# Patient Record
Sex: FEMALE | Race: WHITE | Employment: OTHER | URBAN - METROPOLITAN AREA
[De-identification: names, ages, dates, MRNs, and addresses within clinical notes are randomized per-mention and may not be internally consistent; named-entity substitution may affect disease eponyms.]

---

## 2017-11-13 PROBLEM — S80.01XA CONTUSION OF RIGHT KNEE: Status: ACTIVE | Noted: 2017-11-13

## 2017-11-13 PROBLEM — S13.9XXA NECK SPRAIN: Status: ACTIVE | Noted: 2017-11-13

## 2017-11-13 PROBLEM — T14.8XXA ABRASION: Status: ACTIVE | Noted: 2017-11-13

## 2018-04-08 ENCOUNTER — APPOINTMENT (OUTPATIENT)
Dept: GENERAL RADIOLOGY | Age: 83
End: 2018-04-08
Payer: MEDICARE

## 2018-04-08 ENCOUNTER — HOSPITAL ENCOUNTER (EMERGENCY)
Age: 83
Discharge: HOME | End: 2018-04-08
Attending: EMERGENCY MEDICINE
Payer: MEDICARE

## 2018-04-08 VITALS
HEART RATE: 80 BPM | OXYGEN SATURATION: 96 % | DIASTOLIC BLOOD PRESSURE: 70 MMHG | WEIGHT: 160 LBS | RESPIRATION RATE: 18 BRPM | TEMPERATURE: 98 F | SYSTOLIC BLOOD PRESSURE: 144 MMHG | HEIGHT: 63 IN | BODY MASS INDEX: 28.35 KG/M2

## 2018-04-08 DIAGNOSIS — J06.9 UPPER RESPIRATORY TRACT INFECTION, UNSPECIFIED TYPE: Primary | ICD-10-CM

## 2018-04-08 LAB
ALBUMIN SERPL-MCNC: 3.8 G/DL (ref 3.5–5.2)
ALP BLD-CCNC: 81 U/L (ref 35–104)
ALT SERPL-CCNC: 8 U/L (ref 0–32)
ANION GAP SERPL CALCULATED.3IONS-SCNC: 9 MMOL/L (ref 7–16)
AST SERPL-CCNC: 17 U/L (ref 0–31)
BASOPHILS ABSOLUTE: 0.02 E9/L (ref 0–0.2)
BASOPHILS RELATIVE PERCENT: 0.3 % (ref 0–2)
BILIRUB SERPL-MCNC: 0.2 MG/DL (ref 0–1.2)
BUN BLDV-MCNC: 12 MG/DL (ref 8–23)
CALCIUM SERPL-MCNC: 8.8 MG/DL (ref 8.6–10.2)
CHLORIDE BLD-SCNC: 102 MMOL/L (ref 98–107)
CO2: 28 MMOL/L (ref 22–29)
CREAT SERPL-MCNC: 0.8 MG/DL (ref 0.5–1)
EKG ATRIAL RATE: 69 BPM
EKG P AXIS: 77 DEGREES
EKG P-R INTERVAL: 194 MS
EKG Q-T INTERVAL: 434 MS
EKG QRS DURATION: 80 MS
EKG QTC CALCULATION (BAZETT): 465 MS
EKG R AXIS: -3 DEGREES
EKG T AXIS: -16 DEGREES
EKG VENTRICULAR RATE: 69 BPM
EOSINOPHILS ABSOLUTE: 0.12 E9/L (ref 0.05–0.5)
EOSINOPHILS RELATIVE PERCENT: 1.9 % (ref 0–6)
GFR AFRICAN AMERICAN: >60
GFR NON-AFRICAN AMERICAN: >60 ML/MIN/1.73
GLUCOSE BLD-MCNC: 100 MG/DL (ref 74–109)
HCT VFR BLD CALC: 39.2 % (ref 34–48)
HEMOGLOBIN: 12.9 G/DL (ref 11.5–15.5)
IMMATURE GRANULOCYTES #: 0.02 E9/L
IMMATURE GRANULOCYTES %: 0.3 % (ref 0–5)
INFLUENZA A BY PCR: NOT DETECTED
INFLUENZA B BY PCR: NOT DETECTED
LACTIC ACID: 0.8 MMOL/L (ref 0.5–2.2)
LYMPHOCYTES ABSOLUTE: 1.23 E9/L (ref 1.5–4)
LYMPHOCYTES RELATIVE PERCENT: 19.4 % (ref 20–42)
MCH RBC QN AUTO: 30.4 PG (ref 26–35)
MCHC RBC AUTO-ENTMCNC: 32.9 % (ref 32–34.5)
MCV RBC AUTO: 92.5 FL (ref 80–99.9)
MONOCYTES ABSOLUTE: 0.72 E9/L (ref 0.1–0.95)
MONOCYTES RELATIVE PERCENT: 11.4 % (ref 2–12)
NEUTROPHILS ABSOLUTE: 4.22 E9/L (ref 1.8–7.3)
NEUTROPHILS RELATIVE PERCENT: 66.7 % (ref 43–80)
PDW BLD-RTO: 13.2 FL (ref 11.5–15)
PLATELET # BLD: 280 E9/L (ref 130–450)
PMV BLD AUTO: 9.6 FL (ref 7–12)
POTASSIUM SERPL-SCNC: 3.9 MMOL/L (ref 3.5–5)
RBC # BLD: 4.24 E12/L (ref 3.5–5.5)
SODIUM BLD-SCNC: 139 MMOL/L (ref 132–146)
TOTAL PROTEIN: 7.1 G/DL (ref 6.4–8.3)
TROPONIN: <0.01 NG/ML (ref 0–0.03)
WBC # BLD: 6.3 E9/L (ref 4.5–11.5)

## 2018-04-08 PROCEDURE — 85025 COMPLETE CBC W/AUTO DIFF WBC: CPT

## 2018-04-08 PROCEDURE — 36415 COLL VENOUS BLD VENIPUNCTURE: CPT

## 2018-04-08 PROCEDURE — 84484 ASSAY OF TROPONIN QUANT: CPT

## 2018-04-08 PROCEDURE — 87502 INFLUENZA DNA AMP PROBE: CPT

## 2018-04-08 PROCEDURE — 2580000003 HC RX 258: Performed by: EMERGENCY MEDICINE

## 2018-04-08 PROCEDURE — 99284 EMERGENCY DEPT VISIT MOD MDM: CPT

## 2018-04-08 PROCEDURE — 96374 THER/PROPH/DIAG INJ IV PUSH: CPT

## 2018-04-08 PROCEDURE — 6360000002 HC RX W HCPCS: Performed by: EMERGENCY MEDICINE

## 2018-04-08 PROCEDURE — 80053 COMPREHEN METABOLIC PANEL: CPT

## 2018-04-08 PROCEDURE — 83605 ASSAY OF LACTIC ACID: CPT

## 2018-04-08 PROCEDURE — 71045 X-RAY EXAM CHEST 1 VIEW: CPT

## 2018-04-08 RX ORDER — PREDNISONE 10 MG/1
TABLET ORAL
Qty: 20 TABLET | Refills: 0 | Status: SHIPPED | OUTPATIENT
Start: 2018-04-08 | End: 2018-04-18

## 2018-04-08 RX ORDER — 0.9 % SODIUM CHLORIDE 0.9 %
1000 INTRAVENOUS SOLUTION INTRAVENOUS ONCE
Status: COMPLETED | OUTPATIENT
Start: 2018-04-08 | End: 2018-04-08

## 2018-04-08 RX ORDER — METHYLPREDNISOLONE SODIUM SUCCINATE 125 MG/2ML
125 INJECTION, POWDER, LYOPHILIZED, FOR SOLUTION INTRAMUSCULAR; INTRAVENOUS ONCE
Status: COMPLETED | OUTPATIENT
Start: 2018-04-08 | End: 2018-04-08

## 2018-04-08 RX ORDER — CODEINE PHOSPHATE AND GUAIFENESIN 10; 100 MG/5ML; MG/5ML
5 SOLUTION ORAL 3 TIMES DAILY PRN
Qty: 150 ML | Refills: 0 | Status: SHIPPED | OUTPATIENT
Start: 2018-04-08 | End: 2018-04-13

## 2018-04-08 RX ADMIN — SODIUM CHLORIDE 1000 ML: 9 INJECTION, SOLUTION INTRAVENOUS at 17:53

## 2018-04-08 RX ADMIN — METHYLPREDNISOLONE SODIUM SUCCINATE 125 MG: 125 INJECTION, POWDER, FOR SOLUTION INTRAMUSCULAR; INTRAVENOUS at 17:42

## 2018-04-08 ASSESSMENT — ENCOUNTER SYMPTOMS
DIARRHEA: 0
VOMITING: 0
NAUSEA: 0
EYE DISCHARGE: 0
EYE PAIN: 0
ABDOMINAL DISTENTION: 0
WHEEZING: 0
COUGH: 1
SORE THROAT: 0
SHORTNESS OF BREATH: 1
SINUS PRESSURE: 0
EYE REDNESS: 0
BACK PAIN: 0

## 2018-04-08 ASSESSMENT — PAIN DESCRIPTION - PAIN TYPE: TYPE: ACUTE PAIN

## 2018-04-08 ASSESSMENT — PAIN DESCRIPTION - ORIENTATION: ORIENTATION: RIGHT;LEFT

## 2018-04-08 ASSESSMENT — PAIN SCALES - GENERAL: PAINLEVEL_OUTOF10: 10

## 2018-04-08 ASSESSMENT — PAIN DESCRIPTION - DESCRIPTORS: DESCRIPTORS: ACHING;SORE

## 2018-04-08 ASSESSMENT — PAIN DESCRIPTION - LOCATION: LOCATION: ABDOMEN;HEAD;BACK

## 2018-04-14 ENCOUNTER — APPOINTMENT (OUTPATIENT)
Dept: GENERAL RADIOLOGY | Age: 83
End: 2018-04-14
Payer: MEDICARE

## 2018-04-14 ENCOUNTER — HOSPITAL ENCOUNTER (EMERGENCY)
Age: 83
Discharge: HOME | End: 2018-04-14
Payer: MEDICARE

## 2018-04-14 VITALS
RESPIRATION RATE: 18 BRPM | TEMPERATURE: 97.9 F | HEART RATE: 80 BPM | SYSTOLIC BLOOD PRESSURE: 148 MMHG | BODY MASS INDEX: 27.31 KG/M2 | DIASTOLIC BLOOD PRESSURE: 70 MMHG | HEIGHT: 64 IN | OXYGEN SATURATION: 96 % | WEIGHT: 160 LBS

## 2018-04-14 DIAGNOSIS — J40 BRONCHITIS: Primary | ICD-10-CM

## 2018-04-14 LAB
ANION GAP SERPL CALCULATED.3IONS-SCNC: 11 MMOL/L (ref 7–16)
BUN BLDV-MCNC: 19 MG/DL (ref 8–23)
CALCIUM SERPL-MCNC: 8.5 MG/DL (ref 8.6–10.2)
CHLORIDE BLD-SCNC: 102 MMOL/L (ref 98–107)
CO2: 26 MMOL/L (ref 22–29)
CREAT SERPL-MCNC: 0.9 MG/DL (ref 0.5–1)
EKG ATRIAL RATE: 82 BPM
EKG P AXIS: 70 DEGREES
EKG P-R INTERVAL: 182 MS
EKG Q-T INTERVAL: 404 MS
EKG QRS DURATION: 82 MS
EKG QTC CALCULATION (BAZETT): 472 MS
EKG R AXIS: -6 DEGREES
EKG T AXIS: 46 DEGREES
EKG VENTRICULAR RATE: 82 BPM
GFR AFRICAN AMERICAN: >60
GFR NON-AFRICAN AMERICAN: >60 ML/MIN/1.73
GLUCOSE BLD-MCNC: 162 MG/DL (ref 74–109)
HCT VFR BLD CALC: 37.6 % (ref 34–48)
HEMOGLOBIN: 12.7 G/DL (ref 11.5–15.5)
MCH RBC QN AUTO: 30.9 PG (ref 26–35)
MCHC RBC AUTO-ENTMCNC: 33.8 % (ref 32–34.5)
MCV RBC AUTO: 91.5 FL (ref 80–99.9)
PDW BLD-RTO: 13.1 FL (ref 11.5–15)
PLATELET # BLD: 370 E9/L (ref 130–450)
PMV BLD AUTO: 9.4 FL (ref 7–12)
POTASSIUM SERPL-SCNC: 4.4 MMOL/L (ref 3.5–5)
PRO-BNP: 396 PG/ML (ref 0–450)
RBC # BLD: 4.11 E12/L (ref 3.5–5.5)
SODIUM BLD-SCNC: 139 MMOL/L (ref 132–146)
TROPONIN: <0.01 NG/ML (ref 0–0.03)
WBC # BLD: 8 E9/L (ref 4.5–11.5)

## 2018-04-14 PROCEDURE — 85027 COMPLETE CBC AUTOMATED: CPT

## 2018-04-14 PROCEDURE — 83880 ASSAY OF NATRIURETIC PEPTIDE: CPT

## 2018-04-14 PROCEDURE — 80048 BASIC METABOLIC PNL TOTAL CA: CPT

## 2018-04-14 PROCEDURE — 94664 DEMO&/EVAL PT USE INHALER: CPT

## 2018-04-14 PROCEDURE — 6370000000 HC RX 637 (ALT 250 FOR IP): Performed by: PHYSICIAN ASSISTANT

## 2018-04-14 PROCEDURE — 84484 ASSAY OF TROPONIN QUANT: CPT

## 2018-04-14 PROCEDURE — 71046 X-RAY EXAM CHEST 2 VIEWS: CPT

## 2018-04-14 PROCEDURE — 99284 EMERGENCY DEPT VISIT MOD MDM: CPT

## 2018-04-14 RX ORDER — DOXYCYCLINE HYCLATE 100 MG
100 TABLET ORAL 2 TIMES DAILY
Qty: 20 TABLET | Refills: 0 | Status: SHIPPED | OUTPATIENT
Start: 2018-04-14 | End: 2018-04-24

## 2018-04-14 RX ORDER — ALBUTEROL SULFATE 90 UG/1
2 AEROSOL, METERED RESPIRATORY (INHALATION) EVERY 4 HOURS PRN
Qty: 1 INHALER | Refills: 1 | Status: SHIPPED | OUTPATIENT
Start: 2018-04-14 | End: 2018-06-04 | Stop reason: ALTCHOICE

## 2018-04-14 RX ORDER — IPRATROPIUM BROMIDE AND ALBUTEROL SULFATE 2.5; .5 MG/3ML; MG/3ML
1 SOLUTION RESPIRATORY (INHALATION) ONCE
Status: COMPLETED | OUTPATIENT
Start: 2018-04-14 | End: 2018-04-14

## 2018-04-14 RX ADMIN — IPRATROPIUM BROMIDE AND ALBUTEROL SULFATE 1 AMPULE: .5; 3 SOLUTION RESPIRATORY (INHALATION) at 00:57

## 2018-04-14 ASSESSMENT — PAIN DESCRIPTION - LOCATION: LOCATION: CHEST

## 2018-04-14 ASSESSMENT — PAIN SCALES - GENERAL: PAINLEVEL_OUTOF10: 8

## 2018-04-14 ASSESSMENT — PAIN DESCRIPTION - PAIN TYPE: TYPE: ACUTE PAIN

## 2018-04-14 ASSESSMENT — PAIN DESCRIPTION - FREQUENCY: FREQUENCY: INTERMITTENT

## 2018-04-14 ASSESSMENT — PAIN DESCRIPTION - DESCRIPTORS: DESCRIPTORS: ACHING

## 2018-06-04 ENCOUNTER — APPOINTMENT (OUTPATIENT)
Dept: GENERAL RADIOLOGY | Age: 83
DRG: 305 | End: 2018-06-04
Payer: MEDICARE

## 2018-06-04 ENCOUNTER — HOSPITAL ENCOUNTER (INPATIENT)
Age: 83
LOS: 1 days | Discharge: HOME | DRG: 305 | End: 2018-06-05
Attending: INTERNAL MEDICINE | Admitting: INTERNAL MEDICINE
Payer: MEDICARE

## 2018-06-04 DIAGNOSIS — I10 ESSENTIAL HYPERTENSION: ICD-10-CM

## 2018-06-04 DIAGNOSIS — R55 NEAR SYNCOPE: Primary | ICD-10-CM

## 2018-06-04 LAB
ANION GAP SERPL CALCULATED.3IONS-SCNC: 8 MMOL/L (ref 7–16)
BASOPHILS ABSOLUTE: 0.02 E9/L (ref 0–0.2)
BASOPHILS RELATIVE PERCENT: 0.5 % (ref 0–2)
BUN BLDV-MCNC: 13 MG/DL (ref 8–23)
CALCIUM SERPL-MCNC: 8.6 MG/DL (ref 8.6–10.2)
CHLORIDE BLD-SCNC: 106 MMOL/L (ref 98–107)
CO2: 28 MMOL/L (ref 22–29)
CREAT SERPL-MCNC: 0.6 MG/DL (ref 0.5–1)
EOSINOPHILS ABSOLUTE: 0.1 E9/L (ref 0.05–0.5)
EOSINOPHILS RELATIVE PERCENT: 2.3 % (ref 0–6)
GFR AFRICAN AMERICAN: >60
GFR NON-AFRICAN AMERICAN: >60 ML/MIN/1.73
GLUCOSE BLD-MCNC: 98 MG/DL (ref 74–109)
HCT VFR BLD CALC: 36.5 % (ref 34–48)
HEMOGLOBIN: 11.9 G/DL (ref 11.5–15.5)
IMMATURE GRANULOCYTES #: 0.01 E9/L
IMMATURE GRANULOCYTES %: 0.2 % (ref 0–5)
LYMPHOCYTES ABSOLUTE: 1.2 E9/L (ref 1.5–4)
LYMPHOCYTES RELATIVE PERCENT: 27.8 % (ref 20–42)
MCH RBC QN AUTO: 30.7 PG (ref 26–35)
MCHC RBC AUTO-ENTMCNC: 32.6 % (ref 32–34.5)
MCV RBC AUTO: 94.1 FL (ref 80–99.9)
MONOCYTES ABSOLUTE: 0.46 E9/L (ref 0.1–0.95)
MONOCYTES RELATIVE PERCENT: 10.6 % (ref 2–12)
NEUTROPHILS ABSOLUTE: 2.53 E9/L (ref 1.8–7.3)
NEUTROPHILS RELATIVE PERCENT: 58.6 % (ref 43–80)
PDW BLD-RTO: 13.4 FL (ref 11.5–15)
PLATELET # BLD: 239 E9/L (ref 130–450)
PMV BLD AUTO: 9.8 FL (ref 7–12)
POTASSIUM SERPL-SCNC: 3.8 MMOL/L (ref 3.5–5)
RBC # BLD: 3.88 E12/L (ref 3.5–5.5)
SODIUM BLD-SCNC: 142 MMOL/L (ref 132–146)
TROPONIN: <0.01 NG/ML (ref 0–0.03)
WBC # BLD: 4.3 E9/L (ref 4.5–11.5)

## 2018-06-04 PROCEDURE — 71046 X-RAY EXAM CHEST 2 VIEWS: CPT

## 2018-06-04 PROCEDURE — 6370000000 HC RX 637 (ALT 250 FOR IP): Performed by: INTERNAL MEDICINE

## 2018-06-04 PROCEDURE — 2580000003 HC RX 258: Performed by: INTERNAL MEDICINE

## 2018-06-04 PROCEDURE — 85025 COMPLETE CBC W/AUTO DIFF WBC: CPT

## 2018-06-04 PROCEDURE — 99285 EMERGENCY DEPT VISIT HI MDM: CPT

## 2018-06-04 PROCEDURE — 80048 BASIC METABOLIC PNL TOTAL CA: CPT

## 2018-06-04 PROCEDURE — 2060000000 HC ICU INTERMEDIATE R&B

## 2018-06-04 PROCEDURE — 84484 ASSAY OF TROPONIN QUANT: CPT

## 2018-06-04 RX ORDER — DIAZEPAM 2 MG/1
2 TABLET ORAL NIGHTLY
Status: DISCONTINUED | OUTPATIENT
Start: 2018-06-04 | End: 2018-06-05 | Stop reason: HOSPADM

## 2018-06-04 RX ORDER — SODIUM CHLORIDE 0.9 % (FLUSH) 0.9 %
10 SYRINGE (ML) INJECTION PRN
Status: DISCONTINUED | OUTPATIENT
Start: 2018-06-04 | End: 2018-06-05 | Stop reason: HOSPADM

## 2018-06-04 RX ORDER — ACETAMINOPHEN 325 MG/1
650 TABLET ORAL EVERY 4 HOURS PRN
Status: DISCONTINUED | OUTPATIENT
Start: 2018-06-04 | End: 2018-06-05 | Stop reason: HOSPADM

## 2018-06-04 RX ORDER — SODIUM CHLORIDE 0.9 % (FLUSH) 0.9 %
10 SYRINGE (ML) INJECTION EVERY 12 HOURS SCHEDULED
Status: DISCONTINUED | OUTPATIENT
Start: 2018-06-04 | End: 2018-06-05 | Stop reason: HOSPADM

## 2018-06-04 RX ORDER — EZETIMIBE 10 MG/1
10 TABLET ORAL DAILY
Status: DISCONTINUED | OUTPATIENT
Start: 2018-06-05 | End: 2018-06-05 | Stop reason: HOSPADM

## 2018-06-04 RX ORDER — ASPIRIN 81 MG/1
81 TABLET, CHEWABLE ORAL DAILY
Status: DISCONTINUED | OUTPATIENT
Start: 2018-06-05 | End: 2018-06-05 | Stop reason: HOSPADM

## 2018-06-04 RX ORDER — CLONIDINE HYDROCHLORIDE 0.1 MG/1
0.1 TABLET ORAL EVERY 12 HOURS
Status: DISCONTINUED | OUTPATIENT
Start: 2018-06-04 | End: 2018-06-05

## 2018-06-04 RX ADMIN — Medication 10 ML: at 23:44

## 2018-06-04 RX ADMIN — DIAZEPAM 2 MG: 2 TABLET ORAL at 23:44

## 2018-06-04 ASSESSMENT — PAIN SCALES - GENERAL
PAINLEVEL_OUTOF10: 0
PAINLEVEL_OUTOF10: 0

## 2018-06-05 VITALS
WEIGHT: 169 LBS | RESPIRATION RATE: 18 BRPM | OXYGEN SATURATION: 97 % | SYSTOLIC BLOOD PRESSURE: 142 MMHG | DIASTOLIC BLOOD PRESSURE: 67 MMHG | HEART RATE: 75 BPM | BODY MASS INDEX: 29.95 KG/M2 | TEMPERATURE: 97.6 F | HEIGHT: 63 IN

## 2018-06-05 LAB
ANION GAP SERPL CALCULATED.3IONS-SCNC: 10 MMOL/L (ref 7–16)
BUN BLDV-MCNC: 14 MG/DL (ref 8–23)
CALCIUM SERPL-MCNC: 8.9 MG/DL (ref 8.6–10.2)
CHLORIDE BLD-SCNC: 105 MMOL/L (ref 98–107)
CO2: 26 MMOL/L (ref 22–29)
CREAT SERPL-MCNC: 0.6 MG/DL (ref 0.5–1)
GFR AFRICAN AMERICAN: >60
GFR NON-AFRICAN AMERICAN: >60 ML/MIN/1.73
GLUCOSE BLD-MCNC: 97 MG/DL (ref 74–109)
HCT VFR BLD CALC: 34.5 % (ref 34–48)
HEMOGLOBIN: 11.4 G/DL (ref 11.5–15.5)
LV EF: 65 %
LVEF MODALITY: NORMAL
MCH RBC QN AUTO: 31.1 PG (ref 26–35)
MCHC RBC AUTO-ENTMCNC: 33 % (ref 32–34.5)
MCV RBC AUTO: 94 FL (ref 80–99.9)
PDW BLD-RTO: 13.4 FL (ref 11.5–15)
PLATELET # BLD: 226 E9/L (ref 130–450)
PMV BLD AUTO: 9.9 FL (ref 7–12)
POTASSIUM SERPL-SCNC: 4 MMOL/L (ref 3.5–5)
PRO-BNP: 223 PG/ML (ref 0–450)
RBC # BLD: 3.67 E12/L (ref 3.5–5.5)
SODIUM BLD-SCNC: 141 MMOL/L (ref 132–146)
T3 FREE: 2.6 PG/ML (ref 2–4.4)
T4 FREE: 1.04 NG/DL (ref 0.93–1.7)
TROPONIN: <0.01 NG/ML (ref 0–0.03)
TROPONIN: <0.01 NG/ML (ref 0–0.03)
TSH SERPL DL<=0.05 MIU/L-ACNC: 2.56 UIU/ML (ref 0.27–4.2)
WBC # BLD: 3.8 E9/L (ref 4.5–11.5)

## 2018-06-05 PROCEDURE — 2580000003 HC RX 258: Performed by: INTERNAL MEDICINE

## 2018-06-05 PROCEDURE — 85027 COMPLETE CBC AUTOMATED: CPT

## 2018-06-05 PROCEDURE — 99232 SBSQ HOSP IP/OBS MODERATE 35: CPT | Performed by: INTERNAL MEDICINE

## 2018-06-05 PROCEDURE — 84481 FREE ASSAY (FT-3): CPT

## 2018-06-05 PROCEDURE — 6370000000 HC RX 637 (ALT 250 FOR IP): Performed by: INTERNAL MEDICINE

## 2018-06-05 PROCEDURE — APPSS45 APP SPLIT SHARED TIME 31-45 MINUTES: Performed by: NURSE PRACTITIONER

## 2018-06-05 PROCEDURE — 36415 COLL VENOUS BLD VENIPUNCTURE: CPT

## 2018-06-05 PROCEDURE — 84484 ASSAY OF TROPONIN QUANT: CPT

## 2018-06-05 PROCEDURE — 6370000000 HC RX 637 (ALT 250 FOR IP): Performed by: NURSE PRACTITIONER

## 2018-06-05 PROCEDURE — 84439 ASSAY OF FREE THYROXINE: CPT

## 2018-06-05 PROCEDURE — 80048 BASIC METABOLIC PNL TOTAL CA: CPT

## 2018-06-05 PROCEDURE — 93306 TTE W/DOPPLER COMPLETE: CPT

## 2018-06-05 PROCEDURE — 83880 ASSAY OF NATRIURETIC PEPTIDE: CPT

## 2018-06-05 PROCEDURE — 84443 ASSAY THYROID STIM HORMONE: CPT

## 2018-06-05 RX ORDER — AMLODIPINE BESYLATE 5 MG/1
5 TABLET ORAL DAILY
Qty: 30 TABLET | Refills: 3 | Status: ON HOLD | OUTPATIENT
Start: 2018-06-06 | End: 2019-10-29 | Stop reason: HOSPADM

## 2018-06-05 RX ORDER — AMLODIPINE BESYLATE 5 MG/1
5 TABLET ORAL DAILY
Status: DISCONTINUED | OUTPATIENT
Start: 2018-06-05 | End: 2018-06-05 | Stop reason: HOSPADM

## 2018-06-05 RX ADMIN — ASPIRIN 81 MG 81 MG: 81 TABLET ORAL at 09:37

## 2018-06-05 RX ADMIN — EZETIMIBE 10 MG: 10 TABLET ORAL at 09:37

## 2018-06-05 RX ADMIN — Medication 10 ML: at 09:38

## 2018-06-05 RX ADMIN — AMLODIPINE BESYLATE 5 MG: 5 TABLET ORAL at 09:37

## 2018-06-05 ASSESSMENT — PAIN SCALES - GENERAL: PAINLEVEL_OUTOF10: 0

## 2018-06-15 LAB
EKG ATRIAL RATE: 59 BPM
EKG P AXIS: 56 DEGREES
EKG P-R INTERVAL: 216 MS
EKG Q-T INTERVAL: 468 MS
EKG QRS DURATION: 84 MS
EKG QTC CALCULATION (BAZETT): 463 MS
EKG R AXIS: 10 DEGREES
EKG T AXIS: 35 DEGREES
EKG VENTRICULAR RATE: 59 BPM

## 2018-08-30 ENCOUNTER — APPOINTMENT (OUTPATIENT)
Dept: CT IMAGING | Age: 83
End: 2018-08-30
Payer: MEDICARE

## 2018-08-30 ENCOUNTER — HOSPITAL ENCOUNTER (OUTPATIENT)
Age: 83
Setting detail: OBSERVATION
Discharge: HOME | End: 2018-08-31
Attending: EMERGENCY MEDICINE | Admitting: INTERNAL MEDICINE
Payer: MEDICARE

## 2018-08-30 DIAGNOSIS — K57.92 ACUTE DIVERTICULITIS: Primary | ICD-10-CM

## 2018-08-30 PROBLEM — K57.33 DIVERTICULITIS LARGE INTESTINE W/O PERFORATION OR ABSCESS W/BLEEDING: Status: ACTIVE | Noted: 2018-08-30

## 2018-08-30 LAB
ALBUMIN SERPL-MCNC: 3.9 G/DL (ref 3.5–5.2)
ALP BLD-CCNC: 93 U/L (ref 35–104)
ALT SERPL-CCNC: 9 U/L (ref 0–32)
ANION GAP SERPL CALCULATED.3IONS-SCNC: 9 MMOL/L (ref 7–16)
AST SERPL-CCNC: 13 U/L (ref 0–31)
BACTERIA: ABNORMAL /HPF
BASOPHILS ABSOLUTE: 0.02 E9/L (ref 0–0.2)
BASOPHILS RELATIVE PERCENT: 0.3 % (ref 0–2)
BILIRUB SERPL-MCNC: 0.5 MG/DL (ref 0–1.2)
BILIRUBIN URINE: NEGATIVE
BLOOD, URINE: NEGATIVE
BUN BLDV-MCNC: 15 MG/DL (ref 8–23)
CALCIUM SERPL-MCNC: 9.4 MG/DL (ref 8.6–10.2)
CHLORIDE BLD-SCNC: 105 MMOL/L (ref 98–107)
CHP ED QC CHECK: YES
CLARITY: CLEAR
CO2: 27 MMOL/L (ref 22–29)
COLOR: YELLOW
CREAT SERPL-MCNC: 0.8 MG/DL (ref 0.5–1)
CRYSTALS, UA: ABNORMAL
EOSINOPHILS ABSOLUTE: 0.08 E9/L (ref 0.05–0.5)
EOSINOPHILS RELATIVE PERCENT: 1.4 % (ref 0–6)
EPITHELIAL CELLS, UA: ABNORMAL /HPF
GFR AFRICAN AMERICAN: >60
GFR NON-AFRICAN AMERICAN: >60 ML/MIN/1.73
GLUCOSE BLD-MCNC: 100 MG/DL (ref 74–109)
GLUCOSE URINE: NEGATIVE MG/DL
HCT VFR BLD CALC: 38.6 % (ref 34–48)
HEMOCCULT STL QL: NEGATIVE
HEMOGLOBIN: 12.8 G/DL (ref 11.5–15.5)
IMMATURE GRANULOCYTES #: 0.02 E9/L
IMMATURE GRANULOCYTES %: 0.3 % (ref 0–5)
KETONES, URINE: NEGATIVE MG/DL
LACTIC ACID: 1.1 MMOL/L (ref 0.5–2.2)
LEUKOCYTE ESTERASE, URINE: ABNORMAL
LIPASE: 15 U/L (ref 13–60)
LYMPHOCYTES ABSOLUTE: 1.02 E9/L (ref 1.5–4)
LYMPHOCYTES RELATIVE PERCENT: 17.7 % (ref 20–42)
MCH RBC QN AUTO: 30.8 PG (ref 26–35)
MCHC RBC AUTO-ENTMCNC: 33.2 % (ref 32–34.5)
MCV RBC AUTO: 93 FL (ref 80–99.9)
MONOCYTES ABSOLUTE: 0.62 E9/L (ref 0.1–0.95)
MONOCYTES RELATIVE PERCENT: 10.8 % (ref 2–12)
NEUTROPHILS ABSOLUTE: 4 E9/L (ref 1.8–7.3)
NEUTROPHILS RELATIVE PERCENT: 69.5 % (ref 43–80)
NITRITE, URINE: NEGATIVE
PDW BLD-RTO: 13.5 FL (ref 11.5–15)
PH UA: 5.5 (ref 5–9)
PLATELET # BLD: 289 E9/L (ref 130–450)
PMV BLD AUTO: 9.7 FL (ref 7–12)
POTASSIUM SERPL-SCNC: 3.6 MMOL/L (ref 3.5–5)
PROTEIN UA: NEGATIVE MG/DL
RBC # BLD: 4.15 E12/L (ref 3.5–5.5)
RBC UA: ABNORMAL /HPF (ref 0–2)
SODIUM BLD-SCNC: 141 MMOL/L (ref 132–146)
SPECIFIC GRAVITY UA: >=1.03 (ref 1–1.03)
TOTAL PROTEIN: 6.9 G/DL (ref 6.4–8.3)
UROBILINOGEN, URINE: 0.2 E.U./DL
WBC # BLD: 5.8 E9/L (ref 4.5–11.5)
WBC UA: ABNORMAL /HPF (ref 0–5)

## 2018-08-30 PROCEDURE — G0378 HOSPITAL OBSERVATION PER HR: HCPCS

## 2018-08-30 PROCEDURE — 85025 COMPLETE CBC W/AUTO DIFF WBC: CPT

## 2018-08-30 PROCEDURE — 2500000003 HC RX 250 WO HCPCS: Performed by: EMERGENCY MEDICINE

## 2018-08-30 PROCEDURE — 74176 CT ABD & PELVIS W/O CONTRAST: CPT

## 2018-08-30 PROCEDURE — 99285 EMERGENCY DEPT VISIT HI MDM: CPT

## 2018-08-30 PROCEDURE — 80053 COMPREHEN METABOLIC PANEL: CPT

## 2018-08-30 PROCEDURE — 6360000002 HC RX W HCPCS: Performed by: EMERGENCY MEDICINE

## 2018-08-30 PROCEDURE — 83690 ASSAY OF LIPASE: CPT

## 2018-08-30 PROCEDURE — 83605 ASSAY OF LACTIC ACID: CPT

## 2018-08-30 PROCEDURE — 2580000003 HC RX 258: Performed by: NURSE PRACTITIONER

## 2018-08-30 PROCEDURE — 81001 URINALYSIS AUTO W/SCOPE: CPT

## 2018-08-30 PROCEDURE — 96365 THER/PROPH/DIAG IV INF INIT: CPT

## 2018-08-30 RX ORDER — SODIUM CHLORIDE 0.9 % (FLUSH) 0.9 %
10 SYRINGE (ML) INJECTION PRN
Status: DISCONTINUED | OUTPATIENT
Start: 2018-08-30 | End: 2018-08-31 | Stop reason: HOSPADM

## 2018-08-30 RX ORDER — SODIUM CHLORIDE 0.9 % (FLUSH) 0.9 %
10 SYRINGE (ML) INJECTION EVERY 12 HOURS SCHEDULED
Status: DISCONTINUED | OUTPATIENT
Start: 2018-08-30 | End: 2018-08-31 | Stop reason: HOSPADM

## 2018-08-30 RX ORDER — 0.9 % SODIUM CHLORIDE 0.9 %
1000 INTRAVENOUS SOLUTION INTRAVENOUS ONCE
Status: COMPLETED | OUTPATIENT
Start: 2018-08-30 | End: 2018-08-30

## 2018-08-30 RX ORDER — CIPROFLOXACIN 2 MG/ML
400 INJECTION, SOLUTION INTRAVENOUS ONCE
Status: COMPLETED | OUTPATIENT
Start: 2018-08-30 | End: 2018-08-30

## 2018-08-30 RX ORDER — ACETAMINOPHEN 325 MG/1
650 TABLET ORAL EVERY 4 HOURS PRN
Status: DISCONTINUED | OUTPATIENT
Start: 2018-08-30 | End: 2018-08-31 | Stop reason: HOSPADM

## 2018-08-30 RX ORDER — DIAZEPAM 2 MG/1
1 TABLET ORAL NIGHTLY
Status: ON HOLD | COMMUNITY
Start: 2018-08-11 | End: 2019-10-29 | Stop reason: SDUPTHER

## 2018-08-30 RX ADMIN — SODIUM CHLORIDE 1000 ML: 9 INJECTION, SOLUTION INTRAVENOUS at 14:03

## 2018-08-30 RX ADMIN — CIPROFLOXACIN 400 MG: 2 INJECTION, SOLUTION INTRAVENOUS at 16:51

## 2018-08-30 RX ADMIN — METRONIDAZOLE 500 MG: 500 INJECTION, SOLUTION INTRAVENOUS at 18:04

## 2018-08-30 ASSESSMENT — PAIN DESCRIPTION - FREQUENCY: FREQUENCY: CONTINUOUS

## 2018-08-30 ASSESSMENT — PAIN DESCRIPTION - LOCATION: LOCATION: ABDOMEN

## 2018-08-30 ASSESSMENT — PAIN DESCRIPTION - ONSET: ONSET: ON-GOING

## 2018-08-30 ASSESSMENT — PAIN DESCRIPTION - PROGRESSION: CLINICAL_PROGRESSION: NOT CHANGED

## 2018-08-30 ASSESSMENT — PAIN SCALES - GENERAL
PAINLEVEL_OUTOF10: 5
PAINLEVEL_OUTOF10: 5

## 2018-08-30 ASSESSMENT — PAIN DESCRIPTION - DESCRIPTORS: DESCRIPTORS: ACHING;CONSTANT;DISCOMFORT

## 2018-08-30 ASSESSMENT — PAIN DESCRIPTION - PAIN TYPE: TYPE: ACUTE PAIN

## 2018-08-30 ASSESSMENT — PAIN DESCRIPTION - ORIENTATION: ORIENTATION: RIGHT;LEFT;MID

## 2018-08-30 NOTE — ED PROVIDER NOTES
Saturation Interpretation: Normal.    · General Appearance/Constitutional:  Alert, development consistent with age. NAD  · HEENT:  NC/NT. PERRLA. Airway patent. · Neck:  Supple. No lymphadenopathy. No meningeal signs. · Respiratory:  No retractions. Lungs Clear to auscultation and breath sounds equal.  · CV:  Regular rate and rhythm. · GI:  General Appearance: normal.         Bowel sounds: normal bowel sounds. Distension:  None. Tenderness: mild tenderness is present in the lower abdomen, no rebound tenderness, no guarding. Nonsurgical abdomen. Liver: non-tender. Spleen:  non-tender. Pulsatile Mass: absent. Rectum: Female chaperone at bedside. No thrombosed or bleeding hemorrhoids noted. No pain with palpation of rectum and walls. No gross blood noted on gloved finger. Hemoccult negative. Back: CVA Tenderness: No.  · Integument:  Normal turgor. Warm, dry, without visible rash, unless noted elsewhere. · Lymphatics: No edema, cap.refill <3sec. · Neurological:  Orientation age-appropriate. Motor functions intact.     Lab / Imaging Results   (All laboratory and radiology results have been personally reviewed by myself)  Labs:  Results for orders placed or performed during the hospital encounter of 08/30/18   CBC auto differential   Result Value Ref Range    WBC 5.8 4.5 - 11.5 E9/L    RBC 4.15 3.50 - 5.50 E12/L    Hemoglobin 12.8 11.5 - 15.5 g/dL    Hematocrit 38.6 34.0 - 48.0 %    MCV 93.0 80.0 - 99.9 fL    MCH 30.8 26.0 - 35.0 pg    MCHC 33.2 32.0 - 34.5 %    RDW 13.5 11.5 - 15.0 fL    Platelets 411 303 - 472 E9/L    MPV 9.7 7.0 - 12.0 fL    Neutrophils % 69.5 43.0 - 80.0 %    Immature Granulocytes % 0.3 0.0 - 5.0 %    Lymphocytes % 17.7 (L) 20.0 - 42.0 %    Monocytes % 10.8 2.0 - 12.0 %    Eosinophils % 1.4 0.0 - 6.0 %    Basophils % 0.3 0.0 - 2.0 %    Neutrophils # 4.00 1.80 - 7.30 E9/L    Immature Granulocytes # 0.02 E9/L    Lymphocytes # Medical Decision Making     Medications   ciprofloxacin (CIPRO) IVPB 400 mg (400 mg Intravenous New Bag 8/30/18 1651)   metronidazole (FLAGYL) 500 mg in NaCl 100 mL IVPB premix (not administered)   0.9 % sodium chloride bolus (0 mLs Intravenous Stopped 8/30/18 1609)        Re-Evaluations:  8/30/18      Patient was informed of diagnostic test results that are back at this time. Awaiting CT results. Patient in no acute distress, nontoxic in appearance. Patient has a nonsurgical abdomen on reevaluation. Patient denies needs currently. 1655    Patient was informed that she will be admitted to the hospital for diverticulitis. Patient is comfortable and agreeable to this plan. Patient in no acute distress, nontoxic in appearance. Patient appropriate for transfer to the floor for further evaluation at this time. Consultations:             IP CONSULT TO PRIMARY CARE PROVIDER  1650: I spoke with Dr. Sky Membreno about the patient's presentation, diagnostic test results and current status. He states that he eugenie admit the patient and will place orders for her. Procedures:   none    MDM:  Patient presents to the emergency department for lower abdominal pain that began roughly 3 days ago. Patient reported blood on her toilet paper this morning while wiping. Multiple diagnostic tests were obtained. These lab tests were reassuring. However her CAT scan did show acute diverticulitis of the sigmoid colon. Because of this the patient was given IV antibiotics. She will be admitted to Dr. Sky Membreno on a medical surgical floor. Patient is comfortable with this plan. On reassessment prior to disposition patient has a nonsurgical abdomen. Patient's vital signs are stable. She is appropriate for transfer to the floor for further evaluation and care.     Counseling:   I have spoken with the patient and discussed todays results, in addition to providing specific details for the plan of care and counseling regarding the diagnosis and

## 2018-08-30 NOTE — H&P
History & Physical        Reason for admission:    History Obtained From:  patient    HISTORY OF PRESENT ILLNESS:    The patient is a [de-identified] y.o. female who presents to the hospital with abdominal pain and loose bowel movements evaluation ER with CAT scan indicated acute diverticulitis patient started on IV antibiotics IV fluids and assigned observation status. Past Medical History:        Diagnosis Date    Anxiety     Gastric reflux     Hypertension        Past Surgical History:        Procedure Laterality Date    COLONOSCOPY      HYSTERECTOMY         Medications Prior to Admission:    Not in a hospital admission. Allergies:  Lorazepam and Penicillins    Social History:   TOBACCO:   reports that she has never smoked. She has never used smokeless tobacco.  ETOH:   reports that she does not drink alcohol. Family History:   Family History   Problem Relation Age of Onset    High Blood Pressure Mother     Other Father         Emphysema    Cancer Brother         Leukemia    Cancer Brother        REVIEW OF SYSTEMS:  CONSTITUTIONAL:  Neg   Recent weight changes,fatigue,fever,chills or night sweats  EYES:  Neg  blurriness,tearing,itching or acute change in vision  NOSE:  Neg  rhinorrhea,sneezing,itching,allergy or epistaxis  MOUTH/THROAT:  Neg  bleeding gums,hoarseness or sore throat. RESPIRATORY:   Neg SOB,wheeze,cough,sputum,hemoptysis or bronochitis  CARDIOVASCULAR   Neg : chest pain,palpitations,dyspnea on exertion,orthopnea,paroxysmal nocturnal dyspnea or edema  GASTROINTESTINAL:  As per present illness. NEUROLOGICAL:  Neg  Loss of Consciousness,memeory loss,forgetfulness,periods of confusion,decline in intellect,nervousness,insomina,aphasia or dysarthria. SKIN :  Neg  skin or hair changes,and has no itching,rashes,sores.     PHYSICAL EXAM:  BP (!) 140/70   Pulse 82   Temp 98.3 °F (36.8 °C) (Oral)   Resp 18   Ht 5' 3\" (1.6 m)   Wt 170 lb (77.1 kg)   LMP  (LMP Unknown)   SpO2 95%   BMI 30.11

## 2018-08-30 NOTE — ED NOTES
SBAR faxed to [phone]. Called to confirm fax.         Chemo Robles, CARRI  08/30/18 8138 [facility] Candelario Aiken, CARRI  08/30/18 3420

## 2018-08-31 VITALS
BODY MASS INDEX: 30.12 KG/M2 | WEIGHT: 170 LBS | SYSTOLIC BLOOD PRESSURE: 127 MMHG | OXYGEN SATURATION: 95 % | RESPIRATION RATE: 18 BRPM | DIASTOLIC BLOOD PRESSURE: 58 MMHG | HEIGHT: 63 IN | HEART RATE: 92 BPM | TEMPERATURE: 97.8 F

## 2018-08-31 PROBLEM — E87.6 HYPOKALEMIA: Status: ACTIVE | Noted: 2018-08-31

## 2018-08-31 LAB
ANION GAP SERPL CALCULATED.3IONS-SCNC: 11 MMOL/L (ref 7–16)
BASOPHILS ABSOLUTE: 0.03 E9/L (ref 0–0.2)
BASOPHILS RELATIVE PERCENT: 0.7 % (ref 0–2)
BUN BLDV-MCNC: 10 MG/DL (ref 8–23)
CALCIUM SERPL-MCNC: 8.8 MG/DL (ref 8.6–10.2)
CHLORIDE BLD-SCNC: 108 MMOL/L (ref 98–107)
CO2: 25 MMOL/L (ref 22–29)
CREAT SERPL-MCNC: 0.7 MG/DL (ref 0.5–1)
EOSINOPHILS ABSOLUTE: 0.12 E9/L (ref 0.05–0.5)
EOSINOPHILS RELATIVE PERCENT: 2.9 % (ref 0–6)
GFR AFRICAN AMERICAN: >60
GFR NON-AFRICAN AMERICAN: >60 ML/MIN/1.73
GLUCOSE BLD-MCNC: 98 MG/DL (ref 74–109)
HCT VFR BLD CALC: 35.6 % (ref 34–48)
HEMOGLOBIN: 11.6 G/DL (ref 11.5–15.5)
IMMATURE GRANULOCYTES #: 0.02 E9/L
IMMATURE GRANULOCYTES %: 0.5 % (ref 0–5)
LYMPHOCYTES ABSOLUTE: 1.08 E9/L (ref 1.5–4)
LYMPHOCYTES RELATIVE PERCENT: 26.1 % (ref 20–42)
MCH RBC QN AUTO: 30.4 PG (ref 26–35)
MCHC RBC AUTO-ENTMCNC: 32.6 % (ref 32–34.5)
MCV RBC AUTO: 93.4 FL (ref 80–99.9)
MONOCYTES ABSOLUTE: 0.55 E9/L (ref 0.1–0.95)
MONOCYTES RELATIVE PERCENT: 13.3 % (ref 2–12)
NEUTROPHILS ABSOLUTE: 2.34 E9/L (ref 1.8–7.3)
NEUTROPHILS RELATIVE PERCENT: 56.5 % (ref 43–80)
PDW BLD-RTO: 13.5 FL (ref 11.5–15)
PLATELET # BLD: 273 E9/L (ref 130–450)
PMV BLD AUTO: 9.8 FL (ref 7–12)
POTASSIUM SERPL-SCNC: 3.4 MMOL/L (ref 3.5–5)
RBC # BLD: 3.81 E12/L (ref 3.5–5.5)
SODIUM BLD-SCNC: 144 MMOL/L (ref 132–146)
WBC # BLD: 4.1 E9/L (ref 4.5–11.5)

## 2018-08-31 PROCEDURE — 96367 TX/PROPH/DG ADDL SEQ IV INF: CPT

## 2018-08-31 PROCEDURE — G0378 HOSPITAL OBSERVATION PER HR: HCPCS

## 2018-08-31 PROCEDURE — 2580000003 HC RX 258: Performed by: INTERNAL MEDICINE

## 2018-08-31 PROCEDURE — 80048 BASIC METABOLIC PNL TOTAL CA: CPT

## 2018-08-31 PROCEDURE — 6370000000 HC RX 637 (ALT 250 FOR IP): Performed by: INTERNAL MEDICINE

## 2018-08-31 PROCEDURE — 96366 THER/PROPH/DIAG IV INF ADDON: CPT

## 2018-08-31 PROCEDURE — 6360000002 HC RX W HCPCS: Performed by: INTERNAL MEDICINE

## 2018-08-31 PROCEDURE — 2500000003 HC RX 250 WO HCPCS: Performed by: INTERNAL MEDICINE

## 2018-08-31 PROCEDURE — 85025 COMPLETE CBC W/AUTO DIFF WBC: CPT

## 2018-08-31 PROCEDURE — 36415 COLL VENOUS BLD VENIPUNCTURE: CPT

## 2018-08-31 RX ORDER — AMMONIUM LACTATE 12 G/100G
LOTION TOPICAL 2 TIMES DAILY
Status: DISCONTINUED | OUTPATIENT
Start: 2018-08-31 | End: 2018-08-31 | Stop reason: HOSPADM

## 2018-08-31 RX ORDER — POTASSIUM CHLORIDE 20 MEQ/1
40 TABLET, EXTENDED RELEASE ORAL ONCE
Status: COMPLETED | OUTPATIENT
Start: 2018-08-31 | End: 2018-08-31

## 2018-08-31 RX ORDER — EZETIMIBE 10 MG/1
10 TABLET ORAL DAILY
Status: DISCONTINUED | OUTPATIENT
Start: 2018-08-31 | End: 2018-08-31 | Stop reason: CLARIF

## 2018-08-31 RX ORDER — AMMONIUM LACTATE 12 G/100G
LOTION TOPICAL
Qty: 45 G | Refills: 5 | Status: ON HOLD | OUTPATIENT
Start: 2018-08-31 | End: 2020-02-04

## 2018-08-31 RX ORDER — ASPIRIN 81 MG/1
81 TABLET, CHEWABLE ORAL DAILY
Status: DISCONTINUED | OUTPATIENT
Start: 2018-08-31 | End: 2018-08-31 | Stop reason: HOSPADM

## 2018-08-31 RX ORDER — SODIUM CHLORIDE 9 MG/ML
INJECTION, SOLUTION INTRAVENOUS CONTINUOUS
Status: DISCONTINUED | OUTPATIENT
Start: 2018-08-31 | End: 2018-08-31 | Stop reason: HOSPADM

## 2018-08-31 RX ORDER — CIPROFLOXACIN 500 MG/1
500 TABLET, FILM COATED ORAL EVERY 12 HOURS SCHEDULED
Qty: 20 TABLET | Refills: 0 | Status: SHIPPED | OUTPATIENT
Start: 2018-08-31 | End: 2018-09-10

## 2018-08-31 RX ORDER — AMLODIPINE BESYLATE 2.5 MG/1
2.5 TABLET ORAL DAILY
Status: DISCONTINUED | OUTPATIENT
Start: 2018-08-31 | End: 2018-08-31 | Stop reason: HOSPADM

## 2018-08-31 RX ORDER — DIAZEPAM 2 MG/1
2 TABLET ORAL NIGHTLY
Status: DISCONTINUED | OUTPATIENT
Start: 2018-08-31 | End: 2018-08-31 | Stop reason: HOSPADM

## 2018-08-31 RX ORDER — CIPROFLOXACIN 2 MG/ML
400 INJECTION, SOLUTION INTRAVENOUS EVERY 12 HOURS
Status: DISCONTINUED | OUTPATIENT
Start: 2018-08-31 | End: 2018-08-31

## 2018-08-31 RX ORDER — AMMONIUM LACTATE 12 G/100G
LOTION TOPICAL PRN
Status: DISCONTINUED | OUTPATIENT
Start: 2018-08-31 | End: 2018-08-31 | Stop reason: SDUPTHER

## 2018-08-31 RX ORDER — METRONIDAZOLE 500 MG/1
500 TABLET ORAL EVERY 8 HOURS SCHEDULED
Qty: 30 TABLET | Refills: 0 | Status: SHIPPED | OUTPATIENT
Start: 2018-08-31 | End: 2018-09-10

## 2018-08-31 RX ORDER — CIPROFLOXACIN 500 MG/1
500 TABLET, FILM COATED ORAL EVERY 12 HOURS SCHEDULED
Status: DISCONTINUED | OUTPATIENT
Start: 2018-08-31 | End: 2018-08-31 | Stop reason: HOSPADM

## 2018-08-31 RX ORDER — METRONIDAZOLE 500 MG/1
500 TABLET ORAL EVERY 8 HOURS SCHEDULED
Status: DISCONTINUED | OUTPATIENT
Start: 2018-08-31 | End: 2018-08-31 | Stop reason: HOSPADM

## 2018-08-31 RX ADMIN — Medication 10 ML: at 10:54

## 2018-08-31 RX ADMIN — AMLODIPINE BESYLATE 2.5 MG: 2.5 TABLET ORAL at 10:54

## 2018-08-31 RX ADMIN — Medication 10 ML: at 01:04

## 2018-08-31 RX ADMIN — SODIUM CHLORIDE: 9 INJECTION, SOLUTION INTRAVENOUS at 10:54

## 2018-08-31 RX ADMIN — POTASSIUM CHLORIDE 40 MEQ: 20 TABLET, EXTENDED RELEASE ORAL at 18:12

## 2018-08-31 RX ADMIN — Medication: at 13:19

## 2018-08-31 RX ADMIN — METRONIDAZOLE 500 MG: 500 INJECTION, SOLUTION INTRAVENOUS at 13:19

## 2018-08-31 RX ADMIN — CIPROFLOXACIN 400 MG: 2 INJECTION, SOLUTION INTRAVENOUS at 10:54

## 2018-08-31 RX ADMIN — DIAZEPAM 2 MG: 2 TABLET ORAL at 01:03

## 2018-08-31 RX ADMIN — ASPIRIN 81 MG 81 MG: 81 TABLET ORAL at 10:54

## 2018-08-31 ASSESSMENT — PAIN SCALES - GENERAL
PAINLEVEL_OUTOF10: 0

## 2018-08-31 NOTE — DISCHARGE SUMMARY
Patient ID: Fredis Meeks      Patient's PCP: Aleksandr Jordan MD    Admit Date: 8/30/2018   Discharge Date: 8/31/2018  Admitting Physician: Aleksandr Jordan MD  Discharge Physician: Aleksandr Jordan   Discharge Diagnoses:   Patient Active Problem List   Diagnosis Code    Anxiety F41.9    Chest pain R07.9    Diabetes mellitus ([facility] [address] E11.9    Essential hypertension I10    Gastroesophageal reflux disease without esophagitis K21.9    Contusion of right knee S80. 01XA    Abrasion T14. 8XXA    Neck sprain S13. 9XXA    Near syncope R55    Mixed hyperlipidemia T74.2    Diastolic dysfunction I10.2    Dizziness R42    Diverticulitis K57.92    Diverticulitis large intestine w/o perforation or abscess w/bleeding K57.33    Hypokalemia E87.6       Hospital Course: The patient is admitted with abdominal pain found on CT scan to have acute diverticulitis, patient was started on IV Flagyl and IV Cipro as well as IV fluids, patient improved and had a formed bowel movement today and was able to tolerate diet. Patient is being discharged on oral antibiotics to follow in the office in one week. Physical Exam:  BP (!) 127/58   Pulse 92   Temp 97.8 °F (36.6 °C) (Oral)   Resp 18   Ht 5' 3\" (1.6 m)   Wt 170 lb (77.1 kg)   LMP  (LMP Unknown)   SpO2 95%   BMI 30.11 kg/m²   General appearance: alert, appears stated age and cooperative  Head: Normocephalic, without obvious abnormality, atraumatic  Eyes: conjunctivae/corneas clear. PERRL, EOM's intact. Ears: External ears -normal  Nose: No drainage or sinus tenderness. Throat: lips, mucosa, and tongue normal; teeth and gums normal  Neck: no adenopathy, no carotid bruit, no JVD, supple, symmetrical, trachea midline and thyroid not enlarged, symmetric, no tenderness/mass/nodules  Lungs: clear to auscultation bilaterally  Heart: regular rate and rhythm, S1, S2 normal, no murmur,   Abdomen: soft, mild discomfort with palpation in the right lower quadrant with no palpable masses.

## 2018-08-31 NOTE — PROGRESS NOTES
Pharmacy Note    Sydnee Hoang was ordered ezetimibe (Rosy Anna). Per the [address], this medication is non-formulary and not stocked by pharmacy for the reason indicated below. The medication can be reordered at discharge.      Medications in which risks outweigh benefits during hospitalization:         -  oral bisphosphonates         -  raloxifene (Evista)    Medications that lack necessity during an acute hospital stay:      -  ezetimibe (Zetia)         -  nasal antihistamines        -  nasal miacalcin        -  acyclovir topical cream/ointment orders for herpes labialis (cold sores)    Simon Harding RPh  8/31/2018  1:33 AM

## 2019-04-02 ENCOUNTER — HOSPITAL ENCOUNTER (EMERGENCY)
Age: 84
Discharge: HOME | End: 2019-04-02
Attending: EMERGENCY MEDICINE
Payer: MEDICARE

## 2019-04-02 ENCOUNTER — APPOINTMENT (OUTPATIENT)
Dept: GENERAL RADIOLOGY | Age: 84
End: 2019-04-02
Payer: MEDICARE

## 2019-04-02 VITALS
TEMPERATURE: 98.1 F | HEART RATE: 88 BPM | OXYGEN SATURATION: 96 % | RESPIRATION RATE: 16 BRPM | DIASTOLIC BLOOD PRESSURE: 68 MMHG | SYSTOLIC BLOOD PRESSURE: 138 MMHG

## 2019-04-02 DIAGNOSIS — M25.512 LEFT SHOULDER PAIN, UNSPECIFIED CHRONICITY: ICD-10-CM

## 2019-04-02 DIAGNOSIS — R07.89 CHEST WALL PAIN: Primary | ICD-10-CM

## 2019-04-02 LAB
ALBUMIN SERPL-MCNC: 3.9 G/DL (ref 3.5–5.2)
ALP BLD-CCNC: 101 U/L (ref 35–104)
ALT SERPL-CCNC: 12 U/L (ref 0–32)
ANION GAP SERPL CALCULATED.3IONS-SCNC: 7 MMOL/L (ref 7–16)
AST SERPL-CCNC: 14 U/L (ref 0–31)
BASOPHILS ABSOLUTE: 0.02 E9/L (ref 0–0.2)
BASOPHILS RELATIVE PERCENT: 0.4 % (ref 0–2)
BILIRUB SERPL-MCNC: 0.3 MG/DL (ref 0–1.2)
BILIRUBIN DIRECT: <0.2 MG/DL (ref 0–0.3)
BILIRUBIN, INDIRECT: NORMAL MG/DL (ref 0–1)
BUN BLDV-MCNC: 16 MG/DL (ref 8–23)
CALCIUM SERPL-MCNC: 8.9 MG/DL (ref 8.6–10.2)
CHLORIDE BLD-SCNC: 108 MMOL/L (ref 98–107)
CO2: 27 MMOL/L (ref 22–29)
CREAT SERPL-MCNC: 0.8 MG/DL (ref 0.5–1)
EOSINOPHILS ABSOLUTE: 0.12 E9/L (ref 0.05–0.5)
EOSINOPHILS RELATIVE PERCENT: 2.6 % (ref 0–6)
GFR AFRICAN AMERICAN: >60
GFR NON-AFRICAN AMERICAN: >60 ML/MIN/1.73
GLUCOSE BLD-MCNC: 200 MG/DL (ref 74–99)
HCT VFR BLD CALC: 38.3 % (ref 34–48)
HEMOGLOBIN: 12.4 G/DL (ref 11.5–15.5)
IMMATURE GRANULOCYTES #: 0.01 E9/L
IMMATURE GRANULOCYTES %: 0.2 % (ref 0–5)
INFLUENZA A BY PCR: NOT DETECTED
INFLUENZA B BY PCR: NOT DETECTED
LIPASE: 23 U/L (ref 13–60)
LYMPHOCYTES ABSOLUTE: 1.27 E9/L (ref 1.5–4)
LYMPHOCYTES RELATIVE PERCENT: 27.9 % (ref 20–42)
MCH RBC QN AUTO: 31.3 PG (ref 26–35)
MCHC RBC AUTO-ENTMCNC: 32.4 % (ref 32–34.5)
MCV RBC AUTO: 96.7 FL (ref 80–99.9)
MONOCYTES ABSOLUTE: 0.42 E9/L (ref 0.1–0.95)
MONOCYTES RELATIVE PERCENT: 9.2 % (ref 2–12)
NEUTROPHILS ABSOLUTE: 2.72 E9/L (ref 1.8–7.3)
NEUTROPHILS RELATIVE PERCENT: 59.7 % (ref 43–80)
PDW BLD-RTO: 13.3 FL (ref 11.5–15)
PLATELET # BLD: 254 E9/L (ref 130–450)
PMV BLD AUTO: 9.8 FL (ref 7–12)
POTASSIUM SERPL-SCNC: 3.4 MMOL/L (ref 3.5–5)
RBC # BLD: 3.96 E12/L (ref 3.5–5.5)
SODIUM BLD-SCNC: 142 MMOL/L (ref 132–146)
TOTAL PROTEIN: 6.5 G/DL (ref 6.4–8.3)
TROPONIN: <0.01 NG/ML (ref 0–0.03)
WBC # BLD: 4.6 E9/L (ref 4.5–11.5)

## 2019-04-02 PROCEDURE — 84484 ASSAY OF TROPONIN QUANT: CPT

## 2019-04-02 PROCEDURE — 83690 ASSAY OF LIPASE: CPT

## 2019-04-02 PROCEDURE — 85025 COMPLETE CBC W/AUTO DIFF WBC: CPT

## 2019-04-02 PROCEDURE — 80048 BASIC METABOLIC PNL TOTAL CA: CPT

## 2019-04-02 PROCEDURE — 87502 INFLUENZA DNA AMP PROBE: CPT

## 2019-04-02 PROCEDURE — 71045 X-RAY EXAM CHEST 1 VIEW: CPT

## 2019-04-02 PROCEDURE — 80076 HEPATIC FUNCTION PANEL: CPT

## 2019-04-02 PROCEDURE — 99284 EMERGENCY DEPT VISIT MOD MDM: CPT

## 2019-04-02 ASSESSMENT — PAIN DESCRIPTION - PAIN TYPE: TYPE: ACUTE PAIN

## 2019-04-02 ASSESSMENT — PAIN SCALES - GENERAL: PAINLEVEL_OUTOF10: 8

## 2019-04-02 NOTE — ED PROVIDER NOTES
HPI:  4/2/19,   Time: 7:37 PM         Heber Cox is a 82 y.o. female presenting to the ED for chest pain and shortness of breath, beginning with the chest pain for 2 weeks worse last night and the shortness of breath over last several days ago. The complaint has been persistent, mild in severity, and worsened by nothing. Patient's an 51-year-old female presents to the emergency department complaining of shortness of breath most likely upper respiratory infection per her along with voice hoarseness, however she did have some substernal chest discomfort last night, patient is a poor historian unable to really give me an accurate times of start in resolution, patient states that the chest discomfort started going up into her left shoulder down in her left arm and her left arm is tender when she palpates around her biceps as well as she palpates around her clavicle. ROS:   Pertinent positives and negatives are stated within HPI, all other systems reviewed and are negative.  --------------------------------------------- PAST HISTORY ---------------------------------------------  Past Medical History:  has a past medical history of Anxiety, Gastric reflux, and Hypertension. Past Surgical History:  has a past surgical history that includes Colonoscopy and Hysterectomy. Social History:  reports that she has never smoked. She has never used smokeless tobacco. She reports that she does not drink alcohol or use drugs. Family History: family history includes Cancer in her brother and brother; High Blood Pressure in her mother; Other in her father. The patients home medications have been reviewed.     Allergies: Lorazepam and Penicillins    -------------------------------------------------- RESULTS -------------------------------------------------  All laboratory and radiology results have been personally reviewed by myself   LABS:  Results for orders placed or performed during the hospital encounter of 04/02/19   Rapid influenza A/B antigens   Result Value Ref Range    Influenza A by PCR Not Detected Not Detected    Influenza B by PCR Not Detected Not Detected   CBC Auto Differential   Result Value Ref Range    WBC 4.6 4.5 - 11.5 E9/L    RBC 3.96 3.50 - 5.50 E12/L    Hemoglobin 12.4 11.5 - 15.5 g/dL    Hematocrit 38.3 34.0 - 48.0 %    MCV 96.7 80.0 - 99.9 fL    MCH 31.3 26.0 - 35.0 pg    MCHC 32.4 32.0 - 34.5 %    RDW 13.3 11.5 - 15.0 fL    Platelets 734 645 - 343 E9/L    MPV 9.8 7.0 - 12.0 fL    Neutrophils % 59.7 43.0 - 80.0 %    Immature Granulocytes % 0.2 0.0 - 5.0 %    Lymphocytes % 27.9 20.0 - 42.0 %    Monocytes % 9.2 2.0 - 12.0 %    Eosinophils % 2.6 0.0 - 6.0 %    Basophils % 0.4 0.0 - 2.0 %    Neutrophils # 2.72 1.80 - 7.30 E9/L    Immature Granulocytes # 0.01 E9/L    Lymphocytes # 1.27 (L) 1.50 - 4.00 E9/L    Monocytes # 0.42 0.10 - 0.95 E9/L    Eosinophils # 0.12 0.05 - 0.50 E9/L    Basophils # 0.02 0.00 - 0.20 J6/E   Basic Metabolic Panel   Result Value Ref Range    Sodium 142 132 - 146 mmol/L    Potassium 3.4 (L) 3.5 - 5.0 mmol/L    Chloride 108 (H) 98 - 107 mmol/L    CO2 27 22 - 29 mmol/L    Anion Gap 7 7 - 16 mmol/L    Glucose 200 (H) 74 - 99 mg/dL    BUN 16 8 - 23 mg/dL    CREATININE 0.8 0.5 - 1.0 mg/dL    GFR Non-African American >60 >=60 mL/min/1.73    GFR African American >60     Calcium 8.9 8.6 - 10.2 mg/dL   Hepatic Function Panel   Result Value Ref Range    Total Protein 6.5 6.4 - 8.3 g/dL    Alb 3.9 3.5 - 5.2 g/dL    Alkaline Phosphatase 101 35 - 104 U/L    ALT 12 0 - 32 U/L    AST 14 0 - 31 U/L    Total Bilirubin 0.3 0.0 - 1.2 mg/dL    Bilirubin, Direct <0.2 0.0 - 0.3 mg/dL    Bilirubin, Indirect see below 0.0 - 1.0 mg/dL   Lipase   Result Value Ref Range    Lipase 23 13 - 60 U/L   Troponin   Result Value Ref Range    Troponin <0.01 0.00 - 0.03 ng/mL   EKG 12 Lead   Result Value Ref Range    Ventricular Rate 79 BPM    Atrial Rate 79 BPM    P-R Interval 194 ms    QRS Duration 82 ms    Q-T Interval 400 ms    QTc Calculation (Bazett) 458 ms    P Axis 58 degrees    R Axis 14 degrees    T Axis 81 degrees       RADIOLOGY:  Interpreted by Radiologist.  XR CHEST 1 VW   Final Result   No radiographic evidence of acute cardiopulmonary disease. Vascular calcifications thoracic aorta.          ------------------------- NURSING NOTES AND VITALS REVIEWED ---------------------------   The nursing notes within the ED encounter and vital signs as below have been reviewed. /68   Pulse 88   Temp 98.1 °F (36.7 °C) (Oral)   Resp 16   LMP  (LMP Unknown)   SpO2 96%   Oxygen Saturation Interpretation: Normal      ---------------------------------------------------PHYSICAL EXAM--------------------------------------      Constitutional/General: Alert and oriented x3, well appearing, non toxic in NAD  Head: NC/AT  Eyes: PERRL, EOMI  Mouth: Oropharynx clear, handling secretions, no trismus  Neck: Supple, full ROM, no meningeal signs  Pulmonary: Lungs clear to auscultation bilaterally, no wheezes, rales, or rhonchi. Not in respiratory distress  Cardiovascular:  Regular rate and rhythm, no murmurs, gallops, or rubs. 2+ distal pulses, left lateral chest wall and left upper extremity tenderness noted without rash or ecchymosis  Abdomen: Soft, non tender, non distended,   Extremities: Moves all extremities x 4. Warm and well perfused  Skin: warm and dry without rash  Neurologic: GCS 15,  Psych: Normal Affect      ------------------------------ ED COURSE/MEDICAL DECISION MAKING----------------------  Medications - No data to display      Medical Decision Making:    HEART SCORE:    History: +0 for low suspicion  EKG: +0 for normal EKG   Age: +4 for age >65 years  Risk factors (includes HLD, HTN, DM, tobacco use, obesity, and +FHx): +1 for 1-2 risk factors  Initial troponin: +0 for negative troponin    Heart score: 3.   This falls under the following category: Score of 0-3, which indicates a very low risk for major adverse cardiac event and supports early discharge      Counseling: The emergency provider has spoken with the patient and family member daughter and discussed todays results, in addition to providing specific details for the plan of care and counseling regarding the diagnosis and prognosis. Questions are answered at this time and they are agreeable with the plan.      --------------------------------- IMPRESSION AND DISPOSITION ---------------------------------    IMPRESSION  1. Chest wall pain    2.  Left shoulder pain, unspecified chronicity        DISPOSITION  Disposition: Discharge to home  Patient condition is good                  Shaun Rowe MD  04/14/19 9185

## 2019-04-07 LAB
EKG ATRIAL RATE: 79 BPM
EKG P AXIS: 58 DEGREES
EKG P-R INTERVAL: 194 MS
EKG Q-T INTERVAL: 400 MS
EKG QRS DURATION: 82 MS
EKG QTC CALCULATION (BAZETT): 458 MS
EKG R AXIS: 14 DEGREES
EKG T AXIS: 81 DEGREES
EKG VENTRICULAR RATE: 79 BPM

## 2019-06-17 ENCOUNTER — HOSPITAL ENCOUNTER (EMERGENCY)
Age: 84
Discharge: HOME | End: 2019-06-18
Attending: EMERGENCY MEDICINE
Payer: MEDICARE

## 2019-06-17 ENCOUNTER — APPOINTMENT (OUTPATIENT)
Dept: GENERAL RADIOLOGY | Age: 84
End: 2019-06-17
Payer: MEDICARE

## 2019-06-17 DIAGNOSIS — R10.9 ABDOMINAL PAIN, UNSPECIFIED ABDOMINAL LOCATION: Primary | ICD-10-CM

## 2019-06-17 LAB
ALBUMIN SERPL-MCNC: 4.1 G/DL (ref 3.5–5.2)
ALP BLD-CCNC: 97 U/L (ref 35–104)
ALT SERPL-CCNC: 9 U/L (ref 0–32)
ANION GAP SERPL CALCULATED.3IONS-SCNC: 9 MMOL/L (ref 7–16)
AST SERPL-CCNC: 14 U/L (ref 0–31)
BACTERIA: ABNORMAL /HPF
BASOPHILS ABSOLUTE: 0.03 E9/L (ref 0–0.2)
BASOPHILS RELATIVE PERCENT: 0.4 % (ref 0–2)
BILIRUB SERPL-MCNC: 0.2 MG/DL (ref 0–1.2)
BILIRUBIN URINE: NEGATIVE
BLOOD, URINE: NEGATIVE
BUN BLDV-MCNC: 17 MG/DL (ref 8–23)
CALCIUM SERPL-MCNC: 8.8 MG/DL (ref 8.6–10.2)
CASTS: ABNORMAL /LPF
CHLORIDE BLD-SCNC: 106 MMOL/L (ref 98–107)
CLARITY: CLEAR
CO2: 27 MMOL/L (ref 22–29)
COLOR: YELLOW
CREAT SERPL-MCNC: 0.8 MG/DL (ref 0.5–1)
CRYSTALS, UA: ABNORMAL
EOSINOPHILS ABSOLUTE: 0.08 E9/L (ref 0.05–0.5)
EOSINOPHILS RELATIVE PERCENT: 1.2 % (ref 0–6)
GFR AFRICAN AMERICAN: >60
GFR NON-AFRICAN AMERICAN: >60 ML/MIN/1.73
GLUCOSE BLD-MCNC: 91 MG/DL (ref 74–99)
GLUCOSE URINE: NEGATIVE MG/DL
HCT VFR BLD CALC: 40.1 % (ref 34–48)
HEMOGLOBIN: 12.9 G/DL (ref 11.5–15.5)
IMMATURE GRANULOCYTES #: 0.01 E9/L
IMMATURE GRANULOCYTES %: 0.1 % (ref 0–5)
KETONES, URINE: NEGATIVE MG/DL
LACTIC ACID: 0.8 MMOL/L (ref 0.5–2.2)
LEUKOCYTE ESTERASE, URINE: ABNORMAL
LIPASE: 21 U/L (ref 13–60)
LYMPHOCYTES ABSOLUTE: 1.69 E9/L (ref 1.5–4)
LYMPHOCYTES RELATIVE PERCENT: 24.9 % (ref 20–42)
MCH RBC QN AUTO: 30.6 PG (ref 26–35)
MCHC RBC AUTO-ENTMCNC: 32.2 % (ref 32–34.5)
MCV RBC AUTO: 95.2 FL (ref 80–99.9)
MONOCYTES ABSOLUTE: 0.69 E9/L (ref 0.1–0.95)
MONOCYTES RELATIVE PERCENT: 10.2 % (ref 2–12)
MUCUS: PRESENT
NEUTROPHILS ABSOLUTE: 4.29 E9/L (ref 1.8–7.3)
NEUTROPHILS RELATIVE PERCENT: 63.2 % (ref 43–80)
NITRITE, URINE: NEGATIVE
PDW BLD-RTO: 13.2 FL (ref 11.5–15)
PH UA: 5.5 (ref 5–9)
PLATELET # BLD: 286 E9/L (ref 130–450)
PMV BLD AUTO: 9.8 FL (ref 7–12)
POTASSIUM REFLEX MAGNESIUM: 3.9 MMOL/L (ref 3.5–5)
PROTEIN UA: NEGATIVE MG/DL
RBC # BLD: 4.21 E12/L (ref 3.5–5.5)
RBC UA: ABNORMAL /HPF (ref 0–2)
SODIUM BLD-SCNC: 142 MMOL/L (ref 132–146)
SPECIFIC GRAVITY UA: >=1.03 (ref 1–1.03)
TOTAL PROTEIN: 6.7 G/DL (ref 6.4–8.3)
UROBILINOGEN, URINE: 0.2 E.U./DL
WBC # BLD: 6.8 E9/L (ref 4.5–11.5)
WBC UA: ABNORMAL /HPF (ref 0–5)

## 2019-06-17 PROCEDURE — 83690 ASSAY OF LIPASE: CPT

## 2019-06-17 PROCEDURE — 99284 EMERGENCY DEPT VISIT MOD MDM: CPT

## 2019-06-17 PROCEDURE — 96372 THER/PROPH/DIAG INJ SC/IM: CPT

## 2019-06-17 PROCEDURE — 83605 ASSAY OF LACTIC ACID: CPT

## 2019-06-17 PROCEDURE — 80053 COMPREHEN METABOLIC PANEL: CPT

## 2019-06-17 PROCEDURE — 81001 URINALYSIS AUTO W/SCOPE: CPT

## 2019-06-17 PROCEDURE — 96374 THER/PROPH/DIAG INJ IV PUSH: CPT

## 2019-06-17 PROCEDURE — 85025 COMPLETE CBC W/AUTO DIFF WBC: CPT

## 2019-06-17 PROCEDURE — 6360000002 HC RX W HCPCS: Performed by: EMERGENCY MEDICINE

## 2019-06-17 PROCEDURE — 74022 RADEX COMPL AQT ABD SERIES: CPT

## 2019-06-17 RX ORDER — DICYCLOMINE HYDROCHLORIDE 10 MG/ML
20 INJECTION INTRAMUSCULAR ONCE
Status: COMPLETED | OUTPATIENT
Start: 2019-06-17 | End: 2019-06-17

## 2019-06-17 RX ORDER — DICYCLOMINE HYDROCHLORIDE 10 MG/1
20 CAPSULE ORAL
Qty: 20 CAPSULE | Refills: 0 | Status: ON HOLD | OUTPATIENT
Start: 2019-06-17 | End: 2019-10-28

## 2019-06-17 RX ORDER — KETOROLAC TROMETHAMINE 30 MG/ML
15 INJECTION, SOLUTION INTRAMUSCULAR; INTRAVENOUS ONCE
Status: COMPLETED | OUTPATIENT
Start: 2019-06-17 | End: 2019-06-17

## 2019-06-17 RX ADMIN — KETOROLAC TROMETHAMINE 15 MG: 30 INJECTION, SOLUTION INTRAMUSCULAR at 21:46

## 2019-06-17 RX ADMIN — DICYCLOMINE HYDROCHLORIDE 20 MG: 20 INJECTION, SOLUTION INTRAMUSCULAR at 21:48

## 2019-06-17 ASSESSMENT — ENCOUNTER SYMPTOMS
SHORTNESS OF BREATH: 0
DIARRHEA: 0
BLOOD IN STOOL: 0
COUGH: 0
COLOR CHANGE: 0
VOMITING: 0
NAUSEA: 0
SORE THROAT: 0
ABDOMINAL PAIN: 1
CONSTIPATION: 0

## 2019-06-17 ASSESSMENT — PAIN DESCRIPTION - PAIN TYPE: TYPE: ACUTE PAIN

## 2019-06-17 ASSESSMENT — PAIN - FUNCTIONAL ASSESSMENT: PAIN_FUNCTIONAL_ASSESSMENT: PREVENTS OR INTERFERES SOME ACTIVE ACTIVITIES AND ADLS

## 2019-06-17 ASSESSMENT — PAIN SCALES - GENERAL
PAINLEVEL_OUTOF10: 9
PAINLEVEL_OUTOF10: 9

## 2019-06-17 ASSESSMENT — PAIN DESCRIPTION - DESCRIPTORS: DESCRIPTORS: PATIENT UNABLE TO DESCRIBE

## 2019-06-17 ASSESSMENT — PAIN DESCRIPTION - LOCATION: LOCATION: ABDOMEN

## 2019-06-17 ASSESSMENT — PAIN DESCRIPTION - FREQUENCY: FREQUENCY: CONTINUOUS

## 2019-06-17 ASSESSMENT — PAIN DESCRIPTION - ONSET: ONSET: SUDDEN

## 2019-06-18 VITALS
DIASTOLIC BLOOD PRESSURE: 63 MMHG | OXYGEN SATURATION: 95 % | RESPIRATION RATE: 14 BRPM | TEMPERATURE: 98.2 F | BODY MASS INDEX: 28.35 KG/M2 | HEIGHT: 63 IN | HEART RATE: 87 BPM | SYSTOLIC BLOOD PRESSURE: 128 MMHG | WEIGHT: 160 LBS

## 2019-06-18 NOTE — ED NOTES
Patient given something to drink and told to drink little sips slowly.       Rufina Somers RN  06/17/19 1239

## 2019-06-18 NOTE — ED PROVIDER NOTES
Vincenzo Urban is a 82 y.o. female with PMH significant for anxiety, GERD and hypertension presenting to the emergency department for Abdominal Pain. Patient reports that she has had intermittent pain for 1 week. After she noticed she ate donuts that were on recall. Patient reports generalized abdominal pain. Patient's biggest complaint today is that after she eats something she has a bowel movement. Patient denies any fever, chills, nausea, vomiting constipation, diarrhea or bloody stools. Patient is concerned about the donut recall and that she continues to have bowel movements. She has seen Dr. Eric Miller for separate complaint of back pain she is prescribed lidocaine patches, however patient reports she has not used them. The history is provided by the patient. Abdominal Pain   Pain location:  Generalized  Timing:  Intermittent  Chronicity:  New  Relieved by:  Nothing  Worsened by:  Nothing  Associated symptoms: no chest pain, no chills, no constipation, no cough, no diarrhea, no dysuria, no fever, no nausea, no shortness of breath, no sore throat and no vomiting        Review of Systems   Constitutional: Negative for chills and fever. HENT: Negative for congestion and sore throat. Respiratory: Negative for cough and shortness of breath. Cardiovascular: Negative for chest pain. Gastrointestinal: Positive for abdominal pain. Negative for blood in stool, constipation, diarrhea, nausea and vomiting. Genitourinary: Negative for dysuria. Musculoskeletal: Negative for gait problem and neck pain. Skin: Negative for color change. Neurological: Negative for headaches. Psychiatric/Behavioral: Negative for confusion. Physical Exam   Constitutional: She is oriented to person, place, and time. She appears well-developed. No distress. HENT:   Head: Normocephalic and atraumatic.    Nose: Nose normal.   Mouth/Throat: Oropharynx is clear and moist.   Eyes: Conjunctivae are normal.   Neck: Normal range of motion. Neck supple. Cardiovascular: Normal rate, regular rhythm, intact distal pulses and normal pulses. Pulmonary/Chest: Effort normal. She has no wheezes. She has no rhonchi. She has no rales. Abdominal: Soft. Bowel sounds are normal. She exhibits no pulsatile midline mass. There is no tenderness. There is no rigidity, no rebound and no guarding. Musculoskeletal: She exhibits no edema. Neurological: She is alert and oriented to person, place, and time. No sensory deficit. She exhibits normal muscle tone. Skin: Skin is warm and dry. Capillary refill takes less than 2 seconds. Psychiatric: She has a normal mood and affect. Her speech is normal.   Nursing note and vitals reviewed. Procedures    MDM  Number of Diagnoses or Management Options  Abdominal pain, unspecified abdominal location:   Diagnosis management comments: Patient presents to the ED for abdominal pain. We initially obtained blood work and urine sample. Patient was given Bentyl and Toradol for their symptoms with moderate improvement. Workup in the ED revealed unspecified abdominal pain. Results of blood work and ua unremarkable. Patient did not have any abdominal pain on exam as abdominal series was obtained that did not show any obstructive pattern. Patient did not have rebound, guarding or rigidity present on exam as patient does not show signs of acute surgical abdomen during this encounter. Patient continues to be non-toxic on re-evaluation. She is able to tolerate p.o. intake. Patient is hemodynamically stable. Findings were discussed with the patient and reasons to immediately return to the ED were articulated to them. Patient wants work-up for E. coli after eating donuts hour recall, as we advised patient follow-up with their PMD. Patient agrees with the plan and all questions were answered.              --------------------------------------------- PAST HISTORY ---------------------------------------------  Past Medical History:  has a past medical history of Anxiety, Gastric reflux, and Hypertension. Past Surgical History:  has a past surgical history that includes Colonoscopy and Hysterectomy. Social History:  reports that she has never smoked. She has never used smokeless tobacco. She reports that she does not drink alcohol or use drugs. Family History: family history includes Cancer in her brother and brother; High Blood Pressure in her mother; Other in her father. The patients home medications have been reviewed.     Allergies: Lorazepam and Penicillins    -------------------------------------------------- RESULTS -------------------------------------------------  Labs:  Results for orders placed or performed during the hospital encounter of 06/17/19   Comprehensive Metabolic Panel w/ Reflex to MG   Result Value Ref Range    Sodium 142 132 - 146 mmol/L    Potassium reflex Magnesium 3.9 3.5 - 5.0 mmol/L    Chloride 106 98 - 107 mmol/L    CO2 27 22 - 29 mmol/L    Anion Gap 9 7 - 16 mmol/L    Glucose 91 74 - 99 mg/dL    BUN 17 8 - 23 mg/dL    CREATININE 0.8 0.5 - 1.0 mg/dL    GFR Non-African American >60 >=60 mL/min/1.73    GFR African American >60     Calcium 8.8 8.6 - 10.2 mg/dL    Total Protein 6.7 6.4 - 8.3 g/dL    Alb 4.1 3.5 - 5.2 g/dL    Total Bilirubin 0.2 0.0 - 1.2 mg/dL    Alkaline Phosphatase 97 35 - 104 U/L    ALT 9 0 - 32 U/L    AST 14 0 - 31 U/L   Lactic acid, plasma   Result Value Ref Range    Lactic Acid 0.8 0.5 - 2.2 mmol/L   CBC auto differential   Result Value Ref Range    WBC 6.8 4.5 - 11.5 E9/L    RBC 4.21 3.50 - 5.50 E12/L    Hemoglobin 12.9 11.5 - 15.5 g/dL    Hematocrit 40.1 34.0 - 48.0 %    MCV 95.2 80.0 - 99.9 fL    MCH 30.6 26.0 - 35.0 pg    MCHC 32.2 32.0 - 34.5 %    RDW 13.2 11.5 - 15.0 fL    Platelets 929 061 - 391 E9/L    MPV 9.8 7.0 - 12.0 fL    Neutrophils % 63.2 43.0 - 80.0 %    Immature Granulocytes % 0.1 0.0 - 5.0 % 6/17/19 2148)   ketorolac (TORADOL) injection 15 mg (15 mg Intravenous Given 6/17/19 2146)       I have spoken with the patient and discussed todays results, in addition to providing specific details for the plan of care and counseling regarding the diagnosis and prognosis. Their questions are answered at this time and they are agreeable with the plan. I discussed at length with them reasons for immediate return here for re evaluation. They will followup with primary care by calling their office tomorrow. --------------------------------- ADDITIONAL PROVIDER NOTES ---------------------------------  At this time the patient is without objective evidence of an acute process requiring hospitalization or inpatient management. They have remained hemodynamically stable throughout their entire ED visit and are stable for discharge with outpatient follow-up. The plan has been discussed in detail and they are aware of the specific conditions for emergent return, as well as the importance of follow-up. Discharge Medication List as of 6/17/2019 11:56 PM      START taking these medications    Details   dicyclomine (BENTYL) 10 MG capsule Take 2 capsules by mouth 4 times daily (before meals and nightly), Disp-20 capsule, R-nonePrint             Diagnosis:  1. Abdominal pain, unspecified abdominal location        Disposition:  Patient's disposition: Discharge to home  Patient's condition is stable.             Lala Mandel DO  Resident  06/18/19 4778

## 2019-10-28 ENCOUNTER — HOSPITAL ENCOUNTER (INPATIENT)
Age: 84
LOS: 1 days | Discharge: HOME | DRG: 313 | End: 2019-10-29
Attending: INTERNAL MEDICINE | Admitting: INTERNAL MEDICINE
Payer: MEDICARE

## 2019-10-28 DIAGNOSIS — F41.9 ANXIETY: Primary | Chronic | ICD-10-CM

## 2019-10-28 PROBLEM — R07.9 CHEST PAIN: Status: ACTIVE | Noted: 2019-10-28

## 2019-10-28 LAB
ALBUMIN SERPL-MCNC: 4.1 G/DL (ref 3.5–5.2)
ALP BLD-CCNC: 88 U/L (ref 35–104)
ALT SERPL-CCNC: 9 U/L (ref 0–32)
ANION GAP SERPL CALCULATED.3IONS-SCNC: 10 MMOL/L (ref 7–16)
AST SERPL-CCNC: 13 U/L (ref 0–31)
BILIRUB SERPL-MCNC: 0.2 MG/DL (ref 0–1.2)
BUN BLDV-MCNC: 12 MG/DL (ref 8–23)
CALCIUM SERPL-MCNC: 8.9 MG/DL (ref 8.6–10.2)
CHLORIDE BLD-SCNC: 103 MMOL/L (ref 98–107)
CHOLESTEROL, TOTAL: 160 MG/DL (ref 0–199)
CO2: 28 MMOL/L (ref 22–29)
CREAT SERPL-MCNC: 0.7 MG/DL (ref 0.5–1)
GFR AFRICAN AMERICAN: >60
GFR NON-AFRICAN AMERICAN: >60 ML/MIN/1.73
GLUCOSE BLD-MCNC: 100 MG/DL (ref 74–99)
HCT VFR BLD CALC: 39 % (ref 34–48)
HDLC SERPL-MCNC: 33 MG/DL
HEMOGLOBIN: 12.7 G/DL (ref 11.5–15.5)
LDL CHOLESTEROL CALCULATED: 98 MG/DL (ref 0–99)
MCH RBC QN AUTO: 30.8 PG (ref 26–35)
MCHC RBC AUTO-ENTMCNC: 32.6 % (ref 32–34.5)
MCV RBC AUTO: 94.4 FL (ref 80–99.9)
PDW BLD-RTO: 13.6 FL (ref 11.5–15)
PLATELET # BLD: 279 E9/L (ref 130–450)
PMV BLD AUTO: 9.9 FL (ref 7–12)
POTASSIUM SERPL-SCNC: 3.9 MMOL/L (ref 3.5–5)
RBC # BLD: 4.13 E12/L (ref 3.5–5.5)
SODIUM BLD-SCNC: 141 MMOL/L (ref 132–146)
TOTAL PROTEIN: 6.8 G/DL (ref 6.4–8.3)
TRIGL SERPL-MCNC: 147 MG/DL (ref 0–149)
TROPONIN: <0.01 NG/ML (ref 0–0.03)
TSH SERPL DL<=0.05 MIU/L-ACNC: 1.57 UIU/ML (ref 0.27–4.2)
VLDLC SERPL CALC-MCNC: 29 MG/DL
WBC # BLD: 4.7 E9/L (ref 4.5–11.5)

## 2019-10-28 PROCEDURE — 36415 COLL VENOUS BLD VENIPUNCTURE: CPT

## 2019-10-28 PROCEDURE — 1200000000 HC SEMI PRIVATE

## 2019-10-28 PROCEDURE — 6370000000 HC RX 637 (ALT 250 FOR IP): Performed by: INTERNAL MEDICINE

## 2019-10-28 PROCEDURE — 80061 LIPID PANEL: CPT

## 2019-10-28 PROCEDURE — 84484 ASSAY OF TROPONIN QUANT: CPT

## 2019-10-28 PROCEDURE — 86160 COMPLEMENT ANTIGEN: CPT

## 2019-10-28 PROCEDURE — 84443 ASSAY THYROID STIM HORMONE: CPT

## 2019-10-28 PROCEDURE — 85027 COMPLETE CBC AUTOMATED: CPT

## 2019-10-28 PROCEDURE — 80053 COMPREHEN METABOLIC PANEL: CPT

## 2019-10-28 RX ORDER — NITROGLYCERIN 0.4 MG/1
0.4 TABLET SUBLINGUAL EVERY 5 MIN PRN
Status: DISCONTINUED | OUTPATIENT
Start: 2019-10-28 | End: 2019-10-29 | Stop reason: HOSPADM

## 2019-10-28 RX ORDER — AMMONIUM LACTATE 12 G/100G
LOTION TOPICAL PRN
Status: DISCONTINUED | OUTPATIENT
Start: 2019-10-28 | End: 2019-10-29 | Stop reason: HOSPADM

## 2019-10-28 RX ORDER — DIAZEPAM 2 MG/1
2 TABLET ORAL NIGHTLY
Status: DISCONTINUED | OUTPATIENT
Start: 2019-10-28 | End: 2019-10-29 | Stop reason: HOSPADM

## 2019-10-28 RX ORDER — EZETIMIBE 10 MG/1
10 TABLET ORAL DAILY
Status: DISCONTINUED | OUTPATIENT
Start: 2019-10-29 | End: 2019-10-28 | Stop reason: DRUGHIGH

## 2019-10-28 RX ORDER — ACETAMINOPHEN 325 MG/1
650 TABLET ORAL EVERY 6 HOURS PRN
Status: DISCONTINUED | OUTPATIENT
Start: 2019-10-28 | End: 2019-10-29 | Stop reason: HOSPADM

## 2019-10-28 RX ORDER — AMLODIPINE BESYLATE 5 MG/1
5 TABLET ORAL DAILY
Status: DISCONTINUED | OUTPATIENT
Start: 2019-10-29 | End: 2019-10-29 | Stop reason: HOSPADM

## 2019-10-28 RX ADMIN — DIAZEPAM 2 MG: 2 TABLET ORAL at 23:34

## 2019-10-28 ASSESSMENT — PAIN - FUNCTIONAL ASSESSMENT: PAIN_FUNCTIONAL_ASSESSMENT: ACTIVITIES ARE NOT PREVENTED

## 2019-10-28 ASSESSMENT — PAIN DESCRIPTION - DESCRIPTORS: DESCRIPTORS: PATIENT UNABLE TO DESCRIBE

## 2019-10-28 ASSESSMENT — PAIN DESCRIPTION - ORIENTATION: ORIENTATION: LEFT

## 2019-10-28 ASSESSMENT — PAIN DESCRIPTION - FREQUENCY: FREQUENCY: INTERMITTENT

## 2019-10-28 ASSESSMENT — PAIN DESCRIPTION - PROGRESSION: CLINICAL_PROGRESSION: NOT CHANGED

## 2019-10-28 ASSESSMENT — PAIN SCALES - GENERAL
PAINLEVEL_OUTOF10: 0
PAINLEVEL_OUTOF10: 5
PAINLEVEL_OUTOF10: 5

## 2019-10-28 ASSESSMENT — PAIN DESCRIPTION - DIRECTION: RADIATING_TOWARDS: L ARM

## 2019-10-28 ASSESSMENT — PAIN DESCRIPTION - LOCATION: LOCATION: CHEST

## 2019-10-28 ASSESSMENT — PAIN DESCRIPTION - ONSET: ONSET: GRADUAL

## 2019-10-28 ASSESSMENT — PAIN DESCRIPTION - PAIN TYPE: TYPE: ACUTE PAIN

## 2019-10-29 ENCOUNTER — APPOINTMENT (OUTPATIENT)
Dept: NUCLEAR MEDICINE | Age: 84
DRG: 313 | End: 2019-10-29
Attending: INTERNAL MEDICINE
Payer: MEDICARE

## 2019-10-29 VITALS
DIASTOLIC BLOOD PRESSURE: 65 MMHG | OXYGEN SATURATION: 98 % | RESPIRATION RATE: 16 BRPM | SYSTOLIC BLOOD PRESSURE: 131 MMHG | HEIGHT: 62 IN | HEART RATE: 68 BPM | TEMPERATURE: 98.3 F | BODY MASS INDEX: 30.18 KG/M2 | WEIGHT: 164 LBS

## 2019-10-29 PROBLEM — R07.89 ATYPICAL CHEST PAIN: Status: ACTIVE | Noted: 2019-10-28

## 2019-10-29 LAB
C4 COMPLEMENT: 19 MG/DL (ref 10–40)
EKG ATRIAL RATE: 80 BPM
EKG P AXIS: 72 DEGREES
EKG P-R INTERVAL: 204 MS
EKG Q-T INTERVAL: 390 MS
EKG QRS DURATION: 80 MS
EKG QTC CALCULATION (BAZETT): 449 MS
EKG R AXIS: 13 DEGREES
EKG T AXIS: 81 DEGREES
EKG VENTRICULAR RATE: 80 BPM
LV EF: 60 %
LV EF: 83 %
LVEF MODALITY: NORMAL
LVEF MODALITY: NORMAL
TROPONIN: <0.01 NG/ML (ref 0–0.03)

## 2019-10-29 PROCEDURE — 93010 ELECTROCARDIOGRAM REPORT: CPT | Performed by: INTERNAL MEDICINE

## 2019-10-29 PROCEDURE — 93306 TTE W/DOPPLER COMPLETE: CPT

## 2019-10-29 PROCEDURE — 93018 CV STRESS TEST I&R ONLY: CPT | Performed by: INTERNAL MEDICINE

## 2019-10-29 PROCEDURE — 84484 ASSAY OF TROPONIN QUANT: CPT

## 2019-10-29 PROCEDURE — 93017 CV STRESS TEST TRACING ONLY: CPT

## 2019-10-29 PROCEDURE — 6370000000 HC RX 637 (ALT 250 FOR IP): Performed by: INTERNAL MEDICINE

## 2019-10-29 PROCEDURE — 3430000000 HC RX DIAGNOSTIC RADIOPHARMACEUTICAL: Performed by: RADIOLOGY

## 2019-10-29 PROCEDURE — 99223 1ST HOSP IP/OBS HIGH 75: CPT | Performed by: INTERNAL MEDICINE

## 2019-10-29 PROCEDURE — 36415 COLL VENOUS BLD VENIPUNCTURE: CPT

## 2019-10-29 PROCEDURE — 6360000002 HC RX W HCPCS: Performed by: INTERNAL MEDICINE

## 2019-10-29 PROCEDURE — A9500 TC99M SESTAMIBI: HCPCS | Performed by: RADIOLOGY

## 2019-10-29 PROCEDURE — 93005 ELECTROCARDIOGRAM TRACING: CPT | Performed by: INTERNAL MEDICINE

## 2019-10-29 PROCEDURE — 78452 HT MUSCLE IMAGE SPECT MULT: CPT

## 2019-10-29 PROCEDURE — 93016 CV STRESS TEST SUPVJ ONLY: CPT | Performed by: INTERNAL MEDICINE

## 2019-10-29 RX ORDER — AMLODIPINE BESYLATE 5 MG/1
5 TABLET ORAL DAILY
Qty: 30 TABLET | Refills: 3 | Status: ON HOLD | OUTPATIENT
Start: 2019-10-30 | End: 2020-02-13 | Stop reason: HOSPADM

## 2019-10-29 RX ORDER — DIAZEPAM 2 MG/1
2 TABLET ORAL EVERY 12 HOURS PRN
Qty: 60 TABLET | Refills: 0 | Status: SHIPPED | OUTPATIENT
Start: 2019-10-29 | End: 2019-12-28

## 2019-10-29 RX ADMIN — DICLOFENAC 2 G: 10 GEL TOPICAL at 16:56

## 2019-10-29 RX ADMIN — REGADENOSON 0.4 MG: 0.08 INJECTION, SOLUTION INTRAVENOUS at 09:18

## 2019-10-29 RX ADMIN — Medication 30 MILLICURIE: at 08:28

## 2019-10-29 RX ADMIN — Medication 10 MILLICURIE: at 08:28

## 2019-10-29 RX ADMIN — DICLOFENAC 2 G: 10 GEL TOPICAL at 13:00

## 2019-10-29 ASSESSMENT — PAIN SCALES - GENERAL
PAINLEVEL_OUTOF10: 0
PAINLEVEL_OUTOF10: 0

## 2020-02-04 ENCOUNTER — APPOINTMENT (OUTPATIENT)
Dept: CT IMAGING | Age: 85
DRG: 659 | End: 2020-02-04
Payer: MEDICARE

## 2020-02-04 ENCOUNTER — HOSPITAL ENCOUNTER (INPATIENT)
Age: 85
LOS: 8 days | Discharge: HOME | DRG: 659 | End: 2020-02-13
Attending: EMERGENCY MEDICINE | Admitting: FAMILY MEDICINE
Payer: MEDICARE

## 2020-02-04 PROBLEM — K52.9 COLITIS: Status: ACTIVE | Noted: 2020-02-04

## 2020-02-04 LAB
ALBUMIN SERPL-MCNC: 4.2 G/DL (ref 3.5–5.2)
ALP BLD-CCNC: 93 U/L (ref 35–104)
ALT SERPL-CCNC: 8 U/L (ref 0–32)
ANION GAP SERPL CALCULATED.3IONS-SCNC: 8 MMOL/L (ref 7–16)
AST SERPL-CCNC: 14 U/L (ref 0–31)
BACTERIA: ABNORMAL /HPF
BILIRUB SERPL-MCNC: 0.3 MG/DL (ref 0–1.2)
BILIRUBIN URINE: NEGATIVE
BLOOD, URINE: ABNORMAL
BUN BLDV-MCNC: 16 MG/DL (ref 8–23)
CALCIUM SERPL-MCNC: 9.3 MG/DL (ref 8.6–10.2)
CHLORIDE BLD-SCNC: 105 MMOL/L (ref 98–107)
CLARITY: CLEAR
CO2: 27 MMOL/L (ref 22–29)
COLOR: ABNORMAL
CREAT SERPL-MCNC: 0.9 MG/DL (ref 0.5–1)
EPITHELIAL CELLS, UA: ABNORMAL /HPF
GFR AFRICAN AMERICAN: >60
GFR NON-AFRICAN AMERICAN: 60 ML/MIN/1.73
GLUCOSE BLD-MCNC: 166 MG/DL (ref 74–99)
GLUCOSE URINE: NEGATIVE MG/DL
HCT VFR BLD CALC: 40.8 % (ref 34–48)
HEMOGLOBIN: 13.3 G/DL (ref 11.5–15.5)
KETONES, URINE: ABNORMAL MG/DL
LACTIC ACID, SEPSIS: 1.4 MMOL/L (ref 0.5–1.9)
LEUKOCYTE ESTERASE, URINE: ABNORMAL
LIPASE: 19 U/L (ref 13–60)
MCH RBC QN AUTO: 31.1 PG (ref 26–35)
MCHC RBC AUTO-ENTMCNC: 32.6 % (ref 32–34.5)
MCV RBC AUTO: 95.6 FL (ref 80–99.9)
NITRITE, URINE: NEGATIVE
PDW BLD-RTO: 13.3 FL (ref 11.5–15)
PH UA: 7 (ref 5–9)
PLATELET # BLD: 294 E9/L (ref 130–450)
PMV BLD AUTO: 10.2 FL (ref 7–12)
POTASSIUM SERPL-SCNC: 3.6 MMOL/L (ref 3.5–5)
PROTEIN UA: NEGATIVE MG/DL
RBC # BLD: 4.27 E12/L (ref 3.5–5.5)
RBC UA: ABNORMAL /HPF (ref 0–2)
RENAL EPITHELIAL, UA: ABNORMAL /HPF
SODIUM BLD-SCNC: 140 MMOL/L (ref 132–146)
SPECIFIC GRAVITY UA: 1.01 (ref 1–1.03)
TOTAL PROTEIN: 7.2 G/DL (ref 6.4–8.3)
UROBILINOGEN, URINE: 0.2 E.U./DL
WBC # BLD: 10.8 E9/L (ref 4.5–11.5)
WBC UA: >20 /HPF (ref 0–5)

## 2020-02-04 PROCEDURE — G0378 HOSPITAL OBSERVATION PER HR: HCPCS

## 2020-02-04 PROCEDURE — 6360000004 HC RX CONTRAST MEDICATION: Performed by: RADIOLOGY

## 2020-02-04 PROCEDURE — 96374 THER/PROPH/DIAG INJ IV PUSH: CPT

## 2020-02-04 PROCEDURE — 96365 THER/PROPH/DIAG IV INF INIT: CPT

## 2020-02-04 PROCEDURE — 96375 TX/PRO/DX INJ NEW DRUG ADDON: CPT

## 2020-02-04 PROCEDURE — 74177 CT ABD & PELVIS W/CONTRAST: CPT

## 2020-02-04 PROCEDURE — 2580000003 HC RX 258: Performed by: FAMILY MEDICINE

## 2020-02-04 PROCEDURE — 87040 BLOOD CULTURE FOR BACTERIA: CPT

## 2020-02-04 PROCEDURE — 85027 COMPLETE CBC AUTOMATED: CPT

## 2020-02-04 PROCEDURE — 96361 HYDRATE IV INFUSION ADD-ON: CPT

## 2020-02-04 PROCEDURE — 83605 ASSAY OF LACTIC ACID: CPT

## 2020-02-04 PROCEDURE — 80053 COMPREHEN METABOLIC PANEL: CPT

## 2020-02-04 PROCEDURE — 36415 COLL VENOUS BLD VENIPUNCTURE: CPT

## 2020-02-04 PROCEDURE — 99285 EMERGENCY DEPT VISIT HI MDM: CPT

## 2020-02-04 PROCEDURE — 6360000002 HC RX W HCPCS: Performed by: FAMILY MEDICINE

## 2020-02-04 PROCEDURE — 81001 URINALYSIS AUTO W/SCOPE: CPT

## 2020-02-04 PROCEDURE — 2580000003 HC RX 258

## 2020-02-04 PROCEDURE — 83690 ASSAY OF LIPASE: CPT

## 2020-02-04 PROCEDURE — 87088 URINE BACTERIA CULTURE: CPT

## 2020-02-04 PROCEDURE — 6360000002 HC RX W HCPCS: Performed by: STUDENT IN AN ORGANIZED HEALTH CARE EDUCATION/TRAINING PROGRAM

## 2020-02-04 RX ORDER — SODIUM CHLORIDE 0.9 % (FLUSH) 0.9 %
10 SYRINGE (ML) INJECTION EVERY 12 HOURS SCHEDULED
Status: DISCONTINUED | OUTPATIENT
Start: 2020-02-04 | End: 2020-02-13 | Stop reason: HOSPADM

## 2020-02-04 RX ORDER — MORPHINE SULFATE 4 MG/ML
4 INJECTION, SOLUTION INTRAMUSCULAR; INTRAVENOUS ONCE
Status: COMPLETED | OUTPATIENT
Start: 2020-02-04 | End: 2020-02-04

## 2020-02-04 RX ORDER — CIPROFLOXACIN 2 MG/ML
400 INJECTION, SOLUTION INTRAVENOUS EVERY 12 HOURS
Status: DISCONTINUED | OUTPATIENT
Start: 2020-02-05 | End: 2020-02-08

## 2020-02-04 RX ORDER — CIPROFLOXACIN 2 MG/ML
400 INJECTION, SOLUTION INTRAVENOUS ONCE
Status: COMPLETED | OUTPATIENT
Start: 2020-02-04 | End: 2020-02-04

## 2020-02-04 RX ORDER — SODIUM CHLORIDE 9 MG/ML
INJECTION, SOLUTION INTRAVENOUS CONTINUOUS
Status: DISCONTINUED | OUTPATIENT
Start: 2020-02-04 | End: 2020-02-08

## 2020-02-04 RX ORDER — KETOROLAC TROMETHAMINE 30 MG/ML
15 INJECTION, SOLUTION INTRAMUSCULAR; INTRAVENOUS ONCE
Status: COMPLETED | OUTPATIENT
Start: 2020-02-04 | End: 2020-02-04

## 2020-02-04 RX ORDER — ONDANSETRON 2 MG/ML
4 INJECTION INTRAMUSCULAR; INTRAVENOUS ONCE
Status: COMPLETED | OUTPATIENT
Start: 2020-02-04 | End: 2020-02-04

## 2020-02-04 RX ORDER — 0.9 % SODIUM CHLORIDE 0.9 %
500 INTRAVENOUS SOLUTION INTRAVENOUS ONCE
Status: COMPLETED | OUTPATIENT
Start: 2020-02-04 | End: 2020-02-04

## 2020-02-04 RX ORDER — SODIUM CHLORIDE 0.9 % (FLUSH) 0.9 %
10 SYRINGE (ML) INJECTION PRN
Status: DISCONTINUED | OUTPATIENT
Start: 2020-02-04 | End: 2020-02-13 | Stop reason: HOSPADM

## 2020-02-04 RX ORDER — SODIUM CHLORIDE 0.9 % (FLUSH) 0.9 %
SYRINGE (ML) INJECTION
Status: COMPLETED
Start: 2020-02-04 | End: 2020-02-04

## 2020-02-04 RX ORDER — ACETAMINOPHEN 325 MG/1
650 TABLET ORAL EVERY 4 HOURS PRN
Status: DISCONTINUED | OUTPATIENT
Start: 2020-02-04 | End: 2020-02-13 | Stop reason: HOSPADM

## 2020-02-04 RX ORDER — MORPHINE SULFATE 2 MG/ML
2 INJECTION, SOLUTION INTRAMUSCULAR; INTRAVENOUS EVERY 4 HOURS PRN
Status: DISCONTINUED | OUTPATIENT
Start: 2020-02-04 | End: 2020-02-13 | Stop reason: HOSPADM

## 2020-02-04 RX ORDER — HEPARIN SODIUM 10000 [USP'U]/ML
5000 INJECTION, SOLUTION INTRAVENOUS; SUBCUTANEOUS EVERY 8 HOURS SCHEDULED
Status: DISCONTINUED | OUTPATIENT
Start: 2020-02-04 | End: 2020-02-13 | Stop reason: HOSPADM

## 2020-02-04 RX ORDER — ONDANSETRON 2 MG/ML
4 INJECTION INTRAMUSCULAR; INTRAVENOUS EVERY 6 HOURS PRN
Status: DISCONTINUED | OUTPATIENT
Start: 2020-02-04 | End: 2020-02-13 | Stop reason: HOSPADM

## 2020-02-04 RX ADMIN — SODIUM CHLORIDE, PRESERVATIVE FREE 10 ML: 5 INJECTION INTRAVENOUS at 19:58

## 2020-02-04 RX ADMIN — CIPROFLOXACIN 400 MG: 2 INJECTION, SOLUTION INTRAVENOUS at 21:48

## 2020-02-04 RX ADMIN — ONDANSETRON 4 MG: 2 INJECTION INTRAMUSCULAR; INTRAVENOUS at 19:21

## 2020-02-04 RX ADMIN — MORPHINE SULFATE 4 MG: 4 INJECTION, SOLUTION INTRAMUSCULAR; INTRAVENOUS at 19:21

## 2020-02-04 RX ADMIN — KETOROLAC TROMETHAMINE 15 MG: 30 INJECTION, SOLUTION INTRAMUSCULAR; INTRAVENOUS at 19:55

## 2020-02-04 RX ADMIN — IOPAMIDOL 110 ML: 755 INJECTION, SOLUTION INTRAVENOUS at 20:05

## 2020-02-04 RX ADMIN — SODIUM CHLORIDE 500 ML: 9 INJECTION, SOLUTION INTRAVENOUS at 19:28

## 2020-02-04 ASSESSMENT — PAIN DESCRIPTION - PAIN TYPE
TYPE: ACUTE PAIN
TYPE: ACUTE PAIN

## 2020-02-04 ASSESSMENT — ENCOUNTER SYMPTOMS
VOMITING: 1
COUGH: 0
CONSTIPATION: 0
RHINORRHEA: 0
DIARRHEA: 1
ABDOMINAL PAIN: 1
NAUSEA: 1
WHEEZING: 0
SORE THROAT: 0
BACK PAIN: 0
SHORTNESS OF BREATH: 0

## 2020-02-04 ASSESSMENT — PAIN SCALES - GENERAL
PAINLEVEL_OUTOF10: 10
PAINLEVEL_OUTOF10: 9
PAINLEVEL_OUTOF10: 7
PAINLEVEL_OUTOF10: 3

## 2020-02-04 ASSESSMENT — PAIN DESCRIPTION - LOCATION
LOCATION: ABDOMEN
LOCATION: ABDOMEN

## 2020-02-04 ASSESSMENT — PAIN DESCRIPTION - DESCRIPTORS: DESCRIPTORS: ACHING;CRAMPING;DISCOMFORT

## 2020-02-04 ASSESSMENT — PAIN - FUNCTIONAL ASSESSMENT: PAIN_FUNCTIONAL_ASSESSMENT: ACTIVITIES ARE NOT PREVENTED

## 2020-02-04 ASSESSMENT — PAIN DESCRIPTION - ORIENTATION: ORIENTATION: LEFT

## 2020-02-04 NOTE — ED PROVIDER NOTES
symmetrical with midline trachea; no cervical or supraclavicular lymphadenopathy noted; thyroid not palpated, but no tenderness or masses noted in the region; no JVD, no bruits; normal ROM with no meningeal signs  Lungs: LCTAB with no wheezes, rhonchi or rales noted; no respiratory distress, breathing unlabored   CV: RRR, no murmurs, gallops or rubs noted on auscultation, normal S1/S2; UE and LE distal pulses intact b/l +2/4 with no cyanosis noted; no LE edema noted b/l; capillary refill < 2 seconds  ABD: Soft, minimal tenderness to LUQ and LLQ to palpation, non-distended, BS x4; no organomegaly, hernias or masses noted  /Rectal: no suprapubic tenderness; remainder of exam deferred  MSK: UEs and LEs without notable trauma, ROM restriction or tenderness to palpation b/l; normal strength throughout  Skin: Skin warm and dry without notable rash, erythema, bruising or lesion; normal turgor  Neuro: A&O x3; CN II-XII appear grossly intact; no focal deficits appreciated; pt thoughtful in discussion and able to answer all questions without issue     Dre Wilkerson presents to the ED for evaluation of diarrhea and abdominal pain. Differential diagnoses included but not limited to colitis, gastritis, UTI, pyelonephritis, nephrolithiasis. Workup in the ED revealed labs within normal limits, patient tachycardic and appears dehydrated on physical exam. CT abdomen/pelvis demonstrating hydronephrosis and hydroureter of the left kidney due to 2-3mm calculus that is on the verge of passing through the bladder wall. There were also findings concerning for colitis. Patient was started on flagyl and cipro. Patient was given first round of antibiotics and pain medication for their symptoms with mild improvement.  Patient requires continued workup and management of their symptoms and will be admitted to the hospital for further evaluation and treatment.      --------------------------------------------- PAST HISTORY ---------------------------------------------  Past Medical History:  has a past medical history of Anxiety, Gastric reflux, and Hypertension. Past Surgical History:  has a past surgical history that includes Colonoscopy and Hysterectomy. Social History:  reports that she has never smoked. She has never used smokeless tobacco. She reports that she does not drink alcohol or use drugs. Family History: family history includes Cancer in her brother and brother; High Blood Pressure in her mother; Other in her father. The patients home medications have been reviewed.     Allergies: Lorazepam and Penicillins    -------------------------------------------------- RESULTS -------------------------------------------------    LABS:  Results for orders placed or performed during the hospital encounter of 02/04/20   CBC   Result Value Ref Range    WBC 10.8 4.5 - 11.5 E9/L    RBC 4.27 3.50 - 5.50 E12/L    Hemoglobin 13.3 11.5 - 15.5 g/dL    Hematocrit 40.8 34.0 - 48.0 %    MCV 95.6 80.0 - 99.9 fL    MCH 31.1 26.0 - 35.0 pg    MCHC 32.6 32.0 - 34.5 %    RDW 13.3 11.5 - 15.0 fL    Platelets 235 721 - 011 E9/L    MPV 10.2 7.0 - 12.0 fL   Comprehensive metabolic panel   Result Value Ref Range    Sodium 140 132 - 146 mmol/L    Potassium 3.6 3.5 - 5.0 mmol/L    Chloride 105 98 - 107 mmol/L    CO2 27 22 - 29 mmol/L    Anion Gap 8 7 - 16 mmol/L    Glucose 166 (H) 74 - 99 mg/dL    BUN 16 8 - 23 mg/dL    CREATININE 0.9 0.5 - 1.0 mg/dL    GFR Non-African American 60 >=60 mL/min/1.73    GFR African American >60     Calcium 9.3 8.6 - 10.2 mg/dL    Total Protein 7.2 6.4 - 8.3 g/dL    Alb 4.2 3.5 - 5.2 g/dL    Total Bilirubin 0.3 0.0 - 1.2 mg/dL    Alkaline Phosphatase 93 35 - 104 U/L    ALT 8 0 - 32 U/L    AST 14 0 - 31 U/L   Lactate, Sepsis   Result Value Ref Range    Lactic Acid, Sepsis 1.4 0.5 - 1.9 mmol/L   Lipase   Result Value Ref Range    Lipase 19 13 - 60 U/L   Urinalysis, reflex to microscopic   Result Value Ref Range (74.4 kg)       Oxygen Saturation Interpretation: Normal    ------------------------------------------ PROGRESS NOTES ------------------------------------------  Counseling:  I have spoken with the patient and discussed todays results, in addition to providing specific details for the plan of care and counseling regarding the diagnosis and prognosis. Their questions are answered at this time and they are agreeable with the plan of admission.    --------------------------------- ADDITIONAL PROVIDER NOTES ---------------------------------  Consultations:  Time: 2144. Spoke with Dr. Katherine Cedeno. Discussed case. They will admit the patient. This patient's ED course included: a personal history and physicial examination, re-evaluation prior to disposition, multiple bedside re-evaluations and complex medical decision making and emergency management    This patient has remained hemodynamically stable during their ED course. Diagnosis:  1. Colitis    2. Nephrolithiasis        Disposition:  Patient's disposition: Admit to telemetry  Patient's condition is stable.              Travis Gaviria DO  Resident  02/04/20 0268

## 2020-02-05 LAB
ALBUMIN SERPL-MCNC: 3.5 G/DL (ref 3.5–5.2)
ANION GAP SERPL CALCULATED.3IONS-SCNC: 9 MMOL/L (ref 7–16)
BUN BLDV-MCNC: 13 MG/DL (ref 8–23)
CALCIUM SERPL-MCNC: 8.3 MG/DL (ref 8.6–10.2)
CHLORIDE BLD-SCNC: 105 MMOL/L (ref 98–107)
CO2: 24 MMOL/L (ref 22–29)
CREAT SERPL-MCNC: 1.2 MG/DL (ref 0.5–1)
CREATININE URINE: 32 MG/DL (ref 29–226)
GFR AFRICAN AMERICAN: 52
GFR NON-AFRICAN AMERICAN: 43 ML/MIN/1.73
GLUCOSE BLD-MCNC: 102 MG/DL (ref 74–99)
HCT VFR BLD CALC: 35.4 % (ref 34–48)
HEMOGLOBIN: 11.6 G/DL (ref 11.5–15.5)
MCH RBC QN AUTO: 31.3 PG (ref 26–35)
MCHC RBC AUTO-ENTMCNC: 32.8 % (ref 32–34.5)
MCV RBC AUTO: 95.4 FL (ref 80–99.9)
PDW BLD-RTO: 13.3 FL (ref 11.5–15)
PHOSPHORUS: 4 MG/DL (ref 2.5–4.5)
PLATELET # BLD: 261 E9/L (ref 130–450)
PMV BLD AUTO: 9.8 FL (ref 7–12)
POTASSIUM SERPL-SCNC: 4 MMOL/L (ref 3.5–5)
RBC # BLD: 3.71 E12/L (ref 3.5–5.5)
SODIUM BLD-SCNC: 138 MMOL/L (ref 132–146)
SODIUM URINE: 129 MMOL/L
WBC # BLD: 7 E9/L (ref 4.5–11.5)

## 2020-02-05 PROCEDURE — 96366 THER/PROPH/DIAG IV INF ADDON: CPT

## 2020-02-05 PROCEDURE — 87088 URINE BACTERIA CULTURE: CPT

## 2020-02-05 PROCEDURE — 2500000003 HC RX 250 WO HCPCS: Performed by: STUDENT IN AN ORGANIZED HEALTH CARE EDUCATION/TRAINING PROGRAM

## 2020-02-05 PROCEDURE — 36415 COLL VENOUS BLD VENIPUNCTURE: CPT

## 2020-02-05 PROCEDURE — 84300 ASSAY OF URINE SODIUM: CPT

## 2020-02-05 PROCEDURE — 2060000000 HC ICU INTERMEDIATE R&B

## 2020-02-05 PROCEDURE — 96376 TX/PRO/DX INJ SAME DRUG ADON: CPT

## 2020-02-05 PROCEDURE — 2580000003 HC RX 258: Performed by: FAMILY MEDICINE

## 2020-02-05 PROCEDURE — 85027 COMPLETE CBC AUTOMATED: CPT

## 2020-02-05 PROCEDURE — 82570 ASSAY OF URINE CREATININE: CPT

## 2020-02-05 PROCEDURE — 6360000002 HC RX W HCPCS: Performed by: FAMILY MEDICINE

## 2020-02-05 PROCEDURE — 87040 BLOOD CULTURE FOR BACTERIA: CPT

## 2020-02-05 PROCEDURE — G0378 HOSPITAL OBSERVATION PER HR: HCPCS

## 2020-02-05 PROCEDURE — 96372 THER/PROPH/DIAG INJ SC/IM: CPT

## 2020-02-05 PROCEDURE — 99222 1ST HOSP IP/OBS MODERATE 55: CPT | Performed by: SURGERY

## 2020-02-05 PROCEDURE — 96367 TX/PROPH/DG ADDL SEQ IV INF: CPT

## 2020-02-05 PROCEDURE — 80069 RENAL FUNCTION PANEL: CPT

## 2020-02-05 RX ORDER — SODIUM CHLORIDE 0.9 % (FLUSH) 0.9 %
10 SYRINGE (ML) INJECTION EVERY 12 HOURS SCHEDULED
Status: DISCONTINUED | OUTPATIENT
Start: 2020-02-05 | End: 2020-02-07

## 2020-02-05 RX ORDER — CIPROFLOXACIN 2 MG/ML
400 INJECTION, SOLUTION INTRAVENOUS
Status: ACTIVE | OUTPATIENT
Start: 2020-02-05 | End: 2020-02-05

## 2020-02-05 RX ORDER — SODIUM CHLORIDE 0.9 % (FLUSH) 0.9 %
10 SYRINGE (ML) INJECTION PRN
Status: DISCONTINUED | OUTPATIENT
Start: 2020-02-05 | End: 2020-02-07

## 2020-02-05 RX ADMIN — MORPHINE SULFATE 2 MG: 2 INJECTION, SOLUTION INTRAMUSCULAR; INTRAVENOUS at 20:38

## 2020-02-05 RX ADMIN — SODIUM CHLORIDE: 9 INJECTION, SOLUTION INTRAVENOUS at 00:35

## 2020-02-05 RX ADMIN — MORPHINE SULFATE 2 MG: 2 INJECTION, SOLUTION INTRAMUSCULAR; INTRAVENOUS at 15:41

## 2020-02-05 RX ADMIN — SODIUM CHLORIDE: 9 INJECTION, SOLUTION INTRAVENOUS at 10:27

## 2020-02-05 RX ADMIN — MORPHINE SULFATE 2 MG: 2 INJECTION, SOLUTION INTRAMUSCULAR; INTRAVENOUS at 01:43

## 2020-02-05 RX ADMIN — HEPARIN SODIUM 5000 UNITS: 10000 INJECTION INTRAVENOUS; SUBCUTANEOUS at 00:40

## 2020-02-05 RX ADMIN — Medication 10 ML: at 00:40

## 2020-02-05 RX ADMIN — HEPARIN SODIUM 5000 UNITS: 10000 INJECTION INTRAVENOUS; SUBCUTANEOUS at 15:41

## 2020-02-05 RX ADMIN — CIPROFLOXACIN 400 MG: 2 INJECTION, SOLUTION INTRAVENOUS at 21:04

## 2020-02-05 RX ADMIN — METRONIDAZOLE 500 MG: 500 INJECTION, SOLUTION INTRAVENOUS at 00:35

## 2020-02-05 RX ADMIN — CIPROFLOXACIN 400 MG: 2 INJECTION, SOLUTION INTRAVENOUS at 09:16

## 2020-02-05 RX ADMIN — SODIUM CHLORIDE: 9 INJECTION, SOLUTION INTRAVENOUS at 20:40

## 2020-02-05 ASSESSMENT — PAIN DESCRIPTION - PAIN TYPE
TYPE: ACUTE PAIN

## 2020-02-05 ASSESSMENT — PAIN SCALES - GENERAL
PAINLEVEL_OUTOF10: 0
PAINLEVEL_OUTOF10: 5
PAINLEVEL_OUTOF10: 10
PAINLEVEL_OUTOF10: 8
PAINLEVEL_OUTOF10: 0
PAINLEVEL_OUTOF10: 0
PAINLEVEL_OUTOF10: 6
PAINLEVEL_OUTOF10: 3

## 2020-02-05 ASSESSMENT — PAIN DESCRIPTION - DESCRIPTORS
DESCRIPTORS: CRAMPING;DISCOMFORT
DESCRIPTORS: ACHING;CRAMPING;DISCOMFORT
DESCRIPTORS: CRAMPING

## 2020-02-05 ASSESSMENT — PAIN DESCRIPTION - LOCATION
LOCATION: ABDOMEN

## 2020-02-05 ASSESSMENT — PAIN - FUNCTIONAL ASSESSMENT
PAIN_FUNCTIONAL_ASSESSMENT: ACTIVITIES ARE NOT PREVENTED
PAIN_FUNCTIONAL_ASSESSMENT: ACTIVITIES ARE NOT PREVENTED
PAIN_FUNCTIONAL_ASSESSMENT: PREVENTS OR INTERFERES SOME ACTIVE ACTIVITIES AND ADLS

## 2020-02-05 ASSESSMENT — PAIN DESCRIPTION - ONSET
ONSET: ON-GOING
ONSET: ON-GOING

## 2020-02-05 ASSESSMENT — PAIN DESCRIPTION - FREQUENCY
FREQUENCY: CONTINUOUS
FREQUENCY: CONTINUOUS

## 2020-02-05 ASSESSMENT — PAIN DESCRIPTION - PROGRESSION
CLINICAL_PROGRESSION: NOT CHANGED
CLINICAL_PROGRESSION: NOT CHANGED

## 2020-02-05 ASSESSMENT — PAIN DESCRIPTION - ORIENTATION
ORIENTATION: LOWER;MID
ORIENTATION: LOWER
ORIENTATION: LEFT

## 2020-02-05 NOTE — CONSULTS
status:       Spouse name: Not on file    Number of children: 11    Years of education: 15    Highest education level: Not on file   Occupational History    Occupation: retired -[employer]   Social Needs    Financial resource strain: Not on file    Food insecurity:     Worry: Not on file     Inability: Not on file   [employer] needs:     Medical: Not on file     Non-medical: Not on file   Tobacco Use    Smoking status: Never Smoker    Smokeless tobacco: Never Used   Substance and Sexual Activity    Alcohol use: No    Drug use: No    Sexual activity: Never   Lifestyle    Physical activity:     Days per week: Not on file     Minutes per session: Not on file    Stress: Not on file   Relationships    Social connections:     Talks on phone: Not on file     Gets together: Not on file     Attends Sabianism service: Not on file     Active member of club or organization: Not on file     Attends meetings of clubs or organizations: Not on file     Relationship status: Not on file    Intimate partner violence:     Fear of current or ex partner: Not on file     Emotionally abused: Not on file     Physically abused: Not on file     Forced sexual activity: Not on file   Other Topics Concern    Not on file   Social History Narrative    Not on file       Family History   Problem Relation Age of Onset    High Blood Pressure Mother     Other Father         Emphysema    Cancer Brother         Leukemia    Cancer Brother        Review Of Systems:   CONSTITUTIONAL:  positive for  chills, malaise, anorexia and weight loss  EYES:  negative  HEENT:  negative  RESPIRATORY:  negative  CARDIOVASCULAR:  negative  GASTROINTESTINAL:  positive for diarrhea and abdominal pain  GENITOURINARY:  negative  INTEGUMENT/BREAST:  negative  HEMATOLOGIC/LYMPHATIC:  negative  ALLERGIC/IMMUNOLOGIC:  negative  ENDOCRINE:  negative  MUSCULOSKELETAL:  negative  NEUROLOGICAL:  negative  BEHAVIOR/PSYCH:  negative    Physical

## 2020-02-05 NOTE — CARE COORDINATION
Met with patient at bedside, introduced myself and explained my role as RN case manager. Pt stated she came to the hospital due to abdominal pain. Discussed plan of care (urology consult, gen surgery consult, labs/testing, medications, isolation, diet, discharge planning, etc.). Pt verbalized understanding. Pt denies any needs at present time. Pt plans to transition back home upon discharge.      Informed patient that case management and social work will continue to follow to assist with the transition of care

## 2020-02-05 NOTE — PLAN OF CARE
Problem: Falls - Risk of:  Goal: Will remain free from falls  Description  Will remain free from falls  Outcome: Met This Shift  Goal: Absence of physical injury  Description  Absence of physical injury  Outcome: Met This Shift     Problem: Pain:  Description  Pain management should include both nonpharmacologic and pharmacologic interventions.   Goal: Pain level will decrease  Description  Pain level will decrease  Outcome: Met This Shift  Goal: Control of acute pain  Description  Control of acute pain  Outcome: Met This Shift     Problem: Urinary Elimination:  Goal: Will remain free from infection  Description  Will remain free from infection  Outcome: Ongoing

## 2020-02-05 NOTE — H&P
This is a 83 yo F with a PMHx significant for HTN, GERD, Anxiety who presents with abdominal pain and nausea. The patient states that her pain begun around 4:30 PM today after eating at [employer]. Patient states the pain is 1000/10, located everywhere, and sharp in nature. Per son, patient had dry heaves on the drive to the hospital.Patient had one episode diarrhea; patient states that it was copious. When asked if blood was present in the diarrhea, patient stated [employer] has time for that\". Patient had a recent colonoscopy; unsure what the results were (nothing in her chart). Patient is very agitated and was very hard to get a clear story from. The patient has tried nothing for this condition without getting meaningful relief of symptoms. The patient denies recent trauma, fever, chills, fatigue, HA, dizziness, vision changes, chest pain, palpitations, hx of MI, SOB, cough, wheezing, D/C, hematochezia, melena, dysuria, hematuria, generalized weakness and paresthesias. Pt states she chronically has loose bms after eating but yesterday driving home from the store, sudden left lower abd pain, nausea, had diarrhea  Ct showed colitis and left lower stone with hydronephrosis,admitted iv fluids, iv ab  Past Medical History:   Diagnosis Date    Anxiety     Gastric reflux     Hypertension        Past Surgical History:   Procedure Laterality Date    COLONOSCOPY      HYSTERECTOMY         Allergies   Allergen Reactions    Lorazepam Photosensitivity     Unable to remember reaction state's it's too long ago    Penicillins Other (See Comments)     States it was paranoia. No current facility-administered medications on file prior to encounter.       Current Outpatient Medications on File Prior to Encounter   Medication Sig Dispense Refill    amLODIPine (NORVASC) 5 MG tablet Take 1 tablet by mouth daily 30 tablet 3    diclofenac sodium 1 % GEL Apply 2 g topically 4 times daily 2 Tube 1    ezetimibe (ZETIA) 10 MG tablet

## 2020-02-05 NOTE — CONSULTS
Vitals: /63   Pulse 98   Temp 98.1 °F (36.7 °C) (Oral)   Resp 18   Ht 5' 3\" (1.6 m)   Wt 163 lb 9.6 oz (74.2 kg)   LMP  (LMP Unknown)   SpO2 92%   BMI 28.98 kg/m²   General:  Awake, alert, oriented X 3. Well developed, well nourished, well groomed. No apparent distress. HEENT:  Normocephalic, atraumatic. Pupils equal, round. No scleral icterus. No conjunctival injection. Normal lips, teeth, and gums. No nasal discharge. Neck:  Supple, no masses. Heart:  RRR  Lungs:  No audible wheezing. Respirations symmetric and non-labored. Abdomen:  soft, nontender, no masses, no organomegaly, no peritoneal signs  Extremities:  No clubbing, cyanosis, or edema  Skin:  Warm and dry, no open lesions or rashes  Neuro:  Cranial nerves 2-12 intact, no focal deficits  Rectal: deferred  Genitalia:  Cano no    Labs:   Recent Labs     02/04/20  1904 02/05/20  0412   WBC 10.8 7.0   RBC 4.27 3.71   HGB 13.3 11.6   HCT 40.8 35.4   MCV 95.6 95.4   MCH 31.1 31.3   MCHC 32.6 32.8   RDW 13.3 13.3    261   MPV 10.2 9.8       Recent Labs     02/04/20  1904 02/05/20  0412   CREATININE 0.9 1.2*     1. The colon appears narrowed with mural thickening, which is   suspicious for colitis. It extends to the pelvic colon level, and   could represent ulcerative colitis. 2. There is hydronephrosis and hydroureter of the left kidney to the   bladder base, where a 2 to 3 mm diameter calculus is identified. Is on   the verge of passing through the bladder wall.    3. A moderate size retrocardiac hiatus hernia, numerous bilateral   renal cortical cysts, degenerative spinal findings are noted.           ALERT:  THIS IS AN ABNORMAL REPORT-possible colitis and definite left   hydroureteronephrosis with a calculus at the UVJ on the left measuring   2 or 3 mm maximum diameter.             Images:                  Assessment: Danna Dixon 82 y.o. female     Distal left ureteral stone  Left hydronephrosis  Left flank pain    Plan:    CT was reviewed  All options were discussed  Her pain is resolved  Hold on surgery  She can be DC'ed from  standpoint  This was a small stone  She may have passed this stone    DO LULA Bynum  Urology

## 2020-02-05 NOTE — CARE COORDINATION
2/5/2020  Social Work Discharge Planning: This worker met with Pt to discuss  role and transition of care/discharge planning. Pt resides alone, is independent and drives. Pt is currently on IV ATB. If Pt needs HHC she stated no preference after given choices. SW will continue to follow. Electronically signed by MELISSA Tolentino on 2/5/2020 at 10:17 AM

## 2020-02-06 PROBLEM — T14.8XXA ABRASION: Status: RESOLVED | Noted: 2017-11-13 | Resolved: 2020-02-06

## 2020-02-06 PROBLEM — R07.89 ATYPICAL CHEST PAIN: Status: RESOLVED | Noted: 2019-10-28 | Resolved: 2020-02-06

## 2020-02-06 PROBLEM — K44.9 HIATAL HERNIA WITH GERD: Status: ACTIVE | Noted: 2020-02-06

## 2020-02-06 PROBLEM — N13.2 HYDRONEPHROSIS WITH URINARY OBSTRUCTION DUE TO RENAL CALCULUS: Status: ACTIVE | Noted: 2020-02-06

## 2020-02-06 PROBLEM — S80.01XA CONTUSION OF RIGHT KNEE: Status: RESOLVED | Noted: 2017-11-13 | Resolved: 2020-02-06

## 2020-02-06 PROBLEM — K21.9 HIATAL HERNIA WITH GERD: Status: ACTIVE | Noted: 2020-02-06

## 2020-02-06 LAB
ALBUMIN SERPL-MCNC: 3.3 G/DL (ref 3.5–5.2)
ALP BLD-CCNC: 72 U/L (ref 35–104)
ALT SERPL-CCNC: 7 U/L (ref 0–32)
ANION GAP SERPL CALCULATED.3IONS-SCNC: 5 MMOL/L (ref 7–16)
AST SERPL-CCNC: 12 U/L (ref 0–31)
BASOPHILS ABSOLUTE: 0.01 E9/L (ref 0–0.2)
BASOPHILS RELATIVE PERCENT: 0.2 % (ref 0–2)
BILIRUB SERPL-MCNC: 0.2 MG/DL (ref 0–1.2)
BUN BLDV-MCNC: 13 MG/DL (ref 8–23)
CALCIUM SERPL-MCNC: 8.2 MG/DL (ref 8.6–10.2)
CHLORIDE BLD-SCNC: 111 MMOL/L (ref 98–107)
CO2: 24 MMOL/L (ref 22–29)
CREAT SERPL-MCNC: 1.4 MG/DL (ref 0.5–1)
EOSINOPHILS ABSOLUTE: 0.06 E9/L (ref 0.05–0.5)
EOSINOPHILS RELATIVE PERCENT: 1.1 % (ref 0–6)
GFR AFRICAN AMERICAN: 44
GFR NON-AFRICAN AMERICAN: 36 ML/MIN/1.73
GLUCOSE BLD-MCNC: 123 MG/DL (ref 74–99)
HCT VFR BLD CALC: 34.2 % (ref 34–48)
HEMOGLOBIN: 10.8 G/DL (ref 11.5–15.5)
IMMATURE GRANULOCYTES #: 0.01 E9/L
IMMATURE GRANULOCYTES %: 0.2 % (ref 0–5)
LYMPHOCYTES ABSOLUTE: 1 E9/L (ref 1.5–4)
LYMPHOCYTES RELATIVE PERCENT: 18.2 % (ref 20–42)
MCH RBC QN AUTO: 31.2 PG (ref 26–35)
MCHC RBC AUTO-ENTMCNC: 31.6 % (ref 32–34.5)
MCV RBC AUTO: 98.8 FL (ref 80–99.9)
MONOCYTES ABSOLUTE: 0.66 E9/L (ref 0.1–0.95)
MONOCYTES RELATIVE PERCENT: 12 % (ref 2–12)
NEUTROPHILS ABSOLUTE: 3.74 E9/L (ref 1.8–7.3)
NEUTROPHILS RELATIVE PERCENT: 68.3 % (ref 43–80)
PDW BLD-RTO: 13.7 FL (ref 11.5–15)
PLATELET # BLD: 220 E9/L (ref 130–450)
PMV BLD AUTO: 9.6 FL (ref 7–12)
POTASSIUM SERPL-SCNC: 3.7 MMOL/L (ref 3.5–5)
RBC # BLD: 3.46 E12/L (ref 3.5–5.5)
SODIUM BLD-SCNC: 140 MMOL/L (ref 132–146)
TOTAL PROTEIN: 5.8 G/DL (ref 6.4–8.3)
URINE CULTURE, ROUTINE: NORMAL
WBC # BLD: 5.5 E9/L (ref 4.5–11.5)

## 2020-02-06 PROCEDURE — 36415 COLL VENOUS BLD VENIPUNCTURE: CPT

## 2020-02-06 PROCEDURE — 2060000000 HC ICU INTERMEDIATE R&B

## 2020-02-06 PROCEDURE — 6360000002 HC RX W HCPCS: Performed by: FAMILY MEDICINE

## 2020-02-06 PROCEDURE — 85025 COMPLETE CBC W/AUTO DIFF WBC: CPT

## 2020-02-06 PROCEDURE — 99232 SBSQ HOSP IP/OBS MODERATE 35: CPT | Performed by: SURGERY

## 2020-02-06 PROCEDURE — 2580000003 HC RX 258: Performed by: UROLOGY

## 2020-02-06 PROCEDURE — 2580000003 HC RX 258: Performed by: FAMILY MEDICINE

## 2020-02-06 PROCEDURE — 80053 COMPREHEN METABOLIC PANEL: CPT

## 2020-02-06 RX ADMIN — MORPHINE SULFATE 2 MG: 2 INJECTION, SOLUTION INTRAMUSCULAR; INTRAVENOUS at 14:41

## 2020-02-06 RX ADMIN — MORPHINE SULFATE 2 MG: 2 INJECTION, SOLUTION INTRAMUSCULAR; INTRAVENOUS at 23:40

## 2020-02-06 RX ADMIN — CIPROFLOXACIN 400 MG: 2 INJECTION, SOLUTION INTRAVENOUS at 10:03

## 2020-02-06 RX ADMIN — Medication 10 ML: at 20:59

## 2020-02-06 RX ADMIN — SODIUM CHLORIDE: 9 INJECTION, SOLUTION INTRAVENOUS at 20:58

## 2020-02-06 RX ADMIN — HEPARIN SODIUM 5000 UNITS: 10000 INJECTION INTRAVENOUS; SUBCUTANEOUS at 16:53

## 2020-02-06 RX ADMIN — HEPARIN SODIUM 5000 UNITS: 10000 INJECTION INTRAVENOUS; SUBCUTANEOUS at 00:05

## 2020-02-06 RX ADMIN — CIPROFLOXACIN 400 MG: 2 INJECTION, SOLUTION INTRAVENOUS at 20:58

## 2020-02-06 RX ADMIN — HEPARIN SODIUM 5000 UNITS: 10000 INJECTION INTRAVENOUS; SUBCUTANEOUS at 08:19

## 2020-02-06 RX ADMIN — MORPHINE SULFATE 2 MG: 2 INJECTION, SOLUTION INTRAMUSCULAR; INTRAVENOUS at 08:32

## 2020-02-06 RX ADMIN — Medication 10 ML: at 23:41

## 2020-02-06 RX ADMIN — SODIUM CHLORIDE: 9 INJECTION, SOLUTION INTRAVENOUS at 08:18

## 2020-02-06 ASSESSMENT — PAIN DESCRIPTION - PROGRESSION
CLINICAL_PROGRESSION: NOT CHANGED
CLINICAL_PROGRESSION: GRADUALLY WORSENING
CLINICAL_PROGRESSION: NOT CHANGED

## 2020-02-06 ASSESSMENT — PAIN DESCRIPTION - ONSET
ONSET: ON-GOING

## 2020-02-06 ASSESSMENT — PAIN SCALES - GENERAL
PAINLEVEL_OUTOF10: 8
PAINLEVEL_OUTOF10: 3
PAINLEVEL_OUTOF10: 4
PAINLEVEL_OUTOF10: 9
PAINLEVEL_OUTOF10: 5
PAINLEVEL_OUTOF10: 7
PAINLEVEL_OUTOF10: 7

## 2020-02-06 ASSESSMENT — PAIN DESCRIPTION - ORIENTATION
ORIENTATION: LEFT;LOWER
ORIENTATION: LOWER;LEFT
ORIENTATION: LEFT;LOWER
ORIENTATION: LEFT
ORIENTATION: LOWER

## 2020-02-06 ASSESSMENT — PAIN - FUNCTIONAL ASSESSMENT
PAIN_FUNCTIONAL_ASSESSMENT: ACTIVITIES ARE NOT PREVENTED
PAIN_FUNCTIONAL_ASSESSMENT: ACTIVITIES ARE NOT PREVENTED

## 2020-02-06 ASSESSMENT — PAIN DESCRIPTION - LOCATION
LOCATION: ABDOMEN

## 2020-02-06 ASSESSMENT — PAIN DESCRIPTION - PAIN TYPE
TYPE: ACUTE PAIN

## 2020-02-06 ASSESSMENT — PAIN DESCRIPTION - FREQUENCY
FREQUENCY: CONTINUOUS

## 2020-02-06 ASSESSMENT — PAIN DESCRIPTION - DESCRIPTORS
DESCRIPTORS: CRAMPING;DISCOMFORT
DESCRIPTORS: DISCOMFORT;SHOOTING
DESCRIPTORS: CRAMPING;DISCOMFORT
DESCRIPTORS: DISCOMFORT;ACHING;SORE

## 2020-02-06 NOTE — PROGRESS NOTES
Lifestyle    Physical activity:     Days per week: None     Minutes per session: None    Stress: None   Relationships    Social connections:     Talks on phone: None     Gets together: None     Attends Jainism service: None     Active member of club or organization: None     Attends meetings of clubs or organizations: None     Relationship status: None    Intimate partner violence:     Fear of current or ex partner: None     Emotionally abused: None     Physically abused: None     Forced sexual activity: None   Other Topics Concern    None   Social History Narrative    None       Scheduled Meds:   sodium chloride flush  10 mL Intravenous 2 times per day    sodium chloride flush  10 mL Intravenous 2 times per day    heparin (porcine)  5,000 Units Subcutaneous 3 times per day    ciprofloxacin  400 mg Intravenous Q12H     Continuous Infusions:   sodium chloride 100 mL/hr at 02/06/20 0818     PRN Meds:.sodium chloride flush, sodium chloride flush, ondansetron, acetaminophen, morphine    ASSESSMENT:    Patient Active Problem List   Diagnosis    Anxiety    Diabetes mellitus ([facility] [address]    Essential hypertension    Gastroesophageal reflux disease without esophagitis    Neck sprain    Near syncope    Mixed hyperlipidemia    Diastolic dysfunction    Diverticulitis    Diverticulitis large intestine w/o perforation or abscess w/bleeding    Hypokalemia    Left shoulder pain    Pure hypercholesterolemia    Moderate obesity    Colitis    Hydronephrosis with urinary obstruction due to renal calculus    Hiatal hernia with GERD       PLAN:  Will schedule for cysto and stone removal for tomorrow  Rick Jean M.D.  2/6/2020  10:21 AM

## 2020-02-06 NOTE — PROGRESS NOTES
[facility] Quality Flow/Interdisciplinary Rounds Progress Note        Quality Flow Rounds held on February 6, 2020    Disciplines Attending:  Bedside Nurse, ,  and Nursing Unit Leadership    Ashanti Nelson was admitted on 2/4/2020  6:12 PM    Anticipated Discharge Date:  Expected Discharge Date: 02/06/20    Disposition:    Charanjit Score:  Charanjit Scale Score: 20    Readmission Risk              Risk of Unplanned Readmission:        14           Discussed patient goal for the day, patient clinical progression, and barriers to discharge.   The following Goal(s) of the Day/Commitment(s) have been identified:  Other  R/O c-diff      Yan Salinas  February 6, 2020

## 2020-02-06 NOTE — PROGRESS NOTES
Daily Progress Note  Marsha Landis MD      Subjective:   Patient has abdominal pain,she is refusing to eat hospital food. .     Past Medical History:   Diagnosis Date    Anxiety     Gastric reflux     Hypertension        Past Surgical History:   Procedure Laterality Date    COLONOSCOPY      HYSTERECTOMY         Scheduled Meds:   sodium chloride flush  10 mL Intravenous 2 times per day    sodium chloride flush  10 mL Intravenous 2 times per day    heparin (porcine)  5,000 Units Subcutaneous 3 times per day    ciprofloxacin  400 mg Intravenous Q12H     Continuous Infusions:   sodium chloride 100 mL/hr at 02/06/20 0818     PRN Meds:sodium chloride flush, sodium chloride flush, ondansetron, acetaminophen, morphine  DIET GENERAL;    Objective:     I/O last 3 completed shifts: In: 2200 [P.O.:960; I.V.:1140; IV Piggyback:100]  Out: 250 [Urine:250]  No intake/output data recorded.   Stool Occurrence: 0  Vitals:    02/05/20 0915 02/05/20 1616 02/05/20 2315 02/06/20 0415   BP: (!) 114/54 (!) 123/58 (!) 121/58    Pulse: 72 75 73    Resp: 16 16 16    Temp: 97.5 °F (36.4 °C) 98.2 °F (36.8 °C) 98.2 °F (36.8 °C)    TempSrc: Oral Oral Oral    SpO2: 99%  94%    Weight:    169 lb 1.6 oz (76.7 kg)   Height:           General appearance: alert, appears stated age and cooperative  Neck: no adenopathy, no carotid bruit, no JVD, supple, symmetrical, trachea midline and thyroid not enlarged, symmetric, no tenderness/mass/nodules  Lungs: chest clear, no wheezing, rales, normal symmetric air entry  Chest wall: no tenderness  Heart: regular rate and rhythm, S1, S2 normal, no murmur, click, rub or gallop  Abdomen: soft,diffuse discomfort ; bowel sounds normal; no masses,  no organomegaly  Extremities: extremities normal, atraumatic, no cyanosis or edema  Pulses: 2+ and symmetric  Neurologic: Grossly normal      Data Review:    Recent Labs     02/04/20  1904 02/05/20  0412   WBC 10.8 7.0   HGB 13.3 11.6   HCT 40.8 35.4   MCV 95.6 95.4  261     Recent Labs     02/04/20  1904 02/05/20  0412    138   K 3.6 4.0    105   CO2 27 24   BUN 16 13   CREATININE 0.9 1.2*   GLUCOSE 166* 102*     Recent Labs     02/04/20  1904   AST 14   ALT 8   BILITOT 0.3   ALKPHOS 93       U/A:    Lab Results   Component Value Date    NITRU Negative 02/04/2020    COLORU Straw 02/04/2020    PHUR 7.0 02/04/2020    LABCAST RARE 06/17/2019    WBCUA >20 02/04/2020    WBCUA 2-5 07/16/2011    RBCUA 10-20 02/04/2020    RBCUA 2-5 08/02/2013    MUCUS Present 06/17/2019    BACTERIA MODERATE 02/04/2020    CLARITYU Clear 02/04/2020    SPECGRAV 1.015 02/04/2020    LEUKOCYTESUR LARGE 02/04/2020    UROBILINOGEN 0.2 02/04/2020    BILIRUBINUR Negative 02/04/2020    BILIRUBINUR small 11/19/2018    BILIRUBINUR NEGATIVE 07/16/2011    BLOODU LARGE 02/04/2020    GLUCOSEU Negative 02/04/2020    GLUCOSEU NEGATIVE 07/16/2011    KETUA TRACE 02/04/2020     HgBA1c:    Lab Results   Component Value Date    LABA1C 6.1 10/13/2015     TSH:    Lab Results   Component Value Date    TSH 1.570 10/28/2019        Radiology:   CT ABDOMEN PELVIS W IV CONTRAST Additional Contrast? None   Final Result   1. The colon appears narrowed with mural thickening, which is   suspicious for colitis. It extends to the pelvic colon level, and   could represent ulcerative colitis. 2. There is hydronephrosis and hydroureter of the left kidney to the   bladder base, where a 2 to 3 mm diameter calculus is identified. Is on   the verge of passing through the bladder wall. 3. A moderate size retrocardiac hiatus hernia, numerous bilateral   renal cortical cysts, degenerative spinal findings are noted. ALERT:  THIS IS AN ABNORMAL REPORT-possible colitis and definite left   hydroureteronephrosis with a calculus at the UVJ on the left measuring   2 or 3 mm maximum diameter.              Assessment:     Active Problems:    Essential hypertension    Gastroesophageal reflux disease without esophagitis Anxiety    Mixed hyperlipidemia    Moderate obesity    Colitis    Hydronephrosis with urinary obstruction due to renal calculus    Hiatal hernia with GERD  Resolved Problems:    * No resolved hospital problems.  *      Plan:   Advance diet  Ambulate  CDiff pending  See orders      Electronically signed by Jose Stover MD on 2/6/2020 at 9:30 AM

## 2020-02-07 ENCOUNTER — ANESTHESIA EVENT (OUTPATIENT)
Dept: OPERATING ROOM | Age: 85
DRG: 659 | End: 2020-02-07
Payer: MEDICARE

## 2020-02-07 ENCOUNTER — APPOINTMENT (OUTPATIENT)
Dept: GENERAL RADIOLOGY | Age: 85
DRG: 659 | End: 2020-02-07
Payer: MEDICARE

## 2020-02-07 ENCOUNTER — ANESTHESIA (OUTPATIENT)
Dept: OPERATING ROOM | Age: 85
DRG: 659 | End: 2020-02-07
Payer: MEDICARE

## 2020-02-07 VITALS — OXYGEN SATURATION: 100 % | SYSTOLIC BLOOD PRESSURE: 128 MMHG | DIASTOLIC BLOOD PRESSURE: 61 MMHG

## 2020-02-07 LAB — URINE CULTURE, ROUTINE: NORMAL

## 2020-02-07 PROCEDURE — 3600000003 HC SURGERY LEVEL 3 BASE: Performed by: UROLOGY

## 2020-02-07 PROCEDURE — 2580000003 HC RX 258: Performed by: UROLOGY

## 2020-02-07 PROCEDURE — 3600000013 HC SURGERY LEVEL 3 ADDTL 15MIN: Performed by: UROLOGY

## 2020-02-07 PROCEDURE — 6370000000 HC RX 637 (ALT 250 FOR IP): Performed by: INTERNAL MEDICINE

## 2020-02-07 PROCEDURE — 7100000011 HC PHASE II RECOVERY - ADDTL 15 MIN: Performed by: UROLOGY

## 2020-02-07 PROCEDURE — 82365 CALCULUS SPECTROSCOPY: CPT

## 2020-02-07 PROCEDURE — 6360000002 HC RX W HCPCS: Performed by: NURSE ANESTHETIST, CERTIFIED REGISTERED

## 2020-02-07 PROCEDURE — 0T778DZ DILATION OF LEFT URETER WITH INTRALUMINAL DEVICE, VIA NATURAL OR ARTIFICIAL OPENING ENDOSCOPIC: ICD-10-PCS | Performed by: UROLOGY

## 2020-02-07 PROCEDURE — 2720000010 HC SURG SUPPLY STERILE: Performed by: UROLOGY

## 2020-02-07 PROCEDURE — 6370000000 HC RX 637 (ALT 250 FOR IP): Performed by: UROLOGY

## 2020-02-07 PROCEDURE — 3700000001 HC ADD 15 MINUTES (ANESTHESIA): Performed by: UROLOGY

## 2020-02-07 PROCEDURE — 0TC78ZZ EXTIRPATION OF MATTER FROM LEFT URETER, VIA NATURAL OR ARTIFICIAL OPENING ENDOSCOPIC: ICD-10-PCS | Performed by: UROLOGY

## 2020-02-07 PROCEDURE — 88300 SURGICAL PATH GROSS: CPT

## 2020-02-07 PROCEDURE — 3700000000 HC ANESTHESIA ATTENDED CARE: Performed by: UROLOGY

## 2020-02-07 PROCEDURE — 2500000003 HC RX 250 WO HCPCS: Performed by: NURSE ANESTHETIST, CERTIFIED REGISTERED

## 2020-02-07 PROCEDURE — 6360000002 HC RX W HCPCS: Performed by: UROLOGY

## 2020-02-07 PROCEDURE — C1769 GUIDE WIRE: HCPCS | Performed by: UROLOGY

## 2020-02-07 PROCEDURE — 6360000004 HC RX CONTRAST MEDICATION: Performed by: UROLOGY

## 2020-02-07 PROCEDURE — 74420 UROGRAPHY RTRGR +-KUB: CPT

## 2020-02-07 PROCEDURE — 2060000000 HC ICU INTERMEDIATE R&B

## 2020-02-07 PROCEDURE — 2709999900 HC NON-CHARGEABLE SUPPLY: Performed by: UROLOGY

## 2020-02-07 PROCEDURE — BT1F1ZZ FLUOROSCOPY OF LEFT KIDNEY, URETER AND BLADDER USING LOW OSMOLAR CONTRAST: ICD-10-PCS | Performed by: UROLOGY

## 2020-02-07 PROCEDURE — C2617 STENT, NON-COR, TEM W/O DEL: HCPCS | Performed by: UROLOGY

## 2020-02-07 PROCEDURE — 7100000010 HC PHASE II RECOVERY - FIRST 15 MIN: Performed by: UROLOGY

## 2020-02-07 PROCEDURE — 6360000002 HC RX W HCPCS: Performed by: FAMILY MEDICINE

## 2020-02-07 PROCEDURE — 2580000003 HC RX 258: Performed by: FAMILY MEDICINE

## 2020-02-07 PROCEDURE — C1758 CATHETER, URETERAL: HCPCS | Performed by: UROLOGY

## 2020-02-07 DEVICE — URETERAL STENT
Type: IMPLANTABLE DEVICE | Site: URETER | Status: FUNCTIONAL
Brand: PERCUFLEX™

## 2020-02-07 RX ORDER — LIDOCAINE HYDROCHLORIDE 20 MG/ML
INJECTION, SOLUTION EPIDURAL; INFILTRATION; INTRACAUDAL; PERINEURAL PRN
Status: DISCONTINUED | OUTPATIENT
Start: 2020-02-07 | End: 2020-02-07 | Stop reason: SDUPTHER

## 2020-02-07 RX ORDER — PROPOFOL 10 MG/ML
INJECTION, EMULSION INTRAVENOUS CONTINUOUS PRN
Status: DISCONTINUED | OUTPATIENT
Start: 2020-02-07 | End: 2020-02-07 | Stop reason: SDUPTHER

## 2020-02-07 RX ORDER — FENTANYL CITRATE 50 UG/ML
25 INJECTION, SOLUTION INTRAMUSCULAR; INTRAVENOUS EVERY 5 MIN PRN
Status: DISCONTINUED | OUTPATIENT
Start: 2020-02-07 | End: 2020-02-07 | Stop reason: HOSPADM

## 2020-02-07 RX ORDER — PROPOFOL 10 MG/ML
INJECTION, EMULSION INTRAVENOUS PRN
Status: DISCONTINUED | OUTPATIENT
Start: 2020-02-07 | End: 2020-02-07 | Stop reason: SDUPTHER

## 2020-02-07 RX ORDER — AMLODIPINE BESYLATE 2.5 MG/1
2.5 TABLET ORAL DAILY
Status: DISCONTINUED | OUTPATIENT
Start: 2020-02-07 | End: 2020-02-13 | Stop reason: HOSPADM

## 2020-02-07 RX ORDER — EZETIMIBE 10 MG/1
10 TABLET ORAL DAILY
Status: DISCONTINUED | OUTPATIENT
Start: 2020-02-07 | End: 2020-02-13 | Stop reason: HOSPADM

## 2020-02-07 RX ADMIN — LIDOCAINE HYDROCHLORIDE 40 MG: 20 INJECTION, SOLUTION EPIDURAL; INFILTRATION; INTRACAUDAL; PERINEURAL at 13:40

## 2020-02-07 RX ADMIN — CIPROFLOXACIN 400 MG: 2 INJECTION, SOLUTION INTRAVENOUS at 20:45

## 2020-02-07 RX ADMIN — HEPARIN SODIUM 5000 UNITS: 10000 INJECTION INTRAVENOUS; SUBCUTANEOUS at 23:24

## 2020-02-07 RX ADMIN — DICLOFENAC 2 G: 10 GEL TOPICAL at 20:45

## 2020-02-07 RX ADMIN — PROPOFOL 100 MCG/KG/MIN: 10 INJECTION, EMULSION INTRAVENOUS at 13:40

## 2020-02-07 RX ADMIN — AMLODIPINE BESYLATE 2.5 MG: 2.5 TABLET ORAL at 15:25

## 2020-02-07 RX ADMIN — SODIUM CHLORIDE: 9 INJECTION, SOLUTION INTRAVENOUS at 10:01

## 2020-02-07 RX ADMIN — MORPHINE SULFATE 2 MG: 2 INJECTION, SOLUTION INTRAMUSCULAR; INTRAVENOUS at 10:07

## 2020-02-07 RX ADMIN — HEPARIN SODIUM 5000 UNITS: 10000 INJECTION INTRAVENOUS; SUBCUTANEOUS at 15:25

## 2020-02-07 RX ADMIN — PROPOFOL 50 MG: 10 INJECTION, EMULSION INTRAVENOUS at 13:40

## 2020-02-07 RX ADMIN — SODIUM CHLORIDE: 9 INJECTION, SOLUTION INTRAVENOUS at 20:51

## 2020-02-07 RX ADMIN — HEPARIN SODIUM 5000 UNITS: 10000 INJECTION INTRAVENOUS; SUBCUTANEOUS at 00:59

## 2020-02-07 RX ADMIN — CIPROFLOXACIN 400 MG: 2 INJECTION, SOLUTION INTRAVENOUS at 10:01

## 2020-02-07 ASSESSMENT — PAIN DESCRIPTION - FREQUENCY
FREQUENCY: CONTINUOUS
FREQUENCY: INTERMITTENT

## 2020-02-07 ASSESSMENT — PULMONARY FUNCTION TESTS
PIF_VALUE: 1
PIF_VALUE: 0
PIF_VALUE: 0
PIF_VALUE: 1
PIF_VALUE: 0
PIF_VALUE: 1
PIF_VALUE: 0

## 2020-02-07 ASSESSMENT — PAIN DESCRIPTION - ORIENTATION
ORIENTATION: LEFT;LOWER
ORIENTATION: MID

## 2020-02-07 ASSESSMENT — PAIN - FUNCTIONAL ASSESSMENT: PAIN_FUNCTIONAL_ASSESSMENT: ACTIVITIES ARE NOT PREVENTED

## 2020-02-07 ASSESSMENT — PAIN DESCRIPTION - PROGRESSION
CLINICAL_PROGRESSION: NOT CHANGED
CLINICAL_PROGRESSION: NOT CHANGED

## 2020-02-07 ASSESSMENT — PAIN DESCRIPTION - PAIN TYPE
TYPE: ACUTE PAIN
TYPE: ACUTE PAIN
TYPE: SURGICAL PAIN
TYPE: SURGICAL PAIN

## 2020-02-07 ASSESSMENT — PAIN SCALES - GENERAL
PAINLEVEL_OUTOF10: 0
PAINLEVEL_OUTOF10: 6
PAINLEVEL_OUTOF10: 8
PAINLEVEL_OUTOF10: 0
PAINLEVEL_OUTOF10: 2

## 2020-02-07 ASSESSMENT — PAIN DESCRIPTION - DESCRIPTORS
DESCRIPTORS: ACHING;DISCOMFORT
DESCRIPTORS: PRESSURE;SORE

## 2020-02-07 ASSESSMENT — PAIN DESCRIPTION - ONSET
ONSET: ON-GOING
ONSET: ON-GOING

## 2020-02-07 ASSESSMENT — PAIN DESCRIPTION - LOCATION
LOCATION: PELVIS;VAGINA
LOCATION: ABDOMEN

## 2020-02-07 NOTE — ANESTHESIA POSTPROCEDURE EVALUATION
Department of Anesthesiology  Postprocedure Note    Patient: Remington Connelly  MRN: [id number]  YOB: 1935  Date of evaluation: 2/7/2020  Time:  3:29 PM     Procedure Summary     Date:  02/07/20 Room / Location:  [address]    Anesthesia Start:  1335 Anesthesia Stop:  0445    Procedure:  CYSTOSCOPY RETROGRADE PYELOGRAM LEFT URETEROSCOPY LEFT J STENT LASER LITHOTRIPSY (Left ) Diagnosis:  (RENAL CALC)    Surgeon:  Bridgette Mendez DO Responsible Provider:  Alba Platt DO    Anesthesia Type:  MAC ASA Status:  3          Anesthesia Type: MAC    Shivani Phase I: Shivani Score: 10    Shivani Phase II: Shivani Score: 10    Last vitals: Reviewed and per EMR flowsheets.        Anesthesia Post Evaluation    Patient location during evaluation: PACU  Patient participation: complete - patient participated  Level of consciousness: awake and alert  Airway patency: patent  Nausea & Vomiting: no nausea and no vomiting  Complications: no  Cardiovascular status: hemodynamically stable  Respiratory status: acceptable  Hydration status: euvolemic

## 2020-02-07 NOTE — PROGRESS NOTES
Daily Progress Note  Roshni Singh MD      Subjective:   Patient has dry cough  She required morphine last night due to pain  . Past Medical History:   Diagnosis Date    Anxiety     Gastric reflux     Hypertension        Past Surgical History:   Procedure Laterality Date    COLONOSCOPY      HYSTERECTOMY         Scheduled Meds:   amLODIPine  2.5 mg Oral Daily    diclofenac sodium  2 g Topical 4x Daily    ezetimibe  10 mg Oral Daily    sodium chloride flush  10 mL Intravenous 2 times per day    heparin (porcine)  5,000 Units Subcutaneous 3 times per day    ciprofloxacin  400 mg Intravenous Q12H     Continuous Infusions:   sodium chloride 100 mL/hr at 02/07/20 1001     PRN Meds:sodium chloride flush, ondansetron, acetaminophen, morphine  Diet NPO, After Midnight    Objective:     I/O last 3 completed shifts: In: 1143 [P.O.:660; I.V.:586]  Out: 550 [Urine:550]  No intake/output data recorded.   Stool Occurrence: 0  Vitals:    02/06/20 1950 02/06/20 2317 02/07/20 0322 02/07/20 0730   BP: (!) 142/62 133/63  (!) 140/80   Pulse: 76 86  88   Resp: 18 18  18   Temp: 99.3 °F (37.4 °C) 98.9 °F (37.2 °C)  98 °F (36.7 °C)   TempSrc: Oral Oral  Oral   SpO2:  94%  98%   Weight:   170 lb (77.1 kg)    Height:           General appearance: alert, appears stated age and cooperative  Neck: no adenopathy, no carotid bruit, no JVD, supple, symmetrical, trachea midline and thyroid not enlarged, symmetric, no tenderness/mass/nodules  Lungs: chest clear, no wheezing, rales, normal symmetric air entry  Chest wall: no tenderness  Heart: regular rate and rhythm, S1, S2 normal, no murmur, click, rub or gallop  Abdomen: soft,diffuse discomfort ; bowel sounds normal; no masses,  no organomegaly  Extremities: extremities normal, atraumatic, no cyanosis or edema  Pulses: 2+ and symmetric  Neurologic: Grossly normal      Data Review:    Recent Labs     02/04/20  1904 02/05/20  0412 02/06/20  1015   WBC 10.8 7.0 5.5   HGB 13.3 11.6 10.8*   HCT 40.8 35.4 34.2   MCV 95.6 95.4 98.8    261 220     Recent Labs     02/04/20  1904 02/05/20  0412 02/06/20  1015    138 140   K 3.6 4.0 3.7    105 111*   CO2 27 24 24   BUN 16 13 13   CREATININE 0.9 1.2* 1.4*   GLUCOSE 166* 102* 123*     Recent Labs     02/04/20  1904 02/06/20  1015   AST 14 12   ALT 8 7   BILITOT 0.3 0.2   ALKPHOS 93 72       U/A:    Lab Results   Component Value Date    NITRU Negative 02/04/2020    COLORU Straw 02/04/2020    PHUR 7.0 02/04/2020    LABCAST RARE 06/17/2019    WBCUA >20 02/04/2020    WBCUA 2-5 07/16/2011    RBCUA 10-20 02/04/2020    RBCUA 2-5 08/02/2013    MUCUS Present 06/17/2019    BACTERIA MODERATE 02/04/2020    CLARITYU Clear 02/04/2020    SPECGRAV 1.015 02/04/2020    LEUKOCYTESUR LARGE 02/04/2020    UROBILINOGEN 0.2 02/04/2020    BILIRUBINUR Negative 02/04/2020    BILIRUBINUR small 11/19/2018    BILIRUBINUR NEGATIVE 07/16/2011    BLOODU LARGE 02/04/2020    GLUCOSEU Negative 02/04/2020    GLUCOSEU NEGATIVE 07/16/2011    KETUA TRACE 02/04/2020     HgBA1c:    Lab Results   Component Value Date    LABA1C 6.1 10/13/2015     TSH:    Lab Results   Component Value Date    TSH 1.570 10/28/2019        Radiology:   CT ABDOMEN PELVIS W IV CONTRAST Additional Contrast? None   Final Result   1. The colon appears narrowed with mural thickening, which is   suspicious for colitis. It extends to the pelvic colon level, and   could represent ulcerative colitis. 2. There is hydronephrosis and hydroureter of the left kidney to the   bladder base, where a 2 to 3 mm diameter calculus is identified. Is on   the verge of passing through the bladder wall. 3. A moderate size retrocardiac hiatus hernia, numerous bilateral   renal cortical cysts, degenerative spinal findings are noted. ALERT:  THIS IS AN ABNORMAL REPORT-possible colitis and definite left   hydroureteronephrosis with a calculus at the UVJ on the left measuring   2 or 3 mm maximum diameter.          [place]

## 2020-02-07 NOTE — BRIEF OP NOTE
Brief Postoperative Note  ______________________________________________________________    Patient: Amanda Corey  YOB: 1935  MRN: [id number]  Date of Procedure: 2/7/2020    Pre-Op Diagnosis: RENAL CALC    Post-Op Diagnosis: Same       Procedure(s):  CYSTOSCOPY RETROGRADE PYELOGRAM LEFT URETEROSCOPY LEFT J STENT LASER LITHOTRIPSY    Anesthesia: Monitor Anesthesia Care    Surgeon(s):  Fernando Mendez DO    Assistant: none    Estimated Blood Loss (mL): less than 50     Complications: None    Specimens:   * No specimens in log *    Implants:  Implant Name Type Inv.  Item Serial No.  Lot No. LRB No. Used   Kim[employer] [facility] SFT [id number] Stent:[facility] SFT [id number]  [employer]: INTERVENTIONAL CARDIO  Left 1         Drains: * No LDAs found *    Findings: see operative report    Alma Mendez DO  Date: 2/7/2020  Time: 2:07 PM

## 2020-02-07 NOTE — ANESTHESIA PRE PROCEDURE
Department of Anesthesiology  Preprocedure Note       Name:  Anselmo Zaragoza   Age:  82 y.o.  :  1935                                          MRN:  [id number]         Date:  2020      Surgeon: Matthew Reed):  Martha Mendez DO    Procedure: CYSTOSCOPY RETROGRADE PYELOGRAM URETEROSCOPY J STENT LASER LITHOTRIPSY LEFT (Left )    Medications prior to admission:   Prior to Admission medications    Medication Sig Start Date End Date Taking?  Authorizing Provider   amLODIPine (NORVASC) 5 MG tablet Take 1 tablet by mouth daily 10/30/19  Yes Bryson Jenkins MD   diclofenac sodium 1 % GEL Apply 2 g topically 4 times daily 19  Yes Louann Hill MD   ezetimibe (ZETIA) 10 MG tablet Take 10 mg by mouth daily   Yes Historical Provider, MD       Current medications:    Current Facility-Administered Medications   Medication Dose Route Frequency Provider Last Rate Last Dose    amLODIPine (NORVASC) tablet 2.5 mg  2.5 mg Oral Daily Bryson Jenkins MD        diclofenac sodium 1 % gel 2 g  2 g Topical 4x Daily Bryson Jenkins MD        ezetimibe (ZETIA) tablet 10 mg  10 mg Oral Daily Bryson Jenkins MD        sodium chloride flush 0.9 % injection 10 mL  10 mL Intravenous 2 times per day [facility], DO   10 mL at 20 2341    sodium chloride flush 0.9 % injection 10 mL  10 mL Intravenous PRN [facility], DO        [facility]ron [facility]) injection 4 mg  4 mg Intravenous Q6H PRN [facility], DO        heparin (porcine) injection 5,000 Units  5,000 Units Subcutaneous 3 times per day [facility], DO   5,000 Units at 20 0059    acetaminophen (TYLENOL) tablet 650 mg  650 mg Oral Q4H PRN [facility], DO        morphine (PF) injection 2 mg  2 mg Intravenous Q4H PRN [place] [facility], DO   2 mg at 20 1007    0.9 % sodium chloride infusion   Intravenous Continuous [facility],  mL/hr at 20 1001      ciprofloxacin (CIPRO) IVPB 400 mg  400 mg Intravenous Q12H [facility], DO liquid consumption: 02/07/20                        Date of last solid food consumption: 02/06/20    EKG 10/30/2019     Component Value Ref Range & Units Status Collected Lab   Ventricular Rate 80  BPM Final 10/29/2019 12:00 AM [facility]   Atrial Rate 80  BPM Final 10/29/2019 12:00 AM [facility]   P-R Interval 204  ms Final 10/29/2019 12:00 AM [facility]   QRS Duration 80  ms Final 10/29/2019 12:00 AM [facility]   Q-T Interval 390  ms Final 10/29/2019 12:00 AM [facility]   QTc Calculation (Bazett) 449  ms Final 10/29/2019 12:00 AM [facility]   P [place] 72  degrees Final 10/29/2019 12:00 AM [facility]   R [place] 13  degrees Final 10/29/2019 12:00 AM [facility]   T [place] 81  degrees Final 10/29/2019 12:00 AM [facility]   Testing Performed By     Lab - Abbreviation Name Director Address Valid Date Range   360-[facility] MUSE Unknown Unknown 04/18/16 0721-Present   Narrative & Impression     Normal sinus rhythm  Normal ECG     ECHO 10/29/2019      Findings      Left Ventricle   Left ventricle is normal in size . No regional wall motion abnormalities seen. Normal left ventricular ejection fraction. Ejection fraction is visually estimated at 60%. There is doppler evidence of stage I diastolic dysfunction. Right Ventricle   Normal right ventricular size and function. Left Atrium   Normal sized left atrium. Interatrial septum appears intact. Right Atrium   Normal right atrium size. Mitral Valve   Focal calcification mitral valve leaflets. Physiologic and/or trace mitral regurgitation is present. Tricuspid Valve   The tricuspid valve appears structurally normal.   Physiologic and/or trace tricuspid regurgitation. Aortic Valve   The aortic valve leaflets were not well visualized. Pulmonic Valve   The pulmonic valve was not well visualized. Pericardial Effusion   No evidence of pericardial effusion. Aorta   Aortic root dimension within normal limits.       Conclusions      Summary   Left ventricle is normal in size . No regional wall motion abnormalities seen. Normal left ventricular ejection fraction. There is doppler evidence of stage I diastolic dysfunction. Physiologic and/or trace mitral regurgitation is present. Physiologic and/or trace tricuspid regurgitation. Cardiac Stress Test 10/29/2019  The   patient experienced no anginal symptoms and remained   hemodynamically stable during administration and no   electrocardiographic changes were noted. CT Abdomen Pelvis 2/4/2020  1. The colon appears narrowed with mural thickening, which is   suspicious for colitis. It extends to the pelvic colon level, and   could represent ulcerative colitis. 2. There is hydronephrosis and hydroureter of the left kidney to the   bladder base, where a 2 to 3 mm diameter calculus is identified. Is on   the verge of passing through the bladder wall. 3. A moderate size retrocardiac hiatus hernia, numerous bilateral   renal cortical cysts, degenerative spinal findings are noted. BMI:   Wt Readings from Last 3 Encounters:   02/07/20 170 lb (77.1 kg)   10/28/19 164 lb (74.4 kg)   06/17/19 160 lb (72.6 kg)     Body mass index is 30.11 kg/m².     CBC:   Lab Results   Component Value Date    WBC 5.5 02/06/2020    RBC 3.46 02/06/2020    HGB 10.8 02/06/2020    HCT 34.2 02/06/2020    MCV 98.8 02/06/2020    RDW 13.7 02/06/2020     02/06/2020       CMP:   Lab Results   Component Value Date     02/06/2020    K 3.7 02/06/2020    K 3.9 06/17/2019     02/06/2020    CO2 24 02/06/2020    BUN 13 02/06/2020    CREATININE 1.4 02/06/2020    GFRAA 44 02/06/2020    LABGLOM 36 02/06/2020    GLUCOSE 123 02/06/2020    GLUCOSE 97 01/30/2012    PROT 5.8 02/06/2020    CALCIUM 8.2 02/06/2020    BILITOT 0.2 02/06/2020    ALKPHOS 72 02/06/2020    AST 12 02/06/2020    ALT 7 02/06/2020       POC Tests: No results for input(s): POCGLU, POCNA, POCK, POCCL, POCBUN, POCHEMO, POCHCT in the last 72

## 2020-02-08 ENCOUNTER — APPOINTMENT (OUTPATIENT)
Dept: GENERAL RADIOLOGY | Age: 85
DRG: 659 | End: 2020-02-08
Payer: MEDICARE

## 2020-02-08 LAB
ANION GAP SERPL CALCULATED.3IONS-SCNC: 8 MMOL/L (ref 7–16)
BASOPHILS ABSOLUTE: 0.01 E9/L (ref 0–0.2)
BASOPHILS RELATIVE PERCENT: 0.2 % (ref 0–2)
BUN BLDV-MCNC: 6 MG/DL (ref 8–23)
CALCIUM SERPL-MCNC: 8 MG/DL (ref 8.6–10.2)
CHLORIDE BLD-SCNC: 108 MMOL/L (ref 98–107)
CO2: 24 MMOL/L (ref 22–29)
CREAT SERPL-MCNC: 0.7 MG/DL (ref 0.5–1)
EOSINOPHILS ABSOLUTE: 0.04 E9/L (ref 0.05–0.5)
EOSINOPHILS RELATIVE PERCENT: 0.6 % (ref 0–6)
GFR AFRICAN AMERICAN: >60
GFR NON-AFRICAN AMERICAN: >60 ML/MIN/1.73
GLUCOSE BLD-MCNC: 136 MG/DL (ref 74–99)
HCT VFR BLD CALC: 35.4 % (ref 34–48)
HEMOGLOBIN: 11.6 G/DL (ref 11.5–15.5)
IMMATURE GRANULOCYTES #: 0.01 E9/L
IMMATURE GRANULOCYTES %: 0.2 % (ref 0–5)
LYMPHOCYTES ABSOLUTE: 0.61 E9/L (ref 1.5–4)
LYMPHOCYTES RELATIVE PERCENT: 9.7 % (ref 20–42)
MCH RBC QN AUTO: 31.4 PG (ref 26–35)
MCHC RBC AUTO-ENTMCNC: 32.8 % (ref 32–34.5)
MCV RBC AUTO: 95.7 FL (ref 80–99.9)
MONOCYTES ABSOLUTE: 0.82 E9/L (ref 0.1–0.95)
MONOCYTES RELATIVE PERCENT: 13 % (ref 2–12)
NEUTROPHILS ABSOLUTE: 4.81 E9/L (ref 1.8–7.3)
NEUTROPHILS RELATIVE PERCENT: 76.3 % (ref 43–80)
PDW BLD-RTO: 13.2 FL (ref 11.5–15)
PLATELET # BLD: 222 E9/L (ref 130–450)
PMV BLD AUTO: 10.5 FL (ref 7–12)
POTASSIUM SERPL-SCNC: 3.3 MMOL/L (ref 3.5–5)
RBC # BLD: 3.7 E12/L (ref 3.5–5.5)
REASON FOR REJECTION: NORMAL
REJECTED TEST: NORMAL
SODIUM BLD-SCNC: 140 MMOL/L (ref 132–146)
WBC # BLD: 6.3 E9/L (ref 4.5–11.5)

## 2020-02-08 PROCEDURE — 2060000000 HC ICU INTERMEDIATE R&B

## 2020-02-08 PROCEDURE — 6360000002 HC RX W HCPCS: Performed by: UROLOGY

## 2020-02-08 PROCEDURE — 6370000000 HC RX 637 (ALT 250 FOR IP): Performed by: INTERNAL MEDICINE

## 2020-02-08 PROCEDURE — 85025 COMPLETE CBC W/AUTO DIFF WBC: CPT

## 2020-02-08 PROCEDURE — 80048 BASIC METABOLIC PNL TOTAL CA: CPT

## 2020-02-08 PROCEDURE — 71046 X-RAY EXAM CHEST 2 VIEWS: CPT

## 2020-02-08 PROCEDURE — 36415 COLL VENOUS BLD VENIPUNCTURE: CPT

## 2020-02-08 PROCEDURE — 6360000002 HC RX W HCPCS: Performed by: INTERNAL MEDICINE

## 2020-02-08 PROCEDURE — 2580000003 HC RX 258: Performed by: UROLOGY

## 2020-02-08 PROCEDURE — 94664 DEMO&/EVAL PT USE INHALER: CPT

## 2020-02-08 PROCEDURE — 6370000000 HC RX 637 (ALT 250 FOR IP): Performed by: UROLOGY

## 2020-02-08 RX ORDER — BENZONATATE 100 MG/1
200 CAPSULE ORAL 3 TIMES DAILY PRN
Status: DISCONTINUED | OUTPATIENT
Start: 2020-02-08 | End: 2020-02-13 | Stop reason: HOSPADM

## 2020-02-08 RX ORDER — PREDNISONE 1 MG/1
10 TABLET ORAL DAILY
Status: DISCONTINUED | OUTPATIENT
Start: 2020-02-08 | End: 2020-02-10

## 2020-02-08 RX ORDER — POTASSIUM CHLORIDE 20 MEQ/1
40 TABLET, EXTENDED RELEASE ORAL ONCE
Status: COMPLETED | OUTPATIENT
Start: 2020-02-08 | End: 2020-02-08

## 2020-02-08 RX ORDER — GUAIFENESIN 400 MG/1
600 TABLET ORAL 3 TIMES DAILY
Status: DISCONTINUED | OUTPATIENT
Start: 2020-02-08 | End: 2020-02-13 | Stop reason: HOSPADM

## 2020-02-08 RX ORDER — IPRATROPIUM BROMIDE AND ALBUTEROL SULFATE 2.5; .5 MG/3ML; MG/3ML
1 SOLUTION RESPIRATORY (INHALATION)
Status: DISCONTINUED | OUTPATIENT
Start: 2020-02-08 | End: 2020-02-13 | Stop reason: HOSPADM

## 2020-02-08 RX ORDER — CIPROFLOXACIN 500 MG/1
500 TABLET, FILM COATED ORAL EVERY 12 HOURS SCHEDULED
Status: DISCONTINUED | OUTPATIENT
Start: 2020-02-08 | End: 2020-02-09

## 2020-02-08 RX ORDER — METHYLPREDNISOLONE SODIUM SUCCINATE 125 MG/2ML
60 INJECTION, POWDER, LYOPHILIZED, FOR SOLUTION INTRAMUSCULAR; INTRAVENOUS ONCE
Status: COMPLETED | OUTPATIENT
Start: 2020-02-08 | End: 2020-02-08

## 2020-02-08 RX ORDER — BENZONATATE 100 MG/1
100 CAPSULE ORAL 3 TIMES DAILY PRN
Status: DISCONTINUED | OUTPATIENT
Start: 2020-02-08 | End: 2020-02-08

## 2020-02-08 RX ADMIN — EZETIMIBE 10 MG: 10 TABLET ORAL at 09:18

## 2020-02-08 RX ADMIN — IPRATROPIUM BROMIDE AND ALBUTEROL SULFATE 1 AMPULE: .5; 3 SOLUTION RESPIRATORY (INHALATION) at 15:59

## 2020-02-08 RX ADMIN — HEPARIN SODIUM 5000 UNITS: 10000 INJECTION INTRAVENOUS; SUBCUTANEOUS at 09:18

## 2020-02-08 RX ADMIN — CIPROFLOXACIN 400 MG: 2 INJECTION, SOLUTION INTRAVENOUS at 09:26

## 2020-02-08 RX ADMIN — METHYLPREDNISOLONE SODIUM SUCCINATE 60 MG: 125 INJECTION, POWDER, LYOPHILIZED, FOR SOLUTION INTRAMUSCULAR; INTRAVENOUS at 13:55

## 2020-02-08 RX ADMIN — Medication 10 ML: at 19:51

## 2020-02-08 RX ADMIN — POTASSIUM CHLORIDE 40 MEQ: 20 TABLET, EXTENDED RELEASE ORAL at 13:51

## 2020-02-08 RX ADMIN — AMLODIPINE BESYLATE 2.5 MG: 2.5 TABLET ORAL at 09:18

## 2020-02-08 RX ADMIN — PREDNISONE 10 MG: 5 TABLET ORAL at 13:54

## 2020-02-08 RX ADMIN — DICLOFENAC 2 G: 10 GEL TOPICAL at 16:40

## 2020-02-08 RX ADMIN — GUAIFENESIN 600 MG: 400 TABLET, FILM COATED ORAL at 19:50

## 2020-02-08 RX ADMIN — CIPROFLOXACIN HYDROCHLORIDE 500 MG: 500 TABLET, FILM COATED ORAL at 19:51

## 2020-02-08 RX ADMIN — SODIUM CHLORIDE: 9 INJECTION, SOLUTION INTRAVENOUS at 09:18

## 2020-02-08 RX ADMIN — BENZONATATE 200 MG: 100 CAPSULE ORAL at 13:54

## 2020-02-08 RX ADMIN — DICLOFENAC 2 G: 10 GEL TOPICAL at 19:51

## 2020-02-08 RX ADMIN — DICLOFENAC 2 G: 10 GEL TOPICAL at 09:18

## 2020-02-08 RX ADMIN — DICLOFENAC 2 G: 10 GEL TOPICAL at 12:47

## 2020-02-08 RX ADMIN — GUAIFENESIN 600 MG: 400 TABLET, FILM COATED ORAL at 13:54

## 2020-02-08 RX ADMIN — HEPARIN SODIUM 5000 UNITS: 10000 INJECTION INTRAVENOUS; SUBCUTANEOUS at 23:57

## 2020-02-08 RX ADMIN — HEPARIN SODIUM 5000 UNITS: 10000 INJECTION INTRAVENOUS; SUBCUTANEOUS at 16:40

## 2020-02-08 ASSESSMENT — PAIN DESCRIPTION - DESCRIPTORS: DESCRIPTORS: DISCOMFORT;PRESSURE

## 2020-02-08 ASSESSMENT — PAIN SCALES - GENERAL
PAINLEVEL_OUTOF10: 0
PAINLEVEL_OUTOF10: 8
PAINLEVEL_OUTOF10: 0

## 2020-02-08 ASSESSMENT — PAIN DESCRIPTION - PAIN TYPE: TYPE: ACUTE PAIN

## 2020-02-08 ASSESSMENT — PAIN DESCRIPTION - ONSET: ONSET: ON-GOING

## 2020-02-08 ASSESSMENT — PAIN DESCRIPTION - FREQUENCY: FREQUENCY: INTERMITTENT

## 2020-02-08 ASSESSMENT — PAIN - FUNCTIONAL ASSESSMENT: PAIN_FUNCTIONAL_ASSESSMENT: PREVENTS OR INTERFERES SOME ACTIVE ACTIVITIES AND ADLS

## 2020-02-08 ASSESSMENT — PAIN DESCRIPTION - ORIENTATION: ORIENTATION: RIGHT;LOWER

## 2020-02-08 ASSESSMENT — PAIN DESCRIPTION - LOCATION: LOCATION: ABDOMEN

## 2020-02-08 NOTE — OP NOTE
[id number] [address]                                OPERATIVE REPORT    PATIENT NAME: Maren Javed                     :        1935  MED REC NO:   [id number]                            ROOM:       4094  ACCOUNT NO:   [de-identified]                           ADMIT DATE: 2020  PROVIDER:     Marah Mendez DO    DATE OF PROCEDURE:  2020    PREOPERATIVE DIAGNOSES:  1. A 4 mm distal left ureteral stent. 2.  Left hydronephrosis. 3.  Left flank pain. POSTOPERATIVE DIAGNOSES:  1. A 4 mm distal left ureteral stent. 2.  Left hydronephrosis. 3.  Left flank pain. PROCEDURES PERFORMED:  The patient had:  1. Cystoscopy. 2.  Retrograde pyelograms done under fluoroscopy. 3.  Distal ureteroscopy. 4.  Stone basket extraction. 5.  Stent insertion. 6.  Stent was left on a string. ANESTHESIA:  Monitored anesthesia. SURGEON:  Fernando Mendez DO.    ESTIMATED BLOOD LOSS:  None. CONDITION:  To PACU, stable. ANTIBIOTICS:  Preoperative antibiotics were administered. STORY ON THIS PATIENT:  An 27-year-old  female who was admitted  to [facility] earlier this week with abdominal  pain. CAT scan did delineate a 4 mm distal stone. The patient actually  the next day stated that the pain had gotten better and so we had opted  not to do anything. When she was reevaluated, pain persisted, and we  decided to progress to the operative setting for the above proposed  surgical procedure. The risks, the benefits, and the alternatives of  the proposed surgical procedure were extensively explained to the  patient. She understood the risks, the benefits, and the alternatives  and elected to proceed.     DESCRIPTION OF PROCEDURE:  This pleasant 27-year-old  female  was brought to the operating room #6 at [facility], placed in the supine position and induced with  monitored anesthesia. Anesthesia monitored the head and neck area, IV  access, and vital signs throughout the course of the case. Status post  induction, the patient was placed in the dorsal lithotomy position. All  underlying points were pressure padded. SCDs were applied. Prepped and  draped in a normal sterile fashion. I proceeded by taking a #21-Turkish  Olympus scope with a 30-degree lens and inserted through the meatus in  an atraumatic fashion. Panendoscopic visualization of the bladder was  devoid of any significant masses, tumors, lesions, or defects. The  right and left orifices had normal position. I did shoot a right  retrograde pyelogram, which was normal.  The distal, mid, and proximal  portions of the right ureter were devoid of any significant masses,  tumors, lesions, or defects. There was appropriate re-cupping of the  renal calyces. There was no hydronephrosis. In the distal left ureter,  you could see a stone. I placed a 0.035 Glidewire past the stone. I  then took a semi-rigid ureteroscope with normal saline under pressure,  coursed it easily into the distal ureter. I was able to see the stone,  place a Sacred stone basket on the stone, extract the stone, and sent it  off to Pathology. Over the 0.035 Glidewire, I did place a 6 x 26 JJ  stent in a Seldinger method with the string left on. The patient's  bladder was drained. She awoke from anesthesia without complication and  brought to PACU in a stable condition. PLAN:  1. She can be discharged home potentially today. 2.  Pain medications and antibiotics will be need in the future. 3.  Stent can be removed next week. 4.  Findings were conveyed to her family. 5.  She will need outpatient followup.         Caitlin Patel DO    D: 02/07/2020 14:06:53       T: 02/08/2020 0:39:18     [id number]  Job#: [id number]     Doc#: [id number]    CC:    Pricilla Cook MD

## 2020-02-08 NOTE — PLAN OF CARE
Problem: Falls - Risk of:  Goal: Will remain free from falls  Description  Will remain free from falls  Outcome: Met This Shift  Goal: Absence of physical injury  Description  Absence of physical injury  Outcome: Met This Shift     Problem: Pain:  Goal: Pain level will decrease  Description  Pain level will decrease  Outcome: Met This Shift  Goal: Control of acute pain  Description  Control of acute pain  Outcome: Met This Shift     Problem: Urinary Elimination:  Goal: Will remain free from infection  Description  Will remain free from infection  Outcome: Ongoing

## 2020-02-08 NOTE — PLAN OF CARE
Problem: Falls - Risk of:  Goal: Will remain free from falls  Description  Will remain free from falls  2/8/2020 1655 by Farrah Curiel RN  Outcome: Met This Shift  2/8/2020 0355 by Ashish Ramirez RN  Outcome: Met This Shift  Goal: Absence of physical injury  Description  Absence of physical injury  2/8/2020 1655 by Farrah Curiel RN  Outcome: Met This Shift  2/8/2020 0355 by Ashish Ramirez RN  Outcome: Met This Shift     Problem: Pain:  Goal: Pain level will decrease  Description  Pain level will decrease  2/8/2020 1655 by Farrah Curiel RN  Outcome: Met This Shift  2/8/2020 0355 by Ashish Ramirez RN  Outcome: Met This Shift  Goal: Control of acute pain  Description  Control of acute pain  2/8/2020 1655 by Farrah Curiel RN  Outcome: Met This Shift  2/8/2020 0355 by Ashish Ramirez RN  Outcome: Met This Shift

## 2020-02-08 NOTE — PROGRESS NOTES
Per request, Dr. Luz Szymanski notified of cxr results and patient states cough not any better.    Amanda Ly

## 2020-02-08 NOTE — PROGRESS NOTES
[facility]  PROGRESS NOTE    Chief Complaint:  Left ureteral stone/Gross hematuria/Left hydronephrosis    HPI: She is feeling much better. She is voiding comfortably. Her hematuria is clearing. She is not passing clots. Her pain is much improved. She hopes to go home tomorrow    Vitals:    02/08/20 1559   BP:    Pulse:    Resp: 16   Temp:    SpO2: 90%       Allergies: Lorazepam and Penicillins    PAST MEDICAL HISTORY:   Past Medical History:   Diagnosis Date    Anxiety     Gastric reflux     Hypertension        PAST SURGICAL HISTORY:   Past Surgical History:   Procedure Laterality Date    COLONOSCOPY      HYSTERECTOMY      LITHOTRIPSY Left 2/7/2020    CYSTOSCOPY RETROGRADE PYELOGRAM LEFT URETEROSCOPY LEFT J STENT LASER LITHOTRIPSY performed by Mj Mendez DO at [facility] OR        PAST FAMILY HISTORY:    Family History   Problem Relation Age of Onset    High Blood Pressure Mother     Other Father         Emphysema    Cancer Brother         Leukemia    Cancer Brother        PAST SOCIAL HISTORY:    Social History     Socioeconomic History    Marital status:       Spouse name: None    Number of children: 11    Years of education: 15    Highest education level: None   Occupational History    Occupation: retired -[employer] de[employer]   Social Needs    Financial resource strain: None    Food insecurity:     Worry: None     Inability: None    Transportation needs:     Medical: None     Non-medical: None   Tobacco Use    Smoking status: Never Smoker    Smokeless tobacco: Never Used   Substance and Sexual Activity    Alcohol use: No    Drug use: No    Sexual activity: Never   Lifestyle    Physical activity:     Days per week: None     Minutes per session: None    Stress: None   Relationships    Social connections:     Talks on phone: None     Gets together: None     Attends Hoahaoism service: None     Active member of club or organization: None     Attends meetings of clubs or organizations: None MD Kimberly  2/8/2020  6:01 PM

## 2020-02-09 ENCOUNTER — APPOINTMENT (OUTPATIENT)
Dept: ULTRASOUND IMAGING | Age: 85
DRG: 659 | End: 2020-02-09
Payer: MEDICARE

## 2020-02-09 LAB
ANION GAP SERPL CALCULATED.3IONS-SCNC: 13 MMOL/L (ref 7–16)
BASOPHILS ABSOLUTE: 0 E9/L (ref 0–0.2)
BASOPHILS RELATIVE PERCENT: 0 % (ref 0–2)
BUN BLDV-MCNC: 15 MG/DL (ref 8–23)
BURR CELLS: ABNORMAL
CALCIUM SERPL-MCNC: 9.2 MG/DL (ref 8.6–10.2)
CHLORIDE BLD-SCNC: 104 MMOL/L (ref 98–107)
CO2: 23 MMOL/L (ref 22–29)
CREAT SERPL-MCNC: 0.8 MG/DL (ref 0.5–1)
EOSINOPHILS ABSOLUTE: 0 E9/L (ref 0.05–0.5)
EOSINOPHILS RELATIVE PERCENT: 0 % (ref 0–6)
GFR AFRICAN AMERICAN: >60
GFR NON-AFRICAN AMERICAN: >60 ML/MIN/1.73
GLUCOSE BLD-MCNC: 186 MG/DL (ref 74–99)
HCT VFR BLD CALC: 39.2 % (ref 34–48)
HEMOGLOBIN: 12.8 G/DL (ref 11.5–15.5)
LYMPHOCYTES ABSOLUTE: 0.1 E9/L (ref 1.5–4)
LYMPHOCYTES RELATIVE PERCENT: 0.9 % (ref 20–42)
MCH RBC QN AUTO: 31.1 PG (ref 26–35)
MCHC RBC AUTO-ENTMCNC: 32.7 % (ref 32–34.5)
MCV RBC AUTO: 95.4 FL (ref 80–99.9)
MONOCYTES ABSOLUTE: 0.69 E9/L (ref 0.1–0.95)
MONOCYTES RELATIVE PERCENT: 6.9 % (ref 2–12)
NEUTROPHILS ABSOLUTE: 9.02 E9/L (ref 1.8–7.3)
NEUTROPHILS RELATIVE PERCENT: 92.2 % (ref 43–80)
NUCLEATED RED BLOOD CELLS: 0 /100 WBC
OVALOCYTES: ABNORMAL
PDW BLD-RTO: 13.5 FL (ref 11.5–15)
PLATELET # BLD: 284 E9/L (ref 130–450)
PMV BLD AUTO: 10.1 FL (ref 7–12)
POIKILOCYTES: ABNORMAL
POTASSIUM SERPL-SCNC: 3.3 MMOL/L (ref 3.5–5)
RBC # BLD: 4.11 E12/L (ref 3.5–5.5)
SODIUM BLD-SCNC: 140 MMOL/L (ref 132–146)
WBC # BLD: 9.8 E9/L (ref 4.5–11.5)

## 2020-02-09 PROCEDURE — 6370000000 HC RX 637 (ALT 250 FOR IP): Performed by: FAMILY MEDICINE

## 2020-02-09 PROCEDURE — 6370000000 HC RX 637 (ALT 250 FOR IP): Performed by: UROLOGY

## 2020-02-09 PROCEDURE — 6360000002 HC RX W HCPCS: Performed by: UROLOGY

## 2020-02-09 PROCEDURE — 2060000000 HC ICU INTERMEDIATE R&B

## 2020-02-09 PROCEDURE — 94640 AIRWAY INHALATION TREATMENT: CPT

## 2020-02-09 PROCEDURE — 85025 COMPLETE CBC W/AUTO DIFF WBC: CPT

## 2020-02-09 PROCEDURE — 93970 EXTREMITY STUDY: CPT

## 2020-02-09 PROCEDURE — 80048 BASIC METABOLIC PNL TOTAL CA: CPT

## 2020-02-09 PROCEDURE — 36415 COLL VENOUS BLD VENIPUNCTURE: CPT

## 2020-02-09 PROCEDURE — 6370000000 HC RX 637 (ALT 250 FOR IP): Performed by: INTERNAL MEDICINE

## 2020-02-09 PROCEDURE — 2580000003 HC RX 258: Performed by: UROLOGY

## 2020-02-09 RX ORDER — POTASSIUM CHLORIDE 20 MEQ/1
40 TABLET, EXTENDED RELEASE ORAL ONCE
Status: COMPLETED | OUTPATIENT
Start: 2020-02-09 | End: 2020-02-09

## 2020-02-09 RX ADMIN — IPRATROPIUM BROMIDE AND ALBUTEROL SULFATE 1 AMPULE: .5; 3 SOLUTION RESPIRATORY (INHALATION) at 12:56

## 2020-02-09 RX ADMIN — GUAIFENESIN 600 MG: 400 TABLET, FILM COATED ORAL at 13:50

## 2020-02-09 RX ADMIN — DICLOFENAC 2 G: 10 GEL TOPICAL at 22:00

## 2020-02-09 RX ADMIN — GUAIFENESIN 600 MG: 400 TABLET, FILM COATED ORAL at 21:54

## 2020-02-09 RX ADMIN — Medication 10 ML: at 08:58

## 2020-02-09 RX ADMIN — CIPROFLOXACIN HYDROCHLORIDE 500 MG: 500 TABLET, FILM COATED ORAL at 08:54

## 2020-02-09 RX ADMIN — DICLOFENAC 2 G: 10 GEL TOPICAL at 16:28

## 2020-02-09 RX ADMIN — BENZONATATE 200 MG: 100 CAPSULE ORAL at 08:54

## 2020-02-09 RX ADMIN — IPRATROPIUM BROMIDE AND ALBUTEROL SULFATE 1 AMPULE: .5; 3 SOLUTION RESPIRATORY (INHALATION) at 20:53

## 2020-02-09 RX ADMIN — Medication 10 ML: at 22:09

## 2020-02-09 RX ADMIN — HEPARIN SODIUM 5000 UNITS: 10000 INJECTION INTRAVENOUS; SUBCUTANEOUS at 08:54

## 2020-02-09 RX ADMIN — EZETIMIBE 10 MG: 10 TABLET ORAL at 08:54

## 2020-02-09 RX ADMIN — IPRATROPIUM BROMIDE AND ALBUTEROL SULFATE 1 AMPULE: .5; 3 SOLUTION RESPIRATORY (INHALATION) at 07:56

## 2020-02-09 RX ADMIN — BENZONATATE 200 MG: 100 CAPSULE ORAL at 21:54

## 2020-02-09 RX ADMIN — HEPARIN SODIUM 5000 UNITS: 10000 INJECTION INTRAVENOUS; SUBCUTANEOUS at 16:28

## 2020-02-09 RX ADMIN — DICLOFENAC 2 G: 10 GEL TOPICAL at 08:58

## 2020-02-09 RX ADMIN — POTASSIUM CHLORIDE 40 MEQ: 20 TABLET, EXTENDED RELEASE ORAL at 13:50

## 2020-02-09 RX ADMIN — DICLOFENAC 2 G: 10 GEL TOPICAL at 13:50

## 2020-02-09 RX ADMIN — MORPHINE SULFATE 2 MG: 2 INJECTION, SOLUTION INTRAMUSCULAR; INTRAVENOUS at 21:56

## 2020-02-09 RX ADMIN — PREDNISONE 10 MG: 5 TABLET ORAL at 08:54

## 2020-02-09 RX ADMIN — GUAIFENESIN 600 MG: 400 TABLET, FILM COATED ORAL at 08:54

## 2020-02-09 ASSESSMENT — PAIN DESCRIPTION - DESCRIPTORS: DESCRIPTORS: ACHING;DISCOMFORT

## 2020-02-09 ASSESSMENT — PAIN - FUNCTIONAL ASSESSMENT: PAIN_FUNCTIONAL_ASSESSMENT: ACTIVITIES ARE NOT PREVENTED

## 2020-02-09 ASSESSMENT — PAIN SCALES - GENERAL
PAINLEVEL_OUTOF10: 2
PAINLEVEL_OUTOF10: 0
PAINLEVEL_OUTOF10: 9

## 2020-02-09 ASSESSMENT — PAIN DESCRIPTION - LOCATION: LOCATION: BACK

## 2020-02-09 ASSESSMENT — PAIN DESCRIPTION - ONSET: ONSET: GRADUAL

## 2020-02-09 ASSESSMENT — PAIN DESCRIPTION - FREQUENCY: FREQUENCY: INTERMITTENT

## 2020-02-09 ASSESSMENT — PAIN DESCRIPTION - PROGRESSION: CLINICAL_PROGRESSION: NOT CHANGED

## 2020-02-09 ASSESSMENT — PAIN DESCRIPTION - PAIN TYPE: TYPE: CHRONIC PAIN

## 2020-02-09 ASSESSMENT — PAIN DESCRIPTION - ORIENTATION: ORIENTATION: LOWER

## 2020-02-09 NOTE — PLAN OF CARE
Problem: Falls - Risk of:  Goal: Will remain free from falls  Description  Will remain free from falls  2/9/2020 0207 by Danica Roth RN  Outcome: Met This Shift  2/8/2020 1655 by Javier Mejias RN  Outcome: Met This Shift  Goal: Absence of physical injury  Description  Absence of physical injury  2/9/2020 0207 by Danica Roth RN  Outcome: Met This Shift  2/8/2020 1655 by Javier Mejias RN  Outcome: Met This Shift     Problem: Pain:  Description  Pain management should include both nonpharmacologic and pharmacologic interventions.   Goal: Pain level will decrease  Description  Pain level will decrease  2/9/2020 0207 by Danica Roth RN  Outcome: Met This Shift  2/8/2020 1655 by Javier Mejias RN  Outcome: Met This Shift  Goal: Control of acute pain  Description  Control of acute pain  2/9/2020 0207 by Danica Roth RN  Outcome: Met This Shift  2/8/2020 1655 by Javier Mejias RN  Outcome: Met This Shift     Problem: Urinary Elimination:  Goal: Will remain free from infection  Description  Will remain free from infection  Outcome: Met This Shift

## 2020-02-09 NOTE — PROGRESS NOTES
Lab Results   Component Value Date    CKMB <1.0 11/13/2017     Lab Results   Component Value Date    LABALBU 3.3 (L) 02/06/2020     Lab Results   Component Value Date    ALT 7 02/06/2020    AST 12 02/06/2020    ALKPHOS 72 02/06/2020    BILITOT 0.2 02/06/2020         Assessment:     POD #2 s/p cysto with left ureteroscopy and stone removal and stent insertion  Right popliteal pain: probable Baker's cyst, rule out DVT  Cough, tachycardia, and anxiety: remain alert to possibility of pulmonary embolus if these symptoms persist.  However, the most likely explanation of cough is a viral URI. Hypokalemia yesterday. Plan:     Check ultrasound of Lower extremities. Follow clinical progress. Will not discharge home today due to above concerns. Recheck K level today.

## 2020-02-10 ENCOUNTER — APPOINTMENT (OUTPATIENT)
Dept: GENERAL RADIOLOGY | Age: 85
DRG: 659 | End: 2020-02-10
Payer: MEDICARE

## 2020-02-10 LAB
ANION GAP SERPL CALCULATED.3IONS-SCNC: 9 MMOL/L (ref 7–16)
BLOOD CULTURE, ROUTINE: NORMAL
BUN BLDV-MCNC: 18 MG/DL (ref 8–23)
CALCIUM SERPL-MCNC: 8.7 MG/DL (ref 8.6–10.2)
CHLORIDE BLD-SCNC: 106 MMOL/L (ref 98–107)
CO2: 25 MMOL/L (ref 22–29)
CREAT SERPL-MCNC: 0.8 MG/DL (ref 0.5–1)
CULTURE, BLOOD 2: NORMAL
D DIMER: 268 NG/ML DDU
GFR AFRICAN AMERICAN: >60
GFR NON-AFRICAN AMERICAN: >60 ML/MIN/1.73
GLUCOSE BLD-MCNC: 121 MG/DL (ref 74–99)
POTASSIUM SERPL-SCNC: 4 MMOL/L (ref 3.5–5)
PRO-BNP: 1017 PG/ML (ref 0–450)
SODIUM BLD-SCNC: 140 MMOL/L (ref 132–146)

## 2020-02-10 PROCEDURE — 85378 FIBRIN DEGRADE SEMIQUANT: CPT

## 2020-02-10 PROCEDURE — 71046 X-RAY EXAM CHEST 2 VIEWS: CPT

## 2020-02-10 PROCEDURE — 36415 COLL VENOUS BLD VENIPUNCTURE: CPT

## 2020-02-10 PROCEDURE — 6360000002 HC RX W HCPCS: Performed by: INTERNAL MEDICINE

## 2020-02-10 PROCEDURE — 2580000003 HC RX 258: Performed by: UROLOGY

## 2020-02-10 PROCEDURE — 74018 RADEX ABDOMEN 1 VIEW: CPT

## 2020-02-10 PROCEDURE — 6360000002 HC RX W HCPCS: Performed by: UROLOGY

## 2020-02-10 PROCEDURE — 6370000000 HC RX 637 (ALT 250 FOR IP): Performed by: INTERNAL MEDICINE

## 2020-02-10 PROCEDURE — 6370000000 HC RX 637 (ALT 250 FOR IP): Performed by: UROLOGY

## 2020-02-10 PROCEDURE — 6370000000 HC RX 637 (ALT 250 FOR IP): Performed by: HOSPITALIST

## 2020-02-10 PROCEDURE — 2580000003 HC RX 258: Performed by: INTERNAL MEDICINE

## 2020-02-10 PROCEDURE — 6360000002 HC RX W HCPCS: Performed by: HOSPITALIST

## 2020-02-10 PROCEDURE — 80048 BASIC METABOLIC PNL TOTAL CA: CPT

## 2020-02-10 PROCEDURE — 6360000002 HC RX W HCPCS

## 2020-02-10 PROCEDURE — 94640 AIRWAY INHALATION TREATMENT: CPT

## 2020-02-10 PROCEDURE — 83880 ASSAY OF NATRIURETIC PEPTIDE: CPT

## 2020-02-10 PROCEDURE — 2060000000 HC ICU INTERMEDIATE R&B

## 2020-02-10 RX ORDER — METHYLPREDNISOLONE SODIUM SUCCINATE 125 MG/2ML
80 INJECTION, POWDER, LYOPHILIZED, FOR SOLUTION INTRAMUSCULAR; INTRAVENOUS EVERY 8 HOURS
Status: DISCONTINUED | OUTPATIENT
Start: 2020-02-10 | End: 2020-02-11

## 2020-02-10 RX ORDER — PANTOPRAZOLE SODIUM 40 MG/1
40 TABLET, DELAYED RELEASE ORAL
Status: DISCONTINUED | OUTPATIENT
Start: 2020-02-10 | End: 2020-02-13 | Stop reason: HOSPADM

## 2020-02-10 RX ORDER — SODIUM CHLORIDE 9 MG/ML
INJECTION, SOLUTION INTRAVENOUS CONTINUOUS
Status: DISCONTINUED | OUTPATIENT
Start: 2020-02-10 | End: 2020-02-11

## 2020-02-10 RX ORDER — METHYLPREDNISOLONE SODIUM SUCCINATE 40 MG/ML
INJECTION, POWDER, LYOPHILIZED, FOR SOLUTION INTRAMUSCULAR; INTRAVENOUS
Status: COMPLETED
Start: 2020-02-10 | End: 2020-02-10

## 2020-02-10 RX ORDER — DOCUSATE SODIUM 100 MG/1
100 CAPSULE, LIQUID FILLED ORAL 2 TIMES DAILY
Status: DISCONTINUED | OUTPATIENT
Start: 2020-02-10 | End: 2020-02-13 | Stop reason: HOSPADM

## 2020-02-10 RX ORDER — POLYETHYLENE GLYCOL 3350 17 G/17G
17 POWDER, FOR SOLUTION ORAL 2 TIMES DAILY
Status: DISCONTINUED | OUTPATIENT
Start: 2020-02-10 | End: 2020-02-13

## 2020-02-10 RX ORDER — PREDNISONE 20 MG/1
20 TABLET ORAL 2 TIMES DAILY
Status: DISCONTINUED | OUTPATIENT
Start: 2020-02-10 | End: 2020-02-10

## 2020-02-10 RX ORDER — BUDESONIDE 0.25 MG/2ML
250 INHALANT ORAL 2 TIMES DAILY
Status: DISCONTINUED | OUTPATIENT
Start: 2020-02-10 | End: 2020-02-13 | Stop reason: HOSPADM

## 2020-02-10 RX ORDER — METHYLPREDNISOLONE SODIUM SUCCINATE 40 MG/ML
40 INJECTION, POWDER, LYOPHILIZED, FOR SOLUTION INTRAMUSCULAR; INTRAVENOUS ONCE
Status: COMPLETED | OUTPATIENT
Start: 2020-02-10 | End: 2020-02-10

## 2020-02-10 RX ORDER — SENNA PLUS 8.6 MG/1
1 TABLET ORAL NIGHTLY
Status: DISCONTINUED | OUTPATIENT
Start: 2020-02-10 | End: 2020-02-13

## 2020-02-10 RX ADMIN — GUAIFENESIN 600 MG: 400 TABLET, FILM COATED ORAL at 08:29

## 2020-02-10 RX ADMIN — IPRATROPIUM BROMIDE AND ALBUTEROL SULFATE 1 AMPULE: .5; 3 SOLUTION RESPIRATORY (INHALATION) at 20:10

## 2020-02-10 RX ADMIN — METHYLNALTREXONE BROMIDE 12 MG: 12 INJECTION, SOLUTION SUBCUTANEOUS at 17:59

## 2020-02-10 RX ADMIN — HEPARIN SODIUM 5000 UNITS: 10000 INJECTION INTRAVENOUS; SUBCUTANEOUS at 08:30

## 2020-02-10 RX ADMIN — SODIUM CHLORIDE: 9 INJECTION, SOLUTION INTRAVENOUS at 14:10

## 2020-02-10 RX ADMIN — MORPHINE SULFATE 2 MG: 2 INJECTION, SOLUTION INTRAMUSCULAR; INTRAVENOUS at 08:37

## 2020-02-10 RX ADMIN — DICLOFENAC 2 G: 10 GEL TOPICAL at 21:05

## 2020-02-10 RX ADMIN — HEPARIN SODIUM 5000 UNITS: 10000 INJECTION INTRAVENOUS; SUBCUTANEOUS at 00:12

## 2020-02-10 RX ADMIN — Medication 10 ML: at 08:33

## 2020-02-10 RX ADMIN — MORPHINE SULFATE 2 MG: 2 INJECTION, SOLUTION INTRAMUSCULAR; INTRAVENOUS at 03:25

## 2020-02-10 RX ADMIN — IPRATROPIUM BROMIDE AND ALBUTEROL SULFATE 1 AMPULE: .5; 3 SOLUTION RESPIRATORY (INHALATION) at 12:33

## 2020-02-10 RX ADMIN — HEPARIN SODIUM 5000 UNITS: 10000 INJECTION INTRAVENOUS; SUBCUTANEOUS at 17:59

## 2020-02-10 RX ADMIN — GUAIFENESIN 600 MG: 400 TABLET, FILM COATED ORAL at 13:10

## 2020-02-10 RX ADMIN — Medication 10 ML: at 21:19

## 2020-02-10 RX ADMIN — DOCUSATE SODIUM 100 MG: 100 CAPSULE, LIQUID FILLED ORAL at 21:06

## 2020-02-10 RX ADMIN — POLYETHYLENE GLYCOL 3350 17 G: 17 POWDER, FOR SOLUTION ORAL at 21:06

## 2020-02-10 RX ADMIN — IPRATROPIUM BROMIDE AND ALBUTEROL SULFATE 1 AMPULE: .5; 3 SOLUTION RESPIRATORY (INHALATION) at 09:04

## 2020-02-10 RX ADMIN — PANTOPRAZOLE SODIUM 40 MG: 40 TABLET, DELAYED RELEASE ORAL at 17:58

## 2020-02-10 RX ADMIN — DICLOFENAC 2 G: 10 GEL TOPICAL at 08:28

## 2020-02-10 RX ADMIN — GUAIFENESIN 600 MG: 400 TABLET, FILM COATED ORAL at 21:06

## 2020-02-10 RX ADMIN — IPRATROPIUM BROMIDE AND ALBUTEROL SULFATE 1 AMPULE: .5; 3 SOLUTION RESPIRATORY (INHALATION) at 03:28

## 2020-02-10 RX ADMIN — METHYLPREDNISOLONE SODIUM SUCCINATE 40 MG: 40 INJECTION, POWDER, LYOPHILIZED, FOR SOLUTION INTRAMUSCULAR; INTRAVENOUS at 04:56

## 2020-02-10 RX ADMIN — MAGNESIUM HYDROXIDE 30 ML: 400 SUSPENSION ORAL at 21:06

## 2020-02-10 RX ADMIN — SENNOSIDES 8.6 MG: 8.6 TABLET, FILM COATED ORAL at 21:06

## 2020-02-10 RX ADMIN — DICLOFENAC 2 G: 10 GEL TOPICAL at 13:10

## 2020-02-10 RX ADMIN — EZETIMIBE 10 MG: 10 TABLET ORAL at 08:28

## 2020-02-10 RX ADMIN — BUDESONIDE 250 MCG: 0.25 SUSPENSION RESPIRATORY (INHALATION) at 20:12

## 2020-02-10 ASSESSMENT — PAIN DESCRIPTION - PAIN TYPE
TYPE: ACUTE PAIN
TYPE: ACUTE PAIN

## 2020-02-10 ASSESSMENT — PAIN DESCRIPTION - LOCATION
LOCATION: ABDOMEN
LOCATION: GENERALIZED

## 2020-02-10 ASSESSMENT — PAIN SCALES - GENERAL
PAINLEVEL_OUTOF10: 6
PAINLEVEL_OUTOF10: 9
PAINLEVEL_OUTOF10: 8
PAINLEVEL_OUTOF10: 8
PAINLEVEL_OUTOF10: 0

## 2020-02-10 ASSESSMENT — PAIN DESCRIPTION - DESCRIPTORS
DESCRIPTORS: ACHING;DISCOMFORT
DESCRIPTORS: ACHING

## 2020-02-10 ASSESSMENT — PAIN - FUNCTIONAL ASSESSMENT: PAIN_FUNCTIONAL_ASSESSMENT: PREVENTS OR INTERFERES SOME ACTIVE ACTIVITIES AND ADLS

## 2020-02-10 NOTE — PROGRESS NOTES
Notified Dr. Joseph Ruiz of consult.  [facility]

## 2020-02-10 NOTE — PROGRESS NOTES
Pt with multiple complaints about care and things she thinks she needs ordered. Still asking for cough medicine with codeine, Xray, meds to help her have a bowel movement. Pt is passing gas when assisted to [address] Pt very upset about order for steroid this morning, states she is deathly afraid of steroids making her fat. Explained that she has been taking prednisone the last 2 days and not much risk for permanent weight gain with limited use, as well the benefit of decreasing inflammation and helping her breathe better. Pt now states that her breathing is not her problem and she needs to have a BM and get an Xray because her LLQ hurts. Given prune juice to assist with BM. Pt now asking \"where did my wheeze go\"? Complaining that her lips swelled up, throat is dry. Lips do not appear swollen--she c/o of this happening in the ER upon admission as well. Suggested the pt attempt to drink more fluids since she is also c/o not being able to eat.

## 2020-02-10 NOTE — PLAN OF CARE
Problem: Falls - Risk of:  Goal: Will remain free from falls  Description  Will remain free from falls  Outcome: Met This Shift  Goal: Absence of physical injury  Description  Absence of physical injury  Outcome: Met This Shift     Problem: Pain:  Description  Pain management should include both nonpharmacologic and pharmacologic interventions.   Goal: Pain level will decrease  Description  Pain level will decrease  Outcome: Met This Shift  Goal: Control of acute pain  Description  Control of acute pain  Outcome: Met This Shift     Problem: Urinary Elimination:  Goal: Will remain free from infection  Description  Will remain free from infection  Outcome: Met This Shift

## 2020-02-10 NOTE — PROGRESS NOTES
Dr. Zohaib Barillas updated patient requiring 3 liters nasal cannula when ambulating and abdominal pain. Orders given.

## 2020-02-10 NOTE — PROGRESS NOTES
Dr. Ava Bernal Notified of consult via perfect serve connected to answering service  Katelin Anne [facility]/US2/MT 2/10/2020 2:07 PM     Dr. Lenora Galvez Notified of consult via perfect serve connected to office  Torie Arias /  TIMMY Martin [facility]/US2/MT 2/10/2020 2:13 PM

## 2020-02-10 NOTE — CONSULTS
Celeste Ta M.D. [facility]  Dr. Krishan Telles M.D.,  Dr. Faisal Holguin M.D.,  Dr. Bibiana Bowers D.O.,  Dr Analy Norris D.O. ,  Dr Rolfe Boas, M.D. Susy Lopez  82 y.o.  female      Re: Abdominal pain  Requesting physician: Dr. Milla Llanos  Date:2:53 PM 2/10/2020          HPI: Mrs. Donnell Atkins is a 82-year-old female patient seen in the hospital for above-described issue of abdominal pain. Patient initially presented to the hospital on 4 February with chief complaint of abdominal pain with diarrhea. She was admitted at that time because of nausea, vomiting, diarrhea and abdominal pain and diagnosed with colitis and nephrolithiasis. At that time patient had CT scan with IV contrast revealing ascending and transverse colonic segments with narrowing and mild mural thickening as well as some diverticular disease in the proximal sigmoid colon. At that time patient was noted to have white blood cell count of 10.8 which has decreased to 6.3 on 8 February. Patient also was consulted with nephrology because of nephrolithiasis -she underwent cystoscopy with retrograde pyelogram and stone extraction with stent insertion. Patient was consulted also with general surgery for above described nausea/vomiting/diarrhea. At this time does not appear that stool studies were obtained/reported. Patient also is complaining of currently consulted with pulmonology for shortness of breath. Patient herself currently denies any further nausea or vomiting. She complains of abdominal pain which is localized mostly in the left lower quadrant with radiation across the abdomen. Patient reports poor oral intake but denies dysphagia odynophagia. She denies hematemesis or emesis of coffee-ground material.  Patient reports that she has not had bowel movement for 7 days. Patient denies noticing any blood in the stool or melena prior to this.   Patient reports that she had colonoscopy in the past 2 to 3 months in [place]        ondansetron (ZOFRAN) injection 4 mg  4 mg Intravenous Q6H PRN Fernando A Andrea, DO        heparin (porcine) injection 5,000 Units  5,000 Units Subcutaneous 3 times per day Bridgette Shaffer Andrea, DO   5,000 Units at 02/10/20 0830    acetaminophen (TYLENOL) tablet 650 mg  650 mg Oral Q4H PRN Fernando A Andrea, DO        morphine (PF) injection 2 mg  2 mg Intravenous Q4H PRN Fernando A Andrea, DO   2 mg at 02/10/20 8923       SocHx:  Social History     Socioeconomic History    Marital status:       Spouse name: Not on file    Number of children: 11    Years of education: 15    Highest education level: Not on file   Occupational History    Occupation: retired -[employer]   Social Needs    Financial resource strain: Not on file    Food insecurity:     Worry: Not on file     Inability: Not on file   Make My plate needs:     Medical: Not on file     Non-medical: Not on file   Tobacco Use    Smoking status: Never Smoker    Smokeless tobacco: Never Used   Substance and Sexual Activity    Alcohol use: No    Drug use: No    Sexual activity: Never   Lifestyle    Physical activity:     Days per week: Not on file     Minutes per session: Not on file    Stress: Not on file   Relationships    Social connections:     Talks on phone: Not on file     Gets together: Not on file     Attends Rastafarian service: Not on file     Active member of club or organization: Not on file     Attends meetings of clubs or organizations: Not on file     Relationship status: Not on file    Intimate partner violence:     Fear of current or ex partner: Not on file     Emotionally abused: Not on file     Physically abused: Not on file     Forced sexual activity: Not on file   Other Topics Concern    Not on file   Social History Narrative    Not on file       FamHx:  Family History   Problem Relation Age of Onset    High Blood Pressure Mother     Other Father         Emphysema    Cancer Brother         Leukemia    Cancer Brother 2/4  -Differential diagnosis will include infectious versus ischemic and less likely inflammatory  -obtain stool studies in case of recurrent diarrhea  -Review office records regarding recent colonoscopy  -Patient symptoms likely at least partially related to constipation  -Constipation possibly related to narcotic use  -Attempt Relistor/stool softeners/laxatives -reassess abdominal pain after bowel movement  -Patient reports decreased appetite but otherwise able to tolerate general diet  -Start PPI especially given steroid treatment  -Await KUB as ordered earlier    2. Nephrolithiasis  - S/P cystoscopy/retrograde pyelogram with stent placement and stone removal  -Per neurology/admitting    Above has been discussed with the patient all questions answered to her satisfaction. She is agreeable with the plan as delineated. Thank you for the opportunity to see this patient in consultation    NOTE:  This report was transcribed using voice recognition software. Every effort was made to ensure accuracy; however, inadvertent computerized transcription errors may be present.     Benjamín Benito MD  2/10/2020  2:53 PM

## 2020-02-10 NOTE — PROGRESS NOTES
Second call placed to answering service for Dr Lan Freire regarding pt condition. New order for 1x solumedrol 40mg IV. Defer to Dr Rhona Flynn this morning regarding further care.

## 2020-02-10 NOTE — PROGRESS NOTES
level, and   could represent ulcerative colitis. 2. There is hydronephrosis and hydroureter of the left kidney to the   bladder base, where a 2 to 3 mm diameter calculus is identified. Is on   the verge of passing through the bladder wall. 3. A moderate size retrocardiac hiatus hernia, numerous bilateral   renal cortical cysts, degenerative spinal findings are noted. ALERT:  THIS IS AN ABNORMAL REPORT-possible colitis and definite left   hydroureteronephrosis with a calculus at the UVJ on the left measuring   2 or 3 mm maximum diameter. Assessment:     Active Problems:    Essential hypertension    Gastroesophageal reflux disease without esophagitis    Anxiety    Mixed hyperlipidemia    Moderate obesity    Colitis    Hydronephrosis with urinary obstruction due to renal calculus    Hiatal hernia with GERD  Resolved Problems:    * No resolved hospital problems. *  Bronchospasm and bronchitis versus atelectasis  Hypoxic resp. failure  Plan:   DuoNeb and increase steroids  Check US lower ext.   Repeat CXR  Zithromax  Address constipation      Electronically signed by Ryland Mcgill MD on 2/10/2020 at 11:05 AM

## 2020-02-10 NOTE — PROGRESS NOTES
Patient refusing dose of solumedrol. States that she will get fat from medication and will blame the doctor. Educated patient on importance of medication compliance and that solumedrol can help to improve her breathing.  Patient states she needs time to think about taking medication but does not want at this time

## 2020-02-11 ENCOUNTER — APPOINTMENT (OUTPATIENT)
Dept: CT IMAGING | Age: 85
DRG: 659 | End: 2020-02-11
Payer: MEDICARE

## 2020-02-11 PROCEDURE — 2700000000 HC OXYGEN THERAPY PER DAY

## 2020-02-11 PROCEDURE — 74177 CT ABD & PELVIS W/CONTRAST: CPT

## 2020-02-11 PROCEDURE — 6360000002 HC RX W HCPCS: Performed by: UROLOGY

## 2020-02-11 PROCEDURE — 6370000000 HC RX 637 (ALT 250 FOR IP): Performed by: UROLOGY

## 2020-02-11 PROCEDURE — 6360000004 HC RX CONTRAST MEDICATION: Performed by: RADIOLOGY

## 2020-02-11 PROCEDURE — 2580000003 HC RX 258: Performed by: UROLOGY

## 2020-02-11 PROCEDURE — 94640 AIRWAY INHALATION TREATMENT: CPT

## 2020-02-11 PROCEDURE — 6360000002 HC RX W HCPCS: Performed by: INTERNAL MEDICINE

## 2020-02-11 PROCEDURE — 6370000000 HC RX 637 (ALT 250 FOR IP): Performed by: HOSPITALIST

## 2020-02-11 PROCEDURE — 6370000000 HC RX 637 (ALT 250 FOR IP): Performed by: INTERNAL MEDICINE

## 2020-02-11 PROCEDURE — 1200000000 HC SEMI PRIVATE

## 2020-02-11 PROCEDURE — 6360000002 HC RX W HCPCS: Performed by: CLINICAL NURSE SPECIALIST

## 2020-02-11 RX ORDER — METHYLPREDNISOLONE SODIUM SUCCINATE 40 MG/ML
40 INJECTION, POWDER, LYOPHILIZED, FOR SOLUTION INTRAMUSCULAR; INTRAVENOUS EVERY 8 HOURS
Status: DISCONTINUED | OUTPATIENT
Start: 2020-02-12 | End: 2020-02-12

## 2020-02-11 RX ORDER — DOCUSATE SODIUM 100 MG/1
100 CAPSULE, LIQUID FILLED ORAL DAILY
Status: DISCONTINUED | OUTPATIENT
Start: 2020-02-11 | End: 2020-02-11

## 2020-02-11 RX ORDER — ARFORMOTEROL TARTRATE 15 UG/2ML
15 SOLUTION RESPIRATORY (INHALATION) 2 TIMES DAILY
Status: DISCONTINUED | OUTPATIENT
Start: 2020-02-11 | End: 2020-02-13 | Stop reason: HOSPADM

## 2020-02-11 RX ADMIN — GUAIFENESIN 600 MG: 400 TABLET, FILM COATED ORAL at 13:43

## 2020-02-11 RX ADMIN — HEPARIN SODIUM 5000 UNITS: 10000 INJECTION INTRAVENOUS; SUBCUTANEOUS at 00:33

## 2020-02-11 RX ADMIN — BUDESONIDE 250 MCG: 0.25 SUSPENSION RESPIRATORY (INHALATION) at 20:12

## 2020-02-11 RX ADMIN — DICLOFENAC 2 G: 10 GEL TOPICAL at 15:02

## 2020-02-11 RX ADMIN — GUAIFENESIN 600 MG: 400 TABLET, FILM COATED ORAL at 20:27

## 2020-02-11 RX ADMIN — DICLOFENAC 2 G: 10 GEL TOPICAL at 10:23

## 2020-02-11 RX ADMIN — IOPAMIDOL 110 ML: 755 INJECTION, SOLUTION INTRAVENOUS at 17:36

## 2020-02-11 RX ADMIN — DOCUSATE SODIUM 100 MG: 100 CAPSULE, LIQUID FILLED ORAL at 20:27

## 2020-02-11 RX ADMIN — POLYETHYLENE GLYCOL 3350 17 G: 17 POWDER, FOR SOLUTION ORAL at 10:21

## 2020-02-11 RX ADMIN — EZETIMIBE 10 MG: 10 TABLET ORAL at 10:22

## 2020-02-11 RX ADMIN — SENNOSIDES 8.6 MG: 8.6 TABLET, FILM COATED ORAL at 20:27

## 2020-02-11 RX ADMIN — HEPARIN SODIUM 5000 UNITS: 10000 INJECTION INTRAVENOUS; SUBCUTANEOUS at 15:01

## 2020-02-11 RX ADMIN — IPRATROPIUM BROMIDE AND ALBUTEROL SULFATE 1 AMPULE: .5; 3 SOLUTION RESPIRATORY (INHALATION) at 08:37

## 2020-02-11 RX ADMIN — MAGNESIUM HYDROXIDE 30 ML: 400 SUSPENSION ORAL at 10:21

## 2020-02-11 RX ADMIN — AMLODIPINE BESYLATE 2.5 MG: 2.5 TABLET ORAL at 10:28

## 2020-02-11 RX ADMIN — MAGNESIUM HYDROXIDE 30 ML: 400 SUSPENSION ORAL at 20:27

## 2020-02-11 RX ADMIN — Medication 10 ML: at 10:22

## 2020-02-11 RX ADMIN — BUDESONIDE 250 MCG: 0.25 SUSPENSION RESPIRATORY (INHALATION) at 08:38

## 2020-02-11 RX ADMIN — Medication 10 ML: at 20:28

## 2020-02-11 RX ADMIN — GUAIFENESIN 600 MG: 400 TABLET, FILM COATED ORAL at 10:22

## 2020-02-11 RX ADMIN — POLYETHYLENE GLYCOL 3350 17 G: 17 POWDER, FOR SOLUTION ORAL at 20:27

## 2020-02-11 RX ADMIN — MORPHINE SULFATE 2 MG: 2 INJECTION, SOLUTION INTRAMUSCULAR; INTRAVENOUS at 16:08

## 2020-02-11 RX ADMIN — IPRATROPIUM BROMIDE AND ALBUTEROL SULFATE 1 AMPULE: .5; 3 SOLUTION RESPIRATORY (INHALATION) at 12:13

## 2020-02-11 RX ADMIN — DOCUSATE SODIUM 100 MG: 100 CAPSULE, LIQUID FILLED ORAL at 10:22

## 2020-02-11 RX ADMIN — METHYLPREDNISOLONE SODIUM SUCCINATE 80 MG: 125 INJECTION, POWDER, LYOPHILIZED, FOR SOLUTION INTRAMUSCULAR; INTRAVENOUS at 15:01

## 2020-02-11 RX ADMIN — DICLOFENAC 2 G: 10 GEL TOPICAL at 20:28

## 2020-02-11 RX ADMIN — METHYLPREDNISOLONE SODIUM SUCCINATE 80 MG: 125 INJECTION, POWDER, LYOPHILIZED, FOR SOLUTION INTRAMUSCULAR; INTRAVENOUS at 06:01

## 2020-02-11 RX ADMIN — ONDANSETRON 4 MG: 2 INJECTION INTRAMUSCULAR; INTRAVENOUS at 16:07

## 2020-02-11 RX ADMIN — ARFORMOTEROL TARTRATE 15 MCG: 15 SOLUTION RESPIRATORY (INHALATION) at 20:12

## 2020-02-11 RX ADMIN — BENZONATATE 200 MG: 100 CAPSULE ORAL at 14:04

## 2020-02-11 RX ADMIN — HEPARIN SODIUM 5000 UNITS: 10000 INJECTION INTRAVENOUS; SUBCUTANEOUS at 10:22

## 2020-02-11 RX ADMIN — IPRATROPIUM BROMIDE AND ALBUTEROL SULFATE 1 AMPULE: .5; 3 SOLUTION RESPIRATORY (INHALATION) at 17:09

## 2020-02-11 RX ADMIN — DICLOFENAC 2 G: 10 GEL TOPICAL at 13:43

## 2020-02-11 RX ADMIN — PANTOPRAZOLE SODIUM 40 MG: 40 TABLET, DELAYED RELEASE ORAL at 06:01

## 2020-02-11 RX ADMIN — IPRATROPIUM BROMIDE AND ALBUTEROL SULFATE 1 AMPULE: .5; 3 SOLUTION RESPIRATORY (INHALATION) at 20:12

## 2020-02-11 RX ADMIN — IOHEXOL 50 ML: 240 INJECTION, SOLUTION INTRATHECAL; INTRAVASCULAR; INTRAVENOUS; ORAL at 17:36

## 2020-02-11 ASSESSMENT — PAIN SCALES - GENERAL
PAINLEVEL_OUTOF10: 3
PAINLEVEL_OUTOF10: 7
PAINLEVEL_OUTOF10: 8
PAINLEVEL_OUTOF10: 0
PAINLEVEL_OUTOF10: 8
PAINLEVEL_OUTOF10: 0
PAINLEVEL_OUTOF10: 8

## 2020-02-11 NOTE — PLAN OF CARE
Problem: Falls - Risk of:  Goal: Will remain free from falls  Description  Will remain free from falls  Outcome: Met This Shift  Goal: Absence of physical injury  Description  Absence of physical injury  Outcome: Met This Shift     Problem: Pain:  Goal: Pain level will decrease  Description  Pain level will decrease  Outcome: Met This Shift  Goal: Control of acute pain  Description  Control of acute pain  Outcome: Met This Shift     Problem: Urinary Elimination:  Goal: Will remain free from infection  Description  Will remain free from infection  Outcome: Met This Shift

## 2020-02-11 NOTE — PROGRESS NOTES
[facility] Quality Flow/Interdisciplinary Rounds Progress Note        Quality Flow Rounds held on February 11, 2020    Disciplines Attending:  Bedside Nurse, ,  and Nursing Unit Leadership    Abiodun De was admitted on 2/4/2020  6:12 PM    Anticipated Discharge Date:  Expected Discharge Date: 02/06/20    Disposition:    Charanjit Score:  Charanjit Scale Score: 20    Readmission Risk              Risk of Unplanned Readmission:        13           Discussed patient goal for the day, patient clinical progression, and barriers to discharge.   The following Goal(s) of the Day/Commitment(s) have been identified:  Wean steroids        Deandre Villanueva  February 11, 2020

## 2020-02-11 NOTE — CARE COORDINATION
Met with patient at bedside, introduced myself and explained my role as RN case manager. Discussed plan of care AT LENGTH (new pulm consult, GI and urology consults,  labs/testing, medications, oxygen therapy, discharge planning, etc.). Pt verbalize understanding. Pt noted that she is frustrated, stating that she came to the hospital for abdominal pain and she is still experiencing abdominal pain. Pt upset that she is being ordered pills when she doesn't take pills at home. She is also concerned about her wheezing. Attempted to educated patient on reasons for her current treatment plan/regimen. Pt also refusing nose swab for respiratory molecular panel, educated pt on rationale and reasoning for testing, however pt still declining. Pt notes that she resides alone on the [facility] [address] [facility] [place] [facility] and plans to transition back home independently at discharge. She denies any needs at present time.      Informed patient that case management and social work will continue to follow to assist with the transition of care

## 2020-02-11 NOTE — CONSULTS
Power Montes M.D.,[facility]  Lisa Bender D.O., F.A.C.O.I., Iwona Jurado M.D. Piper Ruth M.D., Nahum Ballesteros M.D. Patient:  Lieutenant Lombardi 82 y.o. female MRN: [id number]     Date of Service: 2/11/2020      PULMONARY CONSULTATION    Reason for Consultation: post operative hypoxia with wheezing   Referring Physician: Dr. Basil Chauhan with the referring physician will be sent via the electronic medical record. Chief Complaint:wheezing     CODE STATUS:prior     SUBJECTIVE:  HPI:  Lieutenant Lombardi is a 82 y.o. resented to the ER 6 days ago with abdominal pain, diarrhea. Work-up in ED showed colitis and nephrolithiasis. General surgery, urology consulted patient subsequently went for on 2/7/2020 cystoscopy retrograde pyelogram and left stent with lithotripsy. We have been consulted for status post procedure hypoxia and wheezing. Dr. Rukhsana Henderson did read the PFT done on 3/31/2017 patient showed no obstruction did have mild restriction and moderate reduction in diffusion capacity with partial correction for alveolar lung volume. Distally patient does not look like she need required oxygen day and a half ago which she required 2 L for low saturation she is now currently on 3 L with 93% saturation. Sound of bilateral lower extremities did not show a DVT. Chest x-ray done today shows no acute process. She is currently on Pulmicort, duo nebs every 4 hours while awake. Has received several doses of Solu-Medrol over the last day and a half currently she is on Solu-Medrol 80 mg every 8 hours. Patient was seen in her room she is laying in bed. She reports to me she does not understand why she is on oxygen she feels like she does not need it. She reports that she had wheezing overnight and this morning that was as she reports severe. During my examination and auscultation of the patient she does not have any wheezing.   When I did discuss with her steroids she reports she does not want steroids as she feels it will make her\"fat\", complained at length purpose of steroids and short-term side effects. She reports she was never smoker. Reports she has never seen a pulmonologist, and she has never been diagnosed with any lung disease or disorder. 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 Did order incentive spirometry and will have it at bedside patient given instructions for use. Ports that she has intermittent cough none today, but coughed extremely hard yesterday and she felt she dislodged a mucous plug she had a large white semisolid plug that she coughed up. Denies shortness of breath, she does complain of continued abdominal pain and states\"Iwant someone to figure that our\". Planes of constipation. KUB done today with nonspecific bowel gas pattern. Past Medical History:   Diagnosis Date    Anxiety     Gastric reflux     Hypertension        Past Surgical History:   Procedure Laterality Date    COLONOSCOPY      HYSTERECTOMY      LITHOTRIPSY Left 2/7/2020    CYSTOSCOPY RETROGRADE PYELOGRAM LEFT URETEROSCOPY LEFT J STENT LASER LITHOTRIPSY performed by Wilber Mendez DO at [facility] OR       Family History   Problem Relation Age of Onset    High Blood Pressure Mother     Other Father         Emphysema    Cancer Brother         Leukemia    Cancer Brother        Social History:   Social History     Socioeconomic History    Marital status:       Spouse name: Not on file    Number of children: 11    Years of education: 15    Highest education level: Not on file   Occupational History    Occupation: retired -[employer]   Social Needs    Financial resource strain: Not on file    Food insecurity:     Worry: Not on file     Inability: Not on file   [employer] needs:     Medical: Not on file     Non-medical: Not on file   Tobacco Use    Smoking status: Never Smoker    Smokeless tobacco: Never Used   Substance and Sexual Activity    Alcohol use: No    input(s): LP1UAG in the last 72 hours. Assessment:  1. Respiratory failure with hypoxia   2. S/p cysto with stent 2/8 for ureteral stone   3. Hx of mild restrictive ans moderately reduced diffusion capacity on PFT done 3/31/2017   4. Chronic diarrhea   5. US of legs negative for DVT      Plan:  1. mucinex, duoneb Q 4 WA , pulmicort, will add brovan  2. Did not have IS will add this   3. Solumedrol 80 every 8 hours IV , taper with improvement   4. General surgery following for abd pian/ likely ischemic colitis /constiaption   5. Follow cxr -today no acute process         Thank you for allowing me to participate in the care of Richa Treviño. Please feel free to call with questions. This plan of care was reviewed in collaboration with Dr. Ashley Christopher     Electronically signed by CELE Alfonso on 2/11/2020 at 1:39 PM        I personally saw, examined, and cared for the patient. Labs, medications, radiographs reviewed. I agree with history exam and plans detailed in NP note. Acute hypoxic respiratory failure    Wheezing after cystoscopy. Minimal wheezing now. Primarily upper airway. Possible VCD. Taper steroids.     Electronically signed by Jasen Mohan DO on 2/11/2020 at 10:06 PM

## 2020-02-12 LAB
ALBUMIN SERPL-MCNC: 3.2 G/DL (ref 3.5–5.2)
ALP BLD-CCNC: 66 U/L (ref 35–104)
ALT SERPL-CCNC: 19 U/L (ref 0–32)
ANION GAP SERPL CALCULATED.3IONS-SCNC: 10 MMOL/L (ref 7–16)
AST SERPL-CCNC: 14 U/L (ref 0–31)
BASOPHILS ABSOLUTE: 0 E9/L (ref 0–0.2)
BASOPHILS RELATIVE PERCENT: 0 % (ref 0–2)
BILIRUB SERPL-MCNC: 0.2 MG/DL (ref 0–1.2)
BUN BLDV-MCNC: 19 MG/DL (ref 8–23)
CALCIUM SERPL-MCNC: 8.3 MG/DL (ref 8.6–10.2)
CALCULI COMPOSITION: NORMAL
CHLORIDE BLD-SCNC: 102 MMOL/L (ref 98–107)
CO2: 28 MMOL/L (ref 22–29)
CREAT SERPL-MCNC: 0.7 MG/DL (ref 0.5–1)
EOSINOPHILS ABSOLUTE: 0 E9/L (ref 0.05–0.5)
EOSINOPHILS RELATIVE PERCENT: 0 % (ref 0–6)
GFR AFRICAN AMERICAN: >60
GFR NON-AFRICAN AMERICAN: >60 ML/MIN/1.73
GLUCOSE BLD-MCNC: 158 MG/DL (ref 74–99)
HCT VFR BLD CALC: 34.5 % (ref 34–48)
HEMOGLOBIN: 11 G/DL (ref 11.5–15.5)
LACTIC ACID: 1.4 MMOL/L (ref 0.5–2.2)
LYMPHOCYTES ABSOLUTE: 0.53 E9/L (ref 1.5–4)
LYMPHOCYTES RELATIVE PERCENT: 8 % (ref 20–42)
MASS: 7 MG
MCH RBC QN AUTO: 31.2 PG (ref 26–35)
MCHC RBC AUTO-ENTMCNC: 31.9 % (ref 32–34.5)
MCV RBC AUTO: 97.7 FL (ref 80–99.9)
MONOCYTES ABSOLUTE: 0.2 E9/L (ref 0.1–0.95)
MONOCYTES RELATIVE PERCENT: 3 % (ref 2–12)
NEUTROPHILS ABSOLUTE: 5.87 E9/L (ref 1.8–7.3)
NEUTROPHILS RELATIVE PERCENT: 89 % (ref 43–80)
PDW BLD-RTO: 14.1 FL (ref 11.5–15)
PLATELET # BLD: 267 E9/L (ref 130–450)
PMV BLD AUTO: 10.2 FL (ref 7–12)
POTASSIUM SERPL-SCNC: 4.8 MMOL/L (ref 3.5–5)
RBC # BLD: 3.53 E12/L (ref 3.5–5.5)
RBC # BLD: NORMAL 10*6/UL
SODIUM BLD-SCNC: 140 MMOL/L (ref 132–146)
STONE DESCRIPTION: NORMAL
STONE NUMBER: 1
STONE SIZE: NORMAL MM
TOTAL PROTEIN: 6 G/DL (ref 6.4–8.3)
WBC # BLD: 6.6 E9/L (ref 4.5–11.5)

## 2020-02-12 PROCEDURE — 6370000000 HC RX 637 (ALT 250 FOR IP): Performed by: UROLOGY

## 2020-02-12 PROCEDURE — 6360000002 HC RX W HCPCS: Performed by: CLINICAL NURSE SPECIALIST

## 2020-02-12 PROCEDURE — 6370000000 HC RX 637 (ALT 250 FOR IP): Performed by: INTERNAL MEDICINE

## 2020-02-12 PROCEDURE — 6360000002 HC RX W HCPCS: Performed by: INTERNAL MEDICINE

## 2020-02-12 PROCEDURE — 85025 COMPLETE CBC W/AUTO DIFF WBC: CPT

## 2020-02-12 PROCEDURE — 36415 COLL VENOUS BLD VENIPUNCTURE: CPT

## 2020-02-12 PROCEDURE — 6370000000 HC RX 637 (ALT 250 FOR IP): Performed by: HOSPITALIST

## 2020-02-12 PROCEDURE — 6360000002 HC RX W HCPCS: Performed by: UROLOGY

## 2020-02-12 PROCEDURE — 2580000003 HC RX 258: Performed by: UROLOGY

## 2020-02-12 PROCEDURE — 94640 AIRWAY INHALATION TREATMENT: CPT

## 2020-02-12 PROCEDURE — 80053 COMPREHEN METABOLIC PANEL: CPT

## 2020-02-12 PROCEDURE — 83605 ASSAY OF LACTIC ACID: CPT

## 2020-02-12 PROCEDURE — 1200000000 HC SEMI PRIVATE

## 2020-02-12 PROCEDURE — 2700000000 HC OXYGEN THERAPY PER DAY

## 2020-02-12 RX ORDER — PREDNISONE 20 MG/1
40 TABLET ORAL DAILY
Status: DISCONTINUED | OUTPATIENT
Start: 2020-02-13 | End: 2020-02-13 | Stop reason: HOSPADM

## 2020-02-12 RX ADMIN — BUDESONIDE 250 MCG: 0.25 SUSPENSION RESPIRATORY (INHALATION) at 21:37

## 2020-02-12 RX ADMIN — HEPARIN SODIUM 5000 UNITS: 10000 INJECTION INTRAVENOUS; SUBCUTANEOUS at 00:09

## 2020-02-12 RX ADMIN — IPRATROPIUM BROMIDE AND ALBUTEROL SULFATE 1 AMPULE: .5; 3 SOLUTION RESPIRATORY (INHALATION) at 08:09

## 2020-02-12 RX ADMIN — GUAIFENESIN 600 MG: 400 TABLET, FILM COATED ORAL at 21:31

## 2020-02-12 RX ADMIN — GUAIFENESIN 600 MG: 400 TABLET, FILM COATED ORAL at 15:15

## 2020-02-12 RX ADMIN — POLYETHYLENE GLYCOL 3350 17 G: 17 POWDER, FOR SOLUTION ORAL at 21:33

## 2020-02-12 RX ADMIN — ARFORMOTEROL TARTRATE 15 MCG: 15 SOLUTION RESPIRATORY (INHALATION) at 08:10

## 2020-02-12 RX ADMIN — Medication 10 ML: at 21:33

## 2020-02-12 RX ADMIN — Medication 10 ML: at 09:48

## 2020-02-12 RX ADMIN — BENZONATATE 200 MG: 100 CAPSULE ORAL at 10:25

## 2020-02-12 RX ADMIN — PANTOPRAZOLE SODIUM 40 MG: 40 TABLET, DELAYED RELEASE ORAL at 06:24

## 2020-02-12 RX ADMIN — IPRATROPIUM BROMIDE AND ALBUTEROL SULFATE 1 AMPULE: .5; 3 SOLUTION RESPIRATORY (INHALATION) at 21:36

## 2020-02-12 RX ADMIN — HEPARIN SODIUM 5000 UNITS: 10000 INJECTION INTRAVENOUS; SUBCUTANEOUS at 09:51

## 2020-02-12 RX ADMIN — METHYLPREDNISOLONE SODIUM SUCCINATE 40 MG: 40 INJECTION, POWDER, LYOPHILIZED, FOR SOLUTION INTRAMUSCULAR; INTRAVENOUS at 09:49

## 2020-02-12 RX ADMIN — DOCUSATE SODIUM 100 MG: 100 CAPSULE, LIQUID FILLED ORAL at 21:31

## 2020-02-12 RX ADMIN — METHYLPREDNISOLONE SODIUM SUCCINATE 40 MG: 40 INJECTION, POWDER, LYOPHILIZED, FOR SOLUTION INTRAMUSCULAR; INTRAVENOUS at 00:10

## 2020-02-12 RX ADMIN — SENNOSIDES 8.6 MG: 8.6 TABLET, FILM COATED ORAL at 21:32

## 2020-02-12 RX ADMIN — DICLOFENAC 2 G: 10 GEL TOPICAL at 09:48

## 2020-02-12 RX ADMIN — POLYETHYLENE GLYCOL 3350 17 G: 17 POWDER, FOR SOLUTION ORAL at 09:48

## 2020-02-12 RX ADMIN — DICLOFENAC 2 G: 10 GEL TOPICAL at 21:33

## 2020-02-12 RX ADMIN — DICLOFENAC 2 G: 10 GEL TOPICAL at 15:16

## 2020-02-12 RX ADMIN — MAGNESIUM HYDROXIDE 30 ML: 400 SUSPENSION ORAL at 21:31

## 2020-02-12 RX ADMIN — IPRATROPIUM BROMIDE AND ALBUTEROL SULFATE 1 AMPULE: .5; 3 SOLUTION RESPIRATORY (INHALATION) at 16:13

## 2020-02-12 RX ADMIN — DICLOFENAC 2 G: 10 GEL TOPICAL at 19:02

## 2020-02-12 RX ADMIN — DOCUSATE SODIUM 100 MG: 100 CAPSULE, LIQUID FILLED ORAL at 09:47

## 2020-02-12 RX ADMIN — BUDESONIDE 250 MCG: 0.25 SUSPENSION RESPIRATORY (INHALATION) at 08:10

## 2020-02-12 RX ADMIN — GUAIFENESIN 600 MG: 400 TABLET, FILM COATED ORAL at 09:48

## 2020-02-12 RX ADMIN — MAGNESIUM HYDROXIDE 30 ML: 400 SUSPENSION ORAL at 09:48

## 2020-02-12 RX ADMIN — EZETIMIBE 10 MG: 10 TABLET ORAL at 09:48

## 2020-02-12 RX ADMIN — HEPARIN SODIUM 5000 UNITS: 10000 INJECTION INTRAVENOUS; SUBCUTANEOUS at 17:03

## 2020-02-12 RX ADMIN — AMLODIPINE BESYLATE 2.5 MG: 2.5 TABLET ORAL at 09:48

## 2020-02-12 RX ADMIN — ARFORMOTEROL TARTRATE 15 MCG: 15 SOLUTION RESPIRATORY (INHALATION) at 21:36

## 2020-02-12 ASSESSMENT — PAIN SCALES - GENERAL
PAINLEVEL_OUTOF10: 9
PAINLEVEL_OUTOF10: 7

## 2020-02-12 NOTE — PROGRESS NOTES
(74.4 kg)(Per EMR 10/28/19)  · % Weight Change:  ,  No significant wt change since admit (+1% likely fluid related d/t +I/Os). · Ideal Body Wt: 115 lb (52.2 kg), % Ideal Body 143%(admit wt)  · BMI Classification: BMI 25.0 - 29.9 Overweight    Nutrition Interventions:   Continue current diet, Start ONS  Continued Inpatient Monitoring, Education Initiated    Nutrition Evaluation:   · Evaluation: Goals set   · Goals: Intake >50% of meals/ONS.      · Monitoring: Meal Intake, Supplement Intake, Diet Tolerance, Skin Integrity, I&O, Weight, Pertinent Labs, Diarrhea, Constipation, Monitor Bowel Function      Electronically signed by Nathan Seip, RD, LD on 2/12/20 at 2:34 PM    Contact Number:

## 2020-02-12 NOTE — PROGRESS NOTES
Lillian Whatley M.D.,[facility]  Kae Mc D.O., F.A.C.O.I., Nirav Harrington M.D. Scott Brown M.D., Anthon Pallas ,M.D. Jose Gaytan D.O. Daily Pulmonary Progress Note    Patient:  Sonny Alfonso 82 y.o. female MRN: [id number]     Date of Service: 2/12/2020      Synopsis     We are following patient for wheezing     \"CC abdominal pain    Code status:prior       Subjective      Patient was seen and examined. Patient is sitting up in chair she appears in no acute distress she is just returned from a walk she is not out of breath she has no labored breathing and she does not report any wheezing today. Report continued abdominal pain but she reports that it is slightly better after several bowel movements. I did upon auscultation do not hear any wheezing throughout her lung fields nor did I hear any stridor. Review of Systems:  Constitutional: Denies fever, weight loss, night sweats, and fatigue  Skin: Denies pigmentation, dark lesions, and rashes   HEENT: Denies hearing loss, tinnitus, ear drainage, epistaxis, sore throat, and hoarseness. Cardiovascular: Denies palpitations, chest pain, and chest pressure. Respiratory: Denies cough, dyspnea at rest, hemoptysis, apnea, and choking.   Gastrointestinal: Denies nausea, vomiting, poor appetite, diarrhea, heartburn or reflux+ abdominal pain  Genitourinary: Denies dysuria, frequency, urgency or hematuria  Musculoskeletal: Denies myalgias, muscle weakness, and bone pain  24-hour events:  none    Objective   Vitals: /64   Pulse 72   Temp 97.6 °F (36.4 °C) (Oral)   Resp 18   Ht 5' 3\" (1.6 m)   Wt 167 lb (75.8 kg)   LMP  (LMP Unknown)   SpO2 92%   BMI 29.58 kg/m²     I/O:    Intake/Output Summary (Last 24 hours) at 2/12/2020 1423  Last data filed at 2/12/2020 0830  Gross per 24 hour   Intake 180 ml   Output --   Net 180 ml                        CURRENT MEDS :  Scheduled Meds:   Arformoterol Tartrate  15 mcg Nebulization BID    methylPREDNISolone  40 mg Intravenous Q8H    budesonide  250 mcg Nebulization BID    pantoprazole  40 mg Oral QAM AC    polyethylene glycol  17 g Oral BID    docusate sodium  100 mg Oral BID    magnesium hydroxide  30 mL Oral BID    senna  1 tablet Oral Nightly    ipratropium-albuterol  1 ampule Inhalation Q4H WA    guaiFENesin  600 mg Oral TID    amLODIPine  2.5 mg Oral Daily    diclofenac sodium  2 g Topical 4x Daily    ezetimibe  10 mg Oral Daily    sodium chloride flush  10 mL Intravenous 2 times per day    heparin (porcine)  5,000 Units Subcutaneous 3 times per day       Physical Exam:  General Appearance: appears comfortable in no acute distress. HEENT: Normocephalic atraumatic without obvious abnormality   Neck: Lips, mucosa, and tongue normal.  Supple, symmetrical, trachea midline, no adenopathy;thyroid:  no enlargement/tenderness/nodules or JVD. No stridor  Lung: Breath sounds CTA. Respirations   unlabored. Symmetrical expansion. Heart: RRR, normal S1, S2. No MRG  Abdomen: Soft, NT, ND. BS present x 4 quadrants. No bruit or organomegaly. Extremities: Pedal pulses 2+ symmetric b/l. Extremities normal, no cyanosis, clubbing, or edema. Musculokeletal: No joint swelling, no muscle tenderness. ROM normal in all joints of extremities. Neurologic: Mental status: Alert and Oriented X3 .     Pertinent/ New Labs and Imaging Studies   Labs:  Lab Results   Component Value Date    WBC 6.6 02/12/2020    HGB 11.0 02/12/2020    HCT 34.5 02/12/2020    MCV 97.7 02/12/2020    MCH 31.2 02/12/2020    MCHC 31.9 02/12/2020    RDW 14.1 02/12/2020     02/12/2020    MPV 10.2 02/12/2020     Lab Results   Component Value Date     02/12/2020    K 4.8 02/12/2020    K 3.9 06/17/2019     02/12/2020    CO2 28 02/12/2020    BUN 19 02/12/2020    CREATININE 0.7 02/12/2020    LABALBU 3.2 02/12/2020    LABALBU 4.1 01/30/2012    CALCIUM 8.3 02/12/2020    GFRAA >60 02/12/2020

## 2020-02-13 VITALS
RESPIRATION RATE: 18 BRPM | HEART RATE: 78 BPM | BODY MASS INDEX: 29.59 KG/M2 | HEIGHT: 63 IN | TEMPERATURE: 97.8 F | WEIGHT: 167 LBS | OXYGEN SATURATION: 92 % | SYSTOLIC BLOOD PRESSURE: 128 MMHG | DIASTOLIC BLOOD PRESSURE: 66 MMHG

## 2020-02-13 PROCEDURE — 6370000000 HC RX 637 (ALT 250 FOR IP): Performed by: HOSPITALIST

## 2020-02-13 PROCEDURE — 6370000000 HC RX 637 (ALT 250 FOR IP): Performed by: UROLOGY

## 2020-02-13 PROCEDURE — 6360000002 HC RX W HCPCS: Performed by: UROLOGY

## 2020-02-13 PROCEDURE — 6370000000 HC RX 637 (ALT 250 FOR IP): Performed by: INTERNAL MEDICINE

## 2020-02-13 PROCEDURE — 6360000002 HC RX W HCPCS: Performed by: INTERNAL MEDICINE

## 2020-02-13 PROCEDURE — 94640 AIRWAY INHALATION TREATMENT: CPT

## 2020-02-13 PROCEDURE — 6360000002 HC RX W HCPCS: Performed by: CLINICAL NURSE SPECIALIST

## 2020-02-13 PROCEDURE — 2700000000 HC OXYGEN THERAPY PER DAY

## 2020-02-13 PROCEDURE — 2580000003 HC RX 258: Performed by: UROLOGY

## 2020-02-13 RX ORDER — BENZONATATE 200 MG/1
200 CAPSULE ORAL 3 TIMES DAILY PRN
Qty: 90 CAPSULE | Refills: 0 | Status: SHIPPED | OUTPATIENT
Start: 2020-02-13 | End: 2020-02-20

## 2020-02-13 RX ORDER — PSEUDOEPHEDRINE HCL 30 MG
100 TABLET ORAL 2 TIMES DAILY PRN
Status: ON HOLD | COMMUNITY
Start: 2020-02-13 | End: 2020-11-23

## 2020-02-13 RX ORDER — BUDESONIDE 0.25 MG/2ML
250 INHALANT ORAL 2 TIMES DAILY
Qty: 60 AMPULE | Refills: 3 | Status: SHIPPED | OUTPATIENT
Start: 2020-02-13 | End: 2020-02-13 | Stop reason: HOSPADM

## 2020-02-13 RX ORDER — PANTOPRAZOLE SODIUM 40 MG/1
40 TABLET, DELAYED RELEASE ORAL
Qty: 30 TABLET | Refills: 3 | Status: ON HOLD | OUTPATIENT
Start: 2020-02-14 | End: 2020-11-23

## 2020-02-13 RX ORDER — AMLODIPINE BESYLATE 2.5 MG/1
2.5 TABLET ORAL DAILY
Qty: 30 TABLET | Refills: 3 | Status: ON HOLD | OUTPATIENT
Start: 2020-02-14 | End: 2020-12-09 | Stop reason: HOSPADM

## 2020-02-13 RX ORDER — POLYETHYLENE GLYCOL 3350 17 G/17G
17 POWDER, FOR SOLUTION ORAL DAILY PRN
Status: DISCONTINUED | OUTPATIENT
Start: 2020-02-13 | End: 2020-02-13 | Stop reason: HOSPADM

## 2020-02-13 RX ORDER — SENNA PLUS 8.6 MG/1
1 TABLET ORAL NIGHTLY PRN
Status: DISCONTINUED | OUTPATIENT
Start: 2020-02-13 | End: 2020-02-13 | Stop reason: HOSPADM

## 2020-02-13 RX ORDER — IPRATROPIUM BROMIDE AND ALBUTEROL SULFATE 2.5; .5 MG/3ML; MG/3ML
3 SOLUTION RESPIRATORY (INHALATION)
Qty: 360 ML | Refills: 1 | Status: SHIPPED | OUTPATIENT
Start: 2020-02-13 | End: 2020-02-13 | Stop reason: HOSPADM

## 2020-02-13 RX ORDER — ALBUTEROL SULFATE 90 UG/1
2 AEROSOL, METERED RESPIRATORY (INHALATION) EVERY 6 HOURS PRN
Qty: 1 INHALER | Refills: 3 | Status: ON HOLD | OUTPATIENT
Start: 2020-02-13 | End: 2020-11-23

## 2020-02-13 RX ORDER — ARFORMOTEROL TARTRATE 15 UG/2ML
15 SOLUTION RESPIRATORY (INHALATION) 2 TIMES DAILY
Qty: 120 ML | Refills: 3 | Status: SHIPPED | OUTPATIENT
Start: 2020-02-13 | End: 2020-02-13 | Stop reason: HOSPADM

## 2020-02-13 RX ORDER — PREDNISONE 20 MG/1
40 TABLET ORAL DAILY
Qty: 20 TABLET | Refills: 0 | Status: SHIPPED | OUTPATIENT
Start: 2020-02-14 | End: 2020-02-24

## 2020-02-13 RX ORDER — SENNA PLUS 8.6 MG/1
1 TABLET ORAL NIGHTLY PRN
COMMUNITY
Start: 2020-02-13 | End: 2020-03-14

## 2020-02-13 RX ORDER — GUAIFENESIN 200 MG/1
600 TABLET ORAL 3 TIMES DAILY
Qty: 56 TABLET | Refills: 0 | Status: ON HOLD | OUTPATIENT
Start: 2020-02-13 | End: 2020-11-23

## 2020-02-13 RX ADMIN — DOCUSATE SODIUM 100 MG: 100 CAPSULE, LIQUID FILLED ORAL at 08:50

## 2020-02-13 RX ADMIN — HEPARIN SODIUM 5000 UNITS: 10000 INJECTION INTRAVENOUS; SUBCUTANEOUS at 00:24

## 2020-02-13 RX ADMIN — PANTOPRAZOLE SODIUM 40 MG: 40 TABLET, DELAYED RELEASE ORAL at 06:20

## 2020-02-13 RX ADMIN — GUAIFENESIN 600 MG: 400 TABLET, FILM COATED ORAL at 14:34

## 2020-02-13 RX ADMIN — DICLOFENAC 2 G: 10 GEL TOPICAL at 14:35

## 2020-02-13 RX ADMIN — EZETIMIBE 10 MG: 10 TABLET ORAL at 08:53

## 2020-02-13 RX ADMIN — MAGNESIUM HYDROXIDE 30 ML: 400 SUSPENSION ORAL at 08:50

## 2020-02-13 RX ADMIN — Medication 10 ML: at 08:51

## 2020-02-13 RX ADMIN — IPRATROPIUM BROMIDE AND ALBUTEROL SULFATE 1 AMPULE: .5; 3 SOLUTION RESPIRATORY (INHALATION) at 16:23

## 2020-02-13 RX ADMIN — BENZONATATE 200 MG: 100 CAPSULE ORAL at 08:50

## 2020-02-13 RX ADMIN — DICLOFENAC 2 G: 10 GEL TOPICAL at 08:51

## 2020-02-13 RX ADMIN — GUAIFENESIN 600 MG: 400 TABLET, FILM COATED ORAL at 08:50

## 2020-02-13 RX ADMIN — POLYETHYLENE GLYCOL 3350 17 G: 17 POWDER, FOR SOLUTION ORAL at 08:50

## 2020-02-13 RX ADMIN — HEPARIN SODIUM 5000 UNITS: 10000 INJECTION INTRAVENOUS; SUBCUTANEOUS at 08:53

## 2020-02-13 RX ADMIN — AMLODIPINE BESYLATE 2.5 MG: 2.5 TABLET ORAL at 08:50

## 2020-02-13 RX ADMIN — IPRATROPIUM BROMIDE AND ALBUTEROL SULFATE 1 AMPULE: .5; 3 SOLUTION RESPIRATORY (INHALATION) at 12:48

## 2020-02-13 RX ADMIN — BUDESONIDE 250 MCG: 0.25 SUSPENSION RESPIRATORY (INHALATION) at 12:49

## 2020-02-13 RX ADMIN — PREDNISONE 40 MG: 20 TABLET ORAL at 08:50

## 2020-02-13 RX ADMIN — ARFORMOTEROL TARTRATE 15 MCG: 15 SOLUTION RESPIRATORY (INHALATION) at 12:50

## 2020-02-13 ASSESSMENT — PAIN SCALES - GENERAL: PAINLEVEL_OUTOF10: 0

## 2020-02-13 NOTE — PROGRESS NOTES
Occupational Therapy  Date:2/13/2020  Patient Name: Kevyn Castillo  Room: 6137/7296-G     Occupational Therapy (OT) order received and OT evaluation attempted this afternoon. Patient declined to participate in OT evaluation at this time noting her need to attend to [facility]". OT evaluation to be re-attempted at later date, as able/appropriate. Krysta Pacheco, OTR/L  License Number: [id number]

## 2020-02-13 NOTE — PROGRESS NOTES
BID    docusate sodium  100 mg Oral BID    magnesium hydroxide  30 mL Oral BID    senna  1 tablet Oral Nightly    ipratropium-albuterol  1 ampule Inhalation Q4H WA    guaiFENesin  600 mg Oral TID    amLODIPine  2.5 mg Oral Daily    diclofenac sodium  2 g Topical 4x Daily    ezetimibe  10 mg Oral Daily    sodium chloride flush  10 mL Intravenous 2 times per day    heparin (porcine)  5,000 Units Subcutaneous 3 times per day       Physical Exam:  General Appearance: appears comfortable in no acute distress. HEENT: Normocephalic atraumatic without obvious abnormality   Neck: Lips, mucosa, and tongue normal.  Supple, symmetrical, trachea midline, no adenopathy;thyroid:  no enlargement/tenderness/nodules or JVD. No stridor  Lung: Breath sounds CTA. Respirations   unlabored. Symmetrical expansion. Heart: RRR, normal S1, S2. No MRG  Abdomen: Soft, NT, ND. BS present x 4 quadrants. No bruit or organomegaly. Extremities: Pedal pulses 2+ symmetric b/l. Extremities normal, no cyanosis, clubbing, or edema. Musculokeletal: No joint swelling, no muscle tenderness. ROM normal in all joints of extremities. Neurologic: Mental status: Alert and Oriented X3 .     Pertinent/ New Labs and Imaging Studies   Labs:  Lab Results   Component Value Date    WBC 6.6 02/12/2020    HGB 11.0 02/12/2020    HCT 34.5 02/12/2020    MCV 97.7 02/12/2020    MCH 31.2 02/12/2020    MCHC 31.9 02/12/2020    RDW 14.1 02/12/2020     02/12/2020    MPV 10.2 02/12/2020     Lab Results   Component Value Date     02/12/2020    K 4.8 02/12/2020    K 3.9 06/17/2019     02/12/2020    CO2 28 02/12/2020    BUN 19 02/12/2020    CREATININE 0.7 02/12/2020    LABALBU 3.2 02/12/2020    LABALBU 4.1 01/30/2012    CALCIUM 8.3 02/12/2020    GFRAA >60 02/12/2020    LABGLOM >60 02/12/2020     Lab Results   Component Value Date    PROTIME 11.5 11/13/2017    PROTIME 11.5 09/05/2011    INR 1.0 11/13/2017     Recent Labs     02/10/20  1400

## 2020-02-13 NOTE — PROGRESS NOTES
Discharge instructions reviewed with patient and son Naila Sorto. Oxygen delivered and instructions and set up given to son . All questions answered.

## 2020-02-13 NOTE — PROGRESS NOTES
Daily Progress Note  Kailey Kumari MD      Subjective:   Patient continues to complain of abdominal pain. .     Past Medical History:   Diagnosis Date    Anxiety     Gastric reflux     Hypertension        Past Surgical History:   Procedure Laterality Date    COLONOSCOPY      HYSTERECTOMY      LITHOTRIPSY Left 2/7/2020    CYSTOSCOPY RETROGRADE PYELOGRAM LEFT URETEROSCOPY LEFT J STENT LASER LITHOTRIPSY performed by [facility] A Andrea, DO at [facility] OR       Scheduled Meds:   predniSONE  40 mg Oral Daily    Arformoterol Tartrate  15 mcg Nebulization BID    budesonide  250 mcg Nebulization BID    pantoprazole  40 mg Oral QAM AC    docusate sodium  100 mg Oral BID    ipratropium-albuterol  1 ampule Inhalation Q4H WA    guaiFENesin  600 mg Oral TID    amLODIPine  2.5 mg Oral Daily    diclofenac sodium  2 g Topical 4x Daily    ezetimibe  10 mg Oral Daily    sodium chloride flush  10 mL Intravenous 2 times per day    heparin (porcine)  5,000 Units Subcutaneous 3 times per day     Continuous Infusions:    PRN Meds:magnesium hydroxide, polyethylene glycol, senna, benzonatate, sodium chloride flush, ondansetron, acetaminophen, morphine  DIET GENERAL;  Dietary Nutrition Supplements: Diabetic Oral Supplement    Objective:     I/O last 3 completed shifts: In: 180 [P.O.:180]  Out: -   No intake/output data recorded. Stool Occurrence: 1  Vitals:    02/12/20 2100 02/13/20 0830 02/13/20 0835 02/13/20 1252   BP: 119/64 128/66     Pulse: 65 78     Resp: 18 18     Temp: 98 °F (36.7 °C) 97.8 °F (36.6 °C)     TempSrc: Oral Oral     SpO2: 93% (!) 89% 92% 92%   Weight:       Height:           General appearance: alert, appears stated age and cooperative.,anxious. Neck: no adenopathy, no carotid bruit, supple, symmetrical, trachea midline and thyroid not enlarged, symmetric, no tenderness/mass/nodules  Lungs: insp. Rales anteriorly & expiratory rhonchi posteriorly bilaterally worse in the right lung and prolonged expirations.

## 2020-02-13 NOTE — PROGRESS NOTES
recurrent diarrhea  - Colonoscopy 8-27-19 in the office sigmoid with diverticulosis, random biopsies negative for any pathology   -Patient symptoms likely at least partially related to constipation  -Improved constipation/tolerated general diet without nausea or vomiting  - Repeat Ct abdomen 2/11shows large stool burden no signs of diverticulitis   -Continue with current bowel regimen with MiraLAX 17 g twice daily and Colace.     2.  Nephrolithiasis  - S/P cystoscopy/retrograde pyelogram with stent placement and stone removal  -Repeat CT abdomen shows possible stenosis of the left ureter  -Per urology/admitting     3. Cough/wheezing  -Consider Mucomyst versus chest PT  -Per admitting/pulmonology    OK to be d/c from GI POV. Follow in office in 2-3 weeks or as needed - pt to call for appointment - [phone]. Pt was seen, d/w, and examined with  Dr. James Barney DO   GI Fellow   2/13/2020  9:52 AM    Pt seen and independently examined. Patient complains of pain which is migratory around her abdomen and appears to be present with coughing and sneezing. Most recently complaining of pain in the right upper quadrant. Benign abdominal exam without reproducible abdominal pain as described by the patient. Pertinent notes and lab work reviewed. D/w Dr. Priscilla Sheets. Agree with physical exam and A&P. Discussed with patient - all questions answered - agreeable with the plan as delineated.       Jessica Mcclure MD  2/13/2020  3:05 PM

## 2020-02-13 NOTE — CARE COORDINATION
Social Work / Discharge Planning :SW ordered Oxygen as well as nebulizer through [facility] [employer]. Charge RN updated. Gloria Schneider aware discharge is today. SW to follow.  Electronically signed by MELISSA Dumont on 2/13/20 at 4:30 PM

## 2020-03-25 NOTE — DISCHARGE SUMMARY
Patient ID: Farhana Teresai      Patient's PCP: Pricilla Cook MD    Admit Date: 2/4/2020   Discharge Date: 3/24/2020  Admitting Physician: Wendy Spencer DO  Discharge Physician: Pricilla Cook   Discharge Diagnoses:   Patient Active Problem List   Diagnosis Code    Anxiety F41.9    Diabetes mellitus ([facility] [address] E11.9    Essential hypertension I10    Gastroesophageal reflux disease without esophagitis K21.9    Neck sprain S13. 9XXA    Near syncope R51    Mixed hyperlipidemia Q52.2    Diastolic dysfunction P09.66    Diverticulitis K57.92    Diverticulitis large intestine w/o perforation or abscess w/bleeding K57.33    Hypokalemia E87.6    Left shoulder pain M25.512    Pure hypercholesterolemia E78.00    Moderate obesity E66.8    Colitis K52.9    Hydronephrosis with urinary obstruction due to renal calculus N13.2    Hiatal hernia with GERD K21.9, K44.9       Hospital Course: The patient was admitted with nausea vomiting abdominal pain was found to have ureteral stone, had cystoscopy and stent placement, this was followed by cough and hypoxia and respiratory failure patient required oxygen and bronchodilators, pulmonary was consulted was also given Solu-Medrol 80 mg every 8 hours. Patient was seen also by surgery due to persistent pain in the left lower quadrant which was attributed to constipation and possible nonspecific colitis    Physical Exam:  /66   Pulse 78   Temp 97.8 °F (36.6 °C) (Oral)   Resp 18   Ht 5' 3\" (1.6 m)   Wt 167 lb (75.8 kg)   LMP  (LMP Unknown)   SpO2 92%   BMI 29.58 kg/m²   General appearance: alert, appears stated age and cooperative  Head: Normocephalic, without obvious abnormality, atraumatic  Eyes: conjunctivae/corneas clear. PERRL, EOM's intact. Ears: External ears -normal  Nose: No drainage or sinus tenderness.   Throat: lips, mucosa, and tongue normal; teeth and gums normal  Neck: no adenopathy, no carotid bruit, no JVD, supple, symmetrical, trachea midline and thyroid not enlarged, symmetric, no tenderness/mass/nodules  Lungs: clear to auscultation bilaterally  Heart: regular rate and rhythm, S1, S2 normal, no murmur,   Abdomen: soft, non-tender; bowel sounds normal; no masses,  no organomegaly  Extremities: extremities normal, atraumatic, no cyanosis or edema  Neurologic: Grossly normal    CT ABDOMEN PELVIS W IV CONTRAST Additional Contrast? Oral   Final Result   Persistent hydronephrosis, edema and hydroureter on the left kidney   without visualization of the previously noted stone. A small area of   narrowing is identified in the distal left ureter at the UVJ which   could represent edema and stricture, either benign or malignant,   causing obstruction. Consider cystoscopic assessment. Constipation. Mild CHF. XR ABDOMEN (KUB) (SINGLE AP VIEW)   Final Result   Nonspecific bowel gas pattern. XR CHEST STANDARD (2 VW)   Final Result      No airspace opacities or pleural effusion. US DUP LOWER EXTREMITIES BILATERAL VENOUS   Final Result   PATENT DEEP VENOUS SYSTEM OF THE BILATERAL LOWER   EXTREMITY. NO EVIDENCE FOR DVT. XR CHEST STANDARD (2 VW)   Final Result   Unchanged from prior study with no evidence of airspace consolidation   or pulmonary venous congestion. FL RETROGRADE PYELOGRAM W WO KUB   Final Result      Retrograde pyelograms with ureteral stent placement. CT ABDOMEN PELVIS W IV CONTRAST Additional Contrast? None   Final Result   1. The colon appears narrowed with mural thickening, which is   suspicious for colitis. It extends to the pelvic colon level, and   could represent ulcerative colitis. 2. There is hydronephrosis and hydroureter of the left kidney to the   bladder base, where a 2 to 3 mm diameter calculus is identified. Is on   the verge of passing through the bladder wall.    3. A moderate size retrocardiac hiatus hernia, numerous bilateral   renal cortical cysts, degenerative spinal findings are noted. ALERT:  THIS IS AN ABNORMAL REPORT-possible colitis and definite left   hydroureteronephrosis with a calculus at the UVJ on the left measuring   2 or 3 mm maximum diameter. Disposition: Home  Discharged Condition: Stable  Follow Up: Primary Care Physician in 1 week  Discharge Medications:   Ender Gaines Medication Instructions PUW:[id number]    Printed on:03/24/20 2210   Medication Information                      albuterol sulfate HFA (PROVENTIL HFA) 108 (90 Base) MCG/ACT inhaler  Inhale 2 puffs into the lungs every 6 hours as needed for Wheezing             amLODIPine (NORVASC) 2.5 MG tablet  Take 1 tablet by mouth daily             diclofenac sodium 1 % GEL  Apply 2 g topically 4 times daily             docusate sodium (COLACE, DULCOLAX) 100 MG CAPS  Take 100 mg by mouth 2 times daily as needed for Constipation             ezetimibe (ZETIA) 10 MG tablet  Take 10 mg by mouth daily             guaiFENesin 200 MG tablet  Take 3 tablets by mouth 3 times daily             pantoprazole (PROTONIX) 40 MG tablet  Take 1 tablet by mouth every morning (before breakfast)             Respiratory Therapy Supplies (FULL KIT NEBULIZER SET) MISC  Use as directed with nebulized medication. Activity:As tolerated  Diet: As tolerated    Time Spent on discharge is more than 30 minutes discussing plan of care and discharge medications. Active Problems:    Essential hypertension    Gastroesophageal reflux disease without esophagitis    Anxiety    Mixed hyperlipidemia    Moderate obesity    Colitis    Hydronephrosis with urinary obstruction due to renal calculus    Hiatal hernia with GERD  Resolved Problems:    * No resolved hospital problems.  *      Signed:  Electronically signed by Pauline Gillespie MD on 3/24/2020 at 10:10 PM

## 2020-10-17 ENCOUNTER — APPOINTMENT (OUTPATIENT)
Dept: GENERAL RADIOLOGY | Age: 85
End: 2020-10-17
Payer: MEDICARE

## 2020-10-17 ENCOUNTER — HOSPITAL ENCOUNTER (EMERGENCY)
Age: 85
Discharge: HOME | End: 2020-10-17
Attending: EMERGENCY MEDICINE
Payer: MEDICARE

## 2020-10-17 VITALS
HEIGHT: 62 IN | HEART RATE: 75 BPM | OXYGEN SATURATION: 94 % | DIASTOLIC BLOOD PRESSURE: 53 MMHG | BODY MASS INDEX: 30.73 KG/M2 | WEIGHT: 167 LBS | SYSTOLIC BLOOD PRESSURE: 149 MMHG | RESPIRATION RATE: 14 BRPM | TEMPERATURE: 97.9 F

## 2020-10-17 LAB
ALBUMIN SERPL-MCNC: 4 G/DL (ref 3.5–5.2)
ALP BLD-CCNC: 105 U/L (ref 35–104)
ALT SERPL-CCNC: 7 U/L (ref 0–32)
ANION GAP SERPL CALCULATED.3IONS-SCNC: 6 MMOL/L (ref 7–16)
AST SERPL-CCNC: 13 U/L (ref 0–31)
BASOPHILS ABSOLUTE: 0.01 E9/L (ref 0–0.2)
BASOPHILS RELATIVE PERCENT: 0.2 % (ref 0–2)
BILIRUB SERPL-MCNC: 0.2 MG/DL (ref 0–1.2)
BUN BLDV-MCNC: 14 MG/DL (ref 8–23)
CALCIUM SERPL-MCNC: 9.3 MG/DL (ref 8.6–10.2)
CHLORIDE BLD-SCNC: 109 MMOL/L (ref 98–107)
CO2: 28 MMOL/L (ref 22–29)
CREAT SERPL-MCNC: 0.8 MG/DL (ref 0.5–1)
D DIMER: <200 NG/ML DDU
EOSINOPHILS ABSOLUTE: 0.08 E9/L (ref 0.05–0.5)
EOSINOPHILS RELATIVE PERCENT: 1.6 % (ref 0–6)
GFR AFRICAN AMERICAN: >60
GFR NON-AFRICAN AMERICAN: >60 ML/MIN/1.73
GLUCOSE BLD-MCNC: 107 MG/DL (ref 74–99)
HCT VFR BLD CALC: 39.1 % (ref 34–48)
HEMOGLOBIN: 12.9 G/DL (ref 11.5–15.5)
IMMATURE GRANULOCYTES #: 0.01 E9/L
IMMATURE GRANULOCYTES %: 0.2 % (ref 0–5)
LYMPHOCYTES ABSOLUTE: 1.2 E9/L (ref 1.5–4)
LYMPHOCYTES RELATIVE PERCENT: 23.4 % (ref 20–42)
MCH RBC QN AUTO: 31.5 PG (ref 26–35)
MCHC RBC AUTO-ENTMCNC: 33 % (ref 32–34.5)
MCV RBC AUTO: 95.6 FL (ref 80–99.9)
MONOCYTES ABSOLUTE: 0.46 E9/L (ref 0.1–0.95)
MONOCYTES RELATIVE PERCENT: 9 % (ref 2–12)
NEUTROPHILS ABSOLUTE: 3.36 E9/L (ref 1.8–7.3)
NEUTROPHILS RELATIVE PERCENT: 65.6 % (ref 43–80)
PDW BLD-RTO: 13.4 FL (ref 11.5–15)
PLATELET # BLD: 277 E9/L (ref 130–450)
PMV BLD AUTO: 9.5 FL (ref 7–12)
POTASSIUM SERPL-SCNC: 4.2 MMOL/L (ref 3.5–5)
PRO-BNP: 96 PG/ML (ref 0–450)
RBC # BLD: 4.09 E12/L (ref 3.5–5.5)
SODIUM BLD-SCNC: 143 MMOL/L (ref 132–146)
TOTAL PROTEIN: 6.9 G/DL (ref 6.4–8.3)
TROPONIN: <0.01 NG/ML (ref 0–0.03)
WBC # BLD: 5.1 E9/L (ref 4.5–11.5)

## 2020-10-17 PROCEDURE — 80053 COMPREHEN METABOLIC PANEL: CPT

## 2020-10-17 PROCEDURE — 83880 ASSAY OF NATRIURETIC PEPTIDE: CPT

## 2020-10-17 PROCEDURE — 93005 ELECTROCARDIOGRAM TRACING: CPT | Performed by: EMERGENCY MEDICINE

## 2020-10-17 PROCEDURE — 6370000000 HC RX 637 (ALT 250 FOR IP): Performed by: EMERGENCY MEDICINE

## 2020-10-17 PROCEDURE — 99284 EMERGENCY DEPT VISIT MOD MDM: CPT

## 2020-10-17 PROCEDURE — 71045 X-RAY EXAM CHEST 1 VIEW: CPT

## 2020-10-17 PROCEDURE — 85378 FIBRIN DEGRADE SEMIQUANT: CPT

## 2020-10-17 PROCEDURE — 85025 COMPLETE CBC W/AUTO DIFF WBC: CPT

## 2020-10-17 PROCEDURE — 84484 ASSAY OF TROPONIN QUANT: CPT

## 2020-10-17 PROCEDURE — 96374 THER/PROPH/DIAG INJ IV PUSH: CPT

## 2020-10-17 PROCEDURE — 6360000002 HC RX W HCPCS: Performed by: EMERGENCY MEDICINE

## 2020-10-17 PROCEDURE — 36415 COLL VENOUS BLD VENIPUNCTURE: CPT

## 2020-10-17 RX ORDER — DOXYCYCLINE HYCLATE 100 MG/1
100 CAPSULE ORAL ONCE
Status: COMPLETED | OUTPATIENT
Start: 2020-10-17 | End: 2020-10-17

## 2020-10-17 RX ORDER — DOXYCYCLINE HYCLATE 100 MG
100 TABLET ORAL 2 TIMES DAILY
Qty: 20 TABLET | Refills: 0 | Status: SHIPPED | OUTPATIENT
Start: 2020-10-17 | End: 2020-10-27

## 2020-10-17 RX ORDER — KETOROLAC TROMETHAMINE 30 MG/ML
15 INJECTION, SOLUTION INTRAMUSCULAR; INTRAVENOUS ONCE
Status: COMPLETED | OUTPATIENT
Start: 2020-10-17 | End: 2020-10-17

## 2020-10-17 RX ADMIN — KETOROLAC TROMETHAMINE 15 MG: 30 INJECTION, SOLUTION INTRAMUSCULAR; INTRAVENOUS at 20:50

## 2020-10-17 RX ADMIN — DOXYCYCLINE HYCLATE 100 MG: 100 CAPSULE ORAL at 21:37

## 2020-10-17 ASSESSMENT — PAIN SCALES - GENERAL
PAINLEVEL_OUTOF10: 3
PAINLEVEL_OUTOF10: 9
PAINLEVEL_OUTOF10: 10

## 2020-10-17 ASSESSMENT — PAIN DESCRIPTION - PAIN TYPE: TYPE: ACUTE PAIN

## 2020-10-18 LAB
EKG ATRIAL RATE: 76 BPM
EKG P AXIS: 69 DEGREES
EKG P-R INTERVAL: 200 MS
EKG Q-T INTERVAL: 404 MS
EKG QRS DURATION: 82 MS
EKG QTC CALCULATION (BAZETT): 454 MS
EKG R AXIS: -6 DEGREES
EKG T AXIS: 55 DEGREES
EKG VENTRICULAR RATE: 76 BPM

## 2020-10-18 NOTE — ED PROVIDER NOTES
Department of Emergency Medicine   ED  Provider Note  Admit Date/RoomTime: 10/17/2020  8:05 PM  ED Room: 22/22          History of Present Illness:  10/17/20, Time: 9:24 PM EDT  Chief Complaint   Patient presents with    Rib Pain     L sided starting about an 1 hour ago    Shortness of Breath                Sahra Hanley is a 82 y.o. female presenting to the ED for rib pain. Patient developed a sudden onset of left-sided rib pain about an hour ago. Nothing makes it better, it is worse with breathing and twisting. This also worse with touch. She also feels short of breath as it is painful to take a deep breath. She has never had this before. She was at rest when it began. She denies any fever, chills, cough, sputum, change in bowel bladder, neck pain or stiffness, back pain, lethargy, or any other symptoms or complaints. Review of Systems:   Pertinent positives and negatives are stated within HPI, all other systems reviewed and are negative.        --------------------------------------------- PAST HISTORY ---------------------------------------------  Past Medical History:  has a past medical history of Anxiety, Gastric reflux, and Hypertension. Past Surgical History:  has a past surgical history that includes Colonoscopy; Hysterectomy; and Lithotripsy (Left, 2/7/2020). Social History:  reports that she has never smoked. She has never used smokeless tobacco. She reports that she does not drink alcohol or use drugs. Family History: family history includes Cancer in her brother and brother; High Blood Pressure in her mother; Other in her father. . Unless otherwise noted, family history is non contributory    The patients home medications have been reviewed.     Allergies: Lorazepam and Penicillins        ---------------------------------------------------PHYSICAL EXAM--------------------------------------    Constitutional/General: Alert and oriented x3  Head: Normocephalic and atraumatic  Eyes: PERRL, EOMI, sclera non icteric  Mouth: Oropharynx clear, handling secretions, no trismus, no asymmetry of the posterior oropharynx or uvular edema  Neck: Supple, full ROM, no stridor, no meningeal signs  Respiratory: Lungs clear to auscultation bilaterally, no wheezes, rales, or rhonchi. Not in respiratory distress  Cardiovascular:  Regular rate. Regular rhythm. 2+ distal pulses. Equal extremity pulses. Chest: Point tenderness to lateral left chest, no crepitus  GI:  Abdomen Soft, Non tender, Non distended. No rebound, guarding, or rigidity. No pulsatile masses. Musculoskeletal: Moves all extremities x 4. Warm and well perfused, no clubbing, cyanosis, or edema. Capillary refill <3 seconds  Integument: skin warm and dry. No rashes. Neurologic: GCS 15, no focal deficits, symmetric strength 5/5 in the upper and lower extremities bilaterally  Psychiatric: Normal Affect          -------------------------------------------------- RESULTS -------------------------------------------------  I have personally reviewed all laboratory and imaging results for this patient. Results are listed below.      LABS: (Lab results interpreted by me)  Results for orders placed or performed during the hospital encounter of 10/17/20   CBC Auto Differential   Result Value Ref Range    WBC 5.1 4.5 - 11.5 E9/L    RBC 4.09 3.50 - 5.50 E12/L    Hemoglobin 12.9 11.5 - 15.5 g/dL    Hematocrit 39.1 34.0 - 48.0 %    MCV 95.6 80.0 - 99.9 fL    MCH 31.5 26.0 - 35.0 pg    MCHC 33.0 32.0 - 34.5 %    RDW 13.4 11.5 - 15.0 fL    Platelets 214 103 - 027 E9/L    MPV 9.5 7.0 - 12.0 fL    Neutrophils % 65.6 43.0 - 80.0 %    Immature Granulocytes % 0.2 0.0 - 5.0 %    Lymphocytes % 23.4 20.0 - 42.0 %    Monocytes % 9.0 2.0 - 12.0 %    Eosinophils % 1.6 0.0 - 6.0 %    Basophils % 0.2 0.0 - 2.0 %    Neutrophils Absolute 3.36 1.80 - 7.30 E9/L    Immature Granulocytes # 0.01 E9/L    Lymphocytes Absolute 1.20 (L) 1.50 - 4.00 E9/L    Monocytes Absolute 0.46 0.10

## 2020-10-22 LAB
ALBUMIN SERPL-MCNC: NORMAL G/DL
ALP BLD-CCNC: NORMAL U/L
ALT SERPL-CCNC: NORMAL U/L
ANION GAP SERPL CALCULATED.3IONS-SCNC: NORMAL MMOL/L
AST SERPL-CCNC: NORMAL U/L
AVERAGE GLUCOSE: 120
BASOPHILS ABSOLUTE: NORMAL
BASOPHILS RELATIVE PERCENT: NORMAL
BILIRUB SERPL-MCNC: NORMAL MG/DL
BUN BLDV-MCNC: NORMAL MG/DL
CALCIUM SERPL-MCNC: NORMAL MG/DL
CHLORIDE BLD-SCNC: NORMAL MMOL/L
CHOLESTEROL, TOTAL: 188 MG/DL
CHOLESTEROL/HDL RATIO: ABNORMAL
CO2: NORMAL
CREAT SERPL-MCNC: NORMAL MG/DL
EOSINOPHILS ABSOLUTE: NORMAL
EOSINOPHILS RELATIVE PERCENT: NORMAL
GFR CALCULATED: NORMAL
GLUCOSE BLD-MCNC: NORMAL MG/DL
HBA1C MFR BLD: 5.8 %
HCT VFR BLD CALC: NORMAL %
HDLC SERPL-MCNC: 33 MG/DL (ref 35–70)
HEMOGLOBIN: NORMAL
LDL CHOLESTEROL CALCULATED: 127 MG/DL (ref 0–160)
LYMPHOCYTES ABSOLUTE: NORMAL
LYMPHOCYTES RELATIVE PERCENT: NORMAL
MCH RBC QN AUTO: NORMAL PG
MCHC RBC AUTO-ENTMCNC: NORMAL G/DL
MCV RBC AUTO: NORMAL FL
MONOCYTES ABSOLUTE: NORMAL
MONOCYTES RELATIVE PERCENT: NORMAL
NEUTROPHILS ABSOLUTE: NORMAL
NEUTROPHILS RELATIVE PERCENT: NORMAL
NONHDLC SERPL-MCNC: ABNORMAL MG/DL
PLATELET # BLD: NORMAL 10*3/UL
PMV BLD AUTO: NORMAL FL
POTASSIUM SERPL-SCNC: NORMAL MMOL/L
RBC # BLD: NORMAL 10*6/UL
SODIUM BLD-SCNC: NORMAL MMOL/L
TOTAL PROTEIN: NORMAL
TRIGL SERPL-MCNC: 164 MG/DL
VLDLC SERPL CALC-MCNC: 33 MG/DL
WBC # BLD: NORMAL 10*3/UL

## 2020-11-23 ENCOUNTER — APPOINTMENT (OUTPATIENT)
Dept: CT IMAGING | Age: 85
End: 2020-11-23
Payer: MEDICARE

## 2020-11-23 ENCOUNTER — HOSPITAL ENCOUNTER (INPATIENT)
Age: 85
LOS: 7 days | Discharge: NURSING, REHAB OR LONG-TERM CARE | DRG: 038 | End: 2020-11-30
Attending: FAMILY MEDICINE | Admitting: FAMILY MEDICINE
Payer: MEDICARE

## 2020-11-23 ENCOUNTER — HOSPITAL ENCOUNTER (EMERGENCY)
Age: 85
Discharge: ANOTHER HOSPITAL | End: 2020-11-23
Attending: EMERGENCY MEDICINE | Admitting: FAMILY MEDICINE
Payer: MEDICARE

## 2020-11-23 ENCOUNTER — APPOINTMENT (OUTPATIENT)
Dept: GENERAL RADIOLOGY | Age: 85
End: 2020-11-23
Payer: MEDICARE

## 2020-11-23 VITALS
HEIGHT: 62 IN | OXYGEN SATURATION: 99 % | HEART RATE: 76 BPM | BODY MASS INDEX: 30.73 KG/M2 | WEIGHT: 167 LBS | DIASTOLIC BLOOD PRESSURE: 77 MMHG | RESPIRATION RATE: 18 BRPM | TEMPERATURE: 97.7 F | SYSTOLIC BLOOD PRESSURE: 164 MMHG

## 2020-11-23 PROBLEM — Z87.448 PERSONAL HISTORY OF HYDRONEPHROSIS: Chronic | Status: ACTIVE | Noted: 2020-02-06

## 2020-11-23 PROBLEM — I65.21 STENOSIS OF RIGHT CAROTID ARTERY: Status: ACTIVE | Noted: 2020-11-23

## 2020-11-23 PROBLEM — I16.1 HYPERTENSIVE EMERGENCY: Status: ACTIVE | Noted: 2020-11-23

## 2020-11-23 PROBLEM — I63.9 ACUTE CVA (CEREBROVASCULAR ACCIDENT) (HCC): Status: ACTIVE | Noted: 2020-11-23

## 2020-11-23 PROBLEM — S13.9XXA NECK SPRAIN: Status: RESOLVED | Noted: 2017-11-13 | Resolved: 2020-11-23

## 2020-11-23 PROBLEM — K52.9 COLITIS: Status: RESOLVED | Noted: 2020-02-04 | Resolved: 2020-11-23

## 2020-11-23 PROBLEM — K57.92 DIVERTICULITIS: Status: RESOLVED | Noted: 2018-08-30 | Resolved: 2020-11-23

## 2020-11-23 PROBLEM — E87.6 HYPOKALEMIA: Status: RESOLVED | Noted: 2018-08-31 | Resolved: 2020-11-23

## 2020-11-23 PROBLEM — R55 NEAR SYNCOPE: Status: RESOLVED | Noted: 2018-06-04 | Resolved: 2020-11-23

## 2020-11-23 PROBLEM — K57.33 DIVERTICULITIS LARGE INTESTINE W/O PERFORATION OR ABSCESS W/BLEEDING: Status: RESOLVED | Noted: 2018-08-30 | Resolved: 2020-11-23

## 2020-11-23 PROBLEM — M25.512 LEFT SHOULDER PAIN: Status: RESOLVED | Noted: 2019-04-02 | Resolved: 2020-11-23

## 2020-11-23 PROBLEM — K21.9 HIATAL HERNIA WITH GERD: Chronic | Status: ACTIVE | Noted: 2020-02-06

## 2020-11-23 PROBLEM — K44.9 HIATAL HERNIA WITH GERD: Chronic | Status: ACTIVE | Noted: 2020-02-06

## 2020-11-23 LAB
ALBUMIN SERPL-MCNC: 4 G/DL (ref 3.5–5.2)
ALP BLD-CCNC: 99 U/L (ref 35–104)
ALT SERPL-CCNC: 7 U/L (ref 0–32)
ANION GAP SERPL CALCULATED.3IONS-SCNC: 11 MMOL/L (ref 7–16)
APTT: 33.4 SEC (ref 24.5–35.1)
AST SERPL-CCNC: 11 U/L (ref 0–31)
BASOPHILS ABSOLUTE: 0.01 E9/L (ref 0–0.2)
BASOPHILS RELATIVE PERCENT: 0.2 % (ref 0–2)
BILIRUB SERPL-MCNC: 0.4 MG/DL (ref 0–1.2)
BUN BLDV-MCNC: 16 MG/DL (ref 8–23)
CALCIUM SERPL-MCNC: 9 MG/DL (ref 8.6–10.2)
CHLORIDE BLD-SCNC: 106 MMOL/L (ref 98–107)
CHP ED QC CHECK: NORMAL
CO2: 24 MMOL/L (ref 22–29)
CREAT SERPL-MCNC: 0.7 MG/DL (ref 0.5–1)
EKG ATRIAL RATE: 91 BPM
EKG P AXIS: 82 DEGREES
EKG P-R INTERVAL: 190 MS
EKG Q-T INTERVAL: 386 MS
EKG QRS DURATION: 80 MS
EKG QTC CALCULATION (BAZETT): 474 MS
EKG R AXIS: 19 DEGREES
EKG T AXIS: 76 DEGREES
EKG VENTRICULAR RATE: 91 BPM
EOSINOPHILS ABSOLUTE: 0.02 E9/L (ref 0.05–0.5)
EOSINOPHILS RELATIVE PERCENT: 0.4 % (ref 0–6)
GFR AFRICAN AMERICAN: >60
GFR NON-AFRICAN AMERICAN: >60 ML/MIN/1.73
GLUCOSE BLD-MCNC: 104 MG/DL
GLUCOSE BLD-MCNC: 104 MG/DL (ref 74–99)
HCT VFR BLD CALC: 43.7 % (ref 34–48)
HEMOGLOBIN: 13.9 G/DL (ref 11.5–15.5)
IMMATURE GRANULOCYTES #: 0.01 E9/L
IMMATURE GRANULOCYTES %: 0.2 % (ref 0–5)
INR BLD: 1
LYMPHOCYTES ABSOLUTE: 0.97 E9/L (ref 1.5–4)
LYMPHOCYTES RELATIVE PERCENT: 20.8 % (ref 20–42)
MCH RBC QN AUTO: 31.1 PG (ref 26–35)
MCHC RBC AUTO-ENTMCNC: 31.8 % (ref 32–34.5)
MCV RBC AUTO: 97.8 FL (ref 80–99.9)
METER GLUCOSE: 104 MG/DL (ref 74–99)
MONOCYTES ABSOLUTE: 0.58 E9/L (ref 0.1–0.95)
MONOCYTES RELATIVE PERCENT: 12.4 % (ref 2–12)
NEUTROPHILS ABSOLUTE: 3.07 E9/L (ref 1.8–7.3)
NEUTROPHILS RELATIVE PERCENT: 66 % (ref 43–80)
PDW BLD-RTO: 13.4 FL (ref 11.5–15)
PLATELET # BLD: 274 E9/L (ref 130–450)
PMV BLD AUTO: 9.4 FL (ref 7–12)
POTASSIUM SERPL-SCNC: 3.9 MMOL/L (ref 3.5–5)
PROTHROMBIN TIME: 11.4 SEC (ref 9.3–12.4)
RBC # BLD: 4.47 E12/L (ref 3.5–5.5)
SODIUM BLD-SCNC: 141 MMOL/L (ref 132–146)
TOTAL PROTEIN: 6.9 G/DL (ref 6.4–8.3)
TROPONIN: <0.01 NG/ML (ref 0–0.03)
WBC # BLD: 4.7 E9/L (ref 4.5–11.5)

## 2020-11-23 PROCEDURE — 99223 1ST HOSP IP/OBS HIGH 75: CPT | Performed by: PSYCHIATRY & NEUROLOGY

## 2020-11-23 PROCEDURE — 6360000004 HC RX CONTRAST MEDICATION: Performed by: RADIOLOGY

## 2020-11-23 PROCEDURE — 93005 ELECTROCARDIOGRAM TRACING: CPT | Performed by: PHYSICIAN ASSISTANT

## 2020-11-23 PROCEDURE — 99443 PR PHYS/QHP TELEPHONE EVALUATION 21-30 MIN: CPT | Performed by: PSYCHIATRY & NEUROLOGY

## 2020-11-23 PROCEDURE — 70496 CT ANGIOGRAPHY HEAD: CPT

## 2020-11-23 PROCEDURE — 2060000000 HC ICU INTERMEDIATE R&B

## 2020-11-23 PROCEDURE — 85730 THROMBOPLASTIN TIME PARTIAL: CPT

## 2020-11-23 PROCEDURE — 2580000003 HC RX 258: Performed by: FAMILY MEDICINE

## 2020-11-23 PROCEDURE — 2580000003 HC RX 258: Performed by: RADIOLOGY

## 2020-11-23 PROCEDURE — 0042T CT BRAIN PERFUSION: CPT

## 2020-11-23 PROCEDURE — 84484 ASSAY OF TROPONIN QUANT: CPT

## 2020-11-23 PROCEDURE — 85610 PROTHROMBIN TIME: CPT

## 2020-11-23 PROCEDURE — 80053 COMPREHEN METABOLIC PANEL: CPT

## 2020-11-23 PROCEDURE — 70498 CT ANGIOGRAPHY NECK: CPT

## 2020-11-23 PROCEDURE — 82962 GLUCOSE BLOOD TEST: CPT

## 2020-11-23 PROCEDURE — 70450 CT HEAD/BRAIN W/O DYE: CPT

## 2020-11-23 PROCEDURE — 6370000000 HC RX 637 (ALT 250 FOR IP): Performed by: EMERGENCY MEDICINE

## 2020-11-23 PROCEDURE — 99285 EMERGENCY DEPT VISIT HI MDM: CPT

## 2020-11-23 PROCEDURE — 85025 COMPLETE CBC W/AUTO DIFF WBC: CPT

## 2020-11-23 PROCEDURE — 2580000003 HC RX 258: Performed by: PHYSICIAN ASSISTANT

## 2020-11-23 PROCEDURE — 71045 X-RAY EXAM CHEST 1 VIEW: CPT

## 2020-11-23 RX ORDER — HYDRALAZINE HYDROCHLORIDE 20 MG/ML
5 INJECTION INTRAMUSCULAR; INTRAVENOUS EVERY 4 HOURS PRN
Status: DISCONTINUED | OUTPATIENT
Start: 2020-11-23 | End: 2020-11-30

## 2020-11-23 RX ORDER — ASPIRIN 325 MG
325 TABLET ORAL ONCE
Status: COMPLETED | OUTPATIENT
Start: 2020-11-23 | End: 2020-11-23

## 2020-11-23 RX ORDER — SODIUM CHLORIDE 0.9 % (FLUSH) 0.9 %
10 SYRINGE (ML) INJECTION PRN
Status: DISCONTINUED | OUTPATIENT
Start: 2020-11-23 | End: 2020-11-30 | Stop reason: HOSPADM

## 2020-11-23 RX ORDER — PROMETHAZINE HYDROCHLORIDE 25 MG/1
12.5 TABLET ORAL EVERY 6 HOURS PRN
Status: DISCONTINUED | OUTPATIENT
Start: 2020-11-23 | End: 2020-11-30 | Stop reason: HOSPADM

## 2020-11-23 RX ORDER — ACETAMINOPHEN 325 MG/1
650 TABLET ORAL EVERY 6 HOURS PRN
Status: DISCONTINUED | OUTPATIENT
Start: 2020-11-23 | End: 2020-11-30 | Stop reason: HOSPADM

## 2020-11-23 RX ORDER — CLOPIDOGREL BISULFATE 75 MG/1
300 TABLET ORAL ONCE
Status: COMPLETED | OUTPATIENT
Start: 2020-11-23 | End: 2020-11-23

## 2020-11-23 RX ORDER — 0.9 % SODIUM CHLORIDE 0.9 %
500 INTRAVENOUS SOLUTION INTRAVENOUS ONCE
Status: COMPLETED | OUTPATIENT
Start: 2020-11-23 | End: 2020-11-23

## 2020-11-23 RX ORDER — ACETAMINOPHEN 650 MG/1
650 SUPPOSITORY RECTAL EVERY 6 HOURS PRN
Status: DISCONTINUED | OUTPATIENT
Start: 2020-11-23 | End: 2020-11-30 | Stop reason: HOSPADM

## 2020-11-23 RX ORDER — ONDANSETRON 2 MG/ML
4 INJECTION INTRAMUSCULAR; INTRAVENOUS EVERY 6 HOURS PRN
Status: DISCONTINUED | OUTPATIENT
Start: 2020-11-23 | End: 2020-11-30 | Stop reason: HOSPADM

## 2020-11-23 RX ORDER — EZETIMIBE 10 MG/1
10 TABLET ORAL DAILY
Status: DISCONTINUED | OUTPATIENT
Start: 2020-11-24 | End: 2020-11-30 | Stop reason: HOSPADM

## 2020-11-23 RX ORDER — POLYETHYLENE GLYCOL 3350 17 G/17G
17 POWDER, FOR SOLUTION ORAL DAILY PRN
Status: DISCONTINUED | OUTPATIENT
Start: 2020-11-23 | End: 2020-11-30 | Stop reason: HOSPADM

## 2020-11-23 RX ORDER — AMLODIPINE BESYLATE 5 MG/1
5 TABLET ORAL DAILY
Status: DISCONTINUED | OUTPATIENT
Start: 2020-11-24 | End: 2020-11-30 | Stop reason: HOSPADM

## 2020-11-23 RX ORDER — SODIUM CHLORIDE 0.9 % (FLUSH) 0.9 %
10 SYRINGE (ML) INJECTION EVERY 12 HOURS SCHEDULED
Status: DISCONTINUED | OUTPATIENT
Start: 2020-11-23 | End: 2020-11-30 | Stop reason: HOSPADM

## 2020-11-23 RX ORDER — SODIUM CHLORIDE 0.9 % (FLUSH) 0.9 %
10 SYRINGE (ML) INJECTION PRN
Status: COMPLETED | OUTPATIENT
Start: 2020-11-23 | End: 2020-11-23

## 2020-11-23 RX ADMIN — CLOPIDOGREL BISULFATE 300 MG: 75 TABLET ORAL at 11:49

## 2020-11-23 RX ADMIN — IOPAMIDOL 100 ML: 755 INJECTION, SOLUTION INTRAVENOUS at 10:43

## 2020-11-23 RX ADMIN — Medication 10 ML: at 10:41

## 2020-11-23 RX ADMIN — SODIUM CHLORIDE 500 ML: 9 INJECTION, SOLUTION INTRAVENOUS at 11:28

## 2020-11-23 RX ADMIN — ASPIRIN 325 MG: 325 TABLET, FILM COATED ORAL at 11:49

## 2020-11-23 RX ADMIN — Medication 10 ML: at 22:00

## 2020-11-23 NOTE — ED NOTES
Radiology Procedure Waiver   Name: Ayaan Pemberton  : 1935  MRN: [id number]    Date:  20    Time: 10:33 AM EST    Benefits of immediately proceeding with Radiology exam(s) without pre-testing outweigh the risks or are not indicated as specified below and therefore the following is/are being waived:    [] Pregnancy test   [] Patients LMP on-time and regular.   [] Patient had Tubal Ligation or has other Contraception Device. [] Patient  is Menopausal or Premenarcheal.    [] Patient had Full or Partial Hysterectomy. [] Protocol for Iodine allergy    [] MRI Questionnaire     [x] BUN/Creatinine   [] Patient age w/no hx of renal dysfunction. [] Patient on Dialysis. [x] Recent Normal Labs.   Electronically signed by Stephanie Gutierrez PA-C on 20 at 10:33 AM EST               Stephanie Gutierrez PA-C  [phone]

## 2020-11-23 NOTE — PROGRESS NOTES
Called the [address] @ [phone] for update on bed assignment. Spoke with EMILEE NINO [facility], still waiting on a bed. Informed Dr. Dav Zaman and nurse. Called the [address] @ [phone] for update on bed assignment. Spoke with Jenna Rose, still waiting on a bed, checking the bed board-no bed. Informed Dr. Dav Zaman and nurse.

## 2020-11-23 NOTE — ED PROVIDER NOTES
ED Attending  CC: Yes     Department of Emergency Medicine   ED  Provider Note  Admit Date/RoomTime: 11/23/2020 10:27 AM  ED Room: 23/23  Chief Complaint:      Numbness (left hand numbness, hx of neck problems, had recent mri at [facility])    History of Present Illness   Source of history provided by:  patient. History/Exam Limitations: none. LAST KNOWN WELL TIME & DATE: 0830  This am    Liudmila Adams is a 82 y.o. old female who has a past medical history of:   Past Medical History:   Diagnosis Date    Anxiety     Gastric reflux     Hypertension     presents to the emergency department by private vehicle, with sudden onset of weakness of left arm(s) or left hand(s), which began 2 hour(s) prior to arrival. The episode occurred at home. Since recognized the symptoms have been persistent. She has no neurologic history. She has stroke risk factors of: diabetes mellitus, hyperlipidemia and hypertension. Patient states 2 weeks ago her family doctor discontinued her blood pressure medication due to it being too low and the patient felt like it was becoming now \"too high therefore doubled her medication the last day. \"  There have been associated symptoms of nothing else. There has been NO history of abdominal pain, cramping, vomiting, diarrhea, fever, chills, sweats, headache, arthralgias, myalgias, irritability, URI symptoms or cough. There has been no history of recent trauma none. Code Status on file: Prior. NIH Stroke Scale/Score at time of initial evaluation:  1A: Level of Consciousness 0 - alert; keenly responsive   1B: Ask Month and Age 0 - answers both questions correctly   1C:  Tell Patient To Open and Close Eyes, then Hand  Squeeze 0 - performs both tasks correctly   2: Test Horizontal Extraocular Movements 0 - normal   3: Test Visual Fields 0 - no visual loss   4: Test Facial Palsy 0 - normal symmetric movement   5A: Test Left Arm Motor Drift 1 - drift, limb holds 90 (or 45) degrees but drifts down before full 10 seconds: does not hit bed   5B: Test Right Arm Motor Drift 0 - no drift, limb holds 90 (or 45) degrees for full 10 seconds   6A: Test Left Leg Motor Drift 0 - no drift; leg holds 30 degree position for full 5 seconds   6B: Test Right Leg Motor Drift 0 - no drift; leg holds 30 degree position for full 5 seconds   7: Test Limb Ataxia   (FNF/Heel-Shin) 0 - absent   8: Test Sensation 0 - normal; no sensory loss   9: Test Language/Aphasia 0 - no aphasia, normal   10: Test Dysarthria 0 - normal   11: Test Extinction/Inattention 0 - no abnormality   Total Score: 1   11/23/20 at 11:19 PM EST. ROS    Pertinent positives and negatives are stated within HPI, all other systems reviewed and are negative. Past Surgical History:   Procedure Laterality Date    COLONOSCOPY      HYSTERECTOMY      LITHOTRIPSY Left 2/7/2020    CYSTOSCOPY RETROGRADE PYELOGRAM LEFT URETEROSCOPY LEFT J STENT LASER LITHOTRIPSY performed by Casandra Mendez DO at [facility] OR   Social History:  reports that she has never smoked. She has never used smokeless tobacco. She reports that she does not drink alcohol or use drugs. Family History: family history includes Cancer in her brother and brother; High Blood Pressure in her mother; Other in her father. Allergies: Lorazepam and Penicillins    Physical Exam           ED Triage Vitals   BP Temp Temp src Pulse Resp SpO2 Height Weight   -- -- -- -- -- -- -- --      Oxygen Saturation Interpretation: Normal.    Constitutional:   Level of Consciousness: Awake and alert. ETOH: No.         Distress: mild. Cooperativeness: cooperative. Eyes:  PERRL, EOMI, no discharge or conjunctival injection. Ears:  External ears without lesions. Throat:  Pharynx without injection, exudate, or tonsillar hypertrophy. Airway patient. Neck:  Normal ROM. Supple.   Respiratory:  Clear to auscultation and breath sounds equal.  CV:  Regular rate and rhythm, normal heart sounds, without pathological murmurs, ectopy, gallops, or rubs. GI: Abdomen Soft, nontender, good bowel sounds. No firm or pulsatile mass. Back:  No costovertebral tenderness. Integument:  Normal turgor. Warm, dry, without visible rash, unless noted elsewhere. Lymphatic: no lymphadenopathy noted  Neurological:       Orientation: person, place and time. Memory:             short term impairment: No.             Long term impairment: No.       CN II-XII: cranial nerves II-XII are grossly intact. Motor function:            Arm(s): Left weak.             Leg(s): Bilateral normal.       Cerebellar function:            Tremor: No.              Past-pointing: No.             Limb Ataxia: No.       Reflexes: Bilateral knee,ankle,biceps normal.       Sensory function:            Arm(s): Bilateral normal.            Leg(s): Bilateral normal.            Face: Bilateral normal.       Gait:  normal.    Lab / Imaging Results   (All laboratory and radiology results have been personally reviewed by myself)  Labs:  Results for orders placed or performed during the hospital encounter of 11/23/20   CBC Auto Differential   Result Value Ref Range    WBC 4.7 4.5 - 11.5 E9/L    RBC 4.47 3.50 - 5.50 E12/L    Hemoglobin 13.9 11.5 - 15.5 g/dL    Hematocrit 43.7 34.0 - 48.0 %    MCV 97.8 80.0 - 99.9 fL    MCH 31.1 26.0 - 35.0 pg    MCHC 31.8 (L) 32.0 - 34.5 %    RDW 13.4 11.5 - 15.0 fL    Platelets 687 958 - 280 E9/L    MPV 9.4 7.0 - 12.0 fL    Neutrophils % 66.0 43.0 - 80.0 %    Immature Granulocytes % 0.2 0.0 - 5.0 %    Lymphocytes % 20.8 20.0 - 42.0 %    Monocytes % 12.4 (H) 2.0 - 12.0 %    Eosinophils % 0.4 0.0 - 6.0 %    Basophils % 0.2 0.0 - 2.0 %    Neutrophils Absolute 3.07 1.80 - 7.30 E9/L    Immature Granulocytes # 0.01 E9/L    Lymphocytes Absolute 0.97 (L) 1.50 - 4.00 E9/L    Monocytes Absolute 0.58 0.10 - 0.95 E9/L    Eosinophils Absolute 0.02 (L) 0.05 - 0.50 E9/L    Basophils Absolute 0.01 0.00 - 0.20 E9/L   POCT Glucose with the patient and discussed todays results, in addition to providing specific details for the plan of care and counseling regarding the diagnosis and prognosis and are agreeable with the plan. Assessment      1. Cerebrovascular accident (CVA), unspecified mechanism ([address]    2. Left arm weakness    3. Bilateral carotid artery stenosis    4. Secondary hypertension      This patient's ED course included: a personal history and physicial examination, re-evaluation prior to disposition, cardiac monitoring, continuous pulse oximetry and complex medical decision making and emergency management  This patient has remained hemodynamically stable during their ED course. Plan   Transfer to [facility] stroke unit. Patient condition is stable. New Medications     New Prescriptions    No medications on file     Electronically signed by Briana Aviles PA-C   DD: 11/23/20  **This report was transcribed using voice recognition software. Every effort was made to ensure accuracy; however, inadvertent computerized transcription errors may be present.   END OF PROVIDER NOTE        Briana Aviles PA-C  11/23/20 [id number]

## 2020-11-23 NOTE — PROGRESS NOTES
The [address] called @ [phone], patient will be going to [address] [place] [address], nurse to nurse report [phone]. Transferred call to nurse for transport needs.

## 2020-11-23 NOTE — ED PROVIDER NOTES
Department of Emergency Medicine   ED  Provider Note  Admit Date/RoomTime: 11/23/2020 10:27 AM  ED Room: 23/23 11/23/20  11:20 AM EST    Stroke Alert called: on arrival    HISTORY OF PRESENT ILLNESS:  (Nurses Notes Reviewed)    Chief Complaint:   Numbness (left hand numbness, hx of neck problems, had recent mri at [facility])      Source of history provided by:  patient. History/Exam Limitations: none. Little Pate is a 82 y.o. old female presenting to the emergency department by private care, with gradual onset of left arm heavyness, which began 0800. Last known well time: 0800. The episode occurred at home. Since recognized the symptoms have been improving. She has no neurologic history. She has stroke risk factors of: hyperlipidemia and hypertension. There have been associated symptoms of left arm heaviness. There has been no history of recent trauma. Pt is 81 yo f who notes she woke up just before 0800 and shortly after noticed left arm heaviness and mild numbness. Pt notes sx are intermittent and are now improved. Pt denies any speech difficulties, facial dysemmetry, headache, chest pain, abdominal pain, ataxia, leg weakness or numbness, visual complaints. Falls or trauma. Pt denies anuy recent surgeries, POC glucose was 104    Code Status on file: Prior. NIH Stroke Scale at time of initial evaluation:   NIH/MNHISS Stroke Scale  Level of Consciousness (1a. ): Alert  LOC Questions (1b. ):  Answers both correctly  LOC Commands (1c. ): Performs both tasks correctly  Best Gaze (2. ): Normal  Visual (3. ): No visual loss  Facial Palsy (4. ): Normal symmetrical movement  Motor Arm, Left (5a. ): Drift, but does not hit bed  Motor Arm, Right (5b. ): No drift  Motor Leg, Left (6a. ): No drift  Motor Leg, Right (6b. ): No drift  Limb Ataxia (7. ): Absent  Sensory (8. ): Normal  Best Language (9. ): No aphasia  Dysarthria (10. ): Normal  Extinction and Inattention (11): No abnormality  Total: 1      Past Medical History:  has a past medical history of Anxiety, Gastric reflux, and Hypertension. Past Surgical History:  has a past surgical history that includes Colonoscopy; Hysterectomy; and Lithotripsy (Left, 2/7/2020). Social History:  reports that she has never smoked. She has never used smokeless tobacco. She reports that she does not drink alcohol or use drugs. Prior Functional Status(Modified Chrisman Scale):  1=No significant disability despite symptoms; able to carry out all usual duties and activities    Family History: family history includes Cancer in her brother and brother; High Blood Pressure in her mother; Other in her father. The patients home medications have been reviewed. Prior to Admission medications    Medication Sig Start Date End Date Taking? Authorizing Provider   amLODIPine (NORVASC) 2.5 MG tablet Take 1 tablet by mouth daily 2/14/20   Vicki Santos MD   docusate sodium (COLACE, DULCOLAX) 100 MG CAPS Take 100 mg by mouth 2 times daily as needed for Constipation 2/13/20   Vicki Santos MD   guaiFENesin 200 MG tablet Take 3 tablets by mouth 3 times daily 2/13/20   Vicki Santos MD   pantoprazole (PROTONIX) 40 MG tablet Take 1 tablet by mouth every morning (before breakfast) 2/14/20   Vicki Santos MD   Respiratory Therapy Supplies (FULL KIT NEBULIZER SET) MISC Use as directed with nebulized medication.  2/13/20   Vicki Santos MD   albuterol sulfate HFA (PROVENTIL HFA) 108 (90 Base) MCG/ACT inhaler Inhale 2 puffs into the lungs every 6 hours as needed for Wheezing 2/13/20   Nancy Barrera,    diclofenac sodium 1 % GEL Apply 2 g topically 4 times daily 4/2/19   Aubrey Cortes MD   ezetimibe (ZETIA) 10 MG tablet Take 10 mg by mouth daily    Historical Provider, MD       Allergies: Lorazepam and Penicillins       Review of Systems:   Pertinent positives and negatives are stated within HPI, all other systems reviewed and are negative.    ---------------------------------------------------PHYSICAL EXAM--------------------------------------    Constitutional/General: Alert and oriented x3, well appearing, non toxic in NAD  Head: Normocephalic and atraumatic  Eyes: PERRL, EOMI  Mouth: Oropharynx clear, handling secretions, no trismus. NO facial droop  Neck: Supple, full ROM, non tender to palpation in the midline, no stridor, no crepitus, no meningeal signs  Pulmonary: Lungs clear to auscultation bilaterally, no wheezes, rales, or rhonchi. Not in respiratory distress  Cardiovascular:  Regular rate. Regular rhythm. No murmurs, gallops, or rubs. 2+ distal pulses  Chest: no chest wall tenderness  Abdomen: Soft. Non tender. Non distended. +BS. No rebound, guarding, or rigidity. No pulsatile masses appreciated. Musculoskeletal: Moves all extremities x 4. Warm and well perfused, no clubbing, cyanosis, or edema. Capillary refill <3 seconds  Skin: warm and dry. No rashes. Neurologic: GCS 15, the CN 2-12 grossly intact, no focal deficits, symmetric strength 5/5 in the upper and lower extremities bilaterally. Speech normal. Normal finger to nose. Mild left pronator drift. Please also see NIH stroke scale. Psych: Normal Affect    NIH Stroke Scale/Score at time of initial evaluation:  1A: Level of Consciousness 0 - alert; keenly responsive   1B: Ask Month and Age 0 - answers both questions correctly   1C:  Tell Patient To Open and Close Eyes, then Hand  Squeeze 0 - performs both tasks correctly   2: Test Horizontal Extraocular Movements 0 - normal   3: Test Visual Fields 0 - no visual loss   4: Test Facial Palsy 0 - normal symmetric movement   5A: Test Left Arm Motor Drift 1 - drift, limb holds 90 (or 45) degrees but drifts down before full 10 seconds: does not hit bed   5B: Test Right Arm Motor Drift 0 - no drift, limb holds 90 (or 45) degrees for full 10 seconds   6A: Test Left Leg Motor Drift 0 - no drift; leg holds 30 degree position for full 5 seconds   6B: Test Right Leg Motor Drift 0 - no drift; leg holds 30 degree position for full 5 seconds   7: Test Limb Ataxia   (FNF/Heel-Shin) 0 - absent   8: Test Sensation 0 - normal; no sensory loss   9: Test Language/Aphasia 0 - no aphasia, normal   10: Test Dysarthria 0 - normal   11: Test Extinction/Inattention 0 - no abnormality   Total 1         -------------------------------------------------- RESULTS -------------------------------------------------  All laboratory and imaging studies have been reviewed by myself    LABS:  Results for orders placed or performed during the hospital encounter of 11/23/20   CBC Auto Differential   Result Value Ref Range    WBC 4.7 4.5 - 11.5 E9/L    RBC 4.47 3.50 - 5.50 E12/L    Hemoglobin 13.9 11.5 - 15.5 g/dL    Hematocrit 43.7 34.0 - 48.0 %    MCV 97.8 80.0 - 99.9 fL    MCH 31.1 26.0 - 35.0 pg    MCHC 31.8 (L) 32.0 - 34.5 %    RDW 13.4 11.5 - 15.0 fL    Platelets 251 678 - 445 E9/L    MPV 9.4 7.0 - 12.0 fL    Neutrophils % 66.0 43.0 - 80.0 %    Immature Granulocytes % 0.2 0.0 - 5.0 %    Lymphocytes % 20.8 20.0 - 42.0 %    Monocytes % 12.4 (H) 2.0 - 12.0 %    Eosinophils % 0.4 0.0 - 6.0 %    Basophils % 0.2 0.0 - 2.0 %    Neutrophils Absolute 3.07 1.80 - 7.30 E9/L    Immature Granulocytes # 0.01 E9/L    Lymphocytes Absolute 0.97 (L) 1.50 - 4.00 E9/L    Monocytes Absolute 0.58 0.10 - 0.95 E9/L    Eosinophils Absolute 0.02 (L) 0.05 - 0.50 E9/L    Basophils Absolute 0.01 0.00 - 0.20 E9/L   Comprehensive Metabolic Panel   Result Value Ref Range    Sodium 141 132 - 146 mmol/L    Potassium 3.9 3.5 - 5.0 mmol/L    Chloride 106 98 - 107 mmol/L    CO2 24 22 - 29 mmol/L    Anion Gap 11 7 - 16 mmol/L    Glucose 104 (H) 74 - 99 mg/dL    BUN 16 8 - 23 mg/dL    CREATININE 0.7 0.5 - 1.0 mg/dL    GFR Non-African American >60 >=60 mL/min/1.73    GFR African American >60     Calcium 9.0 8.6 - 10.2 mg/dL    Total Protein 6.9 6.4 - 8.3 g/dL    Alb 4.0 3.5 - 5.2 g/dL    Total Bilirubin 0.4 0.0 - 1.2 mg/dL    Alkaline Phosphatase 99 35 - 104 U/L    ALT 7 0 - 32 U/L    AST 11 0 - 31 U/L   Troponin   Result Value Ref Range    Troponin <0.01 0.00 - 0.03 ng/mL   Protime-INR   Result Value Ref Range    Protime 11.4 9.3 - 12.4 sec    INR 1.0    APTT   Result Value Ref Range    aPTT 33.4 24.5 - 35.1 sec   POCT Glucose   Result Value Ref Range    Glucose 104 mg/dL    QC OK? ok    POCT Glucose   Result Value Ref Range    Meter Glucose 104 (H) 74 - 99 mg/dL   EKG 12 Lead   Result Value Ref Range    Ventricular Rate 91 BPM    Atrial Rate 91 BPM    P-R Interval 190 ms    QRS Duration 80 ms    Q-T Interval 386 ms    QTc Calculation (Bazett) 474 ms    P Axis 82 degrees    R Axis 19 degrees    T Axis 76 degrees       RADIOLOGY:  Interpreted by Radiologist.  XR CHEST PORTABLE   Final Result   1. No acute cardiopulmonary process is identified. 2.  The lungs appear mildly hyperinflated, suggestive of possible underlying   obstructive lung disease (such as asthma or COPD). CTA HEAD W CONTRAST   Final Result   1. No perfusion mismatch. 2. 80% stenosis in the proximal right internal carotid artery, with focal   soft plaque projecting into the lumen of the vessel, at risk for distal   embolism. Further evaluation with MRI brain is recommended to exclude small   embolic infarctions. 3. 50% stenosis in the proximal left internal carotid artery with associated   2.4 mm atherosclerotic penetrating ulcer at the posterior aspect. 4. 40% focal stenosis at the origin of the right vertebral artery. 5. 40% stenosis in the M1 segment of the left MCA. 6. Fetal origins of the bilateral PCAs with small caliber of the basilar   artery, normal variants. 30% stenosis along the basilar artery. 7. Asymmetric narrowing of the right posterior nasopharynx at the site of   fossa of Rosenmuller, and asymmetric prominence at the right lingular tonsil,   nonspecific.   Recommend correlation with direct visualization to exclude   underlying mass. Results were reported to Dr. Alaina Malhotra at 11:21 a.m. on November 23, 2020. CTA NECK W CONTRAST   Final Result   1. No perfusion mismatch. 2. 80% stenosis in the proximal right internal carotid artery, with focal   soft plaque projecting into the lumen of the vessel, at risk for distal   embolism. Further evaluation with MRI brain is recommended to exclude small   embolic infarctions. 3. 50% stenosis in the proximal left internal carotid artery with associated   2.4 mm atherosclerotic penetrating ulcer at the posterior aspect. 4. 40% focal stenosis at the origin of the right vertebral artery. 5. 40% stenosis in the M1 segment of the left MCA. 6. Fetal origins of the bilateral PCAs with small caliber of the basilar   artery, normal variants. 30% stenosis along the basilar artery. 7. Asymmetric narrowing of the right posterior nasopharynx at the site of   fossa of Rosenmuller, and asymmetric prominence at the right lingular tonsil,   nonspecific. Recommend correlation with direct visualization to exclude   underlying mass. Results were reported to Dr. Alaina Malhotra at 11:21 a.m. on November 23, 2020. CT BRAIN PERFUSION   Final Result   1. No perfusion mismatch. 2. 80% stenosis in the proximal right internal carotid artery, with focal   soft plaque projecting into the lumen of the vessel, at risk for distal   embolism. Further evaluation with MRI brain is recommended to exclude small   embolic infarctions. 3. 50% stenosis in the proximal left internal carotid artery with associated   2.4 mm atherosclerotic penetrating ulcer at the posterior aspect. 4. 40% focal stenosis at the origin of the right vertebral artery. 5. 40% stenosis in the M1 segment of the left MCA. 6. Fetal origins of the bilateral PCAs with small caliber of the basilar   artery, normal variants. 30% stenosis along the basilar artery.    7. Asymmetric narrowing of the right posterior nasopharynx at the site of   fossa of Rosenmuller, and asymmetric prominence at the right lingular tonsil,   nonspecific. Recommend correlation with direct visualization to exclude   underlying mass. Results were reported to Dr. Jakub Ovalle at 11:21 a.m. on November 23, 2020. CT HEAD WO CONTRAST   Final Result   No acute intracranial abnormality. Findings likely reflecting chronic small vessel ischemic change          EKG Interpretation  Interpreted by emergency department physician. Rhythm: normal sinus   Rate: normal  Axis: normal  Conduction: normal  ST Segments: no acute change  T Waves: no acute change    Clinical Impression: no acute changes  Comparison to Old EKG  10/2020          ------------------------- NURSING NOTES AND VITALS REVIEWED ---------------------------   The nursing notes within the ED encounter and vital signs as below have been reviewed. BP (!) 169/91   Pulse 95   Temp 97.7 °F (36.5 °C) (Oral)   Resp 18   Ht 5' 2\" (1.575 m)   Wt 167 lb (75.8 kg)   LMP  (LMP Unknown)   SpO2 97%   BMI 30.54 kg/m²   Oxygen Saturation Interpretation: Normal    The patients available past medical records and past encounters were reviewed. ------------------------------ ED COURSE/MEDICAL DECISION MAKING----------------------  Medications   iopamidol (ISOVUE-370) 76 % injection 100 mL (100 mLs Intravenous Given 11/23/20 1043)   sodium chloride flush 0.9 % injection 10 mL (10 mLs Intravenous Given 11/23/20 1041)   0.9 % sodium chloride bolus (0 mLs Intravenous Stopped 11/23/20 1217)   aspirin tablet 325 mg (325 mg Oral Given 11/23/20 1149)   clopidogrel (PLAVIX) tablet 300 mg (300 mg Oral Given 11/23/20 1149)       Based upon patient's stroke like symptoms a stroke neurology consult is indicated. Consult to Neurology completed.  [facility]) Neurology via Telepresence      Acute CVA Core Measures:   Last Known to be Well (Stroke Diagnosis)  Date Last Known Well: 11/23/20  Time Last Known Well: 0800  NIH Stroke Scale Total: Total: 1  t-PA Eligibility: was recommeded by [facility] Neurologist and under the order of the ED physician  IV t-PA was considered and not given due to violations in inclusion criteria including pt refused TPA  <3 hsrs indication for TPA. Medical Decision Making:    Pt noted left arm \"heaviness\" that started at 0800, it was difficult to understand if she woke up with it or slightly after. If we got w 0800 as LKW she could possibly be a tpa candidate hence neurology was consulted. Pt did have pronator drift and NIHSS was 1, her motor strength was symmetric bilaterally in upper and lower extremities, there was no speech or facial involvement,. Pt appearewd to have adequate sensation. We did have a discussion about TPA and it was offered and risks vs benefits were discussed which included could prevent worsening stroke or stroke like symptoms, and risks being risk of Severe major bleeding at 6-7% which includes intracranial life threatning bleeding and angioedema. Pt refused TPA she did verbalized understanding of risks vs benefeits. D/w dr Virginia Szymanski who noted asa plavix if pt doesn't want tpa, pt was agreeable to that. Pt will be admitted     Re-Evaluations:             Re-evaluation. Patients symptoms are improving    This patient's ED course included: a personal history and physicial examination, re-evaluation prior to disposition, multiple bedside re-evaluations, cardiac monitoring, continuous pulse oximetry and a personal history and physicial eaxmination    This patient has remained hemodynamically stable and improved during their ED course. Consultations:              1110am  The case has been discussed with Dr. Yusuf Meyers, they noted low NIHSS likely TPA could be no benefit and to give patient the option however if not recommend asa and plavix. 18  Dr. Antonio Comment maty they will admit the patient                Counseling:    The emergency provider has spoken with the patient and discussed todays presentation, in addition to providing specific details for the plan of care and counseling regarding the diagnosis and prognosis. Questions are answered at this time and they are not agreeable with the plan. Was IV tPa Delay/Not Given: No  IV tPA therapy given but delayed(>60 minutes from ED arrival to IV tPA administration time) due to:  initial refusal of IV tPA therapy by patient/family    IV tPA not given because:  patient/family refused IV tPA following discussion of benefits/risk ASA/ plavix [id number] given     --------------------------------- IMPRESSION AND DISPOSITION ---------------------------------    IMPRESSION  1. Cerebrovascular accident (CVA), unspecified mechanism ([address]    2. Left arm weakness    3. Bilateral carotid artery stenosis    4.  Secondary hypertension        DISPOSITION  Disposition: Transfer to [facility] to [facility]  Patient condition is fair             Megan Arora DO  11/23/20 4922

## 2020-11-23 NOTE — PROGRESS NOTES
Called the [address] @ [phone] for update on bed assignment. Spoke with EMILEE NINO [facility], still waiting on a bed. Informed charge nurse.

## 2020-11-23 NOTE — VIRTUAL HEALTH
[address] [facility] Stroke and Vascular Neurology Consult for  [facility] Stroke Alert through [address] @ 1107am 11/23/2020 11/23/2020 11:15 AM  Pt Name: Liudmila Adams  MRN: [id number]  YOB: 1935  Date of evaluation: 11/23/2020  Primary Care Physician: Melina Wei MD  Reason for Evaluation: Stroke evaluation with Phone Consult, Discussion and Review of imaging     83yo female with pmh of htn, dmii, hld, gerd. Pt presents with acute onset LUE weakness. Pt at baseline has no focal deficits. She awoke normal this am, lkn was 8am 11/23/2020. At that time pt developed LUE weakness and presented to the ed. sbp was 213, glu 108. Allergies  is allergic to lorazepam and penicillins. Medications  Prior to Admission medications    Medication Sig Start Date End Date Taking? Authorizing Provider   amLODIPine (NORVASC) 2.5 MG tablet Take 1 tablet by mouth daily 2/14/20   Melina Wei MD   docusate sodium (COLACE, DULCOLAX) 100 MG CAPS Take 100 mg by mouth 2 times daily as needed for Constipation 2/13/20   Melina Wei MD   guaiFENesin 200 MG tablet Take 3 tablets by mouth 3 times daily 2/13/20   Melina Wei MD   pantoprazole (PROTONIX) 40 MG tablet Take 1 tablet by mouth every morning (before breakfast) 2/14/20   Melina Wei MD   Respiratory Therapy Supplies (FULL KIT NEBULIZER SET) MISC Use as directed with nebulized medication.  2/13/20   Melina Wei MD   albuterol sulfate HFA (PROVENTIL HFA) 108 (90 Base) MCG/ACT inhaler Inhale 2 puffs into the lungs every 6 hours as needed for Wheezing 2/13/20   Kali Barrera, DO   diclofenac sodium 1 % GEL Apply 2 g topically 4 times daily 4/2/19   Good Keenan MD   ezetimibe (ZETIA) 10 MG tablet Take 10 mg by mouth daily    Historical Provider, MD    Scheduled Meds:   sodium chloride  500 mL Intravenous Once     Continuous Infusions:  PRN Meds:.  Past Medical History   has a past medical history of Anxiety, Gastric reflux, and both tasks correctly   2. Best Gaze: 0 - normal   3. Visual: 0 - no visual loss   4. Facial Palsy: 0 - normal symmetric movement   5a. Motor left arm: 0 - no drift, limb holds 90 (or 45) degrees for full 10 seconds   5b. Motor right arm: 0 - no drift, limb holds 90 (or 45) degrees for full 10 seconds   6a. Motor left le - no drift; leg holds 30 degree position for full 5 seconds   6b  Motor right le - no drift; leg holds 30 degree position for full 5 seconds   7. Limb Ataxia: 0 - absent   8. Sensory: 0 - normal; no sensory loss   9. Best Language:  0 - no aphasia, normal   10. Dysarthria: 0 - normal   11. Extinction and Inattention: 0 - no abnormality         Total:   0     Premorbid MRS: 0      Imaging:  Images were personally reviewed including:  CT brain without contrast: no large territory hypodensity or bleed  CTA imaging: no LVO  CTP no mismatch    Assessment   81yo female with pmh of htn, dmii, hld, gerd. Pt presents with acute onset LUE weakness. CTH, CTA, CTP no acute changes. Give risk factor stroke is possible, symptoms are mild. Plan to discuss risk and benefit of tpa. ddx includes hypertensive emergency    Recommendations:  --if pt thinks the deficit is severe, and pt consents, give tpa, pt is within window. --no mt, no lvo  --txf to [facility] regardless of tpa  --if tpa is given, admit to nsicu  --if no tpa, load asa 325 and plavix 300 once, continue asa 81 and plavix 75 daily. Admit to floor  --stroke care and workup as per neuro  --sbp < 220 x24hr, then sbp <140 target. Discussed with ED Physician    At least 30 min of Telemedicine and time in conversation directly with ED staff and physician for the patient who is in imminent and life threatening deterioration without further treatment and evaluation. This Virtual Visit was conducted with patient's (and/or legal guardian's) consent, to provide telestroke consultation and necessary medical care.   Time spent examining patient, reviewing the images personally, reviewing the chart, perform high complexity decision making and speaking with the nursing staff regarding recommendations    This is a Phone Consult, I have not seen the patient face to face, the telemedicine device was not utilized.     Lillian Washington MD PhD  Stroke, Neurocritical Care And/or [address] [place] [address] Stroke [facility]  Electronically signed 11/23/2020 at 11:15 AM

## 2020-11-24 ENCOUNTER — APPOINTMENT (OUTPATIENT)
Dept: GENERAL RADIOLOGY | Age: 85
DRG: 038 | End: 2020-11-24
Attending: FAMILY MEDICINE
Payer: MEDICARE

## 2020-11-24 ENCOUNTER — ANESTHESIA EVENT (OUTPATIENT)
Dept: OPERATING ROOM | Age: 85
DRG: 038 | End: 2020-11-24
Payer: MEDICARE

## 2020-11-24 ENCOUNTER — APPOINTMENT (OUTPATIENT)
Dept: MRI IMAGING | Age: 85
DRG: 038 | End: 2020-11-24
Attending: FAMILY MEDICINE
Payer: MEDICARE

## 2020-11-24 LAB
ALBUMIN SERPL-MCNC: 3.5 G/DL (ref 3.5–5.2)
ALP BLD-CCNC: 81 U/L (ref 35–104)
ALT SERPL-CCNC: 6 U/L (ref 0–32)
ANION GAP SERPL CALCULATED.3IONS-SCNC: 11 MMOL/L (ref 7–16)
AST SERPL-CCNC: 10 U/L (ref 0–31)
BILIRUB SERPL-MCNC: 0.4 MG/DL (ref 0–1.2)
BUN BLDV-MCNC: 18 MG/DL (ref 8–23)
CALCIUM SERPL-MCNC: 8.6 MG/DL (ref 8.6–10.2)
CHLORIDE BLD-SCNC: 108 MMOL/L (ref 98–107)
CHOLESTEROL, FASTING: 158 MG/DL (ref 0–199)
CO2: 25 MMOL/L (ref 22–29)
CREAT SERPL-MCNC: 0.8 MG/DL (ref 0.5–1)
GFR AFRICAN AMERICAN: >60
GFR NON-AFRICAN AMERICAN: >60 ML/MIN/1.73
GLUCOSE BLD-MCNC: 90 MG/DL (ref 74–99)
HCT VFR BLD CALC: 38.5 % (ref 34–48)
HDLC SERPL-MCNC: 34 MG/DL
HEMOGLOBIN: 12.5 G/DL (ref 11.5–15.5)
LDL CHOLESTEROL CALCULATED: 97 MG/DL (ref 0–99)
MCH RBC QN AUTO: 31 PG (ref 26–35)
MCHC RBC AUTO-ENTMCNC: 32.5 % (ref 32–34.5)
MCV RBC AUTO: 95.5 FL (ref 80–99.9)
PDW BLD-RTO: 13.7 FL (ref 11.5–15)
PLATELET # BLD: 265 E9/L (ref 130–450)
PMV BLD AUTO: 9.8 FL (ref 7–12)
POTASSIUM REFLEX MAGNESIUM: 3.7 MMOL/L (ref 3.5–5)
RBC # BLD: 4.03 E12/L (ref 3.5–5.5)
SODIUM BLD-SCNC: 144 MMOL/L (ref 132–146)
TOTAL PROTEIN: 5.9 G/DL (ref 6.4–8.3)
TRIGLYCERIDE, FASTING: 136 MG/DL (ref 0–149)
VLDLC SERPL CALC-MCNC: 27 MG/DL
WBC # BLD: 5.5 E9/L (ref 4.5–11.5)

## 2020-11-24 PROCEDURE — 72050 X-RAY EXAM NECK SPINE 4/5VWS: CPT

## 2020-11-24 PROCEDURE — 97530 THERAPEUTIC ACTIVITIES: CPT

## 2020-11-24 PROCEDURE — 6360000002 HC RX W HCPCS: Performed by: FAMILY MEDICINE

## 2020-11-24 PROCEDURE — 2060000000 HC ICU INTERMEDIATE R&B

## 2020-11-24 PROCEDURE — 97161 PT EVAL LOW COMPLEX 20 MIN: CPT

## 2020-11-24 PROCEDURE — 99223 1ST HOSP IP/OBS HIGH 75: CPT | Performed by: SURGERY

## 2020-11-24 PROCEDURE — 70551 MRI BRAIN STEM W/O DYE: CPT

## 2020-11-24 PROCEDURE — 80053 COMPREHEN METABOLIC PANEL: CPT

## 2020-11-24 PROCEDURE — 6370000000 HC RX 637 (ALT 250 FOR IP): Performed by: FAMILY MEDICINE

## 2020-11-24 PROCEDURE — 80061 LIPID PANEL: CPT

## 2020-11-24 PROCEDURE — 2580000003 HC RX 258: Performed by: FAMILY MEDICINE

## 2020-11-24 PROCEDURE — 85027 COMPLETE CBC AUTOMATED: CPT

## 2020-11-24 PROCEDURE — 97166 OT EVAL MOD COMPLEX 45 MIN: CPT

## 2020-11-24 PROCEDURE — APPSS60 APP SPLIT SHARED TIME 46-60 MINUTES: Performed by: PHYSICIAN ASSISTANT

## 2020-11-24 PROCEDURE — 36415 COLL VENOUS BLD VENIPUNCTURE: CPT

## 2020-11-24 PROCEDURE — 99223 1ST HOSP IP/OBS HIGH 75: CPT | Performed by: INTERNAL MEDICINE

## 2020-11-24 RX ORDER — ASPIRIN 81 MG/1
81 TABLET, CHEWABLE ORAL DAILY
Status: DISCONTINUED | OUTPATIENT
Start: 2020-11-25 | End: 2020-11-30 | Stop reason: HOSPADM

## 2020-11-24 RX ORDER — ROSUVASTATIN CALCIUM 5 MG/1
5 TABLET, COATED ORAL NIGHTLY
Status: DISCONTINUED | OUTPATIENT
Start: 2020-11-24 | End: 2020-11-30 | Stop reason: HOSPADM

## 2020-11-24 RX ADMIN — EZETIMIBE 10 MG: 10 TABLET ORAL at 09:36

## 2020-11-24 RX ADMIN — ONDANSETRON 4 MG: 2 INJECTION INTRAMUSCULAR; INTRAVENOUS at 18:29

## 2020-11-24 RX ADMIN — ASPIRIN 325 MG: 325 TABLET, COATED ORAL at 09:36

## 2020-11-24 RX ADMIN — AMLODIPINE BESYLATE 5 MG: 5 TABLET ORAL at 09:36

## 2020-11-24 RX ADMIN — SODIUM CHLORIDE, PRESERVATIVE FREE 10 ML: 5 INJECTION INTRAVENOUS at 18:29

## 2020-11-24 RX ADMIN — Medication 10 ML: at 09:37

## 2020-11-24 RX ADMIN — ENOXAPARIN SODIUM 40 MG: 40 INJECTION SUBCUTANEOUS at 09:36

## 2020-11-24 RX ADMIN — Medication 10 ML: at 20:21

## 2020-11-24 NOTE — PROGRESS NOTES
Physical Therapy    Facility/Department: [address] NEURO IMCU    NAME: Na Kaur  : 1935  MRN: [id number]    Date of Service: 2020  Pt may benefit from acute rehab at discharge, pending progress, and is in need of a PM&R consult.     Marly Rojo, PT, DPT  [id number]

## 2020-11-24 NOTE — CONSULTS
Inpatient Cardiology Consultation      Reason for Consult: Cardiac risk stratification prior to right CEA    Consulting Physician: Dr. Malena Abernathy    Requesting Physician: Dr. Pankaj Ramey    Date of Consultation: 11/24/2020    HISTORY OF PRESENT ILLNESS:   Patient is a 82year-old white female who used to follow with Dr. Kael Thao per prior [facility] Cardiology documentation. She has not had outpatient follow-up with Dr. Kael Thao within the past five years. She was last seen by Dr. Sherita Webb inpatient 10/2019 for chest discomfort. She underwent pharmacologic stress testing and echocardiogram, with no significant abnormalities as noted below. Patient is being seen this hospital admission by Dr. Malena Abernathy for cardiac risk stratification prior to right CEA. She has a known past medical history of obesity, hypertension, hyperlipidemia, gastroesophageal reflux disease and history of medical non-compliance. She denies any personal history of coronary artery disease, myocardial infarction, heart failure, valvular heart disease or cardiac arrhythmia. She states she has had echocardiograms and stress testing in the past, but denies any prior left heart catheterization. Patient originally presented to [facility] on November 23, 2020 with complaints of left arm heaviness beginning around 8AM on 11/23. She describes the feeling as her left upper extremity \"went dead\" and she had difficulty moving it. She denies to me any numbness or tingling of the left upper extremity. She states she became concerned, and decided to call her family who brought her to the ED at [facility] for further evaluation. Per [facility] ED note, it was difficult to tell if patient's symptoms begin after waking up in the AM or began possibly overnight. They also noted pronator drift, but patient had symmetric motor strength with no speech or facial abnormalities.   She was offered treatment with TPA after consultation with neurology service, however patient refused. She was agreeable to ASA and Plavix therapy, and was transferred to [facility] for further evaluation by neurology service. Vascular surgery has evaluated the patient, and plan is for right CEA due to right ICA 80% stenosis on head/neck CTA this admission. Head CT has not revealed any findings of acute CVA, but brain MRI is pending today. She states that her symptoms resolved once she was in the ED at [facility], which was around 9 AM.  She states the left upper extremity symptoms lasted approximately one hour in duration. She notes overall of \"just feeling funny\". She admits to having a similar episode approximately three to four weeks ago -- this episode same as described above, lasting 45 minutes to one hour in duration before complete resolution. She also admits to a similar episode in her left lower extremity approximately six weeks ago, same feeling and duration of time as her upper extremity symptoms. She did not seek medical evaluation for these first two episodes. She denies ever having vision changes, headache, dizziness, near-syncope or syncope. She denies any gait difficulty or other complaints of weakness/inability to move her right upper or lower extremity. She denies any difficulty with speech or facial droop. She denies any prior history of CVA or TIA. She denies complaints of chest discomfort, nausea, vomiting, palpitations diaphoresis, subjective fever, chills, cough or any recent sick contacts. She denies paroxysmal nocturnal dyspnea, orthopnea, peripheral edema. She does admit to dyspnea on exertion, particularly with climbing her stairs. She states she has had this for \"months\". The dyspnea resolves with rest.  She never has dyspnea at rest.  She denies any pertinent cardiac and medical history at this time. She denies any former or current tobacco, alcohol or illicit drug use. Labs and diagnostic testing as noted below.       Please note: past medical records were reviewed per electronic medical record (EMR) - see detailed reports under Past Medical/ Surgical History. PAST MEDICAL HISTORY:    1. Obesity. 2. Hypertension. 3. Hyperlipidemia, on Zetia. 4. Adverse reaction to Lipitor, but upon questioning cannot elaborate. 5. Gastroesophageal reflux disease. 6. History of medical non-compliance. PRIOR CARDIAC TESTING    Lexiscan stress test 10/2019  No reversible perfusion defect. No significant wall motion abnormality. LVEF 83%. Echocardiogram (Dr. Timothy Delatorre) 10/2019   Summary:   Left ventricle is normal in size. No regional wall motion abnormalities seen. Normal left ventricular ejection fraction. There is doppler evidence of stage I diastolic dysfunction. Physiologic and/or trace mitral regurgitation is present. Physiologic and/or trace tricuspid regurgitation. Focal calcifications of the mitral valve leaflets. Aortic valve, pulmonic valve leaflets were not well visualized. PAST SURGICAL HISTORY:    Past Surgical History:   Procedure Laterality Date    COLONOSCOPY      HYSTERECTOMY      LITHOTRIPSY Left 2/7/2020    CYSTOSCOPY RETROGRADE PYELOGRAM LEFT URETEROSCOPY LEFT J STENT LASER LITHOTRIPSY performed by Joan Veliz Memo,  at [address]:  Prior to Admission medications    Medication Sig Start Date End Date Taking?  Authorizing Provider   diclofenac sodium (VOLTAREN) 1 % GEL Apply topically 4 times daily as needed for Pain   Yes Historical Provider, MD   amLODIPine (NORVASC) 2.5 MG tablet Take 1 tablet by mouth daily 2/14/20   Brayan Lora MD   ezetimibe (ZETIA) 10 MG tablet Take 10 mg by mouth daily    Historical Provider, MD       CURRENT MEDICATIONS:      Current Facility-Administered Medications:     amLODIPine (NORVASC) tablet 5 mg, 5 mg, Oral, Daily, Ayleen Melgoza MD, 5 mg at 11/24/20 0936    ezetimibe (ZETIA) tablet 10 mg, 10 mg, Oral, Daily, Ayleen Melgoza MD, 10 mg at 11/24/20 0936    sodium chloride flush 0.9 % injection 10 mL, 10 mL, Intravenous, 2 times per day, Jose Douglas MD, 10 mL at 11/24/20 4887    sodium chloride flush 0.9 % injection 10 mL, 10 mL, Intravenous, PRN, Jose Douglas MD    acetaminophen (TYLENOL) tablet 650 mg, 650 mg, Oral, Q6H PRN **OR** acetaminophen (TYLENOL) suppository 650 mg, 650 mg, Rectal, Q6H PRN, Jose Douglas MD    polyethylene glycol [facility]) packet 17 g, 17 g, Oral, Daily PRN, Jose Douglas MD    promethazine (PHENERGAN) tablet 12.5 mg, 12.5 mg, Oral, Q6H PRN **OR** ondansetron (ZOFRAN) injection 4 mg, 4 mg, Intravenous, Q6H PRN, Jose Douglas MD    enoxaparin (LOVENOX) injection 40 mg, 40 mg, Subcutaneous, Daily, Jose Douglsa MD, 40 mg at 11/24/20 0449    aspirin EC tablet 325 mg, 325 mg, Oral, Daily, Jose Douglas MD, 325 mg at 11/24/20 7392    hydrALAZINE (APRESOLINE) injection 5 mg, 5 mg, Intravenous, Q4H PRN, Jose Douglas MD      ALLERGIES:  Lorazepam and Penicillins    SOCIAL HISTORY:    She denies any former or current tobacco, alcohol or illicit drug use. FAMILY HISTORY:   She denies any pertinent cardiac and medical history at this time. REVIEW OF SYSTEMS:     · Constitutional: Denies fevers, chills, night sweats, and generalized fatigue. Denies significant weight loss or weight gain. · HEENT: Denies headaches, nose bleeds, rhinorrhea, sore throat. Denies blurred vision. Denies dysphagia, odynophagia. · Musculoskeletal: +LUE weakness with history of LLE weakness. Denies falls, pain to BLE with ambulation. · Neurological: Denies dizziness and lightheadedness, numbness and tingling. Denies other focal neurological deficits. · Cardiovascular: Denies chest pain, palpitations, diaphoresis. Denies syncope. Denies PND, orthopnea, peripheral edema. · Respiratory: +chronic dyspnea on exertion. Denies cough, hemoptysis. · Gastrointestinal: Denies abdominal pain, nausea/vomiting, diarrhea and constipation, black/bloody, and tarry stools. · Genitourinary: Denies dysuria and hematuria. · Hematologic: Denies excessive bruising or bleeding. · Endocrine: Denies excessive thirst. Denies intolerance to hot and cold. PHYSICAL EXAM:   BP (!) 144/76   Pulse 77   Temp 97.2 °F (36.2 °C) (Temporal)   Resp 18   Ht 5' 2\" (1.575 m)   Wt 167 lb (75.8 kg)   LMP  (LMP Unknown)   SpO2 96%   BMI 30.54 kg/m²   CONST:  Well developed, obese WF who appears stated age. Awake, alert, cooperative, no apparent distress. HEENT:   Head- Normocephalic, atraumatic. Eyes- Conjunctivae pink, anicteric. Neck-  No stridor, trachea midline, no apparent jugular venous distention. CHEST: Chest symmetrical and non-tender to palpation. No accessory muscle use or intercostal retractions. RESPIRATORY: Lung sounds - clear throughout fields. No wheezing, rales or rhonchi. CARDIOVASCULAR:     Heart Inspection- shows no noted pulsations. Heart Palpation- no heaves or thrills. Heart Ausculation- Regular rate and rhythm, no apparent murmur. PV: No lower extremity edema. Pedal pulses palpable, no clubbing or cyanosis. ABDOMEN: Soft, non-tender to light palpation. Bowel sounds present. MS: Good muscle strength and tone. No atrophy or abnormal movements. SKIN: Warm and dry. NEURO / PSYCH: Oriented to person, place and time. Speech clear and appropriate. Follows all commands. Pleasant affect. No apparent focal neurological deficits on exam.      DATA:    Telemetry: NSR with HR in the 80s    Diagnostic:  All diagnostic testing and lab work thus far this admission reviewed in detail. Head CT 11/23/2020: Impression:  No acute intracranial abnormality. Findings likely reflecting chronic small vessel ischemic change     CTA Head/Neck 11/23/2020 and CT Brain Perfusion:  Impression:  1. No perfusion mismatch.    2. 80% stenosis in the proximal right internal carotid artery, with focal   soft plaque projecting into the lumen of the vessel, at risk for distal Malena Abernathy. [facility], [address] [place] [address], Margarito Alvarez  Cardiology    Electronically signed by Dong Hollins PA-C on 11/24/2020 at 10:05 AM      I have personally seen and evaluated the patient. I personally obtained the history and performed the physical exam.  I personally reviewed all of the above labs, history, review of systems, and data. All of the assessments and recommendations are from me. All of the above cardiac medical decisions are from me. Please see my additional contributions to the history, physical exam, assessment, and recommendations below. History of chief complaint:  She is a very vague and difficult historian. She denies any chest discomfort. However, she states that she does have dyspnea with going up stairs and with carrying groceries into the house. Is impossible for me to tell if this is dyspnea or normal windedness with higher levels of activities. He was admitted this hospitalization with a CVA and found to have a significant right internal carotid artery stenosis. Cardiology is consulted for preop cardiac risk stratification. Review of systems:     Heart: as above   Lungs: as above   Eyes: denies changes in vision or discharge. Ears: denies changes in hearing or pain. Nose: denies epistaxis or masses   Throat: denies sore throat or trouble swallowing. Neuro: denies numbness, tingling, tremors. Skin: denies rashes or itching. : denies hematuria, dysuria   GI: denies vomiting, diarrhea   Psych: denies mood changed, anxiety, depression. Physical exam:  BP (!) 144/76   Pulse 77   Temp 97.2 °F (36.2 °C) (Temporal)   Resp 18   Ht 5' 2\" (1.575 m)   Wt 167 lb (75.8 kg)   LMP  (LMP Unknown)   SpO2 96%   BMI 30.54 kg/m²   Constitutional: A&O x3, communicates well, no acute distress. Eyes: extraocular muscles intact, PERRL. Normal lids & conjunctiva. No icterus. ENT: clear, no bleeding. No external masses. Lips normal formation.   Neck: supple, full ROM, no JVD, no bruits, no lymphadenopathy. No masses. trachea midline. Heart: regular rate & rhythm, normal S1 & S2, no abnormal murmur, S4 gallop. No heave. Lungs: CTA. No accessory muscles. Abd: soft, non-tender. Normal bowel sounds. Neuro: Full ROM X 4, EOMI, no tremors. EXT: No bilateral lower extremity edema  Skin: warm, dry, intact. Good turgor. Psych: A&O x 3, normal behavior, not anxious. Patient seen and examined. Chart, labs & data reviewed. A:  1. Very difficult historian. Dyspnea with exertion versus normal physiologic windedness with activities. 2. CVA and right internal carotid artery stenosis. 3. Obesity  4. Hypertension  5. Hypercholesterolemia  6. GERD  7. Noncompliance      Rec:  1. Lexiscan Cardiolite stress test.  2. Preop cardiac risk stratification based on the stress results. Electronically signed by Alana Mast DO on 11/24/2020 at 1:46 PM    Note: This report was completed using computerized voice recognition software. Every effort has been made to ensure accuracy, however; and invert and computerized transcription errors may be present.

## 2020-11-24 NOTE — CONSULTS
Saleem Simental is a 82 y.o. right handed female    Neurology was consulted for CVA    History of Present Illness: The patient is a 82year old female with a past medical history of hypertension, hyperlipidemia presenting with the complaints of sudden onset of left arm weakness, tingling, numbness. She stated that she developed symptoms at around 8 am on 11/23/2020 shortly after waking up from sleep. In the ED, CT head revealing chronic microvascular changes without any acute abnormalities. She denies any speech difficulty, nausea, vomiting, loss of consciousness, fecal/urinary incontinence. She had three similar episodes of transient left UE and left LE weakness that resolved in the last 3 months. CTA neck revealing bilateral internal carotid artery stenosis, 80% on the right and 50% on the left. TPA was recommended by telestroke but patient denied as her symptoms already improved. She stated that she used to take aspirin but it has been discontinued by her PCP about 8 months ago. Vascular surgery was consulted and recommended carotid endarterectomy given the recurrence of symptoms. At baseline, she is independent, still drives, and is able to take care of herself. Neurology was consulted for evaluation of stroke like symptoms/TIA. This morning, she stated that she feels nauseaous however her symptoms much improved and no longer has tingling, numbness or weakness. Past Medical History:     Past Medical History:   Diagnosis Date    Anxiety     Gastric reflux     Hypertension        Past Surgical History:     Past Surgical History:   Procedure Laterality Date    COLONOSCOPY      HYSTERECTOMY      LITHOTRIPSY Left 2/7/2020    CYSTOSCOPY RETROGRADE PYELOGRAM LEFT URETEROSCOPY LEFT J STENT LASER LITHOTRIPSY performed by Mayank Mendez, DO at [facility] OR       Allergies:     Lorazepam and Penicillins    Medications:     Prior to Admission medications    Medication Sig Start Date End Date Taking?  Authorizing Provider   diclofenac sodium (VOLTAREN) 1 % GEL Apply topically 4 times daily as needed for Pain   Yes Historical Provider, MD   amLODIPine (NORVASC) 2.5 MG tablet Take 1 tablet by mouth daily 2/14/20   Markus Gary MD   ezetimibe (ZETIA) 10 MG tablet Take 10 mg by mouth daily    Historical Provider, MD       Social History:     Denies ETOH, tobacco, or illicit drugs    Review of Systems:     No chest pain or palpitations  No SOB  No vertigo, lightheadedness or loss of consciousness  No falls, tripping or stumbling  No incontinence of bowels or bladder  No itching or bruising appreciated  No numbness, tingling or focal arm/leg weakness    ROS is otherwise negative     Family History:     Family History   Problem Relation Age of Onset    High Blood Pressure Mother     Other Father         Emphysema    Cancer Brother         Leukemia    Cancer Brother         Objective:     BP (!) 144/76   Pulse 77   Temp 97.2 °F (36.2 °C) (Temporal)   Resp 18   Ht 5' 2\" (1.575 m)   Wt 167 lb (75.8 kg)   LMP  (LMP Unknown)   SpO2 96%   BMI 30.54 kg/m²     General appearance: alert, appears stated age, cooperative and no distress  Head: normocephalic, without obvious abnormality, atraumatic  Eyes: conjunctivae/corneas clear.  .  Neck: no adenopathy, no carotid bruit, supple, symmetrical, trachea midline and thyroid not enlarged, symmetric, no tenderness/mass/nodules  Lungs: clear to auscultation bilaterally  Heart: regular rate and rhythm, S1, S2 normal, no murmur, click, rub or gallop  Extremities: normal, atraumatic, no cyanosis or edema  Pulses: 2+ and symmetric  Skin: color, texture, turgor normal---no rashes or lesions    Mental Status: alert, oriented, thought content appropriate    Appropriate attention/concentration  Intact fundus of knowledge  Repetition intact  Memories intact    Speech: no dysarthria  Language: no aphasias    Cranial Nerves:  I: smell    II: visual acuity     II: visual fields Full    II: pupils NORMA   III,VII: ptosis None   III,IV,VI: extraocular muscles  EOMI without nystagmus    V: mastication Normal   V: facial light touch sensation  Normal   V,VII: corneal reflex  Present   VII: facial muscle function - upper  Normal   VII: facial muscle function - lower Normal   VIII: hearing Normal   IX: soft palate elevation  Normal   IX,X: gag reflex Present   XI: trapezius strength  5/5   XI: sternocleidomastoid strength 5/5   XI: neck extension strength  5/5   XII: tongue strength  Normal     Motor:  Right   5/5              Left   5/5               Right Bicep  5/5           Left Bicep  5/5              Right Triceps   5/5       Left Triceps  5/5          Right Deltoid  5/5     Left Deltoid  5/5         Right IPS  5/5            Left IPS  5/5               Right Quadriceps  5/5          Left Quadriceps    5/5           Right Gastrocnemius    5/5    Left Gastrocnemius   5/5  Right Ant Tibialis   5/5  Left Ant Tibialis   5/5   5/5 throughout  Normal bulk and tone   No drift  No abnormal movements    Sensory:  LT and PP normal  Vibration normal     Coordination:   FN, FFM and MADELYN normal  HS normal    Gait:  normal    DTR:   Right Brachioradialis reflex 2+  Left Brachioradialis reflex 2+  Right Biceps reflex 2+  Left Biceps reflex 2+  Right Triceps reflex 2+  Left Triceps reflex 2+  Right Quadriceps reflex 2+  Left Quadriceps reflex 2+  Right Achilles reflex 2+  Left Achilles reflex 2+    No Babinskis  No Ortiz's     No other pathological reflexes    Laboratory/Radiology:     CBC:   Lab Results   Component Value Date    WBC 5.5 11/24/2020    RBC 4.03 11/24/2020    HGB 12.5 11/24/2020    HCT 38.5 11/24/2020    MCV 95.5 11/24/2020    MCH 31.0 11/24/2020    MCHC 32.5 11/24/2020    RDW 13.7 11/24/2020     11/24/2020    MPV 9.8 11/24/2020     CMP:    Lab Results   Component Value Date     11/24/2020    K 3.7 11/24/2020     11/24/2020    CO2 25 11/24/2020    BUN 18 11/24/2020    CREATININE 0.8 the M1 segment of the left MCA. 6. Fetal origins of the bilateral PCAs with small caliber of the basilar    artery, normal variants.  30% stenosis along the basilar artery. 7. Asymmetric narrowing of the right posterior nasopharynx at the site of    fossa of Rosenmuller, and asymmetric prominence at the right lingular tonsil,    nonspecific.  Recommend correlation with direct visualization to exclude    underlying mass. I independently reviewed the labs and imaging studies today    Assessment:     1. Acute left upper extremity weakness likely 2/2 TIA vs myelopathy  2. Bilateral carotid artery stenosis; 80% on the right and 50% on the left internal carotid artery;  Vascular surgery onboard for carotid endarterectomy    Plan:     · Will follow up MRI brain  · Continue Aspirin 81 mg daily for stroke prophylaxis  · Atorvastatin 40 mg daily  · Will check MRI cervical spine and Xray cervical region   · PT/OT    Aisha Kanner, MD  10:09 AM  11/24/2020

## 2020-11-24 NOTE — CONSULTS
I have seen and examined the patient with the Resident Physician on 11/23/2020 and the clinical findings and decision making for this patient were discussed in detail. I have reviewed the documentation included and I agree with plan of care including the workup, evaluation, management, and diagnosis as detailed in the Consultation note. Patient is an 72-year-old female with history of HTN, HLD, spinal arthropathy, and GERD who was admitted with cute onset left arm weakness. Patient indicated about a month ago she had an episode of left leg numbness and weakness that resolved after a few minutes. She indicated that morning of admission she awoke right neck pain with onset soon after of left arm heaviness. Unlikely for cyst symptoms seem to wax and wane but did not resolve as was the case with her left leg. Find any associated symptoms. She does have a history of arthropathy of the neck and has been followed in pain clinic with recent cervical injections. She denies any falls or recent trauma. No recent illness reported. She was seen in the [facility] ED with no acute abnormalities found on CT of the head. CT perfusion did not show evidence of ischemia. CTA was notable for 80% stenosis proximal right ICA with 50% stenosis proximal left ICA and associated atherosclerotic plaque. Her examination was remarkable for normal strength in bilateral upper extremities and left lower extremities. Sensation was preserved throughout with no sensory extinction. Cranial nerve exam was normal.  Gait was normal with a slowed movement secondary to pain. No significant abnormalities in reflexes. No significant abnormalities in her labs. She has been ambulating independently in the room with follow precautions. Clinical findings indicate acute onset left-sided weakness in the arm. There is some neck pain associated with suspicion of possible radicular pain and weakness. Patient with no prior history of a stroke.

## 2020-11-24 NOTE — CONSULTS
Vascular Surgery Consultation Note    Reason for Consult:  80% R ICA stenosis, possible CVA    HPI :    This is a 82 y.o. female who is admitted to the hospital for treatment of left hand weakness, possible CVA vs TIA. Patient states her left arm \"went dead\" day before yesterday. She states it happened all of a sudden and it had never happened before. Patient went to the ED for evaluation. There was concern for stroke CT head, CTA head, CTA neck showed 80% R ICA stenosis with soft plaque. 50% stenosis L ICA. She was offered tPA but declined  Vascular surgery is consulted for evaluation and treatment of bilateral carotid artery stenosis. She denies history of HLD, CAD, smoking. She states her symptoms have since resolved.        ROS : Negative if blank [], Positive if [x]  General Vascular   [] Fevers [] Claudication (Blocks)   [] Chills [] Rest Pain   [] Weight Loss [] Tissue Loss   [] Chest Pain [] Clotting Disorder    [] SOB at rest [] Leg Swelling   [] SOB with exertion [] DVT/PE      [x] Nausea    [] Vomitting [x] Stroke/TIA   [] Abdominal Pain [x] Focal weakness   [] Melena [] Slurred Speech   [] Hematochezia [] Vision Changes   [] Hematuria    [] Dysuria [] Hx of Central Catheters   [] Wears Glasses/Contacts  [] Dialysis and If so date initiated   [] Blindness     []  Hand Dominant   [] Difficulty swallowing        Past Medical History:   Diagnosis Date    Anxiety     Gastric reflux     Hypertension         Past Surgical History:   Procedure Laterality Date    COLONOSCOPY      HYSTERECTOMY      LITHOTRIPSY Left 2/7/2020    CYSTOSCOPY RETROGRADE PYELOGRAM LEFT URETEROSCOPY LEFT J STENT LASER LITHOTRIPSY performed by Oren Mendez,  at [facility] OR     Current Medications:      sodium chloride flush, acetaminophen **OR** acetaminophen, polyethylene glycol, promethazine **OR** ondansetron, hydrALAZINE    amLODIPine  5 mg Oral Daily    ezetimibe  10 mg Oral Daily    sodium chloride flush  10 mL Intravenous 2 age  Normal  EYES:  lids and lashes normal, sclera clear and conjunctiva normal  ENT:  normocepalic, without obvious abnormality, external ears without lesions  NECK:  supple, symmetrical, trachea midline,no jugular venous distension, no carotid bruits  HEMATOLOGIC/LYMPHATICS:  no cervical lymphadenopathy  LUNGS:  no increased work of breathing, good air exchange  CARDIOVASCULAR:  regular rate and rhythm no murmur noted  ABDOMEN:  soft, non-distended, non-tender, Aorta is not palpable  SKIN:  no bruising or bleeding  EXTREMITIES:   R UE Swelling absent Incisions absent       5/5 Strength  L UE Swelling absent Incisions absent       4/5 Strength  R LE Edema absent  Incisions absent    Varicose veins absent    Wounds absent, normalcaprefill   5/5 Strength, neuropathy is absent  L LE Edema absent  Incisions absent    Varicose veins absent    Wounds absent, normalcaprefill   5/5 Strength, neuropathy is absent  R brachial 2 L brachial 2   R radial 2 L radial 2   R femoral 2 L femoral 2                       LABS:    Lab Results   Component Value Date    WBC 5.5 11/24/2020    HGB 12.5 11/24/2020    HCT 38.5 11/24/2020     11/24/2020    PROTIME 11.4 11/23/2020    INR 1.0 11/23/2020    K 3.9 11/23/2020    BUN 16 11/23/2020    CREATININE 0.7 11/23/2020       RADIOLOGY:  CTA neck 11/23  Narrative    EXAMINATION:    CTA OF THE HEAD WITH CONTRAST WITH PERFUSION; CTA OF THE NECK; CTA OF THE    HEAD WITH CONTRAST 11/23/2020 10:43 am; 11/23/2020 10:41 am:    TECHNIQUE:    CTA of the head/brain was performed with the administration of intravenous    contrast. Multiplanar reformatted images are provided for review.  MIP images    are provided for review.  Dose modulation, iterative reconstruction, and/or    weight based adjustment of the mA/kV was utilized to reduce the radiation    dose to as low as reasonably achievable.; CTA of the neck was performed with    the administration of intravenous contrast. Multiplanar reformatted images    are provided for review.  MIP images are provided for review. Stenosis of the    internal carotid arteries measured using NASCET criteria. Dose modulation,    iterative reconstruction, and/or weight based adjustment of the mA/kV was    utilized to reduce the radiation dose to as low as reasonably achievable. Noncontrast CT of the head with reconstructed 2-D images are also provided    for review. COMPARISON:    None. HISTORY:    ORDERING SYSTEM PROVIDED HISTORY: left arm weakness    TECHNOLOGIST PROVIDED HISTORY:    Reason for exam:->left arm weakness; ORDERING SYSTEM PROVIDED HISTORY: left    arm weakness    TECHNOLOGIST PROVIDED HISTORY:    Reason for exam:->left arm weakness    Has a \"code stroke\" or \"stroke alert\" been called? ->Yes; ORDERING SYSTEM    PROVIDED HISTORY: left arm weaknesss    TECHNOLOGIST PROVIDED HISTORY:    Reason for exam:->left arm weaknesss    Has a \"code stroke\" or \"stroke alert\" been called? ->Yes    FINDINGS:    CTA NECK:    AORTIC ARCH/ARCH VESSELS: No dissection or arterial injury.  No significant    stenosis of the brachiocephalic or subclavian arteries. CAROTID ARTERIES: There is 80% stenosis in the proximal right internal    carotid artery, with focal soft plaque projecting into the lumen of the    vessel (series 3, image 102), at risk for distal embolism.  There is up to    50% stenosis in the proximal left internal carotid artery with associated 2.4    mm atherosclerotic penetrating ulcer at the posterior aspect (series 3, image    111).  No evidence of acute abnormality or flow-limiting stenosis in the    remainder of the internal or common carotid arteries by NASCET criteria. VERTEBRAL ARTERIES: There is left dominance of the vertebral arteries.  There    is 40% focal stenosis at the origin of the right vertebral artery.  No    dissection, arterial injury, or significant stenosis in the remainder of the    bilateral vertebral arteries.     SOFT TISSUES: The lung apices are clear.  No cervical or superior mediastinal    lymphadenopathy. Radha Paco is asymmetric narrowing of the right posterior    nasopharynx at the site of fossa of Rosenmuller, and asymmetric prominence at    the right lingular tonsil, nonspecific.  No acute abnormality of the salivary    and thyroid glands. BONES: No acute osseous abnormality. CTA HEAD:    ANTERIOR CIRCULATION: There is up to 40% stenosis in the M1 segment of the    left MCA.  No significant stenosis of the intracranial internal carotid,    anterior cerebral, or right middle cerebral arteries. No aneurysm. POSTERIOR CIRCULATION: There are fetal origins of the bilateral PCAs with    small caliber of the basilar artery, normal variants.  There is left    dominance of the vertebral arteries with hypoplastic intracranial right    vertebral artery.  There is up to 30% stenosis along the basilar artery.  No    significant stenosis of the left vertebral, or posterior cerebral arteries. No aneurysm. OTHER: No dural venous sinus thrombosis on this non-dedicated study. Brain: There is mild parenchymal volume loss.  There is periventricular white    matter low attenuation, likely related to mild chronic microvascular disease. There is no mass effect or midline shift. Radha Paco is no abnormal extra-axial    collection. Radha Paco is no evidence of acute territorial infarction. CT PERFUSION:    There is symmetric perfusion to the brain without a perfusion mismatch.         Impression    1. No perfusion mismatch. 2. 80% stenosis in the proximal right internal carotid artery, with focal    soft plaque projecting into the lumen of the vessel, at risk for distal    embolism.  Further evaluation with MRI brain is recommended to exclude small    embolic infarctions. 3. 50% stenosis in the proximal left internal carotid artery with associated    2.4 mm atherosclerotic penetrating ulcer at the posterior aspect.     4. 40% focal stenosis at the origin of the right vertebral artery. 5. 40% stenosis in the M1 segment of the left MCA. 6. Fetal origins of the bilateral PCAs with small caliber of the basilar    artery, normal variants.  30% stenosis along the basilar artery. 7. Asymmetric narrowing of the right posterior nasopharynx at the site of    fossa of Rosenmuller, and asymmetric prominence at the right lingular tonsil,    nonspecific.  Recommend correlation with direct visualization to exclude    underlying mass. Assesment/Plan  Patient is an 83 yo F who presents secondary to TIA, LUE weakness, found to have 80% R ICA stenosis. - CTA reviewed does have narrowing of R ICA. Symptomatic with TIA  - Patient would likely benefit from R carotid endarterectomy  - Start ASA, high dose statin  - Cardiology consult for clearance  - Patient seen and examined with Dr. Dayron Montemayor    Electronically signed by Dae Herrera MD on 11/24/2020 at 9:21 AM      Patient was seen and examined. Agree with above. General: She is awake she is alert she is answering questions appropriately currently she is in no acute distress  Skin: Is warm and dry no change in turgor no jaundice no scleral icterus. HEENT head is normocephalic atraumatic trach is midline no jugular venous distention. She has some limited range of motion she has some cervical disc issues  Cardiac: Is currently regular rate and rhythm I do not appreciate a murmur rub or gallop  Pulmonary: Clear to auscultation bilaterally no wheezes rales or rhonchi  Abdomen: Soft nontender no rebound or guarding. No pulsatile masses  Extremities: Bilateral palpable brachial and radial pulses. Bilateral palpable DP and PT pulses. Motor and sensation are intact. There is no vascular deficit. Neuro: Is grossly intact bilateral any gross cranial nerve deficit. It is very difficult to tell she has any left-sided weakness at all.   She is pretty symmetrical on the physical examination and she believes that she is back to normal.  Musculoskeletal: Motor and sensation are intact. See above. No gross joint deformities. No joint tenderness. Normal musculature development  Psych: Affect: Judgment: Mentation appear to be appropriate. I have personally reviewed the laboratories. I personally reviewed the CT scan findings. She has a high-grade right carotid artery stenosis. With a soft plaque that appears to be at the level of the internal carotid vessel. Assessment and plan. #1 symptomatic right carotid artery stenosis. At this point she has had no new symptoms. She is scheduled for an MRI. She was not taking antiplatelet therapy up until this admission. This is been started. I also recommend a statin. Cardiology is also been consulted for risk assessment and stratification.     Marisel Lomax MD

## 2020-11-24 NOTE — H&P
Lm Richardson History and Physical      CHIEF COMPLAINT:  Left arm heaviness    History Obtained From:  Patient    HISTORY OF PRESENT ILLNESS:    The patient is a 82 y.o. female with significant past medical history of HTN and hyperlipidemia who presents with gradual onset of left arm heavyness, which began at 0800 yesterday morning shortly after awakening. The episode occurred at home. Since she recognized the symptoms have been improving. She has no neurologic history. There has been no history of recent trauma.     She noted mild numbness. Pt notes sx are intermittent and improved in the ED in [place] [address] yesterday and she decided her symptoms were not severe enough to warrant TPA. Pt denies any speech difficulties, facial dysemmetry, headache, chest pain, abdominal pain, ataxia, leg weakness or numbness, visual complaints. No falls or trauma. Pt denies any recent surgeries. She states that she had a spell of a heavy feeling in her right lower extremity lasting for a few minutes about 6 weeks ago. She states she has felt nauseated this morning but she feels that the movement and sensation in her left arm is normal today. She reports she had an adverse reaction to Lipitor in the past and would not take LIpitor again. Past Medical History:     has a past medical history of Anxiety, Gastric reflux, and Hypertension. Past Surgical History:     has a past surgical history that includes Colonoscopy; Hysterectomy; and Lithotripsy (Left, 2/7/2020).     Medications Prior to Admission:    Medications Prior to Admission: diclofenac sodium (VOLTAREN) 1 % GEL, Apply topically 4 times daily as needed for Pain  amLODIPine (NORVASC) 2.5 MG tablet, Take 1 tablet by mouth daily  [DISCONTINUED] docusate sodium (COLACE, DULCOLAX) 100 MG CAPS, Take 100 mg by mouth 2 times daily as needed for Constipation  [DISCONTINUED] guaiFENesin 200 MG tablet, Take 3 tablets by mouth 3 times daily  [DISCONTINUED] pantoprazole (PROTONIX) 40 MG tablet, Take 1 tablet by mouth every morning (before breakfast)  [DISCONTINUED] Respiratory Therapy Supplies (FULL KIT NEBULIZER SET) MISC, Use as directed with nebulized medication. [DISCONTINUED] albuterol sulfate HFA (PROVENTIL HFA) 108 (90 Base) MCG/ACT inhaler, Inhale 2 puffs into the lungs every 6 hours as needed for Wheezing  [DISCONTINUED] diclofenac sodium 1 % GEL, Apply 2 g topically 4 times daily  ezetimibe (ZETIA) 10 MG tablet, Take 10 mg by mouth daily    Allergies:  Lorazepam and Penicillins    Social History:    reports that she has never smoked. She has never used smokeless tobacco. She reports that she does not drink alcohol or use drugs.     Family History:       Problem Relation Age of Onset    High Blood Pressure Mother     Other Father         Emphysema    Cancer Brother         Leukemia    Cancer Brother        REVIEW OF SYSTEMS:  CONSTITUTIONAL:  negative for  fevers, chills, anorexia and weight loss  RESPIRATORY:  negative for  dry cough, dyspnea, wheezing and chest pain  CARDIOVASCULAR:  negative for  chest pain, dyspnea, palpitations, edema, syncope  GASTROINTESTINAL:  positive for nausea  GENITOURINARY:  negative for frequency, dysuria and hematuria  MUSCULOSKELETAL:  negative for  myalgias and arthralgias  NEUROLOGICAL:  negative for headaches, dizziness, memory problems, speech problems, dysphagia, numbness and syncope    PHYSICAL EXAM:  Vitals:  BP (!) 155/62   Pulse 85   Temp 98.2 °F (36.8 °C) (Temporal)   Resp 20   Ht 5' 2\" (1.575 m)   Wt 167 lb (75.8 kg)   LMP  (LMP Unknown)   SpO2 96%   BMI 30.54 kg/m²   CONSTITUTIONAL:  awake, alert, cooperative, no apparent distress, and appears stated age  EYES:  Lids and lashes normal, pupils equal, round and reactive to light, extra ocular muscles intact, sclera clear, conjunctiva normal  ENT:  Normocephalic, without obvious abnormality, atraumatic, sinuses nontender on palpation, external ears without lesions, oral pharynx with moist mucus membranes, tonsils without erythema or exudates, gums normal and good dentition. NECK:  Supple, symmetrical, trachea midline, no adenopathy, thyroid symmetric, not enlarged and no tenderness, skin normal  HEMATOLOGIC/LYMPHATICS:  no cervical lymphadenopathy  BACK:  Symmetric, no curvature, spinous processes are non-tender on palpation, paraspinous muscles are non-tender on palpation, no costal vertebral tenderness  LUNGS:  No increased work of breathing, good air exchange, clear to auscultation bilaterally, no crackles or wheezing  CARDIOVASCULAR:  Normal apical impulse, regular rate and rhythm, normal S1 and S2, no S3 or S4, and no murmur noted  ABDOMEN:  Flat, soft, non-tender, no HSM, no masses  CHEST/BREASTS:  No chest tenderness  GENITAL/URINARY:  deferred  MUSCULOSKELETAL:  There is no redness, warmth, or swelling of the joints. Full range of motion noted. Motor strength is 5 out of 5 all extremities bilaterally. Tone is normal.  NEUROLOGIC:  Awake, alert, oriented to name, place and time. Cranial nerves II-XII are grossly intact. Motor is 5 out of 5 bilaterally. Cerebellar finger to nose, heel to shin intact. Sensory is intact.   Babinski down going, Romberg negative, and gait is normal.  SKIN:  no bruising or bleeding and no rashes    DATA:  EKG:  NSR  CBC with Differential:    Lab Results   Component Value Date    WBC 5.5 11/24/2020    RBC 4.03 11/24/2020    HGB 12.5 11/24/2020    HCT 38.5 11/24/2020     11/24/2020    MCV 95.5 11/24/2020    MCH 31.0 11/24/2020    MCHC 32.5 11/24/2020    RDW 13.7 11/24/2020    NRBC 0.0 02/09/2020    SEGSPCT 51 12/24/2013    LYMPHOPCT 20.8 11/23/2020    MONOPCT 12.4 11/23/2020    BASOPCT 0.2 11/23/2020    MONOSABS 0.58 11/23/2020    LYMPHSABS 0.97 11/23/2020    EOSABS 0.02 11/23/2020    BASOSABS 0.01 11/23/2020     CMP:    Lab Results   Component Value Date     11/23/2020    K 3.9 11/23/2020    K 3.9 06/17/2019     11/23/2020    CO2 24 11/23/2020    BUN 16 11/23/2020    CREATININE 0.7 11/23/2020    GFRAA >60 11/23/2020    LABGLOM >60 11/23/2020    GLUCOSE 104 11/23/2020    GLUCOSE 97 01/30/2012    PROT 6.9 11/23/2020    LABALBU 4.0 11/23/2020    LABALBU 4.1 01/30/2012    CALCIUM 9.0 11/23/2020    BILITOT 0.4 11/23/2020    ALKPHOS 99 11/23/2020    AST 11 11/23/2020    ALT 7 11/23/2020     PT/INR:    Lab Results   Component Value Date    PROTIME 11.4 11/23/2020    PROTIME 11.5 09/05/2011    INR 1.0 11/23/2020     Last 3 Troponin:    Lab Results   Component Value Date    TROPONINI <0.01 11/23/2020    TROPONINI <0.01 10/17/2020    TROPONINI <0.01 10/29/2019    CKTOTAL 32 11/13/2017    CKTOTAL 29 11/05/2016    CKTOTAL 30 11/05/2016    CKMB <1.0 11/13/2017    CKMB <1.0 11/05/2016    CKMB 1.0 11/05/2016     TSH:    Lab Results   Component Value Date    TSH 1.570 10/28/2019     Radiology Review:  CTA shows [de-identified] percent stenosis of right carotid artery. ASSESSMENT AND PLAN:    Patient Active Problem List    Diagnosis Date Noted    Acute CVA (cerebrovascular accident) ([email or web address] 11/23/2020     Priority: High    Stenosis of right carotid artery 11/23/2020     Priority: High    Hypertensive emergency 11/23/2020     Priority: High    Diastolic dysfunction      Priority: Medium    Diabetes mellitus ([email or web address] 06/11/2012     Priority: Medium    Personal history of hydronephrosis 02/06/2020     Priority: Low    Hiatal hernia with GERD 02/06/2020     Priority: Low    Pure hypercholesterolemia      Priority: Low    Moderate obesity      Priority: Low    Mixed hyperlipidemia      Priority: Low    Essential hypertension 11/04/2016     Priority: Low    Gastroesophageal reflux disease without esophagitis 11/04/2016     Priority: Low    Anxiety      Priority: Low        As symptoms have improved, it appears this may have been TIA and not completed CVA.        Also, patient states she has never had diabetes even though the diagnosis  had been recorded in her medical record. Admit the patient. Patient is hesitant about taking Rosuvastatin but seems to agree to trying to take it. Consult neurologist to advise regarding ongoing evaluation and treatment. Do MRI of brain. Consult vascular surgeon to advise if intervention on right carotid artery is indicated.

## 2020-11-24 NOTE — PROGRESS NOTES
Treatment Status  Date: Short Term Goals=LTG  Treatment frequency: PRN 2-4 x/week   Feeding Set-up/Stand by Assist   Completed while seated edge of bed  Modified [place]    Grooming Minimal Assist   Modified [place]     UB Dressing Minimal Assist   Modified [place]    LB Dressing Maximal Assist  Patient refused to participate in LB dressing   Stand by Assist    Bathing Moderate Assist   Stand by Assist    Toileting Moderate Assist   Stand by Assist    Bed Mobility  Supine to sit: SBA   Sit to supine: NT  Supine to sit: Mod I   Sit to supine: Mod I   Functional Transfers Sit to stand: Min A  Stand to sit: Min A  Toilet Transfer: Min A  Verbal cues for hand placement  and   Mod I using LRD   Functional Mobility Min A  Bed<>bathroom without device  Mod I using LRD  Functional household distance   Balance Sitting:     Static:SBA    Dynamic:NT  Standing: Min A     Activity Tolerance Fair  Good   Visual/  Perceptual Glasses:no  Patient able to accurately read clock on wall          Safety      Poor                  Fair+     Upper Extremities:    Treatment Status  Date: Short Term Goals=LTG   B UE UE ROM:   RUE:  WFL   L shoulder active ROM: ~0-90 degrees  B elbow-hand active ROM: WFL      Strength: RUE:  4-/5 LUE:  *3+/5     Strength: B:4-/5                     Coordination Fine Motor Coordination:  WFL  Opposition:WFL  Finger<>nose: WFL    Gross Motor Coordination:  WFL                      Hand dominance: R handed    Hearing: slight [place]  Sensation:  No c/o numbness or tingling  Tone:  WFL  Edema: none observed B UE                            Comments: Nursing approved OT session. Upon arrival, patient semi-supine in bed and agreeable to session with encouragement. OT evaluation performed with education provided regarding the purpose and benefits of OT session, along with mobility and I/ADL completion.  Patient emotionally labile throughout session reporting it was the recent anniversary of son's [x] Energy Conservation Techniques/Strategies      [] Neuromuscular Re-Education     [x] Functional Transfer Training to maximize safety utilizing LRD          [x] Functional Mobility Training to maximize safety utilizing LRD       [x] Cognitive Re-Training to maximize safetu        [] Splinting/Positioning Needs           [x] Therapeutic Activity to improve activity tolerance for functional activities and ADLs    [x]Therapeutic Exercise to improve UE strength for functional transfers and ADLs   [] Visual/Perceptual   [x] Delirium Prevention/Treatment to maximize functional outcomes  [x] Positioning to Improve Functional Becker, Safety, and Skin Integrity   [x] Patient and/or Family Education to Increase Safety and Functional Becker   [] Other:     Rehab Potential: Good for established goals    Patient / Family Goal: \"To be my own boss. \"     Evaluation time includes thorough review of current medical information, gathering information on past medical & social history & PLOF, completion of standardized testing, informal observation of tasks, consultation with other medical professions/disciplines, assessment of data & development of POC/goals. Patient and/or family were instructed diagnosis, prognosis/goals and plan of care. yes Demonstrated poor understanding. Time In: 09:32  Time Out: 09:58  Total Treatment Time: 26 minutes   Min Units   OT Eval Low 40275     OT Eval Medium 97166 X 1   OT Eval High 37100     OT Re-Eval [id number]     Therapeutic Ex 40114     Therapeutic Activities 73516 10 1   ADL/Self Care 65741     Orthotic Management 87393     Neuro Re-Ed 63990     Non-Billable Time     TOTAL TIMED TREATMENT 10      [] Malnutrition indicators have been identified and nursing has been notified to ensure a dietitian consult is ordered.       Mod OT Evaluation + 10  treatment minutes    Allison Day OTR/L #[id number]

## 2020-11-24 NOTE — CARE COORDINATION
SW spoke with patient in room. She states she lives at home alone in a 2 story home. She states she stays on the first floor mainly but does go up and down stairs for different things. She states prior to admission, she was independent and uses no DME at home. Her PCP is Dr Yamel Saeed. She states no history of HHC or ABHI. We discussed transition of care and likely need for rehab at discharge and also explained she may be appropriate for acute rehab. She is in agreement. She states [facility] like [facility], if they don't have a bed, she isn't sure she would agree to [facility]. She wants to think about it. Requested PM and R consult. Have called and talked to daughter, Wilfredo Rea regarding above and she is in agreement as well.

## 2020-11-25 ENCOUNTER — APPOINTMENT (OUTPATIENT)
Dept: NUCLEAR MEDICINE | Age: 85
DRG: 038 | End: 2020-11-25
Attending: FAMILY MEDICINE
Payer: MEDICARE

## 2020-11-25 ENCOUNTER — APPOINTMENT (OUTPATIENT)
Dept: NON INVASIVE DIAGNOSTICS | Age: 85
DRG: 038 | End: 2020-11-25
Attending: FAMILY MEDICINE
Payer: MEDICARE

## 2020-11-25 ENCOUNTER — APPOINTMENT (OUTPATIENT)
Dept: MRI IMAGING | Age: 85
DRG: 038 | End: 2020-11-25
Attending: FAMILY MEDICINE
Payer: MEDICARE

## 2020-11-25 LAB
LV EF: 75 %
LVEF MODALITY: NORMAL
METER GLUCOSE: 121 MG/DL (ref 74–99)

## 2020-11-25 PROCEDURE — 93018 CV STRESS TEST I&R ONLY: CPT | Performed by: INTERNAL MEDICINE

## 2020-11-25 PROCEDURE — 99232 SBSQ HOSP IP/OBS MODERATE 35: CPT | Performed by: NURSE PRACTITIONER

## 2020-11-25 PROCEDURE — 78452 HT MUSCLE IMAGE SPECT MULT: CPT

## 2020-11-25 PROCEDURE — 6360000002 HC RX W HCPCS: Performed by: INTERNAL MEDICINE

## 2020-11-25 PROCEDURE — 2580000003 HC RX 258: Performed by: FAMILY MEDICINE

## 2020-11-25 PROCEDURE — 3430000000 HC RX DIAGNOSTIC RADIOPHARMACEUTICAL: Performed by: RADIOLOGY

## 2020-11-25 PROCEDURE — 6370000000 HC RX 637 (ALT 250 FOR IP): Performed by: INTERNAL MEDICINE

## 2020-11-25 PROCEDURE — 72141 MRI NECK SPINE W/O DYE: CPT

## 2020-11-25 PROCEDURE — 6370000000 HC RX 637 (ALT 250 FOR IP): Performed by: FAMILY MEDICINE

## 2020-11-25 PROCEDURE — 93017 CV STRESS TEST TRACING ONLY: CPT

## 2020-11-25 PROCEDURE — 6360000002 HC RX W HCPCS: Performed by: FAMILY MEDICINE

## 2020-11-25 PROCEDURE — 82962 GLUCOSE BLOOD TEST: CPT

## 2020-11-25 PROCEDURE — 78452 HT MUSCLE IMAGE SPECT MULT: CPT | Performed by: INTERNAL MEDICINE

## 2020-11-25 PROCEDURE — 2060000000 HC ICU INTERMEDIATE R&B

## 2020-11-25 PROCEDURE — A9500 TC99M SESTAMIBI: HCPCS | Performed by: RADIOLOGY

## 2020-11-25 PROCEDURE — 93016 CV STRESS TEST SUPVJ ONLY: CPT | Performed by: INTERNAL MEDICINE

## 2020-11-25 RX ADMIN — ACETAMINOPHEN 650 MG: 325 TABLET ORAL at 16:10

## 2020-11-25 RX ADMIN — ROSUVASTATIN CALCIUM 5 MG: 5 TABLET, FILM COATED ORAL at 01:04

## 2020-11-25 RX ADMIN — Medication 12 MILLICURIE: at 07:47

## 2020-11-25 RX ADMIN — ROSUVASTATIN CALCIUM 5 MG: 5 TABLET, FILM COATED ORAL at 21:01

## 2020-11-25 RX ADMIN — ONDANSETRON 4 MG: 2 INJECTION INTRAMUSCULAR; INTRAVENOUS at 21:02

## 2020-11-25 RX ADMIN — AMLODIPINE BESYLATE 5 MG: 5 TABLET ORAL at 12:37

## 2020-11-25 RX ADMIN — ASPIRIN 81 MG CHEWABLE TABLET 81 MG: 81 TABLET CHEWABLE at 12:37

## 2020-11-25 RX ADMIN — REGADENOSON 0.4 MG: 0.08 INJECTION, SOLUTION INTRAVENOUS at 09:48

## 2020-11-25 RX ADMIN — Medication 10 ML: at 21:02

## 2020-11-25 RX ADMIN — ONDANSETRON 4 MG: 2 INJECTION INTRAMUSCULAR; INTRAVENOUS at 09:01

## 2020-11-25 RX ADMIN — EZETIMIBE 10 MG: 10 TABLET ORAL at 12:38

## 2020-11-25 RX ADMIN — Medication 35 MILLICURIE: at 11:32

## 2020-11-25 RX ADMIN — ENOXAPARIN SODIUM 40 MG: 40 INJECTION SUBCUTANEOUS at 12:38

## 2020-11-25 ASSESSMENT — PAIN SCALES - GENERAL
PAINLEVEL_OUTOF10: 6
PAINLEVEL_OUTOF10: 7

## 2020-11-25 NOTE — ANESTHESIA PRE PROCEDURE
Department of Anesthesiology  Preprocedure Note       Name:  Brien Morel   Age:  82 y.o.  :  1935                                          MRN:  [id number]         Date:  2020      Surgeon: Sonia Abernathy):  Jean-Pierre Harris MD    Procedure: Procedure(s):  RIGHT CAROTID ENDARTERECTOMY    Medications prior to admission:   Prior to Admission medications    Medication Sig Start Date End Date Taking?  Authorizing Provider   diclofenac sodium (VOLTAREN) 1 % GEL Apply topically 4 times daily as needed for Pain   Yes Historical Provider, MD   amLODIPine (NORVASC) 2.5 MG tablet Take 1 tablet by mouth daily 20   Reji Mitchell MD   ezetimibe (ZETIA) 10 MG tablet Take 10 mg by mouth daily    Historical Provider, MD       Current medications:    Current Facility-Administered Medications   Medication Dose Route Frequency Provider Last Rate Last Dose    aspirin chewable tablet 81 mg  81 mg Oral Daily Jermain Marvin MD   81 mg at 20 1237    rosuvastatin (CRESTOR) tablet 5 mg  5 mg Oral Nightly Arnoldo Rodriguez MD   5 mg at 20 0104    amLODIPine (NORVASC) tablet 5 mg  5 mg Oral Daily Aronldo Rodriguez MD   5 mg at 20 1237    ezetimibe (ZETIA) tablet 10 mg  10 mg Oral Daily Arnoldo Rodriguez MD   10 mg at 20 1238    sodium chloride flush 0.9 % injection 10 mL  10 mL Intravenous 2 times per day Arnoldo Rodriguez MD   10 mL at 20    sodium chloride flush 0.9 % injection 10 mL  10 mL Intravenous PRN Arnoldo Rodriguez MD   10 mL at 20 1829    acetaminophen (TYLENOL) tablet 650 mg  650 mg Oral Q6H PRN Arnoldo Rodriguez MD        Or   [facility] acetaminophen (TYLENOL) suppository 650 mg  650 mg Rectal Q6H PRN Arnoldo Rodriguez MD        polyethylene glycol [facility]) packet 17 g  17 g Oral Daily PRN Arnoldo Rodriguez MD        promethazine (PHENERGAN) tablet 12.5 mg  12.5 mg Oral Q6H PRN Arnoldo Rodriguez MD        Or    ondansetron [facility]) injection 4 mg  4 mg Intravenous Q6H PRN Arnoldo Rodriguez MD   4 mg at 11/25/20 0901    enoxaparin (LOVENOX) injection 40 mg  40 mg Subcutaneous Daily Court Sandhu MD   40 mg at 11/25/20 1238    hydrALAZINE (APRESOLINE) injection 5 mg  5 mg Intravenous Q4H PRN Court Sandhu MD           Allergies: Allergies   Allergen Reactions    Lorazepam Photosensitivity     Unable to remember reaction state's it's too long ago    Penicillins Other (See Comments)     States it was paranoia. Problem List:    Patient Active Problem List   Diagnosis Code    Anxiety F41.9    Essential hypertension I10    Gastroesophageal reflux disease without esophagitis K21.9    Mixed hyperlipidemia Q66.7    Diastolic dysfunction Y06.96    Pure hypercholesterolemia E78.00    Moderate obesity E66.8    Personal history of hydronephrosis Z87.448    Hiatal hernia with GERD K21.9, K44.9    Acute CVA (cerebrovascular accident) ([facility] [address] I63.9    Stenosis of right carotid artery I65.21    Hypertensive emergency I16.1       Past Medical History:        Diagnosis Date    Anxiety     Gastric reflux     Hypertension        Past Surgical History:        Procedure Laterality Date    COLONOSCOPY      HYSTERECTOMY      LITHOTRIPSY Left 2/7/2020    CYSTOSCOPY RETROGRADE PYELOGRAM LEFT URETEROSCOPY LEFT J STENT LASER LITHOTRIPSY performed by Bayron Mendez DO at [facility] OR       Social History:    Social History     Tobacco Use    Smoking status: Never Smoker    Smokeless tobacco: Never Used   Substance Use Topics    Alcohol use:  No                                Counseling given: Not Answered      Vital Signs (Current):   Vitals:    11/24/20 0730 11/24/20 2150 11/25/20 0623 11/25/20 1245   BP: (!) 144/76 138/68  (!) 186/77   Pulse: 77 68  73   Resp: 18 18  16   Temp: 36.2 °C (97.2 °F) 36.7 °C (98 °F)  35.8 °C (96.5 °F)   TempSrc: Temporal Temporal     SpO2: 96% 97%  96%   Weight:   167 lb 1.7 oz (75.8 kg)    Height:                                                  BP Readings from Last 3 Encounters: 11/25/20 (!) 186/77   11/23/20 (!) 164/77   10/17/20 (!) 149/53       NPO Status:                                                                                 BMI:   Wt Readings from Last 3 Encounters:   11/25/20 167 lb 1.7 oz (75.8 kg)   11/23/20 167 lb (75.8 kg)   10/17/20 167 lb (75.8 kg)     Body mass index is 30.56 kg/m². CBC:   Lab Results   Component Value Date    WBC 5.5 11/24/2020    RBC 4.03 11/24/2020    HGB 12.5 11/24/2020    HCT 38.5 11/24/2020    MCV 95.5 11/24/2020    RDW 13.7 11/24/2020     11/24/2020       CMP:   Lab Results   Component Value Date     11/24/2020    K 3.7 11/24/2020     11/24/2020    CO2 25 11/24/2020    BUN 18 11/24/2020    CREATININE 0.8 11/24/2020    GFRAA >60 11/24/2020    LABGLOM >60 11/24/2020    GLUCOSE 90 11/24/2020    GLUCOSE 97 01/30/2012    PROT 5.9 11/24/2020    CALCIUM 8.6 11/24/2020    BILITOT 0.4 11/24/2020    ALKPHOS 81 11/24/2020    AST 10 11/24/2020    ALT 6 11/24/2020       POC Tests: No results for input(s): POCGLU, POCNA, POCK, POCCL, POCBUN, POCHEMO, POCHCT in the last 72 hours. Coags:   Lab Results   Component Value Date    PROTIME 11.4 11/23/2020    PROTIME 11.5 09/05/2011    INR 1.0 11/23/2020    APTT 33.4 11/23/2020       HCG (If Applicable): No results found for: PREGTESTUR, PREGSERUM, HCG, HCGQUANT     ABGs: No results found for: PHART, PO2ART, IFE6TSD, RFO8AVO, BEART, F7BWZUVD     Type & Screen (If Applicable):  No results found for: LABABO, LABRH    Drug/Infectious Status (If Applicable):  No results found for: HIV, HEPCAB    COVID-19 Screening (If Applicable): No results found for: COVID19    ECHO 10/29/19   Summary   Left ventricle is normal in size . No regional wall motion abnormalities seen. Normal left ventricular ejection fraction. There is doppler evidence of stage I diastolic dysfunction. Physiologic and/or trace mitral regurgitation is present. Physiologic and/or trace tricuspid regurgitation.     EKG 11/23/20  Narrative & Impression     Normal sinus rhythm  Normal ECG  When compared with ECG of 17-OCT-2020 21:24,  No significant change was found  Confirmed by Gonzalo Hernadez ([id number]) on 11/23/2020 5:28:52 PM     MRI 11/24/20  Impression    There is a punctate 3 mm area of restricted diffusion in the high right    parietal lobe consistent with an acute area of infarct in the right middle    cerebral artery territory.  No other areas of restricted diffusion are    identified. Cerebral atrophy.  Chronic small vessel ischemic changes.  Remote lacunar    infarct in the right cerebellum. CTA 11/23/20  Impression    1. No perfusion mismatch. 2. 80% stenosis in the proximal right internal carotid artery, with focal    soft plaque projecting into the lumen of the vessel, at risk for distal    embolism.  Further evaluation with MRI brain is recommended to exclude small    embolic infarctions. 3. 50% stenosis in the proximal left internal carotid artery with associated    2.4 mm atherosclerotic penetrating ulcer at the posterior aspect. 4. 40% focal stenosis at the origin of the right vertebral artery. 5. 40% stenosis in the M1 segment of the left MCA. 6. Fetal origins of the bilateral PCAs with small caliber of the basilar    artery, normal variants.  30% stenosis along the basilar artery. 7. Asymmetric narrowing of the right posterior nasopharynx at the site of    fossa of Rosenmuller, and asymmetric prominence at the right lingular tonsil,    nonspecific.  Recommend correlation with direct visualization to exclude    underlying mass. Results were reported to Dr. Demi Cavazos at 11:21 a.m. on November 23, 2020. Nuclear Stress Test 11/25/20                 [facility]:     Cardiologist: Dr. Ericka Meckel     Baseline EKG: NSR, normal EKG     Indications for study: Chest pain     1. No chest pain  2. No new arrhythmias  3.  No EKG changes suggestive of stress induced ischemia  4. Nuclear images pending     Wild Mehta MD., [facility] [place]. [facility] ([facility]) Cardiology                 Anesthesia Evaluation  Patient summary reviewed and Nursing notes reviewed no history of anesthetic complications:   Airway: Mallampati: III  TM distance: >3 FB   Neck ROM: full  Mouth opening: > = 3 FB Dental: normal exam         Pulmonary:Negative Pulmonary ROS breath sounds clear to auscultation                            ROS comment: COVID Neg 11/27/2020   Cardiovascular:    (+) hypertension:, hyperlipidemia      ECG reviewed  Rhythm: regular  Rate: normal  Echocardiogram reviewed  Stress test reviewed  Cleared by cardiology           ROS comment: Stenosis of right carotid artery     Neuro/Psych:   (+) CVA (pt denies):, depression/anxiety             GI/Hepatic/Renal:   (+) hiatal hernia, GERD:,          ROS comment: Personal history of hydronephrosis. Endo/Other:                     Abdominal:   (+) obese,         Vascular:           ROS comment: Stenosis of right carotid artery. Anesthesia Plan      general     ASA 4       Induction: intravenous. arterial line, central line and BIS  MIPS: Postoperative opioids intended, Prophylactic antiemetics administered and Postoperative trial extubation. Anesthetic plan and risks discussed with patient. Use of blood products discussed with patient whom consented to blood products. Plan discussed with CRNA and attending. Tania Lawson RN   11/25/2020    Pt seen, examined, chart reviewed, plan discussed.   [facility] [place]  11/27/2020  6:46 AM

## 2020-11-25 NOTE — CARE COORDINATION
Spoke with Edy at acute rehab. If they are able to accept they will not have a bed until potentially Friday 11/27/20. Patient to have Stress test today. Await results and further plan. Dry Covid test given to Charge RN.

## 2020-11-25 NOTE — CARE COORDINATION
Notified by Nursing that patient will be having an endarectomy Friday. Notified acute, they will continue to follow for now. Will need to check beds and therapy levels closer to discharge.

## 2020-11-25 NOTE — PROGRESS NOTES
Gisell Huddleston is a 82 y.o. right handed female     Neurology following for stroke    PMH of HTN, HLD, GERD, chronic cervical pain    Presented to ED with acute onset of left arm weakness. Patient notes an episode of left leg numbness/weakness about a month ago. On the morning of admission she had awoken with right neck pain and soon after left arm heaviness. Patient has a history of receiving cervical injections with pain clinic. CT of head was negative. CTA head/neck showed R ICA 30% stenosed and LICA 36% stenosis. MRI brain shows small stroke right parietal area. Patient had a stress test done this morning. And is also being followed by vascular, to have CEA done this Friday. Patient has complaints of neck pain and having an episode of lightheadedness with forehead diaphoresis. She has no other complaints and her left arm weakness is slowly subsiding. Neurology has ordered MRI C-spine to further evaluate for possible myelopathic process. No chest pain or palpitations  No coughing or wheezing    +  lightheadedness   No falls, tripping or stumbling  No numbness or tingling   + Left arm weakness    ROS otherwise negative      Objective:     /63   Pulse 82   Temp 98 °F (36.7 °C) (Temporal)   Resp 18   Ht 5' 2\" (1.575 m)   Wt 167 lb 1.7 oz (75.8 kg)   LMP  (LMP Unknown)   SpO2 96%   BMI 30.56 kg/m²     General appearance: alert, appears stated age, cooperative and no distress  Head: normocephalic, without obvious abnormality, atraumatic  Eyes: conjunctivae/corneas clear  Neck: nsymmetrical, trachea midline   Lungs: Respirations nonlabored  Heart: NSR monitor  Extremities:  normal, atraumatic, no cyanosis or edema  Pulses: 2+ and symmetric  Skin: color, texture, turgor normal---no rashes or lesions      Mental Status: Alert and oriented to self, time and place. Follows commands. She did have difficulty spelling the word \"world\" backwards.     Appropriate attention/concentration  Intact fundus of knowledge  Repetition intact  Intact memories      Speech: No dysarthria  Language: No aphasias    Cranial Nerves:  I: smell NA   II: visual acuity  NA   II: visual fields Full to confrontation   II: pupils NORMA   III,VII: ptosis None   III,IV,VI: extraocular muscles  Full ROM   V: mastication Normal   V: facial light touch sensation  Normal   V,VII: corneal reflex     VII: facial muscle function - upper  Normal   VII: facial muscle function - lower Normal   VIII: hearing Normal   IX: soft palate elevation  Normal   IX,X: gag reflex    XI: trapezius strength  5/5   XI: sternocleidomastoid strength 5/5   XI: neck extension strength  5/5   XII: tongue strength  Normal     Motor:  5/5 throughout  Normal bulk and tone  No drift   No abnormal movements    Sensory:  LT and PP normal    Coordination:   FN, FFM and MADELYN normal  HS normal    DTR:   2+ throughout    No Babinskis  No Ortiz's    No other pathological reflexes  Laboratory/Radiology:     CBC with Differential:    Lab Results   Component Value Date    WBC 5.5 11/24/2020    RBC 4.03 11/24/2020    HGB 12.5 11/24/2020    HCT 38.5 11/24/2020     11/24/2020    MCV 95.5 11/24/2020    MCH 31.0 11/24/2020    MCHC 32.5 11/24/2020    RDW 13.7 11/24/2020    NRBC 0.0 02/09/2020    SEGSPCT 51 12/24/2013    LYMPHOPCT 20.8 11/23/2020    MONOPCT 12.4 11/23/2020    BASOPCT 0.2 11/23/2020    MONOSABS 0.58 11/23/2020    LYMPHSABS 0.97 11/23/2020    EOSABS 0.02 11/23/2020    BASOSABS 0.01 11/23/2020     CMP:    Lab Results   Component Value Date     11/24/2020    K 3.7 11/24/2020     11/24/2020    CO2 25 11/24/2020    BUN 18 11/24/2020    CREATININE 0.8 11/24/2020    GFRAA >60 11/24/2020    LABGLOM >60 11/24/2020    GLUCOSE 90 11/24/2020    GLUCOSE 97 01/30/2012    PROT 5.9 11/24/2020    LABALBU 3.5 11/24/2020    LABALBU 4.1 01/30/2012    CALCIUM 8.6 11/24/2020    BILITOT 0.4 11/24/2020    ALKPHOS 81 11/24/2020    AST 10 11/24/2020    ALT 6 11/24/2020     Hepatic Function Panel:    Lab Results   Component Value Date    ALKPHOS 81 11/24/2020    ALT 6 11/24/2020    AST 10 11/24/2020    PROT 5.9 11/24/2020    BILITOT 0.4 11/24/2020    BILIDIR <0.2 04/02/2019    IBILI see below 04/02/2019    LABALBU 3.5 11/24/2020    LABALBU 4.1 01/30/2012     FLP:    Lab Results   Component Value Date    TRIG 147 10/28/2019    HDL 34 11/24/2020    LDLCALC 97 11/24/2020    LABVLDL 27 11/24/2020     CT of head: Negative for acute findings    CTA head/neck; CTP brain:1. No perfusion mismatch. 2. 80% stenosis in the proximal right internal carotid artery, with focal   soft plaque projecting into the lumen of the vessel, at risk for distal   embolism.  Further evaluation with MRI brain is recommended to exclude small   embolic infarctions. 3. 50% stenosis in the proximal left internal carotid artery with associated   2.4 mm atherosclerotic penetrating ulcer at the posterior aspect. 4. 40% focal stenosis at the origin of the right vertebral artery. 5. 40% stenosis in the M1 segment of the left MCA. 6. Fetal origins of the bilateral PCAs with small caliber of the basilar   artery, normal variants.  30% stenosis along the basilar artery. 7. Asymmetric narrowing of the right posterior nasopharynx at the site of   fossa of Rosenmuller, and asymmetric prominence at the right lingular tonsil,   nonspecific.      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

## 2020-11-25 NOTE — PROCEDURES
[facility] Nuclear Stress Test:    Cardiologist: Dr. Pretty Bravo EKG: Normal sinus rhythm, normal EKG    Indications for study: Chest pain    1. No chest pain  2. No new arrhythmias  3. No EKG changes suggestive of stress induced ischemia  4. Nuclear images pending    Dragan Cruz MD., [address] [facility]) Cardiology

## 2020-11-25 NOTE — PROGRESS NOTES
11/25 4pm- Sent for patient to come to MRI and patient did not want exam at this time- asked \"if she could come around 7pm\" This is the 2nd refusal of patient- will try later if schedule allows.

## 2020-11-25 NOTE — PROGRESS NOTES
Acute Rehab Pre-Admission Screen      Referral date: 11/24/20  Onset/Hospital Admit Date: 11/23/2020  9:51 PM    Current Location: [address] [place] [address]    Name: Armani Scales: 1935  Age: 82 y.o. Admitting Diagnosis: CVA   Address: [address] [place] [address]  Home Phone: [phone] (home)  [address]:     Sex: female  Race:   Marital Status:    Ethnic/Cultural/Islam Considerations: Spiritism    Advanced Directives: [x] Full Code  [] [facility] [] Medications only       [] Living Will  [] DPOA      []Organ donor      [] No mechanical breathing or ventilation     [] no tube feeding, nutrition or hydration      [x] Patient does not have advanced directives or living will         COVERAGE INFORMATION   Primary Insurer: medicare  Secondary Insurer: Tanner Benoitey  Medicare #: [id number]  Verified coverage: [] Patient  [] Family/caregiver    [x] financial department [] insurance carrier    COVID SCREEN DATE:  Result: (Negative)      MEDICAL UPDATE:  History of present admission: 82year old female admitted 11/23 with sudden onset left sided weakness. In the ED, CT head revealing chronic microvascular changes without any acute abnormalities. CTA neck revealing bilateral internal carotid artery stenosis, 80% on the right and 50% on the left. TPA was recommended by telestroke but patient denied as her symptoms already improved. 11/24- MRI showed infarct in high right parietal lobe and remote lacunar infarct in right cerebellum  11/25- stress test today  11/27- to OR for carotid endarterectomy with Dr. Rubi Mac. Monitor in ICU post op. Keep BP <140  11/28 - reported swallowing difficulty, speech eval rec dental soft  11/29 - BP dropped significantly with IV hydralazine requiring IVF bolus.  Had BM  11/30 - increased BUN/cr, started IVF    PHYSICIAN / REFERRAL INFORMATION  Referring Physician: Yesenia Geurrero MD  Attending Physician: El Solano MD  Primary Care Physician: Franky Wallace, MD  Consultants/Opinions (see full consult notes on chart): cardiology- [facility]  Neurology- [facility]  [place] [facility] for [facility]    SOCIAL INFORMATION  Primary  Contact: Jacquelyn Enriquez  Relationship: child  Primary Phone: [phone]  Secondary  Contact: Sanna Francisco  Relationship: child  Primary Phone: [phone]    Previous Community Services: none  Caregiver available: [] Yes [] No Hours per day available: tbd  Patient previously employed:  [] Yes [] Part Time [] Full Time [x] No [x] Retired  Occupation/Profession: retired  Prior living arrangements: [x] Home  [] Assisted living  [] SNF [] Other  Lived with:  [x] Alone  [] Spouse  [] Family  [] Other  Home:  multi story home  4 entry steps  Rails: 1  Steps:  flight to 2nd floor  Rails:  1   Bedroom: [x] 1st floor  [] 2nd floor    Bathroom:  [x] 1st floor  [] 2nd floor    Prior Functional Level: Independent for: all, drove  Dominant hand: [x] Right  [] Left    Previous Home Equipment:  [] Cane [] Grab bars [] Orthotic / prosthetic   [] Shower chair [] Tub bench  [] 3-in-1 Commode [] Long handle sponge   [] Oxygen [] Sock aide  [] Wheelchair  [] motorized wc/scooter  [] Wheelchair cushion   [] Crutches [] Long handle shoehorn  [] Reachers [] Toilet seat elevator [] Rollator  [] Walker(wheeled)   [] Walker(standard) [] Mechanical lift    [x] None of the above     Has patient had 2 or more falls in the past year or any fall with injury in the past year? [] yes   [x] no   []unknown    Has patient had major surgery during past 100 days prior to admission?    [x] yes   [] no Type/ Date: 11/27- carotid endarterectomy    Surgical History:  Past Surgical History:   Procedure Laterality Date    CAROTID ENDARTERECTOMY Right 11/27/2020    RIGHT CAROTID ENDARTERECTOMY performed by [facility]ee Gust[facility]son [facility] at [facility] COLONOSCOPY      HYSTERECTOMY      LITHOTRIPSY Left 2/7/2020    CYSTOSCOPY RETROGRADE PYELOGRAM LEFT URETEROSCOPY LEFT J STENT LASER LITHOTRIPSY performed by Fabrice Mccain changes     CLINICAL DATA:     Height : 5'2     Weight:  167 lbs   BMI: 30.71       Date: 11/27/20 Date: 11/30/20 Date:    temperature 96.9 98.5    pulse 69 94    respirations 16 16    Blood pressure 145/66 152/61    Pulse oximeter 97% room air 95% 2L nasal cannula       ALLERGIES: Lorazepam and Penicillins    DIET : DIET DENTAL SOFT;  Dietary Nutrition Supplements: Standard High Calorie Oral Supplement  Dietary Nutrition Supplements: Wound Healing Oral Supplement    Current Lab and Diagnostic Tests:   Recent Results (from the past 24 hour(s))   Basic metabolic panel    Collection Time: 11/30/20  4:58 AM   Result Value Ref Range    Sodium 144 132 - 146 mmol/L    Potassium 3.8 3.5 - 5.0 mmol/L    Chloride 106 98 - 107 mmol/L    CO2 26 22 - 29 mmol/L    Anion Gap 12 7 - 16 mmol/L    Glucose 105 (H) 74 - 99 mg/dL    BUN 17 8 - 23 mg/dL    CREATININE 1.3 (H) 0.5 - 1.0 mg/dL    GFR Non-African American 39 >=60 mL/min/1.73    GFR African American 47     Calcium 8.7 8.6 - 10.2 mg/dL   CBC Auto Differential    Collection Time: 11/30/20  7:09 AM   Result Value Ref Range    WBC 10.1 4.5 - 11.5 E9/L    RBC 4.03 3.50 - 5.50 E12/L    Hemoglobin 12.6 11.5 - 15.5 g/dL    Hematocrit 38.5 34.0 - 48.0 %    MCV 95.5 80.0 - 99.9 fL    MCH 31.3 26.0 - 35.0 pg    MCHC 32.7 32.0 - 34.5 %    RDW 13.6 11.5 - 15.0 fL    Platelets 827 969 - 589 E9/L    MPV 10.4 7.0 - 12.0 fL    Neutrophils % 79.0 43.0 - 80.0 %    Immature Granulocytes % 0.4 0.0 - 5.0 %    Lymphocytes % 7.0 (L) 20.0 - 42.0 %    Monocytes % 13.5 (H) 2.0 - 12.0 %    Eosinophils % 0.1 0.0 - 6.0 %    Basophils % 0.0 0.0 - 2.0 %    Neutrophils Absolute 8.00 (H) 1.80 - 7.30 E9/L    Immature Granulocytes # 0.04 E9/L    Lymphocytes Absolute 0.71 (L) 1.50 - 4.00 E9/L    Monocytes Absolute 1.37 (H) 0.10 - 0.95 E9/L    Eosinophils Absolute 0.01 (L) 0.05 - 0.50 E9/L    Basophils Absolute 0.00 0.00 - 0.20 Y2/G   Basic metabolic panel    Collection Time: 11/30/20 11:30 AM   Result Value Ref Range    Sodium 142 132 - 146 mmol/L    Potassium 3.8 3.5 - 5.0 mmol/L    Chloride 104 98 - 107 mmol/L    CO2 30 (H) 22 - 29 mmol/L    Anion Gap 8 7 - 16 mmol/L    Glucose 113 (H) 74 - 99 mg/dL    BUN 17 8 - 23 mg/dL    CREATININE 1.2 (H) 0.5 - 1.0 mg/dL    GFR Non-African American 43 >=60 mL/min/1.73    GFR African American 52     Calcium 8.6 8.6 - 10.2 mg/dL     Xr Cervical Spine (4-5 Views)  Result Date: 11/24/2020  Degenerative changes of the cervical spine with bilateral neural foraminal narrowing and multilevel narrowing of intervertebral disc spaces. Evaluation is limited based on the images obtained. Limited visualization of the upper cervical spine on the lateral radiographs obtained. Ct Head Wo Contrast  Result Date: 11/23/2020  No acute intracranial abnormality. Findings likely reflecting chronic small vessel ischemic change    Xr Chest Portable  Result Date: 11/23/2020  1. No acute cardiopulmonary process is identified. 2.  The lungs appear mildly hyperinflated, suggestive of possible underlying obstructive lung disease (such as asthma or COPD). Cta Neck W Contrast  Result Date: 11/23/2020  1. No perfusion mismatch. 2. 80% stenosis in the proximal right internal carotid artery, with focal soft plaque projecting into the lumen of the vessel, at risk for distal embolism. Further evaluation with MRI brain is recommended to exclude small embolic infarctions. 3. 50% stenosis in the proximal left internal carotid artery with associated 2.4 mm atherosclerotic penetrating ulcer at the posterior aspect. 4. 40% focal stenosis at the origin of the right vertebral artery. 5. 40% stenosis in the M1 segment of the left MCA. 6. Fetal origins of the bilateral PCAs with small caliber of the basilar artery, normal variants. 30% stenosis along the basilar artery.  7. Asymmetric narrowing of the right posterior nasopharynx at the site of fossa of Rosenmuller, and asymmetric prominence at the right lingular tonsil, nonspecific. Recommend correlation with direct visualization to exclude underlying mass. Results were reported to Dr. Harrison Ellis at 11:21 a.m. on November 23, 2020. Ct Brain Perfusion  Result Date: 11/23/2020  1. No perfusion mismatch. 2. 80% stenosis in the proximal right internal carotid artery, with focal soft plaque projecting into the lumen of the vessel, at risk for distal embolism. Further evaluation with MRI brain is recommended to exclude small embolic infarctions. 3. 50% stenosis in the proximal left internal carotid artery with associated 2.4 mm atherosclerotic penetrating ulcer at the posterior aspect. 4. 40% focal stenosis at the origin of the right vertebral artery. 5. 40% stenosis in the M1 segment of the left MCA. 6. Fetal origins of the bilateral PCAs with small caliber of the basilar artery, normal variants. 30% stenosis along the basilar artery. 7. Asymmetric narrowing of the right posterior nasopharynx at the site of fossa of Rosenmuller, and asymmetric prominence at the right lingular tonsil, nonspecific. Recommend correlation with direct visualization to exclude underlying mass. Results were reported to Dr. Harrison Ellis at 11:21 a.m. on November 23, 2020. Cta Head W Contrast  Result Date: 11/23/2020  1. No perfusion mismatch. 2. 80% stenosis in the proximal right internal carotid artery, with focal soft plaque projecting into the lumen of the vessel, at risk for distal embolism. Further evaluation with MRI brain is recommended to exclude small embolic infarctions. 3. 50% stenosis in the proximal left internal carotid artery with associated 2.4 mm atherosclerotic penetrating ulcer at the posterior aspect. 4. 40% focal stenosis at the origin of the right vertebral artery. 5. 40% stenosis in the M1 segment of the left MCA. 6. Fetal origins of the bilateral PCAs with small caliber of the basilar artery, normal variants. 30% stenosis along the basilar artery.  7. Asymmetric narrowing of the right posterior nasopharynx at the site of fossa of Rosenmuller, and asymmetric prominence at the right lingular tonsil, nonspecific. Recommend correlation with direct visualization to exclude underlying mass. Results were reported to Dr. Demi Cavazos at 11:21 a.m. on November 23, 2020. Mri Brain Wo Contrast  Result Date: 11/24/2020  There is a punctate 3 mm area of restricted diffusion in the high right parietal lobe consistent with an acute area of infarct in the right middle cerebral artery territory. No other areas of restricted diffusion are identified. Cerebral atrophy. Chronic small vessel ischemic changes. Remote lacunar infarct in the right cerebellum.       Additional labs or diagnostic studies needed before admission to rehabilitation unit:  None    Medications:   sennosides-docusate sodium  2 tablet Oral Daily    docusate sodium  100 mg Oral BID    bisacodyl  10 mg Rectal Daily    lactulose  20 g Oral TID    aspirin  81 mg Oral Daily    rosuvastatin  5 mg Oral Nightly    amLODIPine  5 mg Oral Daily    ezetimibe  10 mg Oral Daily    sodium chloride flush  10 mL Intravenous 2 times per day    enoxaparin  40 mg Subcutaneous Daily      lactated ringers 100 mL/hr at 11/30/20 1139     oxyCODONE **OR** oxyCODONE, sodium chloride flush, acetaminophen **OR** acetaminophen, polyethylene glycol, promethazine **OR** ondansetron, hydrALAZINE    SPECIAL PRECAUTIONS: [x] No current precautions  [] Cardiac  [] Renal [] Sternal [] Respiratory      [] Neurological           [] Hip  [] Spinal [] Seizure  [] Aspiration  [] Isolation precautions:    [] Contact   [] Respiratory   [] Protective     [] Droplet    [x] Weight Bearing precautions:         [] Non Weight Bearing :         [] Toe Touch Weight Bearing :        [] Partial Weight Bearing :         [x] Weight Bearing as Tolerated :         [x] Fall Risk:   [] Recent history of falls [x] Falls risk level (Oseguera Scale): high      [x] Bed Alarm    [] Do not leave alone in the bathroom    [x] Chair Alarm   [] Cognitive impairment      [] One to One supervision  [] Sitter / Tele sitter   [] Safety enclosure bed  [x] Decreased balance     SPECIAL REHABILITATION NEEDS:   [x] IV Therapy: [x] PRN Adapter  [] Midline  [] PICC      [] Central Line    [] TPN       [x] Oxygen: [] Trach [] Bi-PAP [] CPAP  [x] Nasal cannula  [x] Liters: 2     [x] Wound Care:   [] Pressure ulcers(stage and location) -   [] Wound vac   [x] Wound or incision care right neck surgical incision    [x] Pain Management (level of pain, meds): no  pain     [x] Incontinence Bladder (purwick) [] Cano  Insertion date:    []Hemodialysis and  Frequency:   [] Incontinence Bowel    [x] Last bowel movement : 11/29/20    Substance use history: [] Yes  [] No   [] Tobacco  [] Alcohol  [] Other     [] Ethnic  [] Cultural  [] Spiritual  [] Language [] Needs  [] Other than English  [] Hearing Impaired  [] Visually Impaired  [] Speaking Impaired  [] Blind  [] Special equipment:  [] Devices/Splints  [] Type   [] Brace   [] Type  [] Bariatric bed  [] Extra wide commode  [] Extra wide wheelchair [] Extra wide walker  [] Rolando walker  [] Rolando wheelchair  [] Transfer lift    [] Other equipment     FUNCTIONAL STATUS PT / [place] / Meena Rose:  Wolf Oakley / RADHA Discipline Initial: 11/24/20 Follow Up: 11/30/20 Current:    Eating OT Set up   Set up      Grooming OT Minimum assistance   Minimum assistance      Bathing OT Moderate Assist   Moderate Assist      Dressing Upper Extremity OT Minimum assistance   Moderate Assist      Dressing Lower Extremity OT Max Assist   Moderate Assist      Toileting OT Moderate Assist   Minimum assistance      Toilet Transfers OT Minimum assistance   Minimum assistance      Tub/Shower Transfers OT nt nt    Homemaking OT nt nt    Bed Mobility PT Minimum assistance   Moderate Assist      Bed/Wheelchair Transfers PT Minimum assistance   Minimum assistance      Locomotion Walk / Wheelchair  Device:  Distance: PT 15 feet x 2 reps with Vince no device 60 feet x 2 reps with Vince no device    Endurance PT      Expression SP      Social Interaction SP      Problem Solving SP      Memory SP      Comprehension SP      Swallowing SP  Mild oropharyngeal dysphagia     Bowel Management NSG      Bladder Management NSG        Comments on Functional Status:  Will benefit from 3 hours of acute rehab therapy daily to improve gait, transfers, IADLs, ADLs    [x] Able to participate a minimum of 3 hours per day of therapy intervention    Required treatments/services: [x] Rehabilitation nursing [] Dietitian / nurtition                 [x] Case management  [] Respiratory Therapy      [x] Social work   [] Other     Required Therapy:  Therapy Hours per Day Days per Week Therapeutic Interventions Required   [x] Physical Therapy 1 5-7 Gait, transfers, Safety, strength, education, endurance   [x] Occupational Therapy 1 5-7 ADLs, IADLs, Safety, strength, education, endurance   [x] Speech Pathology 1 5-7 Speech, cognition, safety, education   [] Prosthetics / Orthotics       []         Anticipated Discharge Plan:   Anticipated DME Needs:  [x] Home     [] Commode   [x] Alone    [] Wheelchair   [] Supervised    [] Felecia Poser   [] Assist    [] Oxygen        [] Hospital Bed  [] Assisted Living    [] Ramp        [x] To Be Determined    Anticipated Home Health Services:  Anticipated Outpatient Services:  [] PT       [] PT  [] OT      [] OT  [] Speech     [] Speech  [] Nursing     [] Dialysis  [] Aide      [x] To Be Determined  [x] To Be Determined    Anticipated support group:  [] Amputation  [] Multiple Sclerosis  [x] Stroke  [] Brain Injury  [] Spinal cord injury  [] Other     Barriers to discharge: lives alone    Discharge Support: [x] Patient lives alone and does not have a caregiver available     [] Patient has a caregiver available     [] Discharge plan has been verified with patient's caregiver      [] Caregiver is in Dr. Vitor Asencio [] Dr. Andrea Uriarte  [] Dr. Pruitt Headings (if not admitted within 48 hours of initial pre-screen)    Medical Update/Changes: Prescreen updated to reflect current data since initiated. Functional Update/Changes: Therapy notes updated on prescreen graph to reflect current status.      Reviewer Signature:_____________________________________    Date:  11/30/20 Time: 1400    PHYSICIAN ADMISSION DETERMINATION AND REVIEW UPDATE:     ____________________________________________________________________  ____________________________________________________________________  ____________________________________________________________________  ____________________________________________________________________  ____________________________________________________________________    Physician Signature:_____________________________________    Print Signature:_________________________________________    Date: 11/30/20    Time:  6907

## 2020-11-25 NOTE — PROGRESS NOTES
Per Dr Eber Mahajan, patient appropriate for ARU. Will accept pending medical stability, completed testing, negative COVID, discharge plan confirmation with children, and bed availability.

## 2020-11-26 PROCEDURE — 6370000000 HC RX 637 (ALT 250 FOR IP): Performed by: FAMILY MEDICINE

## 2020-11-26 PROCEDURE — 2580000003 HC RX 258: Performed by: FAMILY MEDICINE

## 2020-11-26 PROCEDURE — 6370000000 HC RX 637 (ALT 250 FOR IP): Performed by: INTERNAL MEDICINE

## 2020-11-26 PROCEDURE — 6360000002 HC RX W HCPCS: Performed by: FAMILY MEDICINE

## 2020-11-26 PROCEDURE — 2060000000 HC ICU INTERMEDIATE R&B

## 2020-11-26 PROCEDURE — 99232 SBSQ HOSP IP/OBS MODERATE 35: CPT | Performed by: NURSE PRACTITIONER

## 2020-11-26 RX ADMIN — Medication 10 ML: at 20:40

## 2020-11-26 RX ADMIN — ACETAMINOPHEN 650 MG: 325 TABLET ORAL at 14:05

## 2020-11-26 RX ADMIN — EZETIMIBE 10 MG: 10 TABLET ORAL at 09:06

## 2020-11-26 RX ADMIN — ENOXAPARIN SODIUM 40 MG: 40 INJECTION SUBCUTANEOUS at 09:06

## 2020-11-26 RX ADMIN — AMLODIPINE BESYLATE 5 MG: 5 TABLET ORAL at 09:06

## 2020-11-26 RX ADMIN — ASPIRIN 81 MG CHEWABLE TABLET 81 MG: 81 TABLET CHEWABLE at 09:06

## 2020-11-26 RX ADMIN — Medication 10 ML: at 09:07

## 2020-11-26 RX ADMIN — ROSUVASTATIN CALCIUM 5 MG: 5 TABLET, FILM COATED ORAL at 20:39

## 2020-11-26 ASSESSMENT — PAIN SCALES - GENERAL
PAINLEVEL_OUTOF10: 0
PAINLEVEL_OUTOF10: 7
PAINLEVEL_OUTOF10: 4

## 2020-11-26 NOTE — PROGRESS NOTES
Progress Note  Date:2020       Room:8517/8517-B  Patient Name:Flores Gaines     YOB: 1935     Age:85 y.o. Subjective    Subjective arm weakness resolved but still off balance with ambulating  Cardiac tests negative yesterday  Going for endarterectomy ashlee am, very nervous, anxious  Review of Systems  Objective         Vitals Last 24 Hours:  TEMPERATURE:  Temp  Av °F (36.1 °C)  Min: 96.5 °F (35.8 °C)  Max: 98 °F (36.7 °C)  RESPIRATIONS RANGE: Resp  Av.5  Min: 16  Max: 18  PULSE OXIMETRY RANGE: SpO2  Av.7 %  Min: 96 %  Max: 98 %  PULSE RANGE: Pulse  Av.5  Min: 69  Max: 82  BLOOD PRESSURE RANGE: Systolic (42YFY), LXO:027 , Min:139 , UOV:713   ; Diastolic (99BIL), MCN:60, Min:61, Max:77    I/O (24Hr):     Intake/Output Summary (Last 24 hours) at 2020 0739  Last data filed at 2020 0900  Gross per 24 hour   Intake 0 ml   Output --   Net 0 ml     Objective  Labs/Imaging/Diagnostics    Labs:  CBC:  Recent Labs     20  1110 20  0531   WBC 4.7 5.5   RBC 4.47 4.03   HGB 13.9 12.5   HCT 43.7 38.5   MCV 97.8 95.5   RDW 13.4 13.7    265     CHEMISTRIES:  Recent Labs     20  1056 20  1110 20  0531   NA  --  141 144   K  --  3.9 3.7   CL  --  106 108*   CO2  --  24 25   BUN  --  16 18   CREATININE  --  0.7 0.8   GLUCOSE 104 104* 90     PT/INR:  Recent Labs     20  111   PROTIME 11.4   INR 1.0     APTT:  Recent Labs     20  111   APTT 33.4     LIVER PROFILE:  Recent Labs     20  1110 20  0531   AST 11 10   ALT 7 6   BILITOT 0.4 0.4   ALKPHOS 99 81       Imaging Last 24 Hours:  Xr Cervical Spine (4-5 Views)    Result Date: 2020  EXAMINATION: XRAY VIEWS OF THE CERVICAL SPINE 2020 4:22 pm COMPARISON: 2005 HISTORY: ORDERING SYSTEM PROVIDED HISTORY: neck pain TECHNOLOGIST PROVIDED HISTORY: Reason for exam:->neck pain What reading provider will be dictating this exam?->CRC FINDINGS: Prominent degenerative changes of the cervical spine with multilevel narrowing of intervertebral disc spaces. Large anterior osteophytes at C5. Alignment is anatomic. Evaluation is limited based on the images obtained. Limited visualization of the upper cervical spine on the lateral radiograph obtained. No evidence of prevertebral soft tissue swelling. Neural foraminal narrowing at C5-6 bilaterally secondary to osteophytes at the uncovertebral joints. Degenerative changes of the cervical spine with bilateral neural foraminal narrowing and multilevel narrowing of intervertebral disc spaces. Evaluation is limited based on the images obtained. Limited visualization of the upper cervical spine on the lateral radiographs obtained. Mri Cervical Spine Wo Contrast    Result Date: 11/25/2020  EXAMINATION: MRI OF THE CERVICAL SPINE WITHOUT CONTRAST 11/25/2020 7:34 pm TECHNIQUE: Multiplanar multisequence MRI of the cervical spine was performed without the administration of intravenous contrast. COMPARISON: None. HISTORY: ORDERING SYSTEM PROVIDED HISTORY: neck pain TECHNOLOGIST PROVIDED HISTORY: Reason for exam:->neck pain What reading provider will be dictating this exam?->CRC FINDINGS: BONES/ALIGNMENT: There is normal alignment of the spine. The vertebral body heights are maintained. The bone marrow signal appears unremarkable. SPINAL CORD: No abnormal cord signal is seen. SOFT TISSUES: No paraspinal mass identified. C2-C3: There is no significant disc protrusion, spinal canal stenosis or neural foraminal narrowing. C3-C4: There is no significant disc protrusion, spinal canal stenosis or neural foraminal narrowing. C4-C5: There is no significant disc protrusion, spinal canal stenosis or neural foraminal narrowing. C5-C6: Significant intervertebral disc height loss with surrounding endplate irregularity and a mild to moderate disc osteophyte complex resulting in mild narrowing of the spinal canal at this level.  C6-C7: Intervertebral disc height loss with a mild broad-based disc osteophyte complex resulting in mild narrowing of the spinal canal at this left C7-T1: There is no significant disc protrusion, spinal canal stenosis or neural foraminal narrowing. Mild-to-moderate degenerative changes at the C5-C6 and C6-C7 levels without significant spinal canal nor neural foraminal stenosis. Mri Brain Wo Contrast    Result Date: 11/24/2020  EXAMINATION: MRI OF THE BRAIN WITHOUT CONTRAST,  11/24/2020 8:10 pm TECHNIQUE: Multiplanar multisequence MRI of the brain was performed without the administration of intravenous contrast. COMPARISON: None HISTORY: ORDERING SYSTEM PROVIDED HISTORY: Acute CVA with left upper extremity weakness. TECHNOLOGIST PROVIDED HISTORY: Reason for exam:   Acute CVA with left upper extremity weakness. What reading provider will be dictating this exam?   CRC Initial evaluation. FINDINGS: INTRACRANIAL STRUCTURES/VENTRICLES: There is a punctate area of restricted diffusion measuring 3 mm in the high right parietal lobe consistent with an acute area of infarct. No other areas of restricted diffusion are identified. The ventricles and cisternal spaces are prominent consistent with cerebral atrophy. There are several punctate areas of high-signal in the periventricular white matter and centrum semiovale that are likely related to chronic small vessel ischemic disease. There is no midline shift or mass effect. There is a remote lacunar infarct in the right cerebellum. ORBITS: The visualized portion of the orbits demonstrate no acute abnormality. SINUSES:  The visualized paranasal sinuses and mastoid air cells are clear. BONES/SOFT TISSUES: The bone marrow signal intensity appears normal. The soft tissues demonstrate no acute abnormality. There is a punctate 3 mm area of restricted diffusion in the high right parietal lobe consistent with an acute area of infarct in the right middle cerebral artery territory. No other areas of restricted diffusion are identified. Cerebral atrophy. Chronic small vessel ischemic changes. Remote lacunar infarct in the right cerebellum. Nm Cardiac Stress Test Nuclear Imaging    Result Date: 11/25/2020  Indication:  Preoperative cardiac evaluation Clinical History:   Patient has no known history of coronary artery disease. Procedure:  Pharmacologic stress testing was performed with Regadenoson 0.4 mg for 15 seconds. IMAGING: Myocardial perfusion imaging was performed at rest 30-35 minutes following the intravenous injection of 12 mCi of (Tc-Sestamibi) followed by 10 ml of Normal Saline. As per infusion protocol, the patient was injected intravenously with 33.6 mCi of (Tc-Sestamibi) followed by 10 ml of Normal Saline. Gated post-stress tomographic imaging was performed 20-25 minutes after stress. FINDINGS: The overall quality of the study was good. Left ventricular cavity size was noted to be normal. Rotational analog analysis demonstrated no patient motion, no extracardiac activity, no attenuation artifact. The gated SPECT stress imaging in the short, vertical long, and horizontal long axis demonstrated normal homogeneous tracer distribution throughout the myocardium. The resting images show no change. Gated SPECT left ventricular ejection fraction was calculated to be 75 % with normal wall motion and wall thickening. Transient ischemic dilatation 1.07. ECG during the infusion did not change. The myocardial perfusion imaging was normal without ischemia or infarct. Overall left ventricular systolic function was normal without regional wall motion abnormalities. Low risk pre-operative pharmacologic stress test. Thank you for sending your patient to this [employer]. Jennifer Rea MD, [address] [facility], [address] cardiology.      Awake, alert  Nervous, anxious, no tremors  hrrr  Lungs ctab  Moving extremities equally with equal strength  No facial assymetry  Assessment//Plan           Hospital Problems           Last Modified POA    Acute CVA (cerebrovascular accident) ([facility] [address] 11/23/2020 Yes        Assessment & Plan  Acute cva  Carotid stenosis  htn  Hl  Anxiety  Control bp, for surgery in am  Electronically signed by Shae Jennings DO on 11/26/20 at 7:39 AM EST

## 2020-11-26 NOTE — PROGRESS NOTES
The stress test was (-) for MI or ischemia. They are low risk for perioperative ischemic cardiac events.

## 2020-11-26 NOTE — PROGRESS NOTES
Little Pate is a 82 y.o. right handed female     Neurology following for stroke    PMH: HTN, HLD, GERD, chronic cervical pain    Patient presented to ED on 11/23 with acute onset of left arm weakness. Patient notes an episode of left leg numbness/weakness about a month ago. On the morning of admission she had awoken with right neck pain and soon after left arm heaviness. Patient has a history of receiving cervical injections with pain clinic. CT of head was negative. CTA head/neck showed R ICA 77% stenosed and LICA 68% stenosis. CT perfusion showed no perfusion mismatch   MRI brain shows small stroke right parietal area. MRI cervical spine showed mild to moderate degenerative changes at C5-C6 and C6-C7 levels   Stress test done Thursday appears normal    Patient has right CEA planned with vascular on Friday    Vital signs are stable at present time    Patient is sitting up in her bed eating lunch; no complaints offered except her food was cold    ROS is limited due to uncooperativeness    Objective:     /65   Pulse 72   Temp 97.2 °F (36.2 °C)   Resp 16   Ht 5' 2\" (1.575 m)   Wt 167 lb 8.8 oz (76 kg)   LMP  (LMP Unknown)   SpO2 96%   BMI 30.65 kg/m²     General appearance: alert, appears stated age, cooperative and no distress  Head: normocephalic, without obvious abnormality, atraumatic  Eyes: conjunctivae/corneas clear  Neck: nsymmetrical, trachea midline   Lungs: Respirations nonlabored  Heart: NSR monitor  Extremities:  normal, atraumatic, no cyanosis or edema  Pulses: 2+ and symmetric  Skin: color, texture, turgor normal---no rashes or lesions      Mental Status: Alert and oriented x4--- answers questions each time with questions then would answer appropriately. Resistant to follow commands.   Very uncooperative    Poor attention/concentration  Intact fundus of knowledge  Repetition intact  Intact memories      Speech: No dysarthria  Language: No aphasias    Cranial Nerves:  I: smell NA   II: visual acuity  NA   II: visual fields Full to confrontation   II: pupils PERRL   III,VII: ptosis None   III,IV,VI: extraocular muscles   no gaze preference   V: mastication Normal   V: facial light touch sensation  Normal   V,VII: corneal reflex     VII: facial muscle function - upper  Normal   VII: facial muscle function - lower Normal   VIII: hearing Normal   IX: soft palate elevation  Normal   IX,X: gag reflex    XI: trapezius strength  5/5   XI: sternocleidomastoid strength 5/5   XI: neck extension strength  5/5   XII: tongue strength  Normal     Motor:  5/5 throughout  Normal bulk and tone  No drift   No abnormal movements    Sensory:  LT and PP normal    Coordination:   FN, FFM and MADELYN normal  HS normal    DTR:   2+ throughout    No Babinskis  No Ortiz's    No other pathological reflexes    Laboratory/Radiology:     CBC with Differential:    Lab Results   Component Value Date    WBC 5.5 11/24/2020    RBC 4.03 11/24/2020    HGB 12.5 11/24/2020    HCT 38.5 11/24/2020     11/24/2020    MCV 95.5 11/24/2020    MCH 31.0 11/24/2020    MCHC 32.5 11/24/2020    RDW 13.7 11/24/2020    LYMPHOPCT 20.8 11/23/2020    MONOPCT 12.4 11/23/2020    BASOPCT 0.2 11/23/2020    MONOSABS 0.58 11/23/2020    LYMPHSABS 0.97 11/23/2020    EOSABS 0.02 11/23/2020    BASOSABS 0.01 11/23/2020     CMP:    Lab Results   Component Value Date     11/24/2020    K 3.7 11/24/2020     11/24/2020    CO2 25 11/24/2020    BUN 18 11/24/2020    CREATININE 0.8 11/24/2020    GFRAA >60 11/24/2020    LABGLOM >60 11/24/2020    GLUCOSE 90 11/24/2020    PROT 5.9 11/24/2020    LABALBU 3.5 11/24/2020    CALCIUM 8.6 11/24/2020    BILITOT 0.4 11/24/2020    ALKPHOS 81 11/24/2020    AST 10 11/24/2020    ALT 6 11/24/2020     FLP:    Lab Results   Component Value Date    TRIG 147 10/28/2019    HDL 34 11/24/2020    LDLCALC 97 11/24/2020    LABVLDL 27 11/24/2020     MRI CERVICAL SPINE WO CONTRAST   Final Result Mild-to-moderate degenerative changes at the C5-C6 and C6-C7 levels without   significant spinal canal nor neural foraminal stenosis. NM Cardiac Stress Test Nuclear Imaging   Final Result   ECG during the infusion did not change. The myocardial perfusion imaging was normal without ischemia or   infarct. Overall left ventricular systolic function was normal without regional   wall motion abnormalities. Low risk pre-operative pharmacologic stress test.   Thank you for sending your patient to this [employer]. Amada Bee MD, [facility] [place], [address] [facility] cardiology. MRI BRAIN WO CONTRAST   Final Result   There is a punctate 3 mm area of restricted diffusion in the high right   parietal lobe consistent with an acute area of infarct in the right middle   cerebral artery territory. No other areas of restricted diffusion are   identified. Cerebral atrophy. Chronic small vessel ischemic changes. Remote lacunar   infarct in the right cerebellum. XR CERVICAL SPINE (4-5 VIEWS)   Final Result   Degenerative changes of the cervical spine with bilateral neural foraminal   narrowing and multilevel narrowing of intervertebral disc spaces. Evaluation is limited based on the images obtained. Limited visualization of the upper cervical spine on the lateral radiographs   obtained. CT of head: Negative for acute findings    CTA head/neck; CTP brain:  1. No perfusion mismatch. 2. 80% stenosis in the proximal right internal carotid artery, with focal   soft plaque projecting into the lumen of the vessel, at risk for distal   embolism.  Further evaluation with MRI brain is recommended to exclude small   embolic infarctions. 3. 50% stenosis in the proximal left internal carotid artery with associated   2.4 mm atherosclerotic penetrating ulcer at the posterior aspect. 4. 40% focal stenosis at the origin of the right vertebral artery.    5. 40% stenosis in the M1 segment of the left MCA. 6. Fetal origins of the bilateral PCAs with small caliber of the basilar   artery, normal variants.  30% stenosis along the basilar artery. 7. Asymmetric narrowing of the right posterior nasopharynx at the site of   fossa of Rosenmuller, and asymmetric prominence at the right lingular tonsil,   nonspecific. Stress test Lexiscan 11/25:  1. No chest pain  2. No new arrhythmias  3. No EKG changes suggestive of stress induced ischemia  4. 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 R ICA atherosclerotic disease   R ICA 80% stenosis--- R CEA planned for tomorrow    Stress test unrevealing   Stroke risk factors include: HTN, HLD, carotid stenosis    Complaint of neck pain   MRI cervical spine showed mild to moderate degenerative changes   Patient can continue to follow with pain clinic outpt for cervical injections as needed    Plan:     Continue supportive care  Recommend CEA with vascular once cleared  Continue aspirin and statin  LDL 47, A1c 6.1  Stroke education  Continue PT/OT  Up with assistance/fall precautions  SCDs    Okay to discharge from neuro standpoint once medically cleared. Patient should follow-up with stroke clinic in 2 to 3 weeks. Neuro will sign off.       Jimmie 207, APRN NP-C  1:01 PM  11/26/2020

## 2020-11-27 ENCOUNTER — ANESTHESIA (OUTPATIENT)
Dept: OPERATING ROOM | Age: 85
DRG: 038 | End: 2020-11-27
Payer: MEDICARE

## 2020-11-27 VITALS — DIASTOLIC BLOOD PRESSURE: 93 MMHG | OXYGEN SATURATION: 100 % | TEMPERATURE: 96.1 F | SYSTOLIC BLOOD PRESSURE: 172 MMHG

## 2020-11-27 LAB
ABO/RH: NORMAL
ANTIBODY SCREEN: NORMAL
SARS-COV-2, NAAT: NOT DETECTED

## 2020-11-27 PROCEDURE — C1768 GRAFT, VASCULAR: HCPCS | Performed by: SURGERY

## 2020-11-27 PROCEDURE — 2500000003 HC RX 250 WO HCPCS

## 2020-11-27 PROCEDURE — 88304 TISSUE EXAM BY PATHOLOGIST: CPT

## 2020-11-27 PROCEDURE — 86900 BLOOD TYPING SEROLOGIC ABO: CPT

## 2020-11-27 PROCEDURE — 6370000000 HC RX 637 (ALT 250 FOR IP): Performed by: SURGERY

## 2020-11-27 PROCEDURE — 86850 RBC ANTIBODY SCREEN: CPT

## 2020-11-27 PROCEDURE — 2580000003 HC RX 258

## 2020-11-27 PROCEDURE — 88311 DECALCIFY TISSUE: CPT

## 2020-11-27 PROCEDURE — U0002 COVID-19 LAB TEST NON-CDC: HCPCS

## 2020-11-27 PROCEDURE — C9248 INJ, CLEVIDIPINE BUTYRATE: HCPCS | Performed by: NURSE PRACTITIONER

## 2020-11-27 PROCEDURE — 03CK0ZZ EXTIRPATION OF MATTER FROM RIGHT INTERNAL CAROTID ARTERY, OPEN APPROACH: ICD-10-PCS | Performed by: SURGERY

## 2020-11-27 PROCEDURE — 86901 BLOOD TYPING SEROLOGIC RH(D): CPT

## 2020-11-27 PROCEDURE — 6360000002 HC RX W HCPCS

## 2020-11-27 PROCEDURE — C9248 INJ, CLEVIDIPINE BUTYRATE: HCPCS

## 2020-11-27 PROCEDURE — 6360000002 HC RX W HCPCS: Performed by: STUDENT IN AN ORGANIZED HEALTH CARE EDUCATION/TRAINING PROGRAM

## 2020-11-27 PROCEDURE — 03UH0KZ SUPPLEMENT RIGHT COMMON CAROTID ARTERY WITH NONAUTOLOGOUS TISSUE SUBSTITUTE, OPEN APPROACH: ICD-10-PCS | Performed by: SURGERY

## 2020-11-27 PROCEDURE — 3600000005 HC SURGERY LEVEL 5 BASE: Performed by: SURGERY

## 2020-11-27 PROCEDURE — 03CH0ZZ EXTIRPATION OF MATTER FROM RIGHT COMMON CAROTID ARTERY, OPEN APPROACH: ICD-10-PCS | Performed by: SURGERY

## 2020-11-27 PROCEDURE — 6360000002 HC RX W HCPCS: Performed by: NURSE PRACTITIONER

## 2020-11-27 PROCEDURE — 03UK0KZ SUPPLEMENT RIGHT INTERNAL CAROTID ARTERY WITH NONAUTOLOGOUS TISSUE SUBSTITUTE, OPEN APPROACH: ICD-10-PCS | Performed by: SURGERY

## 2020-11-27 PROCEDURE — 35301 RECHANNELING OF ARTERY: CPT | Performed by: SURGERY

## 2020-11-27 PROCEDURE — 85347 COAGULATION TIME ACTIVATED: CPT

## 2020-11-27 PROCEDURE — 2709999900 HC NON-CHARGEABLE SUPPLY: Performed by: SURGERY

## 2020-11-27 PROCEDURE — 2500000003 HC RX 250 WO HCPCS: Performed by: SURGERY

## 2020-11-27 PROCEDURE — 3700000000 HC ANESTHESIA ATTENDED CARE: Performed by: SURGERY

## 2020-11-27 PROCEDURE — 2000000000 HC ICU R&B

## 2020-11-27 PROCEDURE — 2580000003 HC RX 258: Performed by: STUDENT IN AN ORGANIZED HEALTH CARE EDUCATION/TRAINING PROGRAM

## 2020-11-27 PROCEDURE — 2580000003 HC RX 258: Performed by: SURGERY

## 2020-11-27 PROCEDURE — 2580000003 HC RX 258: Performed by: NURSE PRACTITIONER

## 2020-11-27 PROCEDURE — 6360000002 HC RX W HCPCS: Performed by: SURGERY

## 2020-11-27 PROCEDURE — 2700000000 HC OXYGEN THERAPY PER DAY

## 2020-11-27 PROCEDURE — 3600000015 HC SURGERY LEVEL 5 ADDTL 15MIN: Performed by: SURGERY

## 2020-11-27 PROCEDURE — 3700000001 HC ADD 15 MINUTES (ANESTHESIA): Performed by: SURGERY

## 2020-11-27 DEVICE — XENOSURE BIOLOGIC PATCH, 0.8CM X 8CM, EIFU
Type: IMPLANTABLE DEVICE | Site: CAROTID | Status: FUNCTIONAL
Brand: XENOSURE BIOLOGIC PATCH

## 2020-11-27 RX ORDER — MEPERIDINE HYDROCHLORIDE 25 MG/ML
12.5 INJECTION INTRAMUSCULAR; INTRAVENOUS; SUBCUTANEOUS EVERY 5 MIN PRN
Status: DISCONTINUED | OUTPATIENT
Start: 2020-11-27 | End: 2020-11-27 | Stop reason: HOSPADM

## 2020-11-27 RX ORDER — ONDANSETRON 2 MG/ML
INJECTION INTRAMUSCULAR; INTRAVENOUS PRN
Status: DISCONTINUED | OUTPATIENT
Start: 2020-11-27 | End: 2020-11-27 | Stop reason: SDUPTHER

## 2020-11-27 RX ORDER — SODIUM CHLORIDE 9 MG/ML
INJECTION, SOLUTION INTRAVENOUS CONTINUOUS PRN
Status: DISCONTINUED | OUTPATIENT
Start: 2020-11-27 | End: 2020-11-27 | Stop reason: SDUPTHER

## 2020-11-27 RX ORDER — PROTAMINE SULFATE 10 MG/ML
INJECTION, SOLUTION INTRAVENOUS PRN
Status: DISCONTINUED | OUTPATIENT
Start: 2020-11-27 | End: 2020-11-27 | Stop reason: SDUPTHER

## 2020-11-27 RX ORDER — PHENYLEPHRINE HCL IN 0.9% NACL 1 MG/10 ML
SYRINGE (ML) INTRAVENOUS PRN
Status: DISCONTINUED | OUTPATIENT
Start: 2020-11-27 | End: 2020-11-27 | Stop reason: SDUPTHER

## 2020-11-27 RX ORDER — CEFAZOLIN SODIUM 1 G/3ML
INJECTION, POWDER, FOR SOLUTION INTRAMUSCULAR; INTRAVENOUS PRN
Status: DISCONTINUED | OUTPATIENT
Start: 2020-11-27 | End: 2020-11-27 | Stop reason: SDUPTHER

## 2020-11-27 RX ORDER — NEOSTIGMINE METHYLSULFATE 1 MG/ML
INJECTION, SOLUTION INTRAVENOUS PRN
Status: DISCONTINUED | OUTPATIENT
Start: 2020-11-27 | End: 2020-11-27 | Stop reason: SDUPTHER

## 2020-11-27 RX ORDER — FENTANYL CITRATE 50 UG/ML
INJECTION, SOLUTION INTRAMUSCULAR; INTRAVENOUS PRN
Status: DISCONTINUED | OUTPATIENT
Start: 2020-11-27 | End: 2020-11-27 | Stop reason: SDUPTHER

## 2020-11-27 RX ORDER — MIDAZOLAM HYDROCHLORIDE 1 MG/ML
INJECTION INTRAMUSCULAR; INTRAVENOUS PRN
Status: DISCONTINUED | OUTPATIENT
Start: 2020-11-27 | End: 2020-11-27 | Stop reason: SDUPTHER

## 2020-11-27 RX ORDER — OXYCODONE HYDROCHLORIDE 10 MG/1
10 TABLET ORAL EVERY 4 HOURS PRN
Status: DISCONTINUED | OUTPATIENT
Start: 2020-11-27 | End: 2020-11-30 | Stop reason: HOSPADM

## 2020-11-27 RX ORDER — HEPARIN SODIUM 1000 [USP'U]/ML
INJECTION, SOLUTION INTRAVENOUS; SUBCUTANEOUS PRN
Status: DISCONTINUED | OUTPATIENT
Start: 2020-11-27 | End: 2020-11-27 | Stop reason: SDUPTHER

## 2020-11-27 RX ORDER — LIDOCAINE HYDROCHLORIDE 20 MG/ML
INJECTION, SOLUTION INTRAVENOUS PRN
Status: DISCONTINUED | OUTPATIENT
Start: 2020-11-27 | End: 2020-11-27 | Stop reason: SDUPTHER

## 2020-11-27 RX ORDER — DEXAMETHASONE SODIUM PHOSPHATE 10 MG/ML
INJECTION, SOLUTION INTRAMUSCULAR; INTRAVENOUS PRN
Status: DISCONTINUED | OUTPATIENT
Start: 2020-11-27 | End: 2020-11-27 | Stop reason: SDUPTHER

## 2020-11-27 RX ORDER — OXYCODONE HYDROCHLORIDE 5 MG/1
5 TABLET ORAL EVERY 4 HOURS PRN
Status: DISCONTINUED | OUTPATIENT
Start: 2020-11-27 | End: 2020-11-30 | Stop reason: HOSPADM

## 2020-11-27 RX ORDER — PROPOFOL 10 MG/ML
INJECTION, EMULSION INTRAVENOUS PRN
Status: DISCONTINUED | OUTPATIENT
Start: 2020-11-27 | End: 2020-11-27 | Stop reason: SDUPTHER

## 2020-11-27 RX ORDER — GLYCOPYRROLATE 1 MG/5 ML
SYRINGE (ML) INTRAVENOUS PRN
Status: DISCONTINUED | OUTPATIENT
Start: 2020-11-27 | End: 2020-11-27 | Stop reason: SDUPTHER

## 2020-11-27 RX ORDER — ROCURONIUM BROMIDE 10 MG/ML
INJECTION, SOLUTION INTRAVENOUS PRN
Status: DISCONTINUED | OUTPATIENT
Start: 2020-11-27 | End: 2020-11-27 | Stop reason: SDUPTHER

## 2020-11-27 RX ORDER — LABETALOL HYDROCHLORIDE 5 MG/ML
5 INJECTION, SOLUTION INTRAVENOUS EVERY 10 MIN PRN
Status: DISCONTINUED | OUTPATIENT
Start: 2020-11-27 | End: 2020-11-27 | Stop reason: HOSPADM

## 2020-11-27 RX ORDER — NITROGLYCERIN 5 MG/ML
INJECTION, SOLUTION INTRAVENOUS PRN
Status: DISCONTINUED | OUTPATIENT
Start: 2020-11-27 | End: 2020-11-27 | Stop reason: SDUPTHER

## 2020-11-27 RX ORDER — SODIUM CHLORIDE 9 MG/ML
INJECTION, SOLUTION INTRAVENOUS CONTINUOUS
Status: DISCONTINUED | OUTPATIENT
Start: 2020-11-27 | End: 2020-11-28

## 2020-11-27 RX ORDER — PROMETHAZINE HYDROCHLORIDE 25 MG/ML
25 INJECTION, SOLUTION INTRAMUSCULAR; INTRAVENOUS PRN
Status: DISCONTINUED | OUTPATIENT
Start: 2020-11-27 | End: 2020-11-27 | Stop reason: HOSPADM

## 2020-11-27 RX ADMIN — FENTANYL CITRATE 50 MCG: 50 INJECTION, SOLUTION INTRAMUSCULAR; INTRAVENOUS at 08:36

## 2020-11-27 RX ADMIN — LIDOCAINE HYDROCHLORIDE 100 MG: 20 INJECTION, SOLUTION INTRAVENOUS at 07:39

## 2020-11-27 RX ADMIN — FENTANYL CITRATE 50 MCG: 50 INJECTION, SOLUTION INTRAMUSCULAR; INTRAVENOUS at 07:39

## 2020-11-27 RX ADMIN — FENTANYL CITRATE 50 MCG: 50 INJECTION, SOLUTION INTRAMUSCULAR; INTRAVENOUS at 07:59

## 2020-11-27 RX ADMIN — Medication 100 MCG: at 07:46

## 2020-11-27 RX ADMIN — CLEVIPIDINE 4 MG/HR: 0.5 EMULSION INTRAVENOUS at 23:16

## 2020-11-27 RX ADMIN — Medication 3 MG: at 09:44

## 2020-11-27 RX ADMIN — HYDROMORPHONE HYDROCHLORIDE 0.5 MG: 1 INJECTION, SOLUTION INTRAMUSCULAR; INTRAVENOUS; SUBCUTANEOUS at 23:53

## 2020-11-27 RX ADMIN — ONDANSETRON HYDROCHLORIDE 4 MG: 2 INJECTION, SOLUTION INTRAMUSCULAR; INTRAVENOUS at 09:29

## 2020-11-27 RX ADMIN — CLEVIPIDINE 6 MG/HR: 0.5 EMULSION INTRAVENOUS at 12:20

## 2020-11-27 RX ADMIN — CEFAZOLIN 2000 MG: 1 INJECTION, POWDER, FOR SOLUTION INTRAMUSCULAR; INTRAVENOUS at 07:54

## 2020-11-27 RX ADMIN — SODIUM CHLORIDE: 9 INJECTION, SOLUTION INTRAVENOUS at 22:28

## 2020-11-27 RX ADMIN — SODIUM CHLORIDE: 9 INJECTION, SOLUTION INTRAVENOUS at 07:42

## 2020-11-27 RX ADMIN — MIDAZOLAM 2 MG: 1 INJECTION INTRAMUSCULAR; INTRAVENOUS at 07:39

## 2020-11-27 RX ADMIN — SODIUM CHLORIDE: 9 INJECTION, SOLUTION INTRAVENOUS at 10:00

## 2020-11-27 RX ADMIN — ROCURONIUM BROMIDE 40 MG: 10 INJECTION, SOLUTION INTRAVENOUS at 07:36

## 2020-11-27 RX ADMIN — HYDROMORPHONE HYDROCHLORIDE 0.5 MG: 1 INJECTION, SOLUTION INTRAMUSCULAR; INTRAVENOUS; SUBCUTANEOUS at 10:38

## 2020-11-27 RX ADMIN — FENTANYL CITRATE 50 MCG: 50 INJECTION, SOLUTION INTRAMUSCULAR; INTRAVENOUS at 09:17

## 2020-11-27 RX ADMIN — DEXAMETHASONE SODIUM PHOSPHATE 10 MG: 10 INJECTION, SOLUTION INTRAMUSCULAR; INTRAVENOUS at 07:39

## 2020-11-27 RX ADMIN — HEPARIN SODIUM 8000 UNITS: 1000 INJECTION INTRAVENOUS; SUBCUTANEOUS at 08:14

## 2020-11-27 RX ADMIN — SODIUM CHLORIDE: 9 INJECTION, SOLUTION INTRAVENOUS at 07:36

## 2020-11-27 RX ADMIN — ROCURONIUM BROMIDE 10 MG: 10 INJECTION, SOLUTION INTRAVENOUS at 07:55

## 2020-11-27 RX ADMIN — Medication 0.6 MG: at 09:44

## 2020-11-27 RX ADMIN — CLEVIPIDINE 2 MG/HR: 0.5 EMULSION INTRAVENOUS at 09:36

## 2020-11-27 RX ADMIN — PROTAMINE SULFATE 20 MG: 10 INJECTION, SOLUTION INTRAVENOUS at 09:23

## 2020-11-27 RX ADMIN — NITROGLYCERIN 50 MCG: 5 INJECTION, SOLUTION INTRAVENOUS at 09:27

## 2020-11-27 RX ADMIN — PROPOFOL 100 MG: 10 INJECTION, EMULSION INTRAVENOUS at 07:39

## 2020-11-27 RX ADMIN — PROTAMINE SULFATE 30 MG: 10 INJECTION, SOLUTION INTRAVENOUS at 09:13

## 2020-11-27 RX ADMIN — ROCURONIUM BROMIDE 20 MG: 10 INJECTION, SOLUTION INTRAVENOUS at 08:32

## 2020-11-27 RX ADMIN — Medication 100 MCG: at 07:43

## 2020-11-27 RX ADMIN — Medication 100 MCG: at 07:49

## 2020-11-27 RX ADMIN — Medication 10 ML: at 10:00

## 2020-11-27 RX ADMIN — HYDROMORPHONE HYDROCHLORIDE 0.5 MG: 1 INJECTION, SOLUTION INTRAMUSCULAR; INTRAVENOUS; SUBCUTANEOUS at 16:11

## 2020-11-27 RX ADMIN — CLEVIPIDINE 4 MG/HR: 0.5 EMULSION INTRAVENOUS at 18:11

## 2020-11-27 ASSESSMENT — PAIN DESCRIPTION - ORIENTATION
ORIENTATION: POSTERIOR
ORIENTATION: POSTERIOR
ORIENTATION: RIGHT
ORIENTATION: POSTERIOR
ORIENTATION: POSTERIOR
ORIENTATION: RIGHT
ORIENTATION: POSTERIOR

## 2020-11-27 ASSESSMENT — PULMONARY FUNCTION TESTS
PIF_VALUE: 21
PIF_VALUE: 20
PIF_VALUE: 21
PIF_VALUE: 0
PIF_VALUE: 20
PIF_VALUE: 21
PIF_VALUE: 19
PIF_VALUE: 20
PIF_VALUE: 19
PIF_VALUE: 13
PIF_VALUE: 19
PIF_VALUE: 19
PIF_VALUE: 0
PIF_VALUE: 18
PIF_VALUE: 20
PIF_VALUE: 19
PIF_VALUE: 17
PIF_VALUE: 20
PIF_VALUE: 19
PIF_VALUE: 20
PIF_VALUE: 1
PIF_VALUE: 20
PIF_VALUE: 18
PIF_VALUE: 19
PIF_VALUE: 19
PIF_VALUE: 4
PIF_VALUE: 20
PIF_VALUE: 19
PIF_VALUE: 18
PIF_VALUE: 0
PIF_VALUE: 19
PIF_VALUE: 19
PIF_VALUE: 17
PIF_VALUE: 20
PIF_VALUE: 20
PIF_VALUE: 18
PIF_VALUE: 20
PIF_VALUE: 19
PIF_VALUE: 20
PIF_VALUE: 20
PIF_VALUE: 13
PIF_VALUE: 20
PIF_VALUE: 0
PIF_VALUE: 20
PIF_VALUE: 20
PIF_VALUE: 18
PIF_VALUE: 19
PIF_VALUE: 18
PIF_VALUE: 18
PIF_VALUE: 1
PIF_VALUE: 20
PIF_VALUE: 19
PIF_VALUE: 20
PIF_VALUE: 19
PIF_VALUE: 18
PIF_VALUE: 19
PIF_VALUE: 0
PIF_VALUE: 23
PIF_VALUE: 18
PIF_VALUE: 20
PIF_VALUE: 0
PIF_VALUE: 21
PIF_VALUE: 19
PIF_VALUE: 20
PIF_VALUE: 19
PIF_VALUE: 20
PIF_VALUE: 19
PIF_VALUE: 19
PIF_VALUE: 21
PIF_VALUE: 19
PIF_VALUE: 18
PIF_VALUE: 19
PIF_VALUE: 27
PIF_VALUE: 19
PIF_VALUE: 20
PIF_VALUE: 19
PIF_VALUE: 20
PIF_VALUE: 18
PIF_VALUE: 21
PIF_VALUE: 2
PIF_VALUE: 19
PIF_VALUE: 13
PIF_VALUE: 18
PIF_VALUE: 19
PIF_VALUE: 20
PIF_VALUE: 20
PIF_VALUE: 19
PIF_VALUE: 19
PIF_VALUE: 18
PIF_VALUE: 19
PIF_VALUE: 19
PIF_VALUE: 0
PIF_VALUE: 13
PIF_VALUE: 21
PIF_VALUE: 18
PIF_VALUE: 14
PIF_VALUE: 0
PIF_VALUE: 20
PIF_VALUE: 14
PIF_VALUE: 20
PIF_VALUE: 19
PIF_VALUE: 19
PIF_VALUE: 0
PIF_VALUE: 19
PIF_VALUE: 20
PIF_VALUE: 19
PIF_VALUE: 19
PIF_VALUE: 20
PIF_VALUE: 19
PIF_VALUE: 20
PIF_VALUE: 2
PIF_VALUE: 21
PIF_VALUE: 20
PIF_VALUE: 3
PIF_VALUE: 21
PIF_VALUE: 20
PIF_VALUE: 21
PIF_VALUE: 19
PIF_VALUE: 19

## 2020-11-27 ASSESSMENT — PAIN SCALES - GENERAL
PAINLEVEL_OUTOF10: 6
PAINLEVEL_OUTOF10: 6
PAINLEVEL_OUTOF10: 9
PAINLEVEL_OUTOF10: 6
PAINLEVEL_OUTOF10: 6
PAINLEVEL_OUTOF10: 0
PAINLEVEL_OUTOF10: 6
PAINLEVEL_OUTOF10: 6
PAINLEVEL_OUTOF10: 9

## 2020-11-27 ASSESSMENT — PAIN DESCRIPTION - DESCRIPTORS
DESCRIPTORS: CONSTANT;DISCOMFORT
DESCRIPTORS: ACHING;CONSTANT;DISCOMFORT
DESCRIPTORS: CONSTANT;DISCOMFORT
DESCRIPTORS: ACHING;CONSTANT;DISCOMFORT
DESCRIPTORS: ACHING;CONSTANT;DISCOMFORT

## 2020-11-27 ASSESSMENT — PAIN DESCRIPTION - LOCATION
LOCATION: NECK

## 2020-11-27 ASSESSMENT — PAIN DESCRIPTION - FREQUENCY
FREQUENCY: CONTINUOUS

## 2020-11-27 ASSESSMENT — PAIN DESCRIPTION - ONSET
ONSET: ON-GOING

## 2020-11-27 ASSESSMENT — PAIN DESCRIPTION - PAIN TYPE
TYPE: SURGICAL PAIN

## 2020-11-27 ASSESSMENT — PAIN DESCRIPTION - PROGRESSION
CLINICAL_PROGRESSION: NOT CHANGED

## 2020-11-27 NOTE — PROGRESS NOTES
sounds  Extremities: Bilateral palpable brachial and radial pulses. Motor and sensation are intact. She may have just a slight amount of weakness in the left upper extremity but very difficult to tell. They feel very symmetric. Neuro: Grossly intact bilateral any gross cranial nerve deficit. See above    LABS:    Lab Results   Component Value Date    WBC 5.5 11/24/2020    HGB 12.5 11/24/2020    HCT 38.5 11/24/2020     11/24/2020    PROTIME 11.4 11/23/2020    INR 1.0 11/23/2020    APTT 33.4 11/23/2020    K 3.7 11/24/2020    BUN 18 11/24/2020    CREATININE 0.8 11/24/2020       RADIOLOGY:  MRI CERVICAL SPINE WO CONTRAST   Final Result   Mild-to-moderate degenerative changes at the C5-C6 and C6-C7 levels without   significant spinal canal nor neural foraminal stenosis. NM Cardiac Stress Test Nuclear Imaging   Final Result   ECG during the infusion did not change. The myocardial perfusion imaging was normal without ischemia or   infarct. Overall left ventricular systolic function was normal without regional   wall motion abnormalities. Low risk pre-operative pharmacologic stress test.   Thank you for sending your patient to this [employer]. Thomas Mota MD, [facility] [place], [address] [facility] cardiology. MRI BRAIN WO CONTRAST   Final Result   There is a punctate 3 mm area of restricted diffusion in the high right   parietal lobe consistent with an acute area of infarct in the right middle   cerebral artery territory. No other areas of restricted diffusion are   identified. Cerebral atrophy. Chronic small vessel ischemic changes. Remote lacunar   infarct in the right cerebellum. XR CERVICAL SPINE (4-5 VIEWS)   Final Result   Degenerative changes of the cervical spine with bilateral neural foraminal   narrowing and multilevel narrowing of intervertebral disc spaces. Evaluation is limited based on the images obtained.    Limited visualization of the upper cervical spine on the lateral radiographs   obtained. ASSESSMENT/PLAN:   · #1 critical symptomatic right carotid artery stenosis. At this point I reviewed the CT scan, I have reviewed the MRI. I reviewed cardiology's notes and neurology's notes. I had a long discussion with the patient and the son Barbara Parent via phone yesterday. I have gone over in detail the procedure. I have also gone in detail about the risk benefits and alternatives. Currently she is on antiplatelet therapy and risk reduction therapy. She has a high-grade right carotid artery stenosis that was symptomatic. We have gone over the risk benefits and alternatives including but not limited to bleeding, infection, arteriovenous nerve injury, stroke, sensorimotor disturbance, recurrence, wound complications, and/or death. Both the patient and the son understand. He will also relay the information to his additional family members. All questions were answered according to both their satisfaction.         Electronically signed by Nely Cuevas MD on 11/27/2020 at 7:34 AM

## 2020-11-27 NOTE — OP NOTE
Operative Note      Patient: Janice Garland  YOB: 1935  MRN: [id number]    Janice Garland  1935      DATE OF PROCEDURE: 11/27/2020     SURGEON: AZALIA Monk     PREOPERATIVE DIAGNOSIS: Symptomatic 90% right internal carotid artery stenosis. POSTOPERATIVE DIAGNOSIS: Same    OPERATION: right carotid endarterectomy with bovine patch angioplasty. ANESTHESIA: General tracheal anesthesia    Findings: Exophytic plaque extending into the internal carotid vessel. There was a significant amount of posterior plaque that extended more cephalad than the CT scan demonstrated. ESTIMATED BLOOD LOSS: less than 272 ml     COMPLICATIONS: None     DESCRIPTION OF PROCEDURE: The patient was identified and the procedure was confirmed. The right neck was prepped and draped in the usual sterile fashion. A skin incision was made along the anterior border of the sternocleidomastoid muscle and carried down through the subcutaneous tissue. Dissection continued through the platysma and along the anterior border of the sternocleidomastoid muscle. The common facial vein was divided between silk ties. The common carotid artery was identified and dissected free from the surrounding tissues. It was surrounded proximally in the soft portion with an umbilical tape. The vagus nerve was deep to the artery and preserved. Dissection continued along the carotid artery and the external carotid artery and its superior thyroid branch were dissected free from the surrounding tissues and surrounded with vessel loops for control. The patient was then heparinized, maintaining an activated clotting time greater than 300 seconds. Dissection continued along the internal carotid artery, which was freed from the surrounding tissues and surrounded with a vessel loop for control. The vagus was able to be seen. The Barrington Linea was able to be seen. And the hypoglossal was identified.   We did end up having bleeding where the facial vein had been tied. This may have been a retraction injury. Therefore it was difficult to control in the hypoglossal was difficult to see in the remaining tissue. The bleeding was able to be controlled with pulling the tissue towards the carotid vessel and tied over her right angle clamp with a 2-0 silk tie. The vessel loops on the external carotid artery branches were controlled and the internal carotid artery was clamped. After control of the internal, external, and common. Back pressures were performed demonstrating a back pressure of 60 mmHg. The common carotid artery was clamped and then a longitudinal arteriotomy was made in the common carotid artery and extended through the bulb and onto the internal carotid artery. There was a 90 % stenosis in the origin of the internal carotid artery. There was a large exophytic piece of plaque that was friable extending into the internal carotid vessel. A standard endarterectomy was then performed of the obtaining smooth end points on the distal common and internal carotid arteries. Loose fibrinous debris was removed from the vessel bed, which was flushed with heparinized saline solution. 7.0 tacking sutures were placed at distal endpoint. A bovine patch was obtained and cut to the appropriate length and sewn to the artery using a running 6-0 Prolene suture. Both internal, external, and common carotid were flushed in the standard fashion. Flow was then reestablished in the external carotid followed by the internal carotid. .  The closure was completed and flow was restored initially through the external carotid artery followed by the internal carotid artery. Flow through the vessels was confirmed with the handheld Doppler probe. The patient was given protamine and hemostasis was obtained. The incision was irrigated with antibiotic saline solution.  The platysma was approximated with interrupted 3-0 Vicryl sutures and 0.25% Marcaine was injected into the subcutaneous tissue surrounding the incision. The skin was closed with a subcuticular Vicryl stitch and Dermabond was applied to the skin incision in the operating room. Needle, sponge, and instrument counts were reported as correct x2. The patient tolerated the procedure and was transferred to the Cardiovascular ICU in satisfactory condition. Ariadna Torres    CC : Annamaria Villalba MD    Specimens:   ID Type Source Tests Collected by Time Destination   A : RIGHT CAROTID PLAQUE Tissue Tissue SURGICAL PATHOLOGY Ariadna Torres MD 11/27/2020 2024        Implants:  Implant Name Type Inv.  Item Serial No.  Lot No. LRB No. Used Action   GRAFT VASC [id number] THK0.35-0.75MM CAR PERICARD PROC BOV  GRAFT VASC [id number] THK0.35-0.75MM CAR PERICARD PROC BOV  [facility] VASCULAR [facility]- [id number] Right 1 Implanted         Drains:   Urethral Catheter Non-latex 16 fr (Active)         Electronically signed by Ariadna Torres MD on 11/27/2020 at 10:16 AM

## 2020-11-27 NOTE — PROGRESS NOTES
CVICU Admission Note    Name: Adrianne Samano  MRN: [id number]    CC: Postoperative Critical Care Management     Indication for Surgery/Procedure: high grade right carotid artery stenosis     Important/Relevant PMH/PSH: HTN, HLD, GERD, Chronic cervical pain, recent stroke (presented to ED 11/23/20 with acute onset left arm weakness-CTA head/neck showed right ICA 80%, left ICA stenosis 50%)     Procedure/Surgeries: 11/27/2020 right carotid endarterectomy     Physical Exam:    BP (!) 167/85   Pulse 120   Temp 95.7 °F (35.4 °C) (Temporal)   Resp 18   Ht 5' 2\" (1.575 m)   Wt 167 lb 14.4 oz (76.2 kg)   LMP  (LMP Unknown)   SpO2 100%   BMI 30.71 kg/m²     No results for input(s): WBC, RBC, HGB, HCT, MCV, MCH, MCHC, RDW, PLT, MPV in the last 72 hours. No results for input(s): NA, K, CL, CO2, BUN, CREATININE, GLUCOSE, CALCIUM in the last 72 hours. General Appearance: Arrived to ICU in stable condition   Eyes: PERRL  Pulmonary: CTA bilaterally. No wheezes, no accessory muscle use noted on simple face mask   Cardiovascular: RRR, no heaves or thrills palpated   Telemetry: ST   Abdomen: Soft, nontender, nondisended  Extremities: Palpable pulses all extremities, no edema   Neurologic/Psych: drowsy but following commands, appears equal in strength bilaterally, tongue with slight deviation to the right  Skin: Warm and dry   Incision: right neck incision well approximated, minimal swelling, soft, no hematoma    Assessment/Plan: Day of Surgery     1.  Right ICA stenosis S/p right cea    - Frequent neurovascular checks, monitor incision for signs of swelling/hematoma   -NPO, IVF @75cc/hr  -Cano catheter to GD  -Bedrest  -Ancef  -HTN- cleviprex infusion, target -160  -Recent stroke--plan for f/u with stroke clinic in 2-3 weeks--continue ASA and statin    Electronically signed by CELE Olvera - CNP on 11/27/2020 at 10:05 AM

## 2020-11-27 NOTE — CARE COORDINATION
Spoke with Edy, liaison with ARU at [facility]. Patient was appropriate prior to surgery to transition to ARU for rehab when medically stable to discharge. Pending outcome of CEA and updated therapy notes post-op, plan will be to transition to ARU at [facility] on Monday when a bed will be available. CM/SW will continue to follow.      Leticia Alberts.  P:  [phone]

## 2020-11-27 NOTE — PROGRESS NOTES
Vascular Surgery Progress Note    Pt is being seen in f/u today regarding recent symptomatic right carotid endarterectomy    Subjective:  Keyur Barr is a 82 y.o. female is post right carotid endarterectomy currently now in the CVICU    Current Medications:    clevidipine        oxyCODONE **OR** oxyCODONE, HYDROmorphone, sodium chloride flush, acetaminophen **OR** acetaminophen, polyethylene glycol, promethazine **OR** ondansetron, hydrALAZINE    aspirin  81 mg Oral Daily    rosuvastatin  5 mg Oral Nightly    amLODIPine  5 mg Oral Daily    ezetimibe  10 mg Oral Daily    sodium chloride flush  10 mL Intravenous 2 times per day    enoxaparin  40 mg Subcutaneous Daily        PHYSICAL EXAM:    BP (!) 167/85   Pulse 120   Temp 95.7 °F (35.4 °C) (Temporal)   Resp 18   Ht 5' 2\" (1.575 m)   Wt 167 lb 14.4 oz (76.2 kg)   LMP  (LMP Unknown)   SpO2 100%   BMI 30.71 kg/m²     Intake/Output Summary (Last 24 hours) at 11/27/2020 1023  Last data filed at 11/27/2020 0941  Gross per 24 hour   Intake 1610 ml   Output 180 ml   Net 1430 ml          General: He is awake she is alert she will answer questions. She is a little bit hoarse. Skin: Skin incision is clean dry and intact. HEENT: Cephalic atraumatic trachea is midline. She has some mild right-sided tongue deviation. CVS: Currently regular rate and rhythm  Resp: There to auscultation bilaterally  Abd: Soft nontender no rebound or guarding  Extremities: Motor and sensation are intact in the upper and lower extremities. There is a slight bit of weakness on the left side this is similar to the preoperative examination. Essentially unchanged  Neuro: Distally intact bilaterally with mild left upper extremity weakness. This is very difficult to appreciate an otherwise is fairly symmetrical with the contralateral right side consistent with the preoperative examination.   She does have some tongue deviation     LABS:    Lab Results   Component Value Date    WBC 5.5 11/24/2020    HGB 12.5 11/24/2020    HCT 38.5 11/24/2020     11/24/2020    PROTIME 11.4 11/23/2020    INR 1.0 11/23/2020    APTT 33.4 11/23/2020    K 3.7 11/24/2020    BUN 18 11/24/2020    CREATININE 0.8 11/24/2020       RADIOLOGY:  MRI CERVICAL SPINE WO CONTRAST   Final Result   Mild-to-moderate degenerative changes at the C5-C6 and C6-C7 levels without   significant spinal canal nor neural foraminal stenosis. NM Cardiac Stress Test Nuclear Imaging   Final Result   ECG during the infusion did not change. The myocardial perfusion imaging was normal without ischemia or   infarct. Overall left ventricular systolic function was normal without regional   wall motion abnormalities. Low risk pre-operative pharmacologic stress test.   Thank you for sending your patient to this [facility]. Kavya Hanson MD, [facility] [place], [address] [place] [address] cardiology. MRI BRAIN WO CONTRAST   Final Result   There is a punctate 3 mm area of restricted diffusion in the high right   parietal lobe consistent with an acute area of infarct in the right middle   cerebral artery territory. No other areas of restricted diffusion are   identified. Cerebral atrophy. Chronic small vessel ischemic changes. Remote lacunar   infarct in the right cerebellum. XR CERVICAL SPINE (4-5 VIEWS)   Final Result   Degenerative changes of the cervical spine with bilateral neural foraminal   narrowing and multilevel narrowing of intervertebral disc spaces. Evaluation is limited based on the images obtained. Limited visualization of the upper cervical spine on the lateral radiographs   obtained. ASSESSMENT/PLAN:   · #1 symptomatic right carotid artery disease. She is status post carotid endarterectomy. Continue with current postop management. We will continue to evaluate. No plans for any additional intervention at this point. Blood pressure control as per the ICU staff.     I had a long discussion with Sagrario regarding the surgery as well as the postoperative plan. We will see how she progresses.     Electronically signed by Robert Bell MD on 11/27/2020 at 10:23 AM

## 2020-11-27 NOTE — PROGRESS NOTES
Patient for carotid endarterectomy today. Questions and concerns answered. The risks, benefits, alternatives, and potential complications of the procedure, including the risks of bleeding, infection, and injury to surrounding structures, were explained to the patient. All questions were answered. The patient understands and agrees to proceed with the procedure.      Electronically signed by Harshad Yusuf MD on 11/27/2020 at 6:13 AM

## 2020-11-27 NOTE — OP NOTE
Som Schultz  1935      DATE OF PROCEDURE: 11/27/2020     SURGEON: Lorna Welsh M.D.     ASSISTANT: Apollo Jett M.D. PREOPERATIVE DIAGNOSIS:  Symptomatic stenosis of the right internal carotid artery without infarction. POSTOPERATIVE DIAGNOSIS: Same    OPERATION: Right carotid endarterectomy with bovine pericardial patch angioplasty. ANESTHESIA: General     ESTIMATED BLOOD LOSS: less than 50 ml     COMPLICATIONS: None     DESCRIPTION OF PROCEDURE: The patient was identified and the procedure was confirmed. The right neck was prepped and draped in the usual sterile fashion. A skin incision was made along the anterior border of the sternocleidomastoid muscle and carried down through the subcutaneous tissue. Dissection continued through the platysma and along the anterior border of the sternocleidomastoid muscle. The common facial vein was divided between silk ties. The common carotid artery was identified and dissected free from the surrounding tissues. The vagus nerve was deep to the artery and preserved. Dissection continued along the carotid artery and the external carotid artery and its superior thyroid branch were dissected free from the surrounding tissues and surrounded with vessel loops for control. The patient was then heparinized, maintaining an activated clotting time greater than 300 seconds. Dissection continued along the internal carotid artery, which was freed from the surrounding tissues and surrounded with a vessel loop for control. The hypoglossal nerve was identified and preserved. The vessel loops on the external carotid artery branches were controlled and the internal carotid artery was clamped. The common carotid artery was clamped and then a longitudinal arteriotomy was made in the common carotid artery and extended through the bulb and onto the internal carotid artery. There was an 80% stenosis in the origin of the internal carotid artery.  A standard endarterectomy was then performed of the obtaining smooth end points on the distal common and internal carotid arteries. Loose fibrinous debris was removed from the vessel bed, which was flushed with heparinized saline solution. A 7-0 Prolene suture was used to tack the distal remaining plaque to the internal carotid artery  A bovine pericardial patch was obtained and cut to the appropriate length and sewn to the artery using a running 6-0 Prolene suture. The closure was completed and flow was restored initially through the external carotid artery followed by the internal carotid artery. Flow through the vessels was confirmed with the handheld Doppler probe. The patient was given protamine and hemostasis was obtained. The incision was irrigated with antibiotic saline solution. The platysma was approximated with running 3-0 Vicryl sutures. The skin was closed with a subcuticular Monocryl stitch and skin adhesive was applied to the skin incision in the operating room. Needle, sponge, and instrument counts were reported as correct x2. The patient tolerated the procedure and was transferred to the Cardiovascular ICU in satisfactory condition. Dr. Soraya Ferrara was present and scrubbed for the entire procedure.     Electronically signed by Kimberli Solis MD on 11/27/2020 at 10:08 AM

## 2020-11-28 ENCOUNTER — APPOINTMENT (OUTPATIENT)
Dept: GENERAL RADIOLOGY | Age: 85
DRG: 038 | End: 2020-11-28
Attending: FAMILY MEDICINE
Payer: MEDICARE

## 2020-11-28 LAB
ANION GAP SERPL CALCULATED.3IONS-SCNC: 6 MMOL/L (ref 7–16)
BUN BLDV-MCNC: 16 MG/DL (ref 8–23)
CALCIUM SERPL-MCNC: 8.3 MG/DL (ref 8.6–10.2)
CHLORIDE BLD-SCNC: 105 MMOL/L (ref 98–107)
CO2: 28 MMOL/L (ref 22–29)
CREAT SERPL-MCNC: 0.6 MG/DL (ref 0.5–1)
GFR AFRICAN AMERICAN: >60
GFR NON-AFRICAN AMERICAN: >60 ML/MIN/1.73
GLUCOSE BLD-MCNC: 130 MG/DL (ref 74–99)
HCT VFR BLD CALC: 33.9 % (ref 34–48)
HEMOGLOBIN: 11.4 G/DL (ref 11.5–15.5)
MCH RBC QN AUTO: 31.6 PG (ref 26–35)
MCHC RBC AUTO-ENTMCNC: 33.6 % (ref 32–34.5)
MCV RBC AUTO: 93.9 FL (ref 80–99.9)
PDW BLD-RTO: 13.1 FL (ref 11.5–15)
PLATELET # BLD: 226 E9/L (ref 130–450)
PMV BLD AUTO: 10.3 FL (ref 7–12)
POTASSIUM SERPL-SCNC: 4.3 MMOL/L (ref 3.5–5)
RBC # BLD: 3.61 E12/L (ref 3.5–5.5)
SODIUM BLD-SCNC: 139 MMOL/L (ref 132–146)
WBC # BLD: 7.7 E9/L (ref 4.5–11.5)

## 2020-11-28 PROCEDURE — 80048 BASIC METABOLIC PNL TOTAL CA: CPT

## 2020-11-28 PROCEDURE — 6370000000 HC RX 637 (ALT 250 FOR IP): Performed by: NURSE PRACTITIONER

## 2020-11-28 PROCEDURE — 85027 COMPLETE CBC AUTOMATED: CPT

## 2020-11-28 PROCEDURE — 6370000000 HC RX 637 (ALT 250 FOR IP): Performed by: STUDENT IN AN ORGANIZED HEALTH CARE EDUCATION/TRAINING PROGRAM

## 2020-11-28 PROCEDURE — 2500000003 HC RX 250 WO HCPCS: Performed by: STUDENT IN AN ORGANIZED HEALTH CARE EDUCATION/TRAINING PROGRAM

## 2020-11-28 PROCEDURE — 92526 ORAL FUNCTION THERAPY: CPT

## 2020-11-28 PROCEDURE — 92611 MOTION FLUOROSCOPY/SWALLOW: CPT

## 2020-11-28 PROCEDURE — 74230 X-RAY XM SWLNG FUNCJ C+: CPT

## 2020-11-28 PROCEDURE — 2580000003 HC RX 258: Performed by: STUDENT IN AN ORGANIZED HEALTH CARE EDUCATION/TRAINING PROGRAM

## 2020-11-28 PROCEDURE — 6360000002 HC RX W HCPCS: Performed by: STUDENT IN AN ORGANIZED HEALTH CARE EDUCATION/TRAINING PROGRAM

## 2020-11-28 PROCEDURE — 2000000000 HC ICU R&B

## 2020-11-28 RX ORDER — DEXTROSE, SODIUM CHLORIDE, AND POTASSIUM CHLORIDE 5; .9; .15 G/100ML; G/100ML; G/100ML
INJECTION INTRAVENOUS CONTINUOUS
Status: DISCONTINUED | OUTPATIENT
Start: 2020-11-28 | End: 2020-11-29

## 2020-11-28 RX ORDER — DEXTROSE, SODIUM CHLORIDE, AND POTASSIUM CHLORIDE 5; .45; .15 G/100ML; G/100ML; G/100ML
INJECTION INTRAVENOUS CONTINUOUS
Status: DISCONTINUED | OUTPATIENT
Start: 2020-11-28 | End: 2020-11-28

## 2020-11-28 RX ADMIN — BARIUM SULFATE 5 ML: 400 PASTE ORAL at 11:03

## 2020-11-28 RX ADMIN — OXYCODONE HYDROCHLORIDE 5 MG: 5 TABLET ORAL at 08:12

## 2020-11-28 RX ADMIN — EZETIMIBE 10 MG: 10 TABLET ORAL at 08:14

## 2020-11-28 RX ADMIN — BARIUM SULFATE 5 ML: 400 SUSPENSION ORAL at 11:03

## 2020-11-28 RX ADMIN — AMLODIPINE BESYLATE 5 MG: 5 TABLET ORAL at 08:12

## 2020-11-28 RX ADMIN — ENOXAPARIN SODIUM 40 MG: 40 INJECTION SUBCUTANEOUS at 08:12

## 2020-11-28 RX ADMIN — ROSUVASTATIN CALCIUM 5 MG: 5 TABLET, FILM COATED ORAL at 20:52

## 2020-11-28 RX ADMIN — BARIUM SULFATE 5 ML: 0.81 POWDER, FOR SUSPENSION ORAL at 11:03

## 2020-11-28 RX ADMIN — DEXTROSE MONOHYDRATE, SODIUM CHLORIDE, AND POTASSIUM CHLORIDE: 50; 9; 1.49 INJECTION, SOLUTION INTRAVENOUS at 13:20

## 2020-11-28 RX ADMIN — ASPIRIN 81 MG CHEWABLE TABLET 81 MG: 81 TABLET CHEWABLE at 08:11

## 2020-11-28 RX ADMIN — OXYCODONE HYDROCHLORIDE 10 MG: 10 TABLET ORAL at 20:56

## 2020-11-28 RX ADMIN — ACETAMINOPHEN 650 MG: 325 TABLET ORAL at 08:12

## 2020-11-28 RX ADMIN — Medication 10 ML: at 20:52

## 2020-11-28 ASSESSMENT — PAIN DESCRIPTION - ONSET
ONSET: ON-GOING

## 2020-11-28 ASSESSMENT — PAIN SCALES - GENERAL
PAINLEVEL_OUTOF10: 4
PAINLEVEL_OUTOF10: 0
PAINLEVEL_OUTOF10: 4
PAINLEVEL_OUTOF10: 0
PAINLEVEL_OUTOF10: 7
PAINLEVEL_OUTOF10: 0
PAINLEVEL_OUTOF10: 0
PAINLEVEL_OUTOF10: 4
PAINLEVEL_OUTOF10: 4
PAINLEVEL_OUTOF10: 7
PAINLEVEL_OUTOF10: 7
PAINLEVEL_OUTOF10: 5
PAINLEVEL_OUTOF10: 4

## 2020-11-28 ASSESSMENT — PAIN DESCRIPTION - PROGRESSION

## 2020-11-28 ASSESSMENT — PAIN DESCRIPTION - FREQUENCY
FREQUENCY: CONTINUOUS

## 2020-11-28 ASSESSMENT — PAIN DESCRIPTION - PAIN TYPE
TYPE: SURGICAL PAIN

## 2020-11-28 ASSESSMENT — PAIN DESCRIPTION - DESCRIPTORS
DESCRIPTORS: ACHING;CONSTANT;DISCOMFORT
DESCRIPTORS: ACHING;DISCOMFORT
DESCRIPTORS: ACHING;DISCOMFORT
DESCRIPTORS: ACHING;CONSTANT;DISCOMFORT

## 2020-11-28 ASSESSMENT — PAIN DESCRIPTION - ORIENTATION
ORIENTATION: POSTERIOR
ORIENTATION: RIGHT
ORIENTATION: POSTERIOR
ORIENTATION: RIGHT

## 2020-11-28 ASSESSMENT — PAIN DESCRIPTION - LOCATION
LOCATION: NECK

## 2020-11-28 NOTE — PROGRESS NOTES
SPEECH/LANGUAGE PATHOLOGY  VIDEOFLUOROSCOPIC STUDY OF SWALLOWING (MBS)      PATIENT NAME:  Lakesha Barger      :  1935          TODAY'S DATE:  2020 ROOM:  3816/3816-A      SUMMARY OF EVALUATION     DYSPHAGIA DIAGNOSIS:  Mild oropharyngeal dysphagia      DIET RECOMMENDATIONS:  Dental soft (Easy to Chew) solids with  thin liquids     FEEDING RECOMMENDATIONS:     Assistance level:  Set-up is required for all oral intake      Compensatory strategies recommended: Alternate solids and liquids    THERAPY RECOMMENDATIONS:      Dysphagia therapy is not recommended                  PROCEDURE     Consistencies Administered During the Evaluation   Liquids: thin liquid and nectar thick liquid   Solids:  pureed foods and solid foods      Method of Intake:   cup, straw, spoon  Self fed, Fed by clinician      Position:   Seated, upright, Lateral plane    Current Respiratory Status   room air                RESULTS     ORAL STAGE       Decreased mastication due to:  Disorganization due to tongue swelling       PHARYNGEAL STAGE           ONSET TIME     Onset time of the pharyngeal swallow was adequate       PHARYNGEAL RESIDUALS          Vallecula/Pharyngeal Wall           Reduced tongue base retraction resulting in minimal residuals in vallecula and/or posterior pharyngeal wall for pureed foods and solid foods which cleared with liquid chaser      Pyriform Sinuses      No significant residuals were noted in the pyriform sinuses    LARYNGEAL PENETRATION     Laryngeal penetration was not present during this evaluation                 ASPIRATION    Aspiration was not present during this evaluation         COMPENSATORY STRATEGIES       Compensatory strategies were not attempted      STRUCTURAL/FUNCTIONAL ANOMALIES       No structural/functional anomalies were noted      CERVICAL ESOPHAGEAL STAGE :        The cervical esophagus appeared adequate                              CPT code:  30697  dysphagia study      [x]The admitting diagnosis and active problem list, as listed below have been reviewed prior to initiation of this evaluation.      ADMITTING DIAGNOSIS: Acute CVA (cerebrovascular accident) ([facility] [address] [I63.9]     ACTIVE PROBLEM LIST:   Patient Active Problem List   Diagnosis    Anxiety    Essential hypertension    Gastroesophageal reflux disease without esophagitis    Mixed hyperlipidemia    Diastolic dysfunction    Pure hypercholesterolemia    Moderate obesity    Personal history of hydronephrosis    Hiatal hernia with GERD    Acute CVA (cerebrovascular accident) ([facility] [address]    Stenosis of right carotid artery    Hypertensive emergency

## 2020-11-28 NOTE — PROGRESS NOTES
Daily Progress Note  Jose Moore MD      Subjective:   Patient has garbled speech since her surgery yesterday, she denies any sore throat  . Past Medical History:   Diagnosis Date    Anxiety     Gastric reflux     Hypertension        Past Surgical History:   Procedure Laterality Date    CAROTID ENDARTERECTOMY Right 11/27/2020    RIGHT CAROTID ENDARTERECTOMY performed by Bernarda Flower MD at [facility]      LITHOTRIPSY Left 2/7/2020    CYSTOSCOPY RETROGRADE PYELOGRAM LEFT URETEROSCOPY LEFT J STENT LASER LITHOTRIPSY performed by Fouzia Mendez DO at [facility] OR       Scheduled Meds:   aspirin  81 mg Oral Daily    rosuvastatin  5 mg Oral Nightly    amLODIPine  5 mg Oral Daily    ezetimibe  10 mg Oral Daily    sodium chloride flush  10 mL Intravenous 2 times per day    enoxaparin  40 mg Subcutaneous Daily     Continuous Infusions:   dextrose 5% and 0.9% NaCl with KCl 20 mEq 75 mL/hr at 11/28/20 1320    clevidipine Stopped (11/28/20 0319)     PRN Meds:oxyCODONE **OR** oxyCODONE, HYDROmorphone, sodium chloride flush, acetaminophen **OR** acetaminophen, polyethylene glycol, promethazine **OR** ondansetron, hydrALAZINE  DIET DENTAL SOFT;    Objective:     I/O last 3 completed shifts: In: 1465 [I.V.:1653]  Out: 835 [Urine:835]  No intake/output data recorded.      Vitals:    11/28/20 0900 11/28/20 1000 11/28/20 1200 11/28/20 1300   BP:       Pulse: 80 62 64 56   Resp: 17 17 10 17   Temp:   98.1 °F (36.7 °C)    TempSrc:   Axillary    SpO2: 93% 98% 99% 99%   Weight:       Height:           General appearance: alert, appears stated age and cooperative  Neck: no adenopathy, no carotid bruit, no JVD, supple, symmetrical, trachea midline and thyroid not enlarged, symmetric, no tenderness/mass/nodules  Lungs: chest clear, no wheezing, rales, normal symmetric air entry  Chest wall: no tenderness  Heart: regular rate and rhythm, S1, S2 normal, no murmur, click, rub or gallop  Abdomen: evaluation  Speech to evaluate cognitive function and phonation  Swallow study reviewed patient can have thin liquids. See orders.       Electronically signed by Ron Fofana MD on 11/28/2020 at 3:06 PM

## 2020-11-28 NOTE — PROGRESS NOTES
VASCULAR SURGERY  DAILY PROGRESS NOTE  11/28/2020    Subjective:  Denies pain, denies weakness, wants food, denies nausea or vomiting, - flatus/BM    Objective:  BP (!) 167/85   Pulse 74   Temp 98.6 °F (37 °C) (Oral)   Resp 16   Ht 5' 2\" (1.575 m)   Wt 167 lb 14.4 oz (76.2 kg)   LMP  (LMP Unknown)   SpO2 99%   BMI 30.71 kg/m²     GENERAL:  Laying in bed, no apparent distress, neurologic exam normal-tongue midline, 5/5 strength in all extremities, smile and grimace equal bilaterally  LUNGS:  No increased work of breathing, no cyanosis  CARDIOVASCULAR:  Extremities warm and well perfused, hypertensive, off cleviprex  ABDOMEN:  Soft, no tenderness, non distended  SKIN: Warm and dry, incision c/d/i, some bruising    Assessment/Plan:  82 y.o. female symptomatic right carotid artery disease. She is status post carotid endarterectomy. Continue with current postop management   No plans for any additional intervention at this point   Blood pressure control as per the ICU staff, BP <140, home meds restarted  Passed swallow test this AM, but still having difficulties with swallowing. Will order   Swallow study  Cano out  Encourage Ambulation       Electronically signed by Johnathon Boxer, MD on 11/28/2020 at 8:12 AM     Agree. Doing well overall. Will see how her swallowing progresses. Continue ICU tonight.

## 2020-11-28 NOTE — FLOWSHEET NOTE
Spoke with Smitha Luis this morning and was instructed to perform a bedside swallow, and pending the passing of the swallow test could advance diet and order pt.  Breakfast.

## 2020-11-28 NOTE — ANESTHESIA POSTPROCEDURE EVALUATION
Department of Anesthesiology  Postprocedure Note    Patient: Asa Patten  MRN: [id number]  YOB: 1935  Date of evaluation: 11/28/2020  Time:  6:25 AM     Procedure Summary     Date:  11/27/20 Room / Location:  [facility] OR 04 / [facility]    Anesthesia Start:  0730 Anesthesia Stop:  1004    Procedure:  RIGHT CAROTID ENDARTERECTOMY (Right ) Diagnosis:  (.)    Surgeon:  Patel Najera MD Responsible Provider:  Tay Truong MD    Anesthesia Type:  general ASA Status:  4          Anesthesia Type: general    Shivani Phase I:      Shivani Phase II:      Last vitals: Reviewed and per EMR flowsheets.        Anesthesia Post Evaluation    Patient location during evaluation: ICU  Patient participation: complete - patient participated  Level of consciousness: awake  Airway patency: patent  Nausea & Vomiting: no nausea and no vomiting  Complications: no  Cardiovascular status: hemodynamically stable  Respiratory status: acceptable  Hydration status: stable

## 2020-11-29 LAB
ANION GAP SERPL CALCULATED.3IONS-SCNC: 10 MMOL/L (ref 7–16)
BUN BLDV-MCNC: 15 MG/DL (ref 8–23)
CALCIUM SERPL-MCNC: 8.2 MG/DL (ref 8.6–10.2)
CHLORIDE BLD-SCNC: 110 MMOL/L (ref 98–107)
CO2: 25 MMOL/L (ref 22–29)
CREAT SERPL-MCNC: 0.6 MG/DL (ref 0.5–1)
GFR AFRICAN AMERICAN: >60
GFR NON-AFRICAN AMERICAN: >60 ML/MIN/1.73
GLUCOSE BLD-MCNC: 120 MG/DL (ref 74–99)
METER GLUCOSE: 139 MG/DL (ref 74–99)
POTASSIUM SERPL-SCNC: 4.1 MMOL/L (ref 3.5–5)
SODIUM BLD-SCNC: 145 MMOL/L (ref 132–146)

## 2020-11-29 PROCEDURE — 6360000002 HC RX W HCPCS: Performed by: STUDENT IN AN ORGANIZED HEALTH CARE EDUCATION/TRAINING PROGRAM

## 2020-11-29 PROCEDURE — 2000000000 HC ICU R&B

## 2020-11-29 PROCEDURE — 6370000000 HC RX 637 (ALT 250 FOR IP): Performed by: STUDENT IN AN ORGANIZED HEALTH CARE EDUCATION/TRAINING PROGRAM

## 2020-11-29 PROCEDURE — 2500000003 HC RX 250 WO HCPCS: Performed by: STUDENT IN AN ORGANIZED HEALTH CARE EDUCATION/TRAINING PROGRAM

## 2020-11-29 PROCEDURE — 2580000003 HC RX 258: Performed by: STUDENT IN AN ORGANIZED HEALTH CARE EDUCATION/TRAINING PROGRAM

## 2020-11-29 PROCEDURE — 80048 BASIC METABOLIC PNL TOTAL CA: CPT

## 2020-11-29 PROCEDURE — 82962 GLUCOSE BLOOD TEST: CPT

## 2020-11-29 PROCEDURE — 6370000000 HC RX 637 (ALT 250 FOR IP): Performed by: NURSE PRACTITIONER

## 2020-11-29 PROCEDURE — 2580000003 HC RX 258: Performed by: SURGERY

## 2020-11-29 RX ORDER — 0.9 % SODIUM CHLORIDE 0.9 %
500 INTRAVENOUS SOLUTION INTRAVENOUS ONCE
Status: COMPLETED | OUTPATIENT
Start: 2020-11-29 | End: 2020-11-29

## 2020-11-29 RX ORDER — DOCUSATE SODIUM 100 MG/1
100 CAPSULE, LIQUID FILLED ORAL 2 TIMES DAILY
Status: DISCONTINUED | OUTPATIENT
Start: 2020-11-29 | End: 2020-11-30 | Stop reason: HOSPADM

## 2020-11-29 RX ORDER — LACTULOSE 10 G/15ML
20 SOLUTION ORAL 3 TIMES DAILY
Status: DISCONTINUED | OUTPATIENT
Start: 2020-11-29 | End: 2020-11-30 | Stop reason: HOSPADM

## 2020-11-29 RX ORDER — BISACODYL 10 MG
10 SUPPOSITORY, RECTAL RECTAL DAILY
Status: DISCONTINUED | OUTPATIENT
Start: 2020-11-29 | End: 2020-11-30 | Stop reason: HOSPADM

## 2020-11-29 RX ORDER — SENNA AND DOCUSATE SODIUM 50; 8.6 MG/1; MG/1
2 TABLET, FILM COATED ORAL DAILY
Status: DISCONTINUED | OUTPATIENT
Start: 2020-11-29 | End: 2020-11-30 | Stop reason: HOSPADM

## 2020-11-29 RX ADMIN — DOCUSATE SODIUM 100 MG: 100 CAPSULE, LIQUID FILLED ORAL at 20:12

## 2020-11-29 RX ADMIN — DEXTROSE MONOHYDRATE, SODIUM CHLORIDE, AND POTASSIUM CHLORIDE: 50; 9; 1.49 INJECTION, SOLUTION INTRAVENOUS at 00:05

## 2020-11-29 RX ADMIN — ENOXAPARIN SODIUM 40 MG: 40 INJECTION SUBCUTANEOUS at 09:17

## 2020-11-29 RX ADMIN — ACETAMINOPHEN 650 MG: 325 TABLET ORAL at 11:24

## 2020-11-29 RX ADMIN — LACTULOSE 20 G: 20 SOLUTION ORAL at 09:17

## 2020-11-29 RX ADMIN — OXYCODONE HYDROCHLORIDE 10 MG: 10 TABLET ORAL at 20:18

## 2020-11-29 RX ADMIN — BISACODYL 10 MG: 10 SUPPOSITORY RECTAL at 15:26

## 2020-11-29 RX ADMIN — ONDANSETRON 4 MG: 2 INJECTION INTRAMUSCULAR; INTRAVENOUS at 15:47

## 2020-11-29 RX ADMIN — SODIUM CHLORIDE 500 ML: 9 INJECTION, SOLUTION INTRAVENOUS at 12:45

## 2020-11-29 RX ADMIN — EZETIMIBE 10 MG: 10 TABLET ORAL at 10:06

## 2020-11-29 RX ADMIN — HYDRALAZINE HYDROCHLORIDE 5 MG: 20 INJECTION, SOLUTION INTRAMUSCULAR; INTRAVENOUS at 12:11

## 2020-11-29 RX ADMIN — Medication 10 ML: at 20:12

## 2020-11-29 RX ADMIN — ROSUVASTATIN CALCIUM 5 MG: 5 TABLET, FILM COATED ORAL at 20:12

## 2020-11-29 RX ADMIN — ASPIRIN 81 MG CHEWABLE TABLET 81 MG: 81 TABLET CHEWABLE at 09:17

## 2020-11-29 RX ADMIN — DOCUSATE SODIUM 50 MG AND SENNOSIDES 8.6 MG 2 TABLET: 8.6; 5 TABLET, FILM COATED ORAL at 09:17

## 2020-11-29 RX ADMIN — Medication 10 ML: at 09:19

## 2020-11-29 RX ADMIN — AMLODIPINE BESYLATE 5 MG: 5 TABLET ORAL at 09:17

## 2020-11-29 RX ADMIN — OXYCODONE HYDROCHLORIDE 5 MG: 5 TABLET ORAL at 16:08

## 2020-11-29 RX ADMIN — DOCUSATE SODIUM 100 MG: 100 CAPSULE, LIQUID FILLED ORAL at 09:17

## 2020-11-29 ASSESSMENT — PAIN SCALES - GENERAL
PAINLEVEL_OUTOF10: 3
PAINLEVEL_OUTOF10: 0
PAINLEVEL_OUTOF10: 10
PAINLEVEL_OUTOF10: 0
PAINLEVEL_OUTOF10: 0
PAINLEVEL_OUTOF10: 9
PAINLEVEL_OUTOF10: 0
PAINLEVEL_OUTOF10: 0
PAINLEVEL_OUTOF10: 9
PAINLEVEL_OUTOF10: 0

## 2020-11-29 ASSESSMENT — PAIN - FUNCTIONAL ASSESSMENT: PAIN_FUNCTIONAL_ASSESSMENT: ACTIVITIES ARE NOT PREVENTED

## 2020-11-29 ASSESSMENT — PAIN DESCRIPTION - ONSET: ONSET: ON-GOING

## 2020-11-29 ASSESSMENT — PAIN DESCRIPTION - PAIN TYPE: TYPE: SURGICAL PAIN

## 2020-11-29 ASSESSMENT — PAIN DESCRIPTION - PROGRESSION: CLINICAL_PROGRESSION: GRADUALLY WORSENING

## 2020-11-29 ASSESSMENT — PAIN DESCRIPTION - DESCRIPTORS: DESCRIPTORS: ACHING;DISCOMFORT

## 2020-11-29 ASSESSMENT — PAIN DESCRIPTION - LOCATION: LOCATION: NECK

## 2020-11-29 ASSESSMENT — PAIN DESCRIPTION - ORIENTATION: ORIENTATION: RIGHT

## 2020-11-29 ASSESSMENT — PAIN DESCRIPTION - FREQUENCY: FREQUENCY: CONTINUOUS

## 2020-11-29 NOTE — PROGRESS NOTES
VASCULAR SURGERY  DAILY PROGRESS NOTE  11/29/2020    Subjective:  Denies pain, denies weakness, concerned about swallowing food, denies nausea or vomiting, - flatus/BM    Objective:  BP (!) 167/85   Pulse 82   Temp 97.1 °F (36.2 °C) (Temporal)   Resp 22   Ht 5' 2\" (1.575 m)   Wt 172 lb 12.8 oz (78.4 kg)   LMP  (LMP Unknown)   SpO2 96%   BMI 31.61 kg/m²     GENERAL:  Laying in bed, no apparent distress, neurologic exam normal-tongue rightward deviation, returns to midline without prompting, 5/5 strength in all extremities  LUNGS:  No increased work of breathing, no cyanosis  CARDIOVASCULAR:  Extremities warm and well perfused, hypertensive, off cleviprex  ABDOMEN:  Soft, no tenderness, non distended  SKIN: Warm and dry, incision c/d/i, some bruising improved    Assessment/Plan:  82 y.o. female sp CEA 11/27    Blood pressure control as per the ICU staff, BP <140, home meds restarted  Passed swallow test yesterday, but still having difficulties with swallowing. Encourage Ambulation   Urinating appropriately, increase bowel regimen  Stopped IVF, encourage PO intake with supervision  Consider transfer out of ICU pending progress      Electronically signed by Jamey Ulrich MD on 11/29/2020 at 7:08 AM     Agree. Remove arterial line (not functioning). Fluids for mild hypotension. Continue ICU for close monitoring.

## 2020-11-29 NOTE — PROGRESS NOTES
Patient's BP low after receiving IV hydralazine for , Dr. Leidy Whiting present, patient symptomatic, feeling faint, IV bolus 500ml started.

## 2020-11-30 ENCOUNTER — APPOINTMENT (OUTPATIENT)
Dept: ULTRASOUND IMAGING | Age: 85
DRG: 038 | End: 2020-11-30
Attending: FAMILY MEDICINE
Payer: MEDICARE

## 2020-11-30 ENCOUNTER — HOSPITAL ENCOUNTER (INPATIENT)
Age: 85
LOS: 9 days | Discharge: HOME | DRG: 057 | End: 2020-12-09
Attending: PHYSICAL MEDICINE & REHABILITATION | Admitting: PHYSICAL MEDICINE & REHABILITATION
Payer: MEDICARE

## 2020-11-30 VITALS
HEIGHT: 62 IN | HEART RATE: 92 BPM | DIASTOLIC BLOOD PRESSURE: 74 MMHG | WEIGHT: 172.8 LBS | BODY MASS INDEX: 31.8 KG/M2 | RESPIRATION RATE: 20 BRPM | OXYGEN SATURATION: 93 % | TEMPERATURE: 98.4 F | SYSTOLIC BLOOD PRESSURE: 163 MMHG

## 2020-11-30 PROBLEM — I63.9 ISCHEMIC STROKE (HCC): Status: ACTIVE | Noted: 2020-11-30

## 2020-11-30 LAB
ANION GAP SERPL CALCULATED.3IONS-SCNC: 12 MMOL/L (ref 7–16)
ANION GAP SERPL CALCULATED.3IONS-SCNC: 8 MMOL/L (ref 7–16)
BASOPHILS ABSOLUTE: 0 E9/L (ref 0–0.2)
BASOPHILS RELATIVE PERCENT: 0 % (ref 0–2)
BUN BLDV-MCNC: 17 MG/DL (ref 8–23)
BUN BLDV-MCNC: 17 MG/DL (ref 8–23)
CALCIUM SERPL-MCNC: 8.6 MG/DL (ref 8.6–10.2)
CALCIUM SERPL-MCNC: 8.7 MG/DL (ref 8.6–10.2)
CHLORIDE BLD-SCNC: 104 MMOL/L (ref 98–107)
CHLORIDE BLD-SCNC: 106 MMOL/L (ref 98–107)
CO2: 26 MMOL/L (ref 22–29)
CO2: 30 MMOL/L (ref 22–29)
CREAT SERPL-MCNC: 1.2 MG/DL (ref 0.5–1)
CREAT SERPL-MCNC: 1.3 MG/DL (ref 0.5–1)
EOSINOPHILS ABSOLUTE: 0.01 E9/L (ref 0.05–0.5)
EOSINOPHILS RELATIVE PERCENT: 0.1 % (ref 0–6)
GFR AFRICAN AMERICAN: 47
GFR AFRICAN AMERICAN: 52
GFR NON-AFRICAN AMERICAN: 39 ML/MIN/1.73
GFR NON-AFRICAN AMERICAN: 43 ML/MIN/1.73
GLUCOSE BLD-MCNC: 105 MG/DL (ref 74–99)
GLUCOSE BLD-MCNC: 113 MG/DL (ref 74–99)
HCT VFR BLD CALC: 38.5 % (ref 34–48)
HEMOGLOBIN: 12.6 G/DL (ref 11.5–15.5)
IMMATURE GRANULOCYTES #: 0.04 E9/L
IMMATURE GRANULOCYTES %: 0.4 % (ref 0–5)
LYMPHOCYTES ABSOLUTE: 0.71 E9/L (ref 1.5–4)
LYMPHOCYTES RELATIVE PERCENT: 7 % (ref 20–42)
MCH RBC QN AUTO: 31.3 PG (ref 26–35)
MCHC RBC AUTO-ENTMCNC: 32.7 % (ref 32–34.5)
MCV RBC AUTO: 95.5 FL (ref 80–99.9)
MONOCYTES ABSOLUTE: 1.37 E9/L (ref 0.1–0.95)
MONOCYTES RELATIVE PERCENT: 13.5 % (ref 2–12)
NEUTROPHILS ABSOLUTE: 8 E9/L (ref 1.8–7.3)
NEUTROPHILS RELATIVE PERCENT: 79 % (ref 43–80)
PDW BLD-RTO: 13.6 FL (ref 11.5–15)
PLATELET # BLD: 235 E9/L (ref 130–450)
PMV BLD AUTO: 10.4 FL (ref 7–12)
POC ACT LR: 155 SECONDS
POC ACT LR: 162 SECONDS
POC ACT LR: 187 SECONDS
POC ACT LR: 320 SECONDS
POC ACT LR: 385 SECONDS
POTASSIUM SERPL-SCNC: 3.8 MMOL/L (ref 3.5–5)
POTASSIUM SERPL-SCNC: 3.8 MMOL/L (ref 3.5–5)
RBC # BLD: 4.03 E12/L (ref 3.5–5.5)
SODIUM BLD-SCNC: 142 MMOL/L (ref 132–146)
SODIUM BLD-SCNC: 144 MMOL/L (ref 132–146)
WBC # BLD: 10.1 E9/L (ref 4.5–11.5)

## 2020-11-30 PROCEDURE — 1280000000 HC REHAB R&B

## 2020-11-30 PROCEDURE — 2580000003 HC RX 258: Performed by: INTERNAL MEDICINE

## 2020-11-30 PROCEDURE — 97164 PT RE-EVAL EST PLAN CARE: CPT

## 2020-11-30 PROCEDURE — 6370000000 HC RX 637 (ALT 250 FOR IP): Performed by: NURSE PRACTITIONER

## 2020-11-30 PROCEDURE — 76770 US EXAM ABDO BACK WALL COMP: CPT

## 2020-11-30 PROCEDURE — 99024 POSTOP FOLLOW-UP VISIT: CPT | Performed by: SURGERY

## 2020-11-30 PROCEDURE — 97530 THERAPEUTIC ACTIVITIES: CPT

## 2020-11-30 PROCEDURE — 85025 COMPLETE CBC W/AUTO DIFF WBC: CPT

## 2020-11-30 PROCEDURE — 96125 COGNITIVE TEST BY HC PRO: CPT | Performed by: SPEECH-LANGUAGE PATHOLOGIST

## 2020-11-30 PROCEDURE — 6370000000 HC RX 637 (ALT 250 FOR IP): Performed by: STUDENT IN AN ORGANIZED HEALTH CARE EDUCATION/TRAINING PROGRAM

## 2020-11-30 PROCEDURE — 80048 BASIC METABOLIC PNL TOTAL CA: CPT

## 2020-11-30 PROCEDURE — 36415 COLL VENOUS BLD VENIPUNCTURE: CPT

## 2020-11-30 PROCEDURE — 6360000002 HC RX W HCPCS: Performed by: STUDENT IN AN ORGANIZED HEALTH CARE EDUCATION/TRAINING PROGRAM

## 2020-11-30 PROCEDURE — 97168 OT RE-EVAL EST PLAN CARE: CPT

## 2020-11-30 PROCEDURE — 97535 SELF CARE MNGMENT TRAINING: CPT

## 2020-11-30 PROCEDURE — 2580000003 HC RX 258: Performed by: STUDENT IN AN ORGANIZED HEALTH CARE EDUCATION/TRAINING PROGRAM

## 2020-11-30 RX ORDER — ACETAMINOPHEN 650 MG/1
650 SUPPOSITORY RECTAL EVERY 6 HOURS PRN
Status: DISCONTINUED | OUTPATIENT
Start: 2020-11-30 | End: 2020-11-30

## 2020-11-30 RX ORDER — LACTULOSE 10 G/15ML
20 SOLUTION ORAL 3 TIMES DAILY
Status: DISCONTINUED | OUTPATIENT
Start: 2020-12-01 | End: 2020-12-09

## 2020-11-30 RX ORDER — OXYCODONE HYDROCHLORIDE 10 MG/1
10 TABLET ORAL EVERY 4 HOURS PRN
Status: CANCELLED | OUTPATIENT
Start: 2020-11-30

## 2020-11-30 RX ORDER — POLYETHYLENE GLYCOL 3350 17 G/17G
17 POWDER, FOR SOLUTION ORAL DAILY PRN
Status: CANCELLED | OUTPATIENT
Start: 2020-11-30

## 2020-11-30 RX ORDER — OXYCODONE HYDROCHLORIDE 5 MG/1
5 TABLET ORAL EVERY 4 HOURS PRN
Status: DISCONTINUED | OUTPATIENT
Start: 2020-11-30 | End: 2020-12-09 | Stop reason: HOSPADM

## 2020-11-30 RX ORDER — EZETIMIBE 10 MG/1
10 TABLET ORAL DAILY
Status: CANCELLED | OUTPATIENT
Start: 2020-12-01

## 2020-11-30 RX ORDER — AMLODIPINE BESYLATE 5 MG/1
5 TABLET ORAL DAILY
Status: DISCONTINUED | OUTPATIENT
Start: 2020-12-01 | End: 2020-12-09 | Stop reason: HOSPADM

## 2020-11-30 RX ORDER — ACETAMINOPHEN 325 MG/1
650 TABLET ORAL EVERY 6 HOURS PRN
Status: DISCONTINUED | OUTPATIENT
Start: 2020-11-30 | End: 2020-12-09 | Stop reason: HOSPADM

## 2020-11-30 RX ORDER — PROMETHAZINE HYDROCHLORIDE 25 MG/1
12.5 TABLET ORAL EVERY 6 HOURS PRN
Status: DISCONTINUED | OUTPATIENT
Start: 2020-11-30 | End: 2020-12-09 | Stop reason: HOSPADM

## 2020-11-30 RX ORDER — PROMETHAZINE HYDROCHLORIDE 25 MG/1
12.5 TABLET ORAL EVERY 6 HOURS PRN
Status: CANCELLED | OUTPATIENT
Start: 2020-11-30

## 2020-11-30 RX ORDER — OXYCODONE HYDROCHLORIDE 5 MG/1
5 TABLET ORAL EVERY 4 HOURS PRN
Status: CANCELLED | OUTPATIENT
Start: 2020-11-30

## 2020-11-30 RX ORDER — SODIUM CHLORIDE 0.9 % (FLUSH) 0.9 %
10 SYRINGE (ML) INJECTION EVERY 12 HOURS SCHEDULED
Status: DISCONTINUED | OUTPATIENT
Start: 2020-12-01 | End: 2020-12-03

## 2020-11-30 RX ORDER — SENNA AND DOCUSATE SODIUM 50; 8.6 MG/1; MG/1
2 TABLET, FILM COATED ORAL DAILY
Status: DISCONTINUED | OUTPATIENT
Start: 2020-12-01 | End: 2020-12-09 | Stop reason: HOSPADM

## 2020-11-30 RX ORDER — LACTULOSE 10 G/15ML
20 SOLUTION ORAL 3 TIMES DAILY
Status: CANCELLED | OUTPATIENT
Start: 2020-11-30

## 2020-11-30 RX ORDER — OXYCODONE HYDROCHLORIDE 10 MG/1
10 TABLET ORAL EVERY 4 HOURS PRN
Status: DISCONTINUED | OUTPATIENT
Start: 2020-11-30 | End: 2020-12-02

## 2020-11-30 RX ORDER — ASPIRIN 81 MG/1
81 TABLET, CHEWABLE ORAL DAILY
Status: CANCELLED | OUTPATIENT
Start: 2020-12-01

## 2020-11-30 RX ORDER — ONDANSETRON 2 MG/ML
4 INJECTION INTRAMUSCULAR; INTRAVENOUS EVERY 6 HOURS PRN
Status: DISCONTINUED | OUTPATIENT
Start: 2020-11-30 | End: 2020-11-30

## 2020-11-30 RX ORDER — POLYETHYLENE GLYCOL 3350 17 G/17G
17 POWDER, FOR SOLUTION ORAL DAILY PRN
Status: DISCONTINUED | OUTPATIENT
Start: 2020-11-30 | End: 2020-12-09 | Stop reason: HOSPADM

## 2020-11-30 RX ORDER — SODIUM CHLORIDE 0.9 % (FLUSH) 0.9 %
10 SYRINGE (ML) INJECTION PRN
Status: CANCELLED | OUTPATIENT
Start: 2020-11-30

## 2020-11-30 RX ORDER — ACETAMINOPHEN 650 MG/1
650 SUPPOSITORY RECTAL EVERY 6 HOURS PRN
Status: CANCELLED | OUTPATIENT
Start: 2020-11-30

## 2020-11-30 RX ORDER — BISACODYL 10 MG
10 SUPPOSITORY, RECTAL RECTAL DAILY
Status: DISCONTINUED | OUTPATIENT
Start: 2020-12-01 | End: 2020-12-09

## 2020-11-30 RX ORDER — SODIUM CHLORIDE 0.9 % (FLUSH) 0.9 %
10 SYRINGE (ML) INJECTION PRN
Status: DISCONTINUED | OUTPATIENT
Start: 2020-11-30 | End: 2020-12-03

## 2020-11-30 RX ORDER — ONDANSETRON 2 MG/ML
4 INJECTION INTRAMUSCULAR; INTRAVENOUS EVERY 6 HOURS PRN
Status: CANCELLED | OUTPATIENT
Start: 2020-11-30

## 2020-11-30 RX ORDER — SODIUM CHLORIDE, SODIUM LACTATE, POTASSIUM CHLORIDE, CALCIUM CHLORIDE 600; 310; 30; 20 MG/100ML; MG/100ML; MG/100ML; MG/100ML
INJECTION, SOLUTION INTRAVENOUS CONTINUOUS
Status: CANCELLED | OUTPATIENT
Start: 2020-11-30

## 2020-11-30 RX ORDER — AMLODIPINE BESYLATE 5 MG/1
5 TABLET ORAL DAILY
Status: CANCELLED | OUTPATIENT
Start: 2020-12-01

## 2020-11-30 RX ORDER — DOCUSATE SODIUM 100 MG/1
100 CAPSULE, LIQUID FILLED ORAL 2 TIMES DAILY
Status: DISCONTINUED | OUTPATIENT
Start: 2020-12-01 | End: 2020-12-09

## 2020-11-30 RX ORDER — SODIUM CHLORIDE 0.9 % (FLUSH) 0.9 %
10 SYRINGE (ML) INJECTION EVERY 12 HOURS SCHEDULED
Status: CANCELLED | OUTPATIENT
Start: 2020-11-30

## 2020-11-30 RX ORDER — SODIUM CHLORIDE, SODIUM LACTATE, POTASSIUM CHLORIDE, CALCIUM CHLORIDE 600; 310; 30; 20 MG/100ML; MG/100ML; MG/100ML; MG/100ML
INJECTION, SOLUTION INTRAVENOUS CONTINUOUS
Status: DISCONTINUED | OUTPATIENT
Start: 2020-11-30 | End: 2020-11-30 | Stop reason: HOSPADM

## 2020-11-30 RX ORDER — ACETAMINOPHEN 325 MG/1
650 TABLET ORAL EVERY 6 HOURS PRN
Status: CANCELLED | OUTPATIENT
Start: 2020-11-30

## 2020-11-30 RX ORDER — ASPIRIN 81 MG/1
81 TABLET, CHEWABLE ORAL DAILY
Status: DISCONTINUED | OUTPATIENT
Start: 2020-12-01 | End: 2020-12-09 | Stop reason: HOSPADM

## 2020-11-30 RX ORDER — ROSUVASTATIN CALCIUM 5 MG/1
5 TABLET, COATED ORAL NIGHTLY
Status: CANCELLED | OUTPATIENT
Start: 2020-11-30

## 2020-11-30 RX ORDER — SENNA AND DOCUSATE SODIUM 50; 8.6 MG/1; MG/1
2 TABLET, FILM COATED ORAL DAILY
Status: CANCELLED | OUTPATIENT
Start: 2020-12-01

## 2020-11-30 RX ORDER — SODIUM CHLORIDE, SODIUM LACTATE, POTASSIUM CHLORIDE, CALCIUM CHLORIDE 600; 310; 30; 20 MG/100ML; MG/100ML; MG/100ML; MG/100ML
INJECTION, SOLUTION INTRAVENOUS CONTINUOUS
Status: DISCONTINUED | OUTPATIENT
Start: 2020-11-30 | End: 2020-12-01

## 2020-11-30 RX ORDER — ROSUVASTATIN CALCIUM 5 MG/1
5 TABLET, COATED ORAL NIGHTLY
Status: DISCONTINUED | OUTPATIENT
Start: 2020-12-01 | End: 2020-12-09 | Stop reason: HOSPADM

## 2020-11-30 RX ORDER — EZETIMIBE 10 MG/1
10 TABLET ORAL DAILY
Status: DISCONTINUED | OUTPATIENT
Start: 2020-12-01 | End: 2020-12-09 | Stop reason: HOSPADM

## 2020-11-30 RX ORDER — DOCUSATE SODIUM 100 MG/1
100 CAPSULE, LIQUID FILLED ORAL 2 TIMES DAILY
Status: CANCELLED | OUTPATIENT
Start: 2020-11-30

## 2020-11-30 RX ORDER — BISACODYL 10 MG
10 SUPPOSITORY, RECTAL RECTAL DAILY
Status: CANCELLED | OUTPATIENT
Start: 2020-12-01

## 2020-11-30 RX ADMIN — Medication 10 ML: at 08:04

## 2020-11-30 RX ADMIN — ONDANSETRON 4 MG: 2 INJECTION INTRAMUSCULAR; INTRAVENOUS at 11:39

## 2020-11-30 RX ADMIN — Medication 10 ML: at 19:45

## 2020-11-30 RX ADMIN — SODIUM CHLORIDE, POTASSIUM CHLORIDE, SODIUM LACTATE AND CALCIUM CHLORIDE: 600; 310; 30; 20 INJECTION, SOLUTION INTRAVENOUS at 23:29

## 2020-11-30 RX ADMIN — EZETIMIBE 10 MG: 10 TABLET ORAL at 08:04

## 2020-11-30 RX ADMIN — AMLODIPINE BESYLATE 5 MG: 5 TABLET ORAL at 08:04

## 2020-11-30 RX ADMIN — SODIUM CHLORIDE, POTASSIUM CHLORIDE, SODIUM LACTATE AND CALCIUM CHLORIDE: 600; 310; 30; 20 INJECTION, SOLUTION INTRAVENOUS at 11:39

## 2020-11-30 RX ADMIN — ENOXAPARIN SODIUM 40 MG: 40 INJECTION SUBCUTANEOUS at 08:04

## 2020-11-30 RX ADMIN — ROSUVASTATIN CALCIUM 5 MG: 5 TABLET, FILM COATED ORAL at 19:45

## 2020-11-30 RX ADMIN — ASPIRIN 81 MG CHEWABLE TABLET 81 MG: 81 TABLET CHEWABLE at 08:04

## 2020-11-30 ASSESSMENT — PAIN SCALES - GENERAL
PAINLEVEL_OUTOF10: 0

## 2020-11-30 NOTE — PLAN OF CARE
Problem: Inadequate oral food/beverage intake (NI-2.1)  Goal: Food and/or Nutrient Delivery  Description: Individualized approach for food/nutrient provision.   Outcome: Met This Shift
Problem: Skin Integrity:  Goal: Absence of new skin breakdown  Description: Absence of new skin breakdown  Outcome: Met This Shift  Note: Will encourage frequent repositioning, and assist pt in doing so at minimum of q2h. Will frequently assess pressure points and provide pillow support under knees and legs to elevate heels and place pillows under arms. Problem: Pain:  Goal: Control of acute pain  Description: Control of acute pain  Outcome: Met This Shift  Note: Will frequently assess pain level using 0-10 scale. Will explain medications available as well as nonpharmacological ways to manage postoperative pain, such as repositioning. Will administer pain medication as needed.
Problem: Skin Integrity:  Goal: Will show no infection signs and symptoms  Description: Will show no infection signs and symptoms  11/29/2020 1026 by Erum Yin RN  Outcome: Met This Shift  11/29/2020 0405 by Maite Lopez RN  Outcome: Met This Shift  11/28/2020 2122 by Maite Lopez RN  Outcome: Met This Shift  Goal: Absence of new skin breakdown  Description: Absence of new skin breakdown  11/29/2020 1026 by Erum Yin RN  Outcome: Met This Shift  11/29/2020 0405 by Maite Lopez RN  Outcome: Met This Shift  11/28/2020 2122 by Maite Lopez RN  Outcome: Met This Shift     Problem: Falls - Risk of:  Goal: Will remain free from falls  Description: Will remain free from falls  11/29/2020 1026 by Erum Yin RN  Outcome: Met This Shift  11/29/2020 0405 by Maite Lopez RN  Outcome: Met This Shift  11/28/2020 2122 by Maite Lopez RN  Outcome: Met This Shift  Goal: Absence of physical injury  Description: Absence of physical injury  11/29/2020 1026 by Erum Yin RN  Outcome: Met This Shift  11/29/2020 0405 by Maite Lopez RN  Outcome: Met This Shift  11/28/2020 2122 by Maite Lopez RN  Outcome: Met This Shift     Problem: Pain:  Goal: Pain level will decrease  Description: Pain level will decrease  11/29/2020 1026 by Erum Yin RN  Outcome: Met This Shift  11/29/2020 0405 by Maite Lopez RN  Outcome: Met This Shift  11/28/2020 2122 by Maite Lopez RN  Outcome: Met This Shift  Goal: Control of acute pain  Description: Control of acute pain  11/29/2020 0405 by Maite Lopez RN  Outcome: Met This Shift  11/28/2020 2122 by Maite Lopez RN  Outcome: Not Met This Shift  Goal: Control of chronic pain  Description: Control of chronic pain  11/29/2020 0405 by Maite Lopez RN  Outcome: Met This Shift  11/28/2020 2122 by Maite Lopez RN  Outcome: Met This Shift
Problem: Skin Integrity:  Goal: Will show no infection signs and symptoms  Description: Will show no infection signs and symptoms  11/29/2020 2034 by Yogi Renteria RN  Outcome: Met This Shift  11/29/2020 1026 by Yumiko Bowling RN  Outcome: Met This Shift  Goal: Absence of new skin breakdown  Description: Absence of new skin breakdown  11/29/2020 2034 by Yogi Renteria RN  Outcome: Met This Shift  11/29/2020 1026 by Yumiko Bowling RN  Outcome: Met This Shift     Problem: Falls - Risk of:  Goal: Will remain free from falls  Description: Will remain free from falls  11/29/2020 2034 by Yogi Renteria RN  Outcome: Met This Shift  11/29/2020 1026 by Yumiko Bowling RN  Outcome: Met This Shift  Goal: Absence of physical injury  Description: Absence of physical injury  11/29/2020 2034 by Yogi Renteria RN  Outcome: Met This Shift  11/29/2020 1026 by Yumiko Bowling RN  Outcome: Met This Shift     Problem: Neurological  Goal: Maximum potential motor/sensory/cognitive function  Outcome: Not Met This Shift     Problem: Pain:  Goal: Pain level will decrease  Description: Pain level will decrease  11/29/2020 2034 by Yogi Renteria RN  Outcome: Not Met This Shift  11/29/2020 1026 by Yumiko Bowling RN  Outcome: Met This Shift  Goal: Control of acute pain  Description: Control of acute pain  Outcome: Not Met This Shift  Goal: Control of chronic pain  Description: Control of chronic pain  Outcome: Not Met This Shift
Problem: Skin Integrity:  Goal: Will show no infection signs and symptoms  Description: Will show no infection signs and symptoms  11/30/2020 0438 by Juan Guerrero RN  Outcome: Met This Shift  11/29/2020 2034 by Juan Guerrero RN  Outcome: Met This Shift  Goal: Absence of new skin breakdown  Description: Absence of new skin breakdown  11/30/2020 0438 by Juan Guerrero RN  Outcome: Met This Shift  11/29/2020 2034 by Juan Guerrero RN  Outcome: Met This Shift     Problem: Falls - Risk of:  Goal: Will remain free from falls  Description: Will remain free from falls  11/30/2020 0438 by Juan Guerrero RN  Outcome: Met This Shift  11/29/2020 2034 by Juan Guerrero RN  Outcome: Met This Shift  Goal: Absence of physical injury  Description: Absence of physical injury  11/30/2020 0438 by Juan Guerrero RN  Outcome: Met This Shift  11/29/2020 2034 by Juan Guerrero RN  Outcome: Met This Shift     Problem: Neurological  Goal: Maximum potential motor/sensory/cognitive function  11/30/2020 0438 by Juan Guerrero RN  Outcome: Not Met This Shift  11/29/2020 2034 by Juan Guerrero RN  Outcome: Not Met This Shift     Problem: Pain:  Goal: Pain level will decrease  Description: Pain level will decrease  11/30/2020 0438 by Juan Guerrero RN  Outcome: Met This Shift  11/29/2020 2034 by Juan Guerrero RN  Outcome: Not Met This Shift  Goal: Control of acute pain  Description: Control of acute pain  11/30/2020 0438 by Juan Guerrero RN  Outcome: Met This Shift  11/29/2020 2034 by Juan Guerrero RN  Outcome: Not Met This Shift  Goal: Control of chronic pain  Description: Control of chronic pain  11/30/2020 0438 by Juan Guerrero RN  Outcome: Met This Shift  11/29/2020 2034 by Juan Guerrero RN  Outcome: Not Met This Shift
Problem: Skin Integrity:  Goal: Will show no infection signs and symptoms  Description: Will show no infection signs and symptoms  11/30/2020 0719 by Cristine Saucedo RN  Outcome: Met This Shift     Problem: Falls - Risk of:  Goal: Will remain free from falls  Description: Will remain free from falls  11/30/2020 0719 by Cristine Saucedo RN  Outcome: Met This Shift     Problem: Pain:  Goal: Pain level will decrease  Description: Pain level will decrease  11/30/2020 0719 by Cristine Saucedo RN  Outcome: Met This Shift     Problem: Pain:  Goal: Control of acute pain  Description: Control of acute pain  11/30/2020 0719 by Cristine Saucedo RN  Outcome: Met This Shift
Problem: Skin Integrity:  Goal: Will show no infection signs and symptoms  Description: Will show no infection signs and symptoms  Outcome: Met This Shift  Goal: Absence of new skin breakdown  Description: Absence of new skin breakdown  Outcome: Met This Shift     Problem: Falls - Risk of:  Goal: Will remain free from falls  Description: Will remain free from falls  Outcome: Met This Shift  Goal: Absence of physical injury  Description: Absence of physical injury  Outcome: Met This Shift     Problem: Neurological  Goal: Maximum potential motor/sensory/cognitive function  Outcome: Met This Shift
Problem: Skin Integrity:  Goal: Will show no infection signs and symptoms  Description: Will show no infection signs and symptoms  Outcome: Met This Shift  Goal: Absence of new skin breakdown  Description: Absence of new skin breakdown  Outcome: Met This Shift     Problem: Falls - Risk of:  Goal: Will remain free from falls  Description: Will remain free from falls  Outcome: Met This Shift  Goal: Absence of physical injury  Description: Absence of physical injury  Outcome: Met This Shift     Problem: Neurological  Goal: Maximum potential motor/sensory/cognitive function  Outcome: Met This Shift
98.5

## 2020-11-30 NOTE — PROGRESS NOTES
Daily Progress Note  Cyrus Lopez MD      Subjective:   Patient Speech is little better. she had an episode earlier today of severe dizziness blood pressure dropped significantly and patient was quite somnolent with that. She denies any chest pain or shortness of breath. .     She is complaining of constipation. Past Medical History:   Diagnosis Date    Anxiety     Gastric reflux     Hypertension        Past Surgical History:   Procedure Laterality Date    CAROTID ENDARTERECTOMY Right 11/27/2020    RIGHT [facility]      LITHOTRIPSY Left 2/7/2020    CYSTOSCOPY RETROGRADE PYELOGRAM LEFT URETEROSCOPY LEFT J STENT LASER LITHOTRIPSY performed by Merari Mendez,  at [facility] OR       Scheduled Meds:   sennosides-docusate sodium  2 tablet Oral Daily    docusate sodium  100 mg Oral BID    bisacodyl  10 mg Rectal Daily    lactulose  20 g Oral TID    aspirin  81 mg Oral Daily    rosuvastatin  5 mg Oral Nightly    amLODIPine  5 mg Oral Daily    ezetimibe  10 mg Oral Daily    sodium chloride flush  10 mL Intravenous 2 times per day    enoxaparin  40 mg Subcutaneous Daily     Continuous Infusions:    PRN Meds:oxyCODONE **OR** oxyCODONE, HYDROmorphone, sodium chloride flush, acetaminophen **OR** acetaminophen, polyethylene glycol, promethazine **OR** ondansetron, hydrALAZINE  DIET DENTAL SOFT;  Dietary Nutrition Supplements: Standard High Calorie Oral Supplement  Dietary Nutrition Supplements: Wound Healing Oral Supplement    Objective:     I/O last 3 completed shifts:   In: 620 [P.O.:120; IV Piggyback:500]  Out: 650 [Urine:650]  I/O this shift:  In: -   Out: 200 [Urine:200]  Stool Occurrence: 0  Vitals:    11/30/20 0807 11/30/20 0900 11/30/20 1000 11/30/20 1030   BP:  (!) 156/63 (!) 143/66    Pulse:  102 97    Resp:  25 15    Temp:       TempSrc:       SpO2: 95% 96% 97%    Weight:       Height:    5' 2\" (1.575 m)       General appearance: alert, appears stated age and cooperative, speech was significantly improved in the beginning of the visit but as the patient kept talking fast then she started to have garbled speech again, she stated that the speech issue is intermittent. Neck: no adenopathy, swelling over the right endarterectomy, trachea midline and thyroid not enlarged, symmetric. Lungs: chest clear, no increased work of breathing, no wheezing, rales, normal symmetric air entry  Chest wall: no tenderness  Heart: regular rate and rhythm, S1, S2 normal, no murmur, click, rub or gallop  Abdomen: soft, non-tender; bowel sounds normal; no masses,  no organomegaly  Extremities: extremities normal, atraumatic, no cyanosis or edema  Pulses: 2+ and symmetric  Neurologic: Good strength in upper and lower extremity bilaterally, speech is intermittently garbled. Patient alert oriented x3. Data Review:    Recent Labs     11/28/20  0435 11/30/20  0709   WBC 7.7 10.1   HGB 11.4* 12.6   HCT 33.9* 38.5   MCV 93.9 95.5    235     Recent Labs     11/28/20  0435 11/29/20  0448 11/30/20  0458    145 144   K 4.3 4.1 3.8    110* 106   CO2 28 25 26   BUN 16 15 17   CREATININE 0.6 0.6 1.3*   GLUCOSE 130* 120* 105*       HgBA1c:    Lab Results   Component Value Date    LABA1C 6.1 10/13/2015     Radiology:   Fluoroscopy modified barium swallow with video   Final Result   Swallowing mechanism grossly within normal limits without evidence of   aspiration. Please see separate speech pathology report for full discussion of findings   and recommendations. MRI CERVICAL SPINE WO CONTRAST   Final Result   Mild-to-moderate degenerative changes at the C5-C6 and C6-C7 levels without   significant spinal canal nor neural foraminal stenosis. NM Cardiac Stress Test Nuclear Imaging   Final Result   ECG during the infusion did not change. The myocardial perfusion imaging was normal without ischemia or   infarct.     Overall left ventricular systolic function was normal without regional   wall motion abnormalities. Low risk pre-operative pharmacologic stress test.   Thank you for sending your patient to this [facility] Cristina Garcia MD, [facility], [address] [place] [address] cardiology. MRI BRAIN WO CONTRAST   Final Result   There is a punctate 3 mm area of restricted diffusion in the high right   parietal lobe consistent with an acute area of infarct in the right middle   cerebral artery territory. No other areas of restricted diffusion are   identified. Cerebral atrophy. Chronic small vessel ischemic changes. Remote lacunar   infarct in the right cerebellum. XR CERVICAL SPINE (4-5 VIEWS)   Final Result   Degenerative changes of the cervical spine with bilateral neural foraminal   narrowing and multilevel narrowing of intervertebral disc spaces. Evaluation is limited based on the images obtained. Limited visualization of the upper cervical spine on the lateral radiographs   obtained. Assessment:     Principal Problem:    Acute CVA (cerebrovascular accident) [facility])  Active Problems:    Essential hypertension    Gastroesophageal reflux disease without esophagitis    Anxiety    Diastolic dysfunction    Moderate obesity    Hiatal hernia with GERD  Resolved Problems:    * No resolved hospital problems. *  Hypotension after administering 10 mg of hydralazine IV for a blood pressure between 778-359 systolic    Plan:   Discussed with the patient, she was under the impression that she  but with long discussion she now understands that she only had low blood pressure as a reaction to the medication. Medication adjusted and parameters for hydralazine addressed. Patient is a good candidate for acute rehab  Patient swallow evaluation noted  Discussed with ICU staff and constipation addressed  Advised the patient that tomorrow she needs to get up with physical therapy and starts moving.   She voices understanding. This is delayed entry for evaluation done November 29, 2020.   Electronically signed by Damien Perez MD on 11/30/2020 at 10:40 AM

## 2020-11-30 NOTE — PROGRESS NOTES
Discussed pt with Dr. Radha Bowling and he is OK to admit pt with IVF. Admit to ARU bed 5517A pending negative Covid test. Orders placed under misc nursing.

## 2020-11-30 NOTE — LETTER
PORTABLE PATIENT PROFILE  Saleem Simental  [id number]    MEDICAL DIAGNOSIS/CONDITION:   Patient Active Problem List   Diagnosis    Anxiety    Essential hypertension    Gastroesophageal reflux disease without esophagitis    Mixed hyperlipidemia    Diastolic dysfunction    Pure hypercholesterolemia    Moderate obesity    Personal history of hydronephrosis    Hiatal hernia with GERD    [facility] [facility]    Stenosis of right carotid artery    Hypertensive emergency    Ischemic stroke ([facility] [facility]       INSURANCE INFORMATION:  Payor: MEDICARE /  /  /     ADVANCED DIRECTIVES:   Advance Directives (For Healthcare)  Pre-existing DNR Comfort Care/DNR Arrest/DNI Order: No  Healthcare Directive: Yes, patient has an advance directive for healthcare treatment  Type of Healthcare Directive: Living will  Copy in Chart: No, copy requested from family  [unfilled]     EMERGENCY CONTACT:       RISK FACTORS:   Social History     Tobacco Use    Smoking status: Never Smoker    Smokeless tobacco: Never Used   Substance Use Topics    Alcohol use: No       ALLERGIES:  Allergies   Allergen Reactions    Lorazepam Photosensitivity     Unable to remember reaction state's it's too long ago    Penicillins Other (See Comments)     States it was paranoia. IMMUNIZATIONS:  Immunization History   Administered Date(s) Administered    Tdap (Boostrix, Adacel) 11/13/2017       SWALLOWING:   Difficulty Chewing or Swallowing Food: Yes (Comment)    VISION AND HEARING:   Sensory Problems  Visual impairment: None    PHYSICIANS INVOLVED WITH CARE:    Sally Mills MD  No ref.  provider found  MD Sally Joy MD

## 2020-11-30 NOTE — CONSULTS
(COLACE) capsule 100 mg, BID  bisacodyl (DULCOLAX) suppository 10 mg, Daily  lactulose (CHRONULAC) 10 GM/15ML solution 20 g, TID  oxyCODONE (ROXICODONE) immediate release tablet 5 mg, Q4H PRN    Or  oxyCODONE HCl (OXY-IR) immediate release tablet 10 mg, Q4H PRN  aspirin chewable tablet 81 mg, Daily  rosuvastatin (CRESTOR) tablet 5 mg, Nightly  amLODIPine (NORVASC) tablet 5 mg, Daily  ezetimibe (ZETIA) tablet 10 mg, Daily  sodium chloride flush 0.9 % injection 10 mL, 2 times per day  sodium chloride flush 0.9 % injection 10 mL, PRN  acetaminophen (TYLENOL) tablet 650 mg, Q6H PRN    Or  acetaminophen (TYLENOL) suppository 650 mg, Q6H PRN  polyethylene glycol (GLYCOLAX) packet 17 g, Daily PRN  promethazine (PHENERGAN) tablet 12.5 mg, Q6H PRN    Or  ondansetron (ZOFRAN) injection 4 mg, Q6H PRN  enoxaparin (LOVENOX) injection 40 mg, Daily        Review of Systems:       Physical exam:   Vital signs stable  Head and ENT normocephalic atraumatic supple neck no JVD, surgical site around the antrectomy was noted  Lungs clear to auscultation no rhonchi no crackles  Heart rate and rhythm no friction physical  Abdomen soft organomegaly no tenderness  Extremities no edema  Neurologic physiologic  Data:   Labs:  CBC with Differential:    Lab Results   Component Value Date    WBC 10.1 11/30/2020    RBC 4.03 11/30/2020    HGB 12.6 11/30/2020    HCT 38.5 11/30/2020     11/30/2020    MCV 95.5 11/30/2020    MCH 31.3 11/30/2020    MCHC 32.7 11/30/2020    RDW 13.6 11/30/2020    NRBC 0.0 02/09/2020    SEGSPCT 51 12/24/2013    LYMPHOPCT 7.0 11/30/2020    MONOPCT 13.5 11/30/2020    BASOPCT 0.0 11/30/2020    MONOSABS 1.37 11/30/2020    LYMPHSABS 0.71 11/30/2020    EOSABS 0.01 11/30/2020    BASOSABS 0.00 11/30/2020     CMP:    Lab Results   Component Value Date     11/30/2020    K 3.8 11/30/2020    K 3.7 11/24/2020     11/30/2020    CO2 30 11/30/2020    BUN 17 11/30/2020    CREATININE 1.2 11/30/2020    GFRAA 52 11/30/2020 LABGLOM 43 11/30/2020    GLUCOSE 113 11/30/2020    GLUCOSE 97 01/30/2012    PROT 5.9 11/24/2020    LABALBU 3.5 11/24/2020    LABALBU 4.1 01/30/2012    CALCIUM 8.6 11/30/2020    BILITOT 0.4 11/24/2020    ALKPHOS 81 11/24/2020    AST 10 11/24/2020    ALT 6 11/24/2020     Ionized Calcium:  No results found for: IONCA  Magnesium:    Lab Results   Component Value Date    MG 2.1 11/13/2017     Phosphorus:    Lab Results   Component Value Date    PHOS 4.0 02/05/2020     U/A:    Lab Results   Component Value Date    NITRITE neg 11/19/2018    COLORU Straw 02/04/2020    PHUR 7.0 02/04/2020    LABCAST RARE 06/17/2019    WBCUA >20 02/04/2020    WBCUA 2-5 07/16/2011    RBCUA 10-20 02/04/2020    RBCUA 2-5 08/02/2013    MUCUS Present 06/17/2019    BACTERIA MODERATE 02/04/2020    CLARITYU Clear 02/04/2020    SPECGRAV 1.015 02/04/2020    LEUKOCYTESUR LARGE 02/04/2020    UROBILINOGEN 0.2 02/04/2020    BILIRUBINUR Negative 02/04/2020    BILIRUBINUR small 11/19/2018    BILIRUBINUR NEGATIVE 07/16/2011    BLOODU LARGE 02/04/2020    GLUCOSEU Negative 02/04/2020    GLUCOSEU NEGATIVE 07/16/2011     Microalbumen/Creatinine ratio:  No components found for: RUCREAT  Iron Saturation:  No components found for: PERCENTFE  TIBC:  No results found for: TIBC  FERRITIN:  No results found for: FERRITIN     Imaging:  Fluoroscopy modified barium swallow with video   Final Result   Swallowing mechanism grossly within normal limits without evidence of   aspiration. Please see separate speech pathology report for full discussion of findings   and recommendations. MRI CERVICAL SPINE WO CONTRAST   Final Result   Mild-to-moderate degenerative changes at the C5-C6 and C6-C7 levels without   significant spinal canal nor neural foraminal stenosis. NM Cardiac Stress Test Nuclear Imaging   Final Result   ECG during the infusion did not change. The myocardial perfusion imaging was normal without ischemia or   infarct.     Overall left ventricular

## 2020-11-30 NOTE — PROGRESS NOTES
Speech Language Pathology  FORMAL COGNITIVE EVALUATION       [x] The admitting diagnosis and active problem list as listed below have been reviewed prior to the initiation of this evaluation. Acute CVA (cerebrovascular accident) [facility]) [I63.9]     Patient Active Problem List   Diagnosis    Anxiety    Essential hypertension    Gastroesophageal reflux disease without esophagitis    Mixed hyperlipidemia    Diastolic dysfunction    Pure hypercholesterolemia    Moderate obesity    Personal history of hydronephrosis    Hiatal hernia with GERD    Cerebrovascular accident (CVA) ([address]    Stenosis of right carotid artery    Hypertensive emergency         SPEECH PATHOLOGY DIAGNOSIS:     Mild-moderate cognitive deficit. Mild dysarthria and slight lateral lisp due to tongue swelling. Periods of dysphonia noted (glottal wagner)      THERAPY RECOMMENDATIONS:      [] Speech pathology intervention is not warranted at this time. [x] Speech pathology intervention is recommended to address deficits in the following cognitive areas:     [x] Immediate memory   [] Recent memory   [] Remote memory    [] Spatial orientation    [] Reality orientation    [] Recall of general information    [x] Reasoning     [x] Problem solving     [] Organization   [] Auditory processing and retention                  MOTOR SPEECH       Oral Peripheral Examination   Lingual weakness and swelling. Parameters of Speech Production  Respiration:  Adequate for speech production  Articulation:  Distortion due to tongue swelling  Resonance:  Within functional limits  Quality:   Within functional limits  Pitch: Within functional limits  Intensity: Within functional limits  Fluency:  Intact  Prosody Intact    Possible glottal wagner noted during conversation    Stimulus questions were obtained from the RIPA-2.   Severity ratings for each subtest  were determined as follows:    RAW SCORE SEVERITY   28-30 Within functional   limits   25-27 Mild

## 2020-11-30 NOTE — FLOWSHEET NOTE
Patient received pain medication for a pain of 9/10. Patient had an episode of hypotension following hydralazine administration at 1245 today. Will recheck blood pressure at 2100 after the pain medication has had some time to work.

## 2020-11-30 NOTE — DISCHARGE INSTR - COC
Continuity of Care Form    Patient Name: Lakesha Barger   :  1935  MRN:  [id number]    Admit date:  2020  Discharge date:  20    Code Status Order: Full Code   Advance Directives:   [facility] Documentation     Date/Time Healthcare Directive Type of Healthcare Directive Copy in [facility] [address] Agent's Name Healthcare Agent's Phone Number    20 [phone]  Yes, patient has an advance directive for healthcare treatment  Living will  --  --  --  --    208  Yes, patient has an advance directive for healthcare treatment  Living will  --  --  --  --          Admitting Physician:  Merary Ferrer MD  PCP: John Barragan MD    Discharging Nurse: [facility] Unit/Room#: 4814/7921-L  Discharging Unit Phone Number: [phone]    Emergency Contact:   Extended Emergency Contact Information  Primary Emergency Contact: Carly Mariano  Address: [address] [place] [address] Phone: [phone]  Mobile Phone: [phone]  Relation: Child  Secondary Emergency Contact: Andrew [address] Phone: [phone]  Relation: Child    Past Surgical History:  Past Surgical History:   Procedure Laterality Date    CAROTID ENDARTERECTOMY Right 2020    RIGHT CAROTID ENDARTERECTOMY performed by Jasmine Boas, MD at [address] [place] [address], [address] LITHOTRIPSY Left 2020    CYSTOSCOPY RETROGRADE PYELOGRAM LEFT URETEROSCOPY LEFT J STENT LASER LITHOTRIPSY performed by Brenda Mendez DO at [facility] OR       Immunization History:   Immunization History   Administered Date(s) Administered    Tdap (Boostrix, Adacel) 2017       Active Problems:  Patient Active Problem List   Diagnosis Code    Anxiety F41.9    Essential hypertension I10    Gastroesophageal reflux disease without esophagitis K21.9    Mixed hyperlipidemia E78.2    Diastolic dysfunction C99.75    Pure hypercholesterolemia E78.00    Moderate obesity E66.8    Personal history of hydronephrosis Z87.448    Hiatal hernia with GERD K21.9, K44.9    Cerebrovascular accident (CVA) ([facility] [address] I63.9    Stenosis of right carotid artery I65.21    Hypertensive emergency I16.1       Isolation/Infection:   Isolation          No Isolation        Patient Infection Status     Infection Onset Added Last Indicated Last Indicated By Review Planned Expiration Resolved Resolved By    COVID-19 Rule Out 11/30/20 11/30/20 11/30/20 COVID-19 (Ordered) 12/07/20 12/14/20      Resolved    COVID-19 Rule Out 11/25/20 11/27/20 11/27/20 COVID-19 (Ordered)   11/27/20 Rule-Out Test Resulted          Nurse Assessment:  Last Vital Signs: BP (!) 156/66   Pulse 97   Temp 98.1 °F (36.7 °C) (Temporal)   Resp 22   Ht 5' 2\" (1.575 m)   Wt 172 lb 12.8 oz (78.4 kg)   LMP  (LMP Unknown)   SpO2 96%   BMI 31.61 kg/m²     Last documented pain score (0-10 scale): Pain Level: 0  Last Weight:   Wt Readings from Last 1 Encounters:   11/29/20 172 lb 12.8 oz (78.4 kg)     Mental Status:  oriented, alert, coherent, logical, thought processes intact and able to concentrate and follow conversation    IV Access:  - Peripheral IV - site  R Antecubital, insertion date: t    Nursing Mobility/ADLs:  Walking   Assisted  Transfer  Assisted  Bathing  Assisted  Dressing  Assisted  Toileting  Assisted  Feeding  Independent  Med Admin  Assisted  Med Delivery   whole    Wound Care Documentation and Therapy:        Elimination:  Continence:   · Bowel: Yes  · Bladder: Yes  Urinary Catheter: None   Colostomy/Ileostomy/Ileal Conduit: No       Date of Last BM: 11/30/20      Intake/Output Summary (Last 24 hours) at 11/30/2020 1701  Last data filed at 11/30/2020 1500  Gross per 24 hour   Intake 535 ml   Output 1200 ml   Net -665 ml     I/O last 3 completed shifts: In: 535 [P.O.:300; I.V.:235]  Out: 1200 [Urine:1200]    Safety Concerns:      At Risk for Falls    Impairments/Disabilities:      None    Nutrition Therapy:  Current Nutrition Therapy:   - Oral Diet:  Dental Soft    Routes of Feeding: Oral  Liquids: Thin Liquids  Daily Fluid Restriction: no  Last Modified Barium Swallow with Video (Video Swallowing Test): 11/26/2020    Treatments at the Time of Hospital Discharge:   Respiratory Treatments: ***  Oxygen Therapy:  is on oxygen at 2 L/min per nasal cannula. Ventilator:    - No ventilator support    Rehab Therapies: Physical Therapy, Occupational Therapy and Speech/Language Therapy  Weight Bearing Status/Restrictions: No weight bearing restirctions  Other Medical Equipment (for information only, NOT a DME order):  {EQUIPMENT:283714197}  Other Treatments: ***    Patient's personal belongings (please select all that are sent with patient):  {[facility] DME Belongings:874805375} phone, phone , clothes, hospital distributed medications    RN SIGNATURE:  Electronically signed by Maricarmen Casas RN on 11/30/20 at 5:04 PM EST    CASE MANAGEMENT/SOCIAL WORK SECTION    Inpatient Status Date: ***    Readmission Risk Assessment Score:  Readmission Risk              Risk of Unplanned Readmission:        13           Discharging to Facility/ Agency   · Name:   · Address:  · Phone:  · Fax:    Dialysis Facility (if applicable)   · Name:  · Address:  · Dialysis Schedule:  · Phone:  · Fax:    / signature: {Esignature:380640329}    PHYSICIAN SECTION    Prognosis: {Prognosis:9860527736}    Condition at Discharge: [address] Patient Condition:429321841}    Rehab Potential (if transferring to Rehab): {Prognosis:7487319231}    Recommended Labs or Other Treatments After Discharge: ***    Physician Certification: I certify the above information and transfer of Sahra Hanley  is necessary for the continuing treatment of the diagnosis listed and that she requires {Admit to Appropriate Level of Care:11350} for {GREATER/LESS:661767863} 30 days.      Update Admission H&P: {CHP DME Changes in LBIOQ:406883835}    PHYSICIAN SIGNATURE:  {Esignature:472134277}

## 2020-11-30 NOTE — PROGRESS NOTES
Physical Therapy  Physical Therapy Re-Assessment     Name: Arleen Logan  : 1935  MRN: [id number]    Referring Provider:  Michelle Jaime MD    Date of Service: 2020    Evaluating PT:  Oral Mortensens, PT, DPT [id number]    Room #:  6396/1759-G  Diagnosis:  Acute CVA  Precautions: Falls, O2  Procedure/Surgery:   R CEA  PMHx/PSHx:  HTN  Equipment Needs:  TBD    SUBJECTIVE:    Pt lives alone in a 2 story home with 4 stairs to enter and 1 rail. Pt sleeps on 1st floor but frequently uses 2nd floor and basement - full flight of steps and 1 rail to each level. Pt ambulated with no device and was independent PTA. OBJECTIVE:   Re- Evaluation  Date: 20 Treatment Short Term/ Long Term   Goals   AM-PAC 6 Clicks      Was pt agreeable to Eval/treatment? Yes     Does pt have pain?  No c/o pain     Bed Mobility  Rolling: NT  Supine to sit: ModA with HOB elevated  Sit to supine: NT  Scooting: ModA  SBA   Transfers Sit to stand: Vince  Stand to sit: Vince  Stand pivot: Vince no device  SBA with AAD   Ambulation   60 feet x 2 reps with Vince no device  >150 feet with SBA with AAD   Stair negotiation: ascended and descended NT  >4 steps with 1 rail SBA   ROM BUE:  Defer to OT note  BLE:  WNL     Strength BUE:  Defer to OT note  BLE:  4/5  Increase by 1/3 MMT grade   Balance Sitting EOB:  Vince  Dynamic Standing:  Vince no device  Sitting EOB:  Independent  Dynamic Standing:  SBA with AAD     Pt is A & O x 4  CAM-ICU: NT  RASS: 0  Sensation:  WNL  Edema:  None    Vitals:  Heart Rate at rest 92 bpm Heart Rate post session 105 bpm   SpO2 at rest 96% SpO2 post session 95%   Blood Pressure at rest 156/63 mmHg Blood Pressure post session 120/55 mmHg     Functional Status Score-Intensive Care Unit (FSS-ICU)   Rolling -/7   Supine to sit transfer 2/7   Unsupported sitting  4/7   Sit to stand transfers 4/7   Ambulation 4/7   Total         Therapeutic Exercises:  NA    Patient education  Pt educated on safety    Patient response to education:   Pt verbalized understanding Pt demonstrated skill Pt requires further education in this area   x x x     ASSESSMENT:    Comments:  RN reported pt was stable for session. Pt was in bed upon arrival, agreeable to re-evaluation s/p R CEA with encouragement. Poor command following for bed mobility technique which resulted in increased assistance. Upon sitting EOB, pt requested to use bathroom. Mild incontinence noted while ambulating to toilet. Once finished, pt continued ambulation into hallway with decreased gait speed and mild unsteadiness. Pt occasionally reached for environment. Fatigue and dizziness limited activity. Pt was left in chair with all needs met and call light in reach. All lines remained intact. Treatment:  Patient practiced and was instructed in the following treatment:     Bed mobility training - pt given verbal and tactile cues to facilitate proper sequencing and safety during supine>sit as well as provided with physical assistance to complete task    Sitting EOB for >2 minutes for upright tolerance, postural awareness and BLE ROM   Transfer training - pt was given verbal and tactile cues to facilitate proper hand placement, technique and safety during sit to stand and stand to sit as well as provided with physical assistance to complete task.  Gait training- pt was given verbal and tactile cues to facilitate safety and balance during ambulation as well as provided with physical assistance to complete task. Pt's/ family goals   1. Return home    Patient and or family understand(s) diagnosis, prognosis, and plan of care.   yes    PLAN OF CARE:    Current Treatment Recommendations     [x] Strengthening     [] ROM   [x] Balance Training   [x] Endurance Training   [x] Transfer Training   [x] Gait Training   [x] Stair Training   [] Positioning   [x] Safety and Education Training   [x] Patient/Caregiver Education   [] HEP  [] Other Frequency of treatments: 2-5x/week x 1-2 weeks. Time in  0855  Time out  0925    Total Treatment Time  25 minutes     Evaluation Time includes thorough review of current medical information, gathering information on past medical history/social history and prior level of function, completion of standardized testing/informal observation of tasks, assessment of data and education on plan of care and goals.     CPT codes:  [] Low Complexity PT evaluation 30847  [] Moderate Complexity PT evaluation 19265  [] High Complexity PT evaluation 54818  [x] PT Re-evaluation 32180  [] Gait training 95592 - minutes  [] Manual therapy 84963 - minutes  [x] Therapeutic activities 30451 25 minutes  [] Therapeutic exercises 42216 - minutes  [] Neuromuscular reeducation 76330 - minutes     Denisa Bullock, PT, DPT  [id number]

## 2020-11-30 NOTE — PROGRESS NOTES
Comprehensive Nutrition Assessment    Type and Reason for Visit:  Reassess    Nutrition Recommendations/Plan: Continue current diet as ordered per SLP recs. Will add  ONS to promote PO intake & healing. Nutrition Assessment:  Pt admit w/ CVA now s/p CEA. Noted SLP reccomending Dental soft diet. Pt w/ noted anxiety about PO intake. WIll provide ONS to support intake. Malnutrition Assessment:  Malnutrition Status: At risk for malnutrition (Comment)    Context:  Acute Illness     Findings of the 6 clinical characteristics of malnutrition:  Energy Intake:  1 - 75% or less of estimated energy requirements for 7 or more days  Weight Loss:  No significant weight loss     Body Fat Loss:  Unable to assess     Muscle Mass Loss:  Unable to assess    Fluid Accumulation:  No significant fluid accumulation     Strength:  Not Performed    Estimated Daily Nutrient Needs:  Energy (kcal):  6015-9877; Weight Used for Energy Requirements:  Current     Protein (g):  1.5-1.8= 75-85; Weight Used for Protein Requirements:  Ideal        Fluid (ml/day):   ; Method Used for Fluid Requirements:         Nutrition Related Findings:  +i/os, soft abd, active bs, no edema noted, MBS w/ mild ororpharangeal dysphagia      Wounds:  Surgical Incision       Current Nutrition Therapies:    DIET DENTAL SOFT;    Anthropometric Measures:  · Height: 5' 2\" (157.5 cm)  · Current Body Weight: 167 lb (75.8 kg)(will use admit 11/23 d/t fluids w/ noted 11/29 172#)   · Usual Body Weight: 167 lb (75.8 kg)(2/2020 per EMR)     · Ideal Body Weight: 110 lbs; % Ideal Body Weight 151.8 %   · BMI: 30.5  · BMI Categories: Obese Class 1 (BMI 30.0-34. 9)       Nutrition Diagnosis:   · Inadequate oral intake related to swallowing difficulty as evidenced by swallow study results, intake 0-25%      Nutrition Interventions:   Food and/or Nutrient Delivery:  Continue Current Diet, Start Oral Nutrition Supplement  Nutrition Education/Counseling:  No recommendation at this time   Coordination of Nutrition Care:  Continue to monitor while inpatient    Goals:  >25% of meals/ONS       Nutrition Monitoring and Evaluation:   Food/Nutrient Intake Outcomes:  Food and Nutrient Intake, Supplement Intake  Physical Signs/Symptoms Outcomes:  Biochemical Data, Chewing or Swallowing, GI Status, Fluid Status or Edema, Hemodynamic Status, Nutrition Focused Physical Findings, Skin, Weight     Discharge Planning:     Too soon to determine     Electronically signed by Alda Vaz RD, LD on 11/30/20 at 10:33 AM EST    Contact: x [phone]

## 2020-11-30 NOTE — PROGRESS NOTES
CVICU Progress Note    Name: Grecia Britton  MRN: [id number]    CC: Postoperative Critical Care Management     Indication for Surgery/Procedure: high grade right carotid artery stenosis      Important/Relevant PMH/PSH: HTN, HLD, GERD, Chronic cervical pain, recent stroke (presented to ED 11/23/20 with acute onset left arm weakness-CTA head/neck showed right ICA 80%, left ICA stenosis 50%)      Procedure/Surgeries: 11/27/2020 right carotid endarterectomy       Intake/Output Summary (Last 24 hours) at 11/30/2020 0735  Last data filed at 11/30/2020 0600  Gross per 24 hour   Intake 620 ml   Output 650 ml   Net -30 ml       Recent Labs     11/28/20  0435   WBC 7.7   HGB 11.4*   HCT 33.9*         Recent Labs     11/28/20  0435 11/29/20  0448 11/30/20  0458    145 144   K 4.3 4.1 3.8    110* 106   CO2 28 25 26   BUN 16 15 17   CREATININE 0.6 0.6 1.3*   GLUCOSE 130* 120* 105*   CALCIUM 8.3* 8.2* 8.7         Physical Exam:    BP (!) 152/67   Pulse 84   Temp 97.8 °F (36.6 °C) (Oral)   Resp 26   Ht 5' 2\" (1.575 m)   Wt 172 lb 12.8 oz (78.4 kg)   LMP  (LMP Unknown)   SpO2 96%   BMI 31.61 kg/m²     General: Awake, alert. Laying in bed, no acute events overnight   Eyes: PERRL, anicteric   Pulmonary: CTA bilaterally. No wheezes, no accessory muscle use noted changed to room air   Cardiovascular:  RRR, no heaves or thrills on palpation  Tele: SR  Abdomen: Soft, nontender, + BS, +flatus, +BM last night    Extremities: Palpable pulses all extremities, no edema   Neurologic/Psych: A&Ox3, [place] to command, equal in strength bilaterally, remains with slight tongue deviation to the right   Skin: Warm and dry  Incisions: right neck incision well approximated, no swelling, minimal ecchymosis       Assessment/Plan: POD #3     1.  Right ICA stenosis S/p right cea    -Labs reviewed, no acute events overnight   -Bump in Scr--monitor--repeat later today 1400   -Passed video swallow--on dental soft diet, still anxious about swallowing--encourage oral intake   -HTN- on norvasc   -PT/OT   -Recent stroke--plan for f/u with stroke clinic in 2-3 weeks--continue ASA and statin  -transfer out of ICU to monitored floor         VTE Prophylaxis: Pharmacologic/Mechanical: Yes, SCDs/daily lovenox   Line infection prevention: Can CVC or arterial line be removed: N/A  Continued need for urinary catheter: No    Dispo: transfer out of ICU     Electronically signed by CELE Truong CNP on 11/30/2020 at 7:35 AM

## 2020-11-30 NOTE — PROGRESS NOTES
Vascular Surgery Progress Note    Pt is being seen in f/u today regarding recent symptomatic right carotid endarterectomy    Subjective  Pt s/e. She continues to be concerned about swallowing despite passing swallow evaluation and has been having poor PO intake. She is swallow water without issue. She complains of her speech being slurred. Hypotensive yesterday after being given hydralazine for . Given fluids after which pressures improved. She is passing gas and had a BM yesterday. Current Medications:      oxyCODONE **OR** oxyCODONE, HYDROmorphone, sodium chloride flush, acetaminophen **OR** acetaminophen, polyethylene glycol, promethazine **OR** ondansetron, hydrALAZINE    sennosides-docusate sodium  2 tablet Oral Daily    docusate sodium  100 mg Oral BID    bisacodyl  10 mg Rectal Daily    lactulose  20 g Oral TID    aspirin  81 mg Oral Daily    rosuvastatin  5 mg Oral Nightly    amLODIPine  5 mg Oral Daily    ezetimibe  10 mg Oral Daily    sodium chloride flush  10 mL Intravenous 2 times per day    enoxaparin  40 mg Subcutaneous Daily        PHYSICAL EXAM:    BP (!) 172/68   Pulse 92   Temp 98.6 °F (37 °C) (Oral)   Resp 15   Ht 5' 2\" (1.575 m)   Wt 172 lb 12.8 oz (78.4 kg)   LMP  (LMP Unknown)   SpO2 97%   BMI 31.61 kg/m²     Intake/Output Summary (Last 24 hours) at 11/30/2020 0082  Last data filed at 11/30/2020 0500  Gross per 24 hour   Intake 620 ml   Output 650 ml   Net -30 ml          Gen: awake, alert and oriented x3, no apparent distress. Neuro exam normal except for mild rightward tongue deviation that patient is able to correct. 5/5 strength bilateral UE, RLE 5/5, LLE 4/5 minimally weaker.    CVS: RRR  Resp: No increased work of breathing, 2 LNC  Abd: Soft, non-tender, non-distended  Skin: Right neck incision C/D/I      LABS:    Lab Results   Component Value Date    WBC 7.7 11/28/2020    HGB 11.4 (L) 11/28/2020    HCT 33.9 (L) 11/28/2020     11/28/2020    PROTIME 11.4

## 2020-11-30 NOTE — PROGRESS NOTES
Could not obtain daily weight. Bed on, but screen would not turn on. Bed is/has been plugged in. No bedside scale available.

## 2020-11-30 NOTE — DISCHARGE SUMMARY
Patient ID: Jyothi Ray      Patient's PCP: Bishop Samia MD    Admit Date: 11/23/2020   Discharge Date: 11/30/2020  Admitting Physician: Danis Short MD  Discharge Physician: Taisha Easton   Discharge Diagnoses:   Patient Active Problem List   Diagnosis Code    Anxiety F41.9    Essential hypertension I10    Gastroesophageal reflux disease without esophagitis K21.9    Mixed hyperlipidemia D68.5    Diastolic dysfunction T53.74    Pure hypercholesterolemia E78.00    Moderate obesity E66.8    Personal history of hydronephrosis Z87.448    Hiatal hernia with GERD K21.9, K44.9    Cerebrovascular accident (CVA) ([address] I63.9    Stenosis of right carotid artery I65.21    Hypertensive emergency I16.1       Lacey Gallego is a 82 y.o. female with severe cerebrovascular disease, patient was admitted to the acute hospital approximately a week ago with acute stroke and hypertensive emergency, evaluation showed severe narrowing of the carotids, patient required right carotid endarterectomy, postoperatively patient developed some slurred speech, she had also some episodes of confusion this has resolved, patient gait was unsteady, she was discharged to acute rehab  Physical Exam:  BP (!) 156/66   Pulse 97   Temp 98.1 °F (36.7 °C) (Temporal)   Resp 22   Ht 5' 2\" (1.575 m)   Wt 172 lb 12.8 oz (78.4 kg)   LMP  (LMP Unknown)   SpO2 96%   BMI 31.61 kg/m²    General appearance: alert, appears stated age, cooperative and no distress  Head: Normocephalic, without obvious abnormality, atraumatic  Eyes: conjunctivae/corneas clear. PERRL, EOM's intact. Ears: normal external ear canals both ears  Neck: no adenopathy, scar of right carotid endarterectomy noted with minimal swelling at the site, supple, symmetrical, trachea midline and thyroid not enlarged.   Lungs: clear to auscultation bilaterally, symmetrical expansion Heart: regular rate and rhythm, S1, S2 normal, no murmur, regular rate and rhythm ,no precordial heave  Abdomen:soft, non-tender; non-distended normal bowel sounds no masses, no organomegaly  Extremities:Pedal edema none  Homans sign is negative, no sign of DVT  Skin: Skin color, texture, turgor normal. No rashes or lesions  Neurologic:Mental status: Alert, oriented, x3 , displaying anxiety       Fluoroscopy modified barium swallow with video   Final Result   Swallowing mechanism grossly within normal limits without evidence of   aspiration. Please see separate speech pathology report for full discussion of findings   and recommendations. MRI CERVICAL SPINE WO CONTRAST   Final Result   Mild-to-moderate degenerative changes at the C5-C6 and C6-C7 levels without   significant spinal canal nor neural foraminal stenosis. NM Cardiac Stress Test Nuclear Imaging   Final Result   ECG during the infusion did not change. The myocardial perfusion imaging was normal without ischemia or   infarct. Overall left ventricular systolic function was normal without regional   wall motion abnormalities. Low risk pre-operative pharmacologic stress test.   Thank you for sending your patient to this [employer]. Kavya Hanson MD, [facility] [place], [address] [facility] cardiology. MRI BRAIN WO CONTRAST   Final Result   There is a punctate 3 mm area of restricted diffusion in the high right   parietal lobe consistent with an acute area of infarct in the right middle   cerebral artery territory. No other areas of restricted diffusion are   identified. Cerebral atrophy. Chronic small vessel ischemic changes. Remote lacunar   infarct in the right cerebellum. XR CERVICAL SPINE (4-5 VIEWS)   Final Result   Degenerative changes of the cervical spine with bilateral neural foraminal   narrowing and multilevel narrowing of intervertebral disc spaces.

## 2020-11-30 NOTE — LETTER
Date: 12/4/2020    Dear Jorge Lewis:    Recently you completed a stay in the Acute Rehab Unit at [facility]. We hope that you are continuing to make progress following your stroke. Now that you are back in your home, questions may arise and you may appreciate the opportunity to speak with one of our health professionals or other stroke survivors and their families. The [address] [facility] Stroke [facility] MEETINGS VIA ZOOM BEGIN AT 4PM AND TYPICALLY CONCLUDE BY 5:30PM.  The meeting is a good opportunity for stroke survivors and their families to mix and mingle with other stroke survivors and family members. January off  February 4  March 4  April 1  May 6  Marleni 3  July off  August 5   September 2  October 7  November 4   December 2    We would love to have you attend virtually thru Zoom until further notice. Please call me at [phone] OR email me: [email or web address] in order to confirm your attendance and for assistance setting up Zoom. Sincerely,    Priscilla Patricia MSN, APRN, FNP-C     [address]   [address]

## 2020-11-30 NOTE — PROGRESS NOTES
OCCUPATIONAL THERAPY RE-EVALUATION      Date:2020  Patient Name: Na Kaur  MRN: [id number]  : 1935  Room: [address] [place] [address]-A    Referring Provider: Pradip Kirkpatrick MD   Re-evaluation s/p surgery  Evaluating OT: Zoraida Moss OTR/L [id number]      Modified Amna Scale   Score     Description  0             No symptoms  1             No significant disability despite symptoms  2             Slight disability; able to look after own affairs  3             Moderate disability; able to ambulate without assist/ requires assist with ADLs  4             Moderate/Severe disability;requires assist to ambulate/assist with ADLs  5             Severe disability;bedridden/incontinent   6               Score:   4      AM-PAC Daily Activity Raw Score: 15/24    Recommended Adaptive Equipment: AD as indicated for balance and fall prevention    Reason for Admission/Diagnosis: Acute CVA (cerebrovascular accident)   Pertinent Medical History/Surgery: anxiety, HTN, gastric reflux     Surgery/Procedures:  right carotid endarterectomy with bovine patch angioplasty. Precautions:  Falls, Lime     Home Living: Pt lives alone in a multi-level home with 4 steps to enter with railing. Patient reports she sleeps on couch on the first floor. Patient has bathroom in basement and second floor with flight of stairs with railing. Bathroom setup: Second floor: tub/shower with grab bar   Equipment owned: none  Prior Level of Function:   I with ADLs ,  assist with IADLs. Patient completes cleaning, pays bills, and completes grocery shopping. Patient's family provide meals. Patient was using no device for functional mobility. Driving: yes                             Occupation: not stated  Leisure:not stated     Pain Level: pt c/o stomach pain  this session; does not rate. Cognition: A&O: 4/4    Follows 1-2 step commands with encouragement.    Memory: fair   Comprehension fair   Problem solving: fair-   Judgement/safety: fair-               Communication skills: WFL           Vision: WFL               Glasses:no                                                   Hearing: min [place]     RASS: 0  CAM-ICU: (NT) Delirium    UE Assessment:  Hand Dominance: Right [x]  Left []     ROM Strength   RUE  WFL 3+/5 proximal  4/5 distal   LUE WFL 3+/5 proximal  4/5 distal     Sensation: No c/o numbness or tingling in extremities   Tone: WNL   Edema: [facility]     Functional Assessment   Initial Eval Status  Date: 11/30 Treatment Status  Date: STG=LTG  5-14  days    Feeding S; set up                        Ailin  while seated up in chair to increase activity tolerance        Grooming Min A  After set up                        S   while standing sink level requiring no device for balance and demonstrating G tolerance      UB dressing/bathing Mod A                          S; set up       LB dressing/bathing Mod A  Seated on commode                        SBA  using AE as needed for safe reach/ energy conservation       Toileting Min A                        SBA     Bed Mobility  Supine to sit: Mod A for directional cues    Sit to supine: NT                        SBA  in prep of ADL tasks & transfers   Functional Transfers Sit to stand: Min A    Stand to sit: Min A    Commode: Min A                        SBA  sit<>stand/functional bathroom transfers using AD/DME as needed for balance and safety   Functional Mobility Min A no device                       SBA   functional/bathroom mobility using AD as needed & demonstrating G safety     Balance Sitting:     Static:  SBA    Dynamic:Min A  Standing: Min A   S dynamic sitting balance; SBA/S dynamic standing balance  during ADL tasks & transfers   Endurance/Activity Tolerance   F tolerance with moderate activity.    G   tolerance with moderate activity/self care routine   Visual/  Perceptual   WFL                       Vitals:   Heart Rate at rest 92 bpm Heart Rate post session 105 bpm movement/Attention                         [x]Delirium prevention/treatment    []Positioning to improve functional independence  []Other:       Treatment: OT intervention provided to achieve goals:   Bed mobility: Instruction on precautions prior to bed mobility to facilitate safe transfers and ADLS. Pt required mod cues for comprehension of technique. Balance retraining: Performed sitting balance ex's to improve righting reactions with postural changes during ADLS. Pt sat in supported chair to complete LB ADLs. ADL retraining: Instruction on adapted techniques (crossing LE's) to increase independence and safe reach during dressing/bathing activities. Pt demo fair follow through. Requires short breaks during tasks. Functional transfer training:  Instruction on postural cues, hand placement/sequencing to assist with balance and fall prevention during self care routine/bathroom transfers. Pt may benefit from use of AD for balance pending progress. Delirium Prevention: Environmental and sensory modifications assessed and implemented to decrease ICU acquired delirium and to improve overall orientation, mentation and pt interaction with family/staff. Line management and environmental modifications made prior to and end of session to ensure patient safety and to increase efficiency of session. Skilled monitoring of HR, O2 saturation, blood pressure and patient's response to activity performed throughout session. Comments: OK from RN to see patient. Upon arrival, patient supine in bed; required min encouragement to participate. Pt demo fair tolerance with fair understanding of education/techniques. At end of session, patient left seated in chair to increase endurance. Call light within reach, all lines and tubes intact. Pt instructed on use of call light for assistance and fall prevention.  .    Patient presents with decreased ROM/strength,activity tolerance, dynamic balance, functional mobility

## 2020-11-30 NOTE — PROGRESS NOTES
Spoke to Dr. Aroldo Aragon regarding need of discharge order to acute rehab, await orders to be placed.  Robel Younger

## 2020-11-30 NOTE — PROGRESS NOTES
Vascular Surgery Progress Note    Pt is being seen in f/u today regarding status post carotid endarterectomy. Overall she is doing well. She still has little bit of slurred speech she is eating well. And otherwise tell me subjectively she is feeling much better. Subjective:  Gisell Huddleston is a 82 y.o. female still has a little bit of slurred speech. But otherwise doing well. She is eating well. She is drinking well. She to me that her speech is improving daily. She has no other complaints. She is in good spirits and she is laughing and joking at the bedside. Current Medications:    lactated ringers 100 mL/hr at 11/30/20 1139      oxyCODONE **OR** oxyCODONE, HYDROmorphone, sodium chloride flush, acetaminophen **OR** acetaminophen, polyethylene glycol, promethazine **OR** ondansetron, hydrALAZINE    sennosides-docusate sodium  2 tablet Oral Daily    docusate sodium  100 mg Oral BID    bisacodyl  10 mg Rectal Daily    lactulose  20 g Oral TID    aspirin  81 mg Oral Daily    rosuvastatin  5 mg Oral Nightly    amLODIPine  5 mg Oral Daily    ezetimibe  10 mg Oral Daily    sodium chloride flush  10 mL Intravenous 2 times per day    enoxaparin  40 mg Subcutaneous Daily        PHYSICAL EXAM:    BP (!) 166/67   Pulse 83   Temp 98.7 °F (37.1 °C) (Temporal)   Resp 15   Ht 5' 2\" (1.575 m)   Wt 172 lb 12.8 oz (78.4 kg)   LMP  (LMP Unknown)   SpO2 97%   BMI 31.61 kg/m²     Intake/Output Summary (Last 24 hours) at 11/30/2020 1226  Last data filed at 11/30/2020 1200  Gross per 24 hour   Intake 740 ml   Output 800 ml   Net -60 ml          General: Alert and oriented answers questions appropriately no acute distress  Skin: Warm and dry no change in turgor no jaundice no scleral icterus. HEENT: Normocephalic atraumatic trach is midline no jugular venous distention. She has some mild right-sided tongue deviation which she actually normalizes.   CVS: Currently regular rate and rhythm no murmur rub or gallop  Resp: Clear to auscultation bilaterally no wheezes rales or rhonchi  Abd: Soft nontender no rebound or guarding. Positive bowel sounds  Extremities: Motor and sensation are intact. Bilateral palpable brachial radial pulses. DP PTs are palpable. Almost no noticeable left upper extremity weakness. Neuro: Grossly intact bilaterally. Speech is slightly slurred tongue is slightly deviated. See above. Otherwise she is doing well    LABS:    Lab Results   Component Value Date    WBC 10.1 11/30/2020    HGB 12.6 11/30/2020    HCT 38.5 11/30/2020     11/30/2020    PROTIME 11.4 11/23/2020    INR 1.0 11/23/2020    APTT 33.4 11/23/2020    K 3.8 11/30/2020    BUN 17 11/30/2020    CREATININE 1.2 (H) 11/30/2020       RADIOLOGY:  Fluoroscopy modified barium swallow with video   Final Result   Swallowing mechanism grossly within normal limits without evidence of   aspiration. Please see separate speech pathology report for full discussion of findings   and recommendations. MRI CERVICAL SPINE WO CONTRAST   Final Result   Mild-to-moderate degenerative changes at the C5-C6 and C6-C7 levels without   significant spinal canal nor neural foraminal stenosis. NM Cardiac Stress Test Nuclear Imaging   Final Result   ECG during the infusion did not change. The myocardial perfusion imaging was normal without ischemia or   infarct. Overall left ventricular systolic function was normal without regional   wall motion abnormalities. Low risk pre-operative pharmacologic stress test.   Thank you for sending your patient to this [facility]. Adenike Craven MD, [facility], [address] [place] [address] cardiology. MRI BRAIN WO CONTRAST   Final Result   There is a punctate 3 mm area of restricted diffusion in the high right   parietal lobe consistent with an acute area of infarct in the right middle   cerebral artery territory. No other areas of restricted diffusion are   identified. Cerebral atrophy. Chronic small vessel ischemic changes. Remote lacunar   infarct in the right cerebellum. XR CERVICAL SPINE (4-5 VIEWS)   Final Result   Degenerative changes of the cervical spine with bilateral neural foraminal   narrowing and multilevel narrowing of intervertebral disc spaces. Evaluation is limited based on the images obtained. Limited visualization of the upper cervical spine on the lateral radiographs   obtained. ASSESSMENT/PLAN:   · #1 status post right carotid endarterectomy. This was done secondary to symptomatic right carotid artery disease with a right sided stroke affecting her left upper extremity. Overall she is doing well. She has some mild neuropraxia she is passed her swallow study without any difficulty otherwise in good spirits and we will plan on transitioning her out of the unit and home shortly.         Electronically signed by Papa Adams MD on 11/30/2020 at 12:26 PM

## 2020-12-01 LAB
ALBUMIN SERPL-MCNC: 3.1 G/DL (ref 3.5–5.2)
ALP BLD-CCNC: 75 U/L (ref 35–104)
ALT SERPL-CCNC: 10 U/L (ref 0–32)
ANION GAP SERPL CALCULATED.3IONS-SCNC: 9 MMOL/L (ref 7–16)
AST SERPL-CCNC: 11 U/L (ref 0–31)
BASOPHILS ABSOLUTE: 0.01 E9/L (ref 0–0.2)
BASOPHILS RELATIVE PERCENT: 0.1 % (ref 0–2)
BILIRUB SERPL-MCNC: 0.5 MG/DL (ref 0–1.2)
BUN BLDV-MCNC: 16 MG/DL (ref 8–23)
CALCIUM SERPL-MCNC: 8.3 MG/DL (ref 8.6–10.2)
CHLORIDE BLD-SCNC: 105 MMOL/L (ref 98–107)
CO2: 29 MMOL/L (ref 22–29)
CREAT SERPL-MCNC: 1 MG/DL (ref 0.5–1)
EOSINOPHILS ABSOLUTE: 0.15 E9/L (ref 0.05–0.5)
EOSINOPHILS RELATIVE PERCENT: 1.8 % (ref 0–6)
GFR AFRICAN AMERICAN: >60
GFR NON-AFRICAN AMERICAN: 53 ML/MIN/1.73
GLUCOSE BLD-MCNC: 106 MG/DL (ref 74–99)
HCT VFR BLD CALC: 35.5 % (ref 34–48)
HEMOGLOBIN: 11.6 G/DL (ref 11.5–15.5)
IMMATURE GRANULOCYTES #: 0.03 E9/L
IMMATURE GRANULOCYTES %: 0.4 % (ref 0–5)
LYMPHOCYTES ABSOLUTE: 0.71 E9/L (ref 1.5–4)
LYMPHOCYTES RELATIVE PERCENT: 8.5 % (ref 20–42)
MAGNESIUM: 1.8 MG/DL (ref 1.6–2.6)
MCH RBC QN AUTO: 31 PG (ref 26–35)
MCHC RBC AUTO-ENTMCNC: 32.7 % (ref 32–34.5)
MCV RBC AUTO: 94.9 FL (ref 80–99.9)
MONOCYTES ABSOLUTE: 1.22 E9/L (ref 0.1–0.95)
MONOCYTES RELATIVE PERCENT: 14.6 % (ref 2–12)
NEUTROPHILS ABSOLUTE: 6.25 E9/L (ref 1.8–7.3)
NEUTROPHILS RELATIVE PERCENT: 74.6 % (ref 43–80)
PDW BLD-RTO: 13.2 FL (ref 11.5–15)
PLATELET # BLD: 219 E9/L (ref 130–450)
PMV BLD AUTO: 10.4 FL (ref 7–12)
POTASSIUM REFLEX MAGNESIUM: 3.5 MMOL/L (ref 3.5–5)
RBC # BLD: 3.74 E12/L (ref 3.5–5.5)
SODIUM BLD-SCNC: 143 MMOL/L (ref 132–146)
TOTAL PROTEIN: 5.4 G/DL (ref 6.4–8.3)
WBC # BLD: 8.4 E9/L (ref 4.5–11.5)

## 2020-12-01 PROCEDURE — 92610 EVALUATE SWALLOWING FUNCTION: CPT

## 2020-12-01 PROCEDURE — 92526 ORAL FUNCTION THERAPY: CPT

## 2020-12-01 PROCEDURE — 97535 SELF CARE MNGMENT TRAINING: CPT

## 2020-12-01 PROCEDURE — 6360000002 HC RX W HCPCS: Performed by: INTERNAL MEDICINE

## 2020-12-01 PROCEDURE — 80053 COMPREHEN METABOLIC PANEL: CPT

## 2020-12-01 PROCEDURE — 97530 THERAPEUTIC ACTIVITIES: CPT

## 2020-12-01 PROCEDURE — 97166 OT EVAL MOD COMPLEX 45 MIN: CPT

## 2020-12-01 PROCEDURE — 6370000000 HC RX 637 (ALT 250 FOR IP): Performed by: INTERNAL MEDICINE

## 2020-12-01 PROCEDURE — 1280000000 HC REHAB R&B

## 2020-12-01 PROCEDURE — 97161 PT EVAL LOW COMPLEX 20 MIN: CPT

## 2020-12-01 PROCEDURE — 2700000000 HC OXYGEN THERAPY PER DAY

## 2020-12-01 PROCEDURE — 85025 COMPLETE CBC W/AUTO DIFF WBC: CPT

## 2020-12-01 PROCEDURE — 83735 ASSAY OF MAGNESIUM: CPT

## 2020-12-01 PROCEDURE — 97110 THERAPEUTIC EXERCISES: CPT

## 2020-12-01 PROCEDURE — 36415 COLL VENOUS BLD VENIPUNCTURE: CPT

## 2020-12-01 PROCEDURE — 97112 NEUROMUSCULAR REEDUCATION: CPT

## 2020-12-01 PROCEDURE — 2580000003 HC RX 258: Performed by: INTERNAL MEDICINE

## 2020-12-01 RX ADMIN — ASPIRIN 81 MG CHEWABLE TABLET 81 MG: 81 TABLET CHEWABLE at 08:56

## 2020-12-01 RX ADMIN — SODIUM CHLORIDE, PRESERVATIVE FREE 10 ML: 5 INJECTION INTRAVENOUS at 08:57

## 2020-12-01 RX ADMIN — DOCUSATE SODIUM 50MG AND SENNOSIDES 8.6MG 2 TABLET: 8.6; 5 TABLET, FILM COATED ORAL at 08:56

## 2020-12-01 RX ADMIN — DOCUSATE SODIUM 100 MG: 100 CAPSULE, LIQUID FILLED ORAL at 20:57

## 2020-12-01 RX ADMIN — EZETIMIBE 10 MG: 10 TABLET ORAL at 08:57

## 2020-12-01 RX ADMIN — ROSUVASTATIN CALCIUM 5 MG: 5 TABLET, FILM COATED ORAL at 20:57

## 2020-12-01 RX ADMIN — LACTULOSE 20 G: 20 SOLUTION ORAL at 08:56

## 2020-12-01 RX ADMIN — AMLODIPINE BESYLATE 5 MG: 5 TABLET ORAL at 08:58

## 2020-12-01 RX ADMIN — BISACODYL 10 MG: 10 SUPPOSITORY RECTAL at 15:45

## 2020-12-01 RX ADMIN — LACTULOSE 20 G: 20 SOLUTION ORAL at 15:44

## 2020-12-01 RX ADMIN — DOCUSATE SODIUM 100 MG: 100 CAPSULE, LIQUID FILLED ORAL at 08:56

## 2020-12-01 RX ADMIN — ENOXAPARIN SODIUM 40 MG: 40 INJECTION SUBCUTANEOUS at 08:56

## 2020-12-01 ASSESSMENT — PAIN SCALES - GENERAL
PAINLEVEL_OUTOF10: 0
PAINLEVEL_OUTOF10: 0

## 2020-12-01 NOTE — PROGRESS NOTES
SPEECH/LANGUAGE PATHOLOGY  BEDSIDE SWALLOWING EVALUATION    PATIENT NAME:  Sonam Lal      :  1935          TODAY'S DATE:  2020 ROOM:  5502/5502-A      SUMMARY OF EVALUATION     DYSPHAGIA DIAGNOSIS: Moderate oral stage dysphagia due to pain/tongue swelling. DIET RECOMMENDATIONS:  Dysphagia 2,  Mechanical Soft (Minced & Moist) solids with thin liquids     FEEDING RECOMMENDATIONS:     Assistance level:  Supervision is needed during all oral intake and set-up is required      Compensatory strategies recommended: Check for oral pocketing given swelling/weakness in the right side of oral cavity, Small bites, Alternate solids and liquids. THERAPY RECOMMENDATIONS:      Speech Pathology intervention is recommended 3-6 times per week for LOS or when goals are met with emphasis on the following:     Pt will complete PO trials of upgraded diet textures with SLP only to determine the least restrictive PO diet to maintain adequate nutrition/hydration with no more than 1 overt s/s of pen/asp. Instruction regarding appropriate implementation of compensatory strategies to improve integrity of swallow function during PO intake        PROCEDURE     Consistencies Administered During the Evaluation   Liquids: Thin liquid   Solids:  Solid and pureed foods      Method of Intake:   Cup, spoon/fork  Self fed      Position:   Seated, upright                  RESULTS     Oral Stage:         Decreased mastication due to disorganized chewing pattern and pain/discomfort reported by pt. Pharyngeal Stage:      No signs of aspiration were noted during this evaluation; however, silent aspiration cannot be ruled out at bedside. If silent aspiration is suspected, a Videofluoroscopic Study of Swallowing (MBS) is recommended and requires a physician order. Prognosis for improvements is good  This plan will be re-evaluated and revised in 1 week  if warranted.    Patient stated goals: Agreed with above  Treatment goals discussed with Patient   The Patient understand(s) the diagnosis, prognosis and plan of care       CPT code:  56972  bedside swallow eval      [x]The admitting diagnosis and active problem list, as listed below have been reviewed prior to initiation of this evaluation.      ADMITTING DIAGNOSIS: Ischemic stroke ([facility] [address] [I63.9]     ACTIVE PROBLEM LIST:   Patient Active Problem List   Diagnosis    Anxiety    Essential hypertension    Gastroesophageal reflux disease without esophagitis    Mixed hyperlipidemia    Diastolic dysfunction    Pure hypercholesterolemia    Moderate obesity    Personal history of hydronephrosis    Hiatal hernia with GERD    Cerebrovascular accident (CVA) ([address]    Stenosis of right carotid artery    Hypertensive emergency    Ischemic stroke ([address]       Sukh Burleson, Graduate Clinician

## 2020-12-01 NOTE — PROGRESS NOTES
Occupational Therapy   Occupational Therapy Initial Assessment  Date: 2020   Patient Name: Kaz Woods  MRN: [id number]     : 1935  Room: 5502A    Referring Practitioner: Svetlana Rodriguez MD  Diagnosis: CVA- R carotid endarterectomy 2020; R parietal lobe & remote pacunar & R cerbellum  Additional Pertinent Hx: HTN  Restrictions: Fall Risk, bed & chair alarm & dental soft- alternate solids & liquids    Date of Service: 2020    Discharge Recommendations:  24 hour supervision or assist, Home with Home health OT, Home with nursing aide     Subjective   Chart Reviewed: Yes  Family / Caregiver Present: No  Subjective: Pt presents supine in bed & was agreeable to OT intervention  Pain Comments: Pt did not c/o pain during OT session    Social/Functional History  Lives With: Alone  Type of Home: House  Home Layout: Two level- basement flight of steps 1HR  Home Access: Stairs to enter with rails  Entrance Stairs - Number of Steps: 4 RAJEEV 1HR  Bathroom Shower/Tub: Tub/Shower unit  Bathroom Toilet: Standard  ADL Assistance: Independent  Homemaking Assistance: Independent  Homemaking Responsibilities: Yes  Meal Prep Responsibility: Primary  Laundry Responsibility: Primary  Cleaning Responsibility: Primary  Bill Paying/Finance Responsibility: Primary  Shopping Responsibility: Primary  Ambulation Assistance: Independent  Transfer Assistance: Independent  Active : Yes  Mode of Transportation: Car  IADL Comments: PLOF pt independent in all areas     Objective   Vision: Within Functional Limits  Hearing: Within functional limits      Orientation  Overall Orientation Status: Within Functional Limits     Observation/Palpation  Posture: Fair- Pt impulsive     Balance  Sitting Balance: Stand by assistance  Standing Balance: Minimal assistance(Pt demo decerased safety & insight & attempted to abandon rw while ambuilating to the toilet)  Functional Mobility  Functional - Mobility Device: Rolling Walker  Activity: awareness of need for assistance  Problem Solving: Assistance required to generate solutions;Assistance required to correct errors made  Insights: Decreased awareness of deficits  Initiation: Requires cues for some  Sequencing: Requires cues for some  Cognition Comment: Pt impulsive when pt realized she need to use the restroom. Pt attempted to abandon rw & ignored O2 tubing & IV line. Pt provided with Min A & mod-max vc's to ensure pt safety     Sensation  Overall Sensation Status: grossly intact to touch      BUE AROM: BUE 3/4 in all planes  Right & Left Hand AROM: B hand grasp & relelase WFL. Pt able to oppose her thumb to each digit with increased time & demonstration  LUE Strength  L Hand: 3-/5  LUE Strength Comment: 3+/5 strength in all planes  RUE Strength  R Hand: 4-/5  RUE Strength Comment: 4-/5 strength in all planes     Hand Dominance: Right    Left Hand Strength -    Handle Setting 2: 10# & norm for pt age & gender is 38#  Right Hand Strength -    Handle Setting 2: 21# & norm for pt age & gender is 43#    Fine Motor Skills  Left 9-Hole Peg Test: 23.6 seconds & norm for pt age & gender is 24.1 seconds  Right 9-Hole Peg Test: 21.7 seconds & norm for pt age & gender is 22.5 seconds      Plan   Times per week: OT twice a day for 10-14 days to address above problem areas  Times per day: Twice a day  Current Treatment Recommendations: Strengthening, Gait Training, Patient/Caregiver Education & Training, Home Management Training, Equipment Evaluation, Education, & procurement, Balance Training, Functional Mobility Training, Cognitive Reorientation, Endurance Training, Safety Education & Training, Self-Care / ADL, Cognitive/Perceptual Training    Assessment   Performance deficits / Impairments: Decreased functional mobility ; Decreased endurance;Decreased ADL status; Decreased balance;Decreased strength;Decreased vision/visual deficit; Decreased safe awareness;Decreased high-level IADLs;Decreased cognition  Prognosis: Good  Decision Making: Medium Complexity  OT Education: OT Role;Plan of Care;Precautions; ADL Adaptive Strategies;Transfer Training;Energy Conservation  Patient Education: Pt educated with regards to OT process. Pt participated in 1 Anthony Phillips goal planning. Pt pleasant & cooperative throughout the OT session. Pt education to vbe ongoing  REQUIRES OT FOLLOW UP: Yes  Activity Tolerance: Patient Tolerated treatment well  Safety Devices in place: Yes  Type of devices: Call light within reach; Bed alarm in place         Goals  Long term goals  Time Frame for Long term goals : 10-14 days to address above problem areas  Long term goal 1: Pt demo Mod I to eat all meals  Long term goal 2: Pt demo Mod I grooming seated  Long term goal 3: Pt demo SBA-CGA bathing @ shower level or sponge bathing @ sink level both seated & standing  Long term goal 4: Pt demo s/u UE & SBA-CGA to don depends & pants & Sup to don socks & shoes  Long term goal 5: Pt demo SBA commode trf wtih a rw to a standard commode  Long term goals 6: Pt demo SBA-CGA tub trf wwith appropraite DME to ensure pt safety  Long term goal 7: Pt demo Min A light homemaking activity & demo G- safety & insight with min vc's  Long term goal 8: Pt demo G- endurance for a 20 minute functional activity  Patient Goals   Patient goals : \"Lots of things- go to the grocery store, make coffee, eat a [place]. Khanh Rathke Khanh Rathke \"       Therapy Time   Individual Concurrent Group Co-treatment   Time In 550-OT eval  600-OT rx         Time Out 600-OT eval  700-OT rx         Minutes 70 Min OT total  10 Min OT eval  60 Min OT rx            Howard Hardy OTR/L [id number]

## 2020-12-01 NOTE — PROGRESS NOTES
OCCUPATIONAL THERAPY DAILY NOTE    Date:2020  Patient Name: Julieth Alatorre  MRN: [id number]  : 1935  Room: [address] [place] [address]   Referring Practitioner: Lei Pettit MD  Diagnosis: CVA- R carotid endarterectomy 2020; R parietal lobe & remote pacunar & R cerbellum  Additional Pertinent Hx: HTN  Restrictions: Fall Risk, bed & chair alarm & dental soft- alternate solids & liquids    Functional Assessment:   Date Status AE  Comments   Feeding 20 Supervision   Per eval   Grooming 20 Stand by Assist   Seated for hair combing and hand washing   Bathing 20 Minimal Assist   Per eval   UB Dressing 20 Stand by Assist   \" \"   LB Dressing 20 Minimal Assist   \" \"   Footwear 20 Minimal Assist   \" \"   Toileting 20 Minimal Assist   \" \"   Homemaking         Functional Transfers / Balance:   Date Status DME  Comments   Sit Balance 20 Stand by Assist      Stand Balance 20 Minimal Assist      [] Tub  [] Shower   Transfer       Commode   Transfer 20 Minimal Assist   While ambulating into bathroom in PM   Functional   Mobility 20 Minimal Assist  No AD To/from bathroom in PM    Other:         Functional Exercises / Activity:   Pt supine in bed upon arrival to  in PM. SUP for bed mobility to sit EOB and Min A for functional mobility into bathroom for toileting/grooming task. Engaged in therex using red milad, 3x15 reps (supination/pronation) to increase BUE strength for ease with functional transfers. SUP for sit-supine when back in . Sensory / Neuromuscular Re-Education:      Cognitive Skills:   Status Comments   Problem   Solving fair     Memory fair     Sequencing fair     Safety fair       Visual Perception:    Education:    [] Family teach completed on:    Pain Level: 0/10    Additional Notes:       Patient has made fair  progress during treatment sessions toward set goals.  Therapy emphasis to obtain goals:Strengthening, Gait Training, Patient/Caregiver Education & Training, Home Management Training, Equipment Evaluation, Education, & procurement, Balance Training, Functional Mobility Training, Cognitive Reorientation, Endurance Training, Safety Education & Training, Self-Care / ADL, Cognitive/Perceptual Training     [x] Continue with current OT Plan of care.   [] Prepare for Discharge     DISCHARGE RECOMMENDATIONS  Recommended DME:    Post Discharge Care:   []Home Independently  []Home with 24hr Care / Supervision []Home with Partial Supervision []Home with Home Health OT []Home with Out Pt OT []Other: ___   Comments:         Time in Time out Tx Time Breakdown  Variance:   First Session  eval  [] Individual Tx-   [] Concurrent Tx -  [] Co-Tx -   [] Group Tx -   [] Time Missed -     Second Session [id number] [] Individual Tx-   [x] Concurrent Tx -30  [] Co-Tx -   [] Group Tx -   [] Time Missed -     Third Session    [] Individual Tx-   [] Concurrent Tx -  [] Co-Tx -   [] Group Tx -   [] Time Missed -         Total Tx Time: Nyjuan pabloreyes [address] [place] [address], OTR/L [id number]

## 2020-12-01 NOTE — PROGRESS NOTES
Physical Therapy  Daily Treatment Note  Evaluating Therapist: Coby Kilgore DPT    NAME: Tonya Clancy  ROOM: 6635B  DIAGNOSIS: Ischemic stroke  PRECAUTIONS: Falls, alarm, O2   PROCEDURE(S): 11/27 R CEA  HPI: 82year old female admitted 11/23 with sudden onset left sided weakness. In the ED, CT head revealing chronic microvascular changes without any acute abnormalities. CTA neck revealing bilateral internal carotid artery stenosis, 80% on the right and 50% on the left. TPA was recommended by telestroke but patient denied as her symptoms already improved. MRI showed infarct in high right parietal lobe and remote lacunar infarct in right cerebellum. Social:  Pt lives with alone in a 2 story home with 4-5 steps and 2 rails to enter. 12 steps with 1 rail to upstairs and 10 steps with no rails to basement. Pt's bedroom is on the 2nd floor and walk in shower is in the basement. Pt sleeps on the couch on first floor. Pt may be staying with her son or daughter at discharge. Daughter's home is a ranch style home with 3 steps to enter and 2 rails. Son's house is a 2 story home with 3 steps to enter and no rails and pt's bed and bath would be on the 2nd floor with 12 steps and no rails. Prior to admission pt ambulated with no AD independently. Initial Evaluation  12/1/20      PM    Short Term Goals Long Term Goals    Was pt agreeable to Eval/treatment? yes Initial Evaluation Refused      Does pt have pain?  No c/o pain       Bed Mobility  Rolling: SBA  Supine to sit: SBA  Sit to supine: SBA  Scooting: SBA   Sup Independent   Transfers Sit to stand: SBA  Stand to sit: SBA  Stand pivot: SBA no AD   Sup Independent   Ambulation   75 feet with no AD with SBA   150 feet with no AD with Supervision 150 feet with no AD with Independent   Walking 10 feet on uneven surface 10 feet with no AD with SBA   10 feet with no AD with Sup 10 feet with no AD with Independent   Wheel Chair Mobility NT       Car Transfers SBA   Sup Independent    Stair negotiation: ascended and descended 4 steps with 1 rail with SBA   4 steps with 1 rail with Sup 12 steps with 1 rail with Mod I   Curb Step:   ascended and descended 7.5 inch step with no AD and CGA   7.5 inch step with no AD and Sup 7.5 inch step with no AD and Independent   Picking up object off the floor Picked up a cone with SBA   Will  an object with Sup Will  an object with Independent   BLE ROM WFL       BLE Strength 4/5       Balance  Sitting: Supervision  Standing: SBA no AD       Date Family Teach Completed TBA       Is additional Family Teaching Needed? Y or N TBA       Hindering Progress Balance, safety awareness       PT recommended ELOS 2 weeks       Team's Discharge Plan        Therapist at Team Meeting            Additional Comments: Pt refused her PM session stating that she was too tired and that she wanted to rest. Educated the pt on the benefits of PT and participating in the program however she continued to decline treatment. Will continue with current POC as established at the initial evaluation.     Padmini Domingo, DPT [id number]

## 2020-12-01 NOTE — PROGRESS NOTES
Physical Therapy  Initial Evaluation  Evaluating Therapist: Sourav Muro DPT    NAME: Clem Pool  ROOM: 6461X  DIAGNOSIS: Ischemic stroke  PRECAUTIONS: Falls, alarm, O2   PROCEDURE(S): 11/27 R CEA  HPI: 82year old female admitted 11/23 with sudden onset left sided weakness. In the ED, CT head revealing chronic microvascular changes without any acute abnormalities. CTA neck revealing bilateral internal carotid artery stenosis, 80% on the right and 50% on the left. TPA was recommended by telestroke but patient denied as her symptoms already improved. MRI showed infarct in high right parietal lobe and remote lacunar infarct in right cerebellum. Social:  Pt lives with alone in a 2 story home with 4-5 steps and 2 rails to enter. 12 steps with 1 rail to upstairs and 10 steps with no rails to basement. Pt's bedroom is on the 2nd floor and walk in shower is in the basement. Pt sleeps on the couch on first floor. Pt may be staying with her son or daughter at discharge. Daughter's home is a ranch style home with 3 steps to enter and 2 rails. Son's house is a 2 story home with 3 steps to enter and no rails and pt's bed and bath would be on the 2nd floor with 12 steps and no rails. Prior to admission pt ambulated with no AD independently. Initial Evaluation  12/1/20          Short Term Goals Long Term Goals    Was pt agreeable to Eval/treatment? yes Initial Evaluation Initial Evaluation     Does pt have pain?  No c/o pain       Bed Mobility  Rolling: SBA  Supine to sit: SBA  Sit to supine: SBA  Scooting: SBA   Sup Independent   Transfers Sit to stand: SBA  Stand to sit: SBA  Stand pivot: SBA no AD   Sup Independent   Ambulation   75 feet with no AD with SBA   150 feet with no AD with Supervision 150 feet with no AD with Independent   Walking 10 feet on uneven surface 10 feet with no AD with SBA   10 feet with no AD with Sup 10 feet with no AD with Independent   Wheel Chair Mobility NT       Car Transfers SBA   Sup Independent    Stair negotiation: ascended and descended 4 steps with 1 rail with SBA   4 steps with 1 rail with Sup 12 steps with 1 rail with Mod I   Curb Step:   ascended and descended 7.5 inch step with no AD and CGA   7.5 inch step with no AD and Sup 7.5 inch step with no AD and Independent   Picking up object off the floor Picked up a cone with SBA   Will  an object with Sup Will  an object with Independent   BLE ROM WFL       BLE Strength 4/5       Balance  Sitting: Supervision  Standing: SBA no AD       Date Family Teach Completed TBA       Is additional Family Teaching Needed? Y or N TBA       Hindering Progress Balance, safety awareness       PT recommended ELOS 2 weeks       Team's Discharge Plan        Therapist at Team Meeting          Pt is alert and Oriented x3  Sensation: Unremarkable  Endurance: Fair+  Skin was inspected: Unremarkable  Bed alarm: yes     ASSESSMENT  Pt displays functional ability as noted in the objective portion of this evaluation. Comments:  Pt was alert and oriented with PT however she does have significant deficits in safety awareness and lack of insight into her deficits. Pt was quick moving with all mobility despite being given cues to slow down to not only control her movements but to decrease the feeling of lightheadedness that she was reporting. Pt intermittently reported feeling lightheaded during the session and stated that it is because \"I'm not eating\". Pt was on 2L O2 during the session with no visible [facility] or labored breathing. Pt at times would reach for objects to grab onto while walking to increase her stability. Pt will benefit from intensive skilled PT services to improve her balance, strength, endurance, and safety with functional mobility. Goals set for independent as this was the pt's PLOF however if her safety awareness does not improve she made be downgraded to supervision long-term goals.     Patient education  Pt educated on role of PT, safety with functional mobility    Patient response to education:   Pt verbalized understanding Pt demonstrated skill Pt requires further education in this area   yes partially yes     Rehab potential is Good for reaching above PT goals. Pts/ family goals   1. To get better and be independent    Patient and or family understand(s) diagnosis, prognosis, and plan of care: yes    PLAN  PT care will be provided in accordance with the objectives noted above. Whenever appropriate, clear delegation orders will be provided for nursing staff. Exercises and functional mobility practice will be used as well as appropriate assistive devices or modalities to obtain goals. Patient and family education will also be administered as needed. Frequency of treatments will be 2x/day M-F and 1x/day Sat or Sun x  14 days.     Time in: 0830  Time out: [address] [facility] [id number]

## 2020-12-01 NOTE — PROGRESS NOTES
Perfect serve sent to [facility] covering Dr. Bong Estes for medical management while in acute rehab.

## 2020-12-01 NOTE — PROGRESS NOTES
Speech Language Pathology  ACUTE REHABILITATION--DAILY PROGRESS NOTE            FIMS    To be assessed    Attempted x2 for speech/language/cognitive evaluation. Pt refused x2 and said \"I just wanted to sleep\". Anticipating need for speech therapy given performance on formal cognitive evaluation (RIPA) on 11/30. Will reattempt evaluation tomorrow morning. SWALLOWING:      Diet:  . Recommend downgrade to Minced and moist consistency solids, continue thin liquids. See previous note     LANGUAGE:     To be assessed     COGNITION:       To be assessed however performance on RIPA on 11/30 indicated a mild-moderate cognitive deficit. Safety: To be assessed    SPEECH: To be assessed    SP recommended after discharge: To be assessed  Supervision recommended at discharge: To be assessed    Will continue SP intervention as per previously established POC.     Minute Tracking:    Individual therapy:     0 minutes  Concurrent therapy:    0 minutes  Group therapy:     0 minutes  Co-treatment therapy:    0 minutes    Total minutes for 12/1/2020: 0 minutes (15 min though this date from earlier session; see previous note)    Werner Diaz, Graduate Clinician

## 2020-12-01 NOTE — H&P
Post Admission Physician Evaluation      Patient Identification  Page Landry is a 82 y.o. female. :  1935  Admit Date:  2020  Attending Provider: Donaldo Britt MD                                  Primary Care Physician:  Jhonathan Cabral MD   Consultants:  Estela Lambert MD  Admitting Diagnosis: Ischemic stroke [facility]) [I63.9]    Indication for Rehabilitation Admission:  Stroke:  01.1  Left Body Involvement (Right Brain)    The main medical problem(s) being actively managed by the physicians and requiring 24 hour rehabilitation nursing care during this stay include close neuro follow up, dvt prophylaxis, skin care, management of multiple issues. This patient has rehabilitation potential for functional improvement. The domains of functional impairment present in this patient which will require an intensive and interdisciplinary rehabilitation environment include self care, mobility, motor dysfunction, safety, cognitive function and communication. History of present illness: The patient is a 82 y.o. female with significant past medical history of HTN and GERD who presented 20 with the gradual onset of (L) arm heaviness. She declined tPA as she felt the symptoms were not serious enough. She was admitted and found to have significant (R) carotid artery disease. She was evaluated by vascular surgery and cleared by cardiology. On 20 she underwent (R) carotid endarterectomy with bovine patch angioplasty by Dr. Bernarda Flower. She tolerated the procedure well SHe reports a change in her speech post operatively which has not returned to normal.   Denies difficulty chewing or swallowing. She now presents to the inpatient rehab unit to increase her level of function, independence, safety and mobility prior to returning home alone if possible. She is complaining of abdominal discomfort and constipation.   She is reluctant to take oral medication for constipation and is Environment / Accessibility: 2-story house, number of outside stairs: 2, number of inside stairs: 10 to basement with walk in shower, bedroom on 2nd floor, bathroom on 2nd floor with tub only and in basement with walk in shower floor, tub only, walk-in shower  Communication: follows at least 1-step commands and primary language: English    Review of Systems:Pertinent items are noted in HPI. The remainder of her comprehensive ROS is negative. Physical Exam:    Vitals reviewed. I/O last 3 completed shifts: In: 680 [P.O.:120; I.V.:560]  Out: -   No intake/output data recorded. Vitals:    12/01/20 0845   BP: (!) 148/74   Pulse: 90   Resp: 20   Temp: 98.2 °F (36.8 °C)         GENERAL ASSESSMENT: well developed and well nourished, anxious and laying on her (R) side in bed. No acute distress but complaining of abdominal pain and mild nausea  SKIN: S/p (R) carotid endarterectomy healing well and resolving ecchymosis  HEAD: normocephalic  EYES: normal eyes  EARS: normal  NOSE: normal external appearance and nares patent  MOUTH: normal mouth and throat  NECK: normal, supple full range of motion, no thyroid enlargement and no adenopathy or masses  CHEST: no rales, no rhonchi, no wheezes, respiratory effort normal with no retractions, decreased air exchange, no retractions  HEART: regular rate and rhythm, normal S1/S2, no murmurs  ABDOMEN: soft, non-distended, no masses, no hepatosplenomegaly  GENITALIA: not examined  SPINE: spine normal, symmetric  EXTREMITY: normal and symmetric movement, normal range of motion, no joint swelling, no calf tenderness or cords. No pain with ROM. Negative Homans' sign.         Detailed Neurological Exam:  Mental Status: Alert, oriented, thought content appropriate    Cranial Nerves:  I: smell NA   II: visual acuity  NA   II: visual fields Full to confrontation   II: pupils NORMA   III,VII: ptosis None   III,IV,VI: extraocular muscles  Full ROM   V: mastication Normal   V: facial light touch sensation  Normal   V,VII: corneal reflex  NA   VII: facial muscle function - upper  Normal   VII: facial muscle function - lower Normal   VIII: hearing Normal   IX: soft palate elevation  Normal   IX,X: gag reflex NA   XI: trapezius strength  5/5   XI: sternocleidomastoid strength 5/5   XI: neck flexion strength  5/5   XII: tongue strength  Normal           Motor:  Right   5/5              Left   5-/5               Right Bicep  5/5           Left Bicep  5-/5              Right Triceps   5-/5       Left Trceps  4/5          Right Deltoid  4/5     Left Deltoid  4/5         Right IPS  5-/5            Left IPS  4/5               Right Quadriceps  5/5          Left Quadriceps    5-/5           Right Gastrocnemius    2/5    Left Gastrocnemius   2/5  Right Ant Tibialis   5/5  Left Ant Tibialis   5/5        Sensory:  normal to light touch and no extinction to DSS.         Gait: not tested due to safety concerns      Coordination:   test for rapid alternating movements reduced on the (L)      DTR:   Right Brachioradialis reflex 1+  Left Brachioradialis reflex 1+  Right Biceps reflex 1+  Left Biceps reflex 1+  Right Triceps reflex 0  Left Triceps reflex 0  Right Quadriceps reflex 0  Left Quadriceps reflex 0  Right Achilles reflex 0  Left Achilles reflex 0      ASSESSMENT AND PLAN      Patient Active Problem List   Diagnosis    Anxiety    Essential hypertension    Gastroesophageal reflux disease without esophagitis    Mixed hyperlipidemia    Diastolic dysfunction    Pure hypercholesterolemia    Moderate obesity    Personal history of hydronephrosis    Hiatal hernia with GERD    [facility]A) [facility]    Stenosis of right carotid artery    Hypertensive emergency    Ischemic stroke ([facility]       1:  Primary indication for inpatient rehabilitation admission over other settings:  Stroke:  01.1  Left Body Involvement (Right Brain)  2:  Comorbidities:  Comorbid Conditions in addition to those listed above None  3. Estimated length of stay:  14 days  4. Anticipated disposition:  Home with family assist as needed. .  The potential to achieve that is very good. 5:  Precautions:  falls, behavior, infections, skin and aspirations      Patient Active Problem List   Diagnosis    Anxiety    Essential hypertension    Gastroesophageal reflux disease without esophagitis    Mixed hyperlipidemia    Diastolic dysfunction    Pure hypercholesterolemia    Moderate obesity    Personal history of hydronephrosis    Hiatal hernia with GERD    Cerebrovascular accident (CVA) ([address]    Stenosis of right carotid artery    Hypertensive emergency    Ischemic stroke [facility])         Admitted for comprehensive inpatient rehabilitation including PT, OT, RT, SLP, and supportive services to improve functional outcome of impaired mobility, ADL's, transfers, and self-care. Rehabilitation Potential: excellent      IOPOC:  I concur with the preadmission screen except as documented within this note. Care at a lower level would risk decline in function and increase risk of complications. Begin PT 90 minutes per day 5-7 days per week with focus on strengthening, balance, progressive ambulation, elevations and positioning; OT 90 minutes per day 5-7 days per week with focus on fine motor skills, ADLs, IADLs, energy conservation and equipment needs; rehab nursing 24/7 for skin care, bowel and bladder management, medication management, education, and carryover and  for discharge planning.       Current Functional Status:     Initial Evaluation  12/1/20          Short Term Goals Long Term Goals    Was pt agreeable to Eval/treatment? yes Initial Evaluation Initial Evaluation       Does pt have pain?  No c/o pain           Bed Mobility  Rolling: SBA  Supine to sit: SBA  Sit to supine: SBA  Scooting: SBA     Sup Independent   Transfers Sit to stand: SBA  Stand to sit: SBA  Stand pivot: SBA no AD     Sup Independent   Ambulation   75 feet with no AD with SBA     150 feet with no AD with Supervision 150 feet with no AD with Independent   Walking 10 feet on uneven surface 10 feet with no AD with SBA     10 feet with no AD with Sup 10 feet with no AD with Independent   Wheel Chair Mobility NT           Car Transfers SBA     Sup Independent    Stair negotiation: ascended and descended 4 steps with 1 rail with SBA     4 steps with 1 rail with Sup 12 steps with 1 rail with Mod I   Curb Step:   ascended and descended 7.5 inch step with no AD and CGA     7.5 inch step with no AD and Sup 7.5 inch step with no AD and Independent   Picking up object off the floor Picked up a cone with SBA     Will  an object with Sup Will  an object with Independent   BLE ROM WFL           BLE Strength 4/5           Balance  Sitting: Supervision  Standing: SBA no AD             SUMMARY OF EVALUATION                 DYSPHAGIA DIAGNOSIS: Moderate oral stage dysphagia due to pain/tongue swelling.                              DIET RECOMMENDATIONS:  Dysphagia 2,  Mechanical Soft (Minced & Moist) solids with thin liquids                 FEEDING RECOMMENDATIONS:                           Assistance level:  Supervision is needed during all oral intake and set-up is required       Balance  Sitting Balance: Stand by assistance  Standing Balance: Minimal assistance(Pt demo decerased safety & insight & attempted to abandon rw while ambuilating to the toilet)  Functional Mobility  Functional - Mobility Device: Rolling Walker  Activity: To/from bathroom  Assist Level: Minimal assistance  Functional Mobility Comments: Pt require mod vc's for safety & insight using rw as well attending to O2 tubing & IV line  Toilet Transfers  Toilet - Technique: Ambulating  Equipment Used: Standard toilet  Toilet Transfer: Minimal assistance  Toilet Transfers Comments: mod-max vc's for safety due to impulsivity & decrased insight into her deficits as well as IV pole & O2 tubing impacting pt as safety  Sensation  Overall Sensation Status: grossly intact to touch      Additional comments:I reviewed the patient's new clinical lab test results. Results for Kaylie Nurse (MRN [id number]) as of 12/1/2020 19:01   Ref.  Range 12/1/2020 04:50   Sodium Latest Ref Range: 132 - 146 mmol/L 143   Potassium Latest Ref Range: 3.5 - 5.0 mmol/L 3.5   Chloride Latest Ref Range: 98 - 107 mmol/L 105   CO2 Latest Ref Range: 22 - 29 mmol/L 29   BUN Latest Ref Range: 8 - 23 mg/dL 16   Creatinine Latest Ref Range: 0.5 - 1.0 mg/dL 1.0   Anion Gap Latest Ref Range: 7 - 16 mmol/L 9   GFR Non- Latest Ref Range: >=60 mL/min/1.73 53   GFR  Unknown >60   Magnesium Latest Ref Range: 1.6 - 2.6 mg/dL 1.8   Glucose Latest Ref Range: 74 - 99 mg/dL 106 (H)   Calcium Latest Ref Range: 8.6 - 10.2 mg/dL 8.3 (L)   Total Protein Latest Ref Range: 6.4 - 8.3 g/dL 5.4 (L)   Albumin Latest Ref Range: 3.5 - 5.2 g/dL 3.1 (L)   Alk Phos Latest Ref Range: 35 - 104 U/L 75   ALT Latest Ref Range: 0 - 32 U/L 10   AST Latest Ref Range: 0 - 31 U/L 11   Bilirubin Latest Ref Range: 0.0 - 1.2 mg/dL 0.5   WBC Latest Ref Range: 4.5 - 11.5 E9/L 8.4   RBC Latest Ref Range: 3.50 - 5.50 E12/L 3.74   Hemoglobin Quant Latest Ref Range: 11.5 - 15.5 g/dL 11.6   Hematocrit Latest Ref Range: 34.0 - 48.0 % 35.5   MCV Latest Ref Range: 80.0 - 99.9 fL 94.9   MCH Latest Ref Range: 26.0 - 35.0 pg 31.0   MCHC Latest Ref Range: 32.0 - 34.5 % 32.7   MPV Latest Ref Range: 7.0 - 12.0 fL 10.4   RDW Latest Ref Range: 11.5 - 15.0 fL 13.2   Platelet Count Latest Ref Range: 130 - 450 E9/L 219   Neutrophils % Latest Ref Range: 43.0 - 80.0 % 74.6   Immature Granulocytes % Latest Ref Range: 0.0 - 5.0 % 0.4   Lymphocyte % Latest Ref Range: 20.0 - 42.0 % 8.5 (L)   Monocytes % Latest Ref Range: 2.0 - 12.0 % 14.6 (H)   Eosinophils % Latest Ref Range: 0.0 - 6.0 % 1.8   Basophils % Latest Ref Range: 0.0 - 2.0 % 0.1   Neutrophils Absolute Latest Ref Range: 1.80 - 7.30 E9/L 6.25   Immature Granulocytes # Latest Units: E9/L 0.03   Lymphocytes Absolute Latest Ref Range: 1.50 - 4.00 E9/L 0.71 (L)   Monocytes Absolute Latest Ref Range: 0.10 - 0.95 E9/L 1.22 (H)   Eosinophils Absolute Latest Ref Range: 0.05 - 0.50 E9/L 0.15   Basophils Absolute Latest Ref Range: 0.00 - 0.20 E9/L 0.01       Continue to monitor renal function. Modified diet as per SLP for evidence of dysphagia on exam.  Explore ability to return home with assist initially. Labs reviewed. Continue secondary stroke prevention. Continue DVT prophylaxis.

## 2020-12-01 NOTE — PROGRESS NOTES
Patient left unit with transport. All meds and discharge papers sent with transport.  Patient holding bag including all belongings of phone, , clothes, etc.

## 2020-12-01 NOTE — CARE COORDINATION
Social Work Assessment Summary    PCP: Dr. Scott So    Patient Resides: alone    Home Architecture : pt lives in a 2 story home with 4-5 entry steps and 2 rails. There are 12 steps to the upstairs and 1 railing. There are 10 steps the the basement with no railings. There is a walk in bathroom in the basement. Another bathroom is on the 2nd floor it is a tub only. The pt's bedroom is on the 2nd floor but the pt sleeps on a couch on the 1st floor by choice. Will you return to your own home? Pt may stay with daughter Sy Pascual and grandson Karol Coyne, [address] [address]. The house is a ranch style home with 3 steps to enter and 2 rails. The bathroom is a tub/shower with curtain. Bed on 1st floor. Pt also may stay with her son Sammi Ford and his wife Bari Hopper as well as their son Lisa Savage. The home is a 2 story home with 3 entry steps and 0 rails. There are 12 steps to the 2nd floor. The pt's bed and bath would be on the 2nd floor. Kirk Recinos is a tub/shower combo with a shower curtain. Primary Caregiver: The pt has 4 living children and a son Demi Pacheco who passed away in 2016. Sy Pascual (14) is on SSD she is healthy and drives. Yisel Rodríguez (62) employed full time at [address] [employer] She is healthy and drives. Lesly May (61) is on SSD, Sammi Ford (47) works full time. Level of Function PTA : the pt was independent in all areas. Pt. Preferred to use the 3 in 1 commode on 1st floor so she did not have to go up and down the steps. Employment: the pt retired on disability from [employer] where she worked on the [employer]. Receives SSD yes Reason: eating disorder     DME Pta  : pt has a 3 in 1 commode.      Community Agency Involvement PTA : none    Do they have a medical profile: no    Family Teaching: tbs    Strength: family support    Preference: Per Farheen Bergeron \"voice/tongue and to get better\"      NAME RELATION HOME # WORK # CELL #   Jordy Morales Daughter  Son   [phone] [facility] Daughter    [phone]   Kentrell Hall   [phone]     Height: 5'2  Weight: 164 lb    Plan to update via conference call Kenna Monzon after team on Fridays at 15.     Junior Macedo  12/1/2020

## 2020-12-01 NOTE — CARE COORDINATION
Verified on 12/1/20 that patient has a non-qualifying BPCI-A DRG of [phone] for hospital encounter admission date 11/23/20 at [facility] (1-).

## 2020-12-01 NOTE — PROGRESS NOTES
Copy of disclosure statement and \"Privacy Act [address] Records\" given to patient. Patient also given a copy of Medicare Important message which was signed on admission.

## 2020-12-02 LAB
BACTERIA: ABNORMAL /HPF
BILIRUBIN URINE: NEGATIVE
BLOOD, URINE: NEGATIVE
CLARITY: ABNORMAL
COLOR: YELLOW
GLUCOSE URINE: NEGATIVE MG/DL
KETONES, URINE: ABNORMAL MG/DL
LEUKOCYTE ESTERASE, URINE: ABNORMAL
NITRITE, URINE: NEGATIVE
PH UA: 6.5 (ref 5–9)
PROTEIN UA: NEGATIVE MG/DL
RBC UA: ABNORMAL /HPF (ref 0–2)
SPECIFIC GRAVITY UA: 1.02 (ref 1–1.03)
UROBILINOGEN, URINE: 0.2 E.U./DL
WBC UA: ABNORMAL /HPF (ref 0–5)

## 2020-12-02 PROCEDURE — 6360000002 HC RX W HCPCS: Performed by: INTERNAL MEDICINE

## 2020-12-02 PROCEDURE — 81001 URINALYSIS AUTO W/SCOPE: CPT

## 2020-12-02 PROCEDURE — 97530 THERAPEUTIC ACTIVITIES: CPT

## 2020-12-02 PROCEDURE — 97535 SELF CARE MNGMENT TRAINING: CPT

## 2020-12-02 PROCEDURE — 2700000000 HC OXYGEN THERAPY PER DAY

## 2020-12-02 PROCEDURE — 97129 THER IVNTJ 1ST 15 MIN: CPT

## 2020-12-02 PROCEDURE — 92526 ORAL FUNCTION THERAPY: CPT

## 2020-12-02 PROCEDURE — 6370000000 HC RX 637 (ALT 250 FOR IP): Performed by: INTERNAL MEDICINE

## 2020-12-02 PROCEDURE — 87186 SC STD MICRODIL/AGAR DIL: CPT

## 2020-12-02 PROCEDURE — 87077 CULTURE AEROBIC IDENTIFY: CPT

## 2020-12-02 PROCEDURE — 92523 SPEECH SOUND LANG COMPREHEN: CPT

## 2020-12-02 PROCEDURE — 1280000000 HC REHAB R&B

## 2020-12-02 PROCEDURE — 87088 URINE BACTERIA CULTURE: CPT

## 2020-12-02 RX ORDER — OXYBUTYNIN CHLORIDE 5 MG/1
5 TABLET ORAL 3 TIMES DAILY
Status: DISCONTINUED | OUTPATIENT
Start: 2020-12-02 | End: 2020-12-09 | Stop reason: HOSPADM

## 2020-12-02 RX ADMIN — EZETIMIBE 10 MG: 10 TABLET ORAL at 08:20

## 2020-12-02 RX ADMIN — ENOXAPARIN SODIUM 40 MG: 40 INJECTION SUBCUTANEOUS at 08:17

## 2020-12-02 RX ADMIN — OXYBUTYNIN CHLORIDE 5 MG: 5 TABLET ORAL at 21:08

## 2020-12-02 RX ADMIN — BISACODYL 10 MG: 10 SUPPOSITORY RECTAL at 21:08

## 2020-12-02 RX ADMIN — OXYBUTYNIN CHLORIDE 5 MG: 5 TABLET ORAL at 14:22

## 2020-12-02 RX ADMIN — ASPIRIN 81 MG CHEWABLE TABLET 81 MG: 81 TABLET CHEWABLE at 08:18

## 2020-12-02 RX ADMIN — ROSUVASTATIN CALCIUM 5 MG: 5 TABLET, FILM COATED ORAL at 21:08

## 2020-12-02 RX ADMIN — DOCUSATE SODIUM 100 MG: 100 CAPSULE, LIQUID FILLED ORAL at 21:08

## 2020-12-02 ASSESSMENT — PAIN SCALES - GENERAL
PAINLEVEL_OUTOF10: 0
PAINLEVEL_OUTOF10: 0

## 2020-12-02 NOTE — CONSULTS
Unknown)   BMI 30.12 kg/m²   General appearance: alert, appears stated age, cooperative and no distress  Head: Normocephalic, without obvious abnormality, atraumatic  Eyes: conjunctivae/corneas clear. PERRL, EOM's intact. Ears: normal external ear canals both ears  Neck: no adenopathy, scar of right carotid endarterectomy noted with minimal swelling at the site, supple, symmetrical, trachea midline and thyroid not enlarged. Lungs: clear to auscultation bilaterally, symmetrical expansion  Heart: regular rate and rhythm, S1, S2 normal, no murmur, regular rate and rhythm ,no precordial heave  Abdomen:soft, non-tender; non-distended normal bowel sounds no masses, no organomegaly  Extremities:Pedal edema none  Homans sign is negative, no sign of DVT  Skin: Skin color, texture, turgor normal. No rashes or lesions  Neurologic:Mental status: Alert, oriented, x3 , displaying anxiety      Data:   Labs:  Recent Labs     11/30/20  0709 12/01/20  0450   WBC 10.1 8.4   HGB 12.6 11.6   HCT 38.5 35.5   MCV 95.5 94.9    219     Recent Labs     11/30/20  0458 11/30/20  1130 12/01/20  0450    142 143   K 3.8 3.8 3.5    104 105   CO2 26 30* 29   BUN 17 17 16   CREATININE 1.3* 1.2* 1.0   GLUCOSE 105* 113* 106*     Recent Labs     12/01/20  0450   AST 11   ALT 10   BILITOT 0.5   ALKPHOS 75     No results for input(s): CKTOTAL, CKMB, TROPONINI in the last 72 hours.   Lab Results   Component Value Date    INR 1.0 11/23/2020    INR 1.0 11/13/2017    INR 1.1 09/05/2015    PROTIME 11.4 11/23/2020    PROTIME 11.5 11/13/2017    PROTIME 11.7 09/05/2015        U/A:    Lab Results   Component Value Date    NITRITE neg 11/19/2018    COLORU Straw 02/04/2020    PHUR 7.0 02/04/2020    LABCAST RARE 06/17/2019    WBCUA >20 02/04/2020    WBCUA 2-5 07/16/2011    RBCUA 10-20 02/04/2020    RBCUA 2-5 08/02/2013    MUCUS Present 06/17/2019    BACTERIA MODERATE 02/04/2020    CLARITYU Clear 02/04/2020    SPECGRAV 1.015 02/04/2020 LEUKOCYTESUR LARGE 02/04/2020    UROBILINOGEN 0.2 02/04/2020    BILIRUBINUR Negative 02/04/2020    BILIRUBINUR small 11/19/2018    BILIRUBINUR NEGATIVE 07/16/2011    BLOODU LARGE 02/04/2020    GLUCOSEU Negative 02/04/2020    GLUCOSEU NEGATIVE 07/16/2011        Imaging:   No orders to display       Assessment  Active Problems:    Stenosis of right carotid artery    Essential hypertension    Gastroesophageal reflux disease without esophagitis    Anxiety    Mixed hyperlipidemia    Diastolic dysfunction    Pure hypercholesterolemia    Moderate obesity    Hiatal hernia with GERD    Cerebrovascular accident (CVA) ([facility] [facility]    Ischemic stroke ([facility] [address]  Resolved Problems:    Diabetes mellitus ([facility] [address] [facility]      1. Status post recent CVA   2. Hypertension   3. Severe cerebrovascular disease status post right carotid endarterectomy   4. Ischemic stroke   5. Abnormal blood sugars   6. Incontinence of urination   7. Anxiety   8. Slurred speech. Plan  1. Medications reviewed, parameters placed on blood pressure medicine to avoid hypotension  2. Urine analysis and culture  3. Trial of the triptan  4.  Speech therapy  5.   6. Electronically signed by Brian Hill MD on 12/2/2020 at 8:47 AM

## 2020-12-02 NOTE — PROGRESS NOTES
Physical Therapy  Treatment Note  Evaluating Therapist: Sourav Muro DPT    NAME: Clem Pool  ROOM: 0230W  DIAGNOSIS: Ischemic stroke  PRECAUTIONS: Falls, alarm, O2   PROCEDURE(S): 11/27 R CEA  HPI: 82year old female admitted 11/23 with sudden onset left sided weakness. In the ED, CT head revealing chronic microvascular changes without any acute abnormalities. CTA neck revealing bilateral internal carotid artery stenosis, 80% on the right and 50% on the left. TPA was recommended by telestroke but patient denied as her symptoms already improved. MRI showed infarct in high right parietal lobe and remote lacunar infarct in right cerebellum. Social:  Pt lives with alone in a 2 story home with 4-5 steps and 2 rails to enter. 12 steps with 1 rail to upstairs and 10 steps with no rails to basement. Pt's bedroom is on the 2nd floor and walk in shower is in the basement. Pt sleeps on the couch on first floor. Pt may be staying with her son or daughter at discharge. Daughter's home is a ranch style home with 3 steps to enter and 2 rails. Son's house is a 2 story home with 3 steps to enter and no rails and pt's bed and bath would be on the 2nd floor with 12 steps and no rails. Prior to admission pt ambulated with no AD independently. Initial Evaluation  12/1/20 AM     PM    Short Term Goals Long Term Goals    Was pt agreeable to Eval/treatment? yes yes yes     Does pt have pain?  No c/o pain No c/o pain No c/o pain     WHITEHEAD   49/56     Bed Mobility  Rolling: SBA  Supine to sit: SBA  Sit to supine: SBA  Scooting: SBA Supervision all aspects NT Sup Independent   Transfers Sit to stand: SBA  Stand to sit: SBA  Stand pivot: SBA no AD Sit to stand: Supervision  Stand to sit: Supervision  Stand pivot: Supervision no AD Sit to stand: Supervision  Stand to sit: Supervision  Stand pivot: Supervision no AD Sup Independent   Ambulation   75 feet with no AD with  feet no AD Supervision 300 feet no AD Supervision 150 feet with no AD with Supervision 150 feet with no AD with Independent   Walking 10 feet on uneven surface 10 feet with no AD with SBA NT NT 10 feet with no AD with Sup 10 feet with no AD with Independent   Wheel Chair Mobility NT NT NT     Car Transfers SBA Supervision NT Sup Independent    Stair negotiation: ascended and descended 4 steps with 1 rail with SBA 12 steps 1 rail Supervision - cues for NR pattern NT 4 steps with 1 rail with Sup 12 steps with 1 rail with Mod I   Curb Step:   ascended and descended 7.5 inch step with no AD and CGA NT 7.5 inch x2 reps with no AD SBA 7.5 inch step with no AD and Sup 7.5 inch step with no AD and Independent   Picking up object off the floor Picked up a cone with SBA NT NT Will  an object with Sup Will  an object with Independent   BLE ROM [facility]      BLE Strength 4/5 4/5      Balance  Sitting: Supervision  Standing: SBA no AD Sitting: Supervision  Standing: Supervision no AD      Date Family Teach Completed TBA TBA      Is additional Family Teaching Needed? Y or N TBA TBA      Hindering Progress Balance, safety awareness Balance, safety awareness      PT recommended ELOS 2 weeks       Team's Discharge Plan        Therapist at Team Meeting          Therapeutic Exercise:   AM:   - Functional mobility as noted above in chart  - 4 cane forward step overs x4 reps to improve coordination, dynamic standing balance, and motor control  PM:   - Functional mobility as noted above in chart  - Side stepping x15 feet/side and backwards walking x30 feet to improve dynamic standing balance and coordination  - Forward step up downs on Airex pad 2x10 to improve dynamic standing balance and coordination    Additional Comments: Reviewed all mobility as noted above. Pt continues to have deficits in safety awareness. Pt was ambulating in the hallway and ran into the side of a door that was on her R side.  Questioned pt on whether she saw the door or not and the pt stated that she did in fact see the door but \"chose not to move\". Pt prefers to perform all mobility at a increased speed stating that \"I live life in the fast monroe\". Educated pt on the importance of slowing down her movement at this time to compensate for her impaired balance. Incorporated various activities today aimed at improving the pt's dynamic standing balance. Future PT sessions will continue to focus on improving the pt's safety awareness and overall balance/safety with functional mobility. Patient/family education  Pt/family educated on importance of slowing down her movements to compensate for her impaired balance, safety with functional mobility    Patient/family response to education:   Pt/family verbalized understanding Pt/family demonstrated skill Pt/family requires further education in this area   yse partially yes     AM  Time in: 0830  Time out: 0915  PM  Time in: 1300  Time out: 1345    Pt is making good progress toward established Physical Therapy goals. Continue with physical therapy current plan of care.     Moise Sharma, DPT [id number]

## 2020-12-02 NOTE — PROGRESS NOTES
OCCUPATIONAL THERAPY DAILY NOTE    Date:2020  Patient Name: Gisell Huddleston  MRN: [id number]  : 1935  Room: [address] [place] [address]   Referring Practitioner: Adelaida Christopher MD  Diagnosis: CVA- R carotid endarterectomy 2020; R parietal lobe & remote pacunar & R cerbellum  Additional Pertinent Hx: HTN  Restrictions: Fall Risk, bed & chair alarm & dental soft- alternate solids & liquids    Functional Assessment:   Date Status AE  Comments   Feeding 20 Supervision   Per eval   Grooming 20 CGA   Standing at sink washing hands    Bathing 20 Minimal Assist   Per eval   UB Dressing 20 Stand by Assist   \" \"   LB Dressing 20 Min A  [place] & donning socks & shoes seated at EOB   Footwear 20 Minimal Assist   \" \"   Toileting 20 CGA  Pt able to complete hygiene, CGA for clothing management   Homemaking         Functional Transfers / Balance:   Date Status DME  Comments   Sit Balance 20 Stand by Assist   EOB during LB dressing, seated in chair, w/c & commode   Stand Balance 20 CGA  No AD short distances in room    [] Tub  [] Shower   Transfer       Commode   Transfer 20 CGA   Cues for safe & slow transfers   Functional   Mobility 20 Minimal Assist  No AD To/from bathroom    Other:         Functional Exercises / Activity:  Therapeutic tasks completed with card game at table top with deficits noted with recall of game & how it was played. Sensory / Neuromuscular Re-Education:      Cognitive Skills:   Status Comments   Problem   Solving fair     Memory fair     Sequencing fair     Safety fair       Visual Perception:    Education:  Pt was educated on importance of attending therapy sessions in order to improve level of independence so that she can return home     [] Family teach completed on:    Pain Level: 0/10    Additional Notes:       Patient has made fair  progress during treatment sessions toward set goals.  Therapy emphasis to obtain goals:Strengthening, Gait Training, Patient/Caregiver Education & Training, Home Management Training, Equipment Evaluation, Education, & procurement, Balance Training, Functional Mobility Training, Cognitive Reorientation, Endurance Training, Safety Education & Training, Self-Care / ADL, Cognitive/Perceptual Training     [x] Continue with current OT Plan of care. [] Prepare for Discharge     DISCHARGE RECOMMENDATIONS  Recommended DME:    Post Discharge Care:   []Home Independently  []Home with 24hr Care / Supervision []Home with Partial Supervision []Home with Home Health OT []Home with Out Pt OT []Other: ___   Comments:         Time in Time out Tx Time Breakdown  Variance:   First Session  10:00 10:45 [x] Individual Tx- 45 min  [] Concurrent Tx -  [] Co-Tx -   [] Group Tx -   [] Time Missed -     Second Session   [] Individual Tx-   [] Concurrent Tx -  [] Co-Tx -   [] Group Tx -   [x] Time Missed -45        Pt refused after several attempts. Pt in bed and stated she did not want to get back up    Third Session    [] Individual Tx-   [] Concurrent Tx -  [] Co-Tx -   [] Group Tx -   [] Time Missed -         Total Tx Time: [address].  [address], [address] [facility]  Susie Farley OTR/L [id number]

## 2020-12-02 NOTE — PLAN OF CARE
Problem: Anxiety:  Goal: Level of anxiety will decrease  Description: Level of anxiety will decrease  Outcome: Met This Shift     Problem: Falls - Risk of:  Goal: Will remain free from falls  Description: Will remain free from falls  Outcome: Met This Shift  Goal: Absence of physical injury  Description: Absence of physical injury  Outcome: Met This Shift     Problem: Skin Integrity:  Goal: Will show no infection signs and symptoms  Description: Will show no infection signs and symptoms  Outcome: Met This Shift  Goal: Absence of new skin breakdown  Description: Absence of new skin breakdown  Outcome: Met This Shift     Problem:  Bowel/Gastric:  Goal: Will show no signs and symptoms of gastrointestinal bleeding  Description: Will show no signs and symptoms of gastrointestinal bleeding  Outcome: Met This Shift     Problem: Coping:  Goal: Ability to identify strategies to decrease anxiety will improve  Description: Ability to identify strategies to decrease anxiety will improve  Outcome: Met This Shift     Problem: Nutritional:  Goal: Ability to chew and swallow food without choking will improve  Description: Ability to chew and swallow food without choking will improve  Outcome: Met This Shift  Goal: Ability to achieve adequate nutritional intake will improve  Description: Ability to achieve adequate nutritional intake will improve  Outcome: Met This Shift  Goal: Ability to maintain an optimal weight for height and age will improve  Description: Ability to maintain an optimal weight for height and age will improve  Outcome: Met This Shift     Problem: Physical Regulation:  Goal: Complications related to the disease process, condition or treatment will be avoided or minimized  Description: Complications related to the disease process, condition or treatment will be avoided or minimized  Outcome: Met This Shift     Problem: Sensory:  Goal: General experience of comfort will improve  Description: General experience of comfort will improve  Outcome: Met This Shift

## 2020-12-02 NOTE — PROGRESS NOTES
Patient removed hat from toilet prior to voiding. Unable to collect urine specimen at this time. Education provided to patient to leave hat in toilet in order to collect specimen. Patient verbalized understanding.

## 2020-12-02 NOTE — PROGRESS NOTES
SPEECH/LANGUAGE PATHOLOGY  SPEECH/LANGUAGE/COGNITIVE EVALUATION      PATIENT NAME:  Brien Morel      :  1935         TODAY'S DATE:  2020 ROOM:  [facility]-A     ADMITTING DIAGNOSIS: Ischemic stroke ([facility] [address] [I63.9]    SPEECH PATHOLOGY DIAGNOSIS:  Moderate motor speech disorder. Mild-moderate cognitive deficit. THERAPY RECOMMENDATIONS:   Speech Pathology intervention is recommended 3-6 times per week for LOS or when goals are met with emphasis on the following:   Improve problem solving/insight/overall safety awareness. Immediate/STM during functional tasks and for recall of medical information with minimal cues and/or external memory aide. OM exercises to improve lingual strength and ROM. Articulation drills/tasks to improve intelligibility targeting specifically vocalic /r/ and medial and final /l/.                 FIMS SCORES      Swallowing    Current Status  5--Supervision    Short Term Goal 6--Modified Independent    Long Term Goal 6--Modified Independent     Receptive    Current Status  6--Modified Independent    Short Term Goal 6--Modified Independent    Long Term Goal 6--Modified Independent     Expressive    Current Status  5--Supervision    Short Term Goal 6--Modified Independent    Long Term Goal 6--Modified Independent     Social Interaction    Current Status  4--Minimal Assistance    Short Term Goal 5--Supervision    Long Term Goal 5--Supervision         Problem Solving    Current Status  4--Minimal Assistance    Short Term Goal 5--Supervision    Long Term Goal 5--Supervision      Memory    Current Status  4--Minimal Assistance    Short Term Goal 5--Supervision    Long Term Goal 5--Supervision         MOTOR SPEECH       Oral Peripheral Examination   Left lingual deviation     Parameters of Speech Production  Respiration:  Adequate for speech production  Articulation:  Distortion  Resonance:  Within functional limits  Quality:   Within functional limits  Pitch: High  Intensity: Within functional limits  Fluency:  Intact  Prosody Intact    RECEPTIVE LANGUAGE    Comprehension of Yes/No Questions: Within functional limits    Process  Simple Verbal Commands:   Within functional limits  Process Intermediate Verbal Commands:   Latent  Process Complex Verbal Commands:     Latent    Comprehension of Conversation:      Within functional limits      EXPRESSIVE LANGUAGE     Motor speech disorder impacts overall intelligibility    Serials: Functional    Imitation:  Words   Functional   Sentences Functional    Naming:  (Modality used:  Verbal)  Confrontation Naming  Functional  Functional Description  Functional  Response Naming: Functional    Conversation:      Conversation was within functional limits    COGNITION     Attention/Orientation  Attention: Sustained attention   Orientation:   Person:  oriented    Place:  oriented    Year:  correct    Month:  accurate within 5 days    Day of Week:  correct    Situation:  accurately described    Memory   Long Term Recall: Address:  recalled without cuing  Birthdate:  recalled without cuing  Age: recalled without cuing  Family: recalled without cuing    Immediate Recall: Sock:  recalled accurately     Blue:  recalled accurately     Bed:  recalled accurately    Delayed Recall:   Sock: could not recall     Blue:  recalled without cuing     Bed:  recalled without cuing    Organization/Problem Solving/Reasoning   Sequencing:    Verbal: Impaired      Motor: To be assessed    Problem solving: Verbal: Functional      Motor: To be assessed    Mathematics:  Simple:  Functional     Complex:  Impaired      CLINICAL OBSERVATIONS NOTED DURING THE EVALUATION  Latent responses, Inconsistent responses and Cueing was required      EDUCATION    Speech Pathologist (SLP) completed education with the patient and/or family regarding identified cognitive linguistic deficits and subsequent need for speech pathology intervention.  Discussed deficit areas to be targeted by formal intervention and established short/long term goals. Reviewed compensatory strategies to improve functional outcome (as appropriate). Encouraged patient and/or family to engage SLP in structured Q&A session relative to identified deficit areas. Patient and/or family indicated understanding of all information provided via satisfactory verbal response. ? Prognosis for improvements is good  This plan will be re-evaluated and revised in 1 week  if warranted. Patient stated goals: Agreed with above  Treatment goals discussed with Patient   The Patient understand(s) the diagnosis, prognosis and plan of care       The admitting diagnosis and active problem list, as listed below have been reviewed prior to initiation of this evaluation.         ACTIVE PROBLEM LIST:   Patient Active Problem List   Diagnosis    Anxiety    Essential hypertension    Gastroesophageal reflux disease without esophagitis    Mixed hyperlipidemia    Diastolic dysfunction    Pure hypercholesterolemia    Moderate obesity    Personal history of hydronephrosis    Hiatal hernia with GERD    Cerebrovascular accident (CVA) ([address]    Stenosis of right carotid artery    Hypertensive emergency    Ischemic stroke ([address]     Terrie Arevalo, Graduate Clinician

## 2020-12-03 PROCEDURE — 97530 THERAPEUTIC ACTIVITIES: CPT

## 2020-12-03 PROCEDURE — 6360000002 HC RX W HCPCS: Performed by: INTERNAL MEDICINE

## 2020-12-03 PROCEDURE — 92526 ORAL FUNCTION THERAPY: CPT

## 2020-12-03 PROCEDURE — 2700000000 HC OXYGEN THERAPY PER DAY

## 2020-12-03 PROCEDURE — 6370000000 HC RX 637 (ALT 250 FOR IP): Performed by: INTERNAL MEDICINE

## 2020-12-03 PROCEDURE — 97535 SELF CARE MNGMENT TRAINING: CPT

## 2020-12-03 PROCEDURE — 1280000000 HC REHAB R&B

## 2020-12-03 RX ORDER — SULFAMETHOXAZOLE AND TRIMETHOPRIM 800; 160 MG/1; MG/1
1 TABLET ORAL EVERY 12 HOURS SCHEDULED
Status: DISCONTINUED | OUTPATIENT
Start: 2020-12-03 | End: 2020-12-09 | Stop reason: HOSPADM

## 2020-12-03 RX ORDER — MIRTAZAPINE 15 MG/1
7.5 TABLET, ORALLY DISINTEGRATING ORAL NIGHTLY
Status: DISCONTINUED | OUTPATIENT
Start: 2020-12-03 | End: 2020-12-09 | Stop reason: HOSPADM

## 2020-12-03 RX ORDER — THIAMINE MONONITRATE (VIT B1) 100 MG
100 TABLET ORAL DAILY
Status: DISCONTINUED | OUTPATIENT
Start: 2020-12-03 | End: 2020-12-09 | Stop reason: HOSPADM

## 2020-12-03 RX ADMIN — Medication 100 MG: at 21:11

## 2020-12-03 RX ADMIN — ENOXAPARIN SODIUM 40 MG: 40 INJECTION SUBCUTANEOUS at 08:17

## 2020-12-03 RX ADMIN — OXYBUTYNIN CHLORIDE 5 MG: 5 TABLET ORAL at 08:19

## 2020-12-03 RX ADMIN — DOCUSATE SODIUM 100 MG: 100 CAPSULE, LIQUID FILLED ORAL at 08:18

## 2020-12-03 RX ADMIN — EZETIMIBE 10 MG: 10 TABLET ORAL at 08:18

## 2020-12-03 RX ADMIN — DOCUSATE SODIUM 100 MG: 100 CAPSULE, LIQUID FILLED ORAL at 21:12

## 2020-12-03 RX ADMIN — ASPIRIN 81 MG CHEWABLE TABLET 81 MG: 81 TABLET CHEWABLE at 08:18

## 2020-12-03 RX ADMIN — ROSUVASTATIN CALCIUM 5 MG: 5 TABLET, FILM COATED ORAL at 21:11

## 2020-12-03 RX ADMIN — OXYBUTYNIN CHLORIDE 5 MG: 5 TABLET ORAL at 21:11

## 2020-12-03 RX ADMIN — MIRTAZAPINE 7.5 MG: 15 TABLET, ORALLY DISINTEGRATING ORAL at 21:11

## 2020-12-03 RX ADMIN — SULFAMETHOXAZOLE AND TRIMETHOPRIM 1 TABLET: 800; 160 TABLET ORAL at 21:11

## 2020-12-03 RX ADMIN — BISACODYL 10 MG: 10 SUPPOSITORY RECTAL at 21:12

## 2020-12-03 ASSESSMENT — PAIN SCALES - GENERAL
PAINLEVEL_OUTOF10: 0

## 2020-12-03 NOTE — PROGRESS NOTES
Progress Note    Subjective/   82y.o. year old female on the rehab unit for stroke and (R) CEA. No new complaints. Tolerating therapy program.  Still notes speech remains different. No pain complaints. Bowel and bladder OK. Objective/   VITALS:  /70   Pulse 90   Temp 98.4 °F (36.9 °C) (Temporal)   Resp 17   Ht 5' 2\" (1.575 m)   Wt 164 lb 11.2 oz (74.7 kg)   LMP  (LMP Unknown)   SpO2 96%   BMI 30.12 kg/m²   24HR INTAKE/OUTPUT:      Intake/Output Summary (Last 24 hours) at 12/3/2020 0007  Last data filed at 12/2/2020 1823  Gross per 24 hour   Intake 360 ml   Output --   Net 360 ml     Constitutional:  Alert, awake, no apparent distress   Cardiovascular:  S1, S2 without m/r/g   Respiratory:  CTA B without w/r/r   Abdomen: +BS  Ext: no pitting LE edema, no calf tenderness  Neuro: awake and alert, dysarthria persists    Functional Level    Initial Evaluation  12/1/20 AM     PM    Short Term Goals Long Term Goals    Was pt agreeable to Eval/treatment? yes yes yes       Does pt have pain?  No c/o pain No c/o pain No c/o pain       WHITEHEAD     49/56       Bed Mobility  Rolling: SBA  Supine to sit: SBA  Sit to supine: SBA  Scooting: SBA Supervision all aspects NT Sup Independent   Transfers Sit to stand: SBA  Stand to sit: SBA  Stand pivot: SBA no AD Sit to stand: Supervision  Stand to sit: Supervision  Stand pivot: Supervision no AD Sit to stand: Supervision  Stand to sit: Supervision  Stand pivot: Supervision no AD Sup Independent   Ambulation   75 feet with no AD with  feet no AD Supervision 300 feet no AD Supervision 150 feet with no AD with Supervision 150 feet with no AD with Independent   Walking 10 feet on uneven surface 10 feet with no AD with SBA NT NT 10 feet with no AD with Sup 10 feet with no AD with Independent   Wheel Chair Mobility NT NT NT       Car Transfers SBA Supervision NT Sup Independent    Stair negotiation: ascended and descended 4 steps with 1 rail with SBA 12 steps 1 rail Supervision - cues for NR pattern NT 4 steps with 1 rail with Sup 12 steps with 1 rail with Mod I   Curb Step:   ascended and descended 7.5 inch step with no AD and CGA NT 7.5 inch x2 reps with no AD SBA 7.5 inch step with no AD and Sup 7.5 inch step with no AD and Independent   Picking up object off the floor Picked up a cone with SBA NT NT Will  an object with Sup Will  an object with Independent   BLE ROM WFL WFL         BLE Strength 4/5 4/5         Balance  Sitting: Supervision  Standing: SBA no AD Sitting: Supervision  Standing: Supervision no AD               Scheduled Meds:   oxybutynin  5 mg Oral TID    enoxaparin  40 mg Subcutaneous Daily    sodium chloride flush  10 mL Intravenous 2 times per day    amLODIPine  5 mg Oral Daily    aspirin  81 mg Oral Daily    bisacodyl  10 mg Rectal Daily    docusate sodium  100 mg Oral BID    ezetimibe  10 mg Oral Daily    lactulose  20 g Oral TID    rosuvastatin  5 mg Oral Nightly    sennosides-docusate sodium  2 tablet Oral Daily     Continuous Infusions:  PRN Meds:glycerin (ADULT), acetaminophen **OR** [DISCONTINUED] acetaminophen, polyethylene glycol, promethazine **OR** [DISCONTINUED] ondansetron, sodium chloride flush, oxyCODONE **OR** [DISCONTINUED] oxyCODONE, magnesium hydroxide  I/O last 3 completed shifts: In: 360 [P.O.:360]  Out: -   No intake/output data recorded. Labs reviewed  CBC:   Recent Labs     11/30/20  0709 12/01/20  0450   WBC 10.1 8.4   HGB 12.6 11.6    219     BMP:    Recent Labs     11/30/20  0458 11/30/20  1130 12/01/20  0450    142 143   K 3.8 3.8 3.5    104 105   CO2 26 30* 29   BUN 17 17 16   CREATININE 1.3* 1.2* 1.0   GLUCOSE 105* 113* 106*     Hepatic:   Recent Labs     12/01/20  0450   AST 11   ALT 10   BILITOT 0.5   ALKPHOS 75     BNP: No results for input(s): BNP in the last 72 hours. Lipids: No results for input(s): CHOL, HDL in the last 72 hours.     Invalid input(s): LDLCALCU  INR: No results for input(s): INR in the last 72 hours. Assessment/  Patient Active Problem List:     Anxiety     Essential hypertension     Gastroesophageal reflux disease without esophagitis     Mixed hyperlipidemia     Diastolic dysfunction     Pure hypercholesterolemia     Moderate obesity     Personal history of hydronephrosis     Hiatal hernia with GERD     Cerebrovascular accident (CVA) ([facility] [address]     Stenosis of right carotid artery     Hypertensive emergency     Ischemic stroke ([facility] [address]      Plan/  1. Continue rehab program with focus on improved mobility and independence  2. Continue current medications  3. Continue dvt prophylaxis  4. Continue mobilize as able  5.  Dr. Darshana Son to follow          Brendan Alicia MD

## 2020-12-03 NOTE — PROGRESS NOTES
Daily Progress Note  Amadeo Cobos MD      Subjective:   Patient has poor appetite  . Past Medical History:   Diagnosis Date    Anxiety     Gastric reflux     Hypertension        Past Surgical History:   Procedure Laterality Date    CAROTID ENDARTERECTOMY Right 11/27/2020    RIGHT CAROTID ENDARTERECTOMY performed by Jasmine Boas, MD at [facility]      LITHOTRIPSY Left 2/7/2020    CYSTOSCOPY RETROGRADE PYELOGRAM LEFT URETEROSCOPY LEFT J STENT LASER LITHOTRIPSY performed by Isabell Mendez,  at [facility] OR       Scheduled Meds:   oxybutynin  5 mg Oral TID    enoxaparin  40 mg Subcutaneous Daily    amLODIPine  5 mg Oral Daily    aspirin  81 mg Oral Daily    bisacodyl  10 mg Rectal Daily    docusate sodium  100 mg Oral BID    ezetimibe  10 mg Oral Daily    lactulose  20 g Oral TID    rosuvastatin  5 mg Oral Nightly    sennosides-docusate sodium  2 tablet Oral Daily     Continuous Infusions:  PRN Meds:glycerin (ADULT), acetaminophen **OR** [DISCONTINUED] acetaminophen, polyethylene glycol, promethazine **OR** [DISCONTINUED] ondansetron, oxyCODONE **OR** [DISCONTINUED] oxyCODONE, magnesium hydroxide  DIET DYSPHAGIA MINCED AND MOIST;    Objective:     I/O last 3 completed shifts:   In: 480 [P.O.:480]  Out: -   I/O this shift:  In: 180 [P.O.:180]  Out: -   Stool Occurrence: 0  Vitals:    12/02/20 1635 12/03/20 0600 12/03/20 0915 12/03/20 1526   BP:   (!) 130/55 (!) 153/72   Pulse:   93 68   Resp:   18 18   Temp:   97.5 °F (36.4 °C) 98.1 °F (36.7 °C)   TempSrc:   Temporal Temporal   SpO2: 97%   95%   Weight:  166 lb 1 oz (75.3 kg)     Height:           General appearance: alert, appears stated age and cooperative  Neck: no adenopathy, no carotid bruit, no JVD, supple, symmetrical, trachea midline and thyroid not enlarged, symmetric, no tenderness/mass/nodules  Lungs: chest clear, no wheezing, rales, normal symmetric air entry  Chest wall: no tenderness  Heart: regular rate and Active Problems:    Stenosis of right carotid artery    Essential hypertension    Gastroesophageal reflux disease without esophagitis    Anxiety    Mixed hyperlipidemia    Diastolic dysfunction    Pure hypercholesterolemia    Moderate obesity    Hiatal hernia with GERD    Cerebrovascular accident (CVA) ([facility] [address]    Ischemic stroke ([facility] [address]  Resolved Problems:    Diabetes mellitus ([facility] [address]  Poor appetite  UTI    Plan:   Bactrim  Remeron  See orders      Electronically signed by Sawyer Galindo MD on 12/3/2020 at 6:28 PM

## 2020-12-03 NOTE — PROGRESS NOTES
Physical Therapy  Weekly Team Note  Evaluating Therapist: Delbert Bustillo DPT    NAME: Umesh Snyder  ROOM: [id number]  DIAGNOSIS: Ischemic stroke  PRECAUTIONS: Falls, alarm, 2L O2   PROCEDURE(S): 11/27 R CEA  HPI: 82year old female admitted 11/23 with sudden onset left sided weakness. In the ED, CT head revealing chronic microvascular changes without any acute abnormalities. CTA neck revealing bilateral internal carotid artery stenosis, 80% on the right and 50% on the left. TPA was recommended by telestroke but patient denied as her symptoms already improved. MRI showed infarct in high right parietal lobe and remote lacunar infarct in right cerebellum. Social:  Pt lives with alone in a 2 story home with 4-5 steps and 2 rails to enter. 12 steps with 1 rail to upstairs and 10 steps with no rails to basement. Pt's bedroom is on the 2nd floor and walk in shower is in the basement. Pt sleeps on the couch on first floor. Pt may be staying with her son or daughter at discharge. Daughter's home is a ranch style home with 3 steps to enter and 2 rails. Son's house is a 2 story home with 3 steps to enter and no rails and pt's bed and bath would be on the 2nd floor with 12 steps and no rails. Prior to admission pt ambulated with no AD independently. Initial Evaluation  12/1/20 12/3/20 Comments Short Term Goals Long Term Goals    Was pt agreeable to Eval/treatment? yes yes      Does pt have pain?  No c/o pain No c/o pain      WHITEHEAD  49/56 on 12/2/20      Bed Mobility  Rolling: SBA  Supine to sit: SBA  Sit to supine: SBA  Scooting: SBA Supervision all aspects  Sup Independent   Transfers Sit to stand: SBA  Stand to sit: SBA  Stand pivot: SBA no AD Sit to stand: Supervision  Stand to sit: Supervision  Stand pivot: Supervision no AD  Sup Independent   Ambulation   75 feet with no AD with  feet no AD Supervision Pt ambulates with fast gait speed, near collision with obstacles with poor awareness of environment 150 feet with no

## 2020-12-03 NOTE — PROGRESS NOTES
SPEECH/LANGUAGE PATHOLOGY    ADMITTING DIAGNOSIS: Ischemic stroke ([facility] [address] [I63.9]    SPEECH PATHOLOGY DIAGNOSIS:  Moderate motor speech disorder. Mild-moderate cognitive deficit. THERAPY RECOMMENDATIONS:   Speech Pathology intervention is recommended 3-6 times per week for LOS or when goals are met with emphasis on the following:   Improve problem solving/insight/overall safety awareness. Immediate/STM during functional tasks and for recall of medical information with minimal cues and/or external memory aide. OM exercises to improve lingual strength and ROM. Articulation drills/tasks to improve intelligibility targeting specifically vocalic /r/ and medial and final /l/. FIMS SCORES      Swallowing    Current Status  5--Supervision    Short Term Goal 6--Modified Independent    Long Term Goal 6--Modified Independent     Receptive    Current Status  6--Modified Independent    Short Term Goal 6--Modified Independent    Long Term Goal 6--Modified Independent     Expressive    Current Status  5--Supervision    Short Term Goal 6--Modified Independent    Long Term Goal 6--Modified Independent     Social Interaction    Current Status  4--Minimal Assistance    Short Term Goal 5--Supervision    Long Term Goal 5--Supervision         Problem Solving    Current Status  4--Minimal Assistance    Short Term Goal 5--Supervision    Long Term Goal 5--Supervision      Memory    Current Status  4--Minimal Assistance    Short Term Goal 5--Supervision    Long Term Goal 5--Supervision         SWALLOWING    Diet: Minced and moist consistency solids with thin liquids    Patient actively participated in functionally-based mealtime assessment, required min assistance to set up meal tray but was able to feed self independently. Pt tolerate diet recommendation at noon meal and showed no overt signs of aspiration. Oral prep/oral-                           No oral containment issues were identified. Slow/inadequate oral prep noted.                      Pharyngeal-                           Clear vocal quality was maintained throughout. No overt clinical indicators of aspiration were apparent.     Completed education with the patient regarding type of swallowing impairment. Reviewed current solid/liquid consistency diet recommendations and reinforced need for consistent use of compensatory strategies to ensure safe PO intake. Reviewed aspiration precautions.      Encouraged patient to engage SLP in unstructured Q&A session relative to identified deficit areas -- patient indicated understanding of all information provided via satisfactory verbal response. SPEECH    Completed education with the patient regarding how speech therapy could benefit given OM exercises and articulation drills to better speech intelligibility. Patient experienced difficulty with vocalic /r/ and medial and final /l/. Patient reported wanting to 'talk better' and noticing a significant difference in her speech. Patient understood reason for therapy and seemed willing to participate.      Three Hour Rule Tracking:    Individual therapy:   15 minutes  Concurrent therapy:  15 minutes  Group therapy:   0 minutes  Co-treatment therapy:  0 minutes    Total minutes for 12/2/2020: 30 minutes    Luci Bright, Graduate Clinician

## 2020-12-03 NOTE — PROGRESS NOTES
SPEECH/LANGUAGE PATHOLOGY  ACUTE REHABILITATION--DAILY PROGRESS NOTE    ADMITTING DIAGNOSIS: Ischemic stroke ([facility] [address] [I63.9]    SPEECH PATHOLOGY DIAGNOSIS:  Moderate motor speech disorder. Mild-moderate cognitive deficit. THERAPY RECOMMENDATIONS:   Speech Pathology intervention is recommended 3-6 times per week for LOS or when goals are met with emphasis on the following:   Improve problem solving/insight/overall safety awareness. Immediate/STM during functional tasks and for recall of medical information with minimal cues and/or external memory aide. OM exercises to improve lingual strength and ROM. Articulation drills/tasks to improve intelligibility targeting specifically vocalic /r/ and medial and final /l/. FIMS SCORES      Swallowing    Current Status  5--Supervision    Short Term Goal 6--Modified Independent    Long Term Goal 6--Modified Independent     Receptive    Current Status  6--Modified Independent    Short Term Goal 6--Modified Independent    Long Term Goal 6--Modified Independent     Expressive    Current Status  5--Supervision    Short Term Goal 6--Modified Independent    Long Term Goal 6--Modified Independent     Social Interaction    Current Status  4--Minimal Assistance    Short Term Goal 5--Supervision    Long Term Goal 5--Supervision         Problem Solving    Current Status  4--Minimal Assistance    Short Term Goal 5--Supervision    Long Term Goal 5--Supervision      Memory    Current Status  4--Minimal Assistance    Short Term Goal 5--Supervision    Long Term Goal 5--Supervision         SWALLOWING    Diet: Minced and moist consistency solids with thin liquids    Patient actively participated in functionally-based mealtime assessment, required min assistance to set up meal tray but was able to feed self independently. Pt tolerate diet recommendation at noon meal and showed no overt signs of aspiration.             Oral prep/oral-                           No oral containment issues were identified. Slow/inadequate oral prep noted.                      Pharyngeal-                           Clear vocal quality was maintained throughout. No overt clinical indicators of aspiration were apparent.     Completed education with the patient regarding type of swallowing impairment. Reviewed current solid/liquid consistency diet recommendations and reinforced need for consistent use of compensatory strategies to ensure safe PO intake. Reviewed aspiration precautions.      Encouraged patient to engage SLP in unstructured Q&A session relative to identified deficit areas -- patient indicated understanding of all information provided via satisfactory verbal response. COGNITION    Patient recalled STM events from throughout the day. SPEECH     Attempted to see for speech therapy however Pt refused.  Provided education as to how it would be beneficial.     Three Hour Rule Tracking:    Individual therapy:   0 minutes  Concurrent therapy:  30 minutes  Group therapy:   0 minutes  Co-treatment therapy:  0 minutes    Total minutes for 12/3/2020: 30 minutes    Miguel Molina Clinician

## 2020-12-03 NOTE — PROGRESS NOTES
Physical Therapy  Treatment Note  Evaluating Therapist: Delbert Bustillo DPT    NAME: Umesh Snyder  ROOM: [id number]  DIAGNOSIS: Ischemic stroke  PRECAUTIONS: Falls, alarm, O2   PROCEDURE(S): 11/27 R CEA  HPI: 82year old female admitted 11/23 with sudden onset left sided weakness. In the ED, CT head revealing chronic microvascular changes without any acute abnormalities. CTA neck revealing bilateral internal carotid artery stenosis, 80% on the right and 50% on the left. TPA was recommended by telestroke but patient denied as her symptoms already improved. MRI showed infarct in high right parietal lobe and remote lacunar infarct in right cerebellum. Social:  Pt lives with alone in a 2 story home with 4-5 steps and 2 rails to enter. 12 steps with 1 rail to upstairs and 10 steps with no rails to basement. Pt's bedroom is on the 2nd floor and walk in shower is in the basement. Pt sleeps on the couch on first floor. Pt may be staying with her son or daughter at discharge. Daughter's home is a ranch style home with 3 steps to enter and 2 rails. Son's house is a 2 story home with 3 steps to enter and no rails and pt's bed and bath would be on the 2nd floor with 12 steps and no rails. Prior to admission pt ambulated with no AD independently. Initial Evaluation  12/1/20 AM     PM    Short Term Goals Long Term Goals    Was pt agreeable to Eval/treatment? yes yes yes     Does pt have pain?  No c/o pain No c/o pain No c/o pain     WHITEHEAD  49/56 on 12/2/20      Bed Mobility  Rolling: SBA  Supine to sit: SBA  Sit to supine: SBA  Scooting: SBA Supervision all aspects NT Sup Independent   Transfers Sit to stand: SBA  Stand to sit: SBA  Stand pivot: SBA no AD Sit to stand: Supervision  Stand to sit: Supervision  Stand pivot: Supervision no AD Sit to stand: Supervision  Stand to sit: Supervision  Stand pivot: Supervision no AD Sup Independent   Ambulation   75 feet with no AD with  feet no AD Supervision 400 feet no AD Supervision 150 feet with no AD with Supervision 150 feet with no AD with Independent   Walking 10 feet on uneven surface 10 feet with no AD with SBA NT NT 10 feet with no AD with Sup 10 feet with no AD with Independent   Wheel Chair Mobility NT NT NT     Car Transfers SBA Supervision NT Sup Independent    Stair negotiation: ascended and descended 4 steps with 1 rail with SBA 12 steps 1 rail Supervision NT 4 steps with 1 rail with Sup 12 steps with 1 rail with Mod I   Curb Step:   ascended and descended 7.5 inch step with no AD and CGA NT 7NT 7.5 inch step with no AD and Sup 7.5 inch step with no AD and Independent   Picking up object off the floor Picked up a cone with SBA NT NT Will  an object with Sup Will  an object with Independent   BLE ROM [facility] [address] [facility]      BLE Strength 4/5 4/5      Balance  Sitting: Supervision  Standing: SBA no AD Sitting: Supervision  Standing: Supervision no AD      Date Family Teach Completed TBA TBA      Is additional Family Teaching Needed? Y or N TBA TBA      Hindering Progress Balance, safety awareness Balance, safety awareness      PT recommended ELOS 2 weeks 5-6 days      Team's Discharge Plan        Therapist at Team Meeting          Therapeutic Exercise:   AM: Sit<>Stand transfer x 5 reps  PM: FW NR stepping over 4 quad canes without AD x 2 reps with SBA  BW NR stepping over 4 quad canes without AD x 2 reps with CGA  Lateral NR stepping over 4 quad canes x 1 rep L and R with SBA  Semi-tandem walking without AD 35 feet x 1 rep with SBA    Patient education  Pt educated on safe hand placement with car transfer    Patient response to education:   Pt verbalized understanding Pt demonstrated skill Pt requires further education in this area   yes partial yes     Additional Comments: Pt remains fast-moving and not receptive to therapist recommendations to improve safety. Pt exhibits poor insight to mild balance deficits.  Attempted activity on RA however pt's O2 saturation dropped to 89% on RA following activity. RN notified and requests pt returned to 2L O2. Pt missed initial 15 minutes of PM session due to restroom use. Pt agitated with therapist attempts to retrieve patient for PM therapy session. Pt fails to recognize deficits and argues their presence when pointed out. Pt may require supervision upon discharge due to safety concerns. Chair/bed alarm: armed following session    AM  Time in: 0830  Time out: 0915    PM  Time in: 1315  Time out: 1345    Pt is making good progress toward established Physical Therapy goals. Continue with physical therapy current plan of care.     Candis Lee, PT, DPT  [phone]

## 2020-12-03 NOTE — PROGRESS NOTES
OCCUPATIONAL THERAPY DAILY NOTE    Date:12/3/2020  Patient Name: Gisell Huddleston  MRN: [id number]  : 1935  Room: [address] [place] [address]   Referring Practitioner: Adelaida Christopher MD  Diagnosis: CVA- R carotid endarterectomy 2020; R parietal lobe & remote pacunar & R cerbellum  Additional Pertinent Hx: HTN  Restrictions: Fall Risk, bed & chair alarm & dental soft- alternate solids & liquids    Functional Assessment:   Date Status AE  Comments   Feeding 12/3/20 Set-Up  Pt seated upright in chair eating of breakfast meal   Grooming 12/3/20 Set-Up   Pt washed face, combed hair, blow dried hair and applied deodorant seated upright in chair at sink   Bathing 12/3/20 Minimal Assist   Pt completed sponge bating task seated/standing in shower, with pt able to wash of UB/LB, with assistance to stand and wash of buttocks with use of grab bar for safety   UB Dressing 12/3/20 Set-Up  Pt donned pullover shirt   LB Dressing 12/3/20 CGA  Pt donned underwear, pants with assistance to stand and pull over hips for safety   Footwear 12/3/20 SBA  Pt donned shoes using cross over technique, denying socks   Toileting 12/3/20 CGA  Pt completed toileting task on standard commode with use of grab bars, able to completed of transfer, clothing management and hygiene, with CGA for safety   Homemaking         Functional Transfers / Balance:   Date Status DME  Comments   Sit Balance 12/3/20 Stand by Assist   Pt sat unsupported on shower bench, supported upright in chair   Stand Balance 12/3/20 CGA  Pt stood during ADL tasks, poor safety   [x] Tub  [x] Shower   Transfer 12/3/20 CGA  Pt completed walk in shower transfer with use of grab bars and min cueing for safety    Sit down method onto extended tub bench, vc's for safety   Commode   Transfer 12/3/20 CGA   Pt completed transfer on standard commode with use of grab bars, and cueing for safety   Functional   Mobility 12/3/20 CGA No AD Pt ambulated short household distance in room EOB<>Bathroom, with no device, cueing for safety   Other:         Functional Exercises / Activity:  Pt supine in bed upon arrival to  during 2nd session. SUP for bed mobility to sit EOB. Pt donned B shoes. CGA for functional mobility to w/c and around OT apartment. Completed tub transfer and was educated with extended tub bench. In OT gym, pt engaged in leisure activity while wearing B 1@ wrist weights to increase problem solving and BUE strength. Pt appeared to have tolerated session well. Sensory / Neuromuscular Re-Education:      Cognitive Skills:   Status Comments   Problem   Solving fair     Memory fair     Sequencing fair     Safety fair       Visual Perception:    Education:  Pt was educated on safety with transfers, techniques to assist with LB dressing tasks. [] Family teach completed on:    Pain Level: 0/10    Additional Notes:      12/3/20: Pt agitated throughout session, having of poor safety awareness, requiring cueing throughout session for safety and follow through. Chair alarm placed on pt, due to pt having poor follow through and understanding of requiring assistance with mobility/transfers. Patient has made fair  progress during treatment sessions toward set goals. Therapy emphasis to obtain goals:Strengthening, Gait Training, Patient/Caregiver Education & Training, Home Management Training, Equipment Evaluation, Education, & procurement, Balance Training, Functional Mobility Training, Cognitive Reorientation, Endurance Training, Safety Education & Training, Self-Care / ADL, Cognitive/Perceptual Training     [x] Continue with current OT Plan of care.   [] Prepare for Discharge     DISCHARGE RECOMMENDATIONS  Recommended DME:    Post Discharge Care:   []Home Independently  []Home with 24hr Care / Supervision []Home with Partial Supervision []Home with Home Health OT []Home with Out Pt OT []Other: ___   Comments:         Time in Time out Tx Time Breakdown  Variance:   First Session  7:15am 8:00am [x] Individual Tx- 45 min  [] Concurrent Tx -  [] Co-Tx -   [] Group Tx -   [] Time Missed -     Second Session [id number] [x] Individual Tx- 45  [] Concurrent Tx -  [] Co-Tx -   [] Group Tx -   [] Time Missed -          Third Session    [] Individual Tx-   [] Concurrent Tx -  [] Co-Tx -   [] Group Tx -   [] Time Missed -         Total Tx Time: 90 mins        Cecille Espinal SANDERS/L [id number]

## 2020-12-04 LAB
ORGANISM: ABNORMAL
URINE CULTURE, ROUTINE: ABNORMAL

## 2020-12-04 PROCEDURE — 6370000000 HC RX 637 (ALT 250 FOR IP): Performed by: INTERNAL MEDICINE

## 2020-12-04 PROCEDURE — 97530 THERAPEUTIC ACTIVITIES: CPT

## 2020-12-04 PROCEDURE — 2700000000 HC OXYGEN THERAPY PER DAY

## 2020-12-04 PROCEDURE — 1280000000 HC REHAB R&B

## 2020-12-04 PROCEDURE — 97535 SELF CARE MNGMENT TRAINING: CPT

## 2020-12-04 PROCEDURE — 6360000002 HC RX W HCPCS: Performed by: INTERNAL MEDICINE

## 2020-12-04 PROCEDURE — 97110 THERAPEUTIC EXERCISES: CPT

## 2020-12-04 PROCEDURE — 92507 TX SP LANG VOICE COMM INDIV: CPT

## 2020-12-04 RX ADMIN — ASPIRIN 81 MG CHEWABLE TABLET 81 MG: 81 TABLET CHEWABLE at 08:30

## 2020-12-04 RX ADMIN — ENOXAPARIN SODIUM 40 MG: 40 INJECTION SUBCUTANEOUS at 08:30

## 2020-12-04 RX ADMIN — SULFAMETHOXAZOLE AND TRIMETHOPRIM 1 TABLET: 800; 160 TABLET ORAL at 21:19

## 2020-12-04 RX ADMIN — OXYBUTYNIN CHLORIDE 5 MG: 5 TABLET ORAL at 08:30

## 2020-12-04 RX ADMIN — DOCUSATE SODIUM 100 MG: 100 CAPSULE, LIQUID FILLED ORAL at 21:19

## 2020-12-04 RX ADMIN — MIRTAZAPINE 7.5 MG: 15 TABLET, ORALLY DISINTEGRATING ORAL at 21:19

## 2020-12-04 RX ADMIN — AMLODIPINE BESYLATE 5 MG: 5 TABLET ORAL at 08:30

## 2020-12-04 RX ADMIN — OXYBUTYNIN CHLORIDE 5 MG: 5 TABLET ORAL at 21:19

## 2020-12-04 RX ADMIN — SULFAMETHOXAZOLE AND TRIMETHOPRIM 1 TABLET: 800; 160 TABLET ORAL at 08:30

## 2020-12-04 RX ADMIN — ROSUVASTATIN CALCIUM 5 MG: 5 TABLET, FILM COATED ORAL at 21:19

## 2020-12-04 RX ADMIN — OXYBUTYNIN CHLORIDE 5 MG: 5 TABLET ORAL at 14:33

## 2020-12-04 ASSESSMENT — PAIN SCALES - GENERAL
PAINLEVEL_OUTOF10: 0
PAINLEVEL_OUTOF10: 0

## 2020-12-04 NOTE — PROGRESS NOTES
Daily Progress Note  Rekha Singh MD      Subjective:   Patient complaining of feeling little \"oozy\" still has mild slurred speech,not eating well,no urinary symptoms  . Past Medical History:   Diagnosis Date    Anxiety     Gastric reflux     Hypertension        Past Surgical History:   Procedure Laterality Date    CAROTID ENDARTERECTOMY Right 11/27/2020    RIGHT CAROTID ENDARTERECTOMY performed by Patrizia Montano MD at [facility]      LITHOTRIPSY Left 2/7/2020    CYSTOSCOPY RETROGRADE PYELOGRAM LEFT URETEROSCOPY LEFT J STENT LASER LITHOTRIPSY performed by Mayank Mendez DO at [facility] OR       Scheduled Meds:   sulfamethoxazole-trimethoprim  1 tablet Oral 2 times per day    mirtazapine  7.5 mg Oral Nightly    thiamine  100 mg Oral Daily    oxybutynin  5 mg Oral TID    enoxaparin  40 mg Subcutaneous Daily    amLODIPine  5 mg Oral Daily    aspirin  81 mg Oral Daily    bisacodyl  10 mg Rectal Daily    docusate sodium  100 mg Oral BID    ezetimibe  10 mg Oral Daily    lactulose  20 g Oral TID    rosuvastatin  5 mg Oral Nightly    sennosides-docusate sodium  2 tablet Oral Daily     Continuous Infusions:  PRN Meds:glycerin (ADULT), acetaminophen **OR** [DISCONTINUED] acetaminophen, polyethylene glycol, promethazine **OR** [DISCONTINUED] ondansetron, oxyCODONE **OR** [DISCONTINUED] oxyCODONE, magnesium hydroxide  DIET DYSPHAGIA MINCED AND MOIST;    Objective:     I/O last 3 completed shifts: In: 5 [P.O.:420]  Out: -   No intake/output data recorded.   Stool Occurrence: 0  Vitals:    12/03/20 0600 12/03/20 0915 12/03/20 1526 12/04/20 0830   BP:  (!) 130/55 (!) 153/72 131/63   Pulse:  93 68 80   Resp:  18 18 16   Temp:  97.5 °F (36.4 °C) 98.1 °F (36.7 °C) 98.4 °F (36.9 °C)   TempSrc:  Temporal Temporal Oral   SpO2:   95% 97%   Weight: 166 lb 1 oz (75.3 kg)      Height:           General appearance: alert, appears stated age and cooperative  Neck: no adenopathy, no carotid bruit, no JVD, supple, symmetrical, trachea midline and thyroid not enlarged, symmetric, no tenderness/mass/nodules  Lungs: chest clear, no wheezing, rales, normal symmetric air entry  Chest wall: no tenderness  Heart: regular rate and rhythm, S1, S2 normal, no murmur, click, rub or gallop  Abdomen: soft, non-tender; bowel sounds normal; no masses,  no organomegaly  Extremities: extremities normal, atraumatic, no cyanosis or edema  Pulses: 2+ and symmetric  Neurologic: Grossly normal      Data Review:    No results for input(s): WBC, HGB, HCT, MCV, PLT in the last 72 hours. No results for input(s): NA, K, CL, CO2, BUN, CREATININE, GLUCOSE in the last 72 hours. Invalid input(s): CA  No results for input(s): AST, ALT, ALB, BILIDIR, BILITOT, ALKPHOS in the last 72 hours. No results for input(s): CKTOTAL, CKMB, TROPONINI in the last 72 hours.   Lab Results   Component Value Date    INR 1.0 11/23/2020    INR 1.0 11/13/2017    INR 1.1 09/05/2015    PROTIME 11.4 11/23/2020    PROTIME 11.5 11/13/2017    PROTIME 11.7 09/05/2015        U/A:    Lab Results   Component Value Date    NITRU Negative 12/02/2020    COLORU Yellow 12/02/2020    PHUR 6.5 12/02/2020    LABCAST RARE 06/17/2019    WBCUA 10-20 12/02/2020    WBCUA 2-5 07/16/2011    RBCUA 1-3 12/02/2020    RBCUA 2-5 08/02/2013    MUCUS Present 06/17/2019    BACTERIA MANY 12/02/2020    CLARITYU SL CLOUDY 12/02/2020    SPECGRAV 1.020 12/02/2020    LEUKOCYTESUR SMALL 12/02/2020    UROBILINOGEN 0.2 12/02/2020    BILIRUBINUR Negative 12/02/2020    BILIRUBINUR small 11/19/2018    BILIRUBINUR NEGATIVE 07/16/2011    BLOODU Negative 12/02/2020    GLUCOSEU Negative 12/02/2020    GLUCOSEU NEGATIVE 07/16/2011    KETUA TRACE 12/02/2020     HgBA1c:    Lab Results   Component Value Date    LABA1C 6.1 10/13/2015     TSH:    Lab Results   Component Value Date    TSH 1.570 10/28/2019      Recent Labs     12/02/20  2120   LABURIN >100,000 CFU/ml     Radiology:   No orders to display Assessment:     Active Problems:    Stenosis of right carotid artery    Essential hypertension    Gastroesophageal reflux disease without esophagitis    Anxiety    Mixed hyperlipidemia    Diastolic dysfunction    Pure hypercholesterolemia    Moderate obesity    Hiatal hernia with GERD    Cerebrovascular accident (CVA) ([facility] [address]    Ischemic stroke ([facility] [address]  Resolved Problems:    Diabetes mellitus ([facility] [address]  Poor appetite  UTI    Plan:   Bactrim  Remeron  Compression stockings  See orders      Electronically signed by Vicki Santos MD on 12/4/2020 at 5:25 PM

## 2020-12-04 NOTE — CARE COORDINATION
Per team meeting:  D/c 12/9. Updated patient. Goals range from Sup to Mod I / I. Patient is recommended to have 24 hour sup initially at d/c. She has a fast gait speed, poor awareness of environment and impulsiveness. Patient needs max encouragement to attend therapies. Patient states she is going to stay with her son at d/c. Conference call with Nirali Badillo and Rupa Telles. They are aware that patient will require 24 hour sup at d/c. Nirali Aby works , Rupa Telles states she can once she is over her bad cold. They state Richard Arias who also does not work is limited due to back issues. SW explained about private duty out of pocket. Family will make arrangements. DME - patient has a 3 in 1  Commode. [facility] However, due to issues with transportation and sup patient will need HHC initially. Dr. Елена Jerry approved. Referral to [facility] for RN, HHA, PT, OT & SP.      FT - 12/5 9 am due to Nirali Badillo schedule. Also - D/c at 5pm due to son's work. The Plan for Transition of Care is related to the following treatment goals: home    The Patient and/or patient representative Nirali Badillo and Rupa Telles was provided with a choice of provider and agrees   with the discharge plan. [x] Yes [] No    Freedom of choice list was provided with basic dialogue that supports the patient's individualized plan of care/goals, treatment preferences and shares the quality data associated with the providers.  [x] Yes [] No      Ceferino Erps

## 2020-12-04 NOTE — PATIENT CARE CONFERENCE
[address] [place], [place] [address] [facility] ACUTE REHABILITATION  TEAM CONFERENCE NOTE/PATIENT PLAN OF CARE    Date: 2020  Admission date: 2020  Patient Name: Eugenio Kim        MRN: [id number]    : 1935  (82 y.o.)  Gender: female   Rehab diagnosis/surgery with date:  CVA 20  Impairment Group Code:  1.1      MEDICAL/FUNCTIONAL HISTORY/STATUS:  right carotid endarterectomy done on 20 for right ICA stenosis 90%, left side weakness    Consultations/Labs/X-rays: 20 urine culture positive for enterococcus faecalis      MEDICATION UPDATE:  started on Bactrim DS twice daily for above urinary tract infection      NURSING FIMS:    Bowel:   Current level: continent    Bladder:   Current level: incontinent times one but ok since    Toilet Hygiene:   Current level : Contact guard assist-min assist  Short term Toilet hygiene goal: Contact guard assist  Long term toilet hygiene goal:  supervision    Skin integrity: intact  Pain: none    NUTRITION    Diet  minced and moist  Liquid consistency   thin     SOCIAL INFORMATION:  Lives with: alone  Prior community services:  none  Home Architecture:  2 story, 4-5 entry, 2 rails, 12 up to 2nd, 10 down to basement with walk in shower, sleeps on couch on first floor  Prior Level of function:  independent  DME:  3 in 1 commode    FAMILY / PATIENT EDUCATION:  safety and self care are ongoing    PHYSICAL THERAPY    Bed mobility:   Current level: supervision  Short term bed mobility goal: supervision  Long term bed mobility goal: independent    Chair/bed transfers:  Current level: supervision  Short term Chair/bed transfers goal: supervision  Long term Chair/bed transfers goal: independent      Ambulation:   Current level: 400 ft no device at supervision, fast gait speed, poor awareness of environment  Short term ambulation goal: met  Long term ambulation goal: 400 ft at independent    Car transfers:   Current level: supervision  Short term car transfers goal: supervision  Long term car transfers goal:independent    Stairs:   Current level : 12 with 1 rail at supervision  Short term stairs goal: met  Long term stairs goal: 12 at Modified independent    Lower Extremity Strength Issues:  bilateral lowers are 4/5    Other comments: WHITEHEAD balance test 49/56-low fall risk , safety deficits      OCCUPATIONAL THERAPY:      Tub/shower:   Current level: Contact guard assist with an extended tub bench  Short term tub/shower goal: standby assist  Long term tub/shower goal: standby assist      Feeding:  Current level: supervision  Short term feeding goal: Modified independent  Long term feeding goal: Modified independent    Grooming:   Current level: supervision  Short term grooming goal: Modified independent  Long term grooming goal: Modified independent    Bathing:  Current level: min assist  Short term bathing goal: Contact guard assist  Long term bathing goal: standby assist    Homemaking:   Current level: to be assessed    Upper body dressing:  Current level: supervision  Short term upper body dressing goal: Modified independent  Long term upper body dressing goal: independent    Lower body dressing:  Current level: min assist  Short term lower body dressing goal: Contact guard assist  Long term lower body dressing goal: standby assist    Toilet transfer:   Current level: Contact guard assist  Short term toilet transfer goal: standby assist  Long term toilet transfer goal: standby assist    Upper body strength issues: left upper 3-/5 on admit, right upper 4-/5    Other comments: 24 hour supervision at discharge        SPEECH THERAPY  Swallowing:  Current level:  supervision  Short term swallowing goal: Modified independent  Long term swallowing Goal: Modified independent    Comprehension:   Current level: Modified independent  Short term comprehension goal: Modified independent  Long term comprehension goal: Modified independent    Expression:   Current level: supervision  Short term expression goal: Modified independent  Long term expression goal: Modified independent    Problem solving:   Current level: min assist  Short term problem solving goal: supervision  Long term problem solving goal: supervision    Memory:  Current level: min assist  Short term memory goal: supervision  Long term memory goal: supervision    Social interaction:  min assist, goal supervision    Safety awareness: fair      Patient/family's personal goals: \"get my speech better\"  Factors supporting goal achievement:  prior level of independence  Factors hindering goal achievement:  impulsive,  may need 24 hour supervision at discharge      Discharge Plan   Estimated Length of Stay: 12/09            Destination: home versus family member's home  Services at Discharge: outpatient vs home health  Equipment at Discharge: extended tub bench      INTERDISCIPLINARY TEAM/PHYSICIAN RECOMMENDATION AND/OR REVISIONS OF PLAN OF CARE:  discuss discharge plans with family      I approve the established interdisciplinary plan of care as documented within the medical record of Gilmer Christopher. Electronically signed by Hollie Willson RN  on 12/4/2020 at 8:03 AM  The following interdisciplinary team members were present:  CARRI Ward, EVERETTA,LSW  Shiva Aguirre.  Nelsy Rangel, DPEMILEE  [employer], OTKenneth Escal[address], CCC-SLP

## 2020-12-04 NOTE — PROGRESS NOTES
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 THERAPY RECOMMENDATIONS:   Speech Pathology intervention is recommended 3-6 times per week for LOS or when goals are met with emphasis on the following:   Improve problem solving/insight/overall safety awareness. Immediate/STM during functional tasks and for recall of medical information with minimal cues and/or external memory aide. OM exercises to improve lingual strength and ROM. Articulation drills/tasks to improve intelligibility targeting specifically vocalic /r/ and medial and final /l/. FIMS SCORES      Swallowing    Current Status  5--Supervision    Short Term Goal 6--Modified Independent    Long Term Goal 6--Modified Independent     Receptive    Current Status  6--Modified Independent    Short Term Goal 6--Modified Independent    Long Term Goal 6--Modified Independent     Expressive    Current Status  5--Supervision    Short Term Goal 6--Modified Independent    Long Term Goal 6--Modified Independent     Social Interaction    Current Status  4--Minimal Assistance    Short Term Goal 5--Supervision    Long Term Goal 5--Supervision         Problem Solving    Current Status  4--Minimal Assistance    Short Term Goal 5--Supervision    Long Term Goal 5--Supervision      Memory    Current Status  4--Minimal Assistance    Short Term Goal 5--Supervision    Long Term Goal 5--Supervision         SWALLOWING    Diet: Minced and moist consistency solids with thin liquids    Patient refused to come to noon meal.       Completed education with the patient regarding type of swallowing impairment. Reviewed current solid/liquid consistency diet recommendations and reinforced need for consistent use of compensatory strategies to ensure safe PO intake. Reviewed aspiration precautions.

## 2020-12-04 NOTE — PROGRESS NOTES
OCCUPATIONAL THERAPY DAILY NOTE    Date:2020  Patient Name: Eladio Redd  MRN: [id number]  : 1935  Room: [address] [place] [address]   Referring Practitioner: Luz Rg MD  Diagnosis: CVA- R carotid endarterectomy 2020; R parietal lobe & remote pacunar & R cerbellum  Additional Pertinent Hx: HTN  Restrictions: Fall Risk, bed & chair alarm & dental soft- alternate solids & liquids    Functional Assessment:   Date Status AE  Comments   Feeding 12/3/20 Set-Up     Grooming 20 Sup   Pt stood to wash hands following toilet needs. Bathing 12/3/20 Minimal Assist      UB Dressing 12/3/20 Set-Up     LB Dressing 12/3/20 CGA     Footwear 12/3/20 SBA     Toileting 12/3/20 CGA     Homemaking 20 S/SBA Seated and standing AM:  Pt folded washcloths seated up in bedside arm chair with alarm on for safety. PM: Pt stood using no device during simple kitchen/hmkg opening and stacking coffee cups in cabinet and retrieving items from fridge/pantry. Functional Transfers / Balance:   Date Status DME  Comments   Sit Balance 20 S/SBA  Sitting balance on EOB/bed side chair and on commode & w/c. Stand Balance 20 S/SBA Counter  Grab bar Standing balance during ambulation in kitchen using counter for balance at times in pm sessions. Standing balance in am during clothing mgmt/handwashing and toilet transfers. [x] Tub  [x] Shower   Transfer 12/3/20 CGA     Commode   Transfer 20 S/SBA  Grab bar  Pt used grab bar for balance to elevate on/off toilet    Functional   Mobility 20 SBA No AD Pt used no device for functional mobility out of  Bathroom to bedside arm chair in am.  PM:  Pt ambulated in OT kitchen using no device with assistance for 02 tank. Other: w/c <>EOB    Bed mobility 20 SBA      Sup Bed rail Pt completed w/c<>EOB transfers both in am/pm sessions. Supervision with bed mobility.      Functional Exercises / Activity:  Pt completed hmkg task seated in her room per request folding washcloths needing verbal encouragement initially to increase BUE AROM and coordination folding and stacking into basket. Pt completed supine>EOB then EOB into w/c transfer at S/SBA. Pt completed w/c<>commode using grab bar at S/SBA then performed own toilet hygiene/clothing mgmt along with standing level handwashing at sink using no device. Pt ambulated in am/pm sessions using no device at SBA for safety due to balance. PM: Pt engaged in kitchen mobility training using no device to open/stack coffee cups while standing at countertop to increase hmkg skills with no LOB noted. Pt completed item retrieval from fridge/pantry with one cue for safety. W/c>EOB required SBA using bed rail and able to complete EOB to supine at Supervision level. Pt declined wearing any BUE wts both am/pm session stating \"I here to be a \". Sensory / Neuromuscular Re-Education:      Cognitive Skills:   Status Comments   Problem   Solving fair     Memory fair     Sequencing fair     Safety fair       Visual Perception:    Education:  Pt was educated on safety with transfers, standing balance, ambulation and kitchen mobility training. [] Family teach completed on:    Pain Level: 0/10    Additional Notes:      12/3/20: Pt agitated throughout session, having of poor safety awareness, requiring cueing throughout session for safety and follow through. Chair alarm placed on pt, due to pt having poor follow through and understanding of requiring assistance with mobility/transfers. Patient has made fair+ progress during treatment sessions toward set goals.  Therapy emphasis to obtain goals:Strengthening, Gait Training, Patient/Caregiver Education & Training, Home Management Training, Equipment Evaluation, Education, & procurement, Balance Training, Functional Mobility Training, Cognitive Reorientation, Endurance Training, Safety Education & Training, Self-Care / ADL, Cognitive/Perceptual Training     [x] Continue with current OT Plan of care.   [] Prepare for Discharge     DISCHARGE RECOMMENDATIONS  Recommended DME:    Post Discharge Care:   []Home Independently  []Home with 24hr Care / Supervision []Home with Partial Supervision []Home with Home Health OT []Home with Out Pt OT []Other: ___   Comments:         Time in Time out Tx Time Breakdown  Variance:   First Session  10;45am 11:00am [x] Individual Tx- 45 min  [] Concurrent Tx -  [] Co-Tx -   [] Group Tx -   [] Time Missed -     Second Session 1345pm 14:30pm [x] Individual Tx- 45[] Concurrent Tx -  [] Co-Tx -   [] Group Tx -   [] Time Missed -          Third Session    [] Individual Tx-   [] Concurrent Tx -  [] Co-Tx -   [] Group Tx -   [] Time Missed -         Total Tx Time: 90 mins    Ben Morris [facility]

## 2020-12-04 NOTE — PLAN OF CARE
Problem: Anxiety:  Goal: Level of anxiety will decrease  Description: Level of anxiety will decrease  Outcome: Met This Shift     Problem: Falls - Risk of:  Goal: Will remain free from falls  Description: Will remain free from falls  Outcome: Met This Shift  Goal: Absence of physical injury  Description: Absence of physical injury  Outcome: Met This Shift     Problem: Skin Integrity:  Goal: Will show no infection signs and symptoms  Description: Will show no infection signs and symptoms  Outcome: Met This Shift  Goal: Absence of new skin breakdown  Description: Absence of new skin breakdown  Outcome: Met This Shift     Problem:  Bowel/Gastric:  Goal: Will show no signs and symptoms of gastrointestinal bleeding  Description: Will show no signs and symptoms of gastrointestinal bleeding  Outcome: Met This Shift     Problem: Coping:  Goal: Ability to identify strategies to decrease anxiety will improve  Description: Ability to identify strategies to decrease anxiety will improve  Outcome: Met This Shift     Problem: Nutritional:  Goal: Ability to chew and swallow food without choking will improve  Description: Ability to chew and swallow food without choking will improve  Outcome: Met This Shift  Goal: Ability to achieve adequate nutritional intake will improve  Description: Ability to achieve adequate nutritional intake will improve  Outcome: Met This Shift  Goal: Ability to maintain an optimal weight for height and age will improve  Description: Ability to maintain an optimal weight for height and age will improve  Outcome: Met This Shift     Problem: Physical Regulation:  Goal: Complications related to the disease process, condition or treatment will be avoided or minimized  Description: Complications related to the disease process, condition or treatment will be avoided or minimized  Outcome: Met This Shift     Problem: Sensory:  Goal: General experience of comfort will improve  Description: General experience of

## 2020-12-04 NOTE — PROGRESS NOTES
Supervision 200 feet no AD Supervision 150 feet with no AD with Supervision 150 feet with no AD with Independent   Walking 10 feet on uneven surface 10 feet with no AD with SBA NT NT 10 feet with no AD with Sup 10 feet with no AD with Independent   Wheel Chair Mobility NT NT NT     Car Transfers SBA Supervision NT Sup Independent    Stair negotiation: ascended and descended 4 steps with 1 rail with SBA 12 steps 1 rail Supervision NT 4 steps with 1 rail with Sup 12 steps with 1 rail with Mod I   Curb Step:   ascended and descended 7.5 inch step with no AD and CGA 7.5 inch step with no AD SBA/CGA 7NT 7.5 inch step with no AD and Sup 7.5 inch step with no AD and Independent   Picking up object off the floor Picked up a cone with SBA NT NT Will  an object with Sup Will  an object with Independent   BLE ROM [facility]      BLE Strength 4/5 4/5      Balance  Sitting: Supervision  Standing: SBA no AD Sitting: Supervision  Standing: Supervision no AD      Date Family Teach Completed TBA TBA      Is additional Family Teaching Needed?   Y or N TBA TBA      Hindering Progress Balance, safety awareness Balance, safety awareness      PT recommended ELOS 2 weeks 5-6 days      Team's Discharge Plan        Therapist at Team Meeting          Therapeutic Exercise:   AM:   - Functional mobility as noted above in chart  - Short distance ambulation within PT gym with no AD  PM:   - Functional mobility as noted above in chart  - Commode transfer  - Short distance ambulation within PT gym with no AD  - 3 cane step over on top of blue mat x3 reps to improve dynamic standing balance and coordination  - 4-inch box step up and over x6 reps to improve dynamic standing balance and coordination  - Ladder: x2 reps each of forward NR, forward reciprocal, and lateral NR to improve dynamic standing balance and coordination    Patient education  Pt educated on safety awareness, deficits and how they affect her balance/safety, fall risk if she continues to not pay attention to her surroundings    Patient response to education:   Pt verbalized understanding Pt demonstrated skill Pt requires further education in this area   yes partial yes     Additional Comments: Pt continues to have impulsive behavior and poor insight into her deficits. While ambulating in the hallway during the AM session she repeatedly would bump into the wall in the hallway causing her to become unsteady and lose her balance. Pt began to laugh at the fact that she was doing this. Educated pt on paying attention to her surroundings and that if she continues to act reckless with her movements she will place herself at a high risk for a fall. Pt's progress and safety will continue to be limited if the pt does not begin to focus and invest effort in improving. Chair/bed alarm: armed following session    AM - pt missed 30 minutes of her time as she was not dressed at the beginning of her PT time. Pt then declined to perform any more PT until she was delivered her breakfast  Time in: 0900  Time out: 0915  PM  Time in: 1300  Time out: 1345    Pt is making good progress toward established Physical Therapy goals. Continue with physical therapy current plan of care.     Jose Parrish, SARAHT  [id number]

## 2020-12-04 NOTE — PROGRESS NOTES
Progress Note    Subjective/   82y.o. year old female on the rehab unit for stroke. No new complaints. Tolerating therapy program.  Seeing some progress. No SOB or chest pain. Objective/   VITALS:  BP (!) 153/72   Pulse 68   Temp 98.1 °F (36.7 °C) (Temporal)   Resp 18   Ht 5' 2\" (1.575 m)   Wt 166 lb 1 oz (75.3 kg)   LMP  (LMP Unknown)   SpO2 95%   BMI 30.37 kg/m²   24HR INTAKE/OUTPUT:      Intake/Output Summary (Last 24 hours) at 12/4/2020 0001  Last data filed at 12/3/2020 1807  Gross per 24 hour   Intake 540 ml   Output --   Net 540 ml     Constitutional:  Alert, awake, no apparent distress   Cardiovascular:  S1, S2 without m/r/g   Respiratory:  CTA B without w/r/r   Abdomen: +BS  Ext: no pitting LE edema  Neuro: awake and alert, no tremor.       Functional Level      Date   Status   AE    Comments     Feeding   12/3/20   Set-Up       Pt seated upright in chair eating of breakfast meal     Grooming   12/3/20   Set-Up        Pt washed face, combed hair, blow dried hair and applied deodorant seated upright in chair at sink     Bathing   12/3/20   Minimal Assist        Pt completed sponge bating task seated/standing in shower, with pt able to wash of UB/LB, with assistance to stand and wash of buttocks with use of grab bar for safety     UB Dressing   12/3/20   Set-Up       Pt donned pullover shirt     LB Dressing   12/3/20   CGA       Pt donned underwear, pants with assistance to stand and pull over hips for safety     Footwear   12/3/20   SBA       Pt donned shoes using cross over technique, denying socks     Toileting   12/3/20   CGA       Pt completed toileting task on standard commode with use of grab bars, able to completed of transfer, clothing management and hygiene, with CGA for safety     Homemaking                          Functional Transfers / Balance:      Date Status DME  Comments   Sit Balance 12/3/20   Stand by Assist    Pt sat unsupported on shower bench, supported upright in chair   Stand Balance 12/3/20   CGA   Pt stood during ADL tasks, poor safety   [x]? Tub  [x]? Shower   Transfer 12/3/20 CGA   Pt completed walk in shower transfer with use of grab bars and min cueing for safety     Sit down method onto extended tub bench, vc's for safety   Commode   Transfer 12/3/20   CGA    Pt completed transfer on standard commode with use of grab bars, and cueing for safety   Functional   Mobility 12/3/20   CGA No AD Pt ambulated short household distance in room EOB<>Bathroom, with no device, cueing for safety   Other:              Functional Exercises / Activity:  Pt supine in bed upon arrival to  during 2nd session. SUP for bed mobility to sit EOB. Pt donned B shoes. CGA for functional mobility to w/c and around OT apartment. Completed tub transfer and was educated with extended tub bench. In OT gym, pt engaged in leisure activity while wearing B 1@ wrist weights to increase problem solving and BUE strength.  Pt appeared to have tolerated session well.     Sensory / Neuromuscular Re-Education:        Cognitive Skills:    Status Comments   Problem   Solving fair      Memory fair      Sequencing fair      Safety fair             Scheduled Meds:   sulfamethoxazole-trimethoprim  1 tablet Oral 2 times per day    mirtazapine  7.5 mg Oral Nightly    thiamine  100 mg Oral Daily    oxybutynin  5 mg Oral TID    enoxaparin  40 mg Subcutaneous Daily    amLODIPine  5 mg Oral Daily    aspirin  81 mg Oral Daily    bisacodyl  10 mg Rectal Daily    docusate sodium  100 mg Oral BID    ezetimibe  10 mg Oral Daily    lactulose  20 g Oral TID    rosuvastatin  5 mg Oral Nightly    sennosides-docusate sodium  2 tablet Oral Daily     Continuous Infusions:  PRN Meds:glycerin (ADULT), acetaminophen **OR** [DISCONTINUED] acetaminophen, polyethylene glycol, promethazine **OR** [DISCONTINUED] ondansetron, oxyCODONE **OR** [DISCONTINUED] oxyCODONE, magnesium hydroxide  I/O last 3 completed shifts: In: 540 [P.O.:540]  Out: -   No intake/output data recorded. Labs reviewed  CBC:   Recent Labs     12/01/20 0450   WBC 8.4   HGB 11.6        BMP:    Recent Labs     12/01/20 0450      K 3.5      CO2 29   BUN 16   CREATININE 1.0   GLUCOSE 106*     Hepatic:   Recent Labs     12/01/20 0450   AST 11   ALT 10   BILITOT 0.5   ALKPHOS 75     BNP: No results for input(s): BNP in the last 72 hours. Lipids: No results for input(s): CHOL, HDL in the last 72 hours. Invalid input(s): LDLCALCU  INR: No results for input(s): INR in the last 72 hours. Assessment/  Patient Active Problem List:     Anxiety     Essential hypertension     Gastroesophageal reflux disease without esophagitis     Mixed hyperlipidemia     Diastolic dysfunction     Pure hypercholesterolemia     Moderate obesity     Personal history of hydronephrosis     Hiatal hernia with GERD     Cerebrovascular accident (CVA) ([address]     Stenosis of right carotid artery     Hypertensive emergency     Ischemic stroke ([facility] [address]      Plan/  1. Continue rehab program  2. Increase activity as able  3. Continue dvt prophylaxis  4. Continue current medications  5.  Continue antibiotic          Prudence Cisneros MD

## 2020-12-05 PROCEDURE — 6360000002 HC RX W HCPCS: Performed by: INTERNAL MEDICINE

## 2020-12-05 PROCEDURE — 2700000000 HC OXYGEN THERAPY PER DAY

## 2020-12-05 PROCEDURE — 97530 THERAPEUTIC ACTIVITIES: CPT

## 2020-12-05 PROCEDURE — 6370000000 HC RX 637 (ALT 250 FOR IP): Performed by: INTERNAL MEDICINE

## 2020-12-05 PROCEDURE — 1280000000 HC REHAB R&B

## 2020-12-05 PROCEDURE — 92507 TX SP LANG VOICE COMM INDIV: CPT | Performed by: SPEECH-LANGUAGE PATHOLOGIST

## 2020-12-05 RX ADMIN — SULFAMETHOXAZOLE AND TRIMETHOPRIM 1 TABLET: 800; 160 TABLET ORAL at 09:20

## 2020-12-05 RX ADMIN — SULFAMETHOXAZOLE AND TRIMETHOPRIM 1 TABLET: 800; 160 TABLET ORAL at 20:57

## 2020-12-05 RX ADMIN — MIRTAZAPINE 7.5 MG: 15 TABLET, ORALLY DISINTEGRATING ORAL at 20:57

## 2020-12-05 RX ADMIN — ASPIRIN 81 MG CHEWABLE TABLET 81 MG: 81 TABLET CHEWABLE at 09:19

## 2020-12-05 RX ADMIN — EZETIMIBE 10 MG: 10 TABLET ORAL at 09:19

## 2020-12-05 RX ADMIN — ENOXAPARIN SODIUM 40 MG: 40 INJECTION SUBCUTANEOUS at 09:19

## 2020-12-05 ASSESSMENT — PAIN SCALES - GENERAL: PAINLEVEL_OUTOF10: 0

## 2020-12-05 NOTE — PLAN OF CARE
Problem: Anxiety:  Goal: Level of anxiety will decrease  Description: Level of anxiety will decrease  12/5/2020 1202 by Maggie Vang  Outcome: Met This Shift  12/5/2020 0424 by Milda Buerger, RN  Outcome: Met This Shift  12/4/2020 2325 by Milda Buerger, RN  Outcome: Met This Shift     Problem: Falls - Risk of:  Goal: Will remain free from falls  Description: Will remain free from falls  12/5/2020 1202 by Maggie Vang  Outcome: Met This Shift  12/5/2020 0424 by Milda Buerger, RN  Outcome: Met This Shift  12/4/2020 2325 by Milda Buerger, RN  Outcome: Met This Shift  Goal: Absence of physical injury  Description: Absence of physical injury  12/5/2020 1202 by Maggie Vang  Outcome: Met This Shift  12/5/2020 0424 by Milda Buerger, RN  Outcome: Met This Shift  12/4/2020 2325 by Milda Buerger, RN  Outcome: Met This Shift     Problem: Skin Integrity:  Goal: Will show no infection signs and symptoms  Description: Will show no infection signs and symptoms  12/5/2020 1202 by Maggie Vang  Outcome: Met This Shift  12/5/2020 0424 by Milda Buerger, RN  Outcome: Met This Shift  12/4/2020 2325 by Milda Buerger, RN  Outcome: Met This Shift  Goal: Absence of new skin breakdown  Description: Absence of new skin breakdown  12/5/2020 1202 by Maggie Vang  Outcome: Met This Shift  12/5/2020 0424 by Milda Buerger, RN  Outcome: Met This Shift  12/4/2020 2325 by Milda Buerger, RN  Outcome: Met This Shift     Problem:  Bowel/Gastric:  Goal: Will show no signs and symptoms of gastrointestinal bleeding  Description: Will show no signs and symptoms of gastrointestinal bleeding  12/5/2020 1202 by Maggie Vang  Outcome: Met This Shift  12/5/2020 0424 by Milda Buerger, RN  Outcome: Met This Shift  12/4/2020 2325 by Milda Buerger, RN  Outcome: Met This Shift     Problem: Coping:  Goal: Ability to identify strategies to decrease anxiety will improve  Description: Ability to identify strategies to decrease anxiety will improve  12/5/2020 1202 by Brayan Krishna  Outcome: Met This Shift  12/5/2020 0424 by Elana Zamudio RN  Outcome: Met This Shift  12/4/2020 2325 by Elana Zamudio RN  Outcome: Met This Shift     Problem: Nutritional:  Goal: Ability to chew and swallow food without choking will improve  Description: Ability to chew and swallow food without choking will improve  12/5/2020 1202 by Brayan Krishna  Outcome: Met This Shift  12/5/2020 0424 by Elana Zamudio RN  Outcome: Met This Shift  Goal: Ability to achieve adequate nutritional intake will improve  Description: Ability to achieve adequate nutritional intake will improve  12/5/2020 1202 by Brayan Krishna  Outcome: Met This Shift  12/5/2020 0424 by Elana Zamudio RN  Outcome: Met This Shift  12/4/2020 2325 by Elana Zamudio RN  Outcome: Met This Shift  Goal: Ability to maintain an optimal weight for height and age will improve  Description: Ability to maintain an optimal weight for height and age will improve  12/5/2020 1202 by Brayan Krishna  Outcome: Met This Shift  12/5/2020 0424 by Elana Zamudio RN  Outcome: Met This Shift  12/4/2020 2325 by Elana Zamudio RN  Outcome: Met This Shift     Problem: Physical Regulation:  Goal: Complications related to the disease process, condition or treatment will be avoided or minimized  Description: Complications related to the disease process, condition or treatment will be avoided or minimized  12/5/2020 1202 by Brayan Krishna  Outcome: Met This Shift  12/5/2020 0424 by Elana Zamudio RN  Outcome: Met This Shift     Problem: Sensory:  Goal: General experience of comfort will improve  Description: General experience of comfort will improve  Outcome: Met This Shift

## 2020-12-05 NOTE — PROGRESS NOTES
parties Educating patient about concerns to prevent falls. Other: EOB<>w/c    Bed mobility      Sofa recliner, sofa, dining chair with arms  12/5/20 12/5/20 12.5.20 Sup      Sup    Sup Bed rail Pt required vc's for hand placement with EOB into w/c transfers to decrease plopping down. Pt demo supine to EOB without assist. Son present to observe today. Pt completed functional transfers in OT apt on/off soft recliner, sofa and dining chair with arms needing vc's for pacing self along with reaching back for intended surface vs plopping down. Son present for FT session. Functional Exercises / Activity:  Family teach session completed on this date with Son present. Sensory / Neuromuscular Re-Education:      Cognitive Skills:   Status Comments   Problem   Solving fair     Memory fair     Sequencing fair     Safety fair       Visual Perception:    Education:  Pt /Son educated in regards to safety with transfers,  Balance and ambulation including fall prevention since patient tends to rush and plops down on surfaces vs reaching back. [x] Family teach completed on:  12/5/20  Son present for family teach and addressed commode/tub transfers including grab bars and ext tub bench or shower chair needs. Pt tends to prefer stepping in/out of tub and son will check her home in regards to wall grab bar that was put in her shower however patient stated \"I dont use it, I pull myself up using water faucet in her shower then demo actual transfers done at home which caused concern for falls. Pt attempted ext tub bench transfers however did not like method then stood up to enter tub. Discussed shower chair/ext tub bench however Son and patient will discuss options for DME including grab bar in or outside shower/tub at home. Pt completed sofa/sofa recliner and dining chair with arms transfers needing vc's to reach back for intended surface vs plopping down.   Pt was educated by this therapist importance of reaching back prior to sitting down to prevent sacral injury or falling and Son agreed and told patient the same thing. Discussed possible Home Health intervention including home safety evaluation at least to assess patient in her own environment since has specific ways she does things and will continue doing that at home. Son agreed [address] idea would be  Beneficial for patient's safety and informed son to discuss with . Pain Level: 0/10    Additional Notes:   12/5/20- Pulse ox monitored with oxygen on and off with levels dropping to 90% with heart rate 95-97% at times. 02 at 2L then placed on with seated rests increasing to 97%. Pt completed functional transfers in OT apt with oxygen off then back on when reading dropped down to 90% for patient safety. 12/3/20: Pt agitated throughout session, having of poor safety awareness, requiring cueing throughout session for safety and follow through. Chair alarm placed on pt, due to pt having poor follow through and understanding of requiring assistance with mobility/transfers. Patient has made fair+ progress during treatment sessions toward set goals. Therapy emphasis to obtain goals:Strengthening, Gait Training, Patient/Caregiver Education & Training, Home Management Training, Equipment Evaluation, Education, & procurement, Balance Training, Functional Mobility Training, Cognitive Reorientation, Endurance Training, Safety Education & Training, Self-Care / ADL, Cognitive/Perceptual Training     [x] Continue with current OT Plan of care. [] Prepare for Discharge     DISCHARGE RECOMMENDATIONS  Recommended DME:  Grab bar, shower bench/shower chair for tub at home.   Post Discharge Care:   []Home Independently  []Home with 24hr Care / Supervision []Home with Partial Supervision []Home with Home Health OT []Home with Out Pt OT []Other: ___   Comments:         Time in Time out Tx Time Breakdown  Variance:   First Session  9:05am 9:13am [x] Individual Tx- 8 min  [] Concurrent Tx -  [] Co-Tx -   [] Group Tx -   [] Time Missed -  Pt hesitant to engage in therapy with son offering support then patient agreed to get up out of bed into w/c and EOB sitting.  Pulse ox monitored with reading 95-97%   Second Session 10:05am 10:50am [x] Individual Tx- 45  [] Concurrent Tx -  [] Co-Tx -   [] Group Tx -   [] Time Missed -          Third Session    [] Individual Tx-   [] Concurrent Tx -  [] Co-Tx -   [] Group Tx -   [] Time Missed -         Total Tx Time: 53 mins    Ben Disla [facility]    I have read & agree with the above status    Karli Ballard OTR/L [id number]

## 2020-12-05 NOTE — PROGRESS NOTES
Physical Therapy  Treatment Note  Evaluating Therapist: Siria Nunez DPT    NAME: Enoc Davenport  ROOM: [address]  DIAGNOSIS: Ischemic stroke  PRECAUTIONS: Falls, alarm, O2   PROCEDURE(S): 11/27 R CEA  HPI: 82year old female admitted 11/23 with sudden onset left sided weakness. In the ED, CT head revealing chronic microvascular changes without any acute abnormalities. CTA neck revealing bilateral internal carotid artery stenosis, 80% on the right and 50% on the left. TPA was recommended by telestroke but patient denied as her symptoms already improved. MRI showed infarct in high right parietal lobe and remote lacunar infarct in right cerebellum. Social:  Pt lives with alone in a 2 story home with 4-5 steps and 2 rails to enter. 12 steps with 1 rail to upstairs and 10 steps with no rails to basement. Pt's bedroom is on the 2nd floor and walk in shower is in the basement. Pt sleeps on the couch on first floor. Pt may be staying with her son or daughter at discharge. Daughter's home is a ranch style home with 3 steps to enter and 2 rails. Son's house is a 2 story home with 3 steps to enter and no rails and pt's bed and bath would be on the 2nd floor with 12 steps and no rails. Prior to admission pt ambulated with no AD independently. Initial Evaluation  12/1/20 AM     PM    Short Term Goals Long Term Goals    Was pt agreeable to Eval/treatment? yes yes NT     Does pt have pain?  No c/o pain No c/o pain      WHITEHEAD  49/56 on 12/2/20      Bed Mobility  Rolling: SBA  Supine to sit: SBA  Sit to supine: SBA  Scooting: SBA Supervision all aspects  Sup Independent   Transfers Sit to stand: SBA  Stand to sit: SBA  Stand pivot: SBA no AD Sit to stand: Supervision  Stand to sit: Supervision  Stand pivot: Supervision no AD  Sup Independent   Ambulation   75 feet with no AD with  feet no AD Supervision  150 feet with no AD with Supervision 150 feet with no AD with Independent   Walking 10 feet on uneven surface 10 feet with no AD with SBA NT  10 feet with no AD with Sup 10 feet with no AD with Independent   Wheel Chair Mobility NT NT      Car Transfers SBA Supervision  Sup Independent    Stair negotiation: ascended and descended 4 steps with 1 rail with SBA 12 steps 1 rail Supervision; 4 steps no rails SBA  4 steps with 1 rail with Sup 12 steps with 1 rail with Mod I   Curb Step:   ascended and descended 7.5 inch step with no AD and CGA 7.5 inch step x4 reps no AD SBA  7.5 inch step with no AD and Sup 7.5 inch step with no AD and Independent   Picking up object off the floor Picked up a cone with SBA NT  Will  an object with Sup Will  an object with Independent   BLE ROM [facility]      BLE Strength 4/5 4/5      Balance  Sitting: Supervision  Standing: SBA no AD Sitting: Supervision  Standing: Supervision no AD      Date Family Teach Completed TBA Son on 12/5/20      Is additional Family Teaching Needed? Y or N TBA If requested by family/pt      Hindering Progress Balance, safety awareness Balance, safety awareness      PT recommended ELOS 2 weeks 5-6 days      Team's Discharge Plan        Therapist at Team Meeting          Therapeutic Exercise:   AM:   - Functional mobility as noted above in chart    Patient education  Pt educated on family training with pt's son, safety with functional mobility, accepting current functional deficits    Patient response to education:   Pt verbalized understanding Pt demonstrated skill Pt requires further education in this area   yes partial yes     Additional Comments: Pt's son was present today for family training session. Pt's son was educated on the pt's progress as well as her current functional deficits which include high balance deficits and safety awareness. Pt continues to be fast moving with most functional mobility despite cues from pt and pt's son to slow down her speed to improve safety.  Pt was cued to use a NR pattern for stair negotiation to improve safety and control but

## 2020-12-05 NOTE — PROGRESS NOTES
[facility]  Progress Note    GENERAL:   Covering for Dr Christina Ramirez. 82 y old female, with history of HTN, Hyperlipidemia, and GERD, who was admitted to [facility] with Left arm weakness. She was found to have significant Right Carotid artery stenosis and underwent a Right Carotid Endarterectomy on 11/7. Transferred to the ARU on 11/30. Patient seated at the edge of the bed, having lunch, has O2 on at 2 L / min. BP noted. Plan is for discharge home on 12/9. UTI with Enterococcus fecalis, on Bactrim DS day # 3.     VITALS:   Vitals:    12/03/20 1526 12/04/20 0830 12/05/20 0045 12/05/20 0919   BP: (!) 153/72 131/63 137/60 (!) 124/52   Pulse: 68 80 81 79   Resp: 18 16 18 17   Temp: 98.1 °F (36.7 °C) 98.4 °F (36.9 °C) 98.8 °F (37.1 °C) 98.6 °F (37 °C)   TempSrc: Temporal Oral Temporal Oral   SpO2: 95% 97%  98%   Weight:       Height:           Scheduled Meds:   sulfamethoxazole-trimethoprim  1 tablet Oral 2 times per day    mirtazapine  7.5 mg Oral Nightly    thiamine  100 mg Oral Daily    oxybutynin  5 mg Oral TID    enoxaparin  40 mg Subcutaneous Daily    amLODIPine  5 mg Oral Daily    aspirin  81 mg Oral Daily    bisacodyl  10 mg Rectal Daily    docusate sodium  100 mg Oral BID    ezetimibe  10 mg Oral Daily    lactulose  20 g Oral TID    rosuvastatin  5 mg Oral Nightly    sennosides-docusate sodium  2 tablet Oral Daily     Continuous Infusions:  PRN Meds:.glycerin (ADULT), acetaminophen **OR** [DISCONTINUED] acetaminophen, polyethylene glycol, promethazine **OR** [DISCONTINUED] ondansetron, oxyCODONE **OR** [DISCONTINUED] oxyCODONE, magnesium hydroxide      LABS:  CBC with Differential:    Lab Results   Component Value Date    WBC 8.4 12/01/2020    RBC 3.74 12/01/2020    HGB 11.6 12/01/2020    HCT 35.5 12/01/2020     12/01/2020    MCV 94.9 12/01/2020    MCH 31.0 12/01/2020    MCHC 32.7 12/01/2020    RDW 13.2 12/01/2020    NRBC 0.0 02/09/2020    SEGSPCT 51 12/24/2013    LYMPHOPCT 8.5 12/01/2020    MONOPCT 14.6 12/01/2020    BASOPCT 0.1 12/01/2020    MONOSABS 1.22 12/01/2020    LYMPHSABS 0.71 12/01/2020    EOSABS 0.15 12/01/2020    BASOSABS 0.01 12/01/2020     CMP:    Lab Results   Component Value Date     12/01/2020    K 3.5 12/01/2020     12/01/2020    CO2 29 12/01/2020    BUN 16 12/01/2020    CREATININE 1.0 12/01/2020    GFRAA >60 12/01/2020    LABGLOM 53 12/01/2020    GLUCOSE 106 12/01/2020    GLUCOSE 97 01/30/2012    PROT 5.4 12/01/2020    LABALBU 3.1 12/01/2020    LABALBU 4.1 01/30/2012    CALCIUM 8.3 12/01/2020    BILITOT 0.5 12/01/2020    ALKPHOS 75 12/01/2020    AST 11 12/01/2020    ALT 10 12/01/2020       FUNCTIONAL STATUS:     Cognition:   MIN Deficits. Speech:  WFL. ADLs and Self Care:  CGA to SBA. Bed Mobility:  SUP. Transfers:   SUP. Gait:     300' with no device with SUP. Stairs:    12 spes with 1 rail with SUP.                                       4 Steps with no rails with SBA.                                       7.5\" step x 4 with no device with SBA.    ROM:        PLAN:    1.) Measure Pulse Ox on RA, patient was not on O2 at home.   2.) Continue Rehab Program.

## 2020-12-05 NOTE — PROGRESS NOTES
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 THERAPY RECOMMENDATIONS:   Speech Pathology intervention is recommended 3-6 times per week for LOS or when goals are met with emphasis on the following:   Improve problem solving/insight/overall safety awareness. Immediate/STM during functional tasks and for recall of medical information with minimal cues and/or external memory aide. OM exercises to improve lingual strength and ROM. Articulation drills/tasks to improve intelligibility targeting specifically vocalic /r/ and medial and final /l/. FIMS SCORES      Swallowing    Current Status  5--Supervision    Short Term Goal 6--Modified Independent    Long Term Goal 6--Modified Independent     Receptive    Current Status  6--Modified Independent    Short Term Goal 6--Modified Independent    Long Term Goal 6--Modified Independent     Expressive    Current Status  5--Supervision    Short Term Goal 6--Modified Independent    Long Term Goal 6--Modified Independent     Social Interaction    Current Status  4--Minimal Assistance    Short Term Goal 5--Supervision    Long Term Goal 5--Supervision         Problem Solving    Current Status  4--Minimal Assistance    Short Term Goal 5--Supervision    Long Term Goal 5--Supervision      Memory    Current Status  4--Minimal Assistance    Short Term Goal 5--Supervision    Long Term Goal 5--Supervision         SWALLOWING    Diet: Minced and moist consistency solids with thin liquids    Not addressed this session       COGNITION    Good recall of adl's and upcoming appt for training. Good sequencing and problem solving noted during adl's this date. SPEECH     Agreed to do speech therapy in room. Pt completed oral motor exercises with fair accuracy given min-mod v/c and occasional model.  Cues needed to stabilize mandible during lingual exercises. Decreased lingual strength and ROM of noted. Completed articulation drill task with fair accuracy given min v/c. Verbal cues needed to correct artic errors/distortions; pt able to produce accurately post model.      Three Hour Rule Tracking:    Individual therapy:   30 minutes  Concurrent therapy:  0 minutes  Group therapy:   0 minutes  Co-treatment therapy:  0 minutes    Total minutes for 12/5/2020: 30 minutes

## 2020-12-05 NOTE — PROGRESS NOTES
Progress Note    Subjective/   82y.o. year old female on the rehab unit for stroke s/p (R) carotid endarterectomy. No new complaints this morning. Tolerating therapy. No SOB or chest pain. Did have a bad dream this morning and was wondering if it was going to come true. She is tolerating therapy. Objective/   VITALS:  /63   Pulse 80   Temp 98.4 °F (36.9 °C) (Oral)   Resp 16   Ht 5' 2\" (1.575 m)   Wt 166 lb 1 oz (75.3 kg)   LMP  (LMP Unknown)   SpO2 97%   BMI 30.37 kg/m²   24HR INTAKE/OUTPUT:      Intake/Output Summary (Last 24 hours) at 12/4/2020 0428  Last data filed at 12/4/2020 1730  Gross per 24 hour   Intake 360 ml   Output --   Net 360 ml     Constitutional:  Alert, awake, no apparent distress   Cardiovascular:  S1, S2 without m/r/g   Respiratory:  CTA B without w/r/r   Abdomen: +BS  Ext: no pitting LE edema  Neuro: awake and alert, no tremor, tone is normal    Functional Level    Initial Evaluation  12/1/20 AM     PM    Short Term Goals Long Term Goals    Was pt agreeable to Eval/treatment? yes yes yes       Does pt have pain?  No c/o pain No c/o pain No c/o pain       WHITEHEAD   49/56 on 12/2/20         Bed Mobility  Rolling: SBA  Supine to sit: SBA  Sit to supine: SBA  Scooting: SBA Supervision all aspects Supine to sit: Supervision Sup Independent   Transfers Sit to stand: SBA  Stand to sit: SBA  Stand pivot: SBA no AD Sit to stand: Supervision  Stand to sit: Supervision  Stand pivot: Supervision no AD Sit to stand: Supervision  Stand to sit: Supervision  Stand pivot: Supervision no AD Sup Independent   Ambulation   75 feet with no AD with  feet x2 reps no AD Supervision 200 feet no AD Supervision 150 feet with no AD with Supervision 150 feet with no AD with Independent   Walking 10 feet on uneven surface 10 feet with no AD with SBA NT NT 10 feet with no AD with Sup 10 feet with no AD with Independent   Wheel Chair Mobility NT NT NT       Car Transfers SBA Supervision NT Sup Independent    Stair negotiation: ascended and descended 4 steps with 1 rail with SBA 12 steps 1 rail Supervision NT 4 steps with 1 rail with Sup 12 steps with 1 rail with Mod I   Curb Step:   ascended and descended 7.5 inch step with no AD and CGA 7.5 inch step with no AD SBA/CGA 7NT 7.5 inch step with no AD and Sup 7.5 inch step with no AD and Independent   Picking up object off the floor Picked up a cone with SBA NT NT Will  an object with Sup Will  an object with Independent   BLE ROM WFL WFL         BLE Strength 4/5 4/5         Balance  Sitting: Supervision  Standing: SBA no AD Sitting: Supervision  Standing: Supervision no AD               Scheduled Meds:   sulfamethoxazole-trimethoprim  1 tablet Oral 2 times per day    mirtazapine  7.5 mg Oral Nightly    thiamine  100 mg Oral Daily    oxybutynin  5 mg Oral TID    enoxaparin  40 mg Subcutaneous Daily    amLODIPine  5 mg Oral Daily    aspirin  81 mg Oral Daily    bisacodyl  10 mg Rectal Daily    docusate sodium  100 mg Oral BID    ezetimibe  10 mg Oral Daily    lactulose  20 g Oral TID    rosuvastatin  5 mg Oral Nightly    sennosides-docusate sodium  2 tablet Oral Daily     Continuous Infusions:  PRN Meds:glycerin (ADULT), acetaminophen **OR** [DISCONTINUED] acetaminophen, polyethylene glycol, promethazine **OR** [DISCONTINUED] ondansetron, oxyCODONE **OR** [DISCONTINUED] oxyCODONE, magnesium hydroxide  I/O last 3 completed shifts: In: 5 [P.O.:420]  Out: -   I/O this shift:  In: 120 [P.O.:120]  Out: -     Labs reviewed  CBC: No results for input(s): WBC, HGB, PLT in the last 72 hours. BMP:  No results for input(s): NA, K, CL, CO2, BUN, CREATININE, GLUCOSE in the last 72 hours. Hepatic: No results for input(s): AST, ALT, ALB, BILITOT, ALKPHOS in the last 72 hours. BNP: No results for input(s): BNP in the last 72 hours. Lipids: No results for input(s): CHOL, HDL in the last 72 hours.     Invalid input(s): LDLCALCU  INR: No results for input(s): INR in the last 72 hours. Assessment/  Patient Active Problem List:     Anxiety     Essential hypertension     Gastroesophageal reflux disease without esophagitis     Mixed hyperlipidemia     Diastolic dysfunction     Pure hypercholesterolemia     Moderate obesity     Personal history of hydronephrosis     Hiatal hernia with GERD     Cerebrovascular accident (CVA) ([facility] [address]     Stenosis of right carotid artery     Hypertensive emergency     Ischemic stroke ([facility] [address]      Plan/  1. See team conference note - Peewee Hoover 12/9/20 with home health v outpatient to be determined. 2. Continue rehab program  3. Increase activity as able  4. Continue current medications  5. I reassured her that having a bad dream is not a sign that something bad is going to happen  6. Continue antibiotics for UTI  7. Continue dvt prophylaxis  8.  Reassured her that the incision is healing well          Rizwan Cadena MD

## 2020-12-06 PROCEDURE — 6360000002 HC RX W HCPCS: Performed by: INTERNAL MEDICINE

## 2020-12-06 PROCEDURE — 97530 THERAPEUTIC ACTIVITIES: CPT

## 2020-12-06 PROCEDURE — 1280000000 HC REHAB R&B

## 2020-12-06 PROCEDURE — 6370000000 HC RX 637 (ALT 250 FOR IP): Performed by: INTERNAL MEDICINE

## 2020-12-06 PROCEDURE — 97535 SELF CARE MNGMENT TRAINING: CPT

## 2020-12-06 RX ADMIN — OXYBUTYNIN CHLORIDE 5 MG: 5 TABLET ORAL at 20:38

## 2020-12-06 RX ADMIN — OXYBUTYNIN CHLORIDE 5 MG: 5 TABLET ORAL at 13:51

## 2020-12-06 RX ADMIN — MIRTAZAPINE 7.5 MG: 15 TABLET, ORALLY DISINTEGRATING ORAL at 20:38

## 2020-12-06 RX ADMIN — Medication 100 MG: at 08:24

## 2020-12-06 RX ADMIN — SULFAMETHOXAZOLE AND TRIMETHOPRIM 1 TABLET: 800; 160 TABLET ORAL at 20:37

## 2020-12-06 RX ADMIN — ENOXAPARIN SODIUM 40 MG: 40 INJECTION SUBCUTANEOUS at 08:23

## 2020-12-06 RX ADMIN — SULFAMETHOXAZOLE AND TRIMETHOPRIM 1 TABLET: 800; 160 TABLET ORAL at 08:23

## 2020-12-06 RX ADMIN — AMLODIPINE BESYLATE 5 MG: 5 TABLET ORAL at 08:24

## 2020-12-06 RX ADMIN — ASPIRIN 81 MG CHEWABLE TABLET 81 MG: 81 TABLET CHEWABLE at 08:23

## 2020-12-06 RX ADMIN — OXYBUTYNIN CHLORIDE 5 MG: 5 TABLET ORAL at 08:25

## 2020-12-06 RX ADMIN — EZETIMIBE 10 MG: 10 TABLET ORAL at 08:24

## 2020-12-06 ASSESSMENT — PAIN SCALES - GENERAL
PAINLEVEL_OUTOF10: 0

## 2020-12-06 NOTE — PLAN OF CARE
Problem: Anxiety:  Goal: Level of anxiety will decrease  Description: Level of anxiety will decrease  12/6/2020 1259 by Noel Deleon  Outcome: Met This Shift  12/6/2020 0312 by Saurabh Pope RN  Outcome: Met This Shift     Problem: Falls - Risk of:  Goal: Will remain free from falls  Description: Will remain free from falls  12/6/2020 1259 by Noel Deleon  Outcome: Met This Shift  12/6/2020 0312 by Saurabh Pope RN  Outcome: Met This Shift  Goal: Absence of physical injury  Description: Absence of physical injury  Outcome: Met This Shift     Problem: Skin Integrity:  Goal: Will show no infection signs and symptoms  Description: Will show no infection signs and symptoms  Outcome: Met This Shift  Goal: Absence of new skin breakdown  Description: Absence of new skin breakdown  12/6/2020 1259 by Noel Deleon  Outcome: Met This Shift  12/6/2020 0312 by Saurabh Pope RN  Outcome: Met This Shift     Problem:  Bowel/Gastric:  Goal: Will show no signs and symptoms of gastrointestinal bleeding  Description: Will show no signs and symptoms of gastrointestinal bleeding  Outcome: Met This Shift     Problem: Coping:  Goal: Ability to identify strategies to decrease anxiety will improve  Description: Ability to identify strategies to decrease anxiety will improve  Outcome: Met This Shift     Problem: Nutritional:  Goal: Ability to chew and swallow food without choking will improve  Description: Ability to chew and swallow food without choking will improve  Outcome: Met This Shift  Goal: Ability to achieve adequate nutritional intake will improve  Description: Ability to achieve adequate nutritional intake will improve  Outcome: Met This Shift  Goal: Ability to maintain an optimal weight for height and age will improve  Description: Ability to maintain an optimal weight for height and age will improve  Outcome: Met This Shift     Problem: Physical Regulation:  Goal: Complications related to the disease process, condition or treatment will be avoided or minimized  Description: Complications related to the disease process, condition or treatment will be avoided or minimized  12/6/2020 1259 by Narciso Robles  Outcome: Met This Shift  12/6/2020 0312 by Gretta Dakins, RN  Outcome: Met This Shift     Problem: Sensory:  Goal: General experience of comfort will improve  Description: General experience of comfort will improve  12/6/2020 1259 by Narciso Robles  Outcome: Met This Shift  12/6/2020 0312 by Gretta Dakins, RN  Outcome: Met This Shift

## 2020-12-06 NOTE — PROGRESS NOTES
Physical Therapy  Treatment Note  Evaluating Therapist: Violeta Gregorio DPT    NAME: Zaki Canchola  ROOM: [id number]  DIAGNOSIS: Ischemic stroke  PRECAUTIONS: Falls, alarm, O2   PROCEDURE(S): 11/27 R CEA  HPI: 82year old female admitted 11/23 with sudden onset left sided weakness. In the ED, CT head revealing chronic microvascular changes without any acute abnormalities. CTA neck revealing bilateral internal carotid artery stenosis, 80% on the right and 50% on the left. TPA was recommended by telestroke but patient denied as her symptoms already improved. MRI showed infarct in high right parietal lobe and remote lacunar infarct in right cerebellum. Social:  Pt lives with alone in a 2 story home with 4-5 steps and 2 rails to enter. 12 steps with 1 rail to upstairs and 10 steps with no rails to basement. Pt's bedroom is on the 2nd floor and walk in shower is in the basement. Pt sleeps on the couch on first floor. Pt may be staying with her son or daughter at discharge. Daughter's home is a ranch style home with 3 steps to enter and 2 rails. Son's house is a 2 story home with 3 steps to enter and no rails and pt's bed and bath would be on the 2nd floor with 12 steps and no rails. Prior to admission pt ambulated with no AD independently. Initial Evaluation  12/1/20 AM     PM    Short Term Goals Long Term Goals    Was pt agreeable to Eval/treatment? yes yes NT     Does pt have pain?  No c/o pain No c/o pain      WHITEHEAD  49/56 on 12/2/20      Bed Mobility  Rolling: SBA  Supine to sit: SBA  Sit to supine: SBA  Scooting: SBA Supervision all aspects  Sup Independent   Transfers Sit to stand: SBA  Stand to sit: SBA  Stand pivot: SBA no AD Sit to stand: Supervision  Stand to sit: Supervision  Stand pivot: Supervision no AD  Sup Independent   Ambulation   75 feet with no AD with  feet no AD Supervision  150 feet with no AD with Supervision 150 feet with no AD with Independent   Walking 10 feet on uneven surface 10 feet with no AD with SBA NT  10 feet with no AD with Sup 10 feet with no AD with Independent   Wheel Chair Mobility NT NT      Car Transfers SBA Supervision  Sup Independent    Stair negotiation: ascended and descended 4 steps with 1 rail with SBA 12 steps 1 rail Supervision  4 steps with 1 rail with Sup 12 steps with 1 rail with Mod I   Curb Step:   ascended and descended 7.5 inch step with no AD and CGA 7.5 inch step no AD SBA  7.5 inch step with no AD and Sup 7.5 inch step with no AD and Independent   Picking up object off the floor Picked up a cone with SBA NT  Will  an object with Sup Will  an object with Independent   BLE ROM [facility]      BLE Strength 4/5 4/5      Balance  Sitting: Supervision  Standing: SBA no AD Sitting: Supervision  Standing: Supervision no AD      Date Family Teach Completed TBA Son on 12/5/20      Is additional Family Teaching Needed? Y or N TBA If requested by family/pt      Hindering Progress Balance, safety awareness Balance, safety awareness      PT recommended ELOS 2 weeks 5-6 days      Team's Discharge Plan        Therapist at Team Meeting          Therapeutic Exercise:   AM:   - Functional mobility as noted above in chart  - Short distance ambulation within PT gym with no AD  - 100 feet x2 reps no AD Supervision  - Standing alternating cone taps x10/side to improve dynamic standing balance and coordination - pt struggled with this task and would frequently lose her balance    Patient education  Pt educated on safety with functional mobility    Patient response to education:   Pt verbalized understanding Pt demonstrated skill Pt requires further education in this area   yes partial yes     Additional Comments: Reviewed all mobility as noted above in chart. Pt was found on RA, O2 sat ranging from 93% to 95% at rest. O2 sats monitored throughout session while pt performed all activity on RA. O2 sats during activity ranged from 91% to 93% with no visible or audible SOB observed. Pt continues to have impaired safety awareness despite cueing and education. Continue to recommend supervision at home initially upon discharge due to the pt's impaired safety awareness and lack of insight into her deficits. Chair/bed alarm: armed following session    AM   Time in: 0930  Time out: 1000    Pt is making good progress toward established Physical Therapy goals. Continue with physical therapy current plan of care.     Tamara Javed DPT  [id number]

## 2020-12-06 NOTE — PROGRESS NOTES
OCCUPATIONAL THERAPY DAILY NOTE    Date:2020  Patient Name: Gisell Huddleston  MRN: [id number]  : 1935  Room: [address] [place] [address]     Referring Practitioner: Adelaida Christopher MD  Diagnosis: CVA- R carotid endarterectomy 2020; R parietal lobe & remote pacunar & R cerbellum  Additional Pertinent Hx: HTN  Restrictions: Fall Risk, bed & chair alarm & dental soft- alternate solids & liquids    Functional Assessment:   Date Status AE  Comments   Feeding 20 Mod I  Pt able to set up own breakfast tray while in bed. Grooming 20 Sup  Arm chair  Pt completed hair combing and oral care seated in arm chair. Bathing 20 S/SBA  Shower   LH hose  Shower stall bench Pt completed shower including washing hair while seated and standing in shower stall with one cue to utilize grab bar for safety. UB Dressing 20 Mod I   To jose t- shirt and long sleeve garment    LB Dressing 20 SBA  To jose underwear and pants with LOB during clothing mgmt when standing. Footwear 20 Sup  Pt donned gripper socks using BLE crossover method seated in arm chair    Toileting 20 Sup Grab bar Pt performed own toilet hygiene and clothing mgmt with one cue for safety due to using grab bar for balance. Homemaking 20 SBA Seated and standing       Functional Transfers / Balance:   Date Status DME  Comments   Sit Balance 20 Sup  Sitting balance on EOB/bed side chair and on commode/shower stall bench. Stand Balance 20 Sup   Grab bar Standing balance during ambulation in room using no device with no LOB noted. Pt stood for noe care, clothing mgmt and transfers. [x] Tub  [x] Shower   Transfer 20 SBA      SBA Shower stall bench Pt entered & exited shower stall with one cue to use grab bar for balance and safety. Commode   Transfer 20 Sup  Grab bar  On/off standard toilet with one cue to use grab bar.    Functional   Mobility 20 Sup No AD Pt used no device to ambulate from EOB in/out of bathroom    Other: EOB<>w/c    Bed mobility      Sofa recliner, sofa, dining chair with arms  12/5/20 12/620 12.5.20 Sup      Sup    Sup Bed rail Pt engaged in supine to EOB using bed rail for safety. Functional Exercises / Activity:  Pt engaged in am ADL;s including shower and washing hair to increase independence with grooming, bathing, dressing and transfers. Pulse ox monitored without oxygen on resulting in 93% and patient stated did not have 02 on during night either. Sensory / Neuromuscular Re-Education:      Cognitive Skills:   Status Comments   Problem   Solving fair     Memory fair     Sequencing fair     Safety fair       Visual Perception:    Education:  Pt educated with safety during shower stall transfers in regards to using grab bar when standing to increase awareness. [x] Family teach completed on:  12/5/20  Son present for family teach and addressed commode/tub transfers including grab bars and ext tub bench or shower chair needs. Pt tends to prefer stepping in/out of tub and son will check her home in regards to wall grab bar that was put in her shower however patient stated \"I dont use it, I pull myself up using water faucet in her shower then demo actual transfers done at home which caused concern for falls. Pt attempted ext tub bench transfers however did not like method then stood up to enter tub. Discussed shower chair/ext tub bench however Son and patient will discuss options for DME including grab bar in or outside shower/tub at home. Pt completed sofa/sofa recliner and dining chair with arms transfers needing vc's to reach back for intended surface vs plopping down. Pt was educated by this therapist importance of reaching back prior to sitting down to prevent sacral injury or falling and Son agreed and told patient the same thing.   Discussed possible Home Health intervention including home safety evaluation at least to assess patient in her own environment since has specific ways she does things and will continue doing that at home. Son agreed [address] idea would be  Beneficial for patient's safety and informed son to discuss with . Pain Level: 0/10    Additional Notes:   12/5/20- Pulse ox monitored with oxygen on and off with levels dropping to 90% with heart rate 95-97% at times. 02 at 2L then placed on with seated rests increasing to 97%. Pt completed functional transfers in OT apt with oxygen off then back on when reading dropped down to 90% for patient safety. 12/3/20: Pt agitated throughout session, having of poor safety awareness, requiring cueing throughout session for safety and follow through. Chair alarm placed on pt, due to pt having poor follow through and understanding of requiring assistance with mobility/transfers. Patient has made fair+ progress during treatment sessions toward set goals. Therapy emphasis to obtain goals:Strengthening, Gait Training, Patient/Caregiver Education & Training, Home Management Training, Equipment Evaluation, Education, & procurement, Balance Training, Functional Mobility Training, Cognitive Reorientation, Endurance Training, Safety Education & Training, Self-Care / ADL, Cognitive/Perceptual Training     [x] Continue with current OT Plan of care. [] Prepare for Discharge     DISCHARGE RECOMMENDATIONS  Recommended DME:  Grab bar, shower bench/shower chair for tub at home.   Post Discharge Care:   []Home Independently  []Home with 24hr Care / Supervision []Home with Partial Supervision []Home with Home Health OT []Home with Out Pt OT []Other: ___   Comments:         Time in Time out Tx Time Breakdown  Variance:   First Session  8:20am 9:20am [x] Individual Tx- 60min  [] Concurrent Tx -  [] Co-Tx -   [] Group Tx -   [] Time Missed -     Second Session   [] Individual Tx-   [] Concurrent Tx -  [] Co-Tx -   [] Group Tx -   [] Time Missed -          Third Session    [] Individual Tx-   [] Concurrent Tx -  [] Co-Tx -   [] Group Tx -   [] Time Missed -         Total Tx Time: 60 mins    Ben Garcia  I have read & agree with the above status.     Phillip Mitchell OTR/L [id number]

## 2020-12-06 NOTE — FLOWSHEET NOTE
12/06/20 0204   Genitourinary   Genitourinary (WDL) X  (On Ditropan)   Urine Frequency   Urine Frequency No   Urine Urgency   Urine Urgency Yes   Urine Assessment   Incontinence No   Urine Color Yellow/straw   Urine Odor No odor   Bladder Scan Volume (mL) 251 mL  (PVR after void. Denies discomfort. Will monitor.)   Per evening RN, patient has been refusing Ditropan.

## 2020-12-06 NOTE — PLAN OF CARE
Problem: Anxiety:  Goal: Level of anxiety will decrease  Description: Level of anxiety will decrease  12/5/2020 2140 by Zohreh Lobo  Outcome: Met This Shift  12/5/2020 1202 by Zohreh Lobo  Outcome: Met This Shift     Problem: Falls - Risk of:  Goal: Will remain free from falls  Description: Will remain free from falls  12/5/2020 2140 by Zohreh Lobo  Outcome: Met This Shift  12/5/2020 1202 by Zohreh Lobo  Outcome: Met This Shift  Goal: Absence of physical injury  Description: Absence of physical injury  12/5/2020 2140 by Zohreh Lobo  Outcome: Met This Shift  12/5/2020 1202 by Zohreh Lobo  Outcome: Met This Shift     Problem: Skin Integrity:  Goal: Will show no infection signs and symptoms  Description: Will show no infection signs and symptoms  12/5/2020 2140 by Zohreh Lobo  Outcome: Met This Shift  12/5/2020 1202 by Zohreh Lobo  Outcome: Met This Shift  Goal: Absence of new skin breakdown  Description: Absence of new skin breakdown  12/5/2020 2140 by Zohreh Lobo  Outcome: Met This Shift  12/5/2020 1202 by Zohreh Lobo  Outcome: Met This Shift     Problem:  Bowel/Gastric:  Goal: Will show no signs and symptoms of gastrointestinal bleeding  Description: Will show no signs and symptoms of gastrointestinal bleeding  12/5/2020 2140 by Zohreh Lobo  Outcome: Met This Shift  12/5/2020 1202 by Zohreh Lobo  Outcome: Met This Shift     Problem: Coping:  Goal: Ability to identify strategies to decrease anxiety will improve  Description: Ability to identify strategies to decrease anxiety will improve  12/5/2020 2140 by Zohreh Lobo  Outcome: Met This Shift  12/5/2020 1202 by Zohreh Lobo  Outcome: Met This Shift     Problem: Nutritional:  Goal: Ability to chew and swallow food without choking will improve  Description: Ability to chew and swallow food without choking will improve  12/5/2020 2140 by Zohreh Lobo  Outcome: Met This Shift  12/5/2020 1202 by Arcelia Baumgarten  Outcome: Met This Shift  Goal: Ability to achieve adequate nutritional intake will improve  Description: Ability to achieve adequate nutritional intake will improve  12/5/2020 2140 by Arcelia Baumgarten  Outcome: Met This Shift  12/5/2020 1202 by Arcelia Baumgarten  Outcome: Met This Shift  Goal: Ability to maintain an optimal weight for height and age will improve  Description: Ability to maintain an optimal weight for height and age will improve  12/5/2020 2140 by Arcelia Baumgarten  Outcome: Met This Shift  12/5/2020 1202 by Arcelia Baumgarten  Outcome: Met This Shift     Problem: Physical Regulation:  Goal: Complications related to the disease process, condition or treatment will be avoided or minimized  Description: Complications related to the disease process, condition or treatment will be avoided or minimized  12/5/2020 2140 by Arcelia Baumgarten  Outcome: Met This Shift  12/5/2020 1202 by Arcelia Baumgarten  Outcome: Met This Shift     Problem: Sensory:  Goal: General experience of comfort will improve  Description: General experience of comfort will improve  12/5/2020 2140 by Arcelia Baumgarten  Outcome: Met This Shift  12/5/2020 1202 by Arcelia Baumgarten  Outcome: Met This Shift

## 2020-12-07 PROCEDURE — 97530 THERAPEUTIC ACTIVITIES: CPT

## 2020-12-07 PROCEDURE — 1280000000 HC REHAB R&B

## 2020-12-07 PROCEDURE — 97110 THERAPEUTIC EXERCISES: CPT

## 2020-12-07 PROCEDURE — 92526 ORAL FUNCTION THERAPY: CPT

## 2020-12-07 PROCEDURE — 97130 THER IVNTJ EA ADDL 15 MIN: CPT

## 2020-12-07 PROCEDURE — 6360000002 HC RX W HCPCS: Performed by: INTERNAL MEDICINE

## 2020-12-07 PROCEDURE — 97535 SELF CARE MNGMENT TRAINING: CPT

## 2020-12-07 PROCEDURE — 97129 THER IVNTJ 1ST 15 MIN: CPT

## 2020-12-07 PROCEDURE — 6370000000 HC RX 637 (ALT 250 FOR IP): Performed by: INTERNAL MEDICINE

## 2020-12-07 RX ADMIN — AMLODIPINE BESYLATE 5 MG: 5 TABLET ORAL at 09:21

## 2020-12-07 RX ADMIN — Medication 100 MG: at 09:21

## 2020-12-07 RX ADMIN — OXYBUTYNIN CHLORIDE 5 MG: 5 TABLET ORAL at 14:39

## 2020-12-07 RX ADMIN — EZETIMIBE 10 MG: 10 TABLET ORAL at 09:21

## 2020-12-07 RX ADMIN — OXYBUTYNIN CHLORIDE 5 MG: 5 TABLET ORAL at 20:39

## 2020-12-07 RX ADMIN — ASPIRIN 81 MG CHEWABLE TABLET 81 MG: 81 TABLET CHEWABLE at 09:17

## 2020-12-07 RX ADMIN — SULFAMETHOXAZOLE AND TRIMETHOPRIM 1 TABLET: 800; 160 TABLET ORAL at 09:21

## 2020-12-07 RX ADMIN — ENOXAPARIN SODIUM 40 MG: 40 INJECTION SUBCUTANEOUS at 09:23

## 2020-12-07 RX ADMIN — BISACODYL 10 MG: 10 SUPPOSITORY RECTAL at 19:36

## 2020-12-07 RX ADMIN — SULFAMETHOXAZOLE AND TRIMETHOPRIM 1 TABLET: 800; 160 TABLET ORAL at 20:39

## 2020-12-07 RX ADMIN — DOCUSATE SODIUM 50MG AND SENNOSIDES 8.6MG 2 TABLET: 8.6; 5 TABLET, FILM COATED ORAL at 09:17

## 2020-12-07 RX ADMIN — DOCUSATE SODIUM 100 MG: 100 CAPSULE, LIQUID FILLED ORAL at 09:23

## 2020-12-07 RX ADMIN — OXYBUTYNIN CHLORIDE 5 MG: 5 TABLET ORAL at 09:21

## 2020-12-07 ASSESSMENT — PAIN SCALES - GENERAL
PAINLEVEL_OUTOF10: 0

## 2020-12-07 NOTE — PLAN OF CARE
Problem: Anxiety:  Goal: Level of anxiety will decrease  Description: Level of anxiety will decrease  12/6/2020 2124 by Chandni Arnulfo  Outcome: Met This Shift  12/6/2020 1259 by Chandni Arredondo  Outcome: Met This Shift     Problem: Falls - Risk of:  Goal: Will remain free from falls  Description: Will remain free from falls  12/6/2020 2124 by Chandni Arredondo  Outcome: Met This Shift  12/6/2020 1259 by Chandni Arredondo  Outcome: Met This Shift  Goal: Absence of physical injury  Description: Absence of physical injury  12/6/2020 2124 by Chandni Arredondo  Outcome: Met This Shift  12/6/2020 1259 by Chandni Arnulfo  Outcome: Met This Shift     Problem: Skin Integrity:  Goal: Will show no infection signs and symptoms  Description: Will show no infection signs and symptoms  12/6/2020 2124 by Chandni Arredondo  Outcome: Met This Shift  12/6/2020 1259 by Chandni Choveena  Outcome: Met This Shift  Goal: Absence of new skin breakdown  Description: Absence of new skin breakdown  12/6/2020 2124 by Chandni Arredondo  Outcome: Met This Shift  12/6/2020 1259 by Cahndni Choveena  Outcome: Met This Shift     Problem:  Bowel/Gastric:  Goal: Will show no signs and symptoms of gastrointestinal bleeding  Description: Will show no signs and symptoms of gastrointestinal bleeding  12/6/2020 2124 by Chandni Arredondo  Outcome: Met This Shift  12/6/2020 1259 by Chandni Choveena  Outcome: Met This Shift     Problem: Coping:  Goal: Ability to identify strategies to decrease anxiety will improve  Description: Ability to identify strategies to decrease anxiety will improve  12/6/2020 2124 by Chandni Arredondo  Outcome: Met This Shift  12/6/2020 1259 by Chandni Choveena  Outcome: Met This Shift     Problem: Nutritional:  Goal: Ability to chew and swallow food without choking will improve  Description: Ability to chew and swallow food without choking will improve  12/6/2020 2124 by Chandni Arredondo  Outcome: Met This Shift  12/6/2020 1259 by Hemal Aguilar  Outcome: Met This Shift  Goal: Ability to achieve adequate nutritional intake will improve  Description: Ability to achieve adequate nutritional intake will improve  12/6/2020 2124 by Hemal Aguilar  Outcome: Met This Shift  12/6/2020 1259 by Hemal Aguilar  Outcome: Met This Shift  Goal: Ability to maintain an optimal weight for height and age will improve  Description: Ability to maintain an optimal weight for height and age will improve  12/6/2020 2124 by Hemal Aguilar  Outcome: Met This Shift  12/6/2020 1259 by Hemal Aguilar  Outcome: Met This Shift     Problem: Physical Regulation:  Goal: Complications related to the disease process, condition or treatment will be avoided or minimized  Description: Complications related to the disease process, condition or treatment will be avoided or minimized  12/6/2020 2124 by Hemal Aguilar  Outcome: Met This Shift  12/6/2020 1259 by Hemal Aguilar  Outcome: Met This Shift     Problem: Sensory:  Goal: General experience of comfort will improve  Description: General experience of comfort will improve  12/6/2020 2124 by Hemal Aguilar  Outcome: Met This Shift  12/6/2020 1259 by Hemal Aguilar  Outcome: Met This Shift

## 2020-12-07 NOTE — PROGRESS NOTES
12/07/20 1224   Attendance   Activity Cards   Participation Active participation   Therapeutic Recreation   Social Skills Cooperates with others in group activity   Leisure Education Demonstrates knowledge of benefits of leisure involvement  Sabina Eng identified thinking and strategy as benefits.)   Time Spent With Patient   Minutes 35

## 2020-12-07 NOTE — PROGRESS NOTES
OCCUPATIONAL THERAPY DAILY NOTE    Date:2020  Patient Name: Little Pate  MRN: [id number]  : 1935  Room: [address] [place] [address]     Referring Practitioner: Cielo Lam MD  Diagnosis: CVA- R carotid endarterectomy 2020; R parietal lobe & remote pacunar & R cerbellum  Additional Pertinent Hx: HTN  Restrictions: Fall Risk, bed & chair alarm & dental soft- alternate solids & liquids    Functional Assessment:   Date Status AE  Comments   Feeding 20 Mod I     Grooming 20 Sup  Arm chair  Pt brushed teeth standing at sink   Bathing 20 SBA  Shower   LH hose  Shower stall bench    UB Dressing 20 Mod I      LB Dressing 20 SBA     Footwear 20 Sup     Toileting 20 Sup Grab bar Pt completed clothing management standing with use of grab bar for support as needed   Homemaking 20 Sup Seated and standing  Pt put away items in various height cabinet/ drawers in kitchen. Min vc required for B UEn placement to promote stabilization when reaching into cabinets. Functional Transfers / Balance:   Date Status DME  Comments   Sit Balance 20 Sup  Demoed during exercises   Stand Balance 20 Sup   Grab bar Demeod during ADLs. [x] Tub  [x] Shower   Transfer 20 SBA      SBA Shower stall bench Pt entered & exited shower stall with one cue to use grab bar for balance and safety. Commode   Transfer 20 Sup  Grab bar  Grab bar used as needed for stabilization   Functional   Mobility 20 Sup No AD Ambulation performed all around unit without AD. Cues for pacing provided to prevent LOB. Other: EOB<>w/c      Bed mobility      Sofa recliner, sofa, dining chair with arms  20 Sup      Sup      Sup Bed rail           Pt transferred from recliner and sofa with Sup. Good carry over of hand placement during transfers was exhibited by pt      Functional Exercises / Activity:  AM:  Pt participated in card activity wearing 2#ww ob B UE. Task was performed to improve problem solving skills and B UE strength. PM:  Educated pt in fall recovery focusing on self assessment post fall and to utilize furniture that is contreras when transferring from floor to standing position. Pt agreeable to fall recovery exercises therefore was gently assisted to a seated position on ground. With CGA pt was able to WB through B UE on couch to assist with achieving a full standing position. Pt also performed 2x5 reps of STS exercises holding 3# dumbbells in B UE. Pt also used 3.5 dowel alem to participate in ladder exercises to increase strength (3x10 reps). Sensory / Neuromuscular Re-Education:      Cognitive Skills:   Status Comments   Problem   Solving fair     Memory fair     Sequencing fair     Safety fair       Visual Perception:    Education:  Fall recovery, self assessment post fall, safety in kitchen and activity pacing to prevent LOB. [x] Family teach completed on:  12/5/20  Son present for family teach and addressed commode/tub transfers including grab bars and ext tub bench or shower chair needs. Pt tends to prefer stepping in/out of tub and son will check her home in regards to wall grab bar that was put in her shower however patient stated \"I dont use it, I pull myself up using water faucet in her shower then demo actual transfers done at home which caused concern for falls. Pt attempted ext tub bench transfers however did not like method then stood up to enter tub. Discussed shower chair/ext tub bench however Son and patient will discuss options for DME including grab bar in or outside shower/tub at home. Pt completed sofa/sofa recliner and dining chair with arms transfers needing vc's to reach back for intended surface vs plopping down. Pt was educated by this therapist importance of reaching back prior to sitting down to prevent sacral injury or falling and Son agreed and told patient the same thing.   Discussed possible Home Health intervention including home safety evaluation at least to assess patient in her own environment since has specific ways she does things and will continue doing that at home. Son agreed [address] idea would be  Beneficial for patient's safety and informed son to discuss with . Pain Level: 0/10    Additional Notes:   12/5/20- Pulse ox monitored with oxygen on and off with levels dropping to 90% with heart rate 95-97% at times. 02 at 2L then placed on with seated rests increasing to 97%. Pt completed functional transfers in OT apt with oxygen off then back on when reading dropped down to 90% for patient safety. 12/3/20: Pt agitated throughout session, having of poor safety awareness, requiring cueing throughout session for safety and follow through. Chair alarm placed on pt, due to pt having poor follow through and understanding of requiring assistance with mobility/transfers. Patient has made fair+ progress during treatment sessions toward set goals. Therapy emphasis to obtain goals:Strengthening, Gait Training, Patient/Caregiver Education & Training, Home Management Training, Equipment Evaluation, Education, & procurement, Balance Training, Functional Mobility Training, Cognitive Reorientation, Endurance Training, Safety Education & Training, Self-Care / ADL, Cognitive/Perceptual Training     [x] Continue with current OT Plan of care. [] Prepare for Discharge     DISCHARGE RECOMMENDATIONS  Recommended DME:  Grab bar, shower bench/shower chair for tub at home.   Post Discharge Care:   []Home Independently  []Home with 24hr Care / Supervision []Home with Partial Supervision []Home with Home Health OT []Home with Out Pt OT []Other: ___   Comments:         Time in Time out Tx Time Breakdown  Variance:   First Session  10:45 11:30 [x] Individual Tx- 45mins  [] Concurrent Tx -  [] Co-Tx -   [] Group Tx -   [] Time Missed -     Second Session 1:45 2:30 [x] Individual Tx- 45 mins  [] Concurrent Tx -  [] Co-Tx -   [] Group Tx -   [] Time Missed -          Third Session    [] Individual Tx-   [] Concurrent Tx -  [] Co-Tx -   [] Group Tx -   [] Time Missed -         Total Tx Time: 45 mins    Corinne Adams SANDERS/L [id number]    I have read & agree with the above status.     Vincenzo Waterman OTR/L [id number]

## 2020-12-07 NOTE — PROGRESS NOTES
Physical Therapy  Treatment Note  Evaluating Therapist: Omaira Brown DPT    NAME: Luis Miguel Young  ROOM: [address]  DIAGNOSIS: Ischemic stroke  PRECAUTIONS: Falls, alarm,   PROCEDURE(S): 11/27 R CEA  HPI: 82year old female admitted 11/23 with sudden onset left sided weakness. In the ED, CT head revealing chronic microvascular changes without any acute abnormalities. CTA neck revealing bilateral internal carotid artery stenosis, 80% on the right and 50% on the left. TPA was recommended by telestroke but patient denied as her symptoms already improved. MRI showed infarct in high right parietal lobe and remote lacunar infarct in right cerebellum. Social:  Pt lives with alone in a 2 story home with 4-5 steps and 2 rails to enter. 12 steps with 1 rail to upstairs and 10 steps with no rails to basement. Pt's bedroom is on the 2nd floor and walk in shower is in the basement. Pt sleeps on the couch on first floor. Pt may be staying with her son or daughter at discharge. Daughter's home is a ranch style home with 3 steps to enter and 2 rails. Son's house is a 2 story home with 3 steps to enter and no rails and pt's bed and bath would be on the 2nd floor with 12 steps and no rails. Prior to admission pt ambulated with no AD independently. Initial Evaluation  12/1/20 AM     PM    Short Term Goals Long Term Goals    Was pt agreeable to Eval/treatment? yes yes With max encouragement     Does pt have pain? No c/o pain No c/o pain No c/o pain     WHITEHEAD  49/56 on 12/2/20      Bed Mobility  Rolling: SBA  Supine to sit: SBA  Sit to supine: SBA  Scooting: SBA Independent all aspects Independent all aspects Sup Independent   Transfers Sit to stand: SBA  Stand to sit: SBA  Stand pivot: SBA no AD Sit to stand: Independent  Stand to sit: Independent  Stand pivot: Independent no AD Sit to stand: Independent  Stand to sit:  Independent  Stand pivot: Independent no AD Sup Independent   Ambulation   75 feet with no AD with  feet no AD Supervision 500 feet no AD Supervision 150 feet with no AD with Supervision 150 feet with no AD with Independent   Walking 10 feet on uneven surface 10 feet with no AD with SBA NT NT 10 feet with no AD with Sup 10 feet with no AD with Independent   Wheel Chair Mobility NT NT NT     Car Transfers SBA Independent NT Sup Independent    Stair negotiation: ascended and descended 4 steps with 1 rail with SBA 12 steps no rail Supervision 20 steps 1 rail Supervision 4 steps with 1 rail with Sup 12 steps with 1 rail with Mod I   Curb Step:   ascended and descended 7.5 inch step with no AD and CGA 7.5 inch step no AD SBA NT 7.5 inch step with no AD and Sup 7.5 inch step with no AD and Independent   Picking up object off the floor Picked up a cone with SBA NT NT Will  an object with Sup Will  an object with Independent   BLE ROM [facility]      BLE Strength 4/5 4/5      Balance  Sitting: Supervision  Standing: SBA no AD Sitting: Supervision  Standing: Supervision no AD      Date Family Teach Completed TBA Son on 12/5/20      Is additional Family Teaching Needed?   Y or N TBA If requested by family/pt      Hindering Progress Balance, safety awareness Balance, safety awareness      PT recommended ELOS 2 weeks 5-6 days      Team's Discharge Plan        Therapist at Team Meeting          Therapeutic Exercise:   AM:   - Functional mobility as noted above in chart  - Short distance ambulation within PT gym with no AD  - Ladder to improve dynamic standing balance, motor control, and coordination: x4 reps of each pattern (NR forward, NR lateral, reciprocal forward)  - 6-inch step up downs to improve BLE strength, motor control, and balance: x10 reps with 4lb DBs and x10 reps with  5lb DBs    PM:   - Functional mobility as noted above in chart  - Short distance ambulation within PT gym with no AD  - 10 cone hide-n-seek (pt at times would struggle with locating a cone even when it was in plain sight in front of the pt)  - Forward/backward band-resisted walking x10 reps to work on eccentric control and standing dynamic balance    Patient education  Pt educated on safety with functional mobility    Patient response to education:   Pt verbalized understanding Pt demonstrated skill Pt requires further education in this area   yes partial yes     Additional Comments: Reviewed all mobility as noted above in chart. Pt continues to have safety awareness deficits however not as bad as last week. Pt is beginning to follow and carry over cueing from PT from session to session. Pt still requires cues and reminders to negotiate steps with a NR pattern to improve safety. Incorporated various exercises/activities aimed at improving the pt's balance and control. Continue to recommend supervision when pt is discharged, at least initially, due to the pt's impaired balance and safety awareness. Chair/bed alarm: armed following session    AM   Time in: 0830  Time out: 0915  PM   Time in: 1300  Time out: 1345    Pt is making good progress toward established Physical Therapy goals. Continue with physical therapy current plan of care.     Violeta Gregorio DPT  [id number]

## 2020-12-07 NOTE — PROGRESS NOTES
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 THERAPY RECOMMENDATIONS:   Speech Pathology intervention is recommended 3-6 times per week for LOS or when goals are met with emphasis on the following:   Improve problem solving/insight/overall safety awareness. Immediate/STM during functional tasks and for recall of medical information with minimal cues and/or external memory aide. OM exercises to improve lingual strength and ROM. Articulation drills/tasks to improve intelligibility targeting specifically vocalic /r/ and medial and final /l/. FIMS SCORES      Swallowing    Current Status  5--Supervision    Short Term Goal 6--Modified Independent    Long Term Goal 6--Modified Independent     Receptive    Current Status  6--Modified Independent    Short Term Goal 6--Modified Independent    Long Term Goal 6--Modified Independent     Expressive    Current Status  5--Supervision    Short Term Goal 6--Modified Independent    Long Term Goal 6--Modified Independent     Social Interaction    Current Status  4--Minimal Assistance    Short Term Goal 5--Supervision    Long Term Goal 5--Supervision         Problem Solving    Current Status  4--Minimal Assistance    Short Term Goal 5--Supervision    Long Term Goal 5--Supervision      Memory    Current Status  4--Minimal Assistance    Short Term Goal 5--Supervision    Long Term Goal 5--Supervision         SWALLOWING    Diet: Minced and moist consistency solids with thin liquids     Patient actively participated in functionally-based mealtime assessment; required min assistance to set up meal tray but was able to feed self independently. Pt tolerated noon meal (burger) with no overt signs of aspiration. Encouraged to double swallow after each bite for better clearance.  Extended time for mastication noted throughout. Completed education with the patient regarding type of swallowing impairment. Reviewed current solid/liquid consistency diet recommendations and reinforced need for consistent use of compensatory strategies to ensure safe PO intake. Reviewed aspiration precautions.       Encouraged patient to engage SLP in unstructured Q&A session relative to identified deficit areas -- patient indicated understanding of all information provided via satisfactory verbal response. COGNITION     Patient recalled STM events from throughout the day. Fair+ outcome when asked to identify absurdities depicted on photo cards. Patient able to explain changes to make the scenarios appropriate. Benefited from increased time and min v/c. SPEECH      Completed articulation drill task with focus on glides with fair/fair+ accuracy given consistent min v/c. Patient instructed to finish vowel sound and then close back teeth during production of vocalic r. She was also educated to focus on raising tongue behind teeth for final l, Fair carryover following review.     Three Hour Rule Tracking:    Individual therapy:   45 minutes  Concurrent therapy:  30 minutes  Group therapy:   0 minutes  Co-treatment therapy:  0 minutes    Total minutes for 12/7/2020: 75 minutes    Govind Del Castillo, Graduate Clinician

## 2020-12-07 NOTE — PROGRESS NOTES
Comprehensive Nutrition Assessment    Type and Reason for Visit:  Initial    Nutrition Recommendations/Plan: Recommend having SLP re-assess pt's swallowing ability. Pt reports she is eating food given to her from home w/ no difficulty but items she reports her son is giving her are potentially not meeting texture needs. Reinforced to patient importance of consuming only minced and moist texture foods & offered additional items from kitchen. Patient declined at this time. Will closely monitor. Nutrition Assessment:  Pt admit s/p CVA w/ dysphagia. D/w patient about PO intake- reports that her son is bringing in food and she is eating is w/ no difficulty. Would reccomend SLP eval to assess function. Malnutrition Assessment:  Malnutrition Status:  No malnutrition    Context:  Acute Illness       Estimated Daily Nutrient Needs:  Energy (kcal):  9849-2540; Weight Used for Energy Requirements:  Current     Protein (g):  1.5-1.8= 75-85; Weight Used for Protein Requirements:  Ideal        Fluid (ml/day):  1300ml; Method Used for Fluid Requirements:  1 ml/kcal      Nutrition Related Findings:  +i/os, soft abd active bs, no edema noted, past MBS w/ mild oropharangeal dysphagia      Wounds:  Surgical Incision       Current Nutrition Therapies:    DIET DYSPHAGIA MINCED AND MOIST;     Anthropometric Measures:  · Height: 5' 2\" (157.5 cm)  · Current Body Weight: 166 lb (75.3 kg)(12/3)     · Usual Body Weight: 167 lb (75.8 kg)(2/ 2020 per EMR)     · Ideal Body Weight: 110 lbs; % Ideal Body Weight 150.9 %   · BMI: 30.4    Nutrition Diagnosis:   · Inadequate oral intake related to swallowing difficulty as evidenced by swallow study results, poor intake prior to admission      Nutrition Interventions:   Food and/or Nutrient Delivery:  Continue Current Diet  Nutrition Education/Counseling:  No recommendation at this time   Coordination of Nutrition Care:  Swallow Evaluation, Speech Therapy, Continue to monitor while

## 2020-12-08 LAB
ALBUMIN SERPL-MCNC: 3.7 G/DL (ref 3.5–5.2)
ALP BLD-CCNC: 65 U/L (ref 35–104)
ALT SERPL-CCNC: 9 U/L (ref 0–32)
ANION GAP SERPL CALCULATED.3IONS-SCNC: 8 MMOL/L (ref 7–16)
AST SERPL-CCNC: 12 U/L (ref 0–31)
BASOPHILS ABSOLUTE: 0.01 E9/L (ref 0–0.2)
BASOPHILS RELATIVE PERCENT: 0.2 % (ref 0–2)
BILIRUB SERPL-MCNC: 0.3 MG/DL (ref 0–1.2)
BUN BLDV-MCNC: 14 MG/DL (ref 8–23)
CALCIUM SERPL-MCNC: 8.8 MG/DL (ref 8.6–10.2)
CHLORIDE BLD-SCNC: 109 MMOL/L (ref 98–107)
CO2: 26 MMOL/L (ref 22–29)
CREAT SERPL-MCNC: 1 MG/DL (ref 0.5–1)
EOSINOPHILS ABSOLUTE: 0.05 E9/L (ref 0.05–0.5)
EOSINOPHILS RELATIVE PERCENT: 1.1 % (ref 0–6)
GFR AFRICAN AMERICAN: >60
GFR NON-AFRICAN AMERICAN: 53 ML/MIN/1.73
GLUCOSE BLD-MCNC: 88 MG/DL (ref 74–99)
HCT VFR BLD CALC: 36.2 % (ref 34–48)
HEMOGLOBIN: 11.6 G/DL (ref 11.5–15.5)
IMMATURE GRANULOCYTES #: 0.01 E9/L
IMMATURE GRANULOCYTES %: 0.2 % (ref 0–5)
LYMPHOCYTES ABSOLUTE: 1.09 E9/L (ref 1.5–4)
LYMPHOCYTES RELATIVE PERCENT: 24.8 % (ref 20–42)
MCH RBC QN AUTO: 31.4 PG (ref 26–35)
MCHC RBC AUTO-ENTMCNC: 32 % (ref 32–34.5)
MCV RBC AUTO: 97.8 FL (ref 80–99.9)
MONOCYTES ABSOLUTE: 0.66 E9/L (ref 0.1–0.95)
MONOCYTES RELATIVE PERCENT: 15 % (ref 2–12)
NEUTROPHILS ABSOLUTE: 2.58 E9/L (ref 1.8–7.3)
NEUTROPHILS RELATIVE PERCENT: 58.7 % (ref 43–80)
PDW BLD-RTO: 13.6 FL (ref 11.5–15)
PLATELET # BLD: 265 E9/L (ref 130–450)
PMV BLD AUTO: 10 FL (ref 7–12)
POTASSIUM SERPL-SCNC: 3.7 MMOL/L (ref 3.5–5)
RBC # BLD: 3.7 E12/L (ref 3.5–5.5)
SODIUM BLD-SCNC: 143 MMOL/L (ref 132–146)
TOTAL PROTEIN: 6.2 G/DL (ref 6.4–8.3)
WBC # BLD: 4.4 E9/L (ref 4.5–11.5)

## 2020-12-08 PROCEDURE — 6370000000 HC RX 637 (ALT 250 FOR IP): Performed by: INTERNAL MEDICINE

## 2020-12-08 PROCEDURE — 36415 COLL VENOUS BLD VENIPUNCTURE: CPT

## 2020-12-08 PROCEDURE — 92507 TX SP LANG VOICE COMM INDIV: CPT

## 2020-12-08 PROCEDURE — 97530 THERAPEUTIC ACTIVITIES: CPT

## 2020-12-08 PROCEDURE — 85025 COMPLETE CBC W/AUTO DIFF WBC: CPT

## 2020-12-08 PROCEDURE — 97535 SELF CARE MNGMENT TRAINING: CPT

## 2020-12-08 PROCEDURE — 6360000002 HC RX W HCPCS: Performed by: INTERNAL MEDICINE

## 2020-12-08 PROCEDURE — 80053 COMPREHEN METABOLIC PANEL: CPT

## 2020-12-08 PROCEDURE — 1280000000 HC REHAB R&B

## 2020-12-08 RX ADMIN — SULFAMETHOXAZOLE AND TRIMETHOPRIM 1 TABLET: 800; 160 TABLET ORAL at 21:49

## 2020-12-08 RX ADMIN — OXYBUTYNIN CHLORIDE 5 MG: 5 TABLET ORAL at 08:16

## 2020-12-08 RX ADMIN — SULFAMETHOXAZOLE AND TRIMETHOPRIM 1 TABLET: 800; 160 TABLET ORAL at 08:14

## 2020-12-08 RX ADMIN — Medication 100 MG: at 08:14

## 2020-12-08 RX ADMIN — MIRTAZAPINE 7.5 MG: 15 TABLET, ORALLY DISINTEGRATING ORAL at 21:49

## 2020-12-08 RX ADMIN — OXYBUTYNIN CHLORIDE 5 MG: 5 TABLET ORAL at 15:02

## 2020-12-08 RX ADMIN — OXYCODONE HYDROCHLORIDE 5 MG: 5 TABLET ORAL at 00:16

## 2020-12-08 RX ADMIN — ASPIRIN 81 MG CHEWABLE TABLET 81 MG: 81 TABLET CHEWABLE at 08:16

## 2020-12-08 RX ADMIN — OXYBUTYNIN CHLORIDE 5 MG: 5 TABLET ORAL at 21:49

## 2020-12-08 RX ADMIN — EZETIMIBE 10 MG: 10 TABLET ORAL at 08:16

## 2020-12-08 RX ADMIN — ENOXAPARIN SODIUM 40 MG: 40 INJECTION SUBCUTANEOUS at 08:13

## 2020-12-08 ASSESSMENT — PAIN DESCRIPTION - LOCATION: LOCATION: LEG

## 2020-12-08 ASSESSMENT — PAIN DESCRIPTION - ORIENTATION: ORIENTATION: LEFT;RIGHT

## 2020-12-08 ASSESSMENT — PAIN SCALES - GENERAL
PAINLEVEL_OUTOF10: 0
PAINLEVEL_OUTOF10: 9

## 2020-12-08 ASSESSMENT — PAIN DESCRIPTION - PAIN TYPE: TYPE: ACUTE PAIN

## 2020-12-08 ASSESSMENT — PAIN DESCRIPTION - DESCRIPTORS: DESCRIPTORS: ACHING

## 2020-12-08 NOTE — PROGRESS NOTES
OCCUPATIONAL THERAPY DAILY NOTE    Date:2020  Patient Name: Sonam Lal  MRN: [id number]  : 1935  Room: [address] [place] [address]     Referring Practitioner: Jacqueline Laughlin MD  Diagnosis: CVA- R carotid endarterectomy 2020; R parietal lobe & remote pacunar & R cerbellum  Additional Pertinent Hx: HTN  Restrictions: Fall Risk, bed & chair alarm & dental soft- alternate solids & liquids    Functional Assessment:   Date Status AE  Comments   Feeding 20 Mod I     Grooming 20  Mod I  No AD  Stood at sink washing hands    Bathing 20 SBA  Shower   LH hose  Shower stall bench    UB Dressing 20 Mod I      LB Dressing 20 SBA     Footwear 20  Mod I   Donned slipper socks seated edge of bed    Toileting 20  Mod I  Grab bar    Homemaking 20 Sup Seated and standing      Functional Transfers / Balance:   Date Status DME  Comments   Sit Balance 20  Independent      Stand Balance 20  Mod I        [x] Tub      [x] Shower   Transfer 20 SBA         SBA Shower stall bench Stepping in and out of tub/shower using grab rail and indwelling seat    Commode   Transfer 20  Mod I  Grab bar     Functional   Mobility 20  Mod I  No AD Back and forth to the bathroom in pt's room. Throughout therapy apt setting. Other:    Bed mobility      Sofa recliner, sofa, dining chair with arms      20     Independent       Mod I            No AD       Functional Exercises / Activity:  -Pt completed peg board activity seated at tabletop with 2lb wrist weights, following of direct picture, focusing on increasing of strength and FMC of BUE's, activity tolerance, cognitive skills, following of one step directions, visual skills, reaching across midline.  Pt requiring min cueing to place pegs in correct spot to match of picture given  -Hand gripper for ~50reps per hand, with rest break, focusing on increasing of  strength     Sensory / Neuromuscular Re-Education:      Cognitive Skills:   Status Comments   Problem   Solving fair     Memory fair     Sequencing fair     Safety fair       Visual Perception:    Education:  Pt was educated on types of DME for tub/shower combo   Reviewed home adaptations to increase level of safety in preparation of pt being discharged home soon        [x] Family teach completed on:  12/5/20  Son present for family teach and addressed commode/tub transfers including grab bars and ext tub bench or shower chair needs. Pt tends to prefer stepping in/out of tub and son will check her home in regards to wall grab bar that was put in her shower however patient stated \"I dont use it, I pull myself up using water faucet in her shower then demo actual transfers done at home which caused concern for falls. Pt attempted ext tub bench transfers however did not like method then stood up to enter tub. Discussed shower chair/ext tub bench however Son and patient will discuss options for DME including grab bar in or outside shower/tub at home. Pt completed sofa/sofa recliner and dining chair with arms transfers needing vc's to reach back for intended surface vs plopping down. Pt was educated by this therapist importance of reaching back prior to sitting down to prevent sacral injury or falling and Son agreed and told patient the same thing. Discussed possible Home Health intervention including home safety evaluation at least to assess patient in her own environment since has specific ways she does things and will continue doing that at home. Son agreed [address] idea would be  Beneficial for patient's safety and informed son to discuss with . Pain Level: 0/10    Additional Notes:   12/8/20 Pt given green dot for in room only       Patient has made fair+ progress during treatment sessions toward set goals.  Therapy emphasis to obtain goals:Strengthening, Gait Training, Patient/Caregiver Education & Training, Home Management Training, Equipment Evaluation, Education, & procurement, Balance Training, Functional Mobility Training, Cognitive Reorientation, Endurance Training, Safety Education & Training, Self-Care / ADL, Cognitive/Perceptual Training     [x] Continue with current OT Plan of care. [] Prepare for Discharge     DISCHARGE RECOMMENDATIONS  Recommended DME:  Grab bar, shower bench/shower chair for tub at home.   Post Discharge Care:   []Home Independently  []Home with 24hr Care / Supervision [x]Home with Partial Supervision []Home with Home Health OT []Home with Out Pt OT []Other: ___   Comments:         Time in Time out Tx Time Breakdown  Variance:   First Session  [id number] [x] Individual Tx- 45mins  [] Concurrent Tx -  [] Co-Tx -   [] Group Tx -   [] Time Missed -     Second Session [id number] [] Individual Tx-  [x] Concurrent Tx - 45mins  [] Co-Tx -   [] Group Tx -   [] Time Missed -          Third Session    [] Individual Tx-   [] Concurrent Tx -  [] Co-Tx -   [] Group Tx -   [] Time Missed -         Total Tx Time: Bri Sherman OTR/L [id number]   Cecille Espinal SANDERS/L [id number]

## 2020-12-08 NOTE — PROGRESS NOTES
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 THERAPY RECOMMENDATIONS:   Speech Pathology intervention is recommended 3-6 times per week for LOS or when goals are met with emphasis on the following:   Improve problem solving/insight/overall safety awareness. Immediate/STM during functional tasks and for recall of medical information with minimal cues and/or external memory aide. OM exercises to improve lingual strength and ROM. Articulation drills/tasks to improve intelligibility targeting specifically vocalic /r/ and medial and final /l/. FIMS SCORES      Swallowing    Current Status  5--Supervision    Short Term Goal 6--Modified Independent    Long Term Goal 6--Modified Independent     Receptive    Current Status  6--Modified Independent    Short Term Goal 6--Modified Independent    Long Term Goal 6--Modified Independent     Expressive    Current Status  5--Supervision    Short Term Goal 6--Modified Independent    Long Term Goal 6--Modified Independent     Social Interaction    Current Status  4--Minimal Assistance    Short Term Goal 5--Supervision    Long Term Goal 5--Supervision         Problem Solving    Current Status  4--Minimal Assistance    Short Term Goal 5--Supervision    Long Term Goal 5--Supervision      Memory    Current Status  4--Minimal Assistance    Short Term Goal 5--Supervision    Long Term Goal 5--Supervision         SWALLOWING    Diet: Minced and moist consistency solids with thin liquids     Patient consumed lunch in room this date. No difficulty reported by nursing staff. COGNITION     Fair+ recall of information previously provided by SLP. Patient requires v/c to decrease rate while ambulating around unit.        Safety: Fair    SPEECH      Completed articulation drill and conversational speech tasks with focus on glides with fair/fair+ accuracy given consistent min v/c. Patient instructed to finish vowel sound and then close back teeth during production of vocalic r. She was also educated to focus on raising tongue behind teeth for final l. Fair carryover following review.     Three Hour Rule Tracking:    Individual therapy:   45 minutes  Concurrent therapy:  0 minutes  Group therapy:   0 minutes  Co-treatment therapy:  0 minutes    Total minutes for 12/8/2020: 45 minutes

## 2020-12-08 NOTE — PROGRESS NOTES
with no AD with Independent   Walking 10 feet on uneven surface 10 feet with no AD with SBA NT NT 10 feet with no AD with Sup 10 feet with no AD with Independent   Wheel Chair Mobility NT NT NT       Car Transfers SBA Independent NT Sup Independent    Stair negotiation: ascended and descended 4 steps with 1 rail with SBA 12 steps no rail Supervision 20 steps 1 rail Supervision 4 steps with 1 rail with Sup 12 steps with 1 rail with Mod I   Curb Step:   ascended and descended 7.5 inch step with no AD and CGA 7.5 inch step no AD SBA NT 7.5 inch step with no AD and Sup 7.5 inch step with no AD and Independent   Picking up object off the floor Picked up a cone with SBA NT NT Will  an object with Sup Will  an object with Independent   BLE ROM WFL WFL         BLE Strength 4/5 4/5         Balance  Sitting: Supervision  Standing: SBA no AD Sitting: Supervision  Standing: Supervision no AD               Scheduled Meds:   sulfamethoxazole-trimethoprim  1 tablet Oral 2 times per day    mirtazapine  7.5 mg Oral Nightly    thiamine  100 mg Oral Daily    oxybutynin  5 mg Oral TID    enoxaparin  40 mg Subcutaneous Daily    amLODIPine  5 mg Oral Daily    aspirin  81 mg Oral Daily    bisacodyl  10 mg Rectal Daily    docusate sodium  100 mg Oral BID    ezetimibe  10 mg Oral Daily    lactulose  20 g Oral TID    rosuvastatin  5 mg Oral Nightly    sennosides-docusate sodium  2 tablet Oral Daily     Continuous Infusions:  PRN Meds:glycerin (ADULT), acetaminophen **OR** [DISCONTINUED] acetaminophen, polyethylene glycol, promethazine **OR** [DISCONTINUED] ondansetron, oxyCODONE **OR** [DISCONTINUED] oxyCODONE, magnesium hydroxide  I/O last 3 completed shifts: In: 720 [P.O.:720]  Out: -   No intake/output data recorded. Labs reviewed  CBC: No results for input(s): WBC, HGB, PLT in the last 72 hours. BMP:  No results for input(s): NA, K, CL, CO2, BUN, CREATININE, GLUCOSE in the last 72 hours.   Hepatic: No results for input(s): AST, ALT, ALB, BILITOT, ALKPHOS in the last 72 hours. BNP: No results for input(s): BNP in the last 72 hours. Lipids: No results for input(s): CHOL, HDL in the last 72 hours. Invalid input(s): LDLCALCU  INR: No results for input(s): INR in the last 72 hours. Assessment/  Patient Active Problem List:     Anxiety     Essential hypertension     Gastroesophageal reflux disease without esophagitis     Mixed hyperlipidemia     Diastolic dysfunction     Pure hypercholesterolemia     Moderate obesity     Personal history of hydronephrosis     Hiatal hernia with GERD     Cerebrovascular accident (CVA) ([facility] [address]     Stenosis of right carotid artery     Hypertensive emergency     Ischemic stroke ([facility] [address]      Plan/  1. Continue rehab program  2. Continue to monitor neuro status  3. Continue to monitor CV status. She was instructed to notify nursing if she has any chest pain. 4. Continue Rx for UTI  5. Continue dvt prophylaxis  6. Continue to plan for discharge on Wednesday  7.  Check labs prior to discharge          Thomas Keith MD

## 2020-12-08 NOTE — PROGRESS NOTES
Physical Therapy  Treatment Note  Evaluating Therapist: Sonido Goss DPT    NAME: Mp Hinojosa  ROOM: 5825R  DIAGNOSIS: Ischemic stroke  PRECAUTIONS: Falls, alarm,  Modified Luann Arsenio  PROCEDURE(S): 11/27 R CEA  HPI: 82year old female admitted 11/23 with sudden onset left sided weakness. In the ED, CT head revealing chronic microvascular changes without any acute abnormalities. CTA neck revealing bilateral internal carotid artery stenosis, 80% on the right and 50% on the left. TPA was recommended by telestroke but patient denied as her symptoms already improved. MRI showed infarct in high right parietal lobe and remote lacunar infarct in right cerebellum. Social:  Pt lives with alone in a 2 story home with 4-5 steps and 2 rails to enter. 12 steps with 1 rail to upstairs and 10 steps with no rails to basement. Pt's bedroom is on the 2nd floor and walk in shower is in the basement. Pt sleeps on the couch on first floor. Pt may be staying with her son or daughter at discharge. Daughter's home is a ranch style home with 3 steps to enter and 2 rails. Son's house is a 2 story home with 3 steps to enter and no rails and pt's bed and bath would be on the 2nd floor with 12 steps and no rails. Prior to admission pt ambulated with no AD independently. Initial Evaluation  12/1/20 AM     PM    Short Term Goals Long Term Goals    Was pt agreeable to Eval/treatment? yes yes yes     Does pt have pain? No c/o pain No c/o pain No c/o pain     WHITEHEAD  49/56 on 12/2/20      Bed Mobility  Rolling: SBA  Supine to sit: SBA  Sit to supine: SBA  Scooting: SBA Independent all aspects Independent all aspects Sup Independent   Transfers Sit to stand: SBA  Stand to sit: SBA  Stand pivot: SBA no AD Sit to stand: Independent  Stand to sit: Independent  Stand pivot: Independent no AD Sit to stand: Independent  Stand to sit:  Independent  Stand pivot: Independent no AD Sup Independent   Ambulation   75 feet with no AD with  feet no AD patterns    Patient education  Pt educated on safety with functional mobility    Patient response to education:   Pt verbalized understanding Pt demonstrated skill Pt requires further education in this area   yes partial yes     Additional Comments: Reviewed all mobility as noted above in chart. Pt continues to have some safety awareness deficits. Pt given Modified Green Dot for use in room only. Pt's O2 sats ranged from 93-94% on RA throughout both sessions. Incorporated various activities/exercises to improve the pt's dynamic standing balance. Cues provided throughout sessions to improve pt's safety awareness however pt is minimally receptive to this. Pt is planned for discharge tomorrow and will have supervision initially as she will be moving in with a family member. Chair/bed alarm: armed following session    AM   Time in: 0830  Time out: 0915  PM   Time in: 1300  Time out: 1345    Pt is making good progress toward established Physical Therapy goals. Continue with physical therapy current plan of care.     Coby Kilgore DPT  [id number]

## 2020-12-09 VITALS
HEIGHT: 62 IN | BODY MASS INDEX: 30.56 KG/M2 | DIASTOLIC BLOOD PRESSURE: 60 MMHG | SYSTOLIC BLOOD PRESSURE: 126 MMHG | RESPIRATION RATE: 18 BRPM | WEIGHT: 166.06 LBS | TEMPERATURE: 97.8 F | OXYGEN SATURATION: 93 % | HEART RATE: 92 BPM

## 2020-12-09 PROCEDURE — 97535 SELF CARE MNGMENT TRAINING: CPT

## 2020-12-09 PROCEDURE — 97130 THER IVNTJ EA ADDL 15 MIN: CPT

## 2020-12-09 PROCEDURE — 6360000002 HC RX W HCPCS: Performed by: INTERNAL MEDICINE

## 2020-12-09 PROCEDURE — 97530 THERAPEUTIC ACTIVITIES: CPT

## 2020-12-09 PROCEDURE — 6370000000 HC RX 637 (ALT 250 FOR IP): Performed by: INTERNAL MEDICINE

## 2020-12-09 PROCEDURE — 97129 THER IVNTJ 1ST 15 MIN: CPT

## 2020-12-09 RX ORDER — MIRTAZAPINE 15 MG/1
7.5 TABLET, ORALLY DISINTEGRATING ORAL NIGHTLY
Qty: 30 TABLET | Refills: 3 | Status: SHIPPED | OUTPATIENT
Start: 2020-12-09 | End: 2021-04-15 | Stop reason: SDUPTHER

## 2020-12-09 RX ORDER — SENNA AND DOCUSATE SODIUM 50; 8.6 MG/1; MG/1
2 TABLET, FILM COATED ORAL DAILY
Qty: 60 TABLET | Refills: 0 | Status: SHIPPED | OUTPATIENT
Start: 2020-12-09 | End: 2021-01-26

## 2020-12-09 RX ORDER — ASPIRIN 81 MG/1
81 TABLET, CHEWABLE ORAL DAILY
Qty: 30 TABLET | Refills: 3 | Status: SHIPPED | OUTPATIENT
Start: 2020-12-09 | End: 2021-04-15 | Stop reason: SDUPTHER

## 2020-12-09 RX ORDER — OXYBUTYNIN CHLORIDE 5 MG/1
5 TABLET ORAL 3 TIMES DAILY
Qty: 90 TABLET | Refills: 3 | Status: SHIPPED | OUTPATIENT
Start: 2020-12-09 | End: 2021-03-22

## 2020-12-09 RX ORDER — POLYETHYLENE GLYCOL 3350 17 G/17G
17 POWDER, FOR SOLUTION ORAL DAILY PRN
Qty: 527 G | Refills: 1 | COMMUNITY
Start: 2020-12-09 | End: 2021-01-08

## 2020-12-09 RX ORDER — LANOLIN ALCOHOL/MO/W.PET/CERES
100 CREAM (GRAM) TOPICAL DAILY
Qty: 30 TABLET | Refills: 3 | Status: SHIPPED | OUTPATIENT
Start: 2020-12-09 | End: 2021-04-15 | Stop reason: SDUPTHER

## 2020-12-09 RX ORDER — SULFAMETHOXAZOLE AND TRIMETHOPRIM 800; 160 MG/1; MG/1
1 TABLET ORAL EVERY 12 HOURS SCHEDULED
Qty: 8 TABLET | Refills: 0 | Status: SHIPPED | OUTPATIENT
Start: 2020-12-09 | End: 2020-12-13

## 2020-12-09 RX ORDER — ROSUVASTATIN CALCIUM 5 MG/1
5 TABLET, COATED ORAL NIGHTLY
Qty: 30 TABLET | Refills: 3 | Status: SHIPPED | OUTPATIENT
Start: 2020-12-09 | End: 2021-01-26

## 2020-12-09 RX ORDER — AMLODIPINE BESYLATE 5 MG/1
5 TABLET ORAL DAILY
Qty: 30 TABLET | Refills: 3 | Status: SHIPPED | OUTPATIENT
Start: 2020-12-09 | End: 2021-04-15 | Stop reason: SDUPTHER

## 2020-12-09 RX ADMIN — OXYBUTYNIN CHLORIDE 5 MG: 5 TABLET ORAL at 14:10

## 2020-12-09 RX ADMIN — ENOXAPARIN SODIUM 40 MG: 40 INJECTION SUBCUTANEOUS at 10:05

## 2020-12-09 RX ADMIN — Medication 100 MG: at 10:05

## 2020-12-09 RX ADMIN — OXYBUTYNIN CHLORIDE 5 MG: 5 TABLET ORAL at 10:05

## 2020-12-09 RX ADMIN — SULFAMETHOXAZOLE AND TRIMETHOPRIM 1 TABLET: 800; 160 TABLET ORAL at 10:05

## 2020-12-09 RX ADMIN — ASPIRIN 81 MG CHEWABLE TABLET 81 MG: 81 TABLET CHEWABLE at 10:05

## 2020-12-09 RX ADMIN — EZETIMIBE 10 MG: 10 TABLET ORAL at 10:05

## 2020-12-09 ASSESSMENT — PAIN SCALES - GENERAL
PAINLEVEL_OUTOF10: 0
PAINLEVEL_OUTOF10: 0

## 2020-12-09 NOTE — DISCHARGE SUMMARY
Rehabilitation Discharge Summary     Patient Identification  Ayaan Pemberton is a 82 y.o. female. :  1935  Admit Date:  2020  Discharge date and time: No discharge date for patient encounter. Attending Provider: Saurabh Nieves MD                                     Admission Diagnoses:   Ischemic stroke [facility]) [I63.9]    Discharge Diagnoses:   Patient Active Problem List   Diagnosis    Anxiety    Essential hypertension    Gastroesophageal reflux disease without esophagitis    Mixed hyperlipidemia    Diastolic dysfunction    Pure hypercholesterolemia    Moderate obesity    Personal history of hydronephrosis    Hiatal hernia with GERD    Cerebrovascular accident (CVA) ([address]    Stenosis of right carotid artery    Hypertensive emergency    Ischemic stroke [facility])        Discharge Physician: Lawson Murrieta    Admission Functional Status:                   Initial Evaluation  20          Short Term Goals Long Term Goals    Was pt agreeable to Eval/treatment? yes Initial Evaluation Initial Evaluation       Does pt have pain?  No c/o pain           Bed Mobility  Rolling: SBA  Supine to sit: SBA  Sit to supine: SBA  Scooting: SBA     Sup Independent   Transfers Sit to stand: SBA  Stand to sit: SBA  Stand pivot: SBA no AD     Sup Independent   Ambulation   75 feet with no AD with SBA     150 feet with no AD with Supervision 150 feet with no AD with Independent   Walking 10 feet on uneven surface 10 feet with no AD with SBA     10 feet with no AD with Sup 10 feet with no AD with Independent   Wheel Chair Mobility NT           Car Transfers SBA     Sup Independent    Stair negotiation: ascended and descended 4 steps with 1 rail with SBA     4 steps with 1 rail with Sup 12 steps with 1 rail with Mod I   Curb Step:   ascended and descended 7.5 inch step with no AD and CGA     7.5 inch step with no AD and Sup 7.5 inch step with no AD and Independent   Picking up object off the floor Picked up a movements & demo decreased insight into her deficits. Pt provided with assist  & vc's to ensure pt safety during ADL, transfers & mobility      Coordination  Movements Are Fluid And Coordinated: Yes  Bed mobility  Rolling to Left: Supervision  Rolling to Right: Supervision  Supine to Sit: Supervision  Sit to Supine: Supervision  Scooting: Stand by assistance  Transfers  Stand Pivot Transfers: Contact guard assistance  Sit to stand: Stand by assistance  Stand to sit: Stand by assistance  Transfer Comments: vc's for hand placement & to ensure overall safety      Vision - Basic Assessment  Prior Vision: No visual deficits  Visual History: No significant visual history  Patient Visual Report: No visual complaint reported.      Cognition  Overall Cognitive Status: Exceptions  Arousal/Alertness: Delayed responses to stimuli  Safety Judgement: Decreased awareness of need for assistance  Problem Solving: Assistance required to generate solutions;Assistance required to correct errors made  Insights: Decreased awareness of deficits  Initiation: Requires cues for some  Sequencing: Requires cues for some  Cognition Comment: Pt impulsive when pt realized she need to use the restroom. Pt attempted to abandon rw & ignored O2 tubing & IV line.  Pt provided with Min A & mod-max vc's to ensure pt safety  Sensation  Overall Sensation Status: grossly intact to touch         Discharge Functional Status:         Swallowing                          Current Status             5--Supervision               Short Term Goal         6--Modified Independent                       Long Term Goal          6--Modified Independent              Receptive                          Current Status             6--Modified Independent                       Short Term Goal         6--Modified Independent                       Long Term Goal          6--Modified Independent              Expressive                          Current Status 5--Supervision               Short Term Goal         6--Modified Independent                       Long Term Goal          6--Modified Independent              Social Interaction                          Current Status             4--Minimal Assistance               Short Term Goal         5--Supervision               Long Term Goal          5--Supervision                                                     Problem Solving                          Current Status             4--Minimal Assistance               Short Term Goal         5--Supervision               Long Term Goal          5--Supervision                  Memory                          Current Status             4--Minimal Assistance               Short Term Goal         5--Supervision               Long Term Goal          5--Supervision                                 SWALLOWING     Diet: Minced and moist consistency solids with thin liquids                 Patient consumed lunch in room this date. No difficulty reported by nursing staff.     COGNITION                 Fair+ recall of information previously provided by SLP. Patient requires v/c to decrease rate while ambulating around unit. Safety: Fair          Date   Status   AE    Comments     Feeding   12/6/20   Mod I             Grooming   12/8/20    Mod I    No AD    Stood at sink washing hands      Bathing   12/6/20   SBA    Shower     LH hose  Shower stall bench       UB Dressing   12/6/20   Mod I              LB Dressing   12/6/20   SBA             Footwear   12/8/20    Mod I        Donned slipper socks seated edge of bed      Toileting   12/8/20    Mod I    Grab bar         Homemaking   12/7/20   Sup   Seated and standing              Functional Transfers / Balance:      Date Status DME  Comments   Sit Balance 12/8/20    Independent        Stand Balance 12/8/20    Mod I           [x]? Tub        [x]?  Shower   Transfer 12/8/20 12/6/20 SBA            SBA Shower stall bench Stepping in and out of tub/shower using grab rail and indwelling seat    Commode   Transfer 12/8/20    Mod I  Grab bar      Functional   Mobility 12/8/20    Mod I  No AD Back and forth to the bathroom in pt's room. Throughout therapy apt setting. Other:     Bed mobility        Sofa recliner, sofa, dining chair with arms        12/8/20 12/8/20       Independent         Mod I                 No AD         Functional Exercises / Activity:  -Pt completed peg board activity seated at tabletop with 2lb wrist weights, following of direct picture, focusing on increasing of strength and FMC of BUE's, activity tolerance, cognitive skills, following of one step directions, visual skills, reaching across midline. Pt requiring min cueing to place pegs in correct spot to match of picture given  -Hand gripper for ~50reps per hand, with rest break, focusing on increasing of  strength     Sensory / Neuromuscular Re-Education:        Cognitive Skills:    Status Comments   Problem   Solving fair      Memory fair      Sequencing fair      Safety fair           Initial Evaluation  12/1/20 AM     PM    Short Term Goals Long Term Goals    Was pt agreeable to Eval/treatment? yes yes yes       Does pt have pain? No c/o pain No c/o pain No c/o pain       WHITEHEAD   49/56 on 12/2/20         Bed Mobility  Rolling: SBA  Supine to sit: SBA  Sit to supine: SBA  Scooting: SBA Independent all aspects Independent all aspects Sup Independent   Transfers Sit to stand: SBA  Stand to sit: SBA  Stand pivot: SBA no AD Sit to stand: Independent  Stand to sit: Independent  Stand pivot: Independent no AD Sit to stand: Independent  Stand to sit:  Independent  Stand pivot: Independent no AD Sup Independent   Ambulation   75 feet with no AD with  feet no AD Supervision 500 feet no AD Supervision 150 feet with no AD with Supervision 150 feet with no AD with Independent   Walking 10 feet on uneven surface 10 feet with no AD with SBA NT 10 feet no AD Supervision 10 feet with no AD with Sup 10 feet with no AD with Independent   Wheel Chair Mobility NT NT NT       Car Transfers SBA Independent NT Sup Independent    Stair negotiation: ascended and descended 4 steps with 1 rail with SBA 20 steps 1 rail Supervision 12 steps no rails SBA/Min A 4 steps with 1 rail with Sup 12 steps with 1 rail with Mod I   Curb Step:   ascended and descended 7.5 inch step with no AD and CGA 7.5 inch step x2 reps no AD SBA NT 7.5 inch step with no AD and Sup 7.5 inch step with no AD and Independent   Picking up object off the floor Picked up a cone with SBA NT Supervision Will  an object with Sup Will  an object with Independent   BLE ROM [facility]         BLE Strength 4/5 4/5              Indication for Admission: Admitted mobility and self-care following stroke versus TIA and right carotid endarterectomy. Need for continued close medical follow-up for secondary stroke prevention as well as postop care. Rehabilitation Course: She did participate in a comprehensive inpatient rehab program.  She made functional gains as outlined above. Her right carotid endarterectomy incision was healing nicely. She had swelling which was slowly improving. She did complain of dysuria and a urine was positive for UTI. She was started on Bactrim. She was followed closely by internal medicine. She was maintained on DVT prophylaxis with Lovenox. She was concerned that she had been treated with steroids. She felt that she had demonstrated some weight gain. She is not currently on steroids. She is complaining that her tongue is sore. She does note it is feeling a bit better. She is anxious about planned discharge. Her son will be picking her up today. She requested that her prescription be sent to the in-house pharmacy.     Consults: vascular surgery and internal medicine    Significant Diagnostic Studies: labs:   Results for Anthony Oswald (MRN [id number]) as Absolute Latest Ref Range: 0.05 - 0.50 E9/L  0.05   Basophils Absolute Latest Ref Range: 0.00 - 0.20 E9/L  0.01   CULTURE, URINE Unknown Rpt (A)    Color, UA Latest Ref Range: Straw/Yellow  Yellow    Clarity, UA Latest Ref Range: Clear  SL CLOUDY    Glucose, UA Latest Ref Range: Negative mg/dL Negative    Bilirubin, Urine Latest Ref Range: Negative  Negative    Ketones, Urine Latest Ref Range: Negative mg/dL TRACE (A)    Specific [place], UA Latest Ref Range: 1.005 - 1.030  1.020    Blood, Urine Latest Ref Range: Negative  Negative    pH, UA Latest Ref Range: 5.0 - 9.0  6.5    Protein, UA Latest Ref Range: Negative mg/dL Negative    Urobilinogen, Urine Latest Ref Range: <2.0 E.U./dL 0.2    Nitrite, Urine Latest Ref Range: Negative  Negative    Leukocyte Esterase, Urine Latest Ref Range: Negative  SMALL (A)    WBC, UA Latest Ref Range: 0 - 5 /HPF 10-20 (A)    RBC, UA Latest Ref Range: 0 - 2 /HPF 1-3    Bacteria, UA Latest Ref Range: None Seen /HPF MANY (A)        Treatments: therapies: PT, OT, ST, RN and SW    Discharge Exam:  Vitals:    12/09/20 0830   BP: 126/60   Pulse: 92   Resp: 18   Temp: 97.8 °F (36.6 °C)   SpO2: 93%     HEENT: NC/AT, anicteric  Neck: Supple, healing (R) CEA incision  Lungs: No wheeze, rales or rhonchi  Heart: regular  Abd: +BS  Ext: No pitting edema, no calf tenderness  Neuro: good sitting balance, dysarthria persists. Ambulating with suprevision    Disposition: Home with 24-hour supervision. Ultimately anticipate she will be able to return to an independent setting. Condition: Improved    Patient Instructions:   She is to notify her physician of any problems. She will follow up as scheduled.    Anibal Walker   Home Medication Instructions GNS:[id number]    Printed on:12/09/20 101   Medication Information                      amLODIPine (NORVASC) 5 MG tablet  Take 1 tablet by mouth daily             aspirin 81 MG chewable tablet  Take 1 tablet by mouth daily             ezetimibe (ZETIA) 10 MG tablet  Take 10 mg by mouth daily             mirtazapine (REMERON SOL-TAB) 15 MG disintegrating tablet  Take 0.5 tablets by mouth nightly             oxybutynin (DITROPAN) 5 MG tablet  Take 1 tablet by mouth 3 times daily             polyethylene glycol (GLYCOLAX) 17 g packet  Take 17 g by mouth daily as needed for Constipation             rosuvastatin (CRESTOR) 5 MG tablet  Take 1 tablet by mouth nightly             sennosides-docusate sodium (SENOKOT-S) 8.6-50 MG tablet  Take 2 tablets by mouth daily             sulfamethoxazole-trimethoprim (BACTRIM DS;SEPTRA DS) 800-160 MG per tablet  Take 1 tablet by mouth every 12 hours for 4 days             vitamin B-1 100 MG tablet  Take 1 tablet by mouth daily                     Activity: activity as tolerated and no driving until cleared  Diet: Regular diet, minced and moist.  Wound Care: none needed  DME Orders after Discharge: She has a 3 in 1 commode. No additional equipment is recommended. Follow-up:  [address]    PT/OT/SP/RN & HHA  [address]  [address] [facility]   Prudence Cisneros MD  In 1 month  As needed for rehab follow up  [address]   Tri Berg MD  In 3 week    [facility]  [phone]   Hermann Lamas MD  In 2 weeks  Post operative follow up  [address] [place] [address].   [place] [address]  [phone]

## 2020-12-09 NOTE — PLAN OF CARE
Problem: Anxiety:  Goal: Level of anxiety will decrease  Description: Level of anxiety will decrease  12/9/2020 1055 by Brianna Goodman RN  Outcome: Completed  12/9/2020 1053 by Brianna Goodman RN  Outcome: Ongoing     Problem: Falls - Risk of:  Goal: Will remain free from falls  Description: Will remain free from falls  12/9/2020 1055 by Brianna Goodman RN  Outcome: Completed  12/9/2020 1053 by Brianna Goodman RN  Outcome: Met This Shift  12/9/2020 0147 by Brandy Burrell RN  Outcome: Met This Shift  Goal: Absence of physical injury  Description: Absence of physical injury  12/9/2020 1055 by Brianna Goodman RN  Outcome: Completed  12/9/2020 1053 by Brianna Goodman RN  Outcome: Met This Shift  12/9/2020 0147 by Brandy Burrell RN  Outcome: Met This Shift     Problem: Skin Integrity:  Goal: Will show no infection signs and symptoms  Description: Will show no infection signs and symptoms  12/9/2020 1055 by Brianna Goodman RN  Outcome: Completed  12/9/2020 1053 by Brianna Goodman RN  Outcome: Met This Shift  Goal: Absence of new skin breakdown  Description: Absence of new skin breakdown  12/9/2020 1055 by Brianna Goodman RN  Outcome: Completed  12/9/2020 1053 by Brianna Goodman RN  Outcome: Met This Shift     Problem:  Bowel/Gastric:  Goal: Will show no signs and symptoms of gastrointestinal bleeding  Description: Will show no signs and symptoms of gastrointestinal bleeding  Outcome: Completed     Problem: Coping:  Goal: Ability to identify strategies to decrease anxiety will improve  Description: Ability to identify strategies to decrease anxiety will improve  12/9/2020 1055 by Brianna Goodman RN  Outcome: Completed  12/9/2020 1053 by Brianna Goodman RN  Outcome: Ongoing     Problem: Nutritional:  Goal: Ability to chew and swallow food without choking will improve  Description: Ability to chew and swallow food without choking will improve  12/9/2020 1055 by Brianna Goodman RN  Outcome: Completed  12/9/2020 1053 by Martínez Valdes RN  Outcome: Met This Shift  Goal: Ability to achieve adequate nutritional intake will improve  Description: Ability to achieve adequate nutritional intake will improve  12/9/2020 1055 by Martínez Valdes RN  Outcome: Completed  12/9/2020 1053 by Martínez Valdes RN  Outcome: Met This Shift  Goal: Ability to maintain an optimal weight for height and age will improve  Description: Ability to maintain an optimal weight for height and age will improve  12/9/2020 1055 by Martínez Valdes RN  Outcome: Completed  12/9/2020 1053 by Martínez Valdes RN  Outcome: Met This Shift     Problem: Physical Regulation:  Goal: Complications related to the disease process, condition or treatment will be avoided or minimized  Description: Complications related to the disease process, condition or treatment will be avoided or minimized  12/9/2020 1055 by Martínez Valdes RN  Outcome: Completed  12/9/2020 1053 by Martínez Valdes RN  Outcome: Ongoing     Problem: Sensory:  Goal: General experience of comfort will improve  Description: General experience of comfort will improve  12/9/2020 1055 by Martínez Valdes RN  Outcome: Completed  12/9/2020 1053 by Martínez Valdes RN  Outcome: Ongoing

## 2020-12-09 NOTE — PROGRESS NOTES
OCCUPATIONAL THERAPY DAILY NOTE/Discharge Summary    Date:2020  Patient Name: Na Kaur  MRN: [id number]  : 1935  Room: [address] [place] [address]     Referring Practitioner: Nathalia Bojorquez MD  Diagnosis: CVA- R carotid endarterectomy 2020; R parietal lobe & remote pacunar & R cerbellum  Additional Pertinent Hx: HTN  Restrictions: Fall Risk, bed & chair alarm & dental soft- alternate solids & liquids    Functional Assessment:   Date Status AE  Comments   Feeding 20  Mod I     Grooming 20  Mod I   Standing at sink pt completed oral and hair care    Bathing 20  SBA  Shower   LH hose  Shower stall bench Bathing completed in shower    UB Dressing 20  Mod I   Pt donned pull over shirt    LB Dressing 20  Mod I   Pt donned underpants, and pants    Footwear 20  Mod I   Pt donned socks and shoes    Toileting 20  Mod I  Grab bar    Homemaking 20 Sup Seated and standing      Functional Transfers / Balance:   Date Status DME  Comments   Sit Balance 20  Independent      Stand Balance 20  Mod I        [x] Tub      [x] Shower   Transfer 20  SBA         SBA Shower stall bench         Transfer in and out of walk in shower    Commode   Transfer 20   Mod I  Grab bar     Functional   Mobility 20  Mod I  No AD Throughout pt's room    Other:    Bed mobility      Sofa recliner, sofa, dining chair with arms      20     Independent       Mod I            No AD       Functional Exercises / Activity:       Sensory / Neuromuscular Re-Education:      Cognitive Skills:   Status Comments   Problem   Solving fair     Memory fair     Sequencing fair     Safety fair       Visual Perception:    Education:         [x] Family teach completed on:  20  Son present for family teach and addressed commode/tub transfers including grab bars and ext tub bench or shower chair needs.   Pt tends to prefer stepping in/out of tub and son will check her home in change.  [] had a medical decline and therefore was transferred off the Rehab unit. Long term goals  Time Frame for Long term goals : 10-14 days to address above problem areas  Long term goal 1: Pt demo Mod I to eat all meals  Long term goal 2: Pt demo Mod I grooming seated  Long term goal 3: Pt demo SBA-CGA bathing @ shower level or sponge bathing @ sink level both seated & standing  Long term goal 4: Pt demo s/u UE & SBA-CGA to don depends & pants & Sup to don socks & shoes  Long term goal 5: Pt demo SBA commode trf wtih a rw to a standard commode  Long term goals 6: Pt demo SBA-CGA tub trf wwith appropraite DME to ensure pt safety  Long term goal 7: Pt demo Min A light homemaking activity & demo G- safety & insight with min vc's  Long term goal 8: Pt demo G- endurance for a 20 minute functional activity    The Patient has received education on:  [x]Transfer Safety [x]Compensatory tech for ADLs []AE training [x]DME training [x]Energy Conservation [x]Home / Kitchen Safety  [x]Fall Prevention  []Other:    Family training was completed: yes    Recommended DME:  Grab bar, shower bench/shower chair for tub at home.   Post Discharge Care:   []Home Independently  []Home with 24hr Care / Supervision [x]Home with Partial Supervision []Home with Home Health OT []Home with Out Pt OT []Other: ___   Comments:         Time in Time out Tx Time Breakdown  Variance:   First Session  [id number]  [x] Individual Tx-45   [] Concurrent Tx -  [] Co-Tx -   [] Group Tx -   [] Time Missed -      Second Session   [] Individual Tx-  [] Concurrent Tx -   [] Co-Tx -   [] Group Tx -   [x] Time Missed -45     Pt declined am session stating she was going home      Third Session    [] Individual Tx-   [] Concurrent Tx -  [] Co-Tx -   [] Group Tx -   [] Time Missed -         Total Tx Time: [facility], OTR/L [id number]  Ruddy Maloney OTR/L [id number]

## 2020-12-09 NOTE — PROGRESS NOTES
Physical Therapy  Treatment Note  Evaluating Therapist: Flor Ruggiero DPT    NAME: Gisselle Walker  ROOM: [id number]  DIAGNOSIS: Ischemic stroke  PRECAUTIONS: Falls, alarm,  Modified America Crick  PROCEDURE(S): 11/27 R CEA  HPI: 82year old female admitted 11/23 with sudden onset left sided weakness. In the ED, CT head revealing chronic microvascular changes without any acute abnormalities. CTA neck revealing bilateral internal carotid artery stenosis, 80% on the right and 50% on the left. TPA was recommended by telestroke but patient denied as her symptoms already improved. MRI showed infarct in high right parietal lobe and remote lacunar infarct in right cerebellum. Social:  Pt lives with alone in a 2 story home with 4-5 steps and 2 rails to enter. 12 steps with 1 rail to upstairs and 10 steps with no rails to basement. Pt's bedroom is on the 2nd floor and walk in shower is in the basement. Pt sleeps on the couch on first floor. Pt may be staying with her son or daughter at discharge. Daughter's home is a ranch style home with 3 steps to enter and 2 rails. Son's house is a 2 story home with 3 steps to enter and no rails and pt's bed and bath would be on the 2nd floor with 12 steps and no rails. Prior to admission pt ambulated with no AD independently. Initial Evaluation  12/1/20 AM      12/9 PM    Short Term Goals Long Term Goals    Was pt agreeable to Eval/treatment? yes yes yes     Does pt have pain? No c/o pain No c/o pain No c/o pain     WHITEHEAD  49/56 on 12/2/20      Bed Mobility  Rolling: SBA  Supine to sit: SBA  Sit to supine: SBA  Scooting: SBA Independent all aspects Independent all aspects Sup Independent   Transfers Sit to stand: SBA  Stand to sit: SBA  Stand pivot: SBA no AD Sit to stand: Independent  Stand to sit: Independent  Stand pivot: Independent no AD Sit to stand: Independent  Stand to sit:  Independent  Stand pivot: Independent no AD Sup Independent   Ambulation   75 feet with no AD with  feet no AD Supervision  300 feet   400 feet x 2 no AD   Supervision 150 feet with no AD with Supervision 150 feet with no AD with Independent   Walking 10 feet on uneven surface 10 feet with no AD with SBA NT  10 feet with no AD with Sup 10 feet with no AD with Independent   Wheel Chair Mobility NT NT NT     Car Transfers SBA Supervision  NT Sup Independent    Stair negotiation: ascended and descended 4 steps with 1 rail with SBA 20 steps 1 rail Supervision 12 steps no rails  cga  4 steps with 1 rail with Sup 12 steps with 1 rail with Mod I   Curb Step:   ascended and descended 7.5 inch step with no AD and CGA 7.5 inch step x2 reps no AD SBA NT 7.5 inch step with no AD and Sup 7.5 inch step with no AD and Independent   Picking up object off the floor Picked up a cone with SBA NT NT Will  an object with Sup Will  an object with Independent   BLE ROM [facility]      BLE Strength 4/5 4/5      Balance  Sitting: Supervision  Standing: SBA no AD Sitting: Independent  Standing: Supervision no AD      Date Family Teach Completed TBA Son on 12/5/20      Is additional Family Teaching Needed? Y or N TBA If requested by family/pt      Hindering Progress Balance, safety awareness Balance, safety awareness      PT recommended ELOS 2 weeks 5-6 days      Team's Discharge Plan        Therapist at Team Meeting          Therapeutic Exercise:   AM:   - Functional mobility as noted above in chart  -  Ambulation to the chapel and back  -   function    Patient education  Pt educated on safety with functional mobility    Patient response to education:   Pt verbalized understanding Pt demonstrated skill Pt requires further education in this area   yes partial yes     Additional Comments: Reviewed all mobility as noted above in chart. Pt continues to have some safety awareness deficits. Pt very distracted while ambulating especially in newer environment. Pt had one slight lob to her right and self recovered going through walkway. cues provided throughout sessions to improve pt's safety awareness however pt is minimally receptive to this. Pt is planned for discharge today and will have supervision initially as she will be moving in with a family member. Chair/bed alarm: armed following session    AM   Time in: 0830  Time out: 0915    Pt is making good progress toward established Physical Therapy goals. Continue with physical therapy current plan of care.     Asuncion Clement, [address]

## 2020-12-09 NOTE — PLAN OF CARE
Problem: Falls - Risk of:  Goal: Will remain free from falls  Description: Will remain free from falls  12/9/2020 1053 by Lady Yamileth RN  Outcome: Met This Shift  12/9/2020 0147 by Ron Bar RN  Outcome: Met This Shift  Goal: Absence of physical injury  Description: Absence of physical injury  12/9/2020 1053 by Lady Yamileth RN  Outcome: Met This Shift  12/9/2020 0147 by Ron Bar RN  Outcome: Met This Shift     Problem: Skin Integrity:  Goal: Will show no infection signs and symptoms  Description: Will show no infection signs and symptoms  Outcome: Met This Shift  Goal: Absence of new skin breakdown  Description: Absence of new skin breakdown  Outcome: Met This Shift     Problem: Nutritional:  Goal: Ability to chew and swallow food without choking will improve  Description: Ability to chew and swallow food without choking will improve  Outcome: Met This Shift  Goal: Ability to achieve adequate nutritional intake will improve  Description: Ability to achieve adequate nutritional intake will improve  Outcome: Met This Shift  Goal: Ability to maintain an optimal weight for height and age will improve  Description: Ability to maintain an optimal weight for height and age will improve  Outcome: Met This Shift     Problem: Anxiety:  Goal: Level of anxiety will decrease  Description: Level of anxiety will decrease  Outcome: Ongoing     Problem: Coping:  Goal: Ability to identify strategies to decrease anxiety will improve  Description: Ability to identify strategies to decrease anxiety will improve  Outcome: Ongoing     Problem: Physical Regulation:  Goal: Complications related to the disease process, condition or treatment will be avoided or minimized  Description: Complications related to the disease process, condition or treatment will be avoided or minimized  Outcome: Ongoing     Problem: Sensory:  Goal: General experience of comfort will improve  Description: General experience of comfort will improve  Outcome: Ongoing

## 2020-12-10 NOTE — PROGRESS NOTES
Patient benefited from increased time. Safety: Jose      Completed conversational speech tasks with focus on glides with fair/fair+ accuracy given consistent min v/c. Patient instructed to finish vowel sound and then close back teeth during production of vocalic r. She was also educated to focus on raising tongue behind teeth for final l. Fair carryover following review. Three Hour Rule Tracking:    Individual therapy:   45 minutes  Concurrent therapy:  0 minutes  Group therapy:   0 minutes  Co-treatment therapy:  0 minutes    Total minutes for 12/10/2020: 45 minutes    Speech Pathologist (SLP) completed education with the patient and/or family regarding type of cognitive impairment. Discussed compensatory strategies to assist in improving attention, STM/LTM, problem solving, abstract reasoning and safety/insight. Encouraged patient and/or family to engage SLP in structured Q&A session relative to identified deficit areas. Patient and/or family indicated understanding of all information provided via satisfactory verbal response. Speech Pathologist (SLP) completed education with the patient/family regarding type of speech impairment. Discussed compensatory strategies to promote increased speech intelligibility, including slow rate of delivery and exaggerated articulation. Reviewed oral motor exercises as/if appropriate. Encouraged pt and/or family to engage SLP in unstructured Q&A session relative to identified deficit areas; indicated understanding of all information provided via satisfactory verbal response. Patient to be discharged from ARU today. Progress made toward goals. Recommend ongoing SP intervention.

## 2020-12-10 NOTE — PROGRESS NOTES
Physical Therapy  Discharge Summary  Evaluating Therapist: Moise Sharma DPT    NAME: Jamil Wheatley  ROOM: [id number]  DIAGNOSIS: Ischemic stroke  PRECAUTIONS: Falls, alarm,  Modified Jhoana Button  PROCEDURE(S): 11/27 R CEA  HPI: 82year old female admitted 11/23 with sudden onset left sided weakness. In the ED, CT head revealing chronic microvascular changes without any acute abnormalities. CTA neck revealing bilateral internal carotid artery stenosis, 80% on the right and 50% on the left. TPA was recommended by telestroke but patient denied as her symptoms already improved. MRI showed infarct in high right parietal lobe and remote lacunar infarct in right cerebellum. Social:  Pt lives with alone in a 2 story home with 4-5 steps and 2 rails to enter. 12 steps with 1 rail to upstairs and 10 steps with no rails to basement. Pt's bedroom is on the 2nd floor and walk in shower is in the basement. Pt sleeps on the couch on first floor. Pt may be staying with her son or daughter at discharge. Daughter's home is a ranch style home with 3 steps to enter and 2 rails. Son's house is a 2 story home with 3 steps to enter and no rails and pt's bed and bath would be on the 2nd floor with 12 steps and no rails. Prior to admission pt ambulated with no AD independently. Initial Evaluation  12/1/20   Levels as of 12/9/20 Short Term Goals Long Term Goals    WHITEHEAD  49/56 on 12/2/20     Bed Mobility  Rolling: SBA  Supine to sit: SBA  Sit to supine: SBA  Scooting: SBA Independent all aspects Sup Independent   Transfers Sit to stand: SBA  Stand to sit: SBA  Stand pivot: SBA no AD Sit to stand: Independent  Stand to sit:  Independent  Stand pivot: Independent no AD Sup Independent   Ambulation   75 feet with no AD with  feet no AD Supervision 150 feet with no AD with Supervision 150 feet with no AD with Independent   Walking 10 feet on uneven surface 10 feet with no AD with SBA 10 feet no AD Supervision 10 feet with no AD with Sup 10 feet with no AD with Independent   Wheel Chair Mobility NT NT     Car Transfers SBA Supervision  Sup Independent    Stair negotiation: ascended and descended 4 steps with 1 rail with SBA 20 steps 1 rail Supervision 4 steps with 1 rail with Sup 12 steps with 1 rail with Mod I   Curb Step:   ascended and descended 7.5 inch step with no AD and CGA 7.5 inch step x2 reps no AD SBA 7.5 inch step with no AD and Sup 7.5 inch step with no AD and Independent   Picking up object off the floor Picked up a cone with SBA Supervision Will  an object with Sup Will  an object with Independent   [facility]     BLE Strength 4/5 4/5     Balance  Sitting: Supervision  Standing: SBA no AD Sitting: Independent  Standing: Supervision no AD     Date Family Teach Completed TBA Son on 12/5/20     Is additional Family Teaching Needed? Y or N TBA If requested by family/pt     Hindering Progress Balance, safety awareness Balance, safety awareness     PT recommended ELOS 2 weeks 5-6 days     Team's Discharge Plan       Therapist at Team Meeting           Comments: Pt made good progress in PT as she met some goals at independent however she still required supervision with some activities due to impaired balance and safety awareness. Pt was discharged to a family members home and will have supervision initially to settle in back home. Pt will continue with skilled PT services in the form of outpatient.     Padmini Domingo, DPT [id number]

## 2020-12-14 ENCOUNTER — TELEPHONE (OUTPATIENT)
Dept: NEUROLOGY | Age: 85
End: 2020-12-14

## 2020-12-14 NOTE — TELEPHONE ENCOUNTER
Referral received from [facility], [address] center coordinator, SELINA for pt to call office to schedule an appointment.

## 2020-12-14 NOTE — TELEPHONE ENCOUNTER
----- Message from CELE Roldan CNP sent at 12/10/2020 12:31 PM EST -----  Regarding: Stroke clinic referral    ----- Message -----  From: Kehinde Ochoa MD  Sent: 11/30/2020  11:33 PM EST  To: CELE Roldan CNP

## 2020-12-16 ENCOUNTER — TELEPHONE (OUTPATIENT)
Dept: NEUROLOGY | Age: 85
End: 2020-12-16

## 2020-12-16 NOTE — TELEPHONE ENCOUNTER
Referral received for stroke clinic from [facility]. LM for pt on 12/14 and 12/16/20 to call office to schedule an appointment.

## 2020-12-22 ENCOUNTER — TELEPHONE (OUTPATIENT)
Dept: NEUROLOGY | Age: 85
End: 2020-12-22

## 2020-12-22 ENCOUNTER — TELEPHONE (OUTPATIENT)
Dept: VASCULAR SURGERY | Age: 85
End: 2020-12-22

## 2020-12-22 NOTE — TELEPHONE ENCOUNTER
Called to confirm appointment for 12/23/20 at [phone], left message with date, time and phone number for patient

## 2020-12-23 ENCOUNTER — OFFICE VISIT (OUTPATIENT)
Dept: VASCULAR SURGERY | Age: 85
End: 2020-12-23

## 2020-12-23 VITALS — BODY MASS INDEX: 27.97 KG/M2 | RESPIRATION RATE: 16 BRPM | WEIGHT: 152 LBS | HEIGHT: 62 IN

## 2020-12-23 PROCEDURE — 99024 POSTOP FOLLOW-UP VISIT: CPT | Performed by: SURGERY

## 2020-12-23 NOTE — PROGRESS NOTES
Vascular Surgery Outpatient Progress Note      Chief Complaint   Patient presents with    Post-Op Check     s/p CEA       HISTORY OF PRESENT ILLNESS:                The patient is a 82 y.o. female who returns for follow-up evaluation of patient with a history of a right carotid. She was symptomatic. Overall she is doing well. She still has a little bit of tongue deviation but has no swallowing difficulty. She has a little bit of pain and discomfort associated with the incision but otherwise is doing very well and has no other current complaints. I reviewed her medications with her and her family. Past Medical History:        Diagnosis Date    Anxiety     Gastric reflux     Hypertension      Past Surgical History:        Procedure Laterality Date    CAROTID ENDARTERECTOMY Right 11/27/2020    RIGHT CAROTID ENDARTERECTOMY performed by Jonathan Barajas MD at [place] [address] COLONOSCOPY      HYSTERECTOMY      LITHOTRIPSY Left 2/7/2020    CYSTOSCOPY RETROGRADE PYELOGRAM LEFT URETEROSCOPY LEFT J STENT LASER LITHOTRIPSY performed by Sheryl Mendez DO at [facility] OR     Current Medications:   Prior to Admission medications    Medication Sig Start Date End Date Taking?  Authorizing Provider   aspirin 81 MG chewable tablet Take 1 tablet by mouth daily 12/9/20  Yes Markus King MD   mirtazapine (REMERON SOL-TAB) 15 MG disintegrating tablet Take 0.5 tablets by mouth nightly 12/9/20  Yes Markus King MD   rosuvastatin (CRESTOR) 5 MG tablet Take 1 tablet by mouth nightly 12/9/20  Yes Markus King MD   amLODIPine (NORVASC) 5 MG tablet Take 1 tablet by mouth daily 12/9/20  Yes Markus King MD   polyethylene glycol (GLYCOLAX) 17 g packet Take 17 g by mouth daily as needed for Constipation 12/9/20 1/8/21 Yes Markus King MD   sennosides-docusate sodium (SENOKOT-S) 8.6-50 MG tablet Take 2 tablets by mouth daily 12/9/20  Yes Markus King MD oxybutynin (DITROPAN) 5 MG tablet Take 1 tablet by mouth 3 times daily 12/9/20  Yes Kushal Henley MD   vitamin B-1 100 MG tablet Take 1 tablet by mouth daily 12/9/20  Yes Kushal Henley MD   ezetimibe (ZETIA) 10 MG tablet Take 10 mg by mouth daily   Yes Historical Provider, MD     Allergies:  Lorazepam and Penicillins    Social History     Socioeconomic History    Marital status:       Spouse name: Not on file    Number of children: 11    Years of education: 15    Highest education level: Not on file   Occupational History    Occupation: retired -[employer]   Social Needs    Financial resource strain: Not on file    Food insecurity     Worry: Not on file     Inability: Not on file   [employer] needs     Medical: Not on file     Non-medical: Not on file   Tobacco Use    Smoking status: Never Smoker    Smokeless tobacco: Never Used   Substance and Sexual Activity    Alcohol use: No    Drug use: No    Sexual activity: Never   Lifestyle    Physical activity     Days per week: Not on file     Minutes per session: Not on file    Stress: Not on file   Relationships    Social connections     Talks on phone: Not on file     Gets together: Not on file     Attends Judaism service: Not on file     Active member of club or organization: Not on file     Attends meetings of clubs or organizations: Not on file     Relationship status: Not on file    Intimate partner violence     Fear of current or ex partner: Not on file     Emotionally abused: Not on file     Physically abused: Not on file     Forced sexual activity: Not on file   Other Topics Concern    Not on file   Social History Narrative    Not on file        Family History   Problem Relation Age of Onset    High Blood Pressure Mother     Other Father         Emphysema    Cancer Brother         Leukemia    Cancer Brother        REVIEW OF SYSTEMS: Constitutional: Negative for activity change, appetite change, chills, diaphoresis, fatigue, fever and unexpected weight change. Neuro: See above    PHYSICAL EXAM:  Vitals:    12/23/20 1116   Resp: 16     General Appearance: alert and oriented to person, place and time, well developed and well- nourished, in no acute distress  Skin: warm and dry, no rash or erythema, incisions healing nicely  Head: normocephalic and atraumatic  Eyes: extraocular eye movements intact, conjunctivae normal  ENT: external ear and ear canal normal bilaterally, nose without deformity  Pulmonary/Chest: clear to auscultation bilaterally- no wheezes, rales or rhonchi, normal air movement, no respiratory distress  Cardiovascular: normal rate, regular rhythm, normal S1 and S2, no murmurs, no carotid bruits  Abdomen: soft, non-tender, non-distended, normal bowel sounds, no masses or organomegaly  Musculoskeletal: normal range of motion, no joint swelling, deformity or tenderness  Neurologic: Still some mild tongue deviation and some slurred speech. But otherwise she is doing very well this is slowly improving based on the physical examination when she was in the hospital no cranial nerve deficit, gait, coordination and speech normal  Extremities: no leg edema bilaterally  Lateral palpable brachial and radial pulses. Problem List Items Addressed This Visit     Stenosis of right carotid artery - Primary    Cerebrovascular accident (CVA) [facility])          That is post right carotid endarterectomy. We will get an ultrasound examination next couple months. She otherwise is doing very well and we will see how she does with regards to her tongue deviation. No follow-ups on file.

## 2020-12-29 ENCOUNTER — TELEPHONE (OUTPATIENT)
Dept: NEUROLOGY | Age: 85
End: 2020-12-29

## 2020-12-29 NOTE — TELEPHONE ENCOUNTER
Referral received from [facility] for a referral at the stroke clinic. LM for patient 12/14,12/16 , 12/23, 12/29 to call office to schedule an appointment at the stroke clinic. Called patient cell phone number [phone]. Spoke weth patient who states has seen her PCP and does not want to come to  the stroke clinic. Referral cancelled.

## 2021-01-06 ENCOUNTER — OFFICE VISIT (OUTPATIENT)
Dept: FAMILY MEDICINE CLINIC | Age: 86
End: 2021-01-06
Payer: MEDICARE

## 2021-01-06 VITALS
HEIGHT: 62 IN | RESPIRATION RATE: 18 BRPM | DIASTOLIC BLOOD PRESSURE: 78 MMHG | HEART RATE: 100 BPM | WEIGHT: 153 LBS | BODY MASS INDEX: 28.16 KG/M2 | SYSTOLIC BLOOD PRESSURE: 142 MMHG | TEMPERATURE: 97.3 F | OXYGEN SATURATION: 98 %

## 2021-01-06 DIAGNOSIS — M25.571 ACUTE RIGHT ANKLE PAIN: Primary | ICD-10-CM

## 2021-01-06 DIAGNOSIS — S90.01XA CONTUSION OF RIGHT ANKLE, INITIAL ENCOUNTER: ICD-10-CM

## 2021-01-06 PROCEDURE — 4040F PNEUMOC VAC/ADMIN/RCVD: CPT | Performed by: PHYSICIAN ASSISTANT

## 2021-01-06 PROCEDURE — 1036F TOBACCO NON-USER: CPT | Performed by: PHYSICIAN ASSISTANT

## 2021-01-06 PROCEDURE — G8427 DOCREV CUR MEDS BY ELIG CLIN: HCPCS | Performed by: PHYSICIAN ASSISTANT

## 2021-01-06 PROCEDURE — 1090F PRES/ABSN URINE INCON ASSESS: CPT | Performed by: PHYSICIAN ASSISTANT

## 2021-01-06 PROCEDURE — 1111F DSCHRG MED/CURRENT MED MERGE: CPT | Performed by: PHYSICIAN ASSISTANT

## 2021-01-06 PROCEDURE — 99214 OFFICE O/P EST MOD 30 MIN: CPT | Performed by: PHYSICIAN ASSISTANT

## 2021-01-06 PROCEDURE — G8484 FLU IMMUNIZE NO ADMIN: HCPCS | Performed by: PHYSICIAN ASSISTANT

## 2021-01-06 PROCEDURE — G8400 PT W/DXA NO RESULTS DOC: HCPCS | Performed by: PHYSICIAN ASSISTANT

## 2021-01-06 PROCEDURE — 1123F ACP DISCUSS/DSCN MKR DOCD: CPT | Performed by: PHYSICIAN ASSISTANT

## 2021-01-06 PROCEDURE — G8417 CALC BMI ABV UP PARAM F/U: HCPCS | Performed by: PHYSICIAN ASSISTANT

## 2021-01-06 NOTE — PROGRESS NOTES
21  Viviane Sousa : 1935 Sex: female  Age 82 y.o. Subjective:  Chief Complaint   Patient presents with    Leg Pain     right leg pain from dog kicking her a week or more          HPI:   Viviane Sousa , 82 y.o. female presents to express care for evaluation of right medial ankle pain. The patient states that she was kicked by a dog about a week ago. There is a small contusion in the area that does not seem to be healing although the swelling has vastly improved. The patient is able to ambulate. The patient was concerned for possible injury or fracture. The patient denies any other injuries or trauma. The patient also stated that she needed a refill of her aspirin. ROS:   Unless otherwise stated in this report the patient's positive and negative responses for review of systems for constitutional, eyes, ENT, cardiovascular, respiratory, gastrointestinal, neurological, , musculoskeletal, and integument systems and related systems to the presenting problem are either stated in the history of present illness or were not pertinent or were negative for the symptoms and/or complaints related to the presenting medical problem. Positives and pertinent negatives as per HPI. All others reviewed and are negative.       PMH:     Past Medical History:   Diagnosis Date    Anxiety     Gastric reflux     Hypertension        Past Surgical History:   Procedure Laterality Date    CAROTID ENDARTERECTOMY Right 2020    RIGHT CAROTID ENDARTERECTOMY performed by Primo Avelar MD at [facility] COLONOSCOPY      HYSTERECTOMY      LITHOTRIPSY Left 2020    CYSTOSCOPY RETROGRADE PYELOGRAM LEFT URETEROSCOPY LEFT J STENT LASER LITHOTRIPSY performed by Siomara Mendez DO at [facility] OR       Family History   Problem Relation Age of Onset    High Blood Pressure Mother     Other Father         Emphysema    Cancer Brother         Leukemia    Cancer Brother        Medications: Current Outpatient Medications:     aspirin 81 MG chewable tablet, Take 1 tablet by mouth daily, Disp: 30 tablet, Rfl: 3    mirtazapine (REMERON SOL-TAB) 15 MG disintegrating tablet, Take 0.5 tablets by mouth nightly, Disp: 30 tablet, Rfl: 3    rosuvastatin (CRESTOR) 5 MG tablet, Take 1 tablet by mouth nightly, Disp: 30 tablet, Rfl: 3    amLODIPine (NORVASC) 5 MG tablet, Take 1 tablet by mouth daily, Disp: 30 tablet, Rfl: 3    polyethylene glycol (GLYCOLAX) 17 g packet, Take 17 g by mouth daily as needed for Constipation, Disp: 527 g, Rfl: 1    sennosides-docusate sodium (SENOKOT-S) 8.6-50 MG tablet, Take 2 tablets by mouth daily, Disp: 60 tablet, Rfl: 0    oxybutynin (DITROPAN) 5 MG tablet, Take 1 tablet by mouth 3 times daily, Disp: 90 tablet, Rfl: 3    vitamin B-1 100 MG tablet, Take 1 tablet by mouth daily, Disp: 30 tablet, Rfl: 3    ezetimibe (ZETIA) 10 MG tablet, Take 10 mg by mouth daily, Disp: , Rfl:     Allergies: Allergies   Allergen Reactions    Lorazepam Photosensitivity     Unable to remember reaction state's it's too long ago    Penicillins Other (See Comments)     States it was paranoia. Social History:     Social History     Tobacco Use    Smoking status: Never Smoker    Smokeless tobacco: Never Used   Substance Use Topics    Alcohol use: No    Drug use: No       Patient lives at home. Physical Exam:     Vitals:    01/06/21 1511   BP: (!) 142/78   Pulse: 100   Resp: 18   Temp: 97.3 °F (36.3 °C)   SpO2: 98%   Weight: 153 lb (69.4 kg)   Height: 5' 2\" (1.575 m)       Exam:  Physical Exam  Vital signs reviewed and nurse's notes. The patient is not hypoxic.      General: Alert, no acute distress, patient resting comfortably   Skin: warm, intact, no pallor noted   Head: Normocephalic, atraumatic   Eye: Normal conjunctiva   Respiratory: No acute distress Musculoskeletal: No obvious deformity noted to the right ankle or to the right foot. The patient had tenderness noted to the medial aspect of the ankle just superior to the medial malleolus. The patient had no significant lateral tenderness. The patient has no palpable defect of the Achilles tendon. No step-offs or crepitus noted. The patient had no knee pain or hip pain. The patient was able to ambulate. Pulses intact at DP/PT 2+.  5/5 strength with dorsiflexion plantarflexion at the right ankle. Neurological: alert and orient x4, normal sensory and motor observed. Psychiatric: Cooperative        Testing:           Medical Decision Making:     Vital signs reviewed    Past medical history reviewed. Allergies reviewed. Medications reviewed. Patient on arrival does not appear to be in any apparent distress or discomfort. The patient had x-rays obtained in the office today and formal radiology report is pending at this time. I personally reviewed the x-ray images and did not see any evidence of acute process. We did place the patient in an Ace wrap. The patient will use Motrin Tylenol. She has multiple refills of her aspirin. We did discuss the potential of occult fracture with the patient and the need for followup. The patient understands the need for follow-up and repeat evaluation. The patient was educated on RICE therapy, nsaids, and tylenol. The patient is to return if any of the signs or symptoms worsen. The patient is to follow-up with PCP in the next 2-3 days for repeat evaluation repeat assessment or go directly to the emergency department. Clinical Impression:   Monty Bland was seen today for leg pain. Diagnoses and all orders for this visit:    Acute right ankle pain  -     XR ANKLE RIGHT (MIN 3 VIEWS);  Future    Contusion of right ankle, initial encounter    Other orders  -     Apply ace wrap

## 2021-01-11 ENCOUNTER — TELEPHONE (OUTPATIENT)
Dept: VASCULAR SURGERY | Age: 86
End: 2021-01-11

## 2021-01-11 NOTE — TELEPHONE ENCOUNTER
Rescheduled pt's 3/31/21 carotid u/s + ov with Dr. Tammy Barr to 4/14/21, message left on pt's answering machine

## 2021-01-12 VITALS
SYSTOLIC BLOOD PRESSURE: 108 MMHG | TEMPERATURE: 97 F | BODY MASS INDEX: 28.34 KG/M2 | HEART RATE: 109 BPM | HEIGHT: 62 IN | WEIGHT: 154 LBS | DIASTOLIC BLOOD PRESSURE: 62 MMHG | RESPIRATION RATE: 20 BRPM | OXYGEN SATURATION: 96 %

## 2021-01-15 DIAGNOSIS — E78.2 MIXED HYPERLIPIDEMIA: ICD-10-CM

## 2021-01-15 DIAGNOSIS — I10 ESSENTIAL HYPERTENSION: Primary | ICD-10-CM

## 2021-01-15 DIAGNOSIS — I69.322 CVA, OLD, DYSARTHRIA: ICD-10-CM

## 2021-01-15 NOTE — TELEPHONE ENCOUNTER
Pt calling asking for RF for her Valium 2mg qd prn.  Medication is not on her med list. It is on her dispense report showing last filled 9/24 #30

## 2021-01-15 NOTE — TELEPHONE ENCOUNTER
No refill  Will discuss at the visit,OK to give her a sooner appointment  Blood test before the visit/order placed

## 2021-01-19 VITALS
BODY MASS INDEX: 28.34 KG/M2 | HEIGHT: 62 IN | RESPIRATION RATE: 20 BRPM | WEIGHT: 154 LBS | OXYGEN SATURATION: 96 % | TEMPERATURE: 97 F | DIASTOLIC BLOOD PRESSURE: 62 MMHG | HEART RATE: 109 BPM | SYSTOLIC BLOOD PRESSURE: 108 MMHG

## 2021-01-25 ENCOUNTER — TELEPHONE (OUTPATIENT)
Dept: PRIMARY CARE CLINIC | Age: 86
End: 2021-01-25

## 2021-01-25 NOTE — TELEPHONE ENCOUNTER
Gabi Cordero called and would like you to call her to discuss before her appt tomorrow.   Please call neil at [phone]

## 2021-01-26 ENCOUNTER — OFFICE VISIT (OUTPATIENT)
Dept: PRIMARY CARE CLINIC | Age: 86
End: 2021-01-26
Payer: MEDICARE

## 2021-01-26 VITALS
OXYGEN SATURATION: 97 % | HEIGHT: 62 IN | SYSTOLIC BLOOD PRESSURE: 136 MMHG | TEMPERATURE: 97.3 F | WEIGHT: 150 LBS | HEART RATE: 106 BPM | DIASTOLIC BLOOD PRESSURE: 76 MMHG | BODY MASS INDEX: 27.6 KG/M2

## 2021-01-26 DIAGNOSIS — R05.9 COUGH: Primary | ICD-10-CM

## 2021-01-26 DIAGNOSIS — I63.9 ISCHEMIC STROKE (HCC): ICD-10-CM

## 2021-01-26 DIAGNOSIS — E78.2 MIXED HYPERLIPIDEMIA: Chronic | ICD-10-CM

## 2021-01-26 DIAGNOSIS — I51.89 DIASTOLIC DYSFUNCTION: Chronic | ICD-10-CM

## 2021-01-26 DIAGNOSIS — N18.31 STAGE 3A CHRONIC KIDNEY DISEASE (HCC): ICD-10-CM

## 2021-01-26 DIAGNOSIS — R39.15 URGENCY OF URINATION: ICD-10-CM

## 2021-01-26 DIAGNOSIS — I63.231 CEREBROVASCULAR ACCIDENT (CVA) DUE TO STENOSIS OF RIGHT CAROTID ARTERY (HCC): ICD-10-CM

## 2021-01-26 DIAGNOSIS — I10 ESSENTIAL HYPERTENSION: Chronic | ICD-10-CM

## 2021-01-26 PROCEDURE — 99214 OFFICE O/P EST MOD 30 MIN: CPT | Performed by: INTERNAL MEDICINE

## 2021-01-26 RX ORDER — BENZONATATE 200 MG/1
200 CAPSULE ORAL 3 TIMES DAILY PRN
Qty: 30 CAPSULE | Refills: 0 | Status: SHIPPED | OUTPATIENT
Start: 2021-01-26 | End: 2021-02-02

## 2021-01-26 RX ORDER — AZITHROMYCIN 250 MG/1
250 TABLET, FILM COATED ORAL SEE ADMIN INSTRUCTIONS
Qty: 6 TABLET | Refills: 0 | Status: SHIPPED | OUTPATIENT
Start: 2021-01-26 | End: 2021-01-31

## 2021-01-26 ASSESSMENT — ENCOUNTER SYMPTOMS
SHORTNESS OF BREATH: 1
COUGH: 1
BACK PAIN: 1

## 2021-01-26 ASSESSMENT — PATIENT HEALTH QUESTIONNAIRE - PHQ9
SUM OF ALL RESPONSES TO PHQ QUESTIONS 1-9: 0
2. FEELING DOWN, DEPRESSED OR HOPELESS: 0
SUM OF ALL RESPONSES TO PHQ QUESTIONS 1-9: 0
SUM OF ALL RESPONSES TO PHQ9 QUESTIONS 1 & 2: 0
1. LITTLE INTEREST OR PLEASURE IN DOING THINGS: 0

## 2021-01-26 NOTE — PROGRESS NOTES
21  Khang Line : 1935 Sex: female  Age: 82 y.o. Chief Complaint   Patient presents with    Hypertension       HPI: The patient is here for follow-up on her blood pressure she continues to have problem with change in her speech the way she pronounces but her speech is very understandable she is able to express all her thoughts clearly. The patient is considering to go back to live by herself and is asking if she is allowed to drive. The patient had a stroke and she should not resume driving for at least 6 months after the stroke  The patient is complaining of cough with green mucus, she states that she is now staying with her son and the they have 14 steps and when she goes up the 14 steps then she feels short of breath and a little bit lightheaded. Review of Systems   Constitutional: Negative. HENT: Negative. Respiratory: Positive for cough and shortness of breath. Genitourinary: Negative. Musculoskeletal: Positive for back pain (Mild pain in the mid back to the left of the spine). Neurological: Positive for dizziness. Psychiatric/Behavioral: Negative for confusion, decreased concentration and hallucinations. The patient is nervous/anxious (Occasionally, I was stressed today because I was worried going to be late. ).           Current Outpatient Medications:     aspirin 81 MG chewable tablet, Take 1 tablet by mouth daily, Disp: 30 tablet, Rfl: 3    mirtazapine (REMERON SOL-TAB) 15 MG disintegrating tablet, Take 0.5 tablets by mouth nightly, Disp: 30 tablet, Rfl: 3    amLODIPine (NORVASC) 5 MG tablet, Take 1 tablet by mouth daily, Disp: 30 tablet, Rfl: 3    oxybutynin (DITROPAN) 5 MG tablet, Take 1 tablet by mouth 3 times daily, Disp: 90 tablet, Rfl: 3    vitamin B-1 100 MG tablet, Take 1 tablet by mouth daily, Disp: 30 tablet, Rfl: 3    ezetimibe (ZETIA) 10 MG tablet, Take 10 mg by mouth daily, Disp: , Rfl:   Allergies   Allergen Reactions  Lorazepam Photosensitivity     Unable to remember reaction state's it's too long ago    Penicillins Other (See Comments)     States it was paranoia. Past Medical History:   Diagnosis Date    Abnormal weight loss     Anxiety     Anxiety disorder     Cerebral infarction, unspecified ([address]     Essential (primary) hypertension     Gastric reflux     Hyperlipidemia     Hypertension     Occlusion and stenosis of right carotid artery     Slurred speech      Past Surgical History:   Procedure Laterality Date    CAROTID ENDARTERECTOMY Right 11/27/2020    RIGHT CAROTID ENDARTERECTOMY performed [facility] Adeline Ly [facility] at [facility]      LITHOTRIPSY Left 2/7/2020    CYSTOSCOPY RETROGRADE PYELOGRAM LEFT URETEROSCOPY LEFT J STENT LASER LITHOTRIPSY performed by Demond Mendez DO at [facility] OR     Family History   Problem Relation Age of Onset    High Blood Pressure Mother     Other Father         Emphysema    Cancer Brother         Leukemia    Cancer Brother      Social History     Tobacco Use    Smoking status: Never Smoker    Smokeless tobacco: Never Used   Substance Use Topics    Alcohol use: No    Drug use: No        Vitals:    01/26/21 1503   BP: 136/76   Pulse: 106   Temp: 97.3 °F (36.3 °C)   SpO2: 97%   Weight: 150 lb (68 kg)   Height: 5' 2\" (1.575 m)       Physical Exam  Constitutional:       Appearance: Normal appearance. HENT:      Head: Normocephalic and atraumatic. Right Ear: Tympanic membrane, ear canal and external ear normal.      Left Ear: Tympanic membrane, ear canal and external ear normal.      Nose: Nose normal.      Mouth/Throat:      Mouth: Mucous membranes are moist.      Pharynx: Oropharynx is clear. Eyes:      General:         Right eye: No discharge. Left eye: No discharge. Extraocular Movements: Extraocular movements intact.       Conjunctiva/sclera: Conjunctivae normal.   Neck: The patient will require physical therapy and speech therapy at her own home if she decides to move to her own home, she will let us know and we will be happy to send the referral.    Seen By:  Ricky King MD

## 2021-01-27 ENCOUNTER — TELEPHONE (OUTPATIENT)
Dept: PRIMARY CARE CLINIC | Age: 86
End: 2021-01-27

## 2021-01-27 NOTE — TELEPHONE ENCOUNTER
Okay for letter to indicate that the patient is not safe to live alone, it is recommended that the patient stay with her family due to her medical condition.

## 2021-01-27 NOTE — TELEPHONE ENCOUNTER
Patient calling asking for a letter states she is not allowed and not allowed to live alone. The person that is helping her needs this letter. Will  at the  when ready.

## 2021-01-28 DIAGNOSIS — R05.9 COUGH: ICD-10-CM

## 2021-01-28 DIAGNOSIS — E78.2 MIXED HYPERLIPIDEMIA: ICD-10-CM

## 2021-01-28 DIAGNOSIS — I10 ESSENTIAL HYPERTENSION: ICD-10-CM

## 2021-01-28 DIAGNOSIS — I69.322 CVA, OLD, DYSARTHRIA: ICD-10-CM

## 2021-01-28 DIAGNOSIS — N18.31 STAGE 3A CHRONIC KIDNEY DISEASE (HCC): ICD-10-CM

## 2021-01-28 LAB
ANION GAP SERPL CALCULATED.3IONS-SCNC: 10 MMOL/L (ref 7–16)
BASOPHILS ABSOLUTE: 0.03 E9/L (ref 0–0.2)
BASOPHILS RELATIVE PERCENT: 0.5 % (ref 0–2)
BUN BLDV-MCNC: 23 MG/DL (ref 8–23)
CALCIUM SERPL-MCNC: 9.6 MG/DL (ref 8.6–10.2)
CHLORIDE BLD-SCNC: 110 MMOL/L (ref 98–107)
CHOLESTEROL, TOTAL: 151 MG/DL (ref 0–199)
CO2: 26 MMOL/L (ref 22–29)
CREAT SERPL-MCNC: 0.8 MG/DL (ref 0.5–1)
EOSINOPHILS ABSOLUTE: 0.09 E9/L (ref 0.05–0.5)
EOSINOPHILS RELATIVE PERCENT: 1.6 % (ref 0–6)
GFR AFRICAN AMERICAN: >60
GFR NON-AFRICAN AMERICAN: >60 ML/MIN/1.73
GLUCOSE BLD-MCNC: 104 MG/DL (ref 74–99)
HCT VFR BLD CALC: 43.2 % (ref 34–48)
HDLC SERPL-MCNC: 35 MG/DL
HEMOGLOBIN: 13.4 G/DL (ref 11.5–15.5)
IMMATURE GRANULOCYTES #: 0.01 E9/L
IMMATURE GRANULOCYTES %: 0.2 % (ref 0–5)
LDL CHOLESTEROL CALCULATED: 87 MG/DL (ref 0–99)
LYMPHOCYTES ABSOLUTE: 1.85 E9/L (ref 1.5–4)
LYMPHOCYTES RELATIVE PERCENT: 32 % (ref 20–42)
MCH RBC QN AUTO: 30.5 PG (ref 26–35)
MCHC RBC AUTO-ENTMCNC: 31 % (ref 32–34.5)
MCV RBC AUTO: 98.2 FL (ref 80–99.9)
MONOCYTES ABSOLUTE: 0.7 E9/L (ref 0.1–0.95)
MONOCYTES RELATIVE PERCENT: 12.1 % (ref 2–12)
NEUTROPHILS ABSOLUTE: 3.11 E9/L (ref 1.8–7.3)
NEUTROPHILS RELATIVE PERCENT: 53.6 % (ref 43–80)
PDW BLD-RTO: 13.5 FL (ref 11.5–15)
PLATELET # BLD: 308 E9/L (ref 130–450)
PMV BLD AUTO: 10.6 FL (ref 7–12)
POTASSIUM SERPL-SCNC: 4.8 MMOL/L (ref 3.5–5)
RBC # BLD: 4.4 E12/L (ref 3.5–5.5)
SODIUM BLD-SCNC: 146 MMOL/L (ref 132–146)
TRIGL SERPL-MCNC: 144 MG/DL (ref 0–149)
TSH SERPL DL<=0.05 MIU/L-ACNC: 1.68 UIU/ML (ref 0.27–4.2)
VLDLC SERPL CALC-MCNC: 29 MG/DL
WBC # BLD: 5.8 E9/L (ref 4.5–11.5)

## 2021-02-05 ENCOUNTER — TELEPHONE (OUTPATIENT)
Dept: PRIMARY CARE CLINIC | Age: 86
End: 2021-02-05

## 2021-02-05 RX ORDER — EZETIMIBE 10 MG/1
10 TABLET ORAL DAILY
Qty: 90 TABLET | Refills: 1 | Status: SHIPPED
Start: 2021-02-05 | End: 2021-06-16 | Stop reason: SDUPTHER

## 2021-02-15 RX ORDER — LORATADINE 10 MG/1
10 TABLET ORAL DAILY
Qty: 30 TABLET | Refills: 0 | Status: SHIPPED | OUTPATIENT
Start: 2021-02-15 | End: 2021-03-17

## 2021-02-15 NOTE — TELEPHONE ENCOUNTER
The pt is calling to see if an antibiotic can be sent over to [facility] on [address] for her.  I asked her what was going on that she needed one and she said that her nose won't stop running and every time she cleans it out, it hurts (pt says she places a tissue on her finger and cleans her nose out that way)

## 2021-03-22 ENCOUNTER — OFFICE VISIT (OUTPATIENT)
Dept: PRIMARY CARE CLINIC | Age: 86
End: 2021-03-22
Payer: MEDICARE

## 2021-03-22 VITALS
HEIGHT: 60 IN | DIASTOLIC BLOOD PRESSURE: 70 MMHG | OXYGEN SATURATION: 96 % | HEART RATE: 94 BPM | TEMPERATURE: 97.5 F | WEIGHT: 147 LBS | RESPIRATION RATE: 18 BRPM | BODY MASS INDEX: 28.86 KG/M2 | SYSTOLIC BLOOD PRESSURE: 122 MMHG

## 2021-03-22 DIAGNOSIS — I10 ESSENTIAL HYPERTENSION: ICD-10-CM

## 2021-03-22 DIAGNOSIS — Z86.73 HISTORY OF ISCHEMIC STROKE: ICD-10-CM

## 2021-03-22 DIAGNOSIS — I10 ESSENTIAL HYPERTENSION: Primary | ICD-10-CM

## 2021-03-22 DIAGNOSIS — M54.2 CERVICALGIA: ICD-10-CM

## 2021-03-22 DIAGNOSIS — K21.9 GASTROESOPHAGEAL REFLUX DISEASE WITHOUT ESOPHAGITIS: Chronic | ICD-10-CM

## 2021-03-22 DIAGNOSIS — I51.89 DIASTOLIC DYSFUNCTION: Chronic | ICD-10-CM

## 2021-03-22 DIAGNOSIS — R47.81 SLURRED SPEECH: ICD-10-CM

## 2021-03-22 DIAGNOSIS — I63.9 ISCHEMIC STROKE (HCC): ICD-10-CM

## 2021-03-22 DIAGNOSIS — R41.3 MEMORY DEFICIT: ICD-10-CM

## 2021-03-22 DIAGNOSIS — I63.231 CEREBROVASCULAR ACCIDENT (CVA) DUE TO STENOSIS OF RIGHT CAROTID ARTERY (HCC): ICD-10-CM

## 2021-03-22 LAB
BASOPHILS ABSOLUTE: 0.03 E9/L (ref 0–0.2)
BASOPHILS RELATIVE PERCENT: 0.7 % (ref 0–2)
EOSINOPHILS ABSOLUTE: 0.06 E9/L (ref 0.05–0.5)
EOSINOPHILS RELATIVE PERCENT: 1.4 % (ref 0–6)
HCT VFR BLD CALC: 41.5 % (ref 34–48)
HEMOGLOBIN: 13.2 G/DL (ref 11.5–15.5)
IMMATURE GRANULOCYTES #: 0 E9/L
IMMATURE GRANULOCYTES %: 0 % (ref 0–5)
LYMPHOCYTES ABSOLUTE: 1.2 E9/L (ref 1.5–4)
LYMPHOCYTES RELATIVE PERCENT: 27.9 % (ref 20–42)
MCH RBC QN AUTO: 31.2 PG (ref 26–35)
MCHC RBC AUTO-ENTMCNC: 31.8 % (ref 32–34.5)
MCV RBC AUTO: 98.1 FL (ref 80–99.9)
MONOCYTES ABSOLUTE: 0.39 E9/L (ref 0.1–0.95)
MONOCYTES RELATIVE PERCENT: 9.1 % (ref 2–12)
NEUTROPHILS ABSOLUTE: 2.62 E9/L (ref 1.8–7.3)
NEUTROPHILS RELATIVE PERCENT: 60.9 % (ref 43–80)
PDW BLD-RTO: 13.3 FL (ref 11.5–15)
PLATELET # BLD: 281 E9/L (ref 130–450)
PMV BLD AUTO: 10 FL (ref 7–12)
RBC # BLD: 4.23 E12/L (ref 3.5–5.5)
WBC # BLD: 4.3 E9/L (ref 4.5–11.5)

## 2021-03-22 PROCEDURE — 99214 OFFICE O/P EST MOD 30 MIN: CPT | Performed by: INTERNAL MEDICINE

## 2021-03-22 RX ORDER — OXYBUTYNIN CHLORIDE 5 MG/1
5 TABLET ORAL NIGHTLY
Qty: 45 TABLET | Refills: 3 | Status: SHIPPED
Start: 2021-03-22 | End: 2022-01-27 | Stop reason: SDUPTHER

## 2021-03-22 ASSESSMENT — ENCOUNTER SYMPTOMS
CHEST TIGHTNESS: 0
DIARRHEA: 0
WHEEZING: 0
SHORTNESS OF BREATH: 0
COUGH: 0
SINUS PRESSURE: 0
ABDOMINAL PAIN: 0
NAUSEA: 0
CONSTIPATION: 0
SORE THROAT: 0

## 2021-03-22 NOTE — PROGRESS NOTES
HPI:  Patient comes in today for follow-up on her blood pressure and cholesterol the patient is anxious to go back to driving, she had a stroke in November had surgery with right carotid endarterectomy, after the surgery patient developed slurred speech, she is still getting speech therapy and is doing very well. Patient is anxious to drive  Review of Systems   Constitutional: Negative for appetite change, chills, fatigue and fever. HENT: Positive for ear pain (Pain behind the right ear and in the upper part of the neck). Negative for sinus pressure, sneezing and sore throat. Respiratory: Negative for cough, chest tightness, shortness of breath and wheezing. Cardiovascular: Negative for chest pain, palpitations and leg swelling. Gastrointestinal: Negative for abdominal pain, constipation, diarrhea and nausea. Genitourinary: Negative for difficulty urinating, frequency, hematuria and urgency. Musculoskeletal: Positive for neck pain (Mainly in the right side of the neck) and neck stiffness (Mild stiffness when she moves her neck and she feels some numbness over the site where she had the carotid endarterectomy on the right.). Negative for gait problem. Neurological: Positive for speech difficulty (Mild slurred speech since she had a right carotid endarterectomy. Patient getting speech therapy . ). Negative for dizziness and light-headedness.         Past Medical History:   Diagnosis Date    Abnormal weight loss     Anxiety     Anxiety disorder     Cerebral infarction, unspecified ([address]     Essential (primary) hypertension     Gastric reflux     Hyperlipidemia     Hypertension     Occlusion and stenosis of right carotid artery     Slurred speech      Past Surgical History:   Procedure Laterality Date    CAROTID ENDARTERECTOMY Right 11/27/2020    RIGHT CAROTID ENDARTERECTOMY performed by Blake Saunders MD at [address] [place] [address], [address] LITHOTRIPSY Left 2/7/2020 01/28/2021    HCT 43.2 01/28/2021     01/28/2021    CHOL 151 01/28/2021    TRIG 144 01/28/2021    HDL 35 01/28/2021    ALT 9 12/08/2020    AST 12 12/08/2020     01/28/2021    K 4.8 01/28/2021     (H) 01/28/2021    CREATININE 0.8 01/28/2021    BUN 23 01/28/2021    CO2 26 01/28/2021    TSH 1.680 01/28/2021    INR 1.0 11/23/2020    LABA1C 5.8 10/22/2020        Impression/Plan:    1. Essential hypertension  2. Cerebrovascular accident (CVA) due to stenosis of right carotid artery ([facility]  -     [address] [place] [address]/[facility]  3. Ischemic stroke ([facility]  4. Diastolic dysfunction  5. Gastroesophageal reflux disease without esophagitis  6. Cervicalgia  7. History of ischemic stroke  -     [facility]  8. Slurred speech  9. Memory deficit  Patient needs comprehensive cognitive eval, she was oriented x4 today seem to have a good logic and clear thought in her mind, I advised that driving should be 6 months from her stroke, we will also ask speech therapy to do cognitive function evaluation  Patient is scheduled to see the surgeon on 31 March, she has questions regarding the tongue muscles and if she will regain her speech back to normal, I explained to the patient that her speech is already improving and if she continues therapy and the tongue exercises there should be some improvement but not guaranteed to go back to normal.  Patient is very understandable when she speaks she pronounces letters very well and I am able to understand everything she says. Patient is anxious about her speech but she has been doing very well without any Valium patient understands that Valium at her age is not recommended and she has been doing very well without it for many months.   Patient also is doing very well at home by herself and is getting help from her son and he checks on her regularly brings her groceries etc. also she gets to visits every week from physical therapy and 2 visits from speech therapy who come to her own house she is currently unable to drive and the her niece brought her to the office today.

## 2021-03-23 LAB
ALBUMIN SERPL-MCNC: 4.2 G/DL (ref 3.5–5.2)
ALP BLD-CCNC: 90 U/L (ref 35–104)
ALT SERPL-CCNC: 6 U/L (ref 0–32)
ANION GAP SERPL CALCULATED.3IONS-SCNC: 6 MMOL/L (ref 7–16)
AST SERPL-CCNC: 15 U/L (ref 0–31)
BILIRUB SERPL-MCNC: 0.3 MG/DL (ref 0–1.2)
BUN BLDV-MCNC: 15 MG/DL (ref 8–23)
CALCIUM SERPL-MCNC: 9.2 MG/DL (ref 8.6–10.2)
CHLORIDE BLD-SCNC: 105 MMOL/L (ref 98–107)
CHOLESTEROL, TOTAL: 167 MG/DL (ref 0–199)
CO2: 29 MMOL/L (ref 22–29)
CREAT SERPL-MCNC: 0.7 MG/DL (ref 0.5–1)
GFR AFRICAN AMERICAN: >60
GFR NON-AFRICAN AMERICAN: >60 ML/MIN/1.73
GLUCOSE BLD-MCNC: 90 MG/DL (ref 74–99)
HDLC SERPL-MCNC: 33 MG/DL
LDL CHOLESTEROL CALCULATED: 108 MG/DL (ref 0–99)
POTASSIUM SERPL-SCNC: 4.5 MMOL/L (ref 3.5–5)
SODIUM BLD-SCNC: 140 MMOL/L (ref 132–146)
TOTAL PROTEIN: 6.8 G/DL (ref 6.4–8.3)
TRIGL SERPL-MCNC: 129 MG/DL (ref 0–149)
TSH SERPL DL<=0.05 MIU/L-ACNC: 1.77 UIU/ML (ref 0.27–4.2)
VLDLC SERPL CALC-MCNC: 26 MG/DL

## 2021-03-26 ENCOUNTER — APPOINTMENT (OUTPATIENT)
Dept: GENERAL RADIOLOGY | Age: 86
End: 2021-03-26
Payer: MEDICARE

## 2021-03-26 ENCOUNTER — APPOINTMENT (OUTPATIENT)
Dept: CT IMAGING | Age: 86
End: 2021-03-26
Payer: MEDICARE

## 2021-03-26 ENCOUNTER — HOSPITAL ENCOUNTER (EMERGENCY)
Age: 86
Discharge: HOME | End: 2021-03-27
Attending: EMERGENCY MEDICINE
Payer: MEDICARE

## 2021-03-26 DIAGNOSIS — I10 ORTHOSTATIC HYPERTENSION: ICD-10-CM

## 2021-03-26 DIAGNOSIS — S52.501A CLOSED FRACTURE OF DISTAL ENDS OF RIGHT RADIUS AND ULNA, INITIAL ENCOUNTER: ICD-10-CM

## 2021-03-26 DIAGNOSIS — S52.601A CLOSED FRACTURE OF DISTAL ENDS OF RIGHT RADIUS AND ULNA, INITIAL ENCOUNTER: ICD-10-CM
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
## 2021-03-26 LAB
ALBUMIN SERPL-MCNC: 4.1 G/DL (ref 3.5–5.2)
ALP BLD-CCNC: 100 U/L (ref 35–104)
ALT SERPL-CCNC: 7 U/L (ref 0–32)
ANION GAP SERPL CALCULATED.3IONS-SCNC: 5 MMOL/L (ref 7–16)
APTT: 35 SEC (ref 24.5–35.1)
AST SERPL-CCNC: 11 U/L (ref 0–31)
BASOPHILS ABSOLUTE: 0.03 E9/L (ref 0–0.2)
BASOPHILS RELATIVE PERCENT: 0.4 % (ref 0–2)
BILIRUB SERPL-MCNC: <0.2 MG/DL (ref 0–1.2)
BUN BLDV-MCNC: 18 MG/DL (ref 8–23)
CALCIUM SERPL-MCNC: 9.1 MG/DL (ref 8.6–10.2)
CHLORIDE BLD-SCNC: 105 MMOL/L (ref 98–107)
CO2: 29 MMOL/L (ref 22–29)
CREAT SERPL-MCNC: 0.8 MG/DL (ref 0.5–1)
EOSINOPHILS ABSOLUTE: 0.03 E9/L (ref 0.05–0.5)
EOSINOPHILS RELATIVE PERCENT: 0.4 % (ref 0–6)
GFR AFRICAN AMERICAN: >60
GFR NON-AFRICAN AMERICAN: >60 ML/MIN/1.73
GLUCOSE BLD-MCNC: 104 MG/DL (ref 74–99)
HCT VFR BLD CALC: 39.8 % (ref 34–48)
HEMOGLOBIN: 13.2 G/DL (ref 11.5–15.5)
IMMATURE GRANULOCYTES #: 0.02 E9/L
IMMATURE GRANULOCYTES %: 0.3 % (ref 0–5)
INR BLD: 1.1
LYMPHOCYTES ABSOLUTE: 1.14 E9/L (ref 1.5–4)
LYMPHOCYTES RELATIVE PERCENT: 16.9 % (ref 20–42)
MCH RBC QN AUTO: 32 PG (ref 26–35)
MCHC RBC AUTO-ENTMCNC: 33.2 % (ref 32–34.5)
MCV RBC AUTO: 96.4 FL (ref 80–99.9)
MONOCYTES ABSOLUTE: 0.53 E9/L (ref 0.1–0.95)
MONOCYTES RELATIVE PERCENT: 7.9 % (ref 2–12)
NEUTROPHILS ABSOLUTE: 4.99 E9/L (ref 1.8–7.3)
NEUTROPHILS RELATIVE PERCENT: 74.1 % (ref 43–80)
PDW BLD-RTO: 13.3 FL (ref 11.5–15)
PLATELET # BLD: 282 E9/L (ref 130–450)
PMV BLD AUTO: 9.8 FL (ref 7–12)
POTASSIUM REFLEX MAGNESIUM: 4.4 MMOL/L (ref 3.5–5)
PROTHROMBIN TIME: 11.9 SEC (ref 9.3–12.4)
RBC # BLD: 4.13 E12/L (ref 3.5–5.5)
SODIUM BLD-SCNC: 139 MMOL/L (ref 132–146)
TOTAL PROTEIN: 6.7 G/DL (ref 6.4–8.3)
TROPONIN: <0.01 NG/ML (ref 0–0.03)
WBC # BLD: 6.7 E9/L (ref 4.5–11.5)

## 2021-03-26 PROCEDURE — 85610 PROTHROMBIN TIME: CPT

## 2021-03-26 PROCEDURE — 74177 CT ABD & PELVIS W/CONTRAST: CPT

## 2021-03-26 PROCEDURE — 99283 EMERGENCY DEPT VISIT LOW MDM: CPT

## 2021-03-26 PROCEDURE — 80053 COMPREHEN METABOLIC PANEL: CPT

## 2021-03-26 PROCEDURE — 73110 X-RAY EXAM OF WRIST: CPT

## 2021-03-26 PROCEDURE — 71260 CT THORAX DX C+: CPT

## 2021-03-26 PROCEDURE — 73080 X-RAY EXAM OF ELBOW: CPT

## 2021-03-26 PROCEDURE — 85730 THROMBOPLASTIN TIME PARTIAL: CPT

## 2021-03-26 PROCEDURE — 73590 X-RAY EXAM OF LOWER LEG: CPT

## 2021-03-26 PROCEDURE — 70450 CT HEAD/BRAIN W/O DYE: CPT

## 2021-03-26 PROCEDURE — 73552 X-RAY EXAM OF FEMUR 2/>: CPT

## 2021-03-26 PROCEDURE — 93005 ELECTROCARDIOGRAM TRACING: CPT | Performed by: STUDENT IN AN ORGANIZED HEALTH CARE EDUCATION/TRAINING PROGRAM

## 2021-03-26 PROCEDURE — 73090 X-RAY EXAM OF FOREARM: CPT

## 2021-03-26 PROCEDURE — 29125 APPL SHORT ARM SPLINT STATIC: CPT

## 2021-03-26 PROCEDURE — 84484 ASSAY OF TROPONIN QUANT: CPT

## 2021-03-26 PROCEDURE — 73060 X-RAY EXAM OF HUMERUS: CPT

## 2021-03-26 PROCEDURE — 85025 COMPLETE CBC W/AUTO DIFF WBC: CPT

## 2021-03-26 PROCEDURE — 6360000002 HC RX W HCPCS: Performed by: STUDENT IN AN ORGANIZED HEALTH CARE EDUCATION/TRAINING PROGRAM

## 2021-03-26 PROCEDURE — 72125 CT NECK SPINE W/O DYE: CPT

## 2021-03-26 RX ORDER — FENTANYL CITRATE 50 UG/ML
25 INJECTION, SOLUTION INTRAMUSCULAR; INTRAVENOUS ONCE
Status: COMPLETED | OUTPATIENT
Start: 2021-03-26 | End: 2021-03-26

## 2021-03-26 RX ADMIN — FENTANYL CITRATE 25 MCG: 50 INJECTION, SOLUTION INTRAMUSCULAR; INTRAVENOUS at 23:07

## 2021-03-26 ASSESSMENT — ENCOUNTER SYMPTOMS
ABDOMINAL PAIN: 0
EYE PAIN: 0
NAUSEA: 0
COUGH: 0
WHEEZING: 0
VOMITING: 0
BACK PAIN: 0
SINUS PRESSURE: 0
SHORTNESS OF BREATH: 0
SORE THROAT: 0
DIARRHEA: 0

## 2021-03-26 ASSESSMENT — PAIN SCALES - GENERAL: PAINLEVEL_OUTOF10: 10

## 2021-03-27 VITALS
WEIGHT: 147 LBS | SYSTOLIC BLOOD PRESSURE: 155 MMHG | OXYGEN SATURATION: 96 % | DIASTOLIC BLOOD PRESSURE: 63 MMHG | HEART RATE: 83 BPM | BODY MASS INDEX: 24.49 KG/M2 | TEMPERATURE: 98.2 F | RESPIRATION RATE: 16 BRPM | HEIGHT: 65 IN

## 2021-03-27 LAB
EKG ATRIAL RATE: 76 BPM
EKG P AXIS: 81 DEGREES
EKG P-R INTERVAL: 188 MS
EKG Q-T INTERVAL: 396 MS
EKG QRS DURATION: 94 MS
EKG QTC CALCULATION (BAZETT): 445 MS
EKG R AXIS: -55 DEGREES
EKG T AXIS: 81 DEGREES
EKG VENTRICULAR RATE: 76 BPM

## 2021-03-27 PROCEDURE — 93010 ELECTROCARDIOGRAM REPORT: CPT | Performed by: INTERNAL MEDICINE

## 2021-03-27 PROCEDURE — 6360000004 HC RX CONTRAST MEDICATION: Performed by: RADIOLOGY

## 2021-03-27 PROCEDURE — 71260 CT THORAX DX C+: CPT

## 2021-03-27 PROCEDURE — 2580000003 HC RX 258: Performed by: EMERGENCY MEDICINE

## 2021-03-27 PROCEDURE — 6360000002 HC RX W HCPCS: Performed by: EMERGENCY MEDICINE

## 2021-03-27 PROCEDURE — 90471 IMMUNIZATION ADMIN: CPT | Performed by: STUDENT IN AN ORGANIZED HEALTH CARE EDUCATION/TRAINING PROGRAM

## 2021-03-27 PROCEDURE — 90715 TDAP VACCINE 7 YRS/> IM: CPT | Performed by: STUDENT IN AN ORGANIZED HEALTH CARE EDUCATION/TRAINING PROGRAM

## 2021-03-27 PROCEDURE — 6360000002 HC RX W HCPCS: Performed by: STUDENT IN AN ORGANIZED HEALTH CARE EDUCATION/TRAINING PROGRAM

## 2021-03-27 PROCEDURE — 74177 CT ABD & PELVIS W/CONTRAST: CPT

## 2021-03-27 RX ORDER — OXYCODONE HYDROCHLORIDE AND ACETAMINOPHEN 5; 325 MG/1; MG/1
1 TABLET ORAL EVERY 4 HOURS PRN
Qty: 20 TABLET | Refills: 0 | Status: SHIPPED | OUTPATIENT
Start: 2021-03-27 | End: 2021-04-03

## 2021-03-27 RX ORDER — 0.9 % SODIUM CHLORIDE 0.9 %
1000 INTRAVENOUS SOLUTION INTRAVENOUS ONCE
Status: COMPLETED | OUTPATIENT
Start: 2021-03-27 | End: 2021-03-27

## 2021-03-27 RX ORDER — FENTANYL CITRATE 50 UG/ML
50 INJECTION, SOLUTION INTRAMUSCULAR; INTRAVENOUS ONCE
Status: COMPLETED | OUTPATIENT
Start: 2021-03-27 | End: 2021-03-27

## 2021-03-27 RX ADMIN — FENTANYL CITRATE 50 MCG: 50 INJECTION, SOLUTION INTRAMUSCULAR; INTRAVENOUS at 01:34

## 2021-03-27 RX ADMIN — TETANUS TOXOID, REDUCED DIPHTHERIA TOXOID AND ACELLULAR PERTUSSIS VACCINE, ADSORBED 0.5 ML: 5; 2.5; 8; 8; 2.5 SUSPENSION INTRAMUSCULAR at 01:34

## 2021-03-27 RX ADMIN — SODIUM CHLORIDE 1000 ML: 9 INJECTION, SOLUTION INTRAVENOUS at 01:33

## 2021-03-27 RX ADMIN — IOPAMIDOL 75 ML: 755 INJECTION, SOLUTION INTRAVENOUS at 00:29

## 2021-03-27 NOTE — ED PROVIDER NOTES
Patient is an 57-year-old female presenting emerge department after mechanical fall at home. She states she fell asleep in bed and her telephone rang she went to go answer it and her legs gave out underneath her. She thinks she lost consciousness afterwards. She states she is having pain in her right wrist, left knee, left elbow. She does not know any neck pain, back pain, does note she has some left-sided rib pain in the left side of her chest.  She states the pain is 10 out of 10 in severity, sharp, gets worse with movement and palpation, denies any thing feeling numb. Denies any midline chest pain, or shortness of breath. States she has not taken anything for chest pain. It is sharp in nature, constant. Patient makes it worse, nothing makes it better. Review of Systems   Constitutional: Negative for chills and fever. HENT: Negative for ear pain, sinus pressure and sore throat. Eyes: Negative for pain. Respiratory: Negative for cough, shortness of breath and wheezing. Cardiovascular: Positive for chest pain (Left lower rib pain). Gastrointestinal: Negative for abdominal pain, diarrhea, nausea and vomiting. Genitourinary: Negative for dysuria and frequency. Musculoskeletal: Positive for arthralgias (Right wrist, left knee, left elbow, left rib pain) and myalgias. Negative for back pain. Skin: Positive for wound (Left elbow abrasion). Negative for rash. Neurological: Positive for syncope (As she lost consciousness after the fall). Negative for weakness and headaches. Hematological: Negative for adenopathy. All other systems reviewed and are negative. Physical Exam  Vitals signs and nursing note reviewed. Constitutional:       Appearance: Normal appearance. HENT:      Head: Normocephalic and atraumatic.       Right Ear: External ear normal.      Left Ear: External ear normal.      Nose: Nose normal.      Mouth/Throat:      Mouth: Mucous membranes are moist. Eyes:      Extraocular Movements: Extraocular movements intact. Pupils: Pupils are equal, round, and reactive to light. Neck:      Musculoskeletal: Normal range of motion and neck supple. Cardiovascular:      Rate and Rhythm: Normal rate and regular rhythm. Pulses: Normal pulses. Heart sounds: Normal heart sounds. Pulmonary:      Effort: Pulmonary effort is normal.      Breath sounds: Normal breath sounds. Abdominal:      General: Abdomen is flat. Bowel sounds are normal.      Palpations: Abdomen is soft. Musculoskeletal: Normal range of motion. General: Swelling and tenderness (Right wrist tenderness and swelling, mild snuffbox tenderness and pain with axial loading, noted abrasion of the left elbow with some mild tenderness, left knee tenderness with no obvious deformity) present. Skin:     General: Skin is warm and dry. Neurological:      Mental Status: She is alert and oriented to person, place, and time. Cranial Nerves: No cranial nerve deficit. Sensory: No sensory deficit. Motor: Weakness (Weakness of the right hand compared to the left due to pain, but she has full range of motion of her fingers) present. Procedures     MDM     ED Course as of Mar 27 0310   Sat Mar 27, 2021   0137  patient presented emergency department after a fall with post fall syncope after hitting her head. CT scans were negative, x-rays were negative except for a distal right radius and ulna fracture. She did have some snuffbox tenderness so she was placed in a thumb spica splint. Lab work looked within her baseline. She was found to be orthostatic hypertension positive, given fluid bolus, patient's pain is improved with fentanyl, appears to have a distal radial ulnar fracture, nondisplaced, had mild snuffbox tenderness so will place in a thumb spica splint and have her follow-up with an orthopedic surgeon as well as her PCP.   Spoke to patient about her episode of Protime 11.9 9.3 - 12.4 sec    INR 1.1    APTT   Result Value Ref Range    aPTT 35.0 24.5 - 35.1 sec   EKG 12 Lead   Result Value Ref Range    Ventricular Rate 76 BPM    Atrial Rate 76 BPM    P-R Interval 188 ms    QRS Duration 94 ms    Q-T Interval 396 ms    QTc Calculation (Bazett) 445 ms    P Axis 81 degrees    R Axis -55 degrees    T Axis 81 degrees       Radiology:  CT Head WO Contrast   Final Result   No acute intracranial abnormality. CT Cervical Spine WO Contrast   Final Result   No acute abnormality of the cervical spine. Degenerative changes C5 through C7.         CT CHEST W CONTRAST   Final Result   Atraumatic appearance of the chest.      Moderate-sized hiatal hernia. Cholelithiasis. CT ABDOMEN PELVIS W IV CONTRAST Additional Contrast? None   Final Result   Atraumatic appearance of the abdomen and pelvis. Hiatal hernia. Cholelithiasis. XR WRIST RIGHT (MIN 3 VIEWS)   Final Result   Acute nondisplaced fractures of the distal right radius and ulna. XR ELBOW LEFT (MIN 3 VIEWS)   Final Result   No acute abnormality identified. XR FEMUR LEFT (MIN 2 VIEWS)   Final Result   No acute abnormality identified. XR TIBIA FIBULA LEFT (2 VIEWS)   Final Result   No acute abnormality identified. XR RADIUS ULNA RIGHT (2 VIEWS)   Final Result   Acute nondisplaced fractures of the distal right radius and ulna. XR HUMERUS LEFT (MIN 2 VIEWS)   Final Result   No acute bony abnormality of the left humerus. ------------------------- NURSING NOTES AND VITALS REVIEWED ---------------------------  Date / Time Roomed:  3/26/2021 10:21 PM  ED Bed Assignment:  18/18    The nursing notes within the ED encounter and vital signs as below have been reviewed.    BP (!) 174/72   Pulse 82   Temp 98.2 °F (36.8 °C)   Resp 16   Ht 5' 5\" (1.651 m)   Wt 147 lb (66.7 kg)   LMP  (LMP Unknown)   SpO2 96%   BMI 24.46 kg/m²   Oxygen Saturation Interpretation: Normal      ------------------------------------------ PROGRESS NOTES ------------------------------------------  3:11 AM EDT  I have spoken with the patient and discussed todays results, in addition to providing specific details for the plan of care and counseling regarding the diagnosis and prognosis. Their questions are answered at this time and they are agreeable with the plan. I discussed at length with them reasons for immediate return here for re evaluation. They will followup with their primary care physician and orthopedic physician by calling their office on Monday.      --------------------------------- ADDITIONAL PROVIDER NOTES ---------------------------------  At this time the patient is without objective evidence of an acute process requiring hospitalization or inpatient management. They have remained hemodynamically stable throughout their entire ED visit and are stable for discharge with outpatient follow-up. The plan has been discussed in detail and they are aware of the specific conditions for emergent return, as well as the importance of follow-up. New Prescriptions    No medications on file       Diagnosis:  1. Fall from bed, initial encounter    2. Orthostatic hypertension    3. Closed fracture of distal ends of right radius and ulna, initial encounter        Disposition:  Patient's disposition: Discharge to home  Patient's condition is stable.     Andre Pro MD PGY-1  3/27/2021 3:11 AM         Farhad Hager MD  Resident  03/27/21 6199

## 2021-03-29 ENCOUNTER — TELEPHONE (OUTPATIENT)
Dept: PRIMARY CARE CLINIC | Age: 86
End: 2021-03-29

## 2021-03-29 NOTE — TELEPHONE ENCOUNTER
Pt called and wants Dr Mary Du to call her due to arm pain that occurred when she fell at home and was then hospitalized for it per pt statement. She stated, \"I would like Dr Mary Du to call me so I can explain to her what happened and she's not going to be happy about it. \"

## 2021-03-29 NOTE — TELEPHONE ENCOUNTER
Patient called V she got hurt on back of hand and someone ( she didn't say who)told her to get in touch with the orthopedic Dr on [address], Noe Bailey has not answered phone today.      Patient insisting on Dr Garcia Running call her

## 2021-04-12 ENCOUNTER — TELEPHONE (OUTPATIENT)
Dept: VASCULAR SURGERY | Age: 86
End: 2021-04-12

## 2021-04-15 RX ORDER — AMLODIPINE BESYLATE 5 MG/1
5 TABLET ORAL DAILY
Qty: 30 TABLET | Refills: 3 | Status: SHIPPED
Start: 2021-04-15 | End: 2021-06-16

## 2021-04-15 RX ORDER — MIRTAZAPINE 15 MG/1
7.5 TABLET, ORALLY DISINTEGRATING ORAL NIGHTLY
Qty: 30 TABLET | Refills: 3 | Status: SHIPPED
Start: 2021-04-15 | End: 2021-06-16

## 2021-04-15 RX ORDER — ASPIRIN 81 MG/1
81 TABLET, CHEWABLE ORAL DAILY
Qty: 30 TABLET | Refills: 3 | Status: SHIPPED
Start: 2021-04-15 | End: 2022-01-06 | Stop reason: SDUPTHER

## 2021-04-15 RX ORDER — LANOLIN ALCOHOL/MO/W.PET/CERES
100 CREAM (GRAM) TOPICAL DAILY
Qty: 30 TABLET | Refills: 3 | Status: SHIPPED
Start: 2021-04-15 | End: 2022-01-27 | Stop reason: SDUPTHER

## 2021-04-15 NOTE — TELEPHONE ENCOUNTER
Last Appointment:  3/22/2021  Future Appointments   Date Time Provider Edison De Leon   6/21/2021  3:00 PM MD Fernando Singh 59

## 2021-04-22 ENCOUNTER — TELEPHONE (OUTPATIENT)
Dept: PRIMARY CARE CLINIC | Age: 86
End: 2021-04-22

## 2021-04-22 NOTE — TELEPHONE ENCOUNTER
Patient is requesting a refill of vitamin b12 5,000 mg. I do not see this in her current medication list to pend for approval. Please advise.

## 2021-04-23 RX ORDER — NICOTINE POLACRILEX 2 MG
5000 LOZENGE BUCCAL DAILY
Qty: 30 TABLET | Refills: 2 | Status: SHIPPED
Start: 2021-04-23 | End: 2021-06-16 | Stop reason: SDUPTHER

## 2021-05-19 ENCOUNTER — TELEPHONE (OUTPATIENT)
Dept: PRIMARY CARE CLINIC | Age: 86
End: 2021-05-19

## 2021-05-19 DIAGNOSIS — S52.501A CLOSED FRACTURE OF DISTAL ENDS OF RIGHT RADIUS AND ULNA, INITIAL ENCOUNTER: Primary | ICD-10-CM

## 2021-05-19 DIAGNOSIS — S52.601A CLOSED FRACTURE OF DISTAL ENDS OF RIGHT RADIUS AND ULNA, INITIAL ENCOUNTER: Primary | ICD-10-CM

## 2021-05-19 NOTE — TELEPHONE ENCOUNTER
Patient had her cast removed and needs home physical therapy to help with mobility. She also needs to be able to drive again without restrictions. Her next appointment is 6/21/2021.

## 2021-05-19 NOTE — TELEPHONE ENCOUNTER
Okay to order physical therapy for range of motion and strengthening exercises for the upper extremities diagnosis radius and ulnar fracture. Confirm with the patient if she requires any other orders, okay to bring the patient sooner for further evaluation.

## 2021-06-01 ENCOUNTER — TELEPHONE (OUTPATIENT)
Dept: PRIMARY CARE CLINIC | Age: 86
End: 2021-06-01

## 2021-06-01 NOTE — TELEPHONE ENCOUNTER
Pt called stating she needs a release to drive and prescription for her hair; pt stating her hair doesn't \"feel strong and her head is itchy. \"

## 2021-06-08 NOTE — TELEPHONE ENCOUNTER
Pt called to inform us that she may not make her appointment on 06/21/2021 due to transportation. She has been unable to drive so her son has been taking her to appointments and cannot make it til after 4pm. Informed pt I'm unable to schedule her after 4pm since the latest appointment is 330pm. Pt stated she will let her son know and call us back to confirm if she is able to make her appointment.

## 2021-06-10 ENCOUNTER — APPOINTMENT (OUTPATIENT)
Dept: CT IMAGING | Age: 86
End: 2021-06-10
Payer: MEDICARE

## 2021-06-10 ENCOUNTER — HOSPITAL ENCOUNTER (EMERGENCY)
Age: 86
Discharge: HOME | End: 2021-06-11
Attending: EMERGENCY MEDICINE
Payer: MEDICARE

## 2021-06-10 ENCOUNTER — APPOINTMENT (OUTPATIENT)
Dept: GENERAL RADIOLOGY | Age: 86
End: 2021-06-10
Payer: MEDICARE

## 2021-06-10 DIAGNOSIS — R20.2 RIGHT HAND PARESTHESIA: Primary | ICD-10-CM

## 2021-06-10 LAB
ANION GAP SERPL CALCULATED.3IONS-SCNC: 11 MMOL/L (ref 7–16)
APTT: 33.5 SEC (ref 24.5–35.1)
BASOPHILS ABSOLUTE: 0.02 E9/L (ref 0–0.2)
BASOPHILS RELATIVE PERCENT: 0.4 % (ref 0–2)
BUN BLDV-MCNC: 20 MG/DL (ref 6–23)
CALCIUM SERPL-MCNC: 9.2 MG/DL (ref 8.6–10.2)
CHLORIDE BLD-SCNC: 108 MMOL/L (ref 98–107)
CO2: 24 MMOL/L (ref 22–29)
CREAT SERPL-MCNC: 0.8 MG/DL (ref 0.5–1)
EOSINOPHILS ABSOLUTE: 0.07 E9/L (ref 0.05–0.5)
EOSINOPHILS RELATIVE PERCENT: 1.3 % (ref 0–6)
GFR AFRICAN AMERICAN: >60
GFR NON-AFRICAN AMERICAN: >60 ML/MIN/1.73
GLUCOSE BLD-MCNC: 103 MG/DL (ref 74–99)
HCT VFR BLD CALC: 41 % (ref 34–48)
HEMOGLOBIN: 12.9 G/DL (ref 11.5–15.5)
IMMATURE GRANULOCYTES #: 0.01 E9/L
IMMATURE GRANULOCYTES %: 0.2 % (ref 0–5)
INR BLD: 1.1
LYMPHOCYTES ABSOLUTE: 1.36 E9/L (ref 1.5–4)
LYMPHOCYTES RELATIVE PERCENT: 24.4 % (ref 20–42)
MCH RBC QN AUTO: 31.1 PG (ref 26–35)
MCHC RBC AUTO-ENTMCNC: 31.5 % (ref 32–34.5)
MCV RBC AUTO: 98.8 FL (ref 80–99.9)
MONOCYTES ABSOLUTE: 0.59 E9/L (ref 0.1–0.95)
MONOCYTES RELATIVE PERCENT: 10.6 % (ref 2–12)
NEUTROPHILS ABSOLUTE: 3.53 E9/L (ref 1.8–7.3)
NEUTROPHILS RELATIVE PERCENT: 63.1 % (ref 43–80)
PDW BLD-RTO: 13.4 FL (ref 11.5–15)
PLATELET # BLD: 288 E9/L (ref 130–450)
PMV BLD AUTO: 10.1 FL (ref 7–12)
POTASSIUM REFLEX MAGNESIUM: 4.2 MMOL/L (ref 3.5–5)
PROTHROMBIN TIME: 11.9 SEC (ref 9.3–12.4)
RBC # BLD: 4.15 E12/L (ref 3.5–5.5)
SODIUM BLD-SCNC: 143 MMOL/L (ref 132–146)
WBC # BLD: 5.6 E9/L (ref 4.5–11.5)

## 2021-06-10 PROCEDURE — 73130 X-RAY EXAM OF HAND: CPT

## 2021-06-10 PROCEDURE — 85730 THROMBOPLASTIN TIME PARTIAL: CPT

## 2021-06-10 PROCEDURE — 99284 EMERGENCY DEPT VISIT MOD MDM: CPT

## 2021-06-10 PROCEDURE — 85025 COMPLETE CBC W/AUTO DIFF WBC: CPT

## 2021-06-10 PROCEDURE — 85610 PROTHROMBIN TIME: CPT

## 2021-06-10 PROCEDURE — 80048 BASIC METABOLIC PNL TOTAL CA: CPT

## 2021-06-10 PROCEDURE — 93005 ELECTROCARDIOGRAM TRACING: CPT | Performed by: PHYSICIAN ASSISTANT

## 2021-06-10 PROCEDURE — 70450 CT HEAD/BRAIN W/O DYE: CPT

## 2021-06-10 RX ORDER — IBUPROFEN 400 MG/1
400 TABLET ORAL EVERY 6 HOURS PRN
Qty: 120 TABLET | Refills: 0 | Status: SHIPPED | OUTPATIENT
Start: 2021-06-10 | End: 2021-06-16

## 2021-06-10 ASSESSMENT — PAIN DESCRIPTION - DESCRIPTORS: DESCRIPTORS: ACHING

## 2021-06-10 ASSESSMENT — PAIN SCALES - GENERAL: PAINLEVEL_OUTOF10: 8

## 2021-06-10 ASSESSMENT — PAIN DESCRIPTION - PAIN TYPE: TYPE: ACUTE PAIN

## 2021-06-10 ASSESSMENT — PAIN DESCRIPTION - FREQUENCY: FREQUENCY: CONTINUOUS

## 2021-06-10 ASSESSMENT — PAIN DESCRIPTION - LOCATION: LOCATION: HAND

## 2021-06-10 ASSESSMENT — PAIN DESCRIPTION - ORIENTATION: ORIENTATION: RIGHT

## 2021-06-10 NOTE — ED NOTES
FIRST PROVIDER CONTACT ASSESSMENT NOTE      Department of Emergency Medicine   Admit Date: No admission date for patient encounter. Chief Complaint: Hand Numbness (ongoing for 3 days, sent in by pcp )      History of Present Illness:    James Coleman is a 82 y.o. female who presents to the ED for RT hand numbness x3 days. Pt has hx of CVA recently and had left hand numbness at that time. Pt also had rt hand fx 9 weeks ago but is having new pain and numbness.          -----------------END OF FIRST PROVIDER CONTACT ASSESSMENT NOTE--------------  Electronically signed by Clem Nichols PA-C   DD: 6/10/21               Clem Nichols PA-C  06/10/21 1602

## 2021-06-11 VITALS
SYSTOLIC BLOOD PRESSURE: 138 MMHG | OXYGEN SATURATION: 96 % | TEMPERATURE: 97.3 F | DIASTOLIC BLOOD PRESSURE: 63 MMHG | BODY MASS INDEX: 24.79 KG/M2 | RESPIRATION RATE: 16 BRPM | HEART RATE: 78 BPM | WEIGHT: 149 LBS

## 2021-06-11 LAB
EKG ATRIAL RATE: 92 BPM
EKG P AXIS: 74 DEGREES
EKG P-R INTERVAL: 190 MS
EKG Q-T INTERVAL: 372 MS
EKG QRS DURATION: 92 MS
EKG QTC CALCULATION (BAZETT): 460 MS
EKG R AXIS: -38 DEGREES
EKG T AXIS: 72 DEGREES
EKG VENTRICULAR RATE: 92 BPM

## 2021-06-11 PROCEDURE — 93010 ELECTROCARDIOGRAM REPORT: CPT | Performed by: INTERNAL MEDICINE

## 2021-06-11 NOTE — ED NOTES
Pt c/o right hand numbness that started about 3 days ago. Pt states had previous injury to hand several weeks ago.      Lakeisha Schumacher RN  06/11/21 0005
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
## 2021-06-16 ENCOUNTER — OFFICE VISIT (OUTPATIENT)
Dept: PRIMARY CARE CLINIC | Age: 86
End: 2021-06-16
Payer: MEDICARE

## 2021-06-16 VITALS
WEIGHT: 137 LBS | SYSTOLIC BLOOD PRESSURE: 110 MMHG | HEIGHT: 65 IN | BODY MASS INDEX: 22.82 KG/M2 | OXYGEN SATURATION: 96 % | TEMPERATURE: 96 F | HEART RATE: 90 BPM | RESPIRATION RATE: 18 BRPM | DIASTOLIC BLOOD PRESSURE: 62 MMHG

## 2021-06-16 DIAGNOSIS — E78.2 MIXED HYPERLIPIDEMIA: ICD-10-CM

## 2021-06-16 DIAGNOSIS — I51.89 DIASTOLIC DYSFUNCTION: ICD-10-CM

## 2021-06-16 DIAGNOSIS — I10 ESSENTIAL HYPERTENSION: ICD-10-CM

## 2021-06-16 DIAGNOSIS — L29.9 ITCHY SCALP: ICD-10-CM

## 2021-06-16 DIAGNOSIS — Z86.73 HISTORY OF ISCHEMIC STROKE: ICD-10-CM

## 2021-06-16 DIAGNOSIS — I63.9 ISCHEMIC STROKE (HCC): Primary | ICD-10-CM

## 2021-06-16 LAB
ALBUMIN SERPL-MCNC: 4.3 G/DL (ref 3.5–5.2)
ALP BLD-CCNC: 75 U/L (ref 35–104)
ALT SERPL-CCNC: 19 U/L (ref 0–32)
ANION GAP SERPL CALCULATED.3IONS-SCNC: 10 MMOL/L (ref 7–16)
AST SERPL-CCNC: 23 U/L (ref 0–31)
BASOPHILS ABSOLUTE: 0.02 E9/L (ref 0–0.2)
BASOPHILS RELATIVE PERCENT: 0.4 % (ref 0–2)
BILIRUB SERPL-MCNC: 0.2 MG/DL (ref 0–1.2)
BUN BLDV-MCNC: 14 MG/DL (ref 6–23)
CALCIUM SERPL-MCNC: 9.4 MG/DL (ref 8.6–10.2)
CHLORIDE BLD-SCNC: 108 MMOL/L (ref 98–107)
CHOLESTEROL, TOTAL: 143 MG/DL (ref 0–199)
CO2: 24 MMOL/L (ref 22–29)
CREAT SERPL-MCNC: 0.7 MG/DL (ref 0.5–1)
EOSINOPHILS ABSOLUTE: 0.06 E9/L (ref 0.05–0.5)
EOSINOPHILS RELATIVE PERCENT: 1.3 % (ref 0–6)
GFR AFRICAN AMERICAN: >60
GFR NON-AFRICAN AMERICAN: >60 ML/MIN/1.73
GLUCOSE BLD-MCNC: 94 MG/DL (ref 74–99)
HCT VFR BLD CALC: 38.9 % (ref 34–48)
HDLC SERPL-MCNC: 29 MG/DL
HEMOGLOBIN: 12.8 G/DL (ref 11.5–15.5)
IMMATURE GRANULOCYTES #: 0.01 E9/L
IMMATURE GRANULOCYTES %: 0.2 % (ref 0–5)
LDL CHOLESTEROL CALCULATED: 84 MG/DL (ref 0–99)
LYMPHOCYTES ABSOLUTE: 1.25 E9/L (ref 1.5–4)
LYMPHOCYTES RELATIVE PERCENT: 26.7 % (ref 20–42)
MCH RBC QN AUTO: 32.1 PG (ref 26–35)
MCHC RBC AUTO-ENTMCNC: 32.9 % (ref 32–34.5)
MCV RBC AUTO: 97.5 FL (ref 80–99.9)
MONOCYTES ABSOLUTE: 0.49 E9/L (ref 0.1–0.95)
MONOCYTES RELATIVE PERCENT: 10.5 % (ref 2–12)
NEUTROPHILS ABSOLUTE: 2.85 E9/L (ref 1.8–7.3)
NEUTROPHILS RELATIVE PERCENT: 60.9 % (ref 43–80)
PDW BLD-RTO: 13.4 FL (ref 11.5–15)
PLATELET # BLD: 297 E9/L (ref 130–450)
PMV BLD AUTO: 10.3 FL (ref 7–12)
POTASSIUM SERPL-SCNC: 4.4 MMOL/L (ref 3.5–5)
RBC # BLD: 3.99 E12/L (ref 3.5–5.5)
SODIUM BLD-SCNC: 142 MMOL/L (ref 132–146)
TOTAL PROTEIN: 6.7 G/DL (ref 6.4–8.3)
TRIGL SERPL-MCNC: 152 MG/DL (ref 0–149)
TSH SERPL DL<=0.05 MIU/L-ACNC: 1.87 UIU/ML (ref 0.27–4.2)
VLDLC SERPL CALC-MCNC: 30 MG/DL
WBC # BLD: 4.7 E9/L (ref 4.5–11.5)

## 2021-06-16 PROCEDURE — 4040F PNEUMOC VAC/ADMIN/RCVD: CPT | Performed by: INTERNAL MEDICINE

## 2021-06-16 PROCEDURE — 99214 OFFICE O/P EST MOD 30 MIN: CPT | Performed by: INTERNAL MEDICINE

## 2021-06-16 PROCEDURE — 1123F ACP DISCUSS/DSCN MKR DOCD: CPT | Performed by: INTERNAL MEDICINE

## 2021-06-16 PROCEDURE — G8420 CALC BMI NORM PARAMETERS: HCPCS | Performed by: INTERNAL MEDICINE

## 2021-06-16 PROCEDURE — 1036F TOBACCO NON-USER: CPT | Performed by: INTERNAL MEDICINE

## 2021-06-16 PROCEDURE — 1090F PRES/ABSN URINE INCON ASSESS: CPT | Performed by: INTERNAL MEDICINE

## 2021-06-16 PROCEDURE — G8400 PT W/DXA NO RESULTS DOC: HCPCS | Performed by: INTERNAL MEDICINE

## 2021-06-16 PROCEDURE — G8427 DOCREV CUR MEDS BY ELIG CLIN: HCPCS | Performed by: INTERNAL MEDICINE

## 2021-06-16 RX ORDER — EZETIMIBE 10 MG/1
10 TABLET ORAL DAILY
Qty: 90 TABLET | Refills: 2 | Status: SHIPPED
Start: 2021-06-16 | End: 2021-10-18 | Stop reason: SDUPTHER

## 2021-06-16 RX ORDER — AMLODIPINE BESYLATE 2.5 MG/1
TABLET ORAL
Qty: 90 TABLET | Refills: 2 | Status: SHIPPED
Start: 2021-06-16 | End: 2021-09-14 | Stop reason: SINTOL

## 2021-06-16 RX ORDER — NICOTINE POLACRILEX 2 MG
5000 LOZENGE BUCCAL DAILY
Qty: 90 TABLET | Refills: 2 | Status: SHIPPED
Start: 2021-06-16 | End: 2021-09-14 | Stop reason: SDUPTHER

## 2021-06-16 RX ORDER — KETOCONAZOLE 20 MG/ML
SHAMPOO TOPICAL
Qty: 1 BOTTLE | Refills: 5 | Status: SHIPPED
Start: 2021-06-16 | End: 2022-05-31 | Stop reason: ALTCHOICE

## 2021-06-16 RX ORDER — AMLODIPINE BESYLATE 2.5 MG/1
TABLET ORAL
COMMUNITY
Start: 2021-03-11 | End: 2021-06-16 | Stop reason: SDUPTHER

## 2021-06-16 ASSESSMENT — ENCOUNTER SYMPTOMS
CONSTIPATION: 0
DIARRHEA: 0
BACK PAIN: 0
ABDOMINAL PAIN: 0
CHEST TIGHTNESS: 0
GASTROINTESTINAL NEGATIVE: 1
SORE THROAT: 0
EYES NEGATIVE: 1
WHEEZING: 0
SHORTNESS OF BREATH: 0

## 2021-06-16 NOTE — PROGRESS NOTES
HPI:  Patient comes in today for follow-up on her speech she is still getting therapy she is kind of disappointed that her speech is not back to normal, she has questions regarding blockage in the arteries and regarding the strokes. Patient is taking amlodipine aspirin Zetia oxybutynin and B12 and B1 and she is not taking the mirtazapine because she states I am not depressed, patient was unaware that the mirtazapine is given to her to improve her appetite. She continues to lose weight and she states that she only eats well when she is with other people but most of the time what ever she puts in her plate she does not finish. She denies any abdominal pain nausea etc. her bowel movements are regular. The patient is requesting a release paper to drive, the patient has been missing the neurologist appointment because she did not have a ride have been missing her dentist appointments because she does not have a ride etc.  Review of Systems   Constitutional: Negative for chills, fatigue and fever. HENT: Negative for ear pain, postnasal drip and sore throat. Eyes: Negative. Respiratory: Negative for chest tightness, shortness of breath and wheezing. Cardiovascular: Negative for chest pain. Gastrointestinal: Negative. Negative for abdominal pain, constipation and diarrhea. Genitourinary: Positive for urgency (She uses the Ditropan occasionally and takes only part of the tablet. ). Musculoskeletal: Negative for arthralgias, back pain, gait problem and myalgias. Neurological: Negative for dizziness, tremors, seizures, syncope, weakness and headaches. Hematological: Negative for adenopathy. Psychiatric/Behavioral: Negative. Negative for agitation.         Past Medical History:   Diagnosis Date    Abnormal weight loss     Anxiety     Anxiety disorder     Cerebral infarction, unspecified ([facility] [address]     Essential (primary) hypertension     Gastric reflux     Hyperlipidemia     Hypertension     Occlusion and stenosis of right carotid artery     Slurred speech      Past Surgical History:   Procedure Laterality Date    CAROTID ENDARTERECTOMY Right 11/27/2020    RIGHT CAROTID ENDARTERECTOMY performed by Scott Varela MD at [address]      LITHOTRIPSY Left 2/7/2020    CYSTOSCOPY RETROGRADE PYELOGRAM LEFT URETEROSCOPY LEFT J STENT LASER LITHOTRIPSY performed by Maddie Mendez DO at [facility]     Family History   Problem Relation Age of Onset    High Blood Pressure Mother     Other Father         Emphysema    Cancer Brother         Leukemia    Cancer Brother       Social History     Tobacco Use    Smoking status: Never Smoker    Smokeless tobacco: Never Used   Vaping Use    Vaping Use: Never used   Substance Use Topics    Alcohol use: No    Drug use: No        Current Outpatient Medications on File Prior to Visit   Medication Sig Dispense Refill    aspirin 81 MG chewable tablet Take 1 tablet by mouth daily 30 tablet 3    thiamine 100 MG tablet Take 1 tablet by mouth daily 30 tablet 3    oxybutynin (DITROPAN) 5 MG tablet Take 1 tablet by mouth nightly 45 tablet 3     No current facility-administered medications on file prior to visit. PE:  VS:  /62 (Site: Left Upper Arm, Position: Sitting, Cuff Size: Small Adult)   Pulse 90   Temp 96 °F (35.6 °C)   Resp 18   Ht 5' 5\" (1.651 m)   Wt 137 lb (62.1 kg)   LMP  (LMP Unknown)   SpO2 96%   BMI 22.80 kg/m²   General:  Alert and oriented, NAD  HEENT:  TMs, EACs and auricles are normal, NORMA, EOMI, Conjunctivae clear       Throat currently clear. NECK:  Supple without adenopathy or thyromegaly, no carotid bruits. LUNGS:  CTA all fields  HEART:  RRR without M, R, or G  ABDOMEN:  Soft and nontender without palpable abnormalities, No renal or aortic bruits.   EXTREMITIES:  Without clubbing, cyanosis, or edema, 2+ DP/PT pulses B    Lab Results   Component Value Date    WBC 5.6 06/10/2021    HGB 12.9 06/10/2021    HCT 41.0 06/10/2021     06/10/2021    CHOL 167 03/22/2021    TRIG 129 03/22/2021    HDL 33 03/22/2021    ALT 7 03/26/2021    AST 11 03/26/2021     06/10/2021    K 4.2 06/10/2021     (H) 06/10/2021    CREATININE 0.8 06/10/2021    BUN 20 06/10/2021    CO2 24 06/10/2021    TSH 1.770 03/22/2021    INR 1.1 06/10/2021    LABA1C 5.8 10/22/2020        Impression/Plan:    1. Ischemic stroke ([address]  2. Itchy scalp  -     ketoconazole (NIZORAL) 2 % shampoo; Apply topically daily as needed. , Disp-1 Bottle, R-5, Normal  3. Essential hypertension  -     CBC Auto Differential; Future  -     Comprehensive Metabolic Panel; Future  -     Lipid Panel; Future  -     TSH without Reflex; Future  4. Diastolic dysfunction  -     CBC Auto Differential; Future  -     Comprehensive Metabolic Panel; Future  -     Lipid Panel; Future  -     TSH without Reflex; Future  5. History of ischemic stroke  6. Mixed hyperlipidemia  -     CBC Auto Differential; Future  -     Comprehensive Metabolic Panel; Future  -     Lipid Panel; Future  -     TSH without Reflex;  Future

## 2021-07-12 ENCOUNTER — TELEPHONE (OUTPATIENT)
Dept: PRIMARY CARE CLINIC | Age: 86
End: 2021-07-12

## 2021-07-12 NOTE — TELEPHONE ENCOUNTER
Patient called asking for Valium  Just to have as a back up in case she can not sleep due to issues she is having with her daughter. Dr Janey Singh used to give patient a script of this medication per patient.

## 2021-07-14 NOTE — TELEPHONE ENCOUNTER
I left a message on the patient the patient's voicemail. There is a concern for falling with the medication she is requesting, she should call to schedule an appointment if she is having anxiety to discuss other medications that would be more suitable for her.

## 2021-07-16 ENCOUNTER — TELEPHONE (OUTPATIENT)
Dept: PRIMARY CARE CLINIC | Age: 86
End: 2021-07-16

## 2021-07-16 DIAGNOSIS — F41.1 ANXIETY NEUROSIS: Primary | ICD-10-CM

## 2021-07-16 NOTE — TELEPHONE ENCOUNTER
----- Message from Gertrude Party sent at 7/16/2021  9:35 AM EDT -----  Subject: Message to Provider    QUESTIONS  Information for Provider? Patient received call from Doctor Easton about   her Valium she requested, she says she does not want to come in for an   appointment and she feels that if she was on valium she would not have   risk of falling, and she does not want to talk about options for medicine,   she wants to get the valium. She only wants to take as needed. ---------------------------------------------------------------------------  --------------  Kj SAUCEDA  What is the best way for the office to contact you? OK to leave message on   voicemail  Preferred Call Back Phone Number? [phone]  ---------------------------------------------------------------------------  --------------  SCRIPT ANSWERS  Relationship to Patient?  Self

## 2021-07-19 NOTE — TELEPHONE ENCOUNTER
----- Message from SISSY LANCE sent at 7/16/2021  5:56 PM EDT -----  Subject: Refill Request    QUESTIONS  Name of Medication? Other - Pnmiui-wcqa-elfosmxbu- topical ointment  Patient-reported dosage and instructions? Once at night and once in the   afternoon underneath the eye  How many days do you have left? 0  Preferred Pharmacy? [address] phone number (if available)? [phone]  Additional Information for Provider? [id number], the ointment   for the bottom of the her eyelids. Does not have any left.  ---------------------------------------------------------------------------  --------------  CALL BACK INFO  What is the best way for the office to contact you? OK to leave message on   voicemail  Preferred Call Back Phone Number?  [phone]

## 2021-07-19 NOTE — TELEPHONE ENCOUNTER
The medication requested by patient is not on her medication list in EMR.      Medication requested is a topical ointment that is used underneath the eye

## 2021-07-19 NOTE — TELEPHONE ENCOUNTER
----- Message from SISSY LANCE sent at 7/16/2021  5:56 PM EDT -----  Subject: Refill Request    QUESTIONS  Name of Medication? Other - Wflmzy-dkht-rpiglehfk- topical ointment  Patient-reported dosage and instructions? Once at night and once in the   afternoon underneath the eye  How many days do you have left? 0  Preferred Pharmacy? [address] phone number (if available)? [phone]  Additional Information for Provider? [id number], the ointment   for the bottom of the her eyelids. Does not have any left.  ---------------------------------------------------------------------------  --------------  CALL BACK INFO  What is the best way for the office to contact you? OK to leave message on   voicemail  Preferred Call Back Phone Number?  [phone]

## 2021-07-21 RX ORDER — DIAZEPAM 2 MG/1
1 TABLET ORAL DAILY PRN
Qty: 15 TABLET | Refills: 0 | Status: SHIPPED | OUTPATIENT
Start: 2021-07-21 | End: 2021-07-22 | Stop reason: SDUPTHER

## 2021-07-22 ENCOUNTER — TELEPHONE (OUTPATIENT)
Dept: PRIMARY CARE CLINIC | Age: 86
End: 2021-07-22

## 2021-07-22 DIAGNOSIS — F41.1 ANXIETY NEUROSIS: ICD-10-CM

## 2021-07-22 RX ORDER — DIAZEPAM 2 MG/1
1 TABLET ORAL DAILY PRN
Qty: 15 TABLET | Refills: 0 | Status: SHIPPED
Start: 2021-07-22 | End: 2021-07-22 | Stop reason: SDUPTHER

## 2021-07-22 RX ORDER — DIAZEPAM 2 MG/1
1 TABLET ORAL DAILY PRN
Qty: 15 TABLET | Refills: 0 | Status: SHIPPED
Start: 2021-07-22 | End: 2022-01-27 | Stop reason: SDUPTHER

## 2021-07-22 NOTE — TELEPHONE ENCOUNTER
Patient called called asking for eye cream for the top of her eye lids, I asked patient what she needed it for and patient stated Dr Jennie Foy already knows all about it, patient provided no other information

## 2021-08-12 ENCOUNTER — TELEPHONE (OUTPATIENT)
Dept: PRIMARY CARE CLINIC | Age: 86
End: 2021-08-12

## 2021-08-12 NOTE — TELEPHONE ENCOUNTER
Patient called requesting an xray of right hand due to swelling from cast,also a knot in middle of hand , patient feels she had cast on too long

## 2021-08-13 NOTE — TELEPHONE ENCOUNTER
Left message for the patient to contact orthopedic that applied the cast for the patient regarding ordering the x-ray. Please call the patient Monday for follow-up, to confirm that she got the voice message left on her phone.

## 2021-08-13 NOTE — TELEPHONE ENCOUNTER
Called patient and LVM, that Dr Scott So has not yet looked at her messages. Patient was explained 8.12.2021 around 4pm when she called in that Dr power would be off Friday and may not see her messages until Monday 8.16.2021.  Did let patient know if she was in any discomfort or pain that there are [facility]gent cares and Emergency rooms that could help her with her hand if she couldn't wait to hear back from Dr Scott So

## 2021-08-16 ENCOUNTER — TELEPHONE (OUTPATIENT)
Dept: PRIMARY CARE CLINIC | Age: 86
End: 2021-08-16

## 2021-08-16 DIAGNOSIS — S52.501A CLOSED FRACTURE OF DISTAL ENDS OF RIGHT RADIUS AND ULNA, INITIAL ENCOUNTER: Primary | ICD-10-CM

## 2021-08-16 DIAGNOSIS — S52.601A CLOSED FRACTURE OF DISTAL ENDS OF RIGHT RADIUS AND ULNA, INITIAL ENCOUNTER: Primary | ICD-10-CM

## 2021-08-16 NOTE — TELEPHONE ENCOUNTER
Pt stated she does not want to see the same orthopedic she was referred to. Wants a different referral preferably in the [facility] area.  Pt does not know the name of the Dr she was referred ti

## 2021-08-19 ENCOUNTER — NURSE TRIAGE (OUTPATIENT)
Dept: OTHER | Facility: CLINIC | Age: 86
End: 2021-08-19

## 2021-08-19 NOTE — TELEPHONE ENCOUNTER
Reason for Disposition   [1] MILD swelling (puffiness) of both hands AND [2] new onset or worsening  (Exception: caused by hot weather or normal pregnancy swelling)    Answer Assessment - Initial Assessment Questions  1. ONSET: \"When did the swelling start? \" (e.g., minutes, hours, days)      It started when the cast was on.    2. LOCATION: \"What part of the hand is swollen? \"  \"Are both hands swollen or just one hand? \"      Right hand and fingers are puffy. Middle finger is hard to bend. 3. SEVERITY: \"How bad is the swelling? \" (e.g., localized; mild, moderate, severe)    - BALL OR LUMP: small ball or lump    - LOCALIZED: puffy or swollen area or patch of skin    - JOINT SWELLING: swelling of a joint    - MILD: puffiness or mild swelling of fingers or hand    - MODERATE: fingers and hand are swollen    - SEVERE: swelling of entire hand and up into forearm      Mild    4. REDNESS: \"Does the swelling look red or infected? \"      Normal skin color    5. PAIN: \"Is the swelling painful to touch? \" If so, ask: \"How painful is it? \"   (Scale 1-10; mild, moderate or severe)      More numbness than pain,     6. FEVER: \"Do you have a fever? \" If so, ask: \"What is it, how was it measured, and when did it start? \"       No    7. CAUSE: \"What do you think is causing the hand swelling? \" (e.g., heat, insect bite, pregnancy, recent injury)      Recent casts on arm    8. MEDICAL HISTORY: \"Do you have a history of heart failure, kidney disease, liver failure, or cancer? \"      Denies    9. RECURRENT SYMPTOM: \"Have you had hand swelling before? \" If so, ask: \"When was the last time? \" \"What happened that time? \"      No    10. OTHER SYMPTOMS: \"Do you have any other symptoms? \" (e.g., blurred vision, difficulty breathing, headache)        Numbness,  Has pea size knot on top center of right hand under the skin, unable to see it, but can feel it. 11. PREGNANCY: \"Is there any chance you are pregnant? \" \"When was your last menstrual period? \"        no    Protocols used: HAND SWELLING-ADULT-AH    Received call from [address] at [employer] with [employer]. Brief description of triage: right hand/ finger swelling, with pea size knot felt in the center top of hand. Triage indicates for patient to see PCP within 24 hrs     Requesting referral to another orthopedic MD in [facility]. Not wanting appointment with PCP. Care advice provided, patient verbalizes understanding; denies any other questions or concerns; instructed to call back for any new or worsening symptoms. Writer provided warm transfer to [facility] for appointment scheduling. Attention Provider: Thank you for allowing me to participate in the care of your patient. The patient was connected to triage in response to information provided to the [facility]. Please do not respond through this encounter as the response is not directed to a shared pool.

## 2021-08-19 NOTE — TELEPHONE ENCOUNTER
Okay to send referral for an orthopedic doctor of the patient choice, patient will check with her family please call her tomorrow for the name and okay to send a referral for the pain in her arm after the cast.  Okay to bring the patient to earlier end of the month for me to evaluate her if necessary.   Otherwise she can keep September 16 appointment

## 2021-08-31 NOTE — TELEPHONE ENCOUNTER
Patient just called and stated she doesn't like the orthopedic we sent her to.  She stated she wanted to go to the ortho place [address] of [address] in [facility]    Please have new referral for the below facility    [address] [place] [address]

## 2021-09-08 ENCOUNTER — TELEPHONE (OUTPATIENT)
Dept: PRIMARY CARE CLINIC | Age: 86
End: 2021-09-08

## 2021-09-08 NOTE — TELEPHONE ENCOUNTER
Please notify the patient we can see her tomorrow, especially that we will have to cancel her appointment for September 16, we can see her tomorrow for her respiratory symptoms as well as for her follow-up appointment. She can come fasting in the morning and we can do the blood test in the office.

## 2021-09-08 NOTE — TELEPHONE ENCOUNTER
Patient called requesting an antibiotic symptoms are as follows    Coughing  Sneezing   Stuffy nose  Spitting up mucous

## 2021-09-09 NOTE — TELEPHONE ENCOUNTER
Patient unable to come in today to be seen.  Earliest she agreed to come Tuesday 9/14, appointment rescheduled

## 2021-09-14 ENCOUNTER — OFFICE VISIT (OUTPATIENT)
Dept: PRIMARY CARE CLINIC | Age: 86
End: 2021-09-14
Payer: MEDICARE

## 2021-09-14 VITALS
HEART RATE: 75 BPM | OXYGEN SATURATION: 95 % | WEIGHT: 135 LBS | BODY MASS INDEX: 22.49 KG/M2 | RESPIRATION RATE: 17 BRPM | SYSTOLIC BLOOD PRESSURE: 118 MMHG | DIASTOLIC BLOOD PRESSURE: 60 MMHG | HEIGHT: 65 IN | TEMPERATURE: 97.1 F

## 2021-09-14 DIAGNOSIS — R05.9 COUGH: Primary | ICD-10-CM

## 2021-09-14 DIAGNOSIS — M67.40 GANGLION CYST: ICD-10-CM

## 2021-09-14 DIAGNOSIS — I51.89 DIASTOLIC DYSFUNCTION: ICD-10-CM

## 2021-09-14 DIAGNOSIS — E78.2 MIXED HYPERLIPIDEMIA: ICD-10-CM

## 2021-09-14 DIAGNOSIS — I10 ESSENTIAL HYPERTENSION: ICD-10-CM

## 2021-09-14 PROCEDURE — G8420 CALC BMI NORM PARAMETERS: HCPCS | Performed by: INTERNAL MEDICINE

## 2021-09-14 PROCEDURE — G8427 DOCREV CUR MEDS BY ELIG CLIN: HCPCS | Performed by: INTERNAL MEDICINE

## 2021-09-14 PROCEDURE — 99214 OFFICE O/P EST MOD 30 MIN: CPT | Performed by: INTERNAL MEDICINE

## 2021-09-14 PROCEDURE — 1090F PRES/ABSN URINE INCON ASSESS: CPT | Performed by: INTERNAL MEDICINE

## 2021-09-14 PROCEDURE — 1123F ACP DISCUSS/DSCN MKR DOCD: CPT | Performed by: INTERNAL MEDICINE

## 2021-09-14 PROCEDURE — G8400 PT W/DXA NO RESULTS DOC: HCPCS | Performed by: INTERNAL MEDICINE

## 2021-09-14 PROCEDURE — 4040F PNEUMOC VAC/ADMIN/RCVD: CPT | Performed by: INTERNAL MEDICINE

## 2021-09-14 PROCEDURE — 1036F TOBACCO NON-USER: CPT | Performed by: INTERNAL MEDICINE

## 2021-09-14 RX ORDER — CYCLOBENZAPRINE HCL 10 MG
TABLET ORAL
COMMUNITY
Start: 2021-08-28 | End: 2022-04-04

## 2021-09-14 RX ORDER — NEOMYCIN SULFATE, POLYMYXIN B SULFATE, AND DEXAMETHASONE 3.5; 10000; 1 MG/G; [USP'U]/G; MG/G
OINTMENT OPHTHALMIC
COMMUNITY
Start: 2021-08-07 | End: 2022-02-28 | Stop reason: ALTCHOICE

## 2021-09-14 RX ORDER — MIRTAZAPINE 15 MG/1
TABLET, ORALLY DISINTEGRATING ORAL
COMMUNITY
Start: 2021-08-28 | End: 2022-05-04 | Stop reason: SDUPTHER

## 2021-09-14 RX ORDER — AZELASTINE HYDROCHLORIDE 137 UG/1
SPRAY, METERED NASAL
COMMUNITY
Start: 2021-08-28 | End: 2022-05-31 | Stop reason: ALTCHOICE

## 2021-09-14 RX ORDER — NICOTINE POLACRILEX 2 MG
5000 LOZENGE BUCCAL DAILY
Qty: 90 TABLET | Refills: 2 | Status: SHIPPED
Start: 2021-09-14 | End: 2021-10-18 | Stop reason: SDUPTHER

## 2021-09-14 SDOH — ECONOMIC STABILITY: FOOD INSECURITY: WITHIN THE PAST 12 MONTHS, YOU WORRIED THAT YOUR FOOD WOULD RUN OUT BEFORE YOU GOT MONEY TO BUY MORE.: NEVER TRUE

## 2021-09-14 SDOH — ECONOMIC STABILITY: FOOD INSECURITY: WITHIN THE PAST 12 MONTHS, THE FOOD YOU BOUGHT JUST DIDN'T LAST AND YOU DIDN'T HAVE MONEY TO GET MORE.: NEVER TRUE

## 2021-09-14 ASSESSMENT — SOCIAL DETERMINANTS OF HEALTH (SDOH): HOW HARD IS IT FOR YOU TO PAY FOR THE VERY BASICS LIKE FOOD, HOUSING, MEDICAL CARE, AND HEATING?: NOT HARD AT ALL

## 2021-09-14 ASSESSMENT — ENCOUNTER SYMPTOMS
SORE THROAT: 0
COUGH: 1
WHEEZING: 0
SHORTNESS OF BREATH: 0
CHOKING: 0
GASTROINTESTINAL NEGATIVE: 1
TROUBLE SWALLOWING: 0
CHEST TIGHTNESS: 0

## 2021-09-15 NOTE — ASSESSMENT & PLAN NOTE
Could be related to acid reflux, will discontinue amlodipine and patient to avoid eating 2 hours before going to bed. Chest x-ray ordered.

## 2021-09-15 NOTE — ASSESSMENT & PLAN NOTE
Patient is taking 2.5 mg of amlodipine, she is complaining of cough, blood pressure is excellent today, patient advised to stop amlodipine and monitor her blood pressure and bring her readings or her machine and will evaluate if that will positively impact her cough.

## 2021-09-22 ENCOUNTER — OFFICE VISIT (OUTPATIENT)
Dept: FAMILY MEDICINE CLINIC | Age: 86
End: 2021-09-22
Payer: MEDICARE

## 2021-09-22 VITALS
DIASTOLIC BLOOD PRESSURE: 80 MMHG | OXYGEN SATURATION: 96 % | WEIGHT: 135.4 LBS | HEART RATE: 86 BPM | BODY MASS INDEX: 22.53 KG/M2 | SYSTOLIC BLOOD PRESSURE: 130 MMHG | TEMPERATURE: 96.8 F

## 2021-09-22 DIAGNOSIS — S09.90XA INJURY OF HEAD, INITIAL ENCOUNTER: Primary | ICD-10-CM

## 2021-09-22 PROCEDURE — G8427 DOCREV CUR MEDS BY ELIG CLIN: HCPCS | Performed by: PHYSICIAN ASSISTANT

## 2021-09-22 PROCEDURE — 99213 OFFICE O/P EST LOW 20 MIN: CPT | Performed by: PHYSICIAN ASSISTANT

## 2021-09-22 PROCEDURE — 1090F PRES/ABSN URINE INCON ASSESS: CPT | Performed by: PHYSICIAN ASSISTANT

## 2021-09-22 PROCEDURE — G8420 CALC BMI NORM PARAMETERS: HCPCS | Performed by: PHYSICIAN ASSISTANT

## 2021-09-22 PROCEDURE — 1123F ACP DISCUSS/DSCN MKR DOCD: CPT | Performed by: PHYSICIAN ASSISTANT

## 2021-09-22 PROCEDURE — 4040F PNEUMOC VAC/ADMIN/RCVD: CPT | Performed by: PHYSICIAN ASSISTANT

## 2021-09-22 PROCEDURE — G8400 PT W/DXA NO RESULTS DOC: HCPCS | Performed by: PHYSICIAN ASSISTANT

## 2021-09-22 PROCEDURE — 1036F TOBACCO NON-USER: CPT | Performed by: PHYSICIAN ASSISTANT

## 2021-09-22 NOTE — PROGRESS NOTES
21  Venice Bernheim : 1935 Sex: female  Age 82 y.o. Subjective:  Chief Complaint   Patient presents with    Fall     fell last night, hit head         HPI:   Venice Bernheim , 82 y.o. female presents to [facility] care for evaluation of head injury    HPI  22-year-old female presents to [facility] for evaluation of a head injury. The patient states that she bent down to  a blanket last night and she hit her head on the corner of the TV. The patient is complaining of pain to the right mid parietal area. The patient states that she felt a little bit lightheaded when it occurred. She did not have any light headedness or dizziness after that. The patient has not had any nausea, vomiting. The patient denies any visual disturbances. No lateralizing weakness of the upper or lower extremities. The patient does take aspirin but no other anticoagulants. This did occur about 24 hours ago. The patient is not having any neck pain or back pain. ROS:   Unless otherwise stated in this report the patient's positive and negative responses for review of systems for constitutional, eyes, ENT, cardiovascular, respiratory, gastrointestinal, neurological, , musculoskeletal, and integument systems and related systems to the presenting problem are either stated in the history of present illness or were not pertinent or were negative for the symptoms and/or complaints related to the presenting medical problem. Positives and pertinent negatives as per HPI. All others reviewed and are negative.       PMH:     Past Medical History:   Diagnosis Date    Abnormal weight loss     Anxiety     Anxiety disorder     Cerebral infarction, unspecified ([facility] [address]     Essential (primary) hypertension     Gastric reflux     Hyperlipidemia     Hypertension     Occlusion and stenosis of right carotid artery     Slurred speech        Past Surgical History:   Procedure Laterality Date    CAROTID ENDARTERECTOMY Right 2020    RIGHT CAROTID ENDARTERECTOMY performed by Rabia Parkinson MD at [facility]      LITHOTRIPSY Left 2020    CYSTOSCOPY RETROGRADE PYELOGRAM LEFT URETEROSCOPY LEFT J STENT LASER LITHOTRIPSY performed by Hung Mendez DO at [facility] OR       Family History   Problem Relation Age of Onset    High Blood Pressure Mother     Other Father         Emphysema    Cancer Brother         Leukemia    Cancer Brother        Medications:     Current Outpatient Medications:     Azelastine HCl 137 MCG/SPRAY SOLN, instill 1 spray into each nostril three times a day if needed, Disp: , Rfl:     cyclobenzaprine (FLEXERIL) 10 MG tablet, take 1 tablet by mouth at bedtime, Disp: , Rfl:     mirtazapine (REMERON SOL-TAB) 15 MG disintegrating tablet, place and dissolve 1/2 tablet by mouth nightly, Disp: , Rfl:     neomycin-polymyxin-dexameth 3.5-19381-3.1 OINT, APPLY TO LEFT EYE TWICE A DAY AS DIRECTED, Disp: , Rfl:     Cyanocobalamin (B-12) 5000 MCG SUBL, Place 5,000 mg under the tongue daily, Disp: 90 tablet, Rfl: 2    Handicap Placard MISC, 1 each by Does not apply route : 5 years, Disp: 1 each, Rfl: 0    ezetimibe (ZETIA) 10 MG tablet, Take 1 tablet by mouth daily, Disp: 90 tablet, Rfl: 2    ketoconazole (NIZORAL) 2 % shampoo, Apply topically daily as needed. , Disp: 1 Bottle, Rfl: 5    aspirin 81 MG chewable tablet, Take 1 tablet by mouth daily, Disp: 30 tablet, Rfl: 3    thiamine 100 MG tablet, Take 1 tablet by mouth daily, Disp: 30 tablet, Rfl: 3    oxybutynin (DITROPAN) 5 MG tablet, Take 1 tablet by mouth nightly, Disp: 45 tablet, Rfl: 3    Allergies: Allergies   Allergen Reactions    Lorazepam Photosensitivity     Unable to remember reaction state's it's too long ago    Penicillins Other (See Comments)     States it was paranoia.         Social History:     Social History     Tobacco Use    Smoking status: Never Smoker    Smokeless tobacco: Never Used Vaping Use    Vaping Use: Never used   Substance Use Topics    Alcohol use: No    Drug use: No       Patient lives at home. Physical Exam:     Vitals:    09/22/21 1453   BP: 130/80   Site: Right Upper Arm   Position: Sitting   Pulse: 86   Temp: 96.8 °F (36 °C)   TempSrc: Temporal   SpO2: 96%   Weight: 135 lb 6.4 oz (61.4 kg)       Exam:  Physical Exam  Vital signs and nurses notes reviewed. The patient is not hypoxic. ? General: The patient appears well and in no apparent distress. Patient is resting comfortably on cart. GCS of 15. Skin: Warm, dry, no pallor noted. The patient has no evidence of rash, petechiae, or purpura noted. Head: Normocephalic, the patient has a small contusion noted to the right mid parietal area. No step-offs or crepitus. No evidence of abrasion, laceration  Neck: Supple, trachea mid-line, no tenderness, no lymphadenopathy. No meningeal signs. No nuchal rigidity. Eye: Pupils are equal, round and reactive to light, EOMI  Ears, Nose, Mouth, and Throat: Oral mucosa is moist, TMs are clear bilaterally, no hemotympanum noted. Cardiovascular: Regular Rate and Rhythm  Respiratory: Patient is in no distress, no accessory muscle use, lungs are clear to auscultation, no wheezing, rales or rhonchi  Back: non-tender, no CVA tenderness  Musculoskeletal: normal ROM, no tenderness, no swelling, normal strength 5/5. Normal pulses to radial 2+ bilaterally and 2+ at [facility] and PT bilaterally and symmetrically. GI: Normal bowel sounds, no tenderness to palpation, no masses appreciated. No rebound, guarding, or rigidity noted. Neurological: A&O x4, normal equal  strength. The patient is not ataxic. The patient has normal speech. The patient has normal coordination. Normal motor and sensory observed. Psychiatric: Cooperative         Testing:           Medical Decision Making:     Vital signs reviewed    Past medical history reviewed. Allergies reviewed. Medications reviewed.     Patient

## 2021-09-23 ENCOUNTER — NURSE TRIAGE (OUTPATIENT)
Dept: OTHER | Facility: CLINIC | Age: 86
End: 2021-09-23

## 2021-09-23 NOTE — TELEPHONE ENCOUNTER
Reason for Disposition   [1] Angry or rude caller AND [2] doesn't respond to 5 minutes of triager counseling AND [3] sick adult (or caller)    Answer Assessment - Initial Assessment Questions  1. SITUATION:  Document reason for call. Patient requesting \"medication for lightheadedness\"    2. BACKGROUND: Document any background information (e.g., prior calls, known psychiatric history)      Unknown    3. ASSESSMENT: Document your nursing assessment. Patient refused to let RN complete assessment. Patient would only tell RN that she experiences lightheadedness when standing up. Patient refused to give any further information. 4. RESPONSE: Document whatur response or recommendation was. RN attempted to explain the importance of triage assessment and how it related to patient safety. Patient still refusing triage. RN attempted to contact [facility] office with no answer. Protocols used: DIFFICULT CALLER-ADULT-AH    Received call from Dayna Alfaro at [facility] with Red Flag Complaint. Brief description of triage: See above note    Triage indicates for patient to: See disposition    Care advice provided, patient verbalizes understanding; denies any other questions or concerns; instructed to call back for any new or worsening symptoms. Attention Provider: Thank you for allowing me to participate in the care of your patient. The patient was connected to triage in response to information provided to the [facility]. Please do not respond through this encounter as the response is not directed to a shared pool.

## 2021-09-26 NOTE — TELEPHONE ENCOUNTER
Please contact the patient okay to schedule office visit or virtual visit to discuss her lightheadedness.

## 2021-10-07 ENCOUNTER — TELEPHONE (OUTPATIENT)
Dept: PRIMARY CARE CLINIC | Age: 86
End: 2021-10-07

## 2021-10-07 NOTE — TELEPHONE ENCOUNTER
----- Message from Amanda Bah sent at 10/7/2021 11:16 AM EDT -----  Subject: Refill Request    QUESTIONS  Name of Medication? aspirin 81 MG chewable tablet  Patient-reported dosage and instructions? 1 a day   How many days do you have left? 0  Preferred Pharmacy? [address] phone number (if available)? [phone]  Additional Information for Provider? Patient is requesting urgent refill   of medication today, has been out for 2 weeks and is looking to increase   quantity to 90 day supply from PCP Cristian Lal   ---------------------------------------------------------------------------  --------------,  Name of Medication? ezetimibe (ZETIA) 10 MG tablet  Patient-reported dosage and instructions? 1 a day  How many days do you have left? 0  Preferred Pharmacy? [address] phone number (if available)? [phone]  Additional Information for Provider? Patient is requesting urgent refill   of medication today, has been out for 2 weeks and is looking to increase   quantity to 90 day supply from PCP Cristian Lal   ---------------------------------------------------------------------------  --------------,  Name of Medication? Cyanocobalamin (B-12) 5000 MCG SUBL  Patient-reported dosage and instructions? 1 a day  How many days do you have left? 0  Preferred Pharmacy? [address] phone number (if available)? [phone]  Additional Information for Provider? Patient is requesting urgent refill   of medication today, has been out for 2 weeks and is looking to increase   quantity to 90 day supply from PCP Abena Easton   ---------------------------------------------------------------------------  --------------  CALL BACK INFO  What is the best way for the office to contact you? OK to leave message on   voicemail  Preferred Call Back Phone Number?  [phone]

## 2021-10-08 NOTE — TELEPHONE ENCOUNTER
Patient called needs refill on the below      ezetimibe (ZETIA) 10 MG tablet       thiamine 100 MG tablet        aspirin 81 MG chewable

## 2021-10-14 PROBLEM — R05.9 COUGH: Status: RESOLVED | Noted: 2021-09-14 | Resolved: 2021-10-14

## 2021-10-15 NOTE — TELEPHONE ENCOUNTER
----- Message from [facility] sent at 10/14/2021  5:06 PM EDT -----  Subject: Refill Request    QUESTIONS  Name of Medication? aspirin 81 MG chewable tablet  Patient-reported dosage and instructions? As prescribed  How many days do you have left? 0  Preferred Pharmacy? [address] phone number (if available)? [phone]  Additional Information for Provider? Pt has been out for 2 weeks and is   very agitated. Please let her know when her medication is called in to the   pharmacy. Thank you!  ---------------------------------------------------------------------------  --------------,  Name of Medication? ezetimibe (ZETIA) 10 MG tablet  Patient-reported dosage and instructions? As Prescribed  How many days do you have left? 0  Preferred Pharmacy? [address] phone number (if available)? [phone]  Additional Information for Provider? Pt has been out for 2 weeks and is   very agitated. Please let her know when her medication is called in to the   pharmacy. Thank you!  ---------------------------------------------------------------------------  --------------,  Name of Medication? Cyanocobalamin (B-12) 5000 MCG SUBL  Patient-reported dosage and instructions? As prescribed  How many days do you have left? 0  Preferred Pharmacy? [address] phone number (if available)? [phone]  ---------------------------------------------------------------------------  --------------  CALL BACK INFO  What is the best way for the office to contact you? OK to leave message on   voicemail  Preferred Call Back Phone Number?  [phone]

## 2021-10-18 RX ORDER — NICOTINE POLACRILEX 2 MG
5000 LOZENGE BUCCAL DAILY
Qty: 90 TABLET | Refills: 2 | Status: SHIPPED
Start: 2021-10-18 | End: 2022-01-06 | Stop reason: SDUPTHER

## 2021-10-18 RX ORDER — EZETIMIBE 10 MG/1
10 TABLET ORAL DAILY
Qty: 90 TABLET | Refills: 2 | Status: SHIPPED
Start: 2021-10-18 | End: 2022-05-04 | Stop reason: SDUPTHER

## 2021-10-28 ENCOUNTER — TELEPHONE (OUTPATIENT)
Dept: PRIMARY CARE CLINIC | Age: 86
End: 2021-10-28

## 2021-10-28 NOTE — TELEPHONE ENCOUNTER
Pt stating she found a small black/brown stephen on the front of her leg just now and would like to know what it is.  Pt stated it hurts a tiny bit when she touches it

## 2021-11-03 ENCOUNTER — OFFICE VISIT (OUTPATIENT)
Dept: PRIMARY CARE CLINIC | Age: 86
End: 2021-11-03
Payer: MEDICARE

## 2021-11-03 VITALS
WEIGHT: 132 LBS | HEIGHT: 65 IN | OXYGEN SATURATION: 96 % | SYSTOLIC BLOOD PRESSURE: 162 MMHG | BODY MASS INDEX: 21.99 KG/M2 | DIASTOLIC BLOOD PRESSURE: 84 MMHG | HEART RATE: 82 BPM

## 2021-11-03 DIAGNOSIS — R63.4 WEIGHT LOSS: ICD-10-CM

## 2021-11-03 DIAGNOSIS — E78.2 MIXED HYPERLIPIDEMIA: ICD-10-CM

## 2021-11-03 DIAGNOSIS — F41.9 ANXIETY: Chronic | ICD-10-CM

## 2021-11-03 DIAGNOSIS — I51.89 DIASTOLIC DYSFUNCTION: ICD-10-CM

## 2021-11-03 DIAGNOSIS — Z86.73 HISTORY OF ISCHEMIC STROKE: ICD-10-CM

## 2021-11-03 DIAGNOSIS — I10 UNCONTROLLED HYPERTENSION: ICD-10-CM

## 2021-11-03 DIAGNOSIS — R47.81 SLURRED SPEECH: ICD-10-CM

## 2021-11-03 DIAGNOSIS — Z00.00 ROUTINE GENERAL MEDICAL EXAMINATION AT A HEALTH CARE FACILITY: Primary | ICD-10-CM

## 2021-11-03 DIAGNOSIS — R05.9 COUGH: ICD-10-CM

## 2021-11-03 DIAGNOSIS — I10 ESSENTIAL HYPERTENSION: Primary | ICD-10-CM

## 2021-11-03 DIAGNOSIS — I10 ESSENTIAL HYPERTENSION: ICD-10-CM

## 2021-11-03 PROBLEM — N18.31 STAGE 3A CHRONIC KIDNEY DISEASE (HCC): Status: ACTIVE | Noted: 2021-11-03

## 2021-11-03 LAB
ALBUMIN SERPL-MCNC: 4.4 G/DL (ref 3.5–5.2)
ALP BLD-CCNC: 79 U/L (ref 35–104)
ALT SERPL-CCNC: 6 U/L (ref 0–32)
ANION GAP SERPL CALCULATED.3IONS-SCNC: 14 MMOL/L (ref 7–16)
AST SERPL-CCNC: 14 U/L (ref 0–31)
BASOPHILS ABSOLUTE: 0.01 E9/L (ref 0–0.2)
BASOPHILS RELATIVE PERCENT: 0.2 % (ref 0–2)
BILIRUB SERPL-MCNC: 0.3 MG/DL (ref 0–1.2)
BUN BLDV-MCNC: 17 MG/DL (ref 6–23)
CALCIUM SERPL-MCNC: 9.7 MG/DL (ref 8.6–10.2)
CHLORIDE BLD-SCNC: 107 MMOL/L (ref 98–107)
CHOLESTEROL, TOTAL: 160 MG/DL (ref 0–199)
CO2: 24 MMOL/L (ref 22–29)
CREAT SERPL-MCNC: 0.8 MG/DL (ref 0.5–1)
EOSINOPHILS ABSOLUTE: 0.03 E9/L (ref 0.05–0.5)
EOSINOPHILS RELATIVE PERCENT: 0.6 % (ref 0–6)
GFR AFRICAN AMERICAN: >60
GFR NON-AFRICAN AMERICAN: >60 ML/MIN/1.73
GLUCOSE BLD-MCNC: 94 MG/DL (ref 74–99)
HCT VFR BLD CALC: 38.7 % (ref 34–48)
HDLC SERPL-MCNC: 39 MG/DL
HEMOGLOBIN: 12.3 G/DL (ref 11.5–15.5)
IMMATURE GRANULOCYTES #: 0.01 E9/L
IMMATURE GRANULOCYTES %: 0.2 % (ref 0–5)
LDL CHOLESTEROL CALCULATED: 102 MG/DL (ref 0–99)
LYMPHOCYTES ABSOLUTE: 1.29 E9/L (ref 1.5–4)
LYMPHOCYTES RELATIVE PERCENT: 26.4 % (ref 20–42)
MCH RBC QN AUTO: 31.3 PG (ref 26–35)
MCHC RBC AUTO-ENTMCNC: 31.8 % (ref 32–34.5)
MCV RBC AUTO: 98.5 FL (ref 80–99.9)
MONOCYTES ABSOLUTE: 0.53 E9/L (ref 0.1–0.95)
MONOCYTES RELATIVE PERCENT: 10.8 % (ref 2–12)
NEUTROPHILS ABSOLUTE: 3.02 E9/L (ref 1.8–7.3)
NEUTROPHILS RELATIVE PERCENT: 61.8 % (ref 43–80)
PDW BLD-RTO: 13.6 FL (ref 11.5–15)
PLATELET # BLD: 295 E9/L (ref 130–450)
PMV BLD AUTO: 10.7 FL (ref 7–12)
POTASSIUM SERPL-SCNC: 4.5 MMOL/L (ref 3.5–5)
PREALBUMIN: 20 MG/DL (ref 20–40)
RBC # BLD: 3.93 E12/L (ref 3.5–5.5)
SODIUM BLD-SCNC: 145 MMOL/L (ref 132–146)
TOTAL PROTEIN: 6.9 G/DL (ref 6.4–8.3)
TRIGL SERPL-MCNC: 93 MG/DL (ref 0–149)
TSH SERPL DL<=0.05 MIU/L-ACNC: 1.94 UIU/ML (ref 0.27–4.2)
VLDLC SERPL CALC-MCNC: 19 MG/DL
WBC # BLD: 4.9 E9/L (ref 4.5–11.5)

## 2021-11-03 PROCEDURE — G8400 PT W/DXA NO RESULTS DOC: HCPCS | Performed by: INTERNAL MEDICINE

## 2021-11-03 PROCEDURE — 99214 OFFICE O/P EST MOD 30 MIN: CPT | Performed by: INTERNAL MEDICINE

## 2021-11-03 PROCEDURE — G8427 DOCREV CUR MEDS BY ELIG CLIN: HCPCS | Performed by: INTERNAL MEDICINE

## 2021-11-03 PROCEDURE — 1036F TOBACCO NON-USER: CPT | Performed by: INTERNAL MEDICINE

## 2021-11-03 PROCEDURE — 4040F PNEUMOC VAC/ADMIN/RCVD: CPT | Performed by: INTERNAL MEDICINE

## 2021-11-03 PROCEDURE — G8484 FLU IMMUNIZE NO ADMIN: HCPCS | Performed by: INTERNAL MEDICINE

## 2021-11-03 PROCEDURE — 1090F PRES/ABSN URINE INCON ASSESS: CPT | Performed by: INTERNAL MEDICINE

## 2021-11-03 PROCEDURE — G8420 CALC BMI NORM PARAMETERS: HCPCS | Performed by: INTERNAL MEDICINE

## 2021-11-03 PROCEDURE — G0439 PPPS, SUBSEQ VISIT: HCPCS | Performed by: INTERNAL MEDICINE

## 2021-11-03 PROCEDURE — 1123F ACP DISCUSS/DSCN MKR DOCD: CPT | Performed by: INTERNAL MEDICINE

## 2021-11-03 RX ORDER — AMLODIPINE BESYLATE 2.5 MG/1
2.5 TABLET ORAL NIGHTLY
Qty: 90 TABLET | Refills: 3 | Status: SHIPPED
Start: 2021-11-03 | End: 2022-01-06 | Stop reason: SDUPTHER

## 2021-11-03 RX ORDER — AMLODIPINE BESYLATE 2.5 MG/1
TABLET ORAL
COMMUNITY
Start: 2021-09-26 | End: 2021-11-03 | Stop reason: SDUPTHER

## 2021-11-03 ASSESSMENT — PATIENT HEALTH QUESTIONNAIRE - PHQ9
SUM OF ALL RESPONSES TO PHQ QUESTIONS 1-9: 0
SUM OF ALL RESPONSES TO PHQ QUESTIONS 1-9: 0
SUM OF ALL RESPONSES TO PHQ9 QUESTIONS 1 & 2: 0
1. LITTLE INTEREST OR PLEASURE IN DOING THINGS: 0
2. FEELING DOWN, DEPRESSED OR HOPELESS: 0
SUM OF ALL RESPONSES TO PHQ QUESTIONS 1-9: 0

## 2021-11-03 ASSESSMENT — LIFESTYLE VARIABLES
AUDIT-C TOTAL SCORE: INCOMPLETE
HOW OFTEN DO YOU HAVE A DRINK CONTAINING ALCOHOL: NEVER
HOW OFTEN DO YOU HAVE A DRINK CONTAINING ALCOHOL: 0
AUDIT TOTAL SCORE: INCOMPLETE

## 2021-11-03 NOTE — PATIENT INSTRUCTIONS
Personalized Preventive Plan for Yeimy Grimes - 11/3/2021  Medicare offers a range of preventive health benefits. Some of the tests and screenings are paid in full while other may be subject to a deductible, co-insurance, and/or copay. Some of these benefits include a comprehensive review of your medical history including lifestyle, illnesses that may run in your family, and various assessments and screenings as appropriate. After reviewing your medical record and screening and assessments performed today your provider may have ordered immunizations, labs, imaging, and/or referrals for you. A list of these orders (if applicable) as well as your Preventive Care list are included within your After Visit Summary for your review. Other Preventive Recommendations:    · A preventive eye exam performed by an eye specialist is recommended every 1-2 years to screen for glaucoma; cataracts, macular degeneration, and other eye disorders. · A preventive dental visit is recommended every 6 months. · Try to get at least 150 minutes of exercise per week or 10,000 steps per day on a pedometer . · Order or download the FREE \"Exercise & Physical Activity: Your Everyday Guide\" from [employer]. Call 8-280.917.1842 or search [employer] online. · You need 9402-3434 mg of calcium and 0970-1592 IU of vitamin D per day. It is possible to meet your calcium requirement with diet alone, but a vitamin D supplement is usually necessary to meet this goal.  · When exposed to the sun, use a sunscreen that protects against both UVA and UVB radiation with an SPF of 30 or greater. Reapply every 2 to 3 hours or after sweating, drying off with a towel, or swimming. · Always wear a seat belt when traveling in a car. Always wear a helmet when riding a bicycle or motorcycle.

## 2021-11-03 NOTE — PROGRESS NOTES
Medicare Annual Wellness Visit  Name: Gabby Vargas Date: 11/3/2021   MRN: [id number] Sex: Female   Age: 82 y.o. Ethnicity: Non- / Non    : 1935 Race: White (non-)      Tima Joaquin is here for Medicare AWV    Screenings for behavioral, psychosocial and functional/safety risks, and cognitive dysfunction are all negative except as indicated below. These results, as well as other patient data from the [address] form, are documented in Flowsheets linked to this Encounter. Allergies   Allergen Reactions    Lorazepam Photosensitivity     Unable to remember reaction state's it's too long ago    Penicillins Other (See Comments)     States it was paranoia. Prior to Visit Medications    Medication Sig Taking? Authorizing Provider   amLODIPine (NORVASC) 2.5 MG tablet take 1 tablet by mouth once daily  Historical Provider, MD   ezetimibe (ZETIA) 10 MG tablet Take 1 tablet by mouth daily  Tejinder Bhatia MD   Cyanocobalamin (B-12) 5000 MCG SUBL Place 5,000 mg under the tongue daily  Tejinder Bhatia MD   Azelastine HCl 137 MCG/SPRAY SOLN instill 1 spray into each nostril three times a day if needed  Historical Provider, MD   cyclobenzaprine (FLEXERIL) 10 MG tablet take 1 tablet by mouth at bedtime  Historical Provider, MD   mirtazapine (REMERON SOL-TAB) 15 MG disintegrating tablet place and dissolve 1/2 tablet by mouth nightly  Historical Provider, MD   neomycin-polymyxin-dexameth [id number] [facility]  Historical Provider, MD   Handicap Placard MISC 1 each by Does not apply route : 5 years  Tejinder Bhatia MD   ketoconazole (NIZORAL) 2 % shampoo Apply topically daily as needed.   Tejinder Bhatia MD   aspirin 81 MG chewable tablet Take 1 tablet by mouth daily  Tejinder Bhatia MD   thiamine 100 MG tablet Take 1 tablet by mouth daily  Abena Easton MD   oxybutynin (DITROPAN) 5 MG tablet Take 1 tablet by mouth nightly  Abena SAUNDERS MD Jemma         Past Medical History:   Diagnosis Date    Abnormal weight loss     Anxiety     Anxiety disorder     Cerebral infarction, unspecified ([facility] [address]     Essential (primary) hypertension     Gastric reflux     Hyperlipidemia     Hypertension     Occlusion and stenosis of right carotid artery     Slurred speech        Past Surgical History:   Procedure Laterality Date    CAROTID ENDARTERECTOMY Right 11/27/2020    RIGHT CAROTID ENDARTERECTOMY performed by Iza Wheat MD at [facility] [address]      LITHOTRIPSY Left 2/7/2020    CYSTOSCOPY RETROGRADE PYELOGRAM LEFT URETEROSCOPY LEFT J STENT LASER LITHOTRIPSY performed by Libertad Mendez DO at [facility] OR         Family History   Problem Relation Age of Onset    High Blood Pressure Mother     Other Father         Emphysema    Cancer Brother         Leukemia    Cancer Brother        CareTeam (Including outside providers/suppliers regularly involved in providing care):   Patient Care Team:  Moy Laguerre MD as PCP - Geneva Bennett MD as PCP - [facility] Empaneled Provider    Wt Readings from Last 3 Encounters:   11/03/21 132 lb (59.9 kg)   09/22/21 135 lb 6.4 oz (61.4 kg)   09/14/21 135 lb (61.2 kg)     There were no vitals filed for this visit. There is no height or weight on file to calculate BMI. Based upon direct observation of the patient, evaluation of cognition reveals recent and remote memory intact.     General Appearance: alert and oriented to person, place and time, well developed and well- nourished, in no acute distress  Skin: warm and dry, no rash or erythema  Head: normocephalic and atraumatic  Neck: supple and non-tender without mass, no thyromegaly or thyroid nodules, no cervical lymphadenopathy  Pulmonary/Chest: clear to auscultation bilaterally- no wheezes, rales or rhonchi, normal air movement, no respiratory distress  Cardiovascular: normal rate, regular rhythm, normal S1 and S2, no murmurs, rubs, Maintenance Status  Immunization History   Administered Date(s) Administered    Tdap (Boostrix, Adacel) 11/13/2017, 03/27/2021    Zoster Recombinant (Shingrix) 06/06/2018, 11/07/2018        Health Maintenance   Topic Date Due    COVID-19 Vaccine (1) Never done    DEXA (modify frequency per FRAX score)  Never done    Pneumococcal 65+ years Vaccine (1 of 1 - PPSV23) Never done   [employer] Visit (AWV)  08/21/2021    Flu vaccine (1) Never done    Potassium monitoring  06/16/2022    Creatinine monitoring  06/16/2022    DTaP/Tdap/Td vaccine (3 - Td or Tdap) 03/27/2031    Shingles Vaccine  Completed    Hepatitis A vaccine  Aged Out    Hib vaccine  Aged Out    Meningococcal (ACWY) vaccine  Aged Out     Recommendations for [employer] Due: see orders and patient instructions/AVS.  . Recommended screening schedule for the next 5-10 years is provided to the patient in written form: see Patient Instructions/AVS.    Steffanie Espinal was seen today for medicare awv.     Diagnoses and all orders for this visit:    Routine general medical examination at a health care facility

## 2021-11-03 NOTE — PROGRESS NOTES
File Prior to Visit   Medication Sig Dispense Refill    ezetimibe (ZETIA) 10 MG tablet Take 1 tablet by mouth daily 90 tablet 2    Cyanocobalamin (B-12) 5000 MCG SUBL Place 5,000 mg under the tongue daily 90 tablet 2    Azelastine HCl 137 MCG/SPRAY SOLN instill 1 spray into each nostril three times a day if needed      cyclobenzaprine (FLEXERIL) 10 MG tablet take 1 tablet by mouth at bedtime      mirtazapine (REMERON SOL-TAB) 15 MG disintegrating tablet place and dissolve 1/2 tablet by mouth nightly      neomycin-polymyxin-dexameth 3.5-32708-2.1 OINT APPLY TO LEFT EYE TWICE A DAY AS DIRECTED      Handicap Placard MISC 1 each by Does not apply route : 5 years 1 each 0    ketoconazole (NIZORAL) 2 % shampoo Apply topically daily as needed. 1 Bottle 5    aspirin 81 MG chewable tablet Take 1 tablet by mouth daily 30 tablet 3    thiamine 100 MG tablet Take 1 tablet by mouth daily 30 tablet 3    oxybutynin (DITROPAN) 5 MG tablet Take 1 tablet by mouth nightly 45 tablet 3     No current facility-administered medications on file prior to visit. PE:  VS:  BP (!) 162/84 (Site: Left Upper Arm, Position: Standing)   Pulse 82   Ht 5' 5\" (1.651 m)   Wt 132 lb (59.9 kg)   LMP  (LMP Unknown)   SpO2 96%   BMI 21.97 kg/m²   General:  Alert and oriented, NAD  HEENT: Ear auricles are normal, EOMI, Conjunctivae clear  NECK:  Supple without adenopathy or thyromegaly, no carotid bruits. LUNGS:  CTA all fields, symmetrical expansion no wheezes no rales bilaterally. HEART:  RRR without M, R, or G  ABDOMEN:  Soft and nontender without palpable abnormalities, No renal or aortic bruits. EXTREMITIES: No ankle edema bilaterally. Neuro: No focal deficit.     Lab Results   Component Value Date    WBC 4.9 2021    HGB 12.3 2021    HCT 38.7 2021     2021    CHOL 160 2021    TRIG 93 2021    HDL 39 2021    ALT 6 2021    AST 14 2021     2021    K

## 2021-11-08 ENCOUNTER — TELEPHONE (OUTPATIENT)
Dept: PRIMARY CARE CLINIC | Age: 86
End: 2021-11-08

## 2021-11-08 NOTE — TELEPHONE ENCOUNTER
Pt stating pharmacy never received scripts for the following medications:    aspirin 81 MG chewable tablet   Cyanocobalamin (B-12) 5000 MCG SUBL   Cough syrup    I see that the Cyanocobalamin (B-12) 5000 MCG SUBL was sent and received by pharmacy on 10/18/2021 for 90 day supply. Verified with pharmacy and they stated it was only filled for 30 day may be d/t insurance.

## 2021-11-10 PROBLEM — N18.31 STAGE 3A CHRONIC KIDNEY DISEASE (HCC): Status: RESOLVED | Noted: 2021-11-03 | Resolved: 2021-11-10

## 2021-11-10 RX ORDER — GUAIFENESIN 100 MG/5ML
200 SYRUP ORAL 4 TIMES DAILY PRN
Qty: 236 ML | Refills: 1 | Status: SHIPPED
Start: 2021-11-10 | End: 2022-02-28 | Stop reason: ALTCHOICE

## 2021-11-10 ASSESSMENT — ENCOUNTER SYMPTOMS
VOICE CHANGE: 0
NAUSEA: 0
ABDOMINAL PAIN: 0
COUGH: 1
VOMITING: 0
SHORTNESS OF BREATH: 0
CHEST TIGHTNESS: 0
SORE THROAT: 0
WHEEZING: 0

## 2021-11-10 NOTE — TELEPHONE ENCOUNTER
Sent, please notify the patient there is an order for a chest x-ray since September and she did not do it she should go to the hospital and get it done. Okay to give her an order or fax it to any other facility she wants to do the x-ray at.

## 2021-11-15 DIAGNOSIS — R05.9 COUGH: ICD-10-CM

## 2021-11-16 ENCOUNTER — APPOINTMENT (OUTPATIENT)
Dept: GENERAL RADIOLOGY | Age: 86
End: 2021-11-16
Payer: MEDICARE

## 2021-11-16 ENCOUNTER — HOSPITAL ENCOUNTER (EMERGENCY)
Age: 86
Discharge: HOME | End: 2021-11-16
Attending: EMERGENCY MEDICINE
Payer: MEDICARE

## 2021-11-16 VITALS
BODY MASS INDEX: 23.92 KG/M2 | HEART RATE: 82 BPM | RESPIRATION RATE: 14 BRPM | SYSTOLIC BLOOD PRESSURE: 168 MMHG | WEIGHT: 130 LBS | OXYGEN SATURATION: 97 % | DIASTOLIC BLOOD PRESSURE: 91 MMHG | HEIGHT: 62 IN | TEMPERATURE: 97 F

## 2021-11-16 DIAGNOSIS — R07.89 CHEST WALL PAIN: Primary | ICD-10-CM

## 2021-11-16 LAB
ALBUMIN SERPL-MCNC: 4.1 G/DL (ref 3.5–5.2)
ALP BLD-CCNC: 89 U/L (ref 35–104)
ALT SERPL-CCNC: <5 U/L (ref 0–32)
ANION GAP SERPL CALCULATED.3IONS-SCNC: 8 MMOL/L (ref 7–16)
AST SERPL-CCNC: 10 U/L (ref 0–31)
BASOPHILS ABSOLUTE: 0.02 E9/L (ref 0–0.2)
BASOPHILS RELATIVE PERCENT: 0.5 % (ref 0–2)
BILIRUB SERPL-MCNC: 0.4 MG/DL (ref 0–1.2)
BUN BLDV-MCNC: 17 MG/DL (ref 6–23)
CALCIUM SERPL-MCNC: 9.2 MG/DL (ref 8.6–10.2)
CHLORIDE BLD-SCNC: 107 MMOL/L (ref 98–107)
CO2: 26 MMOL/L (ref 22–29)
CREAT SERPL-MCNC: 0.7 MG/DL (ref 0.5–1)
D DIMER: <200 NG/ML DDU
EOSINOPHILS ABSOLUTE: 0.05 E9/L (ref 0.05–0.5)
EOSINOPHILS RELATIVE PERCENT: 1.2 % (ref 0–6)
GFR AFRICAN AMERICAN: >60
GFR NON-AFRICAN AMERICAN: >60 ML/MIN/1.73
GLUCOSE BLD-MCNC: 103 MG/DL (ref 74–99)
HCT VFR BLD CALC: 37.5 % (ref 34–48)
HEMOGLOBIN: 12.5 G/DL (ref 11.5–15.5)
IMMATURE GRANULOCYTES #: 0 E9/L
IMMATURE GRANULOCYTES %: 0 % (ref 0–5)
LYMPHOCYTES ABSOLUTE: 1.32 E9/L (ref 1.5–4)
LYMPHOCYTES RELATIVE PERCENT: 30.8 % (ref 20–42)
MCH RBC QN AUTO: 32.6 PG (ref 26–35)
MCHC RBC AUTO-ENTMCNC: 33.3 % (ref 32–34.5)
MCV RBC AUTO: 97.7 FL (ref 80–99.9)
MONOCYTES ABSOLUTE: 0.52 E9/L (ref 0.1–0.95)
MONOCYTES RELATIVE PERCENT: 12.1 % (ref 2–12)
NEUTROPHILS ABSOLUTE: 2.37 E9/L (ref 1.8–7.3)
NEUTROPHILS RELATIVE PERCENT: 55.4 % (ref 43–80)
PDW BLD-RTO: 13.5 FL (ref 11.5–15)
PLATELET # BLD: 271 E9/L (ref 130–450)
PMV BLD AUTO: 9.8 FL (ref 7–12)
POTASSIUM REFLEX MAGNESIUM: 4.4 MMOL/L (ref 3.5–5)
PRO-BNP: 79 PG/ML (ref 0–450)
RBC # BLD: 3.84 E12/L (ref 3.5–5.5)
SARS-COV-2, NAAT: NOT DETECTED
SODIUM BLD-SCNC: 141 MMOL/L (ref 132–146)
TOTAL PROTEIN: 6.8 G/DL (ref 6.4–8.3)
TROPONIN, HIGH SENSITIVITY: <6 NG/L (ref 0–9)
TROPONIN, HIGH SENSITIVITY: <6 NG/L (ref 0–9)
WBC # BLD: 4.3 E9/L (ref 4.5–11.5)

## 2021-11-16 PROCEDURE — 99283 EMERGENCY DEPT VISIT LOW MDM: CPT

## 2021-11-16 PROCEDURE — 83880 ASSAY OF NATRIURETIC PEPTIDE: CPT

## 2021-11-16 PROCEDURE — 85378 FIBRIN DEGRADE SEMIQUANT: CPT

## 2021-11-16 PROCEDURE — 87635 SARS-COV-2 COVID-19 AMP PRB: CPT

## 2021-11-16 PROCEDURE — 85025 COMPLETE CBC W/AUTO DIFF WBC: CPT

## 2021-11-16 PROCEDURE — 80053 COMPREHEN METABOLIC PANEL: CPT

## 2021-11-16 PROCEDURE — 93005 ELECTROCARDIOGRAM TRACING: CPT | Performed by: PHYSICIAN ASSISTANT

## 2021-11-16 PROCEDURE — 84484 ASSAY OF TROPONIN QUANT: CPT

## 2021-11-16 PROCEDURE — 71046 X-RAY EXAM CHEST 2 VIEWS: CPT

## 2021-11-16 ASSESSMENT — PAIN SCALES - GENERAL: PAINLEVEL_OUTOF10: 10

## 2021-11-16 ASSESSMENT — PAIN DESCRIPTION - ORIENTATION: ORIENTATION: LEFT

## 2021-11-16 ASSESSMENT — PAIN DESCRIPTION - LOCATION: LOCATION: CHEST

## 2021-11-16 NOTE — ED NOTES
Discharge instructions given. Patient verbalizes understanding. No other noted or stated problems at this time. Patient will follow up with primary care.      Lawrence Herron RN  11/16/21 9569

## 2021-11-16 NOTE — ED PROVIDER NOTES
ED Attending shared visit  CC: No  HPI:  11/16/21, Time: 2:13 PM CHRISTINE Gonzalez is a 82 y.o. female presenting to the ED for left lateral chest pain, beginning earlier this morning ago. The complaint has been intermittent, mild in severity, and worsened by nothing. Presents for complaints of an onset of left-sided chest pain under the left breast area which she states began approximate 930 this morning. She states the pain has not worsened however has not improved over the last few hours. She denies any recent cough or cold symptoms. She denies any fever, body aches or chills. ROS:   Pertinent positives and negatives are stated within HPI, all other systems reviewed and are negative.  --------------------------------------------- PAST HISTORY ---------------------------------------------  Past Medical History:  has a past medical history of Abnormal weight loss, Anxiety, Anxiety disorder, Cerebral infarction, unspecified ([facility] [address] Essential (primary) hypertension, Gastric reflux, Hyperlipidemia, Hypertension, Occlusion and stenosis of right carotid artery, and Slurred speech. Past Surgical History:  has a past surgical history that includes Colonoscopy; Hysterectomy; Lithotripsy (Left, 2/7/2020); and Carotid endarterectomy (Right, 11/27/2020). Social History:  reports that she has never smoked. She has never used smokeless tobacco. She reports that she does not drink alcohol and does not use drugs. Family History: family history includes Cancer in her brother and brother; High Blood Pressure in her mother; Other in her father. The patients home medications have been reviewed.     Allergies: Lorazepam and Penicillins    ---------------------------------------------------PHYSICAL EXAM--------------------------------------    Constitutional/General: Alert and oriented x3, well appearing, non toxic in NAD  Head: Normocephalic and atraumatic  Eyes: PERRL, EOMI  Mouth: Oropharynx clear, handling secretions, no trismus  Neck: Supple, full ROM, non tender to palpation in the midline, no stridor, no crepitus, no meningeal signs  Pulmonary: Lungs clear to auscultation bilaterally, no wheezes, rales, or rhonchi. Not in respiratory distress  Cardiovascular:  Regular rate. Regular rhythm. No murmurs, gallops, or rubs. 2+ distal pulses  Chest: Left anterior chest wall tenderness reproducible on exam  Abdomen: Soft. Non tender. Non distended. +BS. No rebound, guarding, or rigidity. No pulsatile masses appreciated. Musculoskeletal: Moves all extremities x 4. Warm and well perfused, no clubbing, cyanosis, or edema. Capillary refill <3 seconds  Skin: warm and dry. No rashes. Neurologic: GCS 15, CN 2-12 grossly intact, no focal deficits, symmetric strength 5/5 in the upper and lower extremities bilaterally  Psych: Normal Affect    -------------------------------------------------- RESULTS -------------------------------------------------  I have personally reviewed all laboratory and imaging results for this patient. Results are listed below.      LABS:  Results for orders placed or performed during the hospital encounter of 11/16/21   COVID-19, Rapid    Specimen: Nasopharyngeal Swab   Result Value Ref Range    SARS-CoV-2, NAAT Not Detected Not Detected   CBC Auto Differential   Result Value Ref Range    WBC 4.3 (L) 4.5 - 11.5 E9/L    RBC 3.84 3.50 - 5.50 E12/L    Hemoglobin 12.5 11.5 - 15.5 g/dL    Hematocrit 37.5 34.0 - 48.0 %    MCV 97.7 80.0 - 99.9 fL    MCH 32.6 26.0 - 35.0 pg    MCHC 33.3 32.0 - 34.5 %    RDW 13.5 11.5 - 15.0 fL    Platelets 145 844 - 953 E9/L    MPV 9.8 7.0 - 12.0 fL    Neutrophils % 55.4 43.0 - 80.0 %    Immature Granulocytes % 0.0 0.0 - 5.0 %    Lymphocytes % 30.8 20.0 - 42.0 %    Monocytes % 12.1 (H) 2.0 - 12.0 %    Eosinophils % 1.2 0.0 - 6.0 %    Basophils % 0.5 0.0 - 2.0 %    Neutrophils Absolute 2.37 1.80 - 7.30 E9/L    Immature Granulocytes # 0.00 E9/L    Lymphocytes Absolute 1.32 (L) 1.50 - 4.00 E9/L    Monocytes Absolute 0.52 0.10 - 0.95 E9/L    Eosinophils Absolute 0.05 0.05 - 0.50 E9/L    Basophils Absolute 0.02 0.00 - 0.20 E9/L   Comprehensive Metabolic Panel w/ Reflex to MG   Result Value Ref Range    Sodium 141 132 - 146 mmol/L    Potassium reflex Magnesium 4.4 3.5 - 5.0 mmol/L    Chloride 107 98 - 107 mmol/L    CO2 26 22 - 29 mmol/L    Anion Gap 8 7 - 16 mmol/L    Glucose 103 (H) 74 - 99 mg/dL    BUN 17 6 - 23 mg/dL    CREATININE 0.7 0.5 - 1.0 mg/dL    GFR Non-African American >60 >=60 mL/min/1.73    GFR African American >60     Calcium 9.2 8.6 - 10.2 mg/dL    Total Protein 6.8 6.4 - 8.3 g/dL    Albumin 4.1 3.5 - 5.2 g/dL    Total Bilirubin 0.4 0.0 - 1.2 mg/dL    Alkaline Phosphatase 89 35 - 104 U/L    ALT <5 0 - 32 U/L    AST 10 0 - 31 U/L   Troponin   Result Value Ref Range    Troponin, High Sensitivity <6 0 - 9 ng/L   D-Dimer, Quantitative   Result Value Ref Range    D-Dimer, Quant <200 ng/mL DDU   Brain Natriuretic Peptide   Result Value Ref Range    Pro-BNP 79 0 - 450 pg/mL   Troponin   Result Value Ref Range    Troponin, High Sensitivity <6 0 - 9 ng/L   EKG 12 Lead   Result Value Ref Range    Ventricular Rate 77 BPM    Atrial Rate 77 BPM    P-R Interval 186 ms    QRS Duration 102 ms    Q-T Interval 402 ms    QTc Calculation (Bazett) 454 ms    P Axis 83 degrees    R Axis -43 degrees    T Axis 82 degrees       RADIOLOGY:  Interpreted by Radiologist.  XR CHEST (2 VW)   Final Result   Similar chronic appearing findings.   Short-term follow-up recommended if   symptoms persist.               EKG Interpretation  Interpreted by emergency department physician    Rhythm: normal sinus   Rate: normal  Axis: left  Conduction: normal  ST Segments: no acute change  T Waves: no acute change    Clinical Impression: no acute changes  Comparison to prior EKG: stable as compared to patient's most recent EKG      ------------------------- NURSING NOTES AND VITALS REVIEWED ---------------------------   The nursing notes within the ED encounter and vital signs as below have been reviewed by myself. BP (!) 168/91   Pulse 82   Temp 97 °F (36.1 °C) (Temporal)   Resp 14   Ht 5' 2\" (1.575 m)   Wt 130 lb (59 kg)   LMP  (LMP Unknown)   SpO2 97%   BMI 23.78 kg/m²   Oxygen Saturation Interpretation: Normal    The patients available past medical records and past encounters were reviewed. ------------------------------ ED COURSE/MEDICAL DECISION MAKING----------------------  Medications - No data to display          Medical Decision Making:    Patient was examined by Dr. Mendoza Barboza. Twelve-lead EKG is normal.  Pain is reproducible on examination. Twelve-lead EKG is unchanged from prior. Troponin x2 are negative. D-dimer is negative. Electrolytes are normal.  Likely is a musculoskeletal chest wall pain. Advised to follow-up with primary care physician any change or worsening symptoms to return back to the emergency room. Re-Evaluations:             Re-evaluation. Patients symptoms are improving      Consultations:                 Critical Care: This patient's ED course included: a personal history and physicial examination, re-evaluation prior to disposition, multiple bedside re-evaluations and a personal history and physicial eaxmination    This patient has remained hemodynamically stable and improved during their ED course. Counseling: The emergency provider has spoken with the patient and discussed todays results, in addition to providing specific details for the plan of care and counseling regarding the diagnosis and prognosis. Questions are answered at this time and they are agreeable with the plan.       --------------------------------- IMPRESSION AND DISPOSITION ---------------------------------    IMPRESSION  1.  Chest wall pain        DISPOSITION  Disposition: Discharge to home  Patient condition is good        NOTE: This report was transcribed using voice recognition software.  Every effort was made to ensure accuracy; however, inadvertent computerized transcription errors may be present          Archibald Homans, APRN - CNP  11/16/21 1649

## 2021-11-17 LAB
EKG ATRIAL RATE: 77 BPM
EKG P AXIS: 83 DEGREES
EKG P-R INTERVAL: 186 MS
EKG Q-T INTERVAL: 402 MS
EKG QRS DURATION: 102 MS
EKG QTC CALCULATION (BAZETT): 454 MS
EKG R AXIS: -43 DEGREES
EKG T AXIS: 82 DEGREES
EKG VENTRICULAR RATE: 77 BPM

## 2021-11-17 PROCEDURE — 93010 ELECTROCARDIOGRAM REPORT: CPT | Performed by: INTERNAL MEDICINE

## 2021-11-22 ENCOUNTER — TELEPHONE (OUTPATIENT)
Dept: PRIMARY CARE CLINIC | Age: 86
End: 2021-11-22

## 2021-11-22 NOTE — TELEPHONE ENCOUNTER
Pt stated she had a fall Sunday 11/21/2021 around 830pm. Pt stated she has bruising on the left side of her body and no other symptoms aside from generalized pain

## 2021-12-03 PROBLEM — R05.9 COUGH: Status: RESOLVED | Noted: 2021-09-14 | Resolved: 2021-12-03

## 2022-01-06 DIAGNOSIS — I10 ESSENTIAL HYPERTENSION: ICD-10-CM

## 2022-01-06 DIAGNOSIS — Z86.73 HISTORY OF ISCHEMIC STROKE: ICD-10-CM

## 2022-01-06 DIAGNOSIS — E78.2 MIXED HYPERLIPIDEMIA: ICD-10-CM

## 2022-01-06 RX ORDER — AMLODIPINE BESYLATE 2.5 MG/1
2.5 TABLET ORAL NIGHTLY
Qty: 90 TABLET | Refills: 3 | Status: SHIPPED
Start: 2022-01-06 | End: 2022-02-28 | Stop reason: ALTCHOICE

## 2022-01-06 RX ORDER — NICOTINE POLACRILEX 2 MG
5000 LOZENGE BUCCAL DAILY
Qty: 90 TABLET | Refills: 2 | Status: SHIPPED
Start: 2022-01-06 | End: 2022-05-31 | Stop reason: ALTCHOICE

## 2022-01-06 RX ORDER — ASPIRIN 81 MG/1
81 TABLET, CHEWABLE ORAL DAILY
Qty: 30 TABLET | Refills: 3 | Status: SHIPPED
Start: 2022-01-06 | End: 2022-02-28 | Stop reason: SDUPTHER

## 2022-01-12 ENCOUNTER — TELEPHONE (OUTPATIENT)
Dept: PRIMARY CARE CLINIC | Age: 87
End: 2022-01-12

## 2022-01-14 NOTE — TELEPHONE ENCOUNTER
Please notify the patient I prefer to do any nonurgent prescriptions on her office appointment, we will discuss this when she comes January 27.

## 2022-01-27 ENCOUNTER — OFFICE VISIT (OUTPATIENT)
Dept: PRIMARY CARE CLINIC | Age: 87
End: 2022-01-27
Payer: MEDICARE

## 2022-01-27 VITALS
DIASTOLIC BLOOD PRESSURE: 62 MMHG | BODY MASS INDEX: 23.92 KG/M2 | RESPIRATION RATE: 18 BRPM | TEMPERATURE: 97.1 F | SYSTOLIC BLOOD PRESSURE: 134 MMHG | HEIGHT: 62 IN | WEIGHT: 130 LBS | HEART RATE: 88 BPM | OXYGEN SATURATION: 96 %

## 2022-01-27 DIAGNOSIS — F41.9 ANXIETY: ICD-10-CM

## 2022-01-27 DIAGNOSIS — F41.1 ANXIETY NEUROSIS: Primary | ICD-10-CM

## 2022-01-27 DIAGNOSIS — Z86.73 HISTORY OF ISCHEMIC STROKE: ICD-10-CM

## 2022-01-27 DIAGNOSIS — I10 ESSENTIAL HYPERTENSION: ICD-10-CM

## 2022-01-27 DIAGNOSIS — R63.4 WEIGHT LOSS: ICD-10-CM

## 2022-01-27 DIAGNOSIS — E78.2 MIXED HYPERLIPIDEMIA: ICD-10-CM

## 2022-01-27 DIAGNOSIS — N39.41 URGENCY INCONTINENCE: ICD-10-CM

## 2022-01-27 LAB
ALBUMIN SERPL-MCNC: 4.3 G/DL (ref 3.5–5.2)
ALP BLD-CCNC: 100 U/L (ref 35–104)
ALT SERPL-CCNC: 6 U/L (ref 0–32)
ANION GAP SERPL CALCULATED.3IONS-SCNC: 8 MMOL/L (ref 7–16)
AST SERPL-CCNC: 11 U/L (ref 0–31)
BASOPHILS ABSOLUTE: 0.02 E9/L (ref 0–0.2)
BASOPHILS RELATIVE PERCENT: 0.4 % (ref 0–2)
BILIRUB SERPL-MCNC: 0.4 MG/DL (ref 0–1.2)
BUN BLDV-MCNC: 20 MG/DL (ref 6–23)
CALCIUM SERPL-MCNC: 9.3 MG/DL (ref 8.6–10.2)
CHLORIDE BLD-SCNC: 109 MMOL/L (ref 98–107)
CO2: 28 MMOL/L (ref 22–29)
CREAT SERPL-MCNC: 0.9 MG/DL (ref 0.5–1)
EOSINOPHILS ABSOLUTE: 0.02 E9/L (ref 0.05–0.5)
EOSINOPHILS RELATIVE PERCENT: 0.4 % (ref 0–6)
GFR AFRICAN AMERICAN: >60
GFR NON-AFRICAN AMERICAN: 60 ML/MIN/1.73
GLUCOSE BLD-MCNC: 101 MG/DL (ref 74–99)
HCT VFR BLD CALC: 39.2 % (ref 34–48)
HEMOGLOBIN: 12.6 G/DL (ref 11.5–15.5)
IMMATURE GRANULOCYTES #: 0.01 E9/L
IMMATURE GRANULOCYTES %: 0.2 % (ref 0–5)
LYMPHOCYTES ABSOLUTE: 1.32 E9/L (ref 1.5–4)
LYMPHOCYTES RELATIVE PERCENT: 25.1 % (ref 20–42)
MCH RBC QN AUTO: 31.8 PG (ref 26–35)
MCHC RBC AUTO-ENTMCNC: 32.1 % (ref 32–34.5)
MCV RBC AUTO: 99 FL (ref 80–99.9)
MONOCYTES ABSOLUTE: 0.53 E9/L (ref 0.1–0.95)
MONOCYTES RELATIVE PERCENT: 10.1 % (ref 2–12)
NEUTROPHILS ABSOLUTE: 3.35 E9/L (ref 1.8–7.3)
NEUTROPHILS RELATIVE PERCENT: 63.8 % (ref 43–80)
PDW BLD-RTO: 13.7 FL (ref 11.5–15)
PLATELET # BLD: 303 E9/L (ref 130–450)
PMV BLD AUTO: 10.2 FL (ref 7–12)
POTASSIUM SERPL-SCNC: 4.5 MMOL/L (ref 3.5–5)
RBC # BLD: 3.96 E12/L (ref 3.5–5.5)
SODIUM BLD-SCNC: 145 MMOL/L (ref 132–146)
TOTAL PROTEIN: 7.2 G/DL (ref 6.4–8.3)
TSH SERPL DL<=0.05 MIU/L-ACNC: 1.41 UIU/ML (ref 0.27–4.2)
WBC # BLD: 5.3 E9/L (ref 4.5–11.5)

## 2022-01-27 PROCEDURE — 0509F URINE INCON PLAN DOCD: CPT | Performed by: INTERNAL MEDICINE

## 2022-01-27 PROCEDURE — 1036F TOBACCO NON-USER: CPT | Performed by: INTERNAL MEDICINE

## 2022-01-27 PROCEDURE — 4040F PNEUMOC VAC/ADMIN/RCVD: CPT | Performed by: INTERNAL MEDICINE

## 2022-01-27 PROCEDURE — 1090F PRES/ABSN URINE INCON ASSESS: CPT | Performed by: INTERNAL MEDICINE

## 2022-01-27 PROCEDURE — 99214 OFFICE O/P EST MOD 30 MIN: CPT | Performed by: INTERNAL MEDICINE

## 2022-01-27 PROCEDURE — 1123F ACP DISCUSS/DSCN MKR DOCD: CPT | Performed by: INTERNAL MEDICINE

## 2022-01-27 PROCEDURE — G8420 CALC BMI NORM PARAMETERS: HCPCS | Performed by: INTERNAL MEDICINE

## 2022-01-27 PROCEDURE — G8400 PT W/DXA NO RESULTS DOC: HCPCS | Performed by: INTERNAL MEDICINE

## 2022-01-27 PROCEDURE — G8484 FLU IMMUNIZE NO ADMIN: HCPCS | Performed by: INTERNAL MEDICINE

## 2022-01-27 PROCEDURE — G8427 DOCREV CUR MEDS BY ELIG CLIN: HCPCS | Performed by: INTERNAL MEDICINE

## 2022-01-27 RX ORDER — OXYBUTYNIN CHLORIDE 5 MG/1
5 TABLET ORAL NIGHTLY PRN
Qty: 30 TABLET | Refills: 3 | Status: SHIPPED
Start: 2022-01-27 | End: 2022-05-04

## 2022-01-27 RX ORDER — LANOLIN ALCOHOL/MO/W.PET/CERES
100 CREAM (GRAM) TOPICAL DAILY
Qty: 30 TABLET | Refills: 3 | Status: SHIPPED
Start: 2022-01-27 | End: 2022-05-31 | Stop reason: ALTCHOICE

## 2022-01-27 RX ORDER — DIAZEPAM 2 MG/1
1 TABLET ORAL DAILY PRN
Qty: 30 TABLET | Refills: 0 | Status: SHIPPED | OUTPATIENT
Start: 2022-01-27 | End: 2022-03-28

## 2022-01-27 ASSESSMENT — ENCOUNTER SYMPTOMS
WHEEZING: 0
VOICE CHANGE: 0
SORE THROAT: 0
SHORTNESS OF BREATH: 0
NAUSEA: 0
ABDOMINAL PAIN: 0
VOMITING: 0
CHEST TIGHTNESS: 0

## 2022-01-27 NOTE — PROGRESS NOTES
HPI:  Patient comes in today for complaint of wt. Loss ,no  Fatigue,she cleans the home& does her grocery shopping etc  The pt. Gets anxious,requesting Ativan  Review of Systems   Constitutional: Negative for chills, fever and unexpected weight change. HENT: Negative for postnasal drip, sore throat and voice change. Respiratory: Negative for chest tightness, shortness of breath and wheezing. Cardiovascular: Negative for chest pain and leg swelling. Gastrointestinal: Negative for abdominal pain, nausea and vomiting. Genitourinary: Positive for urgency. Musculoskeletal: Negative for gait problem and joint swelling. Skin: Negative for rash and wound. Neurological: Negative. Psychiatric/Behavioral: The patient is nervous/anxious.          Past Medical History:   Diagnosis Date    Abnormal weight loss     Anxiety     Anxiety disorder     Cerebral infarction, unspecified ([address]     Essential (primary) hypertension     Gastric reflux     Hyperlipidemia     Hypertension     Occlusion and stenosis of right carotid artery     Slurred speech      Past Surgical History:   Procedure Laterality Date    CAROTID ENDARTERECTOMY Right 11/27/2020    RIGHT CAROTID [facility]AR[facility]ECTOM[facility]      LITHOTRIPSY Left 2/7/2020    CYSTOSCOPY RETROGRADE PYELOGRAM LEFT URETEROSCOPY LEFT J STENT LASER LITHOTRIPSY performed by Glenn Mendez DO at [facility] OR     Family History   Problem Relation Age of Onset    High Blood Pressure Mother     Other Father         Emphysema    Cancer Brother         Leukemia    Cancer Brother       Social History     Tobacco Use    Smoking status: Never Smoker    Smokeless tobacco: Never Used   Vaping Use    Vaping Use: Never used   Substance Use Topics    Alcohol use: No    Drug use: No        Current Outpatient Medications on File Prior to Visit   Medication Sig Dispense Refill    Cyanocobalamin (B-12) 6206 MCG SUBL Place 5,000 mg under the tongue daily 90 tablet 2    aspirin 81 MG chewable tablet Take 1 tablet by mouth daily 30 tablet 3    amLODIPine (NORVASC) 2.5 MG tablet Take 1 tablet by mouth nightly 90 tablet 3    guaiFENesin (ALTARUSSIN) 100 MG/5ML syrup Take 10 mLs by mouth 4 times daily as needed for Cough 236 mL 1    ezetimibe (ZETIA) 10 MG tablet Take 1 tablet by mouth daily 90 tablet 2    Azelastine HCl 137 MCG/SPRAY SOLN instill 1 spray into each nostril three times a day if needed      cyclobenzaprine (FLEXERIL) 10 MG tablet take 1 tablet by mouth at bedtime      mirtazapine (REMERON SOL-TAB) 15 MG disintegrating tablet place and dissolve 1/2 tablet by mouth nightly      neomycin-polymyxin-dexameth 3.5-67693-9.1 OINT APPLY TO LEFT EYE TWICE A DAY AS DIRECTED      ketoconazole (NIZORAL) 2 % shampoo Apply topically daily as needed. 1 Bottle 5     No current facility-administered medications on file prior to visit. PE:  VS:  /62   Pulse 88   Temp 97.1 °F (36.2 °C)   Resp 18   Ht 5' 2\" (1.575 m)   Wt 130 lb (59 kg)   LMP  (LMP Unknown)   SpO2 96%   BMI 23.78 kg/m²   General:  Alert and oriented, NAD  HEENT: Ear auricles are normal, EOMI, Conjunctivae clear  NECK:  Supple without adenopathy or thyromegaly, no carotid bruits. LUNGS:  CTA all fields  HEART:  RRR without M, R, or G  ABDOMEN:  Soft and nontender without palpable abnormalities, No renal or aortic bruits. EXTREMITIES: No ankle edema bilaterally. Neuro: No focal deficit.     Lab Results   Component Value Date    WBC 5.3 01/27/2022    HGB 12.6 01/27/2022    HCT 39.2 01/27/2022     01/27/2022    CHOL 160 11/03/2021    TRIG 93 11/03/2021    HDL 39 11/03/2021    ALT 6 01/27/2022    AST 11 01/27/2022     01/27/2022    K 4.5 01/27/2022     (H) 01/27/2022    CREATININE 0.9 01/27/2022    BUN 20 01/27/2022    CO2 28 01/27/2022    TSH 1.410 01/27/2022    INR 1.1 06/10/2021    LABA1C 5.8 10/22/2020 Impression/Plan:    1. Anxiety neurosis  Comments:  PDMP reviewed  Discussed adverse effects of anxiolytics,including falls etc..  2. Urgency incontinence  -     oxybutynin (DITROPAN) 5 MG tablet; Take 1 tablet by mouth nightly as needed (Urgency of urination), Disp-30 tablet, R-3Normal  3. Anxiety  -     diazePAM (VALIUM) 2 MG tablet; Take 0.5 tablets by mouth daily as needed for Anxiety for up to 60 days. , Disp-30 tablet, R-0Print  4. Weight loss  Comments:  Wt. stable in the past few weeks  Check prealb  Orders:  -     PREALBUMIN; Future  -     COMPREHENSIVE METABOLIC PANEL; Future  -     CBC WITH AUTO DIFFERENTIAL; Future  -     TSH; Future  5. Mixed hyperlipidemia  Comments:  LDL not at goal,pt. refuses to cont. Zetia due to muscle pains  trial of Nexletol  6. Essential hypertension  7.  History of ischemic stroke

## 2022-01-28 LAB — PREALBUMIN: 21 MG/DL (ref 20–40)

## 2022-02-26 ENCOUNTER — NURSE TRIAGE (OUTPATIENT)
Dept: OTHER | Facility: CLINIC | Age: 87
End: 2022-02-26

## 2022-02-26 NOTE — TELEPHONE ENCOUNTER
Received call from [facility] at [employer] with [employer]. Subjective:  a little bit of lightheadedness when Im laying down and I do sleep with two pillows under my head, goose pimples in left arm and lightheadedness when I go to get up and I sit right back down\"     Current Symptoms: left arm tingling, bp 127/70 this morning at 1030, pulse ox 95%, repeated blood pressure 143/75 now    Onset: a few hours ago; sudden    Associated Symptoms: reduced activity, eating only a little    What has been tried: took something and it kind of disappeared, the same medicine that Im on, may have taken a tylenol        Recommended disposition: See PCP within 3 Days for further recommendation for lightheadedness and tingling in the left arm when pressure is put on it. Care advice provided, patient verbalizes understanding; denies any other questions or concerns; instructed to call back for any new or worsening symptoms. Patient/Caller agrees with recommended disposition; writer provided warm transfer to [place] [facility] for appointment scheduling     Attention Provider: Thank you for allowing me to participate in the care of your patient. The patient was connected to triage in response to information provided to the [facility]/PSC. Please do not respond through this encounter as the response is not directed to a shared pool.         Reason for Disposition   [1] MODERATE dizziness (e.g., interferes with normal activities) AND [2] has been evaluated by physician for this    Protocols used: [facility]

## 2022-02-28 ENCOUNTER — OFFICE VISIT (OUTPATIENT)
Dept: PRIMARY CARE CLINIC | Age: 87
End: 2022-02-28
Payer: MEDICARE

## 2022-02-28 VITALS
OXYGEN SATURATION: 97 % | TEMPERATURE: 97.8 F | HEART RATE: 86 BPM | SYSTOLIC BLOOD PRESSURE: 110 MMHG | WEIGHT: 128 LBS | BODY MASS INDEX: 23.55 KG/M2 | RESPIRATION RATE: 18 BRPM | HEIGHT: 62 IN | DIASTOLIC BLOOD PRESSURE: 68 MMHG

## 2022-02-28 DIAGNOSIS — E86.1 HYPOTENSION DUE TO HYPOVOLEMIA: Primary | ICD-10-CM

## 2022-02-28 DIAGNOSIS — R42 LIGHTHEADEDNESS: ICD-10-CM

## 2022-02-28 DIAGNOSIS — R73.09 ABNORMAL GLUCOSE: ICD-10-CM

## 2022-02-28 DIAGNOSIS — Z86.73 HISTORY OF CVA (CEREBROVASCULAR ACCIDENT): ICD-10-CM

## 2022-02-28 DIAGNOSIS — I95.89 HYPOTENSION DUE TO HYPOVOLEMIA: Primary | ICD-10-CM

## 2022-02-28 DIAGNOSIS — Z86.73 HISTORY OF ISCHEMIC STROKE: ICD-10-CM

## 2022-02-28 DIAGNOSIS — R63.4 WEIGHT LOSS: ICD-10-CM

## 2022-02-28 DIAGNOSIS — Z86.73 HISTORY OF CEREBROVASCULAR ACCIDENT (CVA) FROM RIGHT CAROTID ARTERY OCCLUSION INVOLVING RIGHT MIDDLE CEREBRAL ARTERY TERRITORY: ICD-10-CM

## 2022-02-28 PROCEDURE — G8484 FLU IMMUNIZE NO ADMIN: HCPCS | Performed by: INTERNAL MEDICINE

## 2022-02-28 PROCEDURE — 1123F ACP DISCUSS/DSCN MKR DOCD: CPT | Performed by: INTERNAL MEDICINE

## 2022-02-28 PROCEDURE — G8400 PT W/DXA NO RESULTS DOC: HCPCS | Performed by: INTERNAL MEDICINE

## 2022-02-28 PROCEDURE — 1090F PRES/ABSN URINE INCON ASSESS: CPT | Performed by: INTERNAL MEDICINE

## 2022-02-28 PROCEDURE — 4040F PNEUMOC VAC/ADMIN/RCVD: CPT | Performed by: INTERNAL MEDICINE

## 2022-02-28 PROCEDURE — 1036F TOBACCO NON-USER: CPT | Performed by: INTERNAL MEDICINE

## 2022-02-28 PROCEDURE — 99214 OFFICE O/P EST MOD 30 MIN: CPT | Performed by: INTERNAL MEDICINE

## 2022-02-28 PROCEDURE — G8427 DOCREV CUR MEDS BY ELIG CLIN: HCPCS | Performed by: INTERNAL MEDICINE

## 2022-02-28 PROCEDURE — G8420 CALC BMI NORM PARAMETERS: HCPCS | Performed by: INTERNAL MEDICINE

## 2022-02-28 RX ORDER — CYANOCOBALAMIN (VITAMIN B-12) 500 MCG
TABLET ORAL
COMMUNITY
Start: 2022-01-22 | End: 2022-05-31 | Stop reason: ALTCHOICE

## 2022-02-28 RX ORDER — ASPIRIN 81 MG/1
81 TABLET, CHEWABLE ORAL DAILY
Qty: 90 TABLET | Refills: 2 | Status: SHIPPED
Start: 2022-02-28 | End: 2022-05-04 | Stop reason: SDUPTHER

## 2022-02-28 ASSESSMENT — ENCOUNTER SYMPTOMS
SORE THROAT: 0
CHEST TIGHTNESS: 0
ABDOMINAL PAIN: 0
SHORTNESS OF BREATH: 0
NAUSEA: 0
VOMITING: 0
VOICE CHANGE: 0
WHEEZING: 0

## 2022-02-28 NOTE — PROGRESS NOTES
HPI:  Patient comes in today for complaint of wt. Loss ,no  Fatigue,she cleans the home& does her grocery shopping etc  Patient is concerned about her low blood pressure. Patient is ambulating well, she is not happy with the weight loss but admits that she is not a big eater. Patient also states that 2 days ago she felt somewhat lightheaded and she had some tingling in the left upper arm. This did not last long it resolved. She has no symptoms today. Review of Systems   Constitutional: Negative for chills, fever and unexpected weight change. HENT: Negative for postnasal drip, sore throat and voice change. Respiratory: Negative for chest tightness, shortness of breath and wheezing. Cardiovascular: Negative for chest pain and leg swelling. Gastrointestinal: Negative for abdominal pain, nausea and vomiting. Musculoskeletal: Negative for gait problem and joint swelling. Skin: Negative for rash and wound. Neurological: Positive for numbness (See HPI).         Past Medical History:   Diagnosis Date    Abnormal weight loss     Anxiety     Anxiety disorder     Cerebral infarction, unspecified ([address]     Essential (primary) hypertension     Gastric reflux     Hyperlipidemia     Hypertension     Occlusion and stenosis of right carotid artery     Slurred speech      Past Surgical History:   Procedure Laterality Date    CAROTID ENDARTERECTOMY Right 11/27/2020    RIGHT CAROTID ENDARTERECTOMY performed by Rita Fowler MD at [facility]      LITHOTRIPSY Left 2/7/2020    CYSTOSCOPY RETROGRADE PYELOGRAM LEFT URETEROSCOPY LEFT J STENT LASER LITHOTRIPSY performed by Miranda Mendez DO at [facility] OR     Family History   Problem Relation Age of Onset    High Blood Pressure Mother     Other Father         Emphysema    Cancer Brother         Leukemia    Cancer Brother       Social History     Tobacco Use    Smoking status: Never Smoker    Smokeless tobacco: Never Used Vaping Use    Vaping Use: Never used   Substance Use Topics    Alcohol use: No    Drug use: No        Current Outpatient Medications on File Prior to Visit   Medication Sig Dispense Refill    Cyanocobalamin (VITAMIN B 12) 500 MCG TABS take 1 tablet under the tongue once daily      oxybutynin (DITROPAN) 5 MG tablet Take 1 tablet by mouth nightly as needed (Urgency of urination) 30 tablet 3    thiamine 100 MG tablet Take 1 tablet by mouth daily 30 tablet 3    diazePAM (VALIUM) 2 MG tablet Take 0.5 tablets by mouth daily as needed for Anxiety for up to 60 days. 30 tablet 0    Cyanocobalamin (B-12) 5000 MCG SUBL Place 5,000 mg under the tongue daily 90 tablet 2    ezetimibe (ZETIA) 10 MG tablet Take 1 tablet by mouth daily 90 tablet 2    Azelastine HCl 137 MCG/SPRAY SOLN instill 1 spray into each nostril three times a day if needed      cyclobenzaprine (FLEXERIL) 10 MG tablet take 1 tablet by mouth at bedtime      mirtazapine (REMERON SOL-TAB) 15 MG disintegrating tablet place and dissolve 1/2 tablet by mouth nightly      ketoconazole (NIZORAL) 2 % shampoo Apply topically daily as needed. 1 Bottle 5     No current facility-administered medications on file prior to visit. PE:  VS:  /68 (Site: Left Upper Arm)   Pulse 86   Temp 97.8 °F (36.6 °C)   Resp 18   Ht 5' 2\" (1.575 m)   Wt 128 lb (58.1 kg)   LMP  (LMP Unknown)   SpO2 97%   BMI 23.41 kg/m²   General:  Alert and oriented, NAD  HEENT: Ear auricles are normal, EOMI, Conjunctivae clear  NECK:  Supple without adenopathy or thyromegaly, no carotid bruits in the left carotid, with a faint bruit over the right carotid. LUNGS:  CTA all fields  HEART:  RRR without M, R, or G  ABDOMEN:  Soft and nontender without palpable abnormalities, No renal or aortic bruits. EXTREMITIES: No ankle edema bilaterally. Neuro: No focal deficit.     Lab Results   Component Value Date    WBC 5.3 01/27/2022    HGB 12.6 01/27/2022    HCT 39.2 01/27/2022    PLT 303 01/27/2022    CHOL 160 11/03/2021    TRIG 93 11/03/2021    HDL 39 11/03/2021    ALT 6 01/27/2022    AST 11 01/27/2022     01/27/2022    K 4.5 01/27/2022     (H) 01/27/2022    CREATININE 0.9 01/27/2022    BUN 20 01/27/2022    CO2 28 01/27/2022    TSH 1.410 01/27/2022    INR 1.1 06/10/2021    LABA1C 5.8 10/22/2020        Impression/Plan:    1. Hypotension due to hypovolemia  Comments:  Patient had some diarrhea last week, now resolved, hypotension could be hypovolemia but also patient advised to take amlodipine only if blood pressure>140 systo  Orders:  -     Comprehensive Metabolic Panel; Future  -     Hemoglobin A1C; Future  -     Lipid Panel; Future  -     CBC with Auto Differential; Future  -     TSH; Future  2. History of ischemic stroke  Comments:  Patient doing very well stable mild slurred speech but good strength ambulating steady. 3. Lightheadedness  Comments:  Patient advised to increase hydration and to use amlodipine only when needed. Orders:  -     Comprehensive Metabolic Panel; Future  -     Hemoglobin A1C; Future  -     Lipid Panel; Future  -     CBC with Auto Differential; Future  -     TSH; Future  4. History of CVA (cerebrovascular accident)  5. History of cerebrovascular accident (CVA) from right carotid artery occlusion involving right middle cerebral artery territory  6. Weight loss  Comments:  Patient had a prealbumin of 21, borderlining advised to increase protein in her diet chicken and salmon and peanut butter etc.  Orders:  -     Comprehensive Metabolic Panel; Future  -     Hemoglobin A1C; Future  -     Lipid Panel; Future  -     CBC with Auto Differential; Future  -     TSH; Future  7. Abnormal glucose  -     Comprehensive Metabolic Panel;  Future  -     Hemoglobin A1C; Future

## 2022-03-15 ENCOUNTER — NURSE ONLY (OUTPATIENT)
Dept: PRIMARY CARE CLINIC | Age: 87
End: 2022-03-15

## 2022-03-15 VITALS — SYSTOLIC BLOOD PRESSURE: 128 MMHG | OXYGEN SATURATION: 98 % | DIASTOLIC BLOOD PRESSURE: 72 MMHG | HEART RATE: 102 BPM

## 2022-03-15 DIAGNOSIS — R42 LIGHTHEADEDNESS: Primary | ICD-10-CM

## 2022-04-01 ENCOUNTER — TELEPHONE (OUTPATIENT)
Dept: PRIMARY CARE CLINIC | Age: 87
End: 2022-04-01

## 2022-04-01 NOTE — TELEPHONE ENCOUNTER
Patent called requesting a CT scan of back of head due to pain she is having   Pain since 3.31.2022    She would like to have done at [facility] imaging

## 2022-04-04 RX ORDER — BACLOFEN 10 MG/1
10 TABLET ORAL NIGHTLY
Qty: 30 TABLET | Refills: 1 | Status: SHIPPED
Start: 2022-04-04 | End: 2022-05-31 | Stop reason: ALTCHOICE

## 2022-04-05 NOTE — TELEPHONE ENCOUNTER
I have advised the patient to start baclofen muscle relaxer. Prescription sent to her pharmacy. Patient has an appointment in May she states she will keep her appointment but will call sooner if still symptomatic.

## 2022-04-08 ENCOUNTER — TELEPHONE (OUTPATIENT)
Dept: PRIMARY CARE CLINIC | Age: 87
End: 2022-04-08

## 2022-04-08 NOTE — TELEPHONE ENCOUNTER
Geneva Donohue calling to recommend the patient get counseling services due to the issue between her and her daughters arguments. She called and reported to [facility] 2 x's and she wanted to reach out and let you know what was going on.

## 2022-04-12 NOTE — TELEPHONE ENCOUNTER
Last Appointment:  2/28/2022  Future Appointments   Date Time Provider Edison De Jesusi   5/26/2022  2:30 PM Dorenda Bumpers, MD [facility]   5/31/2022  3:00 PM MD Fernando Rojas 59

## 2022-04-13 ENCOUNTER — TELEPHONE (OUTPATIENT)
Dept: PRIMARY CARE CLINIC | Age: 87
End: 2022-04-13

## 2022-04-13 RX ORDER — BENZONATATE 100 MG/1
100 CAPSULE ORAL 3 TIMES DAILY PRN
Qty: 30 CAPSULE | Refills: 0 | Status: SHIPPED | OUTPATIENT
Start: 2022-04-13 | End: 2022-04-20

## 2022-04-13 NOTE — TELEPHONE ENCOUNTER
Patient called requesting antibiotic and a cough syrup for the below symptoms       Cough  Sneezing   Running nose

## 2022-04-14 RX ORDER — DIAZEPAM 2 MG/1
2 TABLET ORAL DAILY
Qty: 30 TABLET | Refills: 0 | OUTPATIENT
Start: 2022-04-14 | End: 2022-05-14

## 2022-04-14 NOTE — TELEPHONE ENCOUNTER
Will not refill this medication, needs to wait until Dr. Amanda Valadez gets back.     Thank Luis Shetty

## 2022-04-15 NOTE — PROGRESS NOTES
HPI:  Patient comes in today for complaint of dry cough, she states I have a cold, patient states that she has a good appetite she is eating but she is wondering why she is losing weight. Patient states she is very busy moving from her current location to live in [place] in the same house with her daughter. Patient denies any fever or chills. She refused to take the Covid vaccination. No body aches. Patient had a cardiac cath done recently from the right wrist and states that she thinks her and remains swollen since her cardiac cath. Patient also is complaining of a small tender lump in the palm of the right hand  . Review of Systems   Constitutional: Negative for chills, diaphoresis, fatigue and fever. HENT: Positive for congestion and ear pain (Pressure in her left ear. ). Negative for sore throat and trouble swallowing. Soreness in the right nostril. Respiratory: Positive for cough. Negative for choking, chest tightness, shortness of breath and wheezing. Cardiovascular: Negative for chest pain, palpitations and leg swelling. Gastrointestinal: Negative. Genitourinary: Negative. Musculoskeletal: Negative. Neurological: Negative. Hematological: Negative. Psychiatric/Behavioral: Negative.          Past Medical History:   Diagnosis Date    Abnormal weight loss     Anxiety     Anxiety disorder     [address], unspecified ([facility] [address]     Essential (primary) hypertension     Gastric reflux     Hyperlipidemia     Hypertension     Occlusion and stenosis of right carotid artery     Slurred speech      Past Surgical History:   Procedure Laterality Date    CAROTID ENDARTERECTOMY Right 11/27/2020    RIGHT CAROTID ENDARTERECTOMY performed by Brandee Gustafson MD at [facility]      LITHOTRIPSY Left 2/7/2020    CYSTOSCOPY RETROGRADE PYELOGRAM LEFT URETEROSCOPY LEFT J STENT LASER LITHOTRIPSY performed by Tiffany Betancur Memo, DO at [facility] History   Problem Relation Age of Onset    High Blood Pressure Mother     Other Father         Emphysema    Cancer Brother         Leukemia    Cancer Brother       Social History     Tobacco Use    Smoking status: Never Smoker    Smokeless tobacco: Never Used   Vaping Use    Vaping Use: Never used   Substance Use Topics    Alcohol use: No    Drug use: No        Current Outpatient Medications on File Prior to Visit   Medication Sig Dispense Refill    ezetimibe (ZETIA) 10 MG tablet Take 1 tablet by mouth daily 90 tablet 2    ketoconazole (NIZORAL) 2 % shampoo Apply topically daily as needed. 1 Bottle 5    aspirin 81 MG chewable tablet Take 1 tablet by mouth daily 30 tablet 3    thiamine 100 MG tablet Take 1 tablet by mouth daily 30 tablet 3    oxybutynin (DITROPAN) 5 MG tablet Take 1 tablet by mouth nightly 45 tablet 3    Azelastine HCl 137 MCG/SPRAY SOLN instill 1 spray into each nostril three times a day if needed      cyclobenzaprine (FLEXERIL) 10 MG tablet take 1 tablet by mouth at bedtime      mirtazapine (REMERON SOL-TAB) 15 MG disintegrating tablet place and dissolve 1/2 tablet by mouth nightly      neomycin-polymyxin-dexameth 3.5-46006-8.1 OINT APPLY TO LEFT EYE TWICE A DAY AS DIRECTED       No current facility-administered medications on file prior to visit. PE:  VS:  /60 (Site: Right Upper Arm, Position: Sitting, Cuff Size: Large Adult)   Pulse 75   Temp 97.1 °F (36.2 °C)   Resp 17   Ht 5' 5\" (1.651 m)   Wt 135 lb (61.2 kg)   LMP  (LMP Unknown)   SpO2 95%   BMI 22.47 kg/m²   General:  Alert and oriented, NAD  HEENT:  TMs, EACs and auricles are normal, NORMA, EOMI, Conjunctivae clear       Throat currently clear. Nasal passages with no inflammation. NECK:  Supple without adenopathy or thyromegaly, no carotid bruits. LUNGS:  CTA all fields, no wheezes no rales symmetrical expansion bilaterally. HEART:  RRR without M, R, or G, no precordial heave.   ABDOMEN:  Soft and nontender without palpable abnormalities, No renal or aortic bruits. EXTREMITIES:  Without clubbing, cyanosis, or edema, 2+ DP/PT pulses B  Right hand with a 2 mm tender swelling in the palm likely ganglion cyst.  Lab Results   Component Value Date    WBC 4.7 06/16/2021    HGB 12.8 06/16/2021    HCT 38.9 06/16/2021     06/16/2021    CHOL 143 06/16/2021    TRIG 152 (H) 06/16/2021    HDL 29 06/16/2021    ALT 19 06/16/2021    AST 23 06/16/2021     06/16/2021    K 4.4 06/16/2021     (H) 06/16/2021    CREATININE 0.7 06/16/2021    BUN 14 06/16/2021    CO2 24 06/16/2021    TSH 1.870 06/16/2021    INR 1.1 06/10/2021    LABA1C 5.8 10/22/2020        Impression/Plan:    1. Cough  Assessment & Plan:  Could be related to acid reflux, will discontinue amlodipine and patient to avoid eating 2 hours before going to bed. Chest x-ray ordered. Orders:  -     XR CHEST STANDARD (2 VW); Future  -     CBC WITH AUTO DIFFERENTIAL; Future  -     Comprehensive Metabolic Panel; Future  -     Lipid Panel; Future  -     CBC Auto Differential; Future  -     TSH without Reflex; Future  2. Essential hypertension  Assessment & Plan:  Patient is taking 2.5 mg of amlodipine, she is complaining of cough, blood pressure is excellent today, patient advised to stop amlodipine and monitor her blood pressure and bring her readings or her machine and will evaluate if that will positively impact her cough. Orders:  -     Comprehensive Metabolic Panel; Future  -     Lipid Panel; Future  -     CBC Auto Differential; Future  -     TSH without Reflex; Future  3. Diastolic dysfunction  -     Comprehensive Metabolic Panel; Future  -     Lipid Panel; Future  -     CBC Auto Differential; Future  -     TSH without Reflex; Future  4. Mixed hyperlipidemia  -     Comprehensive Metabolic Panel; Future  -     Lipid Panel; Future  -     CBC Auto Differential; Future  -     TSH without Reflex; Future  5.  Ganglion cyst  -     [facility], MD, Orthopaedics (hand & upper extremities), [facility] - [facility] Isotretinoin Counseling: Patient should get monthly blood tests, not donate blood, not drive at night if vision affected, not share medication, and not undergo elective surgery for 6 months after tx completed. Side effects reviewed, pt to contact office should one occur.

## 2022-04-18 RX ORDER — GUAIFENESIN 600 MG/1
600 TABLET, EXTENDED RELEASE ORAL 2 TIMES DAILY
Qty: 30 TABLET | Refills: 0 | Status: SHIPPED
Start: 2022-04-18 | End: 2022-04-21 | Stop reason: ALTCHOICE

## 2022-04-18 NOTE — TELEPHONE ENCOUNTER
Mucinex sent  Please have her schedule with Dr. Nupur Leyva if at all possible for any red flags - fever, chills, worsening sx, SOB etc.

## 2022-04-21 ENCOUNTER — OFFICE VISIT (OUTPATIENT)
Dept: FAMILY MEDICINE CLINIC | Age: 87
End: 2022-04-21
Payer: MEDICARE

## 2022-04-21 ENCOUNTER — TELEPHONE (OUTPATIENT)
Dept: PRIMARY CARE CLINIC | Age: 87
End: 2022-04-21

## 2022-04-21 VITALS
HEART RATE: 98 BPM | OXYGEN SATURATION: 95 % | BODY MASS INDEX: 22.86 KG/M2 | SYSTOLIC BLOOD PRESSURE: 132 MMHG | WEIGHT: 125 LBS | DIASTOLIC BLOOD PRESSURE: 78 MMHG | TEMPERATURE: 97.3 F

## 2022-04-21 DIAGNOSIS — J20.9 ACUTE BRONCHITIS, UNSPECIFIED ORGANISM: Primary | ICD-10-CM

## 2022-04-21 DIAGNOSIS — R05.9 COUGH: ICD-10-CM

## 2022-04-21 PROCEDURE — 1123F ACP DISCUSS/DSCN MKR DOCD: CPT | Performed by: PHYSICIAN ASSISTANT

## 2022-04-21 PROCEDURE — 1036F TOBACCO NON-USER: CPT | Performed by: PHYSICIAN ASSISTANT

## 2022-04-21 PROCEDURE — 99214 OFFICE O/P EST MOD 30 MIN: CPT | Performed by: PHYSICIAN ASSISTANT

## 2022-04-21 PROCEDURE — G8420 CALC BMI NORM PARAMETERS: HCPCS | Performed by: PHYSICIAN ASSISTANT

## 2022-04-21 PROCEDURE — 3006F CXR DOC REV: CPT | Performed by: PHYSICIAN ASSISTANT

## 2022-04-21 PROCEDURE — 4040F PNEUMOC VAC/ADMIN/RCVD: CPT | Performed by: PHYSICIAN ASSISTANT

## 2022-04-21 PROCEDURE — 1090F PRES/ABSN URINE INCON ASSESS: CPT | Performed by: PHYSICIAN ASSISTANT

## 2022-04-21 PROCEDURE — G8427 DOCREV CUR MEDS BY ELIG CLIN: HCPCS | Performed by: PHYSICIAN ASSISTANT

## 2022-04-21 PROCEDURE — G8400 PT W/DXA NO RESULTS DOC: HCPCS | Performed by: PHYSICIAN ASSISTANT

## 2022-04-21 RX ORDER — DOXYCYCLINE HYCLATE 100 MG
100 TABLET ORAL 2 TIMES DAILY
Qty: 20 TABLET | Refills: 0 | Status: SHIPPED | OUTPATIENT
Start: 2022-04-21 | End: 2022-05-01

## 2022-04-21 RX ORDER — BENZONATATE 100 MG/1
100 CAPSULE ORAL 3 TIMES DAILY PRN
Qty: 21 CAPSULE | Refills: 0 | Status: SHIPPED | OUTPATIENT
Start: 2022-04-21 | End: 2022-04-28

## 2022-04-21 NOTE — TELEPHONE ENCOUNTER
Patient isn't feeling any better and would like to be seen. I offered her 8:30am appointment and she declined due to driving issues. And I offered her the name and numbers of the walk in clinics that will see her same day. Patient will go to the [address] in [facility].

## 2022-04-25 ENCOUNTER — TELEPHONE (OUTPATIENT)
Dept: PRIMARY CARE CLINIC | Age: 87
End: 2022-04-25

## 2022-04-25 RX ORDER — ALBUTEROL SULFATE 90 UG/1
2 AEROSOL, METERED RESPIRATORY (INHALATION) EVERY 6 HOURS PRN
Qty: 18 G | Refills: 0 | Status: SHIPPED
Start: 2022-04-25 | End: 2022-05-31 | Stop reason: ALTCHOICE

## 2022-04-25 NOTE — TELEPHONE ENCOUNTER
Albuterol inhaler sent to the pharmacy, please give the patient an earlier appointment for follow-up in 1 week

## 2022-04-25 NOTE — TELEPHONE ENCOUNTER
Pt stated she is still not feeling better and is requesting inhaler or anything that would help with her cough

## 2022-05-03 ENCOUNTER — OFFICE VISIT (OUTPATIENT)
Dept: PRIMARY CARE CLINIC | Age: 87
End: 2022-05-03
Payer: MEDICARE

## 2022-05-03 VITALS
BODY MASS INDEX: 22.63 KG/M2 | HEIGHT: 62 IN | TEMPERATURE: 97 F | SYSTOLIC BLOOD PRESSURE: 122 MMHG | OXYGEN SATURATION: 96 % | DIASTOLIC BLOOD PRESSURE: 60 MMHG | RESPIRATION RATE: 18 BRPM | HEART RATE: 86 BPM | WEIGHT: 123 LBS

## 2022-05-03 DIAGNOSIS — I95.89 HYPOTENSION DUE TO HYPOVOLEMIA: ICD-10-CM

## 2022-05-03 DIAGNOSIS — R63.4 WEIGHT LOSS: ICD-10-CM

## 2022-05-03 DIAGNOSIS — R45.1 AGITATION: ICD-10-CM

## 2022-05-03 DIAGNOSIS — R73.09 ABNORMAL GLUCOSE: ICD-10-CM

## 2022-05-03 DIAGNOSIS — R05.3 CHRONIC COUGH: ICD-10-CM

## 2022-05-03 DIAGNOSIS — F41.9 ANXIETY: ICD-10-CM

## 2022-05-03 DIAGNOSIS — R42 LIGHTHEADEDNESS: ICD-10-CM

## 2022-05-03 DIAGNOSIS — R05.3 CHRONIC COUGH: Primary | ICD-10-CM

## 2022-05-03 DIAGNOSIS — E86.1 HYPOTENSION DUE TO HYPOVOLEMIA: ICD-10-CM

## 2022-05-03 DIAGNOSIS — R63.0 ANOREXIA: ICD-10-CM

## 2022-05-03 LAB
BASOPHILS ABSOLUTE: 0.02 E9/L (ref 0–0.2)
BASOPHILS RELATIVE PERCENT: 0.4 % (ref 0–2)
EOSINOPHILS ABSOLUTE: 0.05 E9/L (ref 0.05–0.5)
EOSINOPHILS RELATIVE PERCENT: 0.9 % (ref 0–6)
HCT VFR BLD CALC: 36.3 % (ref 34–48)
HEMOGLOBIN: 11.8 G/DL (ref 11.5–15.5)
IMMATURE GRANULOCYTES #: 0.01 E9/L
IMMATURE GRANULOCYTES %: 0.2 % (ref 0–5)
LYMPHOCYTES ABSOLUTE: 1.52 E9/L (ref 1.5–4)
LYMPHOCYTES RELATIVE PERCENT: 28.3 % (ref 20–42)
Lab: NORMAL
MCH RBC QN AUTO: 32.7 PG (ref 26–35)
MCHC RBC AUTO-ENTMCNC: 32.5 % (ref 32–34.5)
MCV RBC AUTO: 100.6 FL (ref 80–99.9)
MONOCYTES ABSOLUTE: 0.58 E9/L (ref 0.1–0.95)
MONOCYTES RELATIVE PERCENT: 10.8 % (ref 2–12)
NEUTROPHILS ABSOLUTE: 3.2 E9/L (ref 1.8–7.3)
NEUTROPHILS RELATIVE PERCENT: 59.4 % (ref 43–80)
PDW BLD-RTO: 13.8 FL (ref 11.5–15)
PERFORMING INSTRUMENT: NORMAL
PLATELET # BLD: 420 E9/L (ref 130–450)
PMV BLD AUTO: 10.5 FL (ref 7–12)
QC PASS/FAIL: NORMAL
RBC # BLD: 3.61 E12/L (ref 3.5–5.5)
SARS-COV-2, POC: NORMAL
WBC # BLD: 5.4 E9/L (ref 4.5–11.5)

## 2022-05-03 PROCEDURE — 1090F PRES/ABSN URINE INCON ASSESS: CPT | Performed by: INTERNAL MEDICINE

## 2022-05-03 PROCEDURE — G8400 PT W/DXA NO RESULTS DOC: HCPCS | Performed by: INTERNAL MEDICINE

## 2022-05-03 PROCEDURE — G8427 DOCREV CUR MEDS BY ELIG CLIN: HCPCS | Performed by: INTERNAL MEDICINE

## 2022-05-03 PROCEDURE — 1123F ACP DISCUSS/DSCN MKR DOCD: CPT | Performed by: INTERNAL MEDICINE

## 2022-05-03 PROCEDURE — 4040F PNEUMOC VAC/ADMIN/RCVD: CPT | Performed by: INTERNAL MEDICINE

## 2022-05-03 PROCEDURE — 87426 SARSCOV CORONAVIRUS AG IA: CPT | Performed by: INTERNAL MEDICINE

## 2022-05-03 PROCEDURE — G8420 CALC BMI NORM PARAMETERS: HCPCS | Performed by: INTERNAL MEDICINE

## 2022-05-03 PROCEDURE — 99214 OFFICE O/P EST MOD 30 MIN: CPT | Performed by: INTERNAL MEDICINE

## 2022-05-03 PROCEDURE — 1036F TOBACCO NON-USER: CPT | Performed by: INTERNAL MEDICINE

## 2022-05-03 ASSESSMENT — ENCOUNTER SYMPTOMS
VOMITING: 0
COUGH: 1
ABDOMINAL PAIN: 0
ABDOMINAL DISTENTION: 0
SHORTNESS OF BREATH: 1

## 2022-05-03 NOTE — PROGRESS NOTES
HPI:  Patient comes in today for cmplaint of cough for 4 wks,no appetite,no taste, the patient is reported to have constant arguments with her daughters. The patient states that she lives with her 2 daughters, she states that her daughters are trying to boss her and no one is going to be her boss. She states that she is losing weight but does not feel like eating. I discussed with her daughter Lauryn Chapa who brought her to the office today, she states that there is always food in the house and the patient eats very little. Patient states she is on antibiotics and has 4 pills left, does not remember the names of the antibiotics. Review of Systems   Constitutional: Positive for appetite change and unexpected weight change. Respiratory: Positive for cough (with colored phlem) and shortness of breath. Cardiovascular: Negative for chest pain, palpitations and leg swelling. Gastrointestinal: Negative for abdominal distention, abdominal pain and vomiting. Musculoskeletal: Negative for arthralgias, gait problem, joint swelling and myalgias. Skin: Negative for rash. Psychiatric/Behavioral: Positive for agitation.         Past Medical History:   Diagnosis Date    Abnormal weight loss     Anxiety     Anxiety disorder     Cerebral infarction, unspecified ([address]     Essential (primary) hypertension     Gastric reflux     Hyperlipidemia     Hypertension     Occlusion and stenosis of right carotid artery     Slurred speech      Past Surgical History:   Procedure Laterality Date    CAROTID ENDARTERECTOMY Right 11/27/2020    RIGHT CAROTID ENDARTERECTOMY performed by Reid Oneill MD at [facility]      LITHOTRIPSY Left 2/7/2020    CYSTOSCOPY RETROGRADE PYELOGRAM LEFT URETEROSCOPY LEFT J STENT LASER LITHOTRIPSY performed by Cherri Mendez DO at [facility] OR     Family History   Problem Relation Age of Onset    High Blood Pressure Mother     Other Father         Emphysema    Cancer Brother         Leukemia    Cancer Brother       Social History     Tobacco Use    Smoking status: Never Smoker    Smokeless tobacco: Never Used   Vaping Use    Vaping Use: Never used   Substance Use Topics    Alcohol use: No    Drug use: No        Current Outpatient Medications on File Prior to Visit   Medication Sig Dispense Refill    albuterol sulfate HFA (PROAIR HFA) 108 (90 Base) MCG/ACT inhaler Inhale 2 puffs into the lungs every 6 hours as needed for Wheezing 18 g 0    baclofen (LIORESAL) 10 MG tablet Take 1 tablet by mouth at bedtime 30 tablet 1    Cyanocobalamin (VITAMIN B 12) 500 MCG TABS take 1 tablet under the tongue once daily      aspirin 81 MG chewable tablet Take 1 tablet by mouth daily 90 tablet 2    oxybutynin (DITROPAN) 5 MG tablet Take 1 tablet by mouth nightly as needed (Urgency of urination) 30 tablet 3    thiamine 100 MG tablet Take 1 tablet by mouth daily 30 tablet 3    Cyanocobalamin (B-12) 5000 MCG SUBL Place 5,000 mg under the tongue daily 90 tablet 2    ezetimibe (ZETIA) 10 MG tablet Take 1 tablet by mouth daily 90 tablet 2    Azelastine HCl 137 MCG/SPRAY SOLN instill 1 spray into each nostril three times a day if needed      mirtazapine (REMERON SOL-TAB) 15 MG disintegrating tablet place and dissolve 1/2 tablet by mouth nightly      ketoconazole (NIZORAL) 2 % shampoo Apply topically daily as needed. 1 Bottle 5     No current facility-administered medications on file prior to visit. PE:  VS:  /60   Pulse 86   Temp 97 °F (36.1 °C)   Resp 18   Ht 5' 2\" (1.575 m)   Wt 123 lb (55.8 kg)   LMP  (LMP Unknown)   SpO2 96%   BMI 22.50 kg/m²   General:  Alert and oriented, NAD  HEENT: Ear auricles are normal, EOMI, Conjunctivae clear  NECK:  Supple without adenopathy or thyromegaly, no carotid bruits.    LUNGS:  CTA all fields  HEART:  RRR without M, R, or G  ABDOMEN:  Soft and nontender without palpable abnormalities, No renal or aortic bruits. EXTREMITIES: No ankle edema bilaterally. Neuro: No focal deficit. Lab Results   Component Value Date    WBC 5.4 05/03/2022    HGB 11.8 05/03/2022    HCT 36.3 05/03/2022     05/03/2022    CHOL 160 11/03/2021    TRIG 93 11/03/2021    HDL 39 11/03/2021    ALT 6 01/27/2022    AST 11 01/27/2022     01/27/2022    K 4.5 01/27/2022     (H) 01/27/2022    CREATININE 0.9 01/27/2022    BUN 20 01/27/2022    CO2 28 01/27/2022    TSH 1.410 01/27/2022    INR 1.1 06/10/2021    LABA1C 5.8 10/22/2020        Impression/Plan:    1. Chronic cough  Comments:  Recent x-rays negative, check CT of the chest.  Orders:  -     CT CHEST WO CONTRAST; Future  -     Prealbumin; Future  -     POCT COVID-19, Antigen  2. Weight loss  Comments:  Check prealbumin and blood work. Patient and daughter indicate that she does not eat. Orders:  -     CT CHEST WO CONTRAST; Future  -     Prealbumin; Future  -     POCT COVID-19, Antigen  3. Anxiety  Comments:  Patient is requesting Valium. Orders:  -     External Referral To Psychiatry  4. Agitation  Comments:  Patient counseled  she is resistant to see psychiatry, referral made and patient advised to comply. Nicho Rodriguez and her daughter is encouraging her mother. 5. Anorexia  Comments:  Patient was prescribed Remeron for appetite stimulation, she states that she does not know what medicine is that and does not think she is taking it  The patient is counseled extensively and discussed with her daughter as well, patient is very abrupt with her daughter not allowing her to to speak.

## 2022-05-04 ENCOUNTER — TELEPHONE (OUTPATIENT)
Dept: PRIMARY CARE CLINIC | Age: 87
End: 2022-05-04

## 2022-05-04 DIAGNOSIS — I10 PRIMARY HYPERTENSION: Primary | ICD-10-CM

## 2022-05-04 LAB
ALBUMIN SERPL-MCNC: 3.7 G/DL (ref 3.5–5.2)
ALP BLD-CCNC: 67 U/L (ref 35–104)
ALT SERPL-CCNC: <5 U/L (ref 0–32)
ANION GAP SERPL CALCULATED.3IONS-SCNC: 12 MMOL/L (ref 7–16)
AST SERPL-CCNC: 9 U/L (ref 0–31)
BILIRUB SERPL-MCNC: 0.4 MG/DL (ref 0–1.2)
BUN BLDV-MCNC: 17 MG/DL (ref 6–23)
CALCIUM SERPL-MCNC: 9 MG/DL (ref 8.6–10.2)
CHLORIDE BLD-SCNC: 110 MMOL/L (ref 98–107)
CHOLESTEROL, TOTAL: 135 MG/DL (ref 0–199)
CO2: 24 MMOL/L (ref 22–29)
CREAT SERPL-MCNC: 0.7 MG/DL (ref 0.5–1)
GFR AFRICAN AMERICAN: >60
GFR NON-AFRICAN AMERICAN: >60 ML/MIN/1.73
GLUCOSE BLD-MCNC: 98 MG/DL (ref 74–99)
HBA1C MFR BLD: 6 % (ref 4–5.6)
HDLC SERPL-MCNC: 32 MG/DL
LDL CHOLESTEROL CALCULATED: 79 MG/DL (ref 0–99)
POTASSIUM SERPL-SCNC: 4.2 MMOL/L (ref 3.5–5)
PREALBUMIN: 18 MG/DL (ref 20–40)
SODIUM BLD-SCNC: 146 MMOL/L (ref 132–146)
TOTAL PROTEIN: 6.7 G/DL (ref 6.4–8.3)
TRIGL SERPL-MCNC: 120 MG/DL (ref 0–149)
TSH SERPL DL<=0.05 MIU/L-ACNC: 1.11 UIU/ML (ref 0.27–4.2)
VLDLC SERPL CALC-MCNC: 24 MG/DL

## 2022-05-04 RX ORDER — EZETIMIBE 10 MG/1
10 TABLET ORAL DAILY
Qty: 90 TABLET | Refills: 3 | Status: SHIPPED
Start: 2022-05-04 | End: 2022-08-01 | Stop reason: SDUPTHER

## 2022-05-04 RX ORDER — MIRTAZAPINE 15 MG/1
7.5 TABLET, ORALLY DISINTEGRATING ORAL NIGHTLY
Qty: 45 TABLET | Refills: 3 | Status: SHIPPED
Start: 2022-05-04 | End: 2022-05-31 | Stop reason: ALTCHOICE

## 2022-05-04 RX ORDER — ASPIRIN 81 MG/1
81 TABLET, CHEWABLE ORAL DAILY
Qty: 90 TABLET | Refills: 3 | Status: SHIPPED
Start: 2022-05-04 | End: 2022-08-01 | Stop reason: SDUPTHER

## 2022-05-04 RX ORDER — AMLODIPINE BESYLATE 2.5 MG/1
2.5 TABLET ORAL DAILY
Qty: 90 TABLET | Refills: 3 | Status: SHIPPED
Start: 2022-05-04 | End: 2022-09-08 | Stop reason: ALTCHOICE

## 2022-05-04 NOTE — TELEPHONE ENCOUNTER
Pt stated that her daughter spoke with Dr Sona Mcneal and that Dr Sona Mcneal mentioned to pt's daughter about cancer. Pt is unaware of this and states Dr Sona Mcneal didn't speak to her about it. Please clarify with pt.

## 2022-05-12 NOTE — TELEPHONE ENCOUNTER
As per previous note, please notify the patient there is no cancer but we are trying to figure why is she coughing and why is she losing weight that is why we are ordering the CT scan to rule out cancer

## 2022-05-12 NOTE — TELEPHONE ENCOUNTER
Patient chest x-ray was negative, because of her chronic cough and her weight loss we ordered CAT scan on the chest, this is not done as of now please check and schedule.   CT of the chest is ordered to rule out cancer

## 2022-05-31 ENCOUNTER — OFFICE VISIT (OUTPATIENT)
Dept: PRIMARY CARE CLINIC | Age: 87
End: 2022-05-31
Payer: MEDICARE

## 2022-05-31 VITALS
OXYGEN SATURATION: 97 % | HEIGHT: 62 IN | TEMPERATURE: 97.7 F | HEART RATE: 64 BPM | RESPIRATION RATE: 18 BRPM | WEIGHT: 126 LBS | DIASTOLIC BLOOD PRESSURE: 62 MMHG | SYSTOLIC BLOOD PRESSURE: 128 MMHG | BODY MASS INDEX: 23.19 KG/M2

## 2022-05-31 DIAGNOSIS — I10 PRIMARY HYPERTENSION: ICD-10-CM

## 2022-05-31 DIAGNOSIS — Z86.73 HISTORY OF ISCHEMIC STROKE: ICD-10-CM

## 2022-05-31 DIAGNOSIS — R63.4 WEIGHT LOSS: ICD-10-CM

## 2022-05-31 DIAGNOSIS — R05.9 COUGH: ICD-10-CM

## 2022-05-31 DIAGNOSIS — K80.10 CALCULUS OF GALLBLADDER WITH CHRONIC CHOLECYSTITIS WITHOUT OBSTRUCTION: ICD-10-CM

## 2022-05-31 DIAGNOSIS — F41.9 ANXIETY: Primary | ICD-10-CM

## 2022-05-31 DIAGNOSIS — R73.09 ABNORMAL GLUCOSE: ICD-10-CM

## 2022-05-31 DIAGNOSIS — R05.3 CHRONIC COUGH: ICD-10-CM

## 2022-05-31 PROCEDURE — 99214 OFFICE O/P EST MOD 30 MIN: CPT | Performed by: INTERNAL MEDICINE

## 2022-05-31 PROCEDURE — 1123F ACP DISCUSS/DSCN MKR DOCD: CPT | Performed by: INTERNAL MEDICINE

## 2022-05-31 PROCEDURE — G8420 CALC BMI NORM PARAMETERS: HCPCS | Performed by: INTERNAL MEDICINE

## 2022-05-31 PROCEDURE — 1090F PRES/ABSN URINE INCON ASSESS: CPT | Performed by: INTERNAL MEDICINE

## 2022-05-31 PROCEDURE — G8427 DOCREV CUR MEDS BY ELIG CLIN: HCPCS | Performed by: INTERNAL MEDICINE

## 2022-05-31 PROCEDURE — 1036F TOBACCO NON-USER: CPT | Performed by: INTERNAL MEDICINE

## 2022-05-31 PROCEDURE — G8400 PT W/DXA NO RESULTS DOC: HCPCS | Performed by: INTERNAL MEDICINE

## 2022-05-31 RX ORDER — FLUTICASONE PROPIONATE 50 MCG
1 SPRAY, SUSPENSION (ML) NASAL DAILY
Qty: 32 G | Refills: 3 | Status: SHIPPED | OUTPATIENT
Start: 2022-05-31 | End: 2022-09-08 | Stop reason: SDUPTHER

## 2022-05-31 RX ORDER — DIAZEPAM 2 MG/1
2 TABLET ORAL EVERY 8 HOURS PRN
Qty: 10 TABLET | Refills: 0 | Status: SHIPPED | OUTPATIENT
Start: 2022-05-31 | End: 2022-06-10

## 2022-05-31 ASSESSMENT — PATIENT HEALTH QUESTIONNAIRE - PHQ9
1. LITTLE INTEREST OR PLEASURE IN DOING THINGS: 0
SUM OF ALL RESPONSES TO PHQ QUESTIONS 1-9: 0
SUM OF ALL RESPONSES TO PHQ QUESTIONS 1-9: 0
2. FEELING DOWN, DEPRESSED OR HOPELESS: 0
SUM OF ALL RESPONSES TO PHQ QUESTIONS 1-9: 0
SUM OF ALL RESPONSES TO PHQ QUESTIONS 1-9: 0
SUM OF ALL RESPONSES TO PHQ9 QUESTIONS 1 & 2: 0

## 2022-05-31 ASSESSMENT — ENCOUNTER SYMPTOMS
VOMITING: 0
ABDOMINAL DISTENTION: 0
ABDOMINAL PAIN: 0
SHORTNESS OF BREATH: 0

## 2022-05-31 NOTE — PROGRESS NOTES
HPI:  Patient comes in today for FU on  cough for 4 wks,she states has better appetite,the pt. Daughter is present in the visit. The pt. Is requesting valium,\"I will only take it if I have to,if I'm anxious. \"  Review of Systems   Constitutional: Negative for appetite change and unexpected weight change. HENT: Negative for congestion, rhinorrhea and sore throat. Respiratory: Positive for cough (improving). Negative for shortness of breath. Cardiovascular: Negative for chest pain, palpitations and leg swelling. Gastrointestinal: Negative for abdominal distention, abdominal pain and vomiting. Musculoskeletal: Negative for arthralgias, gait problem, joint swelling and myalgias. Skin: Negative for rash. Psychiatric/Behavioral: Negative for agitation, confusion and hallucinations.         Past Medical History:   Diagnosis Date    Abnormal weight loss     Anxiety     Anxiety disorder     Cerebral infarction, unspecified ([facility] [address] [facility]     Essential (primary) hypertension     Gastric reflux     Hyperlipidemia     Hypertension     Occlusion and stenosis of right carotid artery     Slurred speech      Past Surgical History:   Procedure Laterality Date    CAROTID ENDARTERECTOMY Right 11/27/2020    RIGHT CAROTID ENDARTERECTOMY performed by Arlington Angelucci, MD at [address] [place] [address] (CERVIX STATUS UNKNOWN)      LITHOTRIPSY Left 2/7/2020    CYSTOSCOPY RETROGRADE PYELOGRAM LEFT URETEROSCOPY LEFT J STENT LASER LITHOTRIPSY performed by Anselmo Mendez DO at [facility] OR     Family History   Problem Relation Age of Onset    High Blood Pressure Mother     Other Father         Emphysema    Cancer Brother         Leukemia    Cancer Brother       Social History     Tobacco Use    Smoking status: Never Smoker    Smokeless tobacco: Never Used   Vaping Use    Vaping Use: Never used   Substance Use Topics    Alcohol use: No    Drug use: No        Current Outpatient Medications on File Prior to Visit   Medication Sig Dispense Refill    ezetimibe (ZETIA) 10 MG tablet Take 1 tablet by mouth daily 90 tablet 3    aspirin 81 MG chewable tablet Take 1 tablet by mouth daily 90 tablet 3    amLODIPine (NORVASC) 2.5 MG tablet Take 1 tablet by mouth daily 90 tablet 3     No current facility-administered medications on file prior to visit. PE:  VS:  /62   Pulse 64   Temp 97.7 °F (36.5 °C)   Resp 18   Ht 5' 2\" (1.575 m)   Wt 126 lb (57.2 kg)   LMP  (LMP Unknown)   SpO2 97%   BMI 23.05 kg/m²   General:  Alert and oriented, NAD  HEENT: Ear auricles are normal, EOMI, Conjunctivae clear  NECK:  Supple without adenopathy or thyromegaly, no carotid bruits. LUNGS:  CTA all fields,no wheezing,no rales. symetrical expansion. HEART:  RRR without M, R, or G  ABDOMEN:  Soft and nontender without palpable abnormalities, No renal or aortic bruits. EXTREMITIES: No ankle edema bilaterally. Neuro: No focal deficit. Lab Results   Component Value Date    WBC 4.8 06/09/2022    HGB 11.4 (L) 06/09/2022    HCT 35.5 06/09/2022     06/09/2022    CHOL 135 05/03/2022    TRIG 120 05/03/2022    HDL 32 05/03/2022    ALT <5 06/09/2022    AST 10 06/09/2022     06/09/2022    K 4.1 06/09/2022     (H) 06/09/2022    CREATININE 0.8 06/09/2022    BUN 19 06/09/2022    CO2 27 06/09/2022    TSH 1.110 05/03/2022    INR 1.1 06/10/2021    LABA1C 6.0 (H) 05/03/2022        Impression/Plan:    1. Anxiety  -     diazePAM (VALIUM) 2 MG tablet; Take 1 tablet by mouth every 8 hours as needed for Anxiety for up to 10 days. , Disp-10 tablet, R-0Print  2. Cough  3. Primary hypertension  Comments:  BP at goal  4. History of ischemic stroke  5. Abnormal glucose  6. Calculus of gallbladder with chronic cholecystitis without obstruction  Comments: The pt. is eating better,no acute abdominal symptomes,she gained wt.,cont. to monitor  The pt.  Is councolled regarding habit forming & other SE of ativan,she voices understanding. The pt. Declines psychiatric evaluation.

## 2022-06-09 ENCOUNTER — HOSPITAL ENCOUNTER (EMERGENCY)
Age: 87
Discharge: HOME | End: 2022-06-09
Attending: EMERGENCY MEDICINE
Payer: MEDICARE

## 2022-06-09 ENCOUNTER — APPOINTMENT (OUTPATIENT)
Dept: GENERAL RADIOLOGY | Age: 87
End: 2022-06-09
Payer: MEDICARE

## 2022-06-09 VITALS
OXYGEN SATURATION: 97 % | DIASTOLIC BLOOD PRESSURE: 68 MMHG | WEIGHT: 128 LBS | BODY MASS INDEX: 23.55 KG/M2 | HEART RATE: 81 BPM | HEIGHT: 62 IN | RESPIRATION RATE: 18 BRPM | TEMPERATURE: 97.7 F | SYSTOLIC BLOOD PRESSURE: 156 MMHG

## 2022-06-09 DIAGNOSIS — R07.89 CHEST WALL PAIN: Primary | ICD-10-CM

## 2022-06-09 LAB
ALBUMIN SERPL-MCNC: 4.1 G/DL (ref 3.5–5.2)
ALP BLD-CCNC: 74 U/L (ref 35–104)
ALT SERPL-CCNC: <5 U/L (ref 0–32)
ANION GAP SERPL CALCULATED.3IONS-SCNC: 8 MMOL/L (ref 7–16)
AST SERPL-CCNC: 10 U/L (ref 0–31)
BASOPHILS ABSOLUTE: 0.02 E9/L (ref 0–0.2)
BASOPHILS RELATIVE PERCENT: 0.4 % (ref 0–2)
BILIRUB SERPL-MCNC: 0.2 MG/DL (ref 0–1.2)
BUN BLDV-MCNC: 19 MG/DL (ref 6–23)
CALCIUM SERPL-MCNC: 8.7 MG/DL (ref 8.6–10.2)
CHLORIDE BLD-SCNC: 108 MMOL/L (ref 98–107)
CO2: 27 MMOL/L (ref 22–29)
CREAT SERPL-MCNC: 0.8 MG/DL (ref 0.5–1)
EOSINOPHILS ABSOLUTE: 0.03 E9/L (ref 0.05–0.5)
EOSINOPHILS RELATIVE PERCENT: 0.6 % (ref 0–6)
GFR AFRICAN AMERICAN: >60
GFR NON-AFRICAN AMERICAN: >60 ML/MIN/1.73
GLUCOSE BLD-MCNC: 100 MG/DL (ref 74–99)
HCT VFR BLD CALC: 35.5 % (ref 34–48)
HEMOGLOBIN: 11.4 G/DL (ref 11.5–15.5)
IMMATURE GRANULOCYTES #: 0.01 E9/L
IMMATURE GRANULOCYTES %: 0.2 % (ref 0–5)
LYMPHOCYTES ABSOLUTE: 1.1 E9/L (ref 1.5–4)
LYMPHOCYTES RELATIVE PERCENT: 22.9 % (ref 20–42)
MCH RBC QN AUTO: 31.7 PG (ref 26–35)
MCHC RBC AUTO-ENTMCNC: 32.1 % (ref 32–34.5)
MCV RBC AUTO: 98.6 FL (ref 80–99.9)
MONOCYTES ABSOLUTE: 0.52 E9/L (ref 0.1–0.95)
MONOCYTES RELATIVE PERCENT: 10.8 % (ref 2–12)
NEUTROPHILS ABSOLUTE: 3.12 E9/L (ref 1.8–7.3)
NEUTROPHILS RELATIVE PERCENT: 65.1 % (ref 43–80)
PDW BLD-RTO: 14.3 FL (ref 11.5–15)
PLATELET # BLD: 256 E9/L (ref 130–450)
PMV BLD AUTO: 10.4 FL (ref 7–12)
POTASSIUM SERPL-SCNC: 4.1 MMOL/L (ref 3.5–5)
PRO-BNP: 212 PG/ML (ref 0–450)
RBC # BLD: 3.6 E12/L (ref 3.5–5.5)
REASON FOR REJECTION: NORMAL
REJECTED TEST: NORMAL
SODIUM BLD-SCNC: 143 MMOL/L (ref 132–146)
TOTAL PROTEIN: 6.7 G/DL (ref 6.4–8.3)
TROPONIN, HIGH SENSITIVITY: <6 NG/L (ref 0–9)
WBC # BLD: 4.8 E9/L (ref 4.5–11.5)

## 2022-06-09 PROCEDURE — 93005 ELECTROCARDIOGRAM TRACING: CPT | Performed by: PHYSICIAN ASSISTANT

## 2022-06-09 PROCEDURE — 83880 ASSAY OF NATRIURETIC PEPTIDE: CPT

## 2022-06-09 PROCEDURE — 80053 COMPREHEN METABOLIC PANEL: CPT

## 2022-06-09 PROCEDURE — 6370000000 HC RX 637 (ALT 250 FOR IP): Performed by: EMERGENCY MEDICINE

## 2022-06-09 PROCEDURE — 84484 ASSAY OF TROPONIN QUANT: CPT

## 2022-06-09 PROCEDURE — 71045 X-RAY EXAM CHEST 1 VIEW: CPT

## 2022-06-09 PROCEDURE — 99285 EMERGENCY DEPT VISIT HI MDM: CPT

## 2022-06-09 PROCEDURE — 85025 COMPLETE CBC W/AUTO DIFF WBC: CPT

## 2022-06-09 RX ORDER — ACETAMINOPHEN 500 MG
500 TABLET ORAL 4 TIMES DAILY PRN
Qty: 120 TABLET | Refills: 0 | Status: SHIPPED | OUTPATIENT
Start: 2022-06-09

## 2022-06-09 RX ORDER — ASPIRIN 81 MG/1
324 TABLET, CHEWABLE ORAL ONCE
Status: COMPLETED | OUTPATIENT
Start: 2022-06-09 | End: 2022-06-09

## 2022-06-09 RX ADMIN — ASPIRIN 81 MG CHEWABLE TABLET 324 MG: 81 TABLET CHEWABLE at 17:10

## 2022-06-09 ASSESSMENT — PAIN - FUNCTIONAL ASSESSMENT: PAIN_FUNCTIONAL_ASSESSMENT: 0-10

## 2022-06-09 ASSESSMENT — PAIN DESCRIPTION - LOCATION: LOCATION: CHEST

## 2022-06-09 ASSESSMENT — PAIN DESCRIPTION - ORIENTATION: ORIENTATION: LEFT

## 2022-06-09 ASSESSMENT — PAIN SCALES - GENERAL: PAINLEVEL_OUTOF10: 9

## 2022-06-09 ASSESSMENT — PAIN DESCRIPTION - FREQUENCY: FREQUENCY: INTERMITTENT

## 2022-06-09 ASSESSMENT — PAIN DESCRIPTION - DESCRIPTORS: DESCRIPTORS: SHARP

## 2022-06-09 NOTE — ED NOTES
Two sets of blood were sent to lab and they were untestable lab contacted unit and stated additional blood needs to be sent for tests to result      Rachel Giron RN  06/09/22 [id number]

## 2022-06-09 NOTE — ED PROVIDER NOTES
HPI:  6/9/22,   Time: 5:01 PM EDT       Gisell Huddleston is a 82 y.o. female presenting to the ED for cp, beginning 5 days ago. The complaint has been intermittent, moderate in severity, and worsened by laying down. Describes as jabbing pain, left lat area. Chronic cough in am on waking. Mild sob, no falls or injury. No radiation. Bib private vehicle. No fever/chills/sweats/focal weakness/sensory disturbance. Review of Systems:   Pertinent positives and negatives are stated within HPI, all other systems reviewed and are negative.          --------------------------------------------- PAST HISTORY ---------------------------------------------  Past Medical History:  has a past medical history of Abnormal weight loss, Anxiety, Anxiety disorder, Cerebral infarction, unspecified ([address] Essential (primary) hypertension, Gastric reflux, Hyperlipidemia, Hypertension, Occlusion and stenosis of right carotid artery, and Slurred speech. Past Surgical History:  has a past surgical history that includes Colonoscopy; Hysterectomy; Lithotripsy (Left, 2/7/2020); and Carotid endarterectomy (Right, 11/27/2020). Social History:  reports that she has never smoked. She has never used smokeless tobacco. She reports that she does not drink alcohol and does not use drugs. Family History: family history includes Cancer in her brother and brother; High Blood Pressure in her mother; Other in her father. The patients home medications have been reviewed.     Allergies: Lorazepam and Penicillins        ---------------------------------------------------PHYSICAL EXAM--------------------------------------    Constitutional/General: Alert and oriented x3, well appearing, non toxic in NAD  Head: Normocephalic and atraumatic  Eyes: PERRL, EOMI, conjunctive normal, sclera non icteric  Mouth: Oropharynx clear, handling secretions,   Neck: Supple, full ROM,   Respiratory: Lungs clear to auscultation bilaterally, no wheezes, rales, or rhonchi. Not in respiratory distress  Cardiovascular:  Regular rate. Regular rhythm. No murmurs, gallops, or rubs. 2+ distal pulses  Chest: left lat chest wall tenderness  GI:  Abdomen Soft, Non tender, Non distended. Musculoskeletal: Moves all extremities x 4. Warm and well perfused, no clubbing, cyanosis, or edema. Capillary refill <3 seconds  Integument: skin warm and dry. No rashes. Lymphatic: no lymphadenopathy noted  Neurologic: GCS 15, no focal deficits,   Psychiatric: Normal Affect    -------------------------------------------------- RESULTS -------------------------------------------------  I have personally reviewed all laboratory and imaging results for this patient. Results are listed below.      LABS:  Results for orders placed or performed during the hospital encounter of 06/09/22   CBC with Auto Differential   Result Value Ref Range    WBC 4.8 4.5 - 11.5 E9/L    RBC 3.60 3.50 - 5.50 E12/L    Hemoglobin 11.4 (L) 11.5 - 15.5 g/dL    Hematocrit 35.5 34.0 - 48.0 %    MCV 98.6 80.0 - 99.9 fL    MCH 31.7 26.0 - 35.0 pg    MCHC 32.1 32.0 - 34.5 %    RDW 14.3 11.5 - 15.0 fL    Platelets 401 819 - 468 E9/L    MPV 10.4 7.0 - 12.0 fL    Neutrophils % 65.1 43.0 - 80.0 %    Immature Granulocytes % 0.2 0.0 - 5.0 %    Lymphocytes % 22.9 20.0 - 42.0 %    Monocytes % 10.8 2.0 - 12.0 %    Eosinophils % 0.6 0.0 - 6.0 %    Basophils % 0.4 0.0 - 2.0 %    Neutrophils Absolute 3.12 1.80 - 7.30 E9/L    Immature Granulocytes # 0.01 E9/L    Lymphocytes Absolute 1.10 (L) 1.50 - 4.00 E9/L    Monocytes Absolute 0.52 0.10 - 0.95 E9/L    Eosinophils Absolute 0.03 (L) 0.05 - 0.50 E9/L    Basophils Absolute 0.02 0.00 - 0.20 E9/L   Brain Natriuretic Peptide   Result Value Ref Range    Pro- 0 - 450 pg/mL   SPECIMEN REJECTION   Result Value Ref Range    Rejected Test CMP, TRP     Reason for Rejection see below    Comprehensive Metabolic Panel   Result Value Ref Range    Sodium 143 132 - 146 mmol/L    Potassium 4.1 3.5 - 5.0 mmol/L    Chloride 108 (H) 98 - 107 mmol/L    CO2 27 22 - 29 mmol/L    Anion Gap 8 7 - 16 mmol/L    Glucose 100 (H) 74 - 99 mg/dL    BUN 19 6 - 23 mg/dL    CREATININE 0.8 0.5 - 1.0 mg/dL    GFR Non-African American >60 >=60 mL/min/1.73    GFR African American >60     Calcium 8.7 8.6 - 10.2 mg/dL    Total Protein 6.7 6.4 - 8.3 g/dL    Albumin 4.1 3.5 - 5.2 g/dL    Total Bilirubin 0.2 0.0 - 1.2 mg/dL    Alkaline Phosphatase 74 35 - 104 U/L    ALT <5 0 - 32 U/L    AST 10 0 - 31 U/L   Troponin   Result Value Ref Range    Troponin, High Sensitivity <6 0 - 9 ng/L   EKG 12 Lead   Result Value Ref Range    Ventricular Rate 75 BPM    Atrial Rate 75 BPM    P-R Interval 198 ms    QRS Duration 90 ms    Q-T Interval 386 ms    QTc Calculation (Bazett) 431 ms    P Axis 74 degrees    R Axis -40 degrees    T Axis 57 degrees       RADIOLOGY:  Interpreted by Radiologist.  XR CHEST PORTABLE   Final Result   No acute process. EKG:  This EKG is signed and interpreted by the EP. Time: 1629  Rate: 75  Rhythm: Sinus  Interpretation: non-specific EKG  Comparison: None      ------------------------- NURSING NOTES AND VITALS REVIEWED ---------------------------   The nursing notes within the ED encounter and vital signs as below have been reviewed by myself. BP (!) 156/68   Pulse 81   Temp 97.7 °F (36.5 °C)   Resp 18   Ht 5' 2\" (1.575 m)   Wt 128 lb (58.1 kg)   LMP  (LMP Unknown)   SpO2 97%   BMI 23.41 kg/m²   Oxygen Saturation Interpretation: Normal    The patients available past medical records and past encounters were reviewed.         ------------------------------ ED COURSE/MEDICAL DECISION MAKING----------------------  Medications   aspirin chewable tablet 324 mg (324 mg Oral Given 6/9/22 1710)         ED COURSE:       Medical Decision Making:    Reproducible haylee, ronen, seems msk, trop neg with 5 days sx, not pe by hx, andreina supp care and outpt fu      This patient's ED course included: a personal history

## 2022-06-09 NOTE — ED NOTES
Department of Emergency Medicine  FIRST PROVIDER TRIAGE NOTE             Independent MLP           6/9/22  3:29 PM EDT    Date of Encounter: 6/9/22   MRN: [id number]      HPI: Keyur Barr is a 82 y.o. female who presents to the ED for No chief complaint on file. Pt presenting with intermittent cp and sob x 5 days. ROS: Negative for abd pain or back pain. PE: Gen Appearance/Constitutional: alert  HEENT: NC/NT. PERRLA,  Airway patent. Initial Plan of Care: All treatment areas with department are currently occupied. Plan to order/Initiate the following while awaiting opening in ED: labs, EKG and imaging studies.   Initiate Treatment-Testing, Proceed toTreatment Area When Bed Available for ED Attending/MLP to Continue Care    Electronically signed by LETI Pollack   DD: 6/9/22       LETI Pollack  06/09/22 1529

## 2022-06-10 LAB
EKG ATRIAL RATE: 75 BPM
EKG P AXIS: 74 DEGREES
EKG P-R INTERVAL: 198 MS
EKG Q-T INTERVAL: 386 MS
EKG QRS DURATION: 90 MS
EKG QTC CALCULATION (BAZETT): 431 MS
EKG R AXIS: -40 DEGREES
EKG T AXIS: 57 DEGREES
EKG VENTRICULAR RATE: 75 BPM

## 2022-06-13 ASSESSMENT — ENCOUNTER SYMPTOMS
COUGH: 1
RHINORRHEA: 0
SORE THROAT: 0

## 2022-06-27 DIAGNOSIS — L29.9 ITCHY SCALP: ICD-10-CM

## 2022-06-28 RX ORDER — KETOCONAZOLE 20 MG/ML
SHAMPOO TOPICAL
Qty: 120 ML | OUTPATIENT
Start: 2022-06-28

## 2022-07-07 ENCOUNTER — OFFICE VISIT (OUTPATIENT)
Dept: PRIMARY CARE CLINIC | Age: 87
End: 2022-07-07
Payer: MEDICARE

## 2022-07-07 VITALS
HEIGHT: 62 IN | SYSTOLIC BLOOD PRESSURE: 102 MMHG | WEIGHT: 126 LBS | TEMPERATURE: 97.7 F | BODY MASS INDEX: 23.19 KG/M2 | OXYGEN SATURATION: 98 % | DIASTOLIC BLOOD PRESSURE: 52 MMHG | RESPIRATION RATE: 18 BRPM | HEART RATE: 62 BPM

## 2022-07-07 DIAGNOSIS — R06.09 DYSPNEA ON EXERTION: ICD-10-CM

## 2022-07-07 DIAGNOSIS — R63.4 WEIGHT LOSS: ICD-10-CM

## 2022-07-07 DIAGNOSIS — I51.89 DIASTOLIC DYSFUNCTION: ICD-10-CM

## 2022-07-07 DIAGNOSIS — I10 PRIMARY HYPERTENSION: ICD-10-CM

## 2022-07-07 DIAGNOSIS — F41.9 ANXIETY: Primary | ICD-10-CM

## 2022-07-07 DIAGNOSIS — D64.9 ANEMIA, UNSPECIFIED TYPE: ICD-10-CM

## 2022-07-07 DIAGNOSIS — R22.2 PLEURAL NODULES: ICD-10-CM

## 2022-07-07 DIAGNOSIS — Z86.73 HISTORY OF ISCHEMIC STROKE: ICD-10-CM

## 2022-07-07 LAB
ALBUMIN SERPL-MCNC: 4.2 G/DL (ref 3.5–5.2)
ALP BLD-CCNC: 88 U/L (ref 35–104)
ALT SERPL-CCNC: 9 U/L (ref 0–32)
ANION GAP SERPL CALCULATED.3IONS-SCNC: 12 MMOL/L (ref 7–16)
AST SERPL-CCNC: 12 U/L (ref 0–31)
BASOPHILS ABSOLUTE: 0.01 E9/L (ref 0–0.2)
BASOPHILS RELATIVE PERCENT: 0.2 % (ref 0–2)
BILIRUB SERPL-MCNC: 0.3 MG/DL (ref 0–1.2)
BUN BLDV-MCNC: 20 MG/DL (ref 6–23)
CALCIUM SERPL-MCNC: 9.5 MG/DL (ref 8.6–10.2)
CHLORIDE BLD-SCNC: 108 MMOL/L (ref 98–107)
CO2: 25 MMOL/L (ref 22–29)
CREAT SERPL-MCNC: 0.8 MG/DL (ref 0.5–1)
EOSINOPHILS ABSOLUTE: 0.04 E9/L (ref 0.05–0.5)
EOSINOPHILS RELATIVE PERCENT: 0.9 % (ref 0–6)
FERRITIN: 33 NG/ML
FOLATE: 10.9 NG/ML (ref 4.8–24.2)
GFR AFRICAN AMERICAN: >60
GFR NON-AFRICAN AMERICAN: >60 ML/MIN/1.73
GLUCOSE BLD-MCNC: 86 MG/DL (ref 74–99)
HCT VFR BLD CALC: 39 % (ref 34–48)
HEMOGLOBIN: 12.3 G/DL (ref 11.5–15.5)
IMMATURE GRANULOCYTES #: 0.01 E9/L
IMMATURE GRANULOCYTES %: 0.2 % (ref 0–5)
IMMATURE RETIC FRACT: 3.6 % (ref 3–15.9)
IRON SATURATION: 33 % (ref 15–50)
IRON: 95 MCG/DL (ref 37–145)
LYMPHOCYTES ABSOLUTE: 1.29 E9/L (ref 1.5–4)
LYMPHOCYTES RELATIVE PERCENT: 28.3 % (ref 20–42)
MCH RBC QN AUTO: 31.7 PG (ref 26–35)
MCHC RBC AUTO-ENTMCNC: 31.5 % (ref 32–34.5)
MCV RBC AUTO: 100.5 FL (ref 80–99.9)
MONOCYTES ABSOLUTE: 0.48 E9/L (ref 0.1–0.95)
MONOCYTES RELATIVE PERCENT: 10.5 % (ref 2–12)
NEUTROPHILS ABSOLUTE: 2.73 E9/L (ref 1.8–7.3)
NEUTROPHILS RELATIVE PERCENT: 59.9 % (ref 43–80)
PDW BLD-RTO: 14.5 FL (ref 11.5–15)
PLATELET # BLD: 289 E9/L (ref 130–450)
PMV BLD AUTO: 9.9 FL (ref 7–12)
POTASSIUM SERPL-SCNC: 4.6 MMOL/L (ref 3.5–5)
RBC # BLD: 3.88 E12/L (ref 3.5–5.5)
RETIC HGB EQUIVALENT: 36.7 PG (ref 28.2–36.6)
RETICULOCYTE ABSOLUTE COUNT: 0.03 E12/L
RETICULOCYTE COUNT PCT: 0.8 % (ref 0.4–1.9)
SODIUM BLD-SCNC: 145 MMOL/L (ref 132–146)
TOTAL IRON BINDING CAPACITY: 287 MCG/DL (ref 250–450)
TOTAL PROTEIN: 6.8 G/DL (ref 6.4–8.3)
VITAMIN B-12: 1238 PG/ML (ref 211–946)
WBC # BLD: 4.6 E9/L (ref 4.5–11.5)

## 2022-07-07 PROCEDURE — G8427 DOCREV CUR MEDS BY ELIG CLIN: HCPCS | Performed by: INTERNAL MEDICINE

## 2022-07-07 PROCEDURE — 1123F ACP DISCUSS/DSCN MKR DOCD: CPT | Performed by: INTERNAL MEDICINE

## 2022-07-07 PROCEDURE — 99214 OFFICE O/P EST MOD 30 MIN: CPT | Performed by: INTERNAL MEDICINE

## 2022-07-07 PROCEDURE — G8420 CALC BMI NORM PARAMETERS: HCPCS | Performed by: INTERNAL MEDICINE

## 2022-07-07 PROCEDURE — G8400 PT W/DXA NO RESULTS DOC: HCPCS | Performed by: INTERNAL MEDICINE

## 2022-07-07 PROCEDURE — 1036F TOBACCO NON-USER: CPT | Performed by: INTERNAL MEDICINE

## 2022-07-07 PROCEDURE — 1090F PRES/ABSN URINE INCON ASSESS: CPT | Performed by: INTERNAL MEDICINE

## 2022-07-07 RX ORDER — DIAZEPAM 2 MG/1
2 TABLET ORAL EVERY 12 HOURS PRN
Qty: 30 TABLET | Refills: 0 | Status: SHIPPED | OUTPATIENT
Start: 2022-07-07 | End: 2022-07-22

## 2022-07-07 RX ORDER — LANOLIN ALCOHOL/MO/W.PET/CERES
CREAM (GRAM) TOPICAL
COMMUNITY
Start: 2022-07-02 | End: 2022-07-07 | Stop reason: SDUPTHER

## 2022-07-07 RX ORDER — LANOLIN ALCOHOL/MO/W.PET/CERES
CREAM (GRAM) TOPICAL
Qty: 30 TABLET | Refills: 11 | Status: SHIPPED
Start: 2022-07-07 | End: 2022-09-08

## 2022-07-07 ASSESSMENT — ENCOUNTER SYMPTOMS
ABDOMINAL DISTENTION: 0
ABDOMINAL PAIN: 0
VOMITING: 0
SHORTNESS OF BREATH: 0
SORE THROAT: 0
RHINORRHEA: 0

## 2022-07-07 NOTE — PROGRESS NOTES
HPI: Patient has been to the emergency room in June ninths due to anxiety and chest pain at that time her blood pressure was elevated, patient states that she was stressed out. Patient currently lives in [place] with her daughter [facility]. Patient states she does not have a cough anymore, no more chest pain. Appetite is better  Patient is active she does a lot of work in the house and she goes out to walk in the [place] fair. Review of Systems   Constitutional: Negative for appetite change and unexpected weight change. HENT: Negative for congestion, rhinorrhea and sore throat. Respiratory: Negative for shortness of breath. Cardiovascular: Negative for chest pain, palpitations and leg swelling. Gastrointestinal: Negative for abdominal distention, abdominal pain and vomiting. Musculoskeletal: Negative for arthralgias, gait problem, joint swelling and myalgias. Skin: Negative for rash. Psychiatric/Behavioral: Negative for agitation, confusion and hallucinations.         Past Medical History:   Diagnosis Date    Abnormal weight loss     Anxiety     Anxiety disorder     Cerebral infarction, unspecified ([facility] [address]     Essential (primary) hypertension     Gastric reflux     Hyperlipidemia     Hypertension     Occlusion and stenosis of right carotid artery     Slurred speech      Past Surgical History:   Procedure Laterality Date    CAROTID ENDARTERECTOMY Right 11/27/2020    RIGHT CAROTID ENDARTERECTOMY performed by Hussian Taylor MD at [facility] (CERVIX STATUS UNKNOWN)      LITHOTRIPSY Left 2/7/2020    CYSTOSCOPY RETROGRADE PYELOGRAM LEFT URETEROSCOPY LEFT J STENT LASER LITHOTRIPSY performed by Christian Mendez DO at [facility] OR     Family History   Problem Relation Age of Onset    High Blood Pressure Mother     Other Father         Emphysema    Cancer Brother         Leukemia    Cancer Brother       Social History     Tobacco Use    Smoking status: Never Smoker  Smokeless tobacco: Never Used   Vaping Use    Vaping Use: Never used   Substance Use Topics    Alcohol use: No    Drug use: No        Current Outpatient Medications on File Prior to Visit   Medication Sig Dispense Refill    acetaminophen (TYLENOL) 500 MG tablet Take 1 tablet by mouth 4 times daily as needed for Pain 120 tablet 0    fluticasone (FLONASE) 50 MCG/ACT nasal spray 1 spray by Each Nostril route daily 32 g 3    ezetimibe (ZETIA) 10 MG tablet Take 1 tablet by mouth daily 90 tablet 3    aspirin 81 MG chewable tablet Take 1 tablet by mouth daily 90 tablet 3    amLODIPine (NORVASC) 2.5 MG tablet Take 1 tablet by mouth daily 90 tablet 3     No current facility-administered medications on file prior to visit. PE:  VS:  BP (!) 102/52   Pulse 62   Temp 97.7 °F (36.5 °C)   Resp 18   Ht 5' 2\" (1.575 m)   Wt 126 lb (57.2 kg)   LMP  (LMP Unknown)   SpO2 98%   BMI 23.05 kg/m²   General:  Alert and oriented, NAD  HEENT: Ear auricles are normal, EOMI, Conjunctivae clear  NECK:  Supple without adenopathy or thyromegaly, no carotid bruits. LUNGS:  CTA all fields,no wheezing,no rales. symetrical expansion. HEART:  RRR without M, R, or G  ABDOMEN:  Soft and nontender without palpable abnormalities, No renal or aortic bruits. EXTREMITIES: No ankle edema bilaterally. Neuro: No focal deficit. Lab Results   Component Value Date    WBC 4.8 06/09/2022    HGB 11.4 (L) 06/09/2022    HCT 35.5 06/09/2022     06/09/2022    CHOL 135 05/03/2022    TRIG 120 05/03/2022    HDL 32 05/03/2022    ALT <5 06/09/2022    AST 10 06/09/2022     06/09/2022    K 4.1 06/09/2022     (H) 06/09/2022    CREATININE 0.8 06/09/2022    BUN 19 06/09/2022    CO2 27 06/09/2022    TSH 1.110 05/03/2022    INR 1.1 06/10/2021    LABA1C 6.0 (H) 05/03/2022        Impression/Plan:    1.  Anxiety  Comments:  Patient will use a small amount of Valium only if needed understands the habit-forming nature of the medicine Orders:  -     diazePAM (VALIUM) 2 MG tablet; Take 1 tablet by mouth every 12 hours as needed for Anxiety for up to 15 days. , Disp-30 tablet, R-0Print  2. Dyspnea on exertion  Comments: Only with walking too far, BNP normal range June 9, was up to over 1000 prior, history of MI and stage II diastolic dysfunction. Consider Jardiance. 3. History of ischemic stroke  4. Primary hypertension  Comments:  Patient doing well with 2.5 mg of amlodipine. Orders:  -     Comprehensive Metabolic Panel; Future  5. Diastolic dysfunction  6. Weight loss  Comments:  Check P albumin and continue to monitor. Orders:  -     Prealbumin; Future  -     CBC with Auto Differential; Future  -     Comprehensive Metabolic Panel; Future  7. Anemia, unspecified type  -     Prealbumin; Future  -     CBC with Auto Differential; Future  -     Comprehensive Metabolic Panel; Future  -     Ferritin; Future  -     Iron and TIBC; Future  -     Vitamin B12 & Folate; Future  -     Reticulocytes; Future  -     Fecal DNA Colorectal cancer screening (Cologuard)  8. Pleural nodules  -     CT CHEST W WO CONTRAST; Future  Comparison requested and patient had an unchanged pleural nodule posterior right lower lung, and new finding of 8 mm pleural nodule posterior medial left lower lung, follow-up in 6 months ordered.

## 2022-07-08 LAB — PREALBUMIN: 22 MG/DL (ref 20–40)

## 2022-07-12 ENCOUNTER — TELEPHONE (OUTPATIENT)
Dept: PRIMARY CARE CLINIC | Age: 87
End: 2022-07-12

## 2022-07-12 DIAGNOSIS — Z12.12 SCREENING FOR COLORECTAL CANCER: Primary | ICD-10-CM

## 2022-07-12 DIAGNOSIS — Z12.11 SCREENING FOR COLORECTAL CANCER: Primary | ICD-10-CM

## 2022-07-12 NOTE — TELEPHONE ENCOUNTER
[employer] stated the ICD 10 D64.9 is not designated for [employer]    Ref #: [id number]

## 2022-07-13 ENCOUNTER — TELEPHONE (OUTPATIENT)
Dept: PRIMARY CARE CLINIC | Age: 87
End: 2022-07-13

## 2022-07-13 NOTE — TELEPHONE ENCOUNTER
Pt is questioning the CT CHEST W WO CONTRAST that was ordered. Pt stated the hospital called her to come in but pt does not remember speaking to Dr Keaton Bunn during her last visit about getting a CT done.  Please advise

## 2022-07-28 ENCOUNTER — HOSPITAL ENCOUNTER (OUTPATIENT)
Age: 87
Discharge: HOME | End: 2022-07-28
Payer: MEDICARE

## 2022-07-28 LAB
BASOPHILS ABSOLUTE: 0.02 E9/L (ref 0–0.2)
BASOPHILS RELATIVE PERCENT: 0.5 % (ref 0–2)
EOSINOPHILS ABSOLUTE: 0.03 E9/L (ref 0.05–0.5)
EOSINOPHILS RELATIVE PERCENT: 0.7 % (ref 0–6)
FERRITIN: 24 NG/ML
FOLLICLE STIMULATING HORMONE: 40.9 MIU/ML
HCT VFR BLD CALC: 36 % (ref 34–48)
HEMOGLOBIN: 11.9 G/DL (ref 11.5–15.5)
IMMATURE GRANULOCYTES #: 0.01 E9/L
IMMATURE GRANULOCYTES %: 0.2 % (ref 0–5)
IRON SATURATION: 25 % (ref 15–50)
IRON: 65 MCG/DL (ref 37–145)
LUTEINIZING HORMONE: 21.6 MIU/ML
LYMPHOCYTES ABSOLUTE: 1.15 E9/L (ref 1.5–4)
LYMPHOCYTES RELATIVE PERCENT: 26.2 % (ref 20–42)
MCH RBC QN AUTO: 32.2 PG (ref 26–35)
MCHC RBC AUTO-ENTMCNC: 33.1 % (ref 32–34.5)
MCV RBC AUTO: 97.6 FL (ref 80–99.9)
MONOCYTES ABSOLUTE: 0.5 E9/L (ref 0.1–0.95)
MONOCYTES RELATIVE PERCENT: 11.4 % (ref 2–12)
NEUTROPHILS ABSOLUTE: 2.68 E9/L (ref 1.8–7.3)
NEUTROPHILS RELATIVE PERCENT: 61 % (ref 43–80)
PDW BLD-RTO: 13.7 FL (ref 11.5–15)
PLATELET # BLD: 264 E9/L (ref 130–450)
PMV BLD AUTO: 9.7 FL (ref 7–12)
RBC # BLD: 3.69 E12/L (ref 3.5–5.5)
T3 TOTAL: 94.51 NG/DL (ref 80–200)
T4 TOTAL: 5.8 MCG/DL (ref 4.5–11.7)
TOTAL IRON BINDING CAPACITY: 263 MCG/DL (ref 250–450)
TSH SERPL DL<=0.05 MIU/L-ACNC: 1.52 UIU/ML (ref 0.27–4.2)
WBC # BLD: 4.4 E9/L (ref 4.5–11.5)

## 2022-07-28 PROCEDURE — 83001 ASSAY OF GONADOTROPIN (FSH): CPT

## 2022-07-28 PROCEDURE — 82627 DEHYDROEPIANDROSTERONE: CPT

## 2022-07-28 PROCEDURE — 84480 ASSAY TRIIODOTHYRONINE (T3): CPT

## 2022-07-28 PROCEDURE — 83540 ASSAY OF IRON: CPT

## 2022-07-28 PROCEDURE — 84630 ASSAY OF ZINC: CPT

## 2022-07-28 PROCEDURE — 84443 ASSAY THYROID STIM HORMONE: CPT

## 2022-07-28 PROCEDURE — 36415 COLL VENOUS BLD VENIPUNCTURE: CPT

## 2022-07-28 PROCEDURE — 86038 ANTINUCLEAR ANTIBODIES: CPT

## 2022-07-28 PROCEDURE — 84436 ASSAY OF TOTAL THYROXINE: CPT

## 2022-07-28 PROCEDURE — 83550 IRON BINDING TEST: CPT

## 2022-07-28 PROCEDURE — 84403 ASSAY OF TOTAL TESTOSTERONE: CPT

## 2022-07-28 PROCEDURE — 84270 ASSAY OF SEX HORMONE GLOBUL: CPT

## 2022-07-28 PROCEDURE — 85025 COMPLETE CBC W/AUTO DIFF WBC: CPT

## 2022-07-28 PROCEDURE — 83002 ASSAY OF GONADOTROPIN (LH): CPT

## 2022-07-28 PROCEDURE — 82728 ASSAY OF FERRITIN: CPT

## 2022-07-29 LAB — ANTI-NUCLEAR ANTIBODY (ANA): NEGATIVE

## 2022-07-30 LAB
DHEAS (DHEA SULFATE): 43 UG/DL (ref 12–154)
SEX HORMONE BINDING GLOBULIN: 66 NMOL/L (ref 30–135)
TESTOSTERONE FREE-NONMALE: ABNORMAL PG/ML (ref 0.6–3.8)
TESTOSTERONE TOTAL: <3 NG/DL (ref 20–70)

## 2022-07-31 LAB — NONINV COLON CA DNA+OCC BLD SCRN STL QL: NORMAL

## 2022-08-01 DIAGNOSIS — I10 PRIMARY HYPERTENSION: ICD-10-CM

## 2022-08-01 RX ORDER — ASPIRIN 81 MG/1
81 TABLET, CHEWABLE ORAL DAILY
Qty: 90 TABLET | Refills: 3 | Status: SHIPPED
Start: 2022-08-01 | End: 2022-10-30 | Stop reason: SDUPTHER

## 2022-08-01 RX ORDER — EZETIMIBE 10 MG/1
10 TABLET ORAL DAILY
Qty: 90 TABLET | Refills: 3 | Status: SHIPPED
Start: 2022-08-01 | End: 2022-10-30 | Stop reason: SDUPTHER

## 2022-08-01 NOTE — TELEPHONE ENCOUNTER
Last Appointment:  7/7/2022  Future Appointments   Date Time Provider Edison De Leon   8/18/2022  4:00 PM MD Dl Torrez

## 2022-08-01 NOTE — TELEPHONE ENCOUNTER
----- Message from Tello Carson sent at 8/1/2022  1:51 PM EDT -----  Subject: Refill Request    QUESTIONS  Name of Medication? aspirin 81 MG chewable tablet  Patient-reported dosage and instructions? take 1 tablet daily  How many days do you have left? 0  Preferred Pharmacy? [facility] #[id number]  Pharmacy phone number (if available)? [phone]  ---------------------------------------------------------------------------  --------------,  Name of Medication? ezetimibe (ZETIA) 10 MG tablet  Patient-reported dosage and instructions? take 1 tablet daily  How many days do you have left? 0  Preferred Pharmacy? [facility] #[id number]  Pharmacy phone number (if available)? [phone]  ---------------------------------------------------------------------------  --------------  CALL BACK INFO  What is the best way for the office to contact you? OK to leave message on   voicemail  Preferred Call Back Phone Number? [phone]  ---------------------------------------------------------------------------  --------------  SCRIPT ANSWERS  Relationship to Patient?  Self

## 2022-08-02 LAB — ZINC: 67.9 UG/DL (ref 60–120)

## 2022-08-03 ENCOUNTER — TELEPHONE (OUTPATIENT)
Dept: PRIMARY CARE CLINIC | Age: 87
End: 2022-08-03

## 2022-08-03 NOTE — TELEPHONE ENCOUNTER
----- Message from Paul Hdz sent at 8/2/2022  2:45 PM EDT -----  Subject: Message to Provider    QUESTIONS  Information for Provider? Pt says she has developed a cough her daughter   does not have Covid she has broncitis pt wants to know if she can have   some meds sent to the [facility] in [facility] requesting a call to   confirm   ---------------------------------------------------------------------------  --------------  [id number] [employer]  [phone]; OK to leave message on voicemail  ---------------------------------------------------------------------------  --------------  SCRIPT ANSWERS  Relationship to Patient?  Self

## 2022-08-04 ENCOUNTER — TELEPHONE (OUTPATIENT)
Dept: PRIMARY CARE CLINIC | Age: 87
End: 2022-08-04

## 2022-08-04 ENCOUNTER — OFFICE VISIT (OUTPATIENT)
Dept: PRIMARY CARE CLINIC | Age: 87
End: 2022-08-04
Payer: MEDICARE

## 2022-08-04 VITALS
HEART RATE: 103 BPM | TEMPERATURE: 97.1 F | SYSTOLIC BLOOD PRESSURE: 110 MMHG | WEIGHT: 129 LBS | DIASTOLIC BLOOD PRESSURE: 50 MMHG | BODY MASS INDEX: 23.74 KG/M2 | RESPIRATION RATE: 19 BRPM | OXYGEN SATURATION: 95 % | HEIGHT: 62 IN

## 2022-08-04 DIAGNOSIS — I10 PRIMARY HYPERTENSION: ICD-10-CM

## 2022-08-04 DIAGNOSIS — R05.9 COUGH: ICD-10-CM

## 2022-08-04 DIAGNOSIS — R05.9 COUGH: Primary | ICD-10-CM

## 2022-08-04 DIAGNOSIS — J40 BRONCHITIS: Primary | ICD-10-CM

## 2022-08-04 PROCEDURE — G8427 DOCREV CUR MEDS BY ELIG CLIN: HCPCS | Performed by: INTERNAL MEDICINE

## 2022-08-04 PROCEDURE — 1123F ACP DISCUSS/DSCN MKR DOCD: CPT | Performed by: INTERNAL MEDICINE

## 2022-08-04 PROCEDURE — G8420 CALC BMI NORM PARAMETERS: HCPCS | Performed by: INTERNAL MEDICINE

## 2022-08-04 PROCEDURE — 1090F PRES/ABSN URINE INCON ASSESS: CPT | Performed by: INTERNAL MEDICINE

## 2022-08-04 PROCEDURE — G8400 PT W/DXA NO RESULTS DOC: HCPCS | Performed by: INTERNAL MEDICINE

## 2022-08-04 PROCEDURE — 1036F TOBACCO NON-USER: CPT | Performed by: INTERNAL MEDICINE

## 2022-08-04 PROCEDURE — 99214 OFFICE O/P EST MOD 30 MIN: CPT | Performed by: INTERNAL MEDICINE

## 2022-08-04 RX ORDER — LEVOFLOXACIN 500 MG/1
500 TABLET, FILM COATED ORAL DAILY
Qty: 10 TABLET | Refills: 0 | Status: SHIPPED | OUTPATIENT
Start: 2022-08-04 | End: 2022-08-14

## 2022-08-04 RX ORDER — BENZONATATE 100 MG/1
100-200 CAPSULE ORAL 3 TIMES DAILY PRN
Qty: 45 CAPSULE | Refills: 1 | Status: SHIPPED | OUTPATIENT
Start: 2022-08-04 | End: 2022-08-19

## 2022-08-04 ASSESSMENT — ENCOUNTER SYMPTOMS
SORE THROAT: 0
WHEEZING: 1
CHEST TIGHTNESS: 0
VOICE CHANGE: 0
ABDOMINAL PAIN: 0
NAUSEA: 0
COUGH: 1
VOMITING: 0
SHORTNESS OF BREATH: 1

## 2022-08-04 NOTE — PROGRESS NOTES
HPI:  Patient comes in today for complaint of cough and sputum production. Patient states the phlegm is not much but she is constantly coughing no definite exposure to COVID. No fever or chills  Review of Systems   Constitutional:  Negative for chills, fever and unexpected weight change. HENT:  Negative for postnasal drip, sore throat and voice change. Respiratory:  Positive for cough, shortness of breath and wheezing. Negative for chest tightness. Cardiovascular:  Negative for chest pain and leg swelling. Gastrointestinal:  Negative for abdominal pain, nausea and vomiting. Musculoskeletal:  Negative for gait problem and joint swelling. Skin:  Negative for rash and wound. Neurological: Negative. Psychiatric/Behavioral: Negative.         Past Medical History:   Diagnosis Date    Abnormal weight loss     Anxiety     Anxiety disorder     Cerebral infarction, unspecified ([address]     Essential (primary) hypertension     Gastric reflux     Hyperlipidemia     Hypertension     Occlusion and stenosis of right carotid artery     Slurred speech      Past Surgical History:   Procedure Laterality Date    CAROTID ENDARTERECTOMY Right 11/27/2020    RIGHT CAROTID ENDARTERECTOMY performed by Anna Soulier, MD at [facility] (CERVIX STATUS UNKNOWN)      LITHOTRIPSY Left 2/7/2020    CYSTOSCOPY RETROGRADE PYELOGRAM LEFT URETEROSCOPY LEFT J STENT LASER LITHOTRIPSY performed by Monie Mendez,  at [facility] OR     Family History   Problem Relation Age of Onset    High Blood Pressure Mother     Other Father         Emphysema    Cancer Brother         Leukemia    Cancer Brother       Social History     Tobacco Use    Smoking status: Never    Smokeless tobacco: Never   Vaping Use    Vaping Use: Never used   Substance Use Topics    Alcohol use: No    Drug use: No        Current Outpatient Medications on File Prior to Visit   Medication Sig Dispense Refill    aspirin 81 MG chewable tablet Take 1 tablet by mouth in the morning. 90 tablet 3    ezetimibe (ZETIA) 10 MG tablet Take 1 tablet by mouth in the morning. 90 tablet 3    thiamine 100 MG tablet take 1 tablet by mouth once daily 30 tablet 11    acetaminophen (TYLENOL) 500 MG tablet Take 1 tablet by mouth 4 times daily as needed for Pain 120 tablet 0    fluticasone (FLONASE) 50 MCG/ACT nasal spray 1 spray by Each Nostril route daily 32 g 3    amLODIPine (NORVASC) 2.5 MG tablet Take 1 tablet by mouth daily 90 tablet 3     No current facility-administered medications on file prior to visit. PE:  VS:  BP (!) 110/50   Pulse (!) 103   Temp 97.1 °F (36.2 °C)   Resp 19   Ht 5' 2\" (1.575 m)   Wt 129 lb (58.5 kg)   LMP  (LMP Unknown)   SpO2 95%   BMI 23.59 kg/m²   General:  Alert and oriented, NAD  HEENT: Ear auricles are normal, EOMI, Conjunctivae clear  NECK:  Supple without adenopathy or thyromegaly, no carotid bruits. LUNGS: Prolonged expiration and few rales in the bases posteriorly no definite egophony, symmetrical expansion. HEART:  RRR without M, R, or G  ABDOMEN:  Soft and nontender without palpable abnormalities, No renal or aortic bruits. EXTREMITIES: No ankle edema bilaterally. Lab Results   Component Value Date    WBC 4.4 (L) 07/28/2022    HGB 11.9 07/28/2022    HCT 36.0 07/28/2022     07/28/2022    CHOL 135 05/03/2022    TRIG 120 05/03/2022    HDL 32 05/03/2022    ALT 9 07/07/2022    AST 12 07/07/2022     07/07/2022    K 4.6 07/07/2022     (H) 07/07/2022    CREATININE 0.8 07/07/2022    BUN 20 07/07/2022    CO2 25 07/07/2022    TSH 1.520 07/28/2022    INR 1.1 06/10/2021    LABA1C 6.0 (H) 05/03/2022        Impression/Plan:    1. Bronchitis  -     XR CHEST STANDARD (2 VW); Future  -     Brain Natriuretic Peptide; Future  -     CBC with Auto Differential; Future  -     Miscellaneous Sendout; Future  -     Comprehensive Metabolic Panel; Future  -     levoFLOXacin (LEVAQUIN) 500 MG tablet;  Take 1 tablet by mouth in the morning for 10 days. , Disp-10 tablet, R-0Normal  2. Primary hypertension  3. Cough  -     Brain Natriuretic Peptide; Future  -     CBC with Auto Differential; Future  -     Miscellaneous Sendout; Future  -     Comprehensive Metabolic Panel; Future  -     levoFLOXacin (LEVAQUIN) 500 MG tablet; Take 1 tablet by mouth in the morning for 10 days. , Disp-10 tablet, R-0Normal

## 2022-09-06 ENCOUNTER — HOSPITAL ENCOUNTER (EMERGENCY)
Age: 87
Discharge: HOME | End: 2022-09-06
Attending: EMERGENCY MEDICINE
Payer: MEDICARE

## 2022-09-06 ENCOUNTER — TELEPHONE (OUTPATIENT)
Dept: PRIMARY CARE CLINIC | Age: 87
End: 2022-09-06

## 2022-09-06 ENCOUNTER — APPOINTMENT (OUTPATIENT)
Dept: GENERAL RADIOLOGY | Age: 87
End: 2022-09-06
Payer: MEDICARE

## 2022-09-06 VITALS
DIASTOLIC BLOOD PRESSURE: 73 MMHG | TEMPERATURE: 98.1 F | SYSTOLIC BLOOD PRESSURE: 118 MMHG | RESPIRATION RATE: 16 BRPM | HEART RATE: 72 BPM | OXYGEN SATURATION: 100 %

## 2022-09-06 DIAGNOSIS — H92.01 EAR PAIN, RIGHT: ICD-10-CM

## 2022-09-06 DIAGNOSIS — R07.89 ATYPICAL CHEST PAIN: Primary | ICD-10-CM

## 2022-09-06 LAB
ALBUMIN SERPL-MCNC: 3.8 G/DL (ref 3.5–5.2)
ALP BLD-CCNC: 82 U/L (ref 35–104)
ALT SERPL-CCNC: <5 U/L (ref 0–32)
ANION GAP SERPL CALCULATED.3IONS-SCNC: 9 MMOL/L (ref 7–16)
AST SERPL-CCNC: 8 U/L (ref 0–31)
BASOPHILS ABSOLUTE: 0.02 E9/L (ref 0–0.2)
BASOPHILS RELATIVE PERCENT: 0.7 % (ref 0–2)
BILIRUB SERPL-MCNC: <0.2 MG/DL (ref 0–1.2)
BUN BLDV-MCNC: 18 MG/DL (ref 6–23)
CALCIUM SERPL-MCNC: 8.7 MG/DL (ref 8.6–10.2)
CHLORIDE BLD-SCNC: 107 MMOL/L (ref 98–107)
CO2: 24 MMOL/L (ref 22–29)
CREAT SERPL-MCNC: 0.7 MG/DL (ref 0.5–1)
EOSINOPHILS ABSOLUTE: 0.03 E9/L (ref 0.05–0.5)
EOSINOPHILS RELATIVE PERCENT: 1 % (ref 0–6)
GFR AFRICAN AMERICAN: >60
GFR NON-AFRICAN AMERICAN: >60 ML/MIN/1.73
GLUCOSE BLD-MCNC: 138 MG/DL (ref 74–99)
HCT VFR BLD CALC: 35.4 % (ref 34–48)
HEMOGLOBIN: 11.7 G/DL (ref 11.5–15.5)
IMMATURE GRANULOCYTES #: 0 E9/L
IMMATURE GRANULOCYTES %: 0 % (ref 0–5)
LACTIC ACID: 1.5 MMOL/L (ref 0.5–2.2)
LYMPHOCYTES ABSOLUTE: 0.99 E9/L (ref 1.5–4)
LYMPHOCYTES RELATIVE PERCENT: 32.4 % (ref 20–42)
MCH RBC QN AUTO: 31.3 PG (ref 26–35)
MCHC RBC AUTO-ENTMCNC: 33.1 % (ref 32–34.5)
MCV RBC AUTO: 94.7 FL (ref 80–99.9)
MONOCYTES ABSOLUTE: 0.37 E9/L (ref 0.1–0.95)
MONOCYTES RELATIVE PERCENT: 12.1 % (ref 2–12)
NEUTROPHILS ABSOLUTE: 1.65 E9/L (ref 1.8–7.3)
NEUTROPHILS RELATIVE PERCENT: 53.8 % (ref 43–80)
PDW BLD-RTO: 13.8 FL (ref 11.5–15)
PLATELET # BLD: 205 E9/L (ref 130–450)
PMV BLD AUTO: 9.5 FL (ref 7–12)
POTASSIUM SERPL-SCNC: 3.7 MMOL/L (ref 3.5–5)
PRO-BNP: 156 PG/ML (ref 0–450)
RBC # BLD: 3.74 E12/L (ref 3.5–5.5)
SODIUM BLD-SCNC: 140 MMOL/L (ref 132–146)
TOTAL PROTEIN: 6.4 G/DL (ref 6.4–8.3)
TROPONIN, HIGH SENSITIVITY: 6 NG/L (ref 0–9)
TROPONIN, HIGH SENSITIVITY: <6 NG/L (ref 0–9)
WBC # BLD: 3.1 E9/L (ref 4.5–11.5)

## 2022-09-06 PROCEDURE — 99285 EMERGENCY DEPT VISIT HI MDM: CPT

## 2022-09-06 PROCEDURE — 36415 COLL VENOUS BLD VENIPUNCTURE: CPT

## 2022-09-06 PROCEDURE — 83880 ASSAY OF NATRIURETIC PEPTIDE: CPT

## 2022-09-06 PROCEDURE — 93005 ELECTROCARDIOGRAM TRACING: CPT | Performed by: PHYSICIAN ASSISTANT

## 2022-09-06 PROCEDURE — 80053 COMPREHEN METABOLIC PANEL: CPT

## 2022-09-06 PROCEDURE — 84484 ASSAY OF TROPONIN QUANT: CPT

## 2022-09-06 PROCEDURE — 71046 X-RAY EXAM CHEST 2 VIEWS: CPT

## 2022-09-06 PROCEDURE — 85025 COMPLETE CBC W/AUTO DIFF WBC: CPT

## 2022-09-06 PROCEDURE — 83605 ASSAY OF LACTIC ACID: CPT

## 2022-09-06 RX ORDER — ASPIRIN 325 MG
325 TABLET ORAL ONCE
Status: DISCONTINUED | OUTPATIENT
Start: 2022-09-06 | End: 2022-09-06 | Stop reason: HOSPADM

## 2022-09-06 ASSESSMENT — ENCOUNTER SYMPTOMS
WHEEZING: 0
COUGH: 0
CONSTIPATION: 0
SORE THROAT: 0
ABDOMINAL PAIN: 0
EYE DISCHARGE: 0
DIARRHEA: 0
ABDOMINAL DISTENTION: 0
PHOTOPHOBIA: 0
BACK PAIN: 0
SINUS PRESSURE: 0
EYE REDNESS: 0
NAUSEA: 0
SHORTNESS OF BREATH: 1
VOMITING: 0
BLOOD IN STOOL: 0
RHINORRHEA: 0

## 2022-09-06 NOTE — TELEPHONE ENCOUNTER
----- Message from Meri Manzo sent at 9/6/2022  2:50 PM EDT -----  Subject: Appointment Request    Reason for Call: Established Patient Appointment needed: Routine Existing   Condition Follow Up    QUESTIONS    Reason for appointment request? Available appointments did not meet   patient need     Additional Information for Provider? PT is having a pain in her left boob,   constantly for the past couple of hours (since 9:30 this am) PT would like   someone from the office staff to give her a call to discuss and she would   also like to schedule an appointment with PCP for this Thursday 9.8.2022   after 4:30 if possible.  Please reach out to the PT.   ---------------------------------------------------------------------------  --------------  [id number] [employer]  [phone]; OK to leave message on voicemail  ---------------------------------------------------------------------------  --------------  SCRIPT ANSWERS  HARIKAID Screen: Clara Melgoza

## 2022-09-06 NOTE — TELEPHONE ENCOUNTER
Called and spoke to pt advised her that Dr. Gonzalo Manjarrez was not in the office today and she is stating that she is having chest pain and has been on going since sweeping this morning. I advised the patient to go to the ER due to the chest pain she was describing. And patient will call after she was seen.

## 2022-09-06 NOTE — ED NOTES
Department of Emergency Medicine  FIRST PROVIDER TRIAGE NOTE             Independent MLP           9/6/22  4:07 PM EDT    Date of Encounter: 9/6/22   MRN: [id number]      HPI: Gabriela Franks is a 82 y.o. female who presents to the ED for Chest Pain (Began at 0930 this morning. Denies n/v, denies radiating pain.) and Headache  Patient is an 82-year-old that is presenting with chest pain on the left side of her chest that she describes as someone \"punching her in the chest.\"  Patient denies any radiation of any pain. Patient states she has associated headaches. Patient only took a baby aspirin this morning    ROS: Negative for vomiting, diarrhea, urinary complaints, or rash. PE: Gen Appearance/Constitutional: alert  HEENT: NC/NT. PERRLA,  Airway patent. Neck: supple     Initial Plan of Care: All treatment areas with department are currently occupied. Plan to order/Initiate the following while awaiting opening in ED: labs, EKG, and imaging studies.   Initiate Treatment-Testing, Proceed toTreatment Area When Bed Available for ED Attending/MLP to Continue Care    Electronically signed by Sariah Pinto PA-C   DD: 9/6/22       Sariah Pinto PA-C  09/06/22 [id number]

## 2022-09-06 NOTE — ED PROVIDER NOTES
[facility]  Department of Emergency Medicine     Written by: Janan Libman, MD  Patient Name: Yossi Albert  Attending Provider: Too Pierre DO  Admit Date: 2022  6:40 PM  MRN: [id number]                   : 1935        Chief Complaint   Patient presents with    Chest Pain     Began at 0930 this morning. Denies n/v, denies radiating pain. Headache    - Chief complaint    Patient is an 74-year-old female with a past medical history of CVA, hyperlipidemia, and hypertension presenting with chest pain and headache. Patient states that she experiencing left-sided chest/breast pain that began around 9:30 this morning with associated shortness of breath. Pain is nonradiating, dull, moderate in severity, somewhat relieved by Tylenol, not exacerbated or relieved by anything. The first time this kind of pain is having her. Patient denies neck pain, arm pain, back pain, diaphoresis, lightheadedness, dizziness, blurry vision, headache, fever, chills, sore throat, nausea, vomiting, abdominal pain, dysuria, hematuria, increasing or decreasing urinary frequency, blood in the stool, constipation, diarrhea, vaginal bleeding/discharge, leg pain, leg swelling, recent travel, recent illness, recent surgery, or sick contacts. Review of Systems   Constitutional:  Negative for activity change, chills, fatigue and fever. HENT:  Negative for congestion, postnasal drip, rhinorrhea, sinus pressure and sore throat. Eyes:  Negative for photophobia, discharge, redness and visual disturbance. Respiratory:  Positive for shortness of breath. Negative for cough and wheezing. Cardiovascular:  Positive for chest pain. Negative for palpitations and leg swelling. Gastrointestinal:  Negative for abdominal distention, abdominal pain, blood in stool, constipation, diarrhea, nausea and vomiting. Endocrine: Negative for cold intolerance and heat intolerance.    Genitourinary:  Negative for decreased urine volume, difficulty urinating, dysuria, flank pain, frequency, hematuria, urgency, vaginal bleeding and vaginal discharge. Musculoskeletal:  Negative for arthralgias, back pain, joint swelling and myalgias. Skin:  Negative for rash and wound. Allergic/Immunologic: Negative for immunocompromised state. Neurological:  Negative for dizziness, syncope, facial asymmetry, weakness, light-headedness, numbness and headaches. Hematological:  Does not bruise/bleed easily. Psychiatric/Behavioral:  Negative for behavioral problems and hallucinations. Physical Exam  Exam conducted with a chaperone present. Constitutional:       General: She is not in acute distress. Appearance: Normal appearance. She is not ill-appearing or toxic-appearing. HENT:      Head: Normocephalic and atraumatic. Right Ear: Hearing normal.      Left Ear: Hearing normal.      Nose: Nose normal.      Mouth/Throat:      Lips: Pink. Mouth: Mucous membranes are moist.      Pharynx: Oropharynx is clear. Eyes:      Extraocular Movements: Extraocular movements intact. Conjunctiva/sclera: Conjunctivae normal.      Pupils: Pupils are equal, round, and reactive to light. Cardiovascular:      Rate and Rhythm: Normal rate and regular rhythm. Pulses: Normal pulses. Radial pulses are 2+ on the right side and 2+ on the left side. Dorsalis pedis pulses are 2+ on the right side and 2+ on the left side. Posterior tibial pulses are 2+ on the right side and 2+ on the left side. Heart sounds: S1 normal and S2 normal.   Pulmonary:      Effort: Pulmonary effort is normal. No tachypnea or respiratory distress. Breath sounds: Normal breath sounds and air entry. No decreased breath sounds, wheezing, rhonchi or rales. Chest:      Chest wall: No mass, lacerations, deformity, swelling, tenderness, crepitus or edema.    Breasts:     Right: Normal.      Left: No swelling, bleeding, inverted nipple, mass, nipple discharge, skin change or tenderness. Abdominal:      General: Abdomen is flat. Bowel sounds are normal. There is no distension or abdominal bruit. Palpations: Abdomen is soft. There is no fluid wave or mass. Tenderness: There is no abdominal tenderness. There is no right CVA tenderness, left CVA tenderness or guarding. Musculoskeletal:         General: No swelling or tenderness. Normal range of motion. Cervical back: Normal range of motion and neck supple. No rigidity. Right lower leg: No edema. Left lower leg: No edema. Lymphadenopathy:      Cervical: No cervical adenopathy. Skin:     General: Skin is warm and dry. Capillary Refill: Capillary refill takes less than 2 seconds. Findings: No bruising, lesion or rash. Neurological:      General: No focal deficit present. Mental Status: She is alert and oriented to person, place, and time. Mental status is at baseline. Motor: No weakness. Psychiatric:         Attention and Perception: Attention normal.         Mood and Affect: Mood and affect normal.         Behavior: Behavior is cooperative. EKG Interpretation    Interpreted by emergency department physician    Rhythm: normal sinus   Rate: normal  Axis: left  Ectopy: none  Conduction: normal  ST Segments: no acute change  T Waves: non specific changes; hyperacute T waves noted on prior  Q Waves: none    Clinical Impression: no acute changes when compared with prior EKG on 6/9/2022    Yomaira Medeiros MD      Procedures       MDM  Number of Diagnoses or Management Options  Atypical chest pain  Ear pain, right  Diagnosis management comments: Patient is an 82-year-old female with a past medical history of CVA, hyperlipidemia, and hypertension presenting with chest pain and headache. At the time of examination the patient is vitally stable. EKG as above.   Chest x-ray shows no acute process with minimal pleural reaction noted at the left lung base. CBC, CMP, initial troponin, lactic acid, and BMP were all reassuring. Repeat troponin delta was 0. Patient was able to ambulate without assistance to and from the bathroom multiple times without experiencing an episode of chest pain. She is remained asymptomatic since being evaluated in the emergency room. Patient is comfortable with going home and following up outpatient. At the time of discharge the patient was vitally stable, demonstrated good understanding of her condition, and had no questions. Amount and/or Complexity of Data Reviewed  Decide to obtain previous medical records or to obtain history from someone other than the patient: yes            ED Course as of 09/07/22 1223   Tue Sep 06, 2022   2030 Due to unclear nature of the patient's pain as she keeps reporting that her pain is located in her breast, breast examination was performed with chaperone present. There is no evidence of mastitis, overlying skin changes, discharge, tenderness to palpation, nipple retraction, skin dimpling, or lymphadenopathy noted [VG]   2119 Patient was reporting right ear pain. She has some mild redness to the auricle that is nontender to palpation. There is no evidence of soft tissue infection. Tympanic membrane was visualized and normal-appearing. No evidence of ear canal irritation or erythema. [VG]      ED Course User Index  [VG] Brittaney Lebron MD       --------------------------------------------- PAST HISTORY ---------------------------------------------  Past Medical History:  has a past medical history of Abnormal weight loss, Anxiety, Anxiety disorder, Cerebral infarction, unspecified ([address] Essential (primary) hypertension, Gastric reflux, Hyperlipidemia, Hypertension, Occlusion and stenosis of right carotid artery, and Slurred speech. Past Surgical History:  has a past surgical history that includes Colonoscopy; Hysterectomy;  Lithotripsy (Left, 2/7/2020); and Carotid endarterectomy (Right, 11/27/2020). Social History:  reports that she has never smoked. She has never used smokeless tobacco. She reports that she does not drink alcohol and does not use drugs. Family History: family history includes Cancer in her brother and brother; High Blood Pressure in her mother; Other in her father. The patients home medications have been reviewed.     Allergies: Lorazepam and Penicillins    -------------------------------------------------- RESULTS -------------------------------------------------  Labs:  Results for orders placed or performed during the hospital encounter of 09/06/22   Comprehensive Metabolic Panel   Result Value Ref Range    Sodium 140 132 - 146 mmol/L    Potassium 3.7 3.5 - 5.0 mmol/L    Chloride 107 98 - 107 mmol/L    CO2 24 22 - 29 mmol/L    Anion Gap 9 7 - 16 mmol/L    Glucose 138 (H) 74 - 99 mg/dL    BUN 18 6 - 23 mg/dL    Creatinine 0.7 0.5 - 1.0 mg/dL    GFR Non-African American >60 >=60 mL/min/1.73    GFR African American >60     Calcium 8.7 8.6 - 10.2 mg/dL    Total Protein 6.4 6.4 - 8.3 g/dL    Albumin 3.8 3.5 - 5.2 g/dL    Total Bilirubin <0.2 0.0 - 1.2 mg/dL    Alkaline Phosphatase 82 35 - 104 U/L    ALT <5 0 - 32 U/L    AST 8 0 - 31 U/L   CBC with Auto Differential   Result Value Ref Range    WBC 3.1 (L) 4.5 - 11.5 E9/L    RBC 3.74 3.50 - 5.50 E12/L    Hemoglobin 11.7 11.5 - 15.5 g/dL    Hematocrit 35.4 34.0 - 48.0 %    MCV 94.7 80.0 - 99.9 fL    MCH 31.3 26.0 - 35.0 pg    MCHC 33.1 32.0 - 34.5 %    RDW 13.8 11.5 - 15.0 fL    Platelets 264 792 - 017 E9/L    MPV 9.5 7.0 - 12.0 fL    Neutrophils % 53.8 43.0 - 80.0 %    Immature Granulocytes % 0.0 0.0 - 5.0 %    Lymphocytes % 32.4 20.0 - 42.0 %    Monocytes % 12.1 (H) 2.0 - 12.0 %    Eosinophils % 1.0 0.0 - 6.0 %    Basophils % 0.7 0.0 - 2.0 %    Neutrophils Absolute 1.65 (L) 1.80 - 7.30 E9/L    Immature Granulocytes # 0.00 E9/L    Lymphocytes Absolute 0.99 (L) 1.50 - 4.00 E9/L    Monocytes Absolute 0. 37 0.10 - 0.95 E9/L    Eosinophils Absolute 0.03 (L) 0.05 - 0.50 E9/L    Basophils Absolute 0.02 0.00 - 0.20 E9/L   Troponin   Result Value Ref Range    Troponin, High Sensitivity 6 0 - 9 ng/L   Lactic Acid   Result Value Ref Range    Lactic Acid 1.5 0.5 - 2.2 mmol/L   Brain Natriuretic Peptide   Result Value Ref Range    Pro- 0 - 450 pg/mL   Troponin   Result Value Ref Range    Troponin, High Sensitivity <6 0 - 9 ng/L   EKG 12 Lead   Result Value Ref Range    Ventricular Rate 80 BPM    Atrial Rate 80 BPM    P-R Interval 178 ms    QRS Duration 92 ms    Q-T Interval 402 ms    QTc Calculation (Bazett) 463 ms    P Axis 79 degrees    R Axis -35 degrees    T Axis 82 degrees       Radiology:  XR CHEST (2 VW)   Final Result   No acute process. Minimal pleural reaction at the left lung base.             ------------------------- NURSING NOTES AND VITALS REVIEWED ---------------------------  Date / Time Roomed:  9/6/2022  6:40 PM  ED Bed Assignment:  [facility]    The nursing notes within the ED encounter and vital signs as below have been reviewed. /73   Pulse 72   Temp 98.1 °F (36.7 °C)   Resp 16   LMP  (LMP Unknown)   SpO2 100%   Oxygen Saturation Interpretation: Normal      ------------------------------------------ PROGRESS NOTES ------------------------------------------  I have spoken with the patient and discussed todays results, in addition to providing specific details for the plan of care and counseling regarding the diagnosis and prognosis. Their questions are answered at this time and they are agreeable with the plan. I discussed at length with them reasons for immediate return here for re evaluation. They will followup with primary care by calling their office tomorrow.       --------------------------------- ADDITIONAL PROVIDER NOTES ---------------------------------  At this time the patient is without objective evidence of an acute process requiring hospitalization or inpatient management. They have remained hemodynamically stable throughout their entire ED visit and are stable for discharge with outpatient follow-up. The plan has been discussed in detail and they are aware of the specific conditions for emergent return, as well as the importance of follow-up. Discharge Medication List as of 9/6/2022  9:53 PM          Diagnosis:  1. Atypical chest pain    2. Ear pain, right        Disposition:  Patient's disposition: Discharge to home  Patient's condition is stable. Patient was seen and evaluated by myself and my attending Divya Turpin DO. Assessment and Plan discussed with attending provider, please see attestation for final plan of care.      MD Mariola Sandoval MD  Resident  09/07/22 1020

## 2022-09-07 LAB
EKG ATRIAL RATE: 80 BPM
EKG P AXIS: 79 DEGREES
EKG P-R INTERVAL: 178 MS
EKG Q-T INTERVAL: 402 MS
EKG QRS DURATION: 92 MS
EKG QTC CALCULATION (BAZETT): 463 MS
EKG R AXIS: -35 DEGREES
EKG T AXIS: 82 DEGREES
EKG VENTRICULAR RATE: 80 BPM

## 2022-09-07 NOTE — DISCHARGE INSTRUCTIONS
Follow-up with your primary care physician soon as possible to follow-up on your symptoms and discuss recent emergency room visit. Please return to the emergency room if you experience worsening of her symptoms, severe chest pain, severe shortness of breath, severe nausea with vomiting, severe abdominal pain, or fevers greater than 100.4 °F.

## 2022-09-08 ENCOUNTER — OFFICE VISIT (OUTPATIENT)
Dept: PRIMARY CARE CLINIC | Age: 87
End: 2022-09-08
Payer: MEDICARE

## 2022-09-08 VITALS
DIASTOLIC BLOOD PRESSURE: 74 MMHG | HEART RATE: 86 BPM | WEIGHT: 126 LBS | SYSTOLIC BLOOD PRESSURE: 128 MMHG | TEMPERATURE: 97 F | BODY MASS INDEX: 23.19 KG/M2 | OXYGEN SATURATION: 98 % | RESPIRATION RATE: 18 BRPM | HEIGHT: 62 IN

## 2022-09-08 DIAGNOSIS — J30.9 ALLERGIC RHINITIS, UNSPECIFIED SEASONALITY, UNSPECIFIED TRIGGER: ICD-10-CM

## 2022-09-08 DIAGNOSIS — R07.89 LEFT-SIDED CHEST WALL PAIN: Primary | ICD-10-CM

## 2022-09-08 DIAGNOSIS — I10 PRIMARY HYPERTENSION: ICD-10-CM

## 2022-09-08 DIAGNOSIS — F41.1 ANXIETY NEUROSIS: ICD-10-CM

## 2022-09-08 DIAGNOSIS — R63.4 WEIGHT LOSS: ICD-10-CM

## 2022-09-08 DIAGNOSIS — L65.9 NON-SCARRING HAIR LOSS: ICD-10-CM

## 2022-09-08 DIAGNOSIS — R73.09 ABNORMAL GLUCOSE: ICD-10-CM

## 2022-09-08 LAB — HBA1C MFR BLD: 5.8 %

## 2022-09-08 PROCEDURE — 99214 OFFICE O/P EST MOD 30 MIN: CPT | Performed by: INTERNAL MEDICINE

## 2022-09-08 PROCEDURE — 1036F TOBACCO NON-USER: CPT | Performed by: INTERNAL MEDICINE

## 2022-09-08 PROCEDURE — G8420 CALC BMI NORM PARAMETERS: HCPCS | Performed by: INTERNAL MEDICINE

## 2022-09-08 PROCEDURE — 1090F PRES/ABSN URINE INCON ASSESS: CPT | Performed by: INTERNAL MEDICINE

## 2022-09-08 PROCEDURE — G8400 PT W/DXA NO RESULTS DOC: HCPCS | Performed by: INTERNAL MEDICINE

## 2022-09-08 PROCEDURE — G8427 DOCREV CUR MEDS BY ELIG CLIN: HCPCS | Performed by: INTERNAL MEDICINE

## 2022-09-08 PROCEDURE — 1123F ACP DISCUSS/DSCN MKR DOCD: CPT | Performed by: INTERNAL MEDICINE

## 2022-09-08 PROCEDURE — 83036 HEMOGLOBIN GLYCOSYLATED A1C: CPT | Performed by: INTERNAL MEDICINE

## 2022-09-08 RX ORDER — FLUTICASONE PROPIONATE 50 MCG
1 SPRAY, SUSPENSION (ML) NASAL DAILY
Qty: 32 G | Refills: 3 | Status: SHIPPED | OUTPATIENT
Start: 2022-09-08

## 2022-09-08 RX ORDER — LANOLIN ALCOHOL/MO/W.PET/CERES
1 CREAM (GRAM) TOPICAL 2 TIMES DAILY
Qty: 60 TABLET | Refills: 3 | Status: SHIPPED
Start: 2022-09-08 | End: 2022-09-14 | Stop reason: SDUPTHER

## 2022-09-08 RX ORDER — DIAZEPAM 2 MG/1
2 TABLET ORAL EVERY 8 HOURS PRN
Qty: 30 TABLET | Refills: 0 | Status: SHIPPED | OUTPATIENT
Start: 2022-09-08 | End: 2022-10-08

## 2022-09-08 RX ORDER — AMLODIPINE BESYLATE 2.5 MG/1
2.5 TABLET ORAL DAILY
Qty: 90 TABLET | Refills: 3 | Status: SHIPPED
Start: 2022-09-08 | End: 2022-10-30 | Stop reason: SDUPTHER

## 2022-09-08 ASSESSMENT — ENCOUNTER SYMPTOMS
VOMITING: 0
NAUSEA: 0
SORE THROAT: 0
VOICE CHANGE: 0
WHEEZING: 0
COUGH: 0
CHEST TIGHTNESS: 0
SHORTNESS OF BREATH: 0
ABDOMINAL PAIN: 0

## 2022-09-08 NOTE — PROGRESS NOTES
(ZETIA) 10 MG tablet Take 1 tablet by mouth in the morning. 90 tablet 3    acetaminophen (TYLENOL) 500 MG tablet Take 1 tablet by mouth 4 times daily as needed for Pain 120 tablet 0     No current facility-administered medications on file prior to visit. PE:  VS:  /74 (Site: Left Upper Arm)   Pulse 86   Temp 97 °F (36.1 °C)   Resp 18   Ht 5' 2\" (1.575 m)   Wt 126 lb (57.2 kg)   LMP  (LMP Unknown)   SpO2 98%   BMI 23.05 kg/m²   General:  Alert and oriented, NAD  HEENT: Ear auricles are normal, EOMI, Conjunctivae clear  NECK:  Supple without adenopathy or thyromegaly, no carotid bruits. LUNGS: Prolonged expiration and few rales in the bases posteriorly no definite egophony, symmetrical expansion. ,tender over the left ribs,below the breast, ,mid clavicular & ant. Axillary line  HEART:  RRR without M, R, or G  ABDOMEN:  Soft and nontender without palpable abnormalities, No renal or aortic bruits. EXTREMITIES: No ankle edema bilaterally. Lab Results   Component Value Date    WBC 3.1 (L) 09/06/2022    HGB 11.7 09/06/2022    HCT 35.4 09/06/2022     09/06/2022    CHOL 135 05/03/2022    TRIG 120 05/03/2022    HDL 32 05/03/2022    ALT <5 09/06/2022    AST 8 09/06/2022     09/06/2022    K 3.7 09/06/2022     09/06/2022    CREATININE 0.7 09/06/2022    BUN 18 09/06/2022    CO2 24 09/06/2022    TSH 1.520 07/28/2022    INR 1.1 06/10/2021    LABA1C 6.0 (H) 05/03/2022        Impression/Plan:    1. Left-sided chest wall pain  -     diclofenac sodium (VOLTAREN) 1 % GEL; Apply 2 g topically 4 times daily, Topical, 4 TIMES DAILY Starting Thu 9/8/2022, Disp-100 g, R-3, Normal  2. Primary hypertension  -     amLODIPine (NORVASC) 2.5 MG tablet; Take 1 tablet by mouth daily, Disp-90 tablet, R-3Normal  3. Weight loss  -     POCT glycosylated hemoglobin (Hb A1C)  -     Prealbumin; Future  4.  Non-scarring hair loss  -     Biotin 300 MCG TABS; Take 1 tablet by mouth in the morning and at bedtime, Disp-60 tablet, R-3Normal  5. Allergic rhinitis, unspecified seasonality, unspecified trigger  -     fluticasone (FLONASE) 50 MCG/ACT nasal spray; 1 spray by Each Nostril route daily, Disp-32 g, R-3Normal  6. Anxiety neurosis  -     diazePAM (VALIUM) 2 MG tablet; Take 1 tablet by mouth every 8 hours as needed for Anxiety for up to 30 days. , Disp-30 tablet, R-0Print  7.  Abnormal glucose  -     POCT glycosylated hemoglobin (Hb A1C)

## 2022-09-14 DIAGNOSIS — L65.9 NON-SCARRING HAIR LOSS: ICD-10-CM

## 2022-09-14 RX ORDER — LANOLIN ALCOHOL/MO/W.PET/CERES
1 CREAM (GRAM) TOPICAL 2 TIMES DAILY
Qty: 60 TABLET | Refills: 5 | Status: SHIPPED
Start: 2022-09-14 | End: 2022-10-12

## 2022-09-14 NOTE — TELEPHONE ENCOUNTER
Biotin 300 MCG TABS     Pt stated the pharmacy needs \"notification from the doctor\" before they can fill the above medication

## 2022-10-12 ENCOUNTER — OFFICE VISIT (OUTPATIENT)
Dept: PRIMARY CARE CLINIC | Age: 87
End: 2022-10-12
Payer: MEDICARE

## 2022-10-12 VITALS
SYSTOLIC BLOOD PRESSURE: 110 MMHG | RESPIRATION RATE: 18 BRPM | OXYGEN SATURATION: 96 % | WEIGHT: 126 LBS | TEMPERATURE: 97.7 F | BODY MASS INDEX: 23.19 KG/M2 | HEART RATE: 86 BPM | HEIGHT: 62 IN | DIASTOLIC BLOOD PRESSURE: 60 MMHG

## 2022-10-12 DIAGNOSIS — F41.1 ANXIETY NEUROSIS: ICD-10-CM

## 2022-10-12 DIAGNOSIS — I10 PRIMARY HYPERTENSION: ICD-10-CM

## 2022-10-12 DIAGNOSIS — M54.2 CERVICALGIA: Primary | ICD-10-CM

## 2022-10-12 DIAGNOSIS — I69.30 HISTORY OF CVA WITH RESIDUAL DEFICIT: ICD-10-CM

## 2022-10-12 DIAGNOSIS — E78.5 HYPERLIPIDEMIA, UNSPECIFIED HYPERLIPIDEMIA TYPE: ICD-10-CM

## 2022-10-12 PROCEDURE — 3074F SYST BP LT 130 MM HG: CPT | Performed by: INTERNAL MEDICINE

## 2022-10-12 PROCEDURE — G8484 FLU IMMUNIZE NO ADMIN: HCPCS | Performed by: INTERNAL MEDICINE

## 2022-10-12 PROCEDURE — G8427 DOCREV CUR MEDS BY ELIG CLIN: HCPCS | Performed by: INTERNAL MEDICINE

## 2022-10-12 PROCEDURE — 99214 OFFICE O/P EST MOD 30 MIN: CPT | Performed by: INTERNAL MEDICINE

## 2022-10-12 PROCEDURE — 1090F PRES/ABSN URINE INCON ASSESS: CPT | Performed by: INTERNAL MEDICINE

## 2022-10-12 PROCEDURE — G8420 CALC BMI NORM PARAMETERS: HCPCS | Performed by: INTERNAL MEDICINE

## 2022-10-12 PROCEDURE — 3078F DIAST BP <80 MM HG: CPT | Performed by: INTERNAL MEDICINE

## 2022-10-12 PROCEDURE — G8400 PT W/DXA NO RESULTS DOC: HCPCS | Performed by: INTERNAL MEDICINE

## 2022-10-12 PROCEDURE — 1036F TOBACCO NON-USER: CPT | Performed by: INTERNAL MEDICINE

## 2022-10-12 PROCEDURE — 1123F ACP DISCUSS/DSCN MKR DOCD: CPT | Performed by: INTERNAL MEDICINE

## 2022-10-12 RX ORDER — BACLOFEN 10 MG/1
10 TABLET ORAL NIGHTLY
Qty: 30 TABLET | Refills: 1 | Status: SHIPPED | OUTPATIENT
Start: 2022-10-12

## 2022-10-12 RX ORDER — BIOTIN 1 MG
TABLET ORAL
COMMUNITY
Start: 2022-09-08

## 2022-10-12 RX ORDER — LIDOCAINE 50 MG/G
OINTMENT TOPICAL
Qty: 30 G | Refills: 2 | Status: SHIPPED | OUTPATIENT
Start: 2022-10-12

## 2022-10-12 RX ORDER — MELOXICAM 7.5 MG/1
7.5 TABLET ORAL DAILY
Qty: 90 TABLET | Refills: 1 | Status: SHIPPED | OUTPATIENT
Start: 2022-10-12

## 2022-10-12 RX ORDER — LANOLIN ALCOHOL/MO/W.PET/CERES
CREAM (GRAM) TOPICAL
COMMUNITY
Start: 2022-10-10

## 2022-10-12 SDOH — ECONOMIC STABILITY: FOOD INSECURITY: WITHIN THE PAST 12 MONTHS, THE FOOD YOU BOUGHT JUST DIDN'T LAST AND YOU DIDN'T HAVE MONEY TO GET MORE.: NEVER TRUE

## 2022-10-12 SDOH — ECONOMIC STABILITY: FOOD INSECURITY: WITHIN THE PAST 12 MONTHS, YOU WORRIED THAT YOUR FOOD WOULD RUN OUT BEFORE YOU GOT MONEY TO BUY MORE.: NEVER TRUE

## 2022-10-12 ASSESSMENT — ENCOUNTER SYMPTOMS
VOMITING: 0
SORE THROAT: 0
SHORTNESS OF BREATH: 0
ABDOMINAL PAIN: 0
NAUSEA: 0
VOICE CHANGE: 0
WHEEZING: 0
CHEST TIGHTNESS: 0

## 2022-10-12 ASSESSMENT — SOCIAL DETERMINANTS OF HEALTH (SDOH): HOW HARD IS IT FOR YOU TO PAY FOR THE VERY BASICS LIKE FOOD, HOUSING, MEDICAL CARE, AND HEATING?: NOT HARD AT ALL

## 2022-10-12 NOTE — PROGRESS NOTES
HPI:  Patient comes in today for Complaint of pain in the neck & base of the skull,\" can't turn my head when I sleep at night  \"I'm so stressed,my daughter sarmad yells at me all the time,I hate her,I yell back at her ,she will give me a stock\"  \" I want to move out\"  Review of Systems   Constitutional:  Negative for chills, fever and unexpected weight change. HENT:  Negative for postnasal drip, sore throat and voice change. Respiratory:  Negative for chest tightness, shortness of breath and wheezing. Cardiovascular:  Negative for chest pain and leg swelling. Gastrointestinal:  Negative for abdominal pain, nausea and vomiting. Musculoskeletal:  Positive for neck pain and neck stiffness. Negative for gait problem and joint swelling. Skin:  Negative for rash and wound. Neurological: Negative. Psychiatric/Behavioral:  Positive for agitation and sleep disturbance. Negative for confusion, hallucinations and suicidal ideas. The patient is nervous/anxious.        Past Medical History:   Diagnosis Date    Abnormal weight loss     Anxiety     Anxiety disorder     Cerebral infarction, unspecified ([address]     Essential (primary) hypertension     Gastric reflux     Hyperlipidemia     Hypertension     Occlusion and stenosis of right carotid artery     Slurred speech      Past Surgical History:   Procedure Laterality Date    CAROTID ENDARTERECTOMY Right 11/27/2020    RIGHT CAROTID ENDARTERECTOMY performed by Vu Partida MD at [facility] (CERVIX STATUS UNKNOWN)      LITHOTRIPSY Left 2/7/2020    CYSTOSCOPY RETROGRADE PYELOGRAM LEFT URETEROSCOPY LEFT J STENT LASER LITHOTRIPSY performed by Analilia Mendez DO at [facility] OR     Family History   Problem Relation Age of Onset    High Blood Pressure Mother     Other Father         Emphysema    Cancer Brother         Leukemia    Cancer Brother       Social History     Tobacco Use    Smoking status: Never    Smokeless tobacco: Never   Vaping Use Vaping Use: Never used   Substance Use Topics    Alcohol use: No    Drug use: No        Current Outpatient Medications on File Prior to Visit   Medication Sig Dispense Refill    Biotin 1000 MCG TABS       thiamine 100 MG tablet take 1 tablet by mouth once daily      fluticasone (FLONASE) 50 MCG/ACT nasal spray 1 spray by Each Nostril route daily 32 g 3    diclofenac sodium (VOLTAREN) 1 % GEL Apply 2 g topically 4 times daily 100 g 3    acetaminophen (TYLENOL) 500 MG tablet Take 1 tablet by mouth 4 times daily as needed for Pain 120 tablet 0     No current facility-administered medications on file prior to visit. PE:  VS:  /60   Pulse 86   Temp 97.7 °F (36.5 °C)   Resp 18   Ht 5' 2\" (1.575 m)   Wt 126 lb (57.2 kg)   LMP  (LMP Unknown)   SpO2 96%   BMI 23.05 kg/m²   General:  Alert and oriented, stressed due to her living situation. HEENT: Ear auricles are normal, EOMI, Conjunctivae clear  NECK:  Supple without adenopathy or thyromegaly, no carotid bruits. LUNGS:  CTA all fields,no wheezing,symmetrical  HEART:  RRR without M, R, or G  ABDOMEN:  Soft and nontender without palpable abnormalities, No renal or aortic bruits. EXTREMITIES: No ankle edema bilaterally. Neuro: mild slurred speech,pt. At base line. Lab Results   Component Value Date    WBC 3.1 (L) 09/06/2022    HGB 11.7 09/06/2022    HCT 35.4 09/06/2022     09/06/2022    CHOL 135 05/03/2022    TRIG 120 05/03/2022    HDL 32 05/03/2022    LDLCALC 79 05/03/2022    ALT <5 09/06/2022    AST 8 09/06/2022     09/06/2022    K 3.7 09/06/2022     09/06/2022    CREATININE 0.7 09/06/2022    BUN 18 09/06/2022    LABGLOM >60 09/06/2022    GFRAA >60 09/06/2022    CO2 24 09/06/2022    TSH 1.520 07/28/2022    INR 1.1 06/10/2021    GLUCOSE 138 (H) 09/06/2022    LABA1C 5.8 (H) 09/08/2022    LABCREA 32 02/05/2020         Impression/Plan:    1. Cervicalgia  Comments:  see meds  Orders:  -     meloxicam (MOBIC) 7.5 MG tablet;  Take 1

## 2022-10-18 ENCOUNTER — TELEPHONE (OUTPATIENT)
Dept: PRIMARY CARE CLINIC | Age: 87
End: 2022-10-18

## 2022-10-18 DIAGNOSIS — Z86.73 HISTORY OF CVA (CEREBROVASCULAR ACCIDENT): Primary | ICD-10-CM

## 2022-10-18 DIAGNOSIS — Z74.8 ASSISTANCE NEEDED WITH TRANSPORTATION: ICD-10-CM

## 2022-10-20 ENCOUNTER — CARE COORDINATION (OUTPATIENT)
Dept: CARE COORDINATION | Age: 87
End: 2022-10-20
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
## 2022-10-28 DIAGNOSIS — I10 PRIMARY HYPERTENSION: ICD-10-CM

## 2022-10-28 NOTE — TELEPHONE ENCOUNTER
----- Message from Blaze Gold sent at 10/28/2022  1:29 PM EDT -----  Subject: Refill Request    QUESTIONS  Name of Medication? aspirin 81 MG chewable tablet  Patient-reported dosage and instructions? once daily  How many days do you have left? 2  Preferred Pharmacy? [facility] #[id number]  Pharmacy phone number (if available)? [phone]  ---------------------------------------------------------------------------  --------------,  Name of Medication? ezetimibe (ZETIA) 10 MG tablet  Patient-reported dosage and instructions? once daily  How many days do you have left? 2  Preferred Pharmacy? [facility] #[id number]  Pharmacy phone number (if available)? [phone]  ---------------------------------------------------------------------------  --------------,  Name of Medication? amLODIPine (NORVASC) 2.5 MG tablet  Patient-reported dosage and instructions? once daily  How many days do you have left? 2  Preferred Pharmacy? [address] [place] [address] #[id number]  Pharmacy phone number (if available)? [phone]  ---------------------------------------------------------------------------  --------------  CALL BACK INFO  What is the best way for the office to contact you? OK to leave message on   voicemail  Preferred Call Back Phone Number? [phone]  ---------------------------------------------------------------------------  --------------  SCRIPT ANSWERS  Relationship to Patient?  Self
Last Appointment:  10/12/2022  No future appointments.
Yes

## 2022-10-30 RX ORDER — AMLODIPINE BESYLATE 2.5 MG/1
2.5 TABLET ORAL DAILY
Qty: 90 TABLET | Refills: 3 | Status: SHIPPED | OUTPATIENT
Start: 2022-10-30

## 2022-10-30 RX ORDER — ASPIRIN 81 MG/1
81 TABLET, CHEWABLE ORAL DAILY
Qty: 90 TABLET | Refills: 3 | Status: SHIPPED | OUTPATIENT
Start: 2022-10-30

## 2022-10-30 RX ORDER — EZETIMIBE 10 MG/1
10 TABLET ORAL DAILY
Qty: 90 TABLET | Refills: 3 | Status: SHIPPED | OUTPATIENT
Start: 2022-10-30

## 2022-11-09 NOTE — PROGRESS NOTES
Daily Progress Note  Maryam James MD      Subjective:   Patient is breathing easier, tolerating food  . Past Medical History:   Diagnosis Date    Anxiety     Gastric reflux     Hypertension        Past Surgical History:   Procedure Laterality Date    COLONOSCOPY      HYSTERECTOMY      LITHOTRIPSY Left 2/7/2020    CYSTOSCOPY RETROGRADE PYELOGRAM LEFT URETEROSCOPY LEFT J STENT LASER LITHOTRIPSY performed by Mohinder Mendez, DO at [facility] OR       Scheduled Meds:   predniSONE  40 mg Oral Daily    Arformoterol Tartrate  15 mcg Nebulization BID    budesonide  250 mcg Nebulization BID    pantoprazole  40 mg Oral QAM AC    docusate sodium  100 mg Oral BID    ipratropium-albuterol  1 ampule Inhalation Q4H WA    guaiFENesin  600 mg Oral TID    amLODIPine  2.5 mg Oral Daily    diclofenac sodium  2 g Topical 4x Daily    ezetimibe  10 mg Oral Daily    sodium chloride flush  10 mL Intravenous 2 times per day    heparin (porcine)  5,000 Units Subcutaneous 3 times per day     Continuous Infusions:  PRN Meds:magnesium hydroxide, polyethylene glycol, senna, benzonatate, sodium chloride flush, ondansetron, acetaminophen, morphine  DIET GENERAL;  Dietary Nutrition Supplements: Diabetic Oral Supplement    Objective:     I/O last 3 completed shifts: In: 180 [P.O.:180]  Out: -   No intake/output data recorded.   Stool Occurrence: 1  Vitals:    02/12/20 2100 02/13/20 0830 02/13/20 0835 02/13/20 1252   BP: 119/64 128/66     Pulse: 65 78     Resp: 18 18     Temp: 98 °F (36.7 °C) 97.8 °F (36.6 °C)     TempSrc: Oral Oral     SpO2: 93% (!) 89% 92% 92%   Weight:       Height:           General appearance: alert, appears stated age and cooperative  Neck: no adenopathy, no carotid bruit, no JVD, supple, symmetrical, trachea midline and thyroid not enlarged, symmetric, no tenderness/mass/nodules  Lungs: chest clear, no wheezing, rales, normal symmetric air entry  Chest wall: no tenderness  Heart: regular rate and rhythm, S1, S2 Synjardy requires prior authorization. Completed on Cover My Meds.  Awaiting insurance approval. normal, no murmur, click, rub or gallop  Abdomen: soft, non-tender; bowel sounds normal; no masses,  no organomegaly  Extremities: extremities normal, atraumatic, no cyanosis or edema  Pulses: 2+ and symmetric  Neurologic: Grossly normal      Data Review:    Recent Labs     02/12/20  0505   WBC 6.6   HGB 11.0*   HCT 34.5   MCV 97.7        Recent Labs     02/12/20  0505      K 4.8      CO2 28   BUN 19   CREATININE 0.7   GLUCOSE 158*     Recent Labs     02/12/20  0505   AST 14   ALT 19   BILITOT 0.2   ALKPHOS 66     No results for input(s): CKTOTAL, CKMB, TROPONINI in the last 72 hours. Lab Results   Component Value Date    INR 1.0 11/13/2017    INR 1.1 09/05/2015    INR 1.2 09/05/2011    PROTIME 11.5 11/13/2017    PROTIME 11.7 09/05/2015    PROTIME 11.5 09/05/2011        U/A:    Lab Results   Component Value Date    NITRU Negative 02/04/2020    COLORU Straw 02/04/2020    PHUR 7.0 02/04/2020    LABCAST RARE 06/17/2019    WBCUA >20 02/04/2020    WBCUA 2-5 07/16/2011    RBCUA 10-20 02/04/2020    RBCUA 2-5 08/02/2013    MUCUS Present 06/17/2019    BACTERIA MODERATE 02/04/2020    CLARITYU Clear 02/04/2020    SPECGRAV 1.015 02/04/2020    LEUKOCYTESUR LARGE 02/04/2020    UROBILINOGEN 0.2 02/04/2020    BILIRUBINUR Negative 02/04/2020    BILIRUBINUR small 11/19/2018    BILIRUBINUR NEGATIVE 07/16/2011    BLOODU LARGE 02/04/2020    GLUCOSEU Negative 02/04/2020    GLUCOSEU NEGATIVE 07/16/2011    KETUA TRACE 02/04/2020     HgBA1c:    Lab Results   Component Value Date    LABA1C 6.1 10/13/2015     TSH:    Lab Results   Component Value Date    TSH 1.570 10/28/2019      No results for input(s): LABURIN in the last 72 hours. No results for input(s): BC in the last 72 hours. No results for input(s): [place] in the last 72 hours. No results for input(s): CULTRESP in the last 72 hours.     Radiology:   CT ABDOMEN PELVIS W IV CONTRAST Additional Contrast? Oral   Final Result   Persistent hydronephrosis, edema and hydroureter on the left kidney   without visualization of the previously noted stone. A small area of   narrowing is identified in the distal left ureter at the UVJ which   could represent edema and stricture, either benign or malignant,   causing obstruction. Consider cystoscopic assessment. Constipation. Mild CHF. XR ABDOMEN (KUB) (SINGLE AP VIEW)   Final Result   Nonspecific bowel gas pattern. XR CHEST STANDARD (2 VW)   Final Result      No airspace opacities or pleural effusion. US DUP LOWER EXTREMITIES BILATERAL VENOUS   Final Result   PATENT DEEP VENOUS SYSTEM OF THE BILATERAL LOWER   EXTREMITY. NO EVIDENCE FOR DVT. XR CHEST STANDARD (2 VW)   Final Result   Unchanged from prior study with no evidence of airspace consolidation   or pulmonary venous congestion. FL RETROGRADE PYELOGRAM W WO KUB   Final Result      Retrograde pyelograms with ureteral stent placement. CT ABDOMEN PELVIS W IV CONTRAST Additional Contrast? None   Final Result   1. The colon appears narrowed with mural thickening, which is   suspicious for colitis. It extends to the pelvic colon level, and   could represent ulcerative colitis. 2. There is hydronephrosis and hydroureter of the left kidney to the   bladder base, where a 2 to 3 mm diameter calculus is identified. Is on   the verge of passing through the bladder wall. 3. A moderate size retrocardiac hiatus hernia, numerous bilateral   renal cortical cysts, degenerative spinal findings are noted. ALERT:  THIS IS AN ABNORMAL REPORT-possible colitis and definite left   hydroureteronephrosis with a calculus at the UVJ on the left measuring   2 or 3 mm maximum diameter.              Assessment:     Active Problems:    Essential hypertension    Gastroesophageal reflux disease without esophagitis    Anxiety    Mixed hyperlipidemia    Moderate obesity    Colitis    Hydronephrosis with urinary obstruction due to renal calculus    Hiatal hernia with GERD  Resolved Problems:    * No resolved hospital problems. *      Plan:   Patient required oxygen for home upon discharge  Increase ambulation  Taper steroids  Hopeful discharge in the next 24 hours. This is a delayed entry from evaluation done February 12, 2020.     Electronically signed by Cherry Longoria MD on 2/13/2020 at 3:41 PM

## 2022-11-10 ENCOUNTER — TELEPHONE (OUTPATIENT)
Dept: PRIMARY CARE CLINIC | Age: 87
End: 2022-11-10

## 2022-11-11 RX ORDER — PRAMOXINE HCL / HYDROCORTISONE / CHLOROXYLENOL .01; .1; .01 G/10ML; G/10ML; G/10ML
1 SOLUTION/ DROPS AURICULAR (OTIC)
Qty: 10 ML | Refills: 1 | Status: SHIPPED | OUTPATIENT
Start: 2022-11-11

## 2022-12-08 ENCOUNTER — OFFICE VISIT (OUTPATIENT)
Dept: PRIMARY CARE CLINIC | Age: 87
End: 2022-12-08

## 2022-12-08 VITALS
HEIGHT: 62 IN | BODY MASS INDEX: 23.74 KG/M2 | WEIGHT: 129 LBS | DIASTOLIC BLOOD PRESSURE: 78 MMHG | OXYGEN SATURATION: 98 % | SYSTOLIC BLOOD PRESSURE: 126 MMHG | TEMPERATURE: 96.9 F | RESPIRATION RATE: 18 BRPM | HEART RATE: 81 BPM

## 2022-12-08 DIAGNOSIS — I10 PRIMARY HYPERTENSION: ICD-10-CM

## 2022-12-08 DIAGNOSIS — H60.392 INFECTION OF LEFT EARLOBE: ICD-10-CM

## 2022-12-08 DIAGNOSIS — R73.03 PRE-DIABETES: ICD-10-CM

## 2022-12-08 DIAGNOSIS — Z00.00 MEDICARE ANNUAL WELLNESS VISIT, SUBSEQUENT: Primary | ICD-10-CM

## 2022-12-08 DIAGNOSIS — F41.1 ANXIETY NEUROSIS: ICD-10-CM

## 2022-12-08 DIAGNOSIS — J30.9 ALLERGIC RHINITIS, UNSPECIFIED SEASONALITY, UNSPECIFIED TRIGGER: ICD-10-CM

## 2022-12-08 RX ORDER — BACITRACIN ZINC AND POLYMYXIN B SULFATE 500; 1000 [USP'U]/G; [USP'U]/G
OINTMENT TOPICAL
Qty: 15 G | Refills: 1 | Status: SHIPPED | OUTPATIENT
Start: 2022-12-08 | End: 2022-12-15

## 2022-12-08 RX ORDER — FLUTICASONE PROPIONATE 50 MCG
1 SPRAY, SUSPENSION (ML) NASAL DAILY
Qty: 32 G | Refills: 3 | Status: SHIPPED | OUTPATIENT
Start: 2022-12-08

## 2022-12-08 RX ORDER — DIAZEPAM 2 MG/1
2 TABLET ORAL NIGHTLY PRN
Qty: 30 TABLET | Refills: 0 | Status: SHIPPED | OUTPATIENT
Start: 2022-12-08 | End: 2023-01-07

## 2022-12-08 ASSESSMENT — PATIENT HEALTH QUESTIONNAIRE - PHQ9
SUM OF ALL RESPONSES TO PHQ QUESTIONS 1-9: 0
1. LITTLE INTEREST OR PLEASURE IN DOING THINGS: 0
2. FEELING DOWN, DEPRESSED OR HOPELESS: 0
SUM OF ALL RESPONSES TO PHQ QUESTIONS 1-9: 0
SUM OF ALL RESPONSES TO PHQ QUESTIONS 1-9: 0
SUM OF ALL RESPONSES TO PHQ9 QUESTIONS 1 & 2: 0
SUM OF ALL RESPONSES TO PHQ QUESTIONS 1-9: 0

## 2022-12-08 ASSESSMENT — LIFESTYLE VARIABLES
HOW MANY STANDARD DRINKS CONTAINING ALCOHOL DO YOU HAVE ON A TYPICAL DAY: PATIENT DOES NOT DRINK
HOW OFTEN DO YOU HAVE A DRINK CONTAINING ALCOHOL: NEVER

## 2022-12-08 NOTE — PATIENT INSTRUCTIONS
Learning About Managing Anger  What causes anger? Many things can cause anger: Stress at work or at home. Social situations that make you angry. A response to everyday events. Anger signals your body to prepare for a fight. This reaction is often called \"fight or flight. \" When you get angry, adrenaline and other hormones are released into your blood. Then your blood pressure goes up, your heart beats faster, and you breathe faster. When you express anger in a healthy way, it can inspire change and make you productive. But if you don't have the skills to express anger in a healthy way, anger can build up. You may hurt others--and yourself--emotionally and even physically. Violent behavior often starts with verbal threats or fairly minor incidents. But over time, it can involve physical harm. It can include physical, verbal, or sexual abuse of an intimate partner (domestic violence), a child (child abuse), or an older adult (elder abuse). It can also make you sick. Anger and constant hostility keep your blood pressure high. They increase your chances of having another health problem, such as depression, a heart attack, or a stroke. Some people with post-traumatic stress disorder (PTSD) feel angry and on alert all the time. It may feel like there are no other ways to react when you are angry. But when you learn to work with anger in appropriate and healthy ways, your anger no longer controls you. How can you manage your anger? The first step to managing anger is to be more aware of it. Note the thoughts, feelings, and emotions that you have when you get angry. Practice noticing these signs of anger when you are calm. If you are more aware of the signs of anger, you can take steps to manage it. Here are a few tips: Think before you act. Take time to stop and cool down when you feel yourself getting angry. Count to 10 while you take slow, steady breaths. Practice some other form of mental relaxation.   Learn the feelings that lead to angry outbursts. Anger and hostility may be a symptom of unhappy feelings or depression about your job, your relationship, or other aspects of your personal life. Avoid situations that lead to angry outbursts. If standing in line bothers you, do errands at less busy times. Express anger in a healthy way. You might:  Go for a short walk or jog. Draw, paint, or listen to music to release the anger. Write in a daily journal.  Use \"I\" statements, not \"you\" statements, to discuss your anger. Say \"I don't feel valued when my needs are not being met\" instead of \"You make me mad when you are so inconsiderate. \"  Take care of yourself. Exercise regularly. Eat a variety of healthy foods. Don't skip meals. Try to get 8 hours of sleep each night. Limit your use of alcohol, and don't use drugs. Practice yoga, meditation, or freddy chi to relax. Explore other resources that may be available through your job or your community. Contact your human resources department at work. You might be able to get services through an employee assistance program.  Contact your local hospital, mental health facility, or health department. Ask what types of programs or support groups are available in your area. Do not keep guns in your home. If you must have guns in your home, unload them and lock them up. Lock ammunition in a separate place. Keep guns away from children. Where can you find help? If anger or stress starts to harm your work or personal relationships, you might seek help. You can learn ways to manage your feelings and actions. Talk to someone you trust, or find a counselor. There are groups in your area that can connect you with people to talk to. Behavioral Health Treatment Services . This service from the national Substance Abuse and [address] can help you find local counselors. Search online at MyLabYogi.com. samhsa.gov or call 2-111-478-HELP (4732), or Financial Transaction ServicesRAPHAEL 5-743-512-734-193-5705. Parents Anonymous. Self-help groups that serve parents under stress, as well as children who have been abused, are available throughout the [place], [place] ([facility]), and [facility]. To find a group in your area, search online or in your phone book under Parents Anonymous or call (445) 046-5301. Where can you learn more? Go to https://chpepicewjudd.Kintera. org and sign in to your [employer] Mobile account. Enter L981 in the YouBeQB box to learn more about \"Learning About Managing Anger. \"     If you do not have an account, please click on the \"Sign Up Now\" link. Current as of: February 9, 2022               Content Version: 13.4  © 2006-2022 Healthwise, Yotpo. Care instructions adapted under license by [facility] ([facility]). If you have questions about a medical condition or this instruction, always ask your healthcare professional. [place] [address] any warranty or liability for your use of this information. Learning About Being Active as an Older Adult  Why is being active important as you get older? Being active is one of the best things you can do for your health. And it's never too late to start. Being active--or getting active, if you aren't already--has definite benefits. It can:  Give you more energy,  Keep your mind sharp. Improve balance to reduce your risk of falls. Help you manage chronic illness with fewer medicines. No matter how old you are, how fit you are, or what health problems you have, there is a form of activity that will work for you. And the more physical activity you can do, the better your overall health will be. What kinds of activity can help you stay healthy? Being more active will make your daily activities easier. Physical activity includes planned exercise and things you do in daily life. There are four types of activity:  Aerobic. Doing aerobic activity makes your heart and lungs strong.   Includes walking, dancing, and gardening. Aim for at least 2½ hours spread throughout the week. It improves your energy and can help you sleep better. Muscle-strengthening. This type of activity can help maintain muscle and strengthen bones. Includes climbing stairs, using resistance bands, and lifting or carrying heavy loads. Aim for at least twice a week. It can help protect the knees and other joints. Stretching. Stretching gives you better range of motion in joints and muscles. Includes upper arm stretches, calf stretches, and gentle yoga. Aim for at least twice a week, preferably after your muscles are warmed up from other activities. It can help you function better in daily life. Balancing. This helps you stay coordinated and have good posture. Includes heel-to-toe walking, freddy chi, and certain types of yoga. Aim for at least 3 days a week. It can reduce your risk of falling. Even if you have a hard time meeting the recommendations, it's better to be more active than less active. All activity done in each category counts toward your weekly total. You'd be surprised how daily things like carrying groceries, keeping up with grandchildren, and taking the stairs can add up. What keeps you from being active? If you've had a hard time being more active, you're not alone. Maybe you remember being able to do more. Or maybe you've never thought of yourself as being active. It's frustrating when you can't do the things you want. Being more active can help. What's holding you back? Getting started. Have a goal, but break it into easy tasks. Small steps build into big accomplishments. Staying motivated. If you feel like skipping your activity, remember your goal. Maybe you want to move better and stay independent. Every activity gets you one step closer. Not feeling your best.  Start with 5 minutes of an activity you enjoy. Prove to yourself you can do it. As you get comfortable, increase your time.   You may not be where you want to be. But you're in the process of getting there. Everyone starts somewhere. How can you find safe ways to stay active? Talk with your doctor about any physical challenges you're facing. Make a plan with your doctor if you have a health problem or aren't sure how to get started with activity. If you're already active, ask your doctor if there is anything you should change to stay safe as your body and health change. If you tend to feel dizzy after you take medicine, avoid activity at that time. Try being active before you take your medicine. This will reduce your risk of falls. If you plan to be active at home, make sure to clear your space before you get started. Remove things like TV cords, coffee tables, and throw rugs. It's safest to have plenty of space to move freely. The key to getting more active is to take it slow and steady. Try to improve only a little bit at a time. Pick just one area to improve on at first. And if an activity hurts, stop and talk to your doctor. Where can you learn more? Go to https://CloudFXestela.Ryzing. org and sign in to your cottonTracks account. Enter P600 in the Search Health Information box to learn more about \"Learning About Being Active as an Older Adult. \"     If you do not have an account, please click on the \"Sign Up Now\" link. Current as of: January 26, 2022               Content Version: 13.4  © 3662-2319 Healthwise, NebuAd. Care instructions adapted under license by [facility] ([facility]). If you have questions about a medical condition or this instruction, always ask your healthcare professional. [address] any warranty or liability for your use of this information. Learning About Activities of Daily Living  What are activities of daily living? Activities of daily living (ADLs) are the basic self-care tasks you do every day.  As you age, and if you have health problems, you may find that it's harder to do these things for yourself. That's when you may need some help. Your doctor uses ADLs to measure how much help you need. Knowing what you can and can't do for yourself is an important first step to getting help. And when you have the help you need, you can stay as independent as possible. Your doctor will want to know if you are able to do tasks such as: Take a bath or shower without help. Go to the bathroom by yourself. Dress and undress without help. Shave, comb your hair, and brush teeth on your own. Get in and out of bed or a chair without help. Feed yourself without help. If you are having trouble doing basic self-care tasks, talk with your doctor. You may want to bring a caregiver or family member who can help the doctor understand your needs and abilities. How will a doctor assess your ADLs? Asking about ADLs is part of a routine health checkup your doctor will likely do as you age. Your health check might be done in a doctor's office, in your home, or at a hospital. The goal is to find out if you are having any problems that could make your health problems worse or that make it unsafe for you to be on your own. To measure your ADLs, your doctor will ask how hard it is for you to do routine tasks. He or she may also want to know if you have changed the way you do a task because of a health problem. He or she may watch how you:  Walk back and forth. Keep your balance while you stand or walk. Move from sitting to standing or from a bed to a chair. Button or unbutton a shirt or sweater. Remove and put on your shoes. It's normal to feel a little worried or anxious if you find you can't do all the things you used to be able to do. Talking with your doctor about ADLs isn't a test that you either pass or fail. It's just a way to get more information about your health and safety. Follow-up care is a key part of your treatment and safety.  Be sure to make and go to all appointments, and call your doctor if you are having problems. It's also a good idea to know your test results and keep a list of the medicines you take. Current as of: October 6, 2021               Content Version: 13.4  © 2006-2022 Healthwise, Cinepapaya. Care instructions adapted under license by [facility] ([facility]). If you have questions about a medical condition or this instruction, always ask your healthcare professional. [address] any warranty or liability for your use of this information. Advance Directives: Care Instructions  Overview  An advance directive is a legal way to state your wishes at the end of your life. It tells your family and your doctor what to do if you can't say what you want. There are two main types of advance directives. You can change them any time your wishes change. Living will. This form tells your family and your doctor your wishes about life support and other treatment. The form is also called a declaration. Medical power of . This form lets you name a person to make treatment decisions for you when you can't speak for yourself. This person is called a health care agent (health care proxy, health care surrogate). The form is also called a durable power of  for health care. If you do not have an advance directive, decisions about your medical care may be made by a family member, or by a doctor or a  who doesn't know you. It may help to think of an advance directive as a gift to the people who care for you. If you have one, they won't have to make tough decisions by themselves. Follow-up care is a key part of your treatment and safety. Be sure to make and go to all appointments, and call your doctor if you are having problems. It's also a good idea to know your test results and keep a list of the medicines you take. What should you include in an advance directive? Many states have a unique advance directive form.  (It may ask you to address specific issues.) Or you might use a universal form that's approved by many states. If your form doesn't tell you what to address, it may be hard to know what to include in your advance directive. Use the questions below to help you get started. Who do you want to make decisions about your medical care if you are not able to? What life-support measures do you want if you have a serious illness that gets worse over time or can't be cured? What are you most afraid of that might happen? (Maybe you're afraid of having pain, losing your independence, or being kept alive by machines.)  Where would you prefer to die? (Your home? A hospital? A nursing home?)  Do you want to donate your organs when you die? Do you want certain Yazidi practices performed before you die? When should you call for help? Be sure to contact your doctor if you have any questions. Where can you learn more? Go to https://Maxeler TechnologiespeOutdoor Water Solutions.Tonchidot. org and sign in to your FreshPay account. Enter R264 in the NEOS GeoSolutions box to learn more about \"Advance Directives: Care Instructions. \"     If you do not have an account, please click on the \"Sign Up Now\" link. Current as of: June 16, 2022               Content Version: 13.4  © 1772-4061 Healthwise, Incorporated. Care instructions adapted under license by [facility] ([facility]). If you have questions about a medical condition or this instruction, always ask your healthcare professional. [place] [address] any warranty or liability for your use of this information. Personalized Preventive Plan for Ariadne Dangelo - 12/8/2022  Medicare offers a range of preventive health benefits. Some of the tests and screenings are paid in full while other may be subject to a deductible, co-insurance, and/or copay.     Some of these benefits include a comprehensive review of your medical history including lifestyle, illnesses that may run in your family, and various assessments and screenings as appropriate. After reviewing your medical record and screening and assessments performed today your provider may have ordered immunizations, labs, imaging, and/or referrals for you. A list of these orders (if applicable) as well as your Preventive Care list are included within your After Visit Summary for your review. Other Preventive Recommendations:    A preventive eye exam performed by an eye specialist is recommended every 1-2 years to screen for glaucoma; cataracts, macular degeneration, and other eye disorders. A preventive dental visit is recommended every 6 months. Try to get at least 150 minutes of exercise per week or 10,000 steps per day on a pedometer . Order or download the FREE \"Exercise & Physical Activity: Your Everyday Guide\" from [employer]. Call 0-466.275.4945 or search [employer] online. You need 8095-7591 mg of calcium and 9120-4264 IU of vitamin D per day. It is possible to meet your calcium requirement with diet alone, but a vitamin D supplement is usually necessary to meet this goal.  When exposed to the sun, use a sunscreen that protects against both UVA and UVB radiation with an SPF of 30 or greater. Reapply every 2 to 3 hours or after sweating, drying off with a towel, or swimming. Always wear a seat belt when traveling in a car. Always wear a helmet when riding a bicycle or motorcycle.

## 2022-12-08 NOTE — PROGRESS NOTES
Medicare Annual Wellness Visit    Connye Buerger is here for Medicare AWV    Assessment & Plan   Medicare annual wellness visit, subsequent  Allergic rhinitis, unspecified seasonality, unspecified trigger  -     fluticasone (FLONASE) 50 MCG/ACT nasal spray; 1 spray by Each Nostril route daily, Disp-32 g, R-3Normal  Infection of left earlobe  -     bacitracin-polymyxin b (POLYSPORIN) [id number] UNIT/GM ointment; Apply topically daily to pimple in the ear, Disp-15 g, R-1, Normal  Primary hypertension  -     Comprehensive Metabolic Panel; Future  -     Lipid Panel; Future  -     CBC with Auto Differential; Future  -     TSH; Future  Anxiety neurosis  -     diazePAM (VALIUM) 2 MG tablet; Take 1 tablet by mouth nightly as needed for Anxiety for up to 30 days. , Disp-30 tablet, R-0Normal  Pre-diabetes  -     Hemoglobin A1C; Future  -     Microalbumin / Creatinine Urine Ratio; Future    Recommendations for Preventive Services Due: see orders and patient instructions/AVS.  Recommended screening schedule for the next 5-10 years is provided to the patient in written form: see Patient Instructions/AVS.     Return in 2 months (on 2/8/2023) for Medicare Annual Wellness Visit in 1 year. Subjective   The following acute and/or chronic problems were also addressed today:  Complaint of pain in the lower back from carrying boxes when she was moving  The left ear is hurting  The back of the head is painfull    Patient's complete Health Risk Assessment and screening values have been reviewed and are found in Flowsheets. The following problems were reviewed today and where indicated follow up appointments were made and/or referrals ordered.     Positive Risk Factor Screenings with Interventions:               General HRA Questions:  Select all that apply: (!) New or Increased Pain, Anger    Pain Interventions:  Patient states her anger is going to improve since she moved to her own place and out of her daughters place, this was agitating her and now she is feeling much better. See AVS for additional education material  See A/P for any pertinent orders    Anger Interventions:  Patient declined any further interventions or treatment  See A/P for any pertinent orders       Weight and Activity:  Physical Activity: Inactive    Days of Exercise per Week: 0 days    Minutes of Exercise per Session: 0 min     On average, how many days per week do you engage in moderate to strenuous exercise (like a brisk walk)?: 0 days  Have you lost any weight without trying in the past 3 months?: No  Body mass index: 23.59    Unintentional Weight Loss Interventions:  Patient declined any further interventions or treatment  See A/P for any pertinent orders  Inactivity Interventions:  Patient comments: The patient will increase her walking  See AVS for additional education material  See A/P for any pertinent orders          ADL's:   Patient reports needing help with:  Select all that apply: [employer], Shopping  Interventions:  Patient comments: Patient indicates she has family that would help her with transportation and declines any additional help. See AVS for additional education material  See A/P for any pertinent orders                    Objective   Vitals:    12/08/22 1434   BP: 126/78   Pulse: 81   Resp: 18   Temp: 96.9 °F (36.1 °C)   TempSrc: Temporal   SpO2: 98%   Weight: 129 lb (58.5 kg)   Height: 5' 2\" (1.575 m)      Body mass index is 23.59 kg/m².       Physical Exam  General Appearance: alert and oriented to person, place and time, well developed and well- nourished, in no acute distress  Skin: warm and dry, no rash or erythema  Head: normocephalic and atraumatic  Eyes: pupils equal, round, and reactive to light, extraocular eye movements intact, conjunctivae normal  Neck: supple and non-tender without mass, no thyromegaly or thyroid nodules, no cervical lymphadenopathy  Pulmonary/Chest: clear to auscultation bilaterally- no wheezes, rales or rhonchi, normal air movement, no respiratory distress  Cardiovascular: normal rate, regular rhythm, normal S1 and S2, no murmurs, rubs, clicks, or gallops, distal pulses intact, no carotid bruits  Abdomen: soft, non-tender, non-distended, normal bowel sounds, no masses or organomegaly  Extremities: no cyanosis, clubbing or edema  Musculoskeletal: normal range of motion, no joint swelling, the patient is having mild discomfort in the paraspinal lumbar muscles with palpation. Negative leg lift bilaterally. Allergies   Allergen Reactions    Lorazepam Photosensitivity     Unable to remember reaction state's it's too long ago    Penicillins Other (See Comments)     States it was paranoia. Prior to Visit Medications    Medication Sig Taking? Authorizing Provider   fluticasone (FLONASE) 50 MCG/ACT nasal spray 1 spray by Each Nostril route daily Yes Abena Easton MD   diazePAM (VALIUM) 2 MG tablet Take 1 tablet by mouth nightly as needed for Anxiety for up to 30 days. Yes Charles Shetty MD   Pramoxine-HC-Chloroxylenol (OTICIN HC NR) 10-10-1 MG/ML SOLN Place 1 drop in ear(s) every 2 hours as needed (Ear pain) Yes Abena Easton MD   ezetimibe (ZETIA) 10 MG tablet Take 1 tablet by mouth daily Yes Charles Shetty MD   aspirin 81 MG chewable tablet Take 1 tablet by mouth daily Yes Charles Shetty MD   amLODIPine (NORVASC) 2.5 MG tablet Take 1 tablet by mouth daily Yes Charles Shetty MD   Biotin 1000 MCG TABS  Yes Historical Provider, MD   thiamine 100 MG tablet take 1 tablet by mouth once daily Yes Historical Provider, MD   meloxicam (MOBIC) 7.5 MG tablet Take 1 tablet by mouth daily Yes Charles Shetty MD   baclofen (LIORESAL) 10 MG tablet Take 1 tablet by mouth nightly Yes Abena Easton MD   lidocaine (XYLOCAINE) 5 % ointment Apply topically as needed.  Yes Charles Shetty MD   diclofenac sodium (VOLTAREN) 1 % GEL Apply 2 g topically 4 times daily Yes Charles Shetty MD   acetaminophen (TYLENOL) 500 MG tablet Take 1 tablet by mouth 4 times daily as needed for Pain Yes Shirley Medina MD       CareTeam (Including outside providers/suppliers regularly involved in providing care):   Patient Care Team:  Makenzie Bearden MD as PCP - Loyda Gutierrez MD as PCP - [facility] Empaneled Provider     Reviewed and updated this visit:  Tobacco  Allergies  Meds  Med Hx  Surg Hx  Soc Hx  Fam Hx

## 2022-12-08 NOTE — ACP (ADVANCE CARE PLANNING)
Advance Care Planning     Advance Care Planning (ACP) Physician/NP/PA Conversation    Date of Conversation: 12/8/2022  Conducted with: Patient with Decision Making Capacity    Healthcare Decision Maker:      Primary Decision Maker: Robinson Mendoza - Daughter-in-Law - [phone]    Secondary Decision Maker: Gus Rucker - Child - [phone]    Click here to complete Healthcare Decision Makers including selection of the Healthcare Decision Maker Relationship (ie \"Primary\")  Today we documented Decision Maker(s) consistent with Legal Next of Kin hierarchy. Care Preferences:    Hospitalization: \"If your health worsens and it becomes clear that your chance of recovery is unlikely, what would be your preference regarding hospitalization? \"  The patient would prefer hospitalization. Ventilation: \"If you were unable to breath on your own and your chance of recovery was unlikely, what would be your preference about the use of a ventilator (breathing machine) if it was available to you? \"  The patient would desire the use of a ventilator. Resuscitation: \"In the event your heart stopped as a result of an underlying serious health condition, would you want attempts made to restart your heart, or would you prefer a natural death? \"  Yes, attempt to resuscitate.     treatment goals, benefit/burden of treatment options, artificial nutrition, ventilation preferences, hospitalization preferences, resuscitation preferences, end of life care preferences (vegetative state/imminent death), hospice care, and opted for full code ,she states No life support if vegetative state    Conversation Outcomes / Follow-Up Plan:  ACP in process - information provided, considering goals and options  Reviewed DNR/DNI and patient elects Full Code (Attempt Resuscitation)    Length of Voluntary ACP Conversation in minutes:  16 minutes    Sally Rosenthal MD

## 2022-12-28 ENCOUNTER — TELEPHONE (OUTPATIENT)
Dept: PRIMARY CARE CLINIC | Age: 87
End: 2022-12-28

## 2022-12-28 NOTE — TELEPHONE ENCOUNTER
Pt called ,  where pt lives faxed over paperwork twice for Dr to fill out and needs faxed back so pt can give daughter a benton     Per pt dr just needs to stated ok    Pt also wanted me to note that when she does a little bit of activity in kitchen she feels light headed ( opening a can or cutting potatoes) while statnding

## 2023-01-03 ENCOUNTER — TELEPHONE (OUTPATIENT)
Dept: PRIMARY CARE CLINIC | Age: 88
End: 2023-01-03

## 2023-01-03 NOTE — TELEPHONE ENCOUNTER
----- Message from Freedom Mohr sent at 1/3/2023  1:33 PM EST -----  Subject: Message to Provider    QUESTIONS  Information for Provider? Patient stated that the nurse was to call her   back but she is leaving the house so if the nurse can please give her a   call back on her cell #:[phone]  ---------------------------------------------------------------------------  --------------  [address] [employer] [facility]  [phone]; OK to leave message on voicemail  ---------------------------------------------------------------------------  --------------  SCRIPT ANSWERS  Relationship to Patient?  Self

## 2023-01-03 NOTE — TELEPHONE ENCOUNTER
Patient states that she is experiencing chest pain due to moving boxes over the weekend. She states her \"heart hurts\". I advised patient to go to ED asap and she refuses. She wants doctor to order a CT scan to get done at [facility] today.  Please advise

## 2023-01-05 ENCOUNTER — OFFICE VISIT (OUTPATIENT)
Dept: PRIMARY CARE CLINIC | Age: 88
End: 2023-01-05
Payer: MEDICARE

## 2023-01-05 VITALS
RESPIRATION RATE: 17 BRPM | SYSTOLIC BLOOD PRESSURE: 110 MMHG | WEIGHT: 129 LBS | BODY MASS INDEX: 23.74 KG/M2 | DIASTOLIC BLOOD PRESSURE: 60 MMHG | OXYGEN SATURATION: 97 % | TEMPERATURE: 96.8 F | HEART RATE: 76 BPM | HEIGHT: 62 IN

## 2023-01-05 DIAGNOSIS — R63.4 WEIGHT LOSS: ICD-10-CM

## 2023-01-05 DIAGNOSIS — I10 PRIMARY HYPERTENSION: ICD-10-CM

## 2023-01-05 DIAGNOSIS — R05.9 COUGH: ICD-10-CM

## 2023-01-05 DIAGNOSIS — E78.5 HYPERLIPIDEMIA, UNSPECIFIED HYPERLIPIDEMIA TYPE: ICD-10-CM

## 2023-01-05 DIAGNOSIS — M94.0 COSTOCHONDRITIS: ICD-10-CM

## 2023-01-05 DIAGNOSIS — R73.03 PRE-DIABETES: ICD-10-CM

## 2023-01-05 DIAGNOSIS — J40 BRONCHITIS: ICD-10-CM

## 2023-01-05 DIAGNOSIS — S20.212A CONTUSION OF LEFT CHEST WALL, INITIAL ENCOUNTER: Primary | ICD-10-CM

## 2023-01-05 DIAGNOSIS — Z86.73 HISTORY OF ISCHEMIC STROKE: ICD-10-CM

## 2023-01-05 LAB
ALBUMIN SERPL-MCNC: 4.1 G/DL (ref 3.5–5.2)
ALP BLD-CCNC: 79 U/L (ref 35–104)
ALT SERPL-CCNC: <5 U/L (ref 0–32)
ANION GAP SERPL CALCULATED.3IONS-SCNC: 10 MMOL/L (ref 7–16)
AST SERPL-CCNC: 11 U/L (ref 0–31)
BASOPHILS ABSOLUTE: 0.02 E9/L (ref 0–0.2)
BASOPHILS RELATIVE PERCENT: 0.5 % (ref 0–2)
BILIRUB SERPL-MCNC: 0.4 MG/DL (ref 0–1.2)
BUN BLDV-MCNC: 20 MG/DL (ref 6–23)
CALCIUM SERPL-MCNC: 9.2 MG/DL (ref 8.6–10.2)
CHLORIDE BLD-SCNC: 106 MMOL/L (ref 98–107)
CHOLESTEROL, TOTAL: 171 MG/DL (ref 0–199)
CO2: 28 MMOL/L (ref 22–29)
CREAT SERPL-MCNC: 0.7 MG/DL (ref 0.5–1)
CREATININE URINE: 249 MG/DL (ref 29–226)
EOSINOPHILS ABSOLUTE: 0.04 E9/L (ref 0.05–0.5)
EOSINOPHILS RELATIVE PERCENT: 1 % (ref 0–6)
GFR SERPL CREATININE-BSD FRML MDRD: >60 ML/MIN/1.73
GLUCOSE BLD-MCNC: 96 MG/DL (ref 74–99)
HBA1C MFR BLD: 6 % (ref 4–5.6)
HCT VFR BLD CALC: 37.1 % (ref 34–48)
HDLC SERPL-MCNC: 43 MG/DL
HEMOGLOBIN: 12.3 G/DL (ref 11.5–15.5)
IMMATURE GRANULOCYTES #: 0.01 E9/L
IMMATURE GRANULOCYTES %: 0.2 % (ref 0–5)
LDL CHOLESTEROL CALCULATED: 111 MG/DL (ref 0–99)
LYMPHOCYTES ABSOLUTE: 1.35 E9/L (ref 1.5–4)
LYMPHOCYTES RELATIVE PERCENT: 32.9 % (ref 20–42)
MCH RBC QN AUTO: 32.8 PG (ref 26–35)
MCHC RBC AUTO-ENTMCNC: 33.2 % (ref 32–34.5)
MCV RBC AUTO: 98.9 FL (ref 80–99.9)
MICROALBUMIN UR-MCNC: 34.3 MG/L
MICROALBUMIN/CREAT UR-RTO: 13.8 (ref 0–30)
MONOCYTES ABSOLUTE: 0.47 E9/L (ref 0.1–0.95)
MONOCYTES RELATIVE PERCENT: 11.5 % (ref 2–12)
NEUTROPHILS ABSOLUTE: 2.21 E9/L (ref 1.8–7.3)
NEUTROPHILS RELATIVE PERCENT: 53.9 % (ref 43–80)
PDW BLD-RTO: 13.8 FL (ref 11.5–15)
PLATELET # BLD: 286 E9/L (ref 130–450)
PMV BLD AUTO: 10.8 FL (ref 7–12)
POTASSIUM SERPL-SCNC: 4.2 MMOL/L (ref 3.5–5)
PREALBUMIN: 22 MG/DL (ref 20–40)
PRO-BNP: 135 PG/ML (ref 0–450)
RBC # BLD: 3.75 E12/L (ref 3.5–5.5)
SODIUM BLD-SCNC: 144 MMOL/L (ref 132–146)
TOTAL PROTEIN: 6.7 G/DL (ref 6.4–8.3)
TRIGL SERPL-MCNC: 84 MG/DL (ref 0–149)
TSH SERPL DL<=0.05 MIU/L-ACNC: 1.43 UIU/ML (ref 0.27–4.2)
VLDLC SERPL CALC-MCNC: 17 MG/DL
WBC # BLD: 4.1 E9/L (ref 4.5–11.5)

## 2023-01-05 PROCEDURE — 3074F SYST BP LT 130 MM HG: CPT | Performed by: INTERNAL MEDICINE

## 2023-01-05 PROCEDURE — G8427 DOCREV CUR MEDS BY ELIG CLIN: HCPCS | Performed by: INTERNAL MEDICINE

## 2023-01-05 PROCEDURE — G8400 PT W/DXA NO RESULTS DOC: HCPCS | Performed by: INTERNAL MEDICINE

## 2023-01-05 PROCEDURE — G8420 CALC BMI NORM PARAMETERS: HCPCS | Performed by: INTERNAL MEDICINE

## 2023-01-05 PROCEDURE — 3078F DIAST BP <80 MM HG: CPT | Performed by: INTERNAL MEDICINE

## 2023-01-05 PROCEDURE — 1036F TOBACCO NON-USER: CPT | Performed by: INTERNAL MEDICINE

## 2023-01-05 PROCEDURE — 99214 OFFICE O/P EST MOD 30 MIN: CPT | Performed by: INTERNAL MEDICINE

## 2023-01-05 PROCEDURE — 1090F PRES/ABSN URINE INCON ASSESS: CPT | Performed by: INTERNAL MEDICINE

## 2023-01-05 PROCEDURE — 1123F ACP DISCUSS/DSCN MKR DOCD: CPT | Performed by: INTERNAL MEDICINE

## 2023-01-05 PROCEDURE — G8484 FLU IMMUNIZE NO ADMIN: HCPCS | Performed by: INTERNAL MEDICINE

## 2023-01-05 ASSESSMENT — PATIENT HEALTH QUESTIONNAIRE - PHQ9
SUM OF ALL RESPONSES TO PHQ QUESTIONS 1-9: 0
1. LITTLE INTEREST OR PLEASURE IN DOING THINGS: 0
SUM OF ALL RESPONSES TO PHQ9 QUESTIONS 1 & 2: 0
SUM OF ALL RESPONSES TO PHQ QUESTIONS 1-9: 0
SUM OF ALL RESPONSES TO PHQ QUESTIONS 1-9: 0
2. FEELING DOWN, DEPRESSED OR HOPELESS: 0
SUM OF ALL RESPONSES TO PHQ QUESTIONS 1-9: 0

## 2023-01-05 NOTE — PROGRESS NOTES
HPI:  Patient comes in today for complaint of pain in her chest patient states she moved a lot of boxes as she was moving to her new apartment she is requiring some forms to be filled for the . Patient is feeling fine denies she is able to do a lot of things and to feel happy she is friends with all her neighbors in the apartment building. .    Review of Systems   Constitutional:  Negative for chills, fever and unexpected weight change. HENT:  Negative for postnasal drip, sore throat and voice change. Respiratory:  Negative for chest tightness, shortness of breath and wheezing. Cardiovascular:  Positive for chest pain. Negative for palpitations and leg swelling. Gastrointestinal:  Negative for abdominal pain, nausea and vomiting. Musculoskeletal:  Negative for gait problem and joint swelling. Skin:  Negative for rash and wound. Neurological: Negative. Psychiatric/Behavioral: Negative.         Past Medical History:   Diagnosis Date    Abnormal weight loss     Anxiety     Anxiety disorder     Cerebral infarction, unspecified ([facility] [address]     Essential (primary) hypertension     Gastric reflux     Hyperlipidemia     Hypertension     Occlusion and stenosis of right carotid artery     Slurred speech      Past Surgical History:   Procedure Laterality Date    CAROTID ENDARTERECTOMY Right 11/27/2020    RIGHT CAROTID ENDARTERECTOMY performed by Sherren Milling, MD at [address] [place] [address] (CERVIX STATUS UNKNOWN)      LITHOTRIPSY Left 2/7/2020    CYSTOSCOPY RETROGRADE PYELOGRAM LEFT URETEROSCOPY LEFT J STENT LASER LITHOTRIPSY performed by Julius Mendez DO at [facility] OR     Family History   Problem Relation Age of Onset    High Blood Pressure Mother     Other Father         Emphysema    Cancer Brother         Leukemia    Cancer Brother       Social History     Tobacco Use    Smoking status: Never    Smokeless tobacco: Never   Vaping Use    Vaping Use: Never used   Substance Use Topics    Alcohol use: No    Drug use: No        Current Outpatient Medications on File Prior to Visit   Medication Sig Dispense Refill    fluticasone (FLONASE) 50 MCG/ACT nasal spray 1 spray by Each Nostril route daily 32 g 3    Pramoxine-HC-Chloroxylenol (OTICIN HC NR) 10-10-1 MG/ML SOLN Place 1 drop in ear(s) every 2 hours as needed (Ear pain) 10 mL 1    ezetimibe (ZETIA) 10 MG tablet Take 1 tablet by mouth daily 90 tablet 3    aspirin 81 MG chewable tablet Take 1 tablet by mouth daily 90 tablet 3    amLODIPine (NORVASC) 2.5 MG tablet Take 1 tablet by mouth daily 90 tablet 3    Biotin 1000 MCG TABS       thiamine 100 MG tablet take 1 tablet by mouth once daily      meloxicam (MOBIC) 7.5 MG tablet Take 1 tablet by mouth daily 90 tablet 1    baclofen (LIORESAL) 10 MG tablet Take 1 tablet by mouth nightly 30 tablet 1    acetaminophen (TYLENOL) 500 MG tablet Take 1 tablet by mouth 4 times daily as needed for Pain 120 tablet 0     No current facility-administered medications on file prior to visit. PE:  VS:  /60   Pulse 76   Temp 96.8 °F (36 °C)   Resp 17   Ht 5' 2\" (1.575 m)   Wt 129 lb (58.5 kg)   LMP  (LMP Unknown)   SpO2 97%   BMI 23.59 kg/m²   General:  Alert and oriented, NAD  HEENT: Ear auricles are normal, EOMI, Conjunctivae clear  NECK:  Supple without adenopathy or thyromegaly, no carotid bruits. LUNGS:  CTA all fields, symmetric expansion bilaterally. Tenderness over the chest wall and the sternal area with palpation, duplicates patient discomfort. HEART:  RRR without M, R, or G, no precordial heave. ABDOMEN:  Soft and nontender without palpable abnormalities, No renal or aortic bruits. EXTREMITIES: No ankle edema bilaterally. Neuro: No focal deficit.     Lab Results   Component Value Date    WBC 4.1 (L) 01/05/2023    HGB 12.3 01/05/2023    HCT 37.1 01/05/2023     01/05/2023    CHOL 171 01/05/2023    TRIG 84 01/05/2023    HDL 43 01/05/2023    LDLCALC 111 (H) 01/05/2023 ALT <5 01/05/2023    AST 11 01/05/2023     01/05/2023    K 4.2 01/05/2023     01/05/2023    CREATININE 0.7 01/05/2023    BUN 20 01/05/2023    LABGLOM >60 01/05/2023    GFRAA >60 09/06/2022    CO2 28 01/05/2023    TSH 1.430 01/05/2023    INR 1.1 06/10/2021    GLUCOSE 96 01/05/2023    LABA1C 6.0 (H) 01/05/2023    LABMICR 34.3 (H) 01/05/2023    LABCREA 249 (H) 01/05/2023    MALBCR 13.8 01/05/2023         Impression/Plan:    1. Contusion of left chest wall, initial encounter  Comments:  Short course of meloxicam patient to take with food and 8 ounce water, adverse effects of NSAIDs reviewed. Orders:  -     XR RIBS LEFT (2 VIEWS); Future  2. Costochondritis  -     XR RIBS LEFT (2 VIEWS); Future  3. Pre-diabetes  Comments:  Avoiding free sugars and decreasing starches and increasing activity discussed with the patient. A1c 6.0 in the prediabetes level  4. Primary hypertension  Comments:  Blood pressure at goal patient to continue amlodipine 2.5 mg daily. 5. Hyperlipidemia, unspecified hyperlipidemia type  Comments:  Discussed with the patient low-fat diet,  did not follow a low-fat during the holidays.   6. History of ischemic stroke  Comments:  Patient to continue 81 mg aspirin daily

## 2023-01-09 ENCOUNTER — TELEPHONE (OUTPATIENT)
Dept: PRIMARY CARE CLINIC | Age: 88
End: 2023-01-09

## 2023-01-09 NOTE — TELEPHONE ENCOUNTER
Patient asking if you could the cream for pain and also something for her hair issue she spoke with you about at her last visit.

## 2023-01-11 RX ORDER — BENZONATATE 100 MG/1
100-200 CAPSULE ORAL 3 TIMES DAILY PRN
Qty: 45 CAPSULE | Refills: 1 | Status: SHIPPED | OUTPATIENT
Start: 2023-01-11 | End: 2023-01-26

## 2023-01-11 NOTE — TELEPHONE ENCOUNTER
Last Appointment:  1/5/2023  Future Appointments   Date Time Provider Edison De Leon   1/19/2023  2:30 PM Sharalyn Cowden, MD [address]

## 2023-01-12 ASSESSMENT — ENCOUNTER SYMPTOMS
VOICE CHANGE: 0
SORE THROAT: 0
WHEEZING: 0
ABDOMINAL PAIN: 0
VOMITING: 0
SHORTNESS OF BREATH: 0
CHEST TIGHTNESS: 0
NAUSEA: 0

## 2023-02-21 ENCOUNTER — OFFICE VISIT (OUTPATIENT)
Dept: FAMILY MEDICINE CLINIC | Age: 88
End: 2023-02-21
Payer: MEDICARE

## 2023-02-21 VITALS
DIASTOLIC BLOOD PRESSURE: 70 MMHG | HEART RATE: 84 BPM | OXYGEN SATURATION: 97 % | RESPIRATION RATE: 20 BRPM | TEMPERATURE: 96.9 F | HEIGHT: 62 IN | SYSTOLIC BLOOD PRESSURE: 124 MMHG | BODY MASS INDEX: 24.11 KG/M2 | WEIGHT: 131 LBS

## 2023-02-21 DIAGNOSIS — R42 LIGHTHEADED: Primary | ICD-10-CM

## 2023-02-21 PROCEDURE — 99214 OFFICE O/P EST MOD 30 MIN: CPT | Performed by: PHYSICIAN ASSISTANT

## 2023-02-21 PROCEDURE — 1090F PRES/ABSN URINE INCON ASSESS: CPT | Performed by: PHYSICIAN ASSISTANT

## 2023-02-21 PROCEDURE — 3078F DIAST BP <80 MM HG: CPT | Performed by: PHYSICIAN ASSISTANT

## 2023-02-21 PROCEDURE — G8420 CALC BMI NORM PARAMETERS: HCPCS | Performed by: PHYSICIAN ASSISTANT

## 2023-02-21 PROCEDURE — 1123F ACP DISCUSS/DSCN MKR DOCD: CPT | Performed by: PHYSICIAN ASSISTANT

## 2023-02-21 PROCEDURE — 1036F TOBACCO NON-USER: CPT | Performed by: PHYSICIAN ASSISTANT

## 2023-02-21 PROCEDURE — G8484 FLU IMMUNIZE NO ADMIN: HCPCS | Performed by: PHYSICIAN ASSISTANT

## 2023-02-21 PROCEDURE — G8400 PT W/DXA NO RESULTS DOC: HCPCS | Performed by: PHYSICIAN ASSISTANT

## 2023-02-21 PROCEDURE — 3074F SYST BP LT 130 MM HG: CPT | Performed by: PHYSICIAN ASSISTANT

## 2023-02-21 PROCEDURE — G8427 DOCREV CUR MEDS BY ELIG CLIN: HCPCS | Performed by: PHYSICIAN ASSISTANT

## 2023-02-21 NOTE — PROGRESS NOTES
23  Lindsay Oquendo : 1935 Sex: female  Age 82 y.o. Subjective:  Chief Complaint   Patient presents with    Dizziness     Feeling light headed         HPI:   Lindsay Oquendo , 82 y.o. female presents to express care for evaluation of lightheaded     HPI  61-year-old female with a history of CVA, hypertension, hyperlipidemia presents to express care for evaluation of blood pressure check. The patient states that she wants her blood pressure checked because she had felt lightheaded earlier today. The patient states that the lightheadedness is gone. She never had any chest pain, shortness of breath. The patient thought that it may be because her blood pressure was elevated. The patient was able to go shopping. She actually has groceries in the car and only wanted her blood pressure checked. The patient has not any fever, chills. No upper respiratory symptoms. No urinary symptoms. ROS:   Unless otherwise stated in this report the patient's positive and negative responses for review of systems for constitutional, eyes, ENT, cardiovascular, respiratory, gastrointestinal, neurological, , musculoskeletal, and integument systems and related systems to the presenting problem are either stated in the history of present illness or were not pertinent or were negative for the symptoms and/or complaints related to the presenting medical problem. Positives and pertinent negatives as per HPI. All others reviewed and are negative.       PMH:     Past Medical History:   Diagnosis Date    Abnormal weight loss     Anxiety     Anxiety disorder     Cerebral infarction, unspecified ([facility] [address]     Essential (primary) hypertension     Gastric reflux     Hyperlipidemia     Hypertension     Occlusion and stenosis of right carotid artery     Slurred speech        Past Surgical History:   Procedure Laterality Date    CAROTID ENDARTERECTOMY Right 2020    RIGHT CAROTID ENDARTERECTOMY performed by Lis Mon Kenyon Lopez MD at [facility] (CERVIX STATUS UNKNOWN)      LITHOTRIPSY Left 2/7/2020    CYSTOSCOPY RETROGRADE PYELOGRAM LEFT URETEROSCOPY LEFT J STENT LASER LITHOTRIPSY performed by Heidi Mendez DO at [facility] OR       Family History   Problem Relation Age of Onset    High Blood Pressure Mother     Other Father         Emphysema    Cancer Brother         Leukemia    Cancer Brother        Medications:     Current Outpatient Medications:     fluticasone (FLONASE) 50 MCG/ACT nasal spray, 1 spray by Each Nostril route daily, Disp: 32 g, Rfl: 3    Pramoxine-HC-Chloroxylenol (OTICIN HC NR) 10-10-1 MG/ML SOLN, Place 1 drop in ear(s) every 2 hours as needed (Ear pain), Disp: 10 mL, Rfl: 1    ezetimibe (ZETIA) 10 MG tablet, Take 1 tablet by mouth daily, Disp: 90 tablet, Rfl: 3    aspirin 81 MG chewable tablet, Take 1 tablet by mouth daily, Disp: 90 tablet, Rfl: 3    amLODIPine (NORVASC) 2.5 MG tablet, Take 1 tablet by mouth daily, Disp: 90 tablet, Rfl: 3    Biotin 1000 MCG TABS, , Disp: , Rfl:     thiamine 100 MG tablet, take 1 tablet by mouth once daily, Disp: , Rfl:     meloxicam (MOBIC) 7.5 MG tablet, Take 1 tablet by mouth daily, Disp: 90 tablet, Rfl: 1    baclofen (LIORESAL) 10 MG tablet, Take 1 tablet by mouth nightly, Disp: 30 tablet, Rfl: 1    acetaminophen (TYLENOL) 500 MG tablet, Take 1 tablet by mouth 4 times daily as needed for Pain, Disp: 120 tablet, Rfl: 0    Allergies: Allergies   Allergen Reactions    Lorazepam Photosensitivity     Unable to remember reaction state's it's too long ago    Penicillins Other (See Comments)     States it was paranoia. Social History:     Social History     Tobacco Use    Smoking status: Never    Smokeless tobacco: Never   Vaping Use    Vaping Use: Never used   Substance Use Topics    Alcohol use: No    Drug use: No       Patient lives at home.     Physical Exam:     Vitals:    02/21/23 1632   BP: 124/70   Site: Right Upper Arm   Position: Sitting Cuff Size: Medium Adult   Pulse: 84   Resp: 20   Temp: 96.9 °F (36.1 °C)   TempSrc: Temporal   SpO2: 97%   Weight: 131 lb (59.4 kg)   Height: 5' 2\" (1.575 m)       Exam:  Physical Exam  Nurses note and vital signs reviewed and patient is not hypoxic. General: The patient appears well and in no apparent distress. Patient is resting comfortably on cart. Skin: Warm, dry, no pallor noted. There is no rash noted. Head: Normocephalic, atraumatic. Eye: Normal conjunctiva  Ears, Nose, Mouth, and Throat: Oral mucosa is moist  Cardiovascular: Regular Rate and Rhythm  Respiratory: Patient is in no distress, no accessory muscle use, lungs are clear to auscultation, no wheezing, crackles or rhonchi  Back: Non-tender, no CVA tenderness bilaterally to percussion. GI: Normal bowel sounds, no tenderness to palpation, no masses appreciated. No rebound, guarding, or rigidity noted. Musculoskeletal: The patient has no evidence of calf tenderness, no pitting edema, symmetrical pulses noted bilaterally  Neurological: A&O x4, normal speech        Testing:           Medical Decision Making:     Vital signs reviewed    Past medical history reviewed. Allergies reviewed. Medications reviewed. Patient on arrival does not appear to be in any apparent distress or discomfort. The patient has been seen and evaluated. The patient does not appear to be toxic or lethargic. The patient had relatively benign physical exam.  The patient does appear well. Vital signs are stable. Blood pressure is normal at 124/70. The patient does not appear to be toxic or lethargic. I would like to obtain laboratory studies, EKG. I offered evaluation in the emergency department. I had offered evaluation here in the office with all of these. The patient is declining. Patient states that she only wanted a blood pressure check. The patient wants to follow-up with her PCP. The patient is currently asymptomatic.     However, we cannot rule out serious etiologies without doing any further testing. The patient understands this and still adamantly refuses    The patient is to return to express care or go directly to the emergency department should any of the signs or symptoms worsen. The patient is to followup with primary care physician in 2-3 days for repeat evaluation. The patient has no other questions or concerns at this time the patient will be discharged home. Clinical Impression:   Verner Conquest was seen today for dizziness. Diagnoses and all orders for this visit:    Lightheaded      The patient is to call for any concerns or return if any of the signs or symptoms worsen. The patient is to follow-up with PCP in the next 2-3 days for repeat evaluation repeat assessment or go directly to the emergency department.      SIGNATURE: Aaliyah Escalona III, PA-C

## 2023-03-08 ENCOUNTER — TELEPHONE (OUTPATIENT)
Dept: PRIMARY CARE CLINIC | Age: 88
End: 2023-03-08

## 2023-03-08 DIAGNOSIS — Z86.73 STATUS POST CVA: ICD-10-CM

## 2023-03-08 DIAGNOSIS — R47.81 SLURRED SPEECH: Primary | ICD-10-CM

## 2023-03-08 NOTE — TELEPHONE ENCOUNTER
----- Message from Jorgetamera Loera sent at 3/8/2023  1:54 PM EST -----  Subject: Referral Request    Reason for referral request? pt req referral for speach therapy again-one   to come to her apartment for lessons- pt would like to use someone she had   in the past   Provider patient wants to be referred to(if known):     Provider Phone Number(if known):     Additional Information for Provider?   ---------------------------------------------------------------------------  --------------  [address]    [phone]; OK to leave message on voicemail  ---------------------------------------------------------------------------  --------------

## 2023-03-10 ENCOUNTER — TELEPHONE (OUTPATIENT)
Dept: PRIMARY CARE CLINIC | Age: 88
End: 2023-03-10

## 2023-03-10 DIAGNOSIS — J06.9 UPPER RESPIRATORY TRACT INFECTION, UNSPECIFIED TYPE: Primary | ICD-10-CM

## 2023-03-10 RX ORDER — AZITHROMYCIN 250 MG/1
250 TABLET, FILM COATED ORAL SEE ADMIN INSTRUCTIONS
Qty: 6 TABLET | Refills: 0 | Status: SHIPPED | OUTPATIENT
Start: 2023-03-10 | End: 2023-03-15

## 2023-03-10 NOTE — TELEPHONE ENCOUNTER
----- Message from Rachaeljeremicarlos Stormbyron sent at 3/10/2023 11:38 AM EST -----  Subject: Message to Provider    QUESTIONS  Information for Provider? Pt states that she has developed a slight cold. She has a runny nose and a cough. She is requesting a script of   antibiotics to hopefully beat whatever bug she may have caught. Her   preferred pharmacy is [facility]. Please advise.   ---------------------------------------------------------------------------  --------------  Anton NOONAN  [phone]; OK to leave message on voicemail  ---------------------------------------------------------------------------  --------------  SCRIPT ANSWERS  Relationship to Patient?  Self

## 2023-03-21 ENCOUNTER — OFFICE VISIT (OUTPATIENT)
Dept: PRIMARY CARE CLINIC | Age: 88
End: 2023-03-21

## 2023-03-21 VITALS
BODY MASS INDEX: 23.55 KG/M2 | TEMPERATURE: 97.5 F | OXYGEN SATURATION: 96 % | HEART RATE: 72 BPM | SYSTOLIC BLOOD PRESSURE: 122 MMHG | WEIGHT: 128 LBS | DIASTOLIC BLOOD PRESSURE: 68 MMHG | HEIGHT: 62 IN

## 2023-03-21 DIAGNOSIS — Z86.73 HISTORY OF ISCHEMIC STROKE: ICD-10-CM

## 2023-03-21 DIAGNOSIS — M62.838 CERVICAL PARASPINAL MUSCLE SPASM: ICD-10-CM

## 2023-03-21 DIAGNOSIS — F41.9 ANXIETY DISORDER, UNSPECIFIED TYPE: ICD-10-CM

## 2023-03-21 DIAGNOSIS — I10 PRIMARY HYPERTENSION: ICD-10-CM

## 2023-03-21 DIAGNOSIS — L30.9 DERMATITIS: ICD-10-CM

## 2023-03-21 DIAGNOSIS — M54.2 CERVICALGIA OF OCCIPITO-ATLANTO-AXIAL REGION: Primary | ICD-10-CM

## 2023-03-21 DIAGNOSIS — E78.5 HYPERLIPIDEMIA, UNSPECIFIED HYPERLIPIDEMIA TYPE: ICD-10-CM

## 2023-03-21 RX ORDER — DIAZEPAM 2 MG/1
2 TABLET ORAL NIGHTLY PRN
Qty: 30 TABLET | Refills: 0 | Status: SHIPPED | OUTPATIENT
Start: 2023-03-21 | End: 2023-04-20

## 2023-03-21 SDOH — ECONOMIC STABILITY: FOOD INSECURITY: WITHIN THE PAST 12 MONTHS, THE FOOD YOU BOUGHT JUST DIDN'T LAST AND YOU DIDN'T HAVE MONEY TO GET MORE.: NEVER TRUE

## 2023-03-21 SDOH — ECONOMIC STABILITY: FOOD INSECURITY: WITHIN THE PAST 12 MONTHS, YOU WORRIED THAT YOUR FOOD WOULD RUN OUT BEFORE YOU GOT MONEY TO BUY MORE.: NEVER TRUE

## 2023-03-21 SDOH — ECONOMIC STABILITY: HOUSING INSECURITY
IN THE LAST 12 MONTHS, WAS THERE A TIME WHEN YOU DID NOT HAVE A STEADY PLACE TO SLEEP OR SLEPT IN A SHELTER (INCLUDING NOW)?: NO

## 2023-03-21 SDOH — ECONOMIC STABILITY: INCOME INSECURITY: HOW HARD IS IT FOR YOU TO PAY FOR THE VERY BASICS LIKE FOOD, HOUSING, MEDICAL CARE, AND HEATING?: NOT HARD AT ALL

## 2023-03-21 ASSESSMENT — ENCOUNTER SYMPTOMS
SHORTNESS OF BREATH: 0
WHEEZING: 0
VOICE CHANGE: 0
NAUSEA: 0
CHEST TIGHTNESS: 0
SORE THROAT: 0
VOMITING: 0
ABDOMINAL PAIN: 0

## 2023-03-21 NOTE — PROGRESS NOTES
01/05/2023    GFRAA >60 09/06/2022    CO2 28 01/05/2023    TSH 1.430 01/05/2023    INR 1.1 06/10/2021    GLUCOSE 96 01/05/2023    LABA1C 6.0 (H) 01/05/2023    LABMICR 34.3 (H) 01/05/2023    LABCREA 249 (H) 01/05/2023    MALBCR 13.8 01/05/2023         Impression/Plan:    1. Cervicalgia of cmadeyyz-hbgkzji-pugvc region  Comments:  Patient to use Voltaren gel also Valium is a muscle relaxer she should take it at night every night for 7 to 10 days. PT or injection if needed  Orders:  -     diclofenac sodium (VOLTAREN) 1 % GEL; Apply 2 g topically 4 times daily, Topical, 4 TIMES DAILY Starting Tue 3/21/2023, Disp-150 g, R-1, Normal  2. Primary hypertension  Comments:  Blood pressure is controlled at goal continue amlodipine 2.5 mg daily. Orders:  -     CBC with Auto Differential; Future  -     Comprehensive Metabolic Panel; Future  -     Lipid Panel; Future  -     TSH; Future  3. Hyperlipidemia, unspecified hyperlipidemia type  Comments:  Goal for LDL is less than 100, patient to continue acetaminophen last LDL is slightly above goal, acceptable for patient age. Orders:  -     CBC with Auto Differential; Future  -     Comprehensive Metabolic Panel; Future  -     Lipid Panel; Future  -     TSH; Future  -     CBC with Auto Differential; Future  -     Comprehensive Metabolic Panel; Future  -     Lipid Panel; Future  -     TSH; Future  4. Dermatitis  -     Chelsey Machado D.O, Dermatology, [place] ([facility])  5. Anxiety disorder, unspecified type  -     diazePAM (VALIUM) 2 MG tablet; Take 1 tablet by mouth nightly as needed for Anxiety for up to 30 days. Max Daily Amount: 2 mg, Disp-30 tablet, R-0Normal  6. History of ischemic stroke  Comments:  Patient has residual from her stroke but is functioning very well walking is improving and his speech is improving. 7. Cervical paraspinal muscle spasm  Comments:  Patient to continue baclofen nightly and Tylenol as needed and Voltaren gel topical as needed.   Patient reassured she

## 2023-03-28 ENCOUNTER — TELEPHONE (OUTPATIENT)
Dept: PRIMARY CARE CLINIC | Age: 88
End: 2023-03-28

## 2023-03-28 NOTE — TELEPHONE ENCOUNTER
----- Message from Curtis Vera sent at 3/22/2023  3:21 PM EDT -----  Subject: Message to Provider    QUESTIONS  Information for Provider? Patient is calling because the referral for   dermatology to Dr. Mariola Jerez was not received. Please send it again to   [phone]  ---------------------------------------------------------------------------  --------------  Kiki Morton INFO  [phone]; OK to leave message on voicemail  ---------------------------------------------------------------------------  --------------  SCRIPT ANSWERS  Relationship to Patient?  Self

## 2023-04-20 ENCOUNTER — TELEPHONE (OUTPATIENT)
Dept: PRIMARY CARE CLINIC | Age: 88
End: 2023-04-20

## 2023-04-20 RX ORDER — BENZONATATE 200 MG/1
200 CAPSULE ORAL 3 TIMES DAILY PRN
Qty: 30 CAPSULE | Refills: 0 | Status: SHIPPED
Start: 2023-04-20 | End: 2023-05-22

## 2023-04-20 RX ORDER — AZITHROMYCIN 250 MG/1
250 TABLET, FILM COATED ORAL SEE ADMIN INSTRUCTIONS
Qty: 6 TABLET | Refills: 0 | Status: SHIPPED | OUTPATIENT
Start: 2023-04-20 | End: 2023-04-25

## 2023-05-04 ENCOUNTER — TELEPHONE (OUTPATIENT)
Dept: PRIMARY CARE CLINIC | Age: 88
End: 2023-05-04

## 2023-05-10 ENCOUNTER — OFFICE VISIT (OUTPATIENT)
Dept: PRIMARY CARE CLINIC | Age: 88
End: 2023-05-10

## 2023-05-10 VITALS
HEART RATE: 91 BPM | OXYGEN SATURATION: 96 % | SYSTOLIC BLOOD PRESSURE: 128 MMHG | TEMPERATURE: 97.3 F | HEIGHT: 62 IN | WEIGHT: 131 LBS | DIASTOLIC BLOOD PRESSURE: 66 MMHG | BODY MASS INDEX: 24.11 KG/M2

## 2023-05-10 DIAGNOSIS — M54.2 CERVICALGIA OF OCCIPITO-ATLANTO-AXIAL REGION: Primary | ICD-10-CM

## 2023-05-10 DIAGNOSIS — G44.52 NEW DAILY PERSISTENT HEADACHE: ICD-10-CM

## 2023-05-10 DIAGNOSIS — N60.12 FIBROCYSTIC DISEASE OF BOTH BREASTS: ICD-10-CM

## 2023-05-10 DIAGNOSIS — R07.2 PRECORDIAL PAIN: ICD-10-CM

## 2023-05-10 DIAGNOSIS — N60.11 FIBROCYSTIC DISEASE OF BOTH BREASTS: ICD-10-CM

## 2023-05-10 DIAGNOSIS — R00.2 PALPITATION: ICD-10-CM

## 2023-05-10 DIAGNOSIS — N64.4 PAIN OF LEFT BREAST: ICD-10-CM

## 2023-05-10 ASSESSMENT — ENCOUNTER SYMPTOMS
VOMITING: 0
ABDOMINAL PAIN: 0
SORE THROAT: 0
CHEST TIGHTNESS: 0
NAUSEA: 0
SHORTNESS OF BREATH: 0
WHEEZING: 0
VOICE CHANGE: 0

## 2023-05-10 NOTE — PROGRESS NOTES
HPI:  Patient comes in today for complaint of pain in the neck area patient is requesting a CAT scan. Patient denies any weakness in any of the arms or the legs, her speech is unchanged. Review of Systems   Constitutional:  Negative for chills, fever and unexpected weight change. HENT:  Negative for postnasal drip, sore throat and voice change. Respiratory:  Negative for chest tightness, shortness of breath and wheezing. Cardiovascular:  Positive for chest pain. Negative for palpitations and leg swelling. Gastrointestinal:  Negative for abdominal pain, nausea and vomiting. Musculoskeletal:  Positive for neck pain. Negative for gait problem and joint swelling. Skin:  Negative for rash and wound. Neurological: Negative. Psychiatric/Behavioral: Negative.         Past Medical History:   Diagnosis Date    Abnormal weight loss     Anxiety     Anxiety disorder     Cerebral infarction, unspecified ([address]     Essential (primary) hypertension     Gastric reflux     Hyperlipidemia     Hypertension     Occlusion and stenosis of right carotid artery     Slurred speech      Past Surgical History:   Procedure Laterality Date    CAROTID ENDARTERECTOMY Right 11/27/2020    RIGHT CAROTID ENDARTERECTOMY performed by Floyd King MD at [facility] (CERVIX STATUS UNKNOWN)      LITHOTRIPSY Left 2/7/2020    CYSTOSCOPY RETROGRADE PYELOGRAM LEFT URETEROSCOPY LEFT J STENT LASER LITHOTRIPSY performed by Nakul Mendez DO at [facility] OR     Family History   Problem Relation Age of Onset    High Blood Pressure Mother     Other Father         Emphysema    Cancer Brother         Leukemia    Cancer Brother       Social History     Tobacco Use    Smoking status: Never    Smokeless tobacco: Never   Vaping Use    Vaping Use: Never used   Substance Use Topics    Alcohol use: No    Drug use: No        Current Outpatient Medications on File Prior to Visit   Medication Sig Dispense Refill    diclofenac

## 2023-05-22 RX ORDER — BENZONATATE 200 MG/1
CAPSULE ORAL
Qty: 30 CAPSULE | Refills: 0 | Status: SHIPPED | OUTPATIENT
Start: 2023-05-22

## 2023-06-19 ENCOUNTER — OFFICE VISIT (OUTPATIENT)
Dept: PRIMARY CARE CLINIC | Age: 88
End: 2023-06-19

## 2023-06-19 VITALS
HEART RATE: 82 BPM | DIASTOLIC BLOOD PRESSURE: 60 MMHG | WEIGHT: 131 LBS | HEIGHT: 62 IN | OXYGEN SATURATION: 97 % | SYSTOLIC BLOOD PRESSURE: 132 MMHG | TEMPERATURE: 97.1 F | BODY MASS INDEX: 24.11 KG/M2

## 2023-06-19 DIAGNOSIS — I10 PRIMARY HYPERTENSION: ICD-10-CM

## 2023-06-19 DIAGNOSIS — E78.00 PURE HYPERCHOLESTEROLEMIA: ICD-10-CM

## 2023-06-19 DIAGNOSIS — R73.09 ABNORMAL GLUCOSE: ICD-10-CM

## 2023-06-19 DIAGNOSIS — R42 DIZZINESS: ICD-10-CM

## 2023-06-19 DIAGNOSIS — F41.9 ANXIETY: Primary | Chronic | ICD-10-CM

## 2023-06-19 LAB — HBA1C MFR BLD: 5.8 %

## 2023-06-19 RX ORDER — DIAZEPAM 2 MG/1
TABLET ORAL
Qty: 30 TABLET | Refills: 0 | Status: SHIPPED | OUTPATIENT
Start: 2023-06-19 | End: 2023-07-18

## 2023-06-19 ASSESSMENT — ENCOUNTER SYMPTOMS
SORE THROAT: 0
CHEST TIGHTNESS: 0
ABDOMINAL PAIN: 0
VOICE CHANGE: 0
VOMITING: 0
SHORTNESS OF BREATH: 0
NAUSEA: 0
WHEEZING: 0

## 2023-06-19 NOTE — PROGRESS NOTES
and changed her daughter Bridgette Mae as secondary  Information updated on the chart. Patient remains a full code.

## 2023-07-05 ENCOUNTER — TELEPHONE (OUTPATIENT)
Dept: PRIMARY CARE CLINIC | Age: 88
End: 2023-07-05

## 2023-07-05 NOTE — TELEPHONE ENCOUNTER
----- Message from Alexa Gary sent at 7/3/2023  3:25 PM EDT -----  Subject: Message to Provider    QUESTIONS  Information for Provider? patient called stating that she has a purple   stephen on side of right ankle, does not hurt when touched. Offered appt but   patient did not want to make appt, just wanted to send message  ---------------------------------------------------------------------------  --------------  [address]  [phone]; OK to leave message on voicemail  ---------------------------------------------------------------------------  --------------  SCRIPT ANSWERS  Relationship to Patient?  Self

## 2023-07-05 NOTE — TELEPHONE ENCOUNTER
I spoke to Vanessa, she stated she has never taken Meloxicam and she can't find it. I told her if she finds it don't take it. She will call back and make the appt, when she finds out when she can come.

## 2023-07-10 RX ORDER — NICOTINE POLACRILEX 2 MG
LOZENGE BUCCAL
Qty: 90 TABLET | Refills: 2 | OUTPATIENT
Start: 2023-07-10

## 2023-07-17 ENCOUNTER — TELEPHONE (OUTPATIENT)
Dept: PRIMARY CARE CLINIC | Age: 88
End: 2023-07-17

## 2023-07-17 NOTE — TELEPHONE ENCOUNTER
----- Message from Antonia Ser sent at 7/17/2023 11:30 AM EDT -----  Subject: Refill Request    QUESTIONS  Name of Medication? ezetimibe (ZETIA) 10 MG tablet  Patient-reported dosage and instructions? one a day  How many days do you have left? 2  Preferred Pharmacy? [address] #[id number]  Pharmacy phone number (if available)? [phone]  Additional Information for Provider? Patient picked up all of her   medications from [employer] and this one was not there. They told her to   call to order. ---------------------------------------------------------------------------  --------------  Elliott SAUCEDA  What is the best way for the office to contact you? OK to leave message on   voicemail  Preferred Call Back Phone Number? [phone]  ---------------------------------------------------------------------------  --------------  SCRIPT ANSWERS  Relationship to Patient?  Self

## 2023-07-17 NOTE — TELEPHONE ENCOUNTER
Pt would like to know if she can take two tablets of ezetimibe (ZETIA) 10 MG tablet   Because she doesn't want to gain any weight

## 2023-07-19 RX ORDER — EZETIMIBE 10 MG/1
TABLET ORAL
Qty: 90 TABLET | Refills: 3 | Status: SHIPPED | OUTPATIENT
Start: 2023-07-19

## 2023-07-24 RX ORDER — EZETIMIBE 10 MG/1
10 TABLET ORAL DAILY
Qty: 90 TABLET | Refills: 3 | Status: SHIPPED | OUTPATIENT
Start: 2023-07-24

## 2023-08-10 NOTE — PROGRESS NOTES
Nurse to nurse called to [place] on [address] Detail Level: Detailed Detail Level: Zone 1-2 drinks

## 2023-08-23 ENCOUNTER — TELEPHONE (OUTPATIENT)
Dept: PRIMARY CARE CLINIC | Age: 88
End: 2023-08-23

## 2023-08-23 DIAGNOSIS — Z86.73 HISTORY OF ISCHEMIC STROKE: Primary | ICD-10-CM

## 2023-08-23 NOTE — TELEPHONE ENCOUNTER
----- Message from Darrellkolton Odom sent at 8/23/2023  2:11 PM EDT -----  Subject: Referral Request    Reason for referral request? Pt is requesting a Nurse to visit at least   once weekly to take her BP, weigh her and give her nutritional food ideas   to eat. Provider patient wants to be referred to(if known):     Provider Phone Number(if known):     Additional Information for Provider?   ---------------------------------------------------------------------------  --------------  [address]    [phone]; OK to leave message on voicemail  ---------------------------------------------------------------------------  --------------

## 2023-09-05 RX ORDER — ASPIRIN 81 MG/1
TABLET, CHEWABLE ORAL
Qty: 30 TABLET | Refills: 3 | OUTPATIENT
Start: 2023-09-05

## 2023-09-08 ENCOUNTER — CARE COORDINATION (OUTPATIENT)
Dept: CARE COORDINATION | Age: 88
End: 2023-09-08

## 2023-09-08 DIAGNOSIS — I10 UNCONTROLLED HYPERTENSION: Primary | ICD-10-CM

## 2023-09-08 SDOH — HEALTH STABILITY: PHYSICAL HEALTH: ON AVERAGE, HOW MANY DAYS PER WEEK DO YOU ENGAGE IN MODERATE TO STRENUOUS EXERCISE (LIKE A BRISK WALK)?: 0 DAYS

## 2023-09-08 SDOH — ECONOMIC STABILITY: TRANSPORTATION INSECURITY
IN THE PAST 12 MONTHS, HAS LACK OF TRANSPORTATION KEPT YOU FROM MEETINGS, WORK, OR FROM GETTING THINGS NEEDED FOR DAILY LIVING?: NO

## 2023-09-08 SDOH — HEALTH STABILITY: PHYSICAL HEALTH: ON AVERAGE, HOW MANY MINUTES DO YOU ENGAGE IN EXERCISE AT THIS LEVEL?: 0 MIN

## 2023-09-08 SDOH — ECONOMIC STABILITY: HOUSING INSECURITY: IN THE LAST 12 MONTHS, HOW MANY PLACES HAVE YOU LIVED?: 1

## 2023-09-08 SDOH — ECONOMIC STABILITY: TRANSPORTATION INSECURITY
IN THE PAST 12 MONTHS, HAS THE LACK OF TRANSPORTATION KEPT YOU FROM MEDICAL APPOINTMENTS OR FROM GETTING MEDICATIONS?: NO

## 2023-09-08 SDOH — ECONOMIC STABILITY: INCOME INSECURITY: IN THE LAST 12 MONTHS, WAS THERE A TIME WHEN YOU WERE NOT ABLE TO PAY THE MORTGAGE OR RENT ON TIME?: NO

## 2023-09-08 NOTE — CARE COORDINATION
ACM contacted Severa Stacks to offer enrollment in care coordination and remote patient monitoring. She is agreeable to enroll. She states she is unable to complete enrollment at the present time and requests a call back at a later time. Remote Patient Monitoring Enrollment Note      Date/Time:  9/8/2023 3:09 PM    Offered patient enrollment in the [facility] Remote Patient Monitoring (RPM) program for in home monitoring for HTN. Patient accepted RPM services. Patient will be monitoring the following daily:  blood pressure reading    ACM reviewed the information below with patient:    Emergency Contact (name and contact number): Zander Jackman (son) [phone]    [x] A member from the care coordination team will reach out to notify the patient once the RPM kit is ordered. [x] Once the kit is delivered, the [facility] team will contact the patient after UPS deliver to assist with set up. [x] Determined BP cuff size: regular (9.05\"-15.74\")      [x] Determined weight scale:  n/a                                                  [x] Hours of ACM monitoring - Monday-Friday [id number]                         All questions about RPM program answered at this time.

## 2023-09-08 NOTE — CARE COORDINATION
Remote Patient Kit Ordering Note      Date/Time:  9/8/2023 3:56 PM      [x] CCSS confirmed patient shipping address  [x] Patient will receive package over the next 1-3 business days. Someone 21 years or older must be present to sign for UPS delivery. [x] HRS will contact patient within 24 hours, an [address] [place] [address] will call the patient directly: If the patient does not answer, HRS will follow up with the clinical team notifying them about the unsuccessful attempt to contact the patient. HRS will make three call attempts to the patient. Provide patient with [facility] Virtual install number is: [phone]. [x] ACM will contact patient once equipment is active to welcome them to the program.                                                         [x] Hours of RPM monitoring - Monday-Friday [id number]; encourage patient to get vitals entered by [facility] each day to have the alert addressed same day. [x]CCSS mailed RPM Patient flyer to patient. All questions answered at this time. ACM made aware the RPM kit has been ordered. [facility].

## 2023-09-09 NOTE — PROGRESS NOTES
Remote Patient Monitoring Treatment Plan    Received request from AC/RIC Wilkins RN  to order remote patient monitoring for in home monitoring of HTN and order completed. Patient will be monitoring blood pressure . Please note: [facility] has stated no scale is needed. Pt will not require weight monitoring via RPM. [facility] has also stated pt will only be monitoring BP, so pulse ox monitoring will also not be required on pt's RPM care plan. Patient will engage in Remote Patient Monitoring each day to develop the skills necessary for self management. RPM Care Team Responsibilities:   Alerts will be reviewed daily and addressed within 2-4 hours during operational hours (Monday -Friday 9 am-4 pm)  Alert response and intervention documented in patient medical record  Alert response escalated to PCP per protocol and documented in patient medical record  Patient monitored over approximately  days  Discharge from program based on self-management readiness    See care coordination encounters for additional details.

## 2023-09-15 ENCOUNTER — CARE COORDINATION (OUTPATIENT)
Dept: CARE COORDINATION | Age: 88
End: 2023-09-15

## 2023-09-19 ENCOUNTER — OFFICE VISIT (OUTPATIENT)
Dept: PRIMARY CARE CLINIC | Age: 88
End: 2023-09-19
Payer: MEDICARE

## 2023-09-19 VITALS
WEIGHT: 130 LBS | HEIGHT: 62 IN | HEART RATE: 72 BPM | OXYGEN SATURATION: 97 % | DIASTOLIC BLOOD PRESSURE: 58 MMHG | TEMPERATURE: 96.9 F | BODY MASS INDEX: 23.92 KG/M2 | RESPIRATION RATE: 18 BRPM | SYSTOLIC BLOOD PRESSURE: 92 MMHG

## 2023-09-19 DIAGNOSIS — I10 PRIMARY HYPERTENSION: ICD-10-CM

## 2023-09-19 DIAGNOSIS — E78.00 PURE HYPERCHOLESTEROLEMIA: ICD-10-CM

## 2023-09-19 DIAGNOSIS — F41.9 ANXIETY: ICD-10-CM

## 2023-09-19 DIAGNOSIS — I95.2 HYPOTENSION DUE TO DRUGS: ICD-10-CM

## 2023-09-19 DIAGNOSIS — M81.0 AGE-RELATED OSTEOPOROSIS WITHOUT CURRENT PATHOLOGICAL FRACTURE: Primary | ICD-10-CM

## 2023-09-19 PROCEDURE — 3074F SYST BP LT 130 MM HG: CPT | Performed by: INTERNAL MEDICINE

## 2023-09-19 PROCEDURE — 3078F DIAST BP <80 MM HG: CPT | Performed by: INTERNAL MEDICINE

## 2023-09-19 PROCEDURE — G8400 PT W/DXA NO RESULTS DOC: HCPCS | Performed by: INTERNAL MEDICINE

## 2023-09-19 PROCEDURE — 1123F ACP DISCUSS/DSCN MKR DOCD: CPT | Performed by: INTERNAL MEDICINE

## 2023-09-19 PROCEDURE — G8420 CALC BMI NORM PARAMETERS: HCPCS | Performed by: INTERNAL MEDICINE

## 2023-09-19 PROCEDURE — 99213 OFFICE O/P EST LOW 20 MIN: CPT | Performed by: INTERNAL MEDICINE

## 2023-09-19 PROCEDURE — G8427 DOCREV CUR MEDS BY ELIG CLIN: HCPCS | Performed by: INTERNAL MEDICINE

## 2023-09-19 PROCEDURE — 1090F PRES/ABSN URINE INCON ASSESS: CPT | Performed by: INTERNAL MEDICINE

## 2023-09-19 PROCEDURE — 1036F TOBACCO NON-USER: CPT | Performed by: INTERNAL MEDICINE

## 2023-09-19 RX ORDER — DIAZEPAM 2 MG/1
2 TABLET ORAL NIGHTLY PRN
Qty: 30 TABLET | Refills: 1 | Status: SHIPPED | OUTPATIENT
Start: 2023-09-19 | End: 2023-11-18

## 2023-09-19 ASSESSMENT — ENCOUNTER SYMPTOMS
VOICE CHANGE: 0
ABDOMINAL PAIN: 0
SHORTNESS OF BREATH: 0
CHEST TIGHTNESS: 0
WHEEZING: 0
SORE THROAT: 0
VOMITING: 0
NAUSEA: 0

## 2023-10-02 ENCOUNTER — CARE COORDINATION (OUTPATIENT)
Dept: CARE COORDINATION | Age: 88
End: 2023-10-02

## 2023-10-02 NOTE — CARE COORDINATION
Ambulatory Care Coordination Note  10/2/2023    Patient Current Location:  Home: [place]  [address] [place] [address]     ACM contacted the patient by telephone. Verified name and  with patient as identifiers. Provided introduction to self, and explanation of the ACM role. Challenges to be reviewed by the provider   Additional needs identified to be addressed with provider: No  none               Method of communication with provider: none. ACM: Kishore Moon RN    ACM contacted Joaquín Cruz. She states she has received the RPM equipment. She states her son set it up. It is pre-activated per HRS site. Patient is not willing to place a three way call to activate the equipment. She states \"my advice to you people is to quit calling me. I won't answer the phone anymore. \"  She asked for clarification of the purpose of AC's call and ACM explained in detail how RPM equipment works. She took AC's phone number to give to her son Bebeto Hill so he can assist with set up. ACM attempted to contact patient's son Bebeto Hill. Left voice mail message requesting a return call. Plan  If no contact from son,  attempt to reach him again. Offered patient enrollment in the Remote Patient Monitoring (RPM) program for in-home monitoring: Yes, patient already enrolled.     Lab Results       None                 Goals Addressed    None         Future Appointments   Date Time Provider [address]   2023  3:30 PM Geremias Shirley MD [facility]

## 2023-10-05 ENCOUNTER — TELEPHONE (OUTPATIENT)
Dept: PRIMARY CARE CLINIC | Age: 88
End: 2023-10-05

## 2023-10-05 NOTE — TELEPHONE ENCOUNTER
Patient called in stating yesterday she drank a glass of tea with caffeine with chocolate. She states that her left breast is painful. Blood pressure was taken 45 mins ago is 113/55. She is scared because the pain hasn't gone away since yesterday. She would like to know what she do.

## 2023-10-06 ENCOUNTER — OFFICE VISIT (OUTPATIENT)
Dept: FAMILY MEDICINE CLINIC | Age: 88
End: 2023-10-06

## 2023-10-06 VITALS
OXYGEN SATURATION: 97 % | TEMPERATURE: 97.4 F | WEIGHT: 130 LBS | DIASTOLIC BLOOD PRESSURE: 66 MMHG | HEART RATE: 78 BPM | RESPIRATION RATE: 16 BRPM | SYSTOLIC BLOOD PRESSURE: 124 MMHG | BODY MASS INDEX: 23.78 KG/M2

## 2023-10-06 DIAGNOSIS — R07.9 CHEST PAIN, UNSPECIFIED TYPE: Primary | ICD-10-CM

## 2023-10-11 ENCOUNTER — TELEPHONE (OUTPATIENT)
Dept: PRIMARY CARE CLINIC | Age: 88
End: 2023-10-11

## 2023-10-11 DIAGNOSIS — Z12.31 BREAST CANCER SCREENING BY MAMMOGRAM: Primary | ICD-10-CM

## 2023-10-11 NOTE — TELEPHONE ENCOUNTER
----- Message from Homardez Paul sent at 10/10/2023 11:18 AM EDT -----  Subject: Referral Request    Reason for referral request? patient would like to have a mammogram   ordered please call once referral is done so she can make appointment   Provider patient wants to be referred to(if known):     Provider Phone Number(if known):     Additional Information for Provider?   ---------------------------------------------------------------------------  --------------  [address]    [phone]; OK to leave message on voicemail  ---------------------------------------------------------------------------  --------------

## 2023-10-11 NOTE — TELEPHONE ENCOUNTER
Called and advised patient she would have to make an appointment for dr Sarah Kapoor to go over results and or talk about if she has a clogged artery. Patient explained she ate a lot of chocolate and thinks this was the cause of her chest pain. Advised patient to see dr power and speak with her about this. She denies any chest pain or SOB today.

## 2023-10-11 NOTE — TELEPHONE ENCOUNTER
----- Message from Lizz Capps sent at 10/10/2023 11:17 AM EDT -----  Subject: Message to Provider    QUESTIONS  Information for Provider? patient called very upset that the urgent care   doctor she went to 10/07 told her she might have a clogged artery, and   wants someone to call and let her know why he would say that, she is very   upset , please call patient , she did not want a follow up appointment or   come into office at this time . ---------------------------------------------------------------------------  --------------  Marcio Grant [facility]  [phone]; OK to leave message on voicemail  ---------------------------------------------------------------------------  --------------  SCRIPT ANSWERS  Relationship to Patient?  Self

## 2023-10-16 ENCOUNTER — TELEPHONE (OUTPATIENT)
Dept: PRIMARY CARE CLINIC | Age: 88
End: 2023-10-16

## 2023-10-16 RX ORDER — GUAIFENESIN 600 MG/1
600 TABLET, EXTENDED RELEASE ORAL 2 TIMES DAILY
Qty: 30 TABLET | Refills: 0 | Status: SHIPPED | OUTPATIENT
Start: 2023-10-16 | End: 2023-10-31

## 2023-10-16 NOTE — TELEPHONE ENCOUNTER
----- Message from Dre Vilchis sent at 10/16/2023 12:47 PM EDT -----  Subject: Message to Provider    QUESTIONS  Information for Provider? pt is requesting an antibiotic to be called to   the pharmacy for her. pt states she bites her fingernails down to the bone   an it has given pt alot of saliva, mucus, and bacteria. she says it is   messing up her stomach. pt states sometimes it's so thick she has to pull   it out of her lungs. pt states her BP was low at 113/55 on 10/14. pt   [id number] Avelino Araiza, [address] [place] [address] Drive -  [phone]. please contact   pt.  ---------------------------------------------------------------------------  --------------  Mateus Forman [facility]  [phone]; OK to leave message on voicemail  ---------------------------------------------------------------------------  --------------  SCRIPT ANSWERS  Relationship to Patient?  Self

## 2023-10-17 NOTE — TELEPHONE ENCOUNTER
Called and left VM for patient to please keep her appt tomorrow 10/18 @ 3pm.  That way Dr Varun De Santiago can assess her and give her the correct medication. For now take the Mucinex as I gave her the dose  suggested. Requesting her to call the office if she has any questions.

## 2023-10-18 ENCOUNTER — CARE COORDINATION (OUTPATIENT)
Dept: CARE COORDINATION | Age: 88
End: 2023-10-18

## 2023-10-18 NOTE — CARE COORDINATION
AC contacted Elaine Vani. She states she has the RPM equipment and is waiting on her son Aaron Betancur) to activate it. She states call woke her up and she wants to go back to sleep. Attempted to reach family Aaron Betancur) by telephone. Left HIPAA compliant message requesting a return call. Will attempt to reach family again. Patient has an appointment with Dr. Varun De Santiago this afternoon. [facility] will route message to Dr. Varun De Santiago regarding RPM equipment.

## 2023-10-18 NOTE — TELEPHONE ENCOUNTER
Pt called back about below message left      she has additional questions , she doesn't understand the message

## 2023-10-26 ENCOUNTER — CARE COORDINATION (OUTPATIENT)
Dept: CARE COORDINATION | Age: 88
End: 2023-10-26

## 2023-10-26 DIAGNOSIS — I16.1 HYPERTENSIVE EMERGENCY: Primary | ICD-10-CM

## 2023-10-26 NOTE — CARE COORDINATION
SHAUN contacted Rashid Mallorie. SHAUN identified ad explained purpose of the call(activation of RPM equipment). Rashid Craigs states she is no longer interested in participating in RPM nor care coordination. She requests the equipment be picked up. She does not have the original shipping box. HERMILOM will notify RPM team and close care coordination episode.

## 2023-10-26 NOTE — PROGRESS NOTES
Remote Patient Order Discontinued    Received request from Meme Tovar RN  to discontinue order for remote patient monitoring of HTN and order completed.

## 2023-10-27 ENCOUNTER — CARE COORDINATION (OUTPATIENT)
Dept: PRIMARY CARE CLINIC | Age: 88
End: 2023-10-27

## 2023-10-27 NOTE — CARE COORDINATION
CCSS placed call to patient to arrange RPM kit  through [facility]. Reviewed with patient how to pack equipment in original packing. Verified patient's availability to schedule UPS  time. UPS  time requested. Anticipated  date range    Patient was very upset with the program and not happy.        [facility]   Medication Assistance  [address] [place]    G) [phone] (A) [phone]

## 2023-10-31 ENCOUNTER — OFFICE VISIT (OUTPATIENT)
Dept: PRIMARY CARE CLINIC | Age: 88
End: 2023-10-31
Payer: MEDICARE

## 2023-10-31 VITALS
WEIGHT: 128 LBS | DIASTOLIC BLOOD PRESSURE: 62 MMHG | HEART RATE: 79 BPM | HEIGHT: 62 IN | OXYGEN SATURATION: 96 % | BODY MASS INDEX: 23.55 KG/M2 | TEMPERATURE: 96.9 F | SYSTOLIC BLOOD PRESSURE: 110 MMHG

## 2023-10-31 DIAGNOSIS — R10.814 LEFT LOWER QUADRANT ABDOMINAL TENDERNESS WITHOUT REBOUND TENDERNESS: ICD-10-CM

## 2023-10-31 DIAGNOSIS — E78.00 PURE HYPERCHOLESTEROLEMIA: ICD-10-CM

## 2023-10-31 DIAGNOSIS — I10 PRIMARY HYPERTENSION: ICD-10-CM

## 2023-10-31 DIAGNOSIS — K52.9 COLITIS: Primary | ICD-10-CM

## 2023-10-31 DIAGNOSIS — R19.7 DIARRHEA, UNSPECIFIED TYPE: ICD-10-CM

## 2023-10-31 DIAGNOSIS — K52.9 COLITIS: ICD-10-CM

## 2023-10-31 LAB
ABSOLUTE IMMATURE GRANULOCYTE: <0.03 K/UL (ref 0–0.58)
ALBUMIN SERPL-MCNC: 4.3 G/DL (ref 3.5–5.2)
ALP BLD-CCNC: 79 U/L (ref 35–104)
ALT SERPL-CCNC: <5 U/L (ref 0–32)
ANION GAP SERPL CALCULATED.3IONS-SCNC: 6 MMOL/L (ref 7–16)
AST SERPL-CCNC: 10 U/L (ref 0–31)
BASOPHILS ABSOLUTE: 0.01 K/UL (ref 0–0.2)
BASOPHILS RELATIVE PERCENT: 0 % (ref 0–2)
BILIRUB SERPL-MCNC: 0.4 MG/DL (ref 0–1.2)
BUN BLDV-MCNC: 14 MG/DL (ref 6–23)
CALCIUM SERPL-MCNC: 9.1 MG/DL (ref 8.6–10.2)
CHLORIDE BLD-SCNC: 105 MMOL/L (ref 98–107)
CO2: 30 MMOL/L (ref 22–29)
CREAT SERPL-MCNC: 0.8 MG/DL (ref 0.5–1)
EOSINOPHILS ABSOLUTE: 0.02 K/UL (ref 0.05–0.5)
EOSINOPHILS RELATIVE PERCENT: 1 % (ref 0–6)
GFR SERPL CREATININE-BSD FRML MDRD: >60 ML/MIN/1.73M2
GLUCOSE BLD-MCNC: 99 MG/DL (ref 74–99)
HCT VFR BLD CALC: 35.7 % (ref 34–48)
HEMOGLOBIN: 11.6 G/DL (ref 11.5–15.5)
IMMATURE GRANULOCYTES: 0 % (ref 0–5)
LYMPHOCYTES ABSOLUTE: 1.04 K/UL (ref 1.5–4)
LYMPHOCYTES RELATIVE PERCENT: 26 % (ref 20–42)
MCH RBC QN AUTO: 33 PG (ref 26–35)
MCHC RBC AUTO-ENTMCNC: 32.5 G/DL (ref 32–34.5)
MCV RBC AUTO: 101.4 FL (ref 80–99.9)
MONOCYTES ABSOLUTE: 0.5 K/UL (ref 0.1–0.95)
MONOCYTES RELATIVE PERCENT: 13 % (ref 2–12)
NEUTROPHILS ABSOLUTE: 2.41 K/UL (ref 1.8–7.3)
NEUTROPHILS RELATIVE PERCENT: 60 % (ref 43–80)
PDW BLD-RTO: 13.9 % (ref 11.5–15)
PLATELET # BLD: 273 K/UL (ref 130–450)
PMV BLD AUTO: 10.5 FL (ref 7–12)
POTASSIUM SERPL-SCNC: 3.8 MMOL/L (ref 3.5–5)
RBC # BLD: 3.52 M/UL (ref 3.5–5.5)
SODIUM BLD-SCNC: 141 MMOL/L (ref 132–146)
TOTAL PROTEIN: 6.7 G/DL (ref 6.4–8.3)
WBC # BLD: 4 K/UL (ref 4.5–11.5)

## 2023-10-31 PROCEDURE — 99214 OFFICE O/P EST MOD 30 MIN: CPT | Performed by: INTERNAL MEDICINE

## 2023-10-31 PROCEDURE — G8400 PT W/DXA NO RESULTS DOC: HCPCS | Performed by: INTERNAL MEDICINE

## 2023-10-31 PROCEDURE — 3074F SYST BP LT 130 MM HG: CPT | Performed by: INTERNAL MEDICINE

## 2023-10-31 PROCEDURE — 3078F DIAST BP <80 MM HG: CPT | Performed by: INTERNAL MEDICINE

## 2023-10-31 PROCEDURE — 1123F ACP DISCUSS/DSCN MKR DOCD: CPT | Performed by: INTERNAL MEDICINE

## 2023-10-31 PROCEDURE — G8484 FLU IMMUNIZE NO ADMIN: HCPCS | Performed by: INTERNAL MEDICINE

## 2023-10-31 PROCEDURE — G8427 DOCREV CUR MEDS BY ELIG CLIN: HCPCS | Performed by: INTERNAL MEDICINE

## 2023-10-31 PROCEDURE — 1036F TOBACCO NON-USER: CPT | Performed by: INTERNAL MEDICINE

## 2023-10-31 PROCEDURE — 1090F PRES/ABSN URINE INCON ASSESS: CPT | Performed by: INTERNAL MEDICINE

## 2023-10-31 PROCEDURE — 81002 URINALYSIS NONAUTO W/O SCOPE: CPT | Performed by: INTERNAL MEDICINE

## 2023-10-31 PROCEDURE — G8420 CALC BMI NORM PARAMETERS: HCPCS | Performed by: INTERNAL MEDICINE

## 2023-10-31 RX ORDER — METRONIDAZOLE 500 MG/1
500 TABLET ORAL 2 TIMES DAILY
Qty: 14 TABLET | Refills: 0 | Status: SHIPPED | OUTPATIENT
Start: 2023-10-31 | End: 2023-11-07

## 2023-10-31 ASSESSMENT — ENCOUNTER SYMPTOMS
NAUSEA: 0
ABDOMINAL PAIN: 1
DIARRHEA: 1
CHEST TIGHTNESS: 0
WHEEZING: 0
VOMITING: 0
VOICE CHANGE: 0
SHORTNESS OF BREATH: 0
SORE THROAT: 0

## 2023-10-31 NOTE — PROGRESS NOTES
01/05/2023    LDLCALC 111 (H) 01/05/2023    ALT <5 01/05/2023    AST 11 01/05/2023     01/05/2023    K 4.2 01/05/2023     01/05/2023    CREATININE 0.7 01/05/2023    BUN 20 01/05/2023    LABGLOM >60 01/05/2023    GFRAA >60 09/06/2022    CO2 28 01/05/2023    TSH 1.430 01/05/2023    INR 1.1 06/10/2021    GLUCOSE 96 01/05/2023    LABA1C 5.8 06/19/2023    LABCREA 249 (H) 01/05/2023    MALBCR 13.8 01/05/2023         Impression/Plan:    1. Colitis  Comments:  Unspecified colitis, we will treat empirically with Flagyl empirically  Orders:  -     POCT Urinalysis no Micro  -     CBC with Auto Differential; Future  -     metroNIDAZOLE (FLAGYL) 500 MG tablet; Take 1 tablet by mouth 2 times daily for 7 days, Disp-14 tablet, R-0Normal  2. Diarrhea, unspecified type  Comments: Will take Flagyl for 1 week and reevaluate, patient to increase hydration double water drinking  Orders:  -     Comprehensive Metabolic Panel; Future  3. Left lower quadrant abdominal tenderness without rebound tenderness  Comments:  Likely related to nonspecific colitis will treat with Flagyl consider imaging if not improved. Orders:  -     POCT Urinalysis no Micro  -     CBC with Auto Differential; Future  -     Comprehensive Metabolic Panel; Future  4. Primary hypertension  Comments:  Blood pressure at goal patient will continue amlodipine 2.5 mg daily  5. Pure hypercholesterolemia  Comments:  Patient on Zetia 10 mg nightly, repeat lab.

## 2023-11-02 ENCOUNTER — TELEPHONE (OUTPATIENT)
Dept: PRIMARY CARE CLINIC | Age: 88
End: 2023-11-02

## 2023-11-02 NOTE — TELEPHONE ENCOUNTER
----- Message from [place], [place] sent at 11/2/2023 11:57 AM EDT -----  Subject: Medication Problem    Medication: metroNIDAZOLE (FLAGYL) 500 MG tablet  Dosage: 500 mg  Ordering Provider: Maria M Finley    Question/Problem: pt is scared to take medication due to side effects and   it is a big pill so she doesn't think she can swallow it, please advise if   there is another medication she can take.       Pharmacy: [address] [place] [address] #[id number] Zaki Kothari, [address] [place] [address],[facility] Floor    ---------------------------------------------------------------------------  --------------  Raghav SAUCEDA  [phone]; OK to leave message on voicemail  ---------------------------------------------------------------------------  --------------    SCRIPT ANSWERS  Relationship to Patient: Self

## 2023-11-06 ENCOUNTER — TELEPHONE (OUTPATIENT)
Dept: PRIMARY CARE CLINIC | Age: 88
End: 2023-11-06

## 2023-11-06 NOTE — TELEPHONE ENCOUNTER
Regulo Skaggs called upset that no one has picked up the box she has setting in her hallway yet. We ordered the stuff for her now we need to pick it up. She is very upset no one has come to pick it up. She has a blood pressure machine that her daughter gave her. She doesn't need this stuff come and get it. Once I got her calmed down a little and looked at her chart I figured out it was sent by Care coordination and they had want her to contact UPS to come pick it up. I don't know were in the process this  is at, I tried to explain that Dr Jeison Gaston did not order it and gave her the number for Aggie Patten at care coordination, she stated she was going to through it in the garbage then Regulo Skaggs hang up on me.

## 2023-11-07 ENCOUNTER — CARE COORDINATION (OUTPATIENT)
Dept: EMERGENCY DEPT | Age: 88
End: 2023-11-07

## 2023-11-07 RX ORDER — ASPIRIN 81 MG/1
TABLET, CHEWABLE ORAL
Qty: 30 TABLET | Refills: 3 | OUTPATIENT
Start: 2023-11-07

## 2023-11-07 NOTE — CARE COORDINATION
Patient left me a message regarding her RPM kit that needed to be picked up. I forwarded this message to June Deleon for RPM who I was covering for when I placed the return for the patient last week. [employer] is working on looking into the tracking information for the kit.          [address] [place] [address]   Medication Assistance  [address] [place]    (H) [phone]  (D) [phone]

## 2023-11-28 ENCOUNTER — TELEPHONE (OUTPATIENT)
Dept: PRIMARY CARE CLINIC | Age: 88
End: 2023-11-28

## 2023-11-28 DIAGNOSIS — R05.9 COUGH, UNSPECIFIED TYPE: Primary | ICD-10-CM

## 2023-11-28 DIAGNOSIS — J31.0 RHINITIS, UNSPECIFIED TYPE: ICD-10-CM

## 2023-11-28 RX ORDER — BENZONATATE 100 MG/1
100-200 CAPSULE ORAL 3 TIMES DAILY PRN
Qty: 60 CAPSULE | Refills: 0 | Status: SHIPPED | OUTPATIENT
Start: 2023-11-28 | End: 2023-12-05

## 2023-11-28 RX ORDER — AZELASTINE 1 MG/ML
2 SPRAY, METERED NASAL 2 TIMES DAILY
Qty: 120 ML | Refills: 1 | Status: SHIPPED | OUTPATIENT
Start: 2023-11-28

## 2023-11-28 NOTE — TELEPHONE ENCOUNTER
Pt called with cold symptoms     Has had for 5 days     Asking for something to be called into pharmacy       Runny nose   cough    [id number] Sudhir Gong, [address] - F [phone]    Pt cant come in , no transportation

## 2023-12-04 ENCOUNTER — OFFICE VISIT (OUTPATIENT)
Dept: FAMILY MEDICINE CLINIC | Age: 88
End: 2023-12-04
Payer: MEDICARE

## 2023-12-04 ENCOUNTER — TELEPHONE (OUTPATIENT)
Dept: PRIMARY CARE CLINIC | Age: 88
End: 2023-12-04

## 2023-12-04 VITALS
WEIGHT: 131 LBS | RESPIRATION RATE: 20 BRPM | DIASTOLIC BLOOD PRESSURE: 64 MMHG | TEMPERATURE: 97.6 F | OXYGEN SATURATION: 96 % | HEART RATE: 86 BPM | HEIGHT: 62 IN | BODY MASS INDEX: 24.11 KG/M2 | SYSTOLIC BLOOD PRESSURE: 132 MMHG

## 2023-12-04 DIAGNOSIS — T78.40XA ALLERGIC REACTION, INITIAL ENCOUNTER: ICD-10-CM

## 2023-12-04 DIAGNOSIS — J06.9 UPPER RESPIRATORY TRACT INFECTION, UNSPECIFIED TYPE: Primary | ICD-10-CM

## 2023-12-04 DIAGNOSIS — R05.2 SUBACUTE COUGH: Primary | ICD-10-CM

## 2023-12-04 LAB
INFLUENZA A ANTIBODY: NEGATIVE
INFLUENZA B ANTIBODY: NEGATIVE
Lab: NORMAL
PERFORMING INSTRUMENT: NORMAL
QC PASS/FAIL: NORMAL
RSV ANTIGEN: NEGATIVE
SARS-COV-2, POC: NORMAL

## 2023-12-04 PROCEDURE — G8427 DOCREV CUR MEDS BY ELIG CLIN: HCPCS | Performed by: PHYSICIAN ASSISTANT

## 2023-12-04 PROCEDURE — 1090F PRES/ABSN URINE INCON ASSESS: CPT | Performed by: PHYSICIAN ASSISTANT

## 2023-12-04 PROCEDURE — 99214 OFFICE O/P EST MOD 30 MIN: CPT | Performed by: PHYSICIAN ASSISTANT

## 2023-12-04 PROCEDURE — 86756 RESPIRATORY VIRUS ANTIBODY: CPT | Performed by: PHYSICIAN ASSISTANT

## 2023-12-04 PROCEDURE — 87804 INFLUENZA ASSAY W/OPTIC: CPT | Performed by: PHYSICIAN ASSISTANT

## 2023-12-04 PROCEDURE — G8484 FLU IMMUNIZE NO ADMIN: HCPCS | Performed by: PHYSICIAN ASSISTANT

## 2023-12-04 PROCEDURE — 1036F TOBACCO NON-USER: CPT | Performed by: PHYSICIAN ASSISTANT

## 2023-12-04 PROCEDURE — 1123F ACP DISCUSS/DSCN MKR DOCD: CPT | Performed by: PHYSICIAN ASSISTANT

## 2023-12-04 PROCEDURE — G8420 CALC BMI NORM PARAMETERS: HCPCS | Performed by: PHYSICIAN ASSISTANT

## 2023-12-04 PROCEDURE — 87426 SARSCOV CORONAVIRUS AG IA: CPT | Performed by: PHYSICIAN ASSISTANT

## 2023-12-04 NOTE — TELEPHONE ENCOUNTER
Pt called, the medication below gave her Burning of knees and stiffness in legs side effects , pt now asking for something to be called in for the new symptoms     benzonatate (TESSALON) 100 MG capsule         [id number] Zaki Kothari, [place] - [address] - P [phone] - F [phone] [[id number]]

## 2023-12-04 NOTE — PROGRESS NOTES
23  Jerman Franklin : 1935 Sex: female  Age 82 y.o. Subjective:  Chief Complaint   Patient presents with    Medication Reaction     Burning in her knees and nauseated         HPI:   HPI  Jerman Franklin , 82 y.o. female presents to express care for evaluation of possible allergic reaction to medication. The patient believes that she was on a medication for colitis. It was a 500 mg tablet but the patient has no idea what medication that she was on. The patient was recently prescribed Tessalon Perles for a cough that has been somewhat persistent. The patient states that the cough has gotten better and is going away. The patient went to be checked for COVID as well. The patient is also noted that there has been some burning in the anterior part of her knees. The patient states that is not all the time. It seems to wax and wane. There is no swelling of the legs. The patient is not having calf pain. No chest pain, shortness of breath. No lip or tongue swelling. The patient Thiry fever, chills. The patient denies any other complaints at this time. ROS:   Unless otherwise stated in this report the patient's positive and negative responses for review of systems for constitutional, eyes, ENT, cardiovascular, respiratory, gastrointestinal, neurological, , musculoskeletal, and integument systems and related systems to the presenting problem are either stated in the history of present illness or were not pertinent or were negative for the symptoms and/or complaints related to the presenting medical problem. Positives and pertinent negatives as per HPI. All others reviewed and are negative.       PMH:     Past Medical History:   Diagnosis Date    Abnormal weight loss     Anxiety     Anxiety disorder     Cerebral infarction, unspecified ([address])     Essential (primary) hypertension     Gastric reflux     Hyperlipidemia     Hypertension     Occlusion and stenosis of right carotid artery No

## 2023-12-06 NOTE — TELEPHONE ENCOUNTER
Santiago Walker  Clinical Staff  Subject: Message to Provider    QUESTIONS  Information for Provider? pt needs to speak to the doctor about an  antibiotic. Pt said she needs to be called back.  She is also is having  burning in her feet

## 2023-12-07 ENCOUNTER — TELEPHONE (OUTPATIENT)
Dept: PRIMARY CARE CLINIC | Age: 88
End: 2023-12-07

## 2023-12-07 RX ORDER — AZITHROMYCIN 250 MG/1
250 TABLET, FILM COATED ORAL ONCE
Status: SHIPPED | OUTPATIENT
Start: 2023-12-07

## 2023-12-07 NOTE — TELEPHONE ENCOUNTER
Pt called has stiffness and pain in neck and in legs , asking for an antibiotic   Pt feels she has some type of infection in her body - went to an urgent care  A couple days ago      Pt asking for a phone call from clinical staff or dr cruz    Ph [phone]

## 2023-12-11 RX ORDER — AZITHROMYCIN 250 MG/1
TABLET, FILM COATED ORAL
Qty: 6 TABLET | Refills: 0 | Status: SHIPPED | OUTPATIENT
Start: 2023-12-11 | End: 2023-12-16

## 2023-12-11 RX ORDER — AZITHROMYCIN 250 MG/1
250 TABLET, FILM COATED ORAL DAILY
COMMUNITY
End: 2023-12-11 | Stop reason: SDUPTHER

## 2023-12-27 ENCOUNTER — OFFICE VISIT (OUTPATIENT)
Dept: PRIMARY CARE CLINIC | Age: 88
End: 2023-12-27
Payer: MEDICARE

## 2023-12-27 VITALS
HEIGHT: 62 IN | TEMPERATURE: 98.8 F | DIASTOLIC BLOOD PRESSURE: 62 MMHG | WEIGHT: 130 LBS | OXYGEN SATURATION: 96 % | BODY MASS INDEX: 23.92 KG/M2 | SYSTOLIC BLOOD PRESSURE: 118 MMHG | HEART RATE: 91 BPM

## 2023-12-27 DIAGNOSIS — M54.2 CERVICALGIA: ICD-10-CM

## 2023-12-27 DIAGNOSIS — I10 PRIMARY HYPERTENSION: ICD-10-CM

## 2023-12-27 DIAGNOSIS — E78.00 PURE HYPERCHOLESTEROLEMIA: ICD-10-CM

## 2023-12-27 DIAGNOSIS — Z00.00 MEDICARE ANNUAL WELLNESS VISIT, SUBSEQUENT: Primary | ICD-10-CM

## 2023-12-27 PROCEDURE — G0439 PPPS, SUBSEQ VISIT: HCPCS | Performed by: INTERNAL MEDICINE

## 2023-12-27 PROCEDURE — 1123F ACP DISCUSS/DSCN MKR DOCD: CPT | Performed by: INTERNAL MEDICINE

## 2023-12-27 PROCEDURE — G8427 DOCREV CUR MEDS BY ELIG CLIN: HCPCS | Performed by: INTERNAL MEDICINE

## 2023-12-27 PROCEDURE — 99213 OFFICE O/P EST LOW 20 MIN: CPT | Performed by: INTERNAL MEDICINE

## 2023-12-27 PROCEDURE — G8484 FLU IMMUNIZE NO ADMIN: HCPCS | Performed by: INTERNAL MEDICINE

## 2023-12-27 PROCEDURE — G8420 CALC BMI NORM PARAMETERS: HCPCS | Performed by: INTERNAL MEDICINE

## 2023-12-27 PROCEDURE — 1090F PRES/ABSN URINE INCON ASSESS: CPT | Performed by: INTERNAL MEDICINE

## 2023-12-27 PROCEDURE — 1036F TOBACCO NON-USER: CPT | Performed by: INTERNAL MEDICINE

## 2023-12-27 RX ORDER — BACLOFEN 10 MG/1
10 TABLET ORAL NIGHTLY PRN
Qty: 15 TABLET | Refills: 1 | Status: SHIPPED | OUTPATIENT
Start: 2023-12-27

## 2023-12-27 NOTE — PROGRESS NOTES
Medicare Annual Wellness Visit    [place] Anand is here for Medicare AWV    Assessment & Plan   Medicare annual wellness visit, subsequent  Cervicalgia  Comments:  Patient is not using Voltaren prescription is sent and muscle relaxer to take only if needed at night. Orders:  -     XR CERVICAL SPINE W OBLIQUES FLEXION AND EXTENSION; Future  -     XR CERVICAL SPINE (4-5 VIEWS); Future  -     diclofenac sodium (VOLTAREN) 1 % GEL; Apply 2 g topically 4 times daily, Topical, 4 TIMES DAILY Starting Wed 12/27/2023, Disp-100 g, R-1, Normal  -     baclofen (LIORESAL) 10 MG tablet; Take 1 tablet by mouth nightly as needed (neck muscle spasm), Disp-15 tablet, R-1Normal  Pure hypercholesterolemia  -     Comprehensive Metabolic Panel; Future  -     Lipid Panel; Future  -     CBC with Auto Differential; Future  -     TSH; Future  Primary hypertension  Comments:  Blood pressure at goal patient will continue amlodipine 2.5 mg daily  Orders:  -     Comprehensive Metabolic Panel; Future  -     Lipid Panel; Future  -     CBC with Auto Differential; Future  -     TSH; Future    Recommendations for Preventive Services Due: see orders and patient instructions/AVS.  Recommended screening schedule for the next 5-10 years is provided to the patient in written form: see Patient Instructions/AVS.     Return for Medicare Annual Wellness Visit in 1 year. Subjective   The following acute and/or chronic problems were also addressed today:  Patient complains of some noise in her left ear also some growling in her stomach states her diarrhea has resolved and she has stiffness in her neck. Especially on the right side, pain limits her range of motion    Patient's complete Health Risk Assessment and screening values have been reviewed and are found in Flowsheets. The following problems were reviewed today and where indicated follow up appointments were made and/or referrals ordered.     Positive Risk Factor Screenings with Interventions:

## 2024-01-02 DIAGNOSIS — I10 PRIMARY HYPERTENSION: ICD-10-CM

## 2024-01-08 DIAGNOSIS — I10 PRIMARY HYPERTENSION: ICD-10-CM

## 2024-01-08 DIAGNOSIS — J31.0 RHINITIS, UNSPECIFIED TYPE: Primary | ICD-10-CM

## 2024-01-08 RX ORDER — AMLODIPINE BESYLATE 2.5 MG/1
2.5 TABLET ORAL DAILY
Qty: 90 TABLET | Refills: 3 | Status: SHIPPED | OUTPATIENT
Start: 2024-01-08

## 2024-01-08 RX ORDER — AMLODIPINE BESYLATE 2.5 MG/1
2.5 TABLET ORAL DAILY
Qty: 90 TABLET | Refills: 3 | OUTPATIENT
Start: 2024-01-08

## 2024-01-08 RX ORDER — ASPIRIN 81 MG/1
81 TABLET, CHEWABLE ORAL DAILY
Qty: 90 TABLET | Refills: 3 | Status: SHIPPED | OUTPATIENT
Start: 2024-01-08

## 2024-01-08 RX ORDER — ASPIRIN 81 MG
81 TABLET,CHEWABLE ORAL DAILY
Qty: 90 TABLET | Refills: 3 | OUTPATIENT
Start: 2024-01-08

## 2024-01-08 NOTE — TELEPHONE ENCOUNTER
----- Message from Марина Matti sent at 1/8/2024  9:18 AM EST -----  Subject: Refill Request    QUESTIONS  Name of Medication? Other - [facility]  Patient-reported dosage and instructions? n/a  How many days do you have left? 0  Preferred [facility]acy? [id number]  Pharmacy phone number (if available)? [phone]  Additional Information for Provider? Pt states [facility] requested   antibiotic last night through fax. Pt states she needs meds right away.   Please let pt know when it has been filled  ---------------------------------------------------------------------------  --------------  CALL BACK INFO  What is the best way for the office to contact you? OK to leave message on   voicemail  Preferred Call Back Phone Number? [phone]  ---------------------------------------------------------------------------  --------------  SCRIPT ANSWERS  Relationship to Patient? Self

## 2024-01-08 NOTE — TELEPHONE ENCOUNTER
I spoke to pharmacy, her regular meds were never sent in. I sent them in and checked to make sure they got the scripts.  The antibiotic would still be pending due to Flores just finished one and I'm not sure its been long enough between them to have another dose.  This was explained to her and she stated understanding.  She said she just feels miserable, she was sniffling and could here the sinus congestion over the phone.  She thanked me and was happy with the results.

## 2024-01-08 NOTE — TELEPHONE ENCOUNTER
Flores called back wanting to know why her medication has not been sent to the pharmacy yet.  I did ask her a few simple questions about needing an antibiotic and what her symptoms where.  And she accused me of drilling her and she doesn't know why her doctor doesn't just send in what she wants, it been since 9am this morning.  I told her I would send an urgent message to the doctor about her antibiotic and her refills.

## 2024-01-08 NOTE — TELEPHONE ENCOUNTER
Last Appointment:  12/27/2023  Future Appointments   Date Time Provider Department Center   3/29/2024  1:00 PM SCHEDULE, [facility]ON [facility]   4/30/2024  3:30 PM Abena Easton MD [facility]

## 2024-01-09 RX ORDER — AZITHROMYCIN 250 MG/1
TABLET, FILM COATED ORAL
Qty: 6 TABLET | Refills: 0 | OUTPATIENT
Start: 2024-01-09

## 2024-01-09 RX ORDER — AZITHROMYCIN 250 MG/1
250 TABLET, FILM COATED ORAL SEE ADMIN INSTRUCTIONS
Qty: 6 TABLET | Refills: 0 | Status: SHIPPED | OUTPATIENT
Start: 2024-01-09 | End: 2024-01-14

## 2024-01-09 RX ORDER — AMLODIPINE BESYLATE 2.5 MG/1
2.5 TABLET ORAL DAILY
Qty: 90 TABLET | Refills: 3 | Status: SHIPPED | OUTPATIENT
Start: 2024-01-09

## 2024-01-09 RX ORDER — ASPIRIN 81 MG
81 TABLET,CHEWABLE ORAL DAILY
Qty: 90 TABLET | Refills: 3 | Status: SHIPPED | OUTPATIENT
Start: 2024-01-09

## 2024-01-11 ENCOUNTER — TELEPHONE (OUTPATIENT)
Dept: PRIMARY CARE CLINIC | Age: 89
End: 2024-01-11

## 2024-01-11 DIAGNOSIS — R05.9 COUGH, UNSPECIFIED TYPE: Primary | ICD-10-CM

## 2024-01-11 NOTE — TELEPHONE ENCOUNTER
ECC: patient is coughing and a lot of nasal drainage and congestion, she said she doesn't think the medication is helping, she just started it yesterday.  I did tell her she needs to give it some time.    Doesn't feel like eating, is drinking tea with honey and having toast.  I told that was good to stay hydrated.  I told her I would send you a note that she may need to go in to urgent care to be assessed due to you being out of the office already for the day.  But I would send you a urgent message.

## 2024-01-15 RX ORDER — BENZONATATE 100 MG/1
100-200 CAPSULE ORAL 3 TIMES DAILY PRN
Qty: 60 CAPSULE | Refills: 1 | Status: SHIPPED | OUTPATIENT
Start: 2024-01-15 | End: 2024-02-14

## 2024-01-15 NOTE — TELEPHONE ENCOUNTER
Flores is feeling a little better, but still coughing a lot.  Is she able to get a prescription for the little pills.  I asked if they were called Tessalon pearls?  She said yes the pearls to help with her cough.  Thank you!

## 2024-01-22 ENCOUNTER — TELEPHONE (OUTPATIENT)
Dept: PRIMARY CARE CLINIC | Age: 89
End: 2024-01-22

## 2024-01-22 NOTE — TELEPHONE ENCOUNTER
16 days with a bad cold.    Antibiotics that was ordered last week is  not working.  Needs a stronger antibiotic to make her feel better.

## 2024-02-01 ENCOUNTER — TELEPHONE (OUTPATIENT)
Dept: PRIMARY CARE CLINIC | Age: 89
End: 2024-02-01

## 2024-02-01 ENCOUNTER — HOSPITAL ENCOUNTER (EMERGENCY)
Age: 89
Discharge: HOME | End: 2024-02-01
Attending: STUDENT IN AN ORGANIZED HEALTH CARE EDUCATION/TRAINING PROGRAM
Payer: MEDICARE

## 2024-02-01 ENCOUNTER — APPOINTMENT (OUTPATIENT)
Dept: CT IMAGING | Age: 89
End: 2024-02-01
Payer: MEDICARE

## 2024-02-01 VITALS
OXYGEN SATURATION: 96 % | BODY MASS INDEX: 22.08 KG/M2 | SYSTOLIC BLOOD PRESSURE: 132 MMHG | DIASTOLIC BLOOD PRESSURE: 66 MMHG | HEART RATE: 81 BPM | WEIGHT: 120 LBS | TEMPERATURE: 97.8 F | RESPIRATION RATE: 18 BRPM | HEIGHT: 62 IN

## 2024-02-01 DIAGNOSIS — R55 SYNCOPE AND COLLAPSE: Primary | ICD-10-CM

## 2024-02-01 LAB
ALBUMIN SERPL-MCNC: 4.2 G/DL (ref 3.5–5.2)
ALP SERPL-CCNC: 75 U/L (ref 35–104)
ALT SERPL-CCNC: 5 U/L (ref 0–32)
ANION GAP SERPL CALCULATED.3IONS-SCNC: 8 MMOL/L (ref 7–16)
AST SERPL-CCNC: 10 U/L (ref 0–31)
BASOPHILS # BLD: 0 K/UL (ref 0–0.2)
BASOPHILS NFR BLD: 0 % (ref 0–2)
BILIRUB SERPL-MCNC: 0.4 MG/DL (ref 0–1.2)
BILIRUB UR QL STRIP: NEGATIVE
BUN SERPL-MCNC: 13 MG/DL (ref 6–23)
CALCIUM SERPL-MCNC: 9 MG/DL (ref 8.6–10.2)
CHLORIDE SERPL-SCNC: 108 MMOL/L (ref 98–107)
CLARITY UR: CLEAR
CO2 SERPL-SCNC: 26 MMOL/L (ref 22–29)
COLOR UR: YELLOW
CREAT SERPL-MCNC: 0.6 MG/DL (ref 0.5–1)
EKG ATRIAL RATE: 76 BPM
EKG P AXIS: 73 DEGREES
EKG P-R INTERVAL: 196 MS
EKG Q-T INTERVAL: 396 MS
EKG QRS DURATION: 92 MS
EKG QTC CALCULATION (BAZETT): 445 MS
EKG R AXIS: -35 DEGREES
EKG T AXIS: 66 DEGREES
EKG VENTRICULAR RATE: 76 BPM
EOSINOPHIL # BLD: 0.01 K/UL (ref 0.05–0.5)
EOSINOPHILS RELATIVE PERCENT: 0 % (ref 0–6)
ERYTHROCYTE [DISTWIDTH] IN BLOOD BY AUTOMATED COUNT: 13.1 % (ref 11.5–15)
GFR SERPL CREATININE-BSD FRML MDRD: >60 ML/MIN/1.73M2
GLUCOSE SERPL-MCNC: 104 MG/DL (ref 74–99)
GLUCOSE UR STRIP-MCNC: NEGATIVE MG/DL
HCT VFR BLD AUTO: 37.5 % (ref 34–48)
HGB BLD-MCNC: 12.1 G/DL (ref 11.5–15.5)
HGB UR QL STRIP.AUTO: NEGATIVE
IMM GRANULOCYTES # BLD AUTO: <0.03 K/UL (ref 0–0.58)
IMM GRANULOCYTES NFR BLD: 0 % (ref 0–5)
KETONES UR STRIP-MCNC: NEGATIVE MG/DL
LEUKOCYTE ESTERASE UR QL STRIP: NEGATIVE
LYMPHOCYTES NFR BLD: 0.86 K/UL (ref 1.5–4)
LYMPHOCYTES RELATIVE PERCENT: 24 % (ref 20–42)
MCH RBC QN AUTO: 32.7 PG (ref 26–35)
MCHC RBC AUTO-ENTMCNC: 32.3 G/DL (ref 32–34.5)
MCV RBC AUTO: 101.4 FL (ref 80–99.9)
MONOCYTES NFR BLD: 0.51 K/UL (ref 0.1–0.95)
MONOCYTES NFR BLD: 14 % (ref 2–12)
NEUTROPHILS NFR BLD: 62 % (ref 43–80)
NEUTS SEG NFR BLD: 2.27 K/UL (ref 1.8–7.3)
NITRITE UR QL STRIP: NEGATIVE
PH UR STRIP: 7 [PH] (ref 5–9)
PLATELET # BLD AUTO: 262 K/UL (ref 130–450)
PMV BLD AUTO: 10.2 FL (ref 7–12)
POTASSIUM SERPL-SCNC: 4 MMOL/L (ref 3.5–5)
PROT SERPL-MCNC: 6.8 G/DL (ref 6.4–8.3)
PROT UR STRIP-MCNC: NEGATIVE MG/DL
RBC # BLD AUTO: 3.7 M/UL (ref 3.5–5.5)
RBC #/AREA URNS HPF: NORMAL /HPF
SODIUM SERPL-SCNC: 142 MMOL/L (ref 132–146)
SP GR UR STRIP: 1.02 (ref 1–1.03)
TROPONIN I SERPL HS-MCNC: 7 NG/L (ref 0–9)
UROBILINOGEN UR STRIP-ACNC: 0.2 EU/DL (ref 0–1)
WBC #/AREA URNS HPF: NORMAL /HPF
WBC OTHER # BLD: 3.7 K/UL (ref 4.5–11.5)

## 2024-02-01 PROCEDURE — 70450 CT HEAD/BRAIN W/O DYE: CPT

## 2024-02-01 PROCEDURE — 99284 EMERGENCY DEPT VISIT MOD MDM: CPT

## 2024-02-01 PROCEDURE — 84484 ASSAY OF TROPONIN QUANT: CPT

## 2024-02-01 PROCEDURE — 93005 ELECTROCARDIOGRAM TRACING: CPT | Performed by: NURSE PRACTITIONER

## 2024-02-01 PROCEDURE — 85025 COMPLETE CBC W/AUTO DIFF WBC: CPT

## 2024-02-01 PROCEDURE — 81001 URINALYSIS AUTO W/SCOPE: CPT

## 2024-02-01 PROCEDURE — 80053 COMPREHEN METABOLIC PANEL: CPT

## 2024-02-01 PROCEDURE — 93010 ELECTROCARDIOGRAM REPORT: CPT | Performed by: INTERNAL MEDICINE

## 2024-02-01 PROCEDURE — 72125 CT NECK SPINE W/O DYE: CPT

## 2024-02-01 ASSESSMENT — PAIN - FUNCTIONAL ASSESSMENT: PAIN_FUNCTIONAL_ASSESSMENT: NONE - DENIES PAIN

## 2024-02-01 NOTE — ED PROVIDER NOTES
is warm and dry.   Neurological:      Mental Status: She is alert and oriented to person, place, and time.      Cranial Nerves: No cranial nerve deficit.      Coordination: Coordination normal.      Comments: Time patient has mildly slurred speech but otherwise no focal neurological deficits.  Moving all 4 extremities with equal strength bilaterally.  No facial droop.          Procedures       [facility]       ED Course as of 02/01/24 1515   Thu Feb 01, 2024   1329 The read interpreted by me.  Rate 76 bpm.  Left axis deviation.  No ST elevations or depressions.  Regular rate and rhythm.  Compared to prior EKG on 7/6/2023 with no significant changes. [JM]      ED Course User Index  [JM] Sameer Garcia MD      Patient is a 88 y.o. female presenting with fall.  Patient did have a syncopal episode after being on the toilet and then standing up quickly.  Patient does have a history of strokes.  Patient's NIH is 0 and has known mild dysarthria.  No aphasia.  Patient to get a CT of the head and CT of the cervical spine.  CT of the head is negative for any acute findings.  Patient otherwise clinically appears well.  No chest pain or shortness of breath.  Patient did get an EKG that is at baseline.  No signs of cardiac arrhythmia.  Patient did have a CBC drawn.  Patient's hemoglobin is normal.  Patient is not having any urinary symptoms.  Patient's troponin is 7.  Patient has a negative CT of the head and cervical spine.  Patient be discharged home.  Discussed this with patient is agreeable to this plan.    Reviewed patient's previous office visit at which time she was seen for dizziness and lightheadedness.0.    Patient was given return precautions.  Patient will follow up with their primary care provider. Patient is agreeable to this plan. Patient has remained stable throughout their stay in the ED.             CONSULTS:   None    Medications given in the emergency room:  Medications - No data to display     LABS:    Labs

## 2024-02-01 NOTE — ED NOTES
Department of Emergency Medicine  FIRST PROVIDER TRIAGE NOTE             Independent MLP           2/1/24  11:35 AM EST    Date of Encounter: 2/1/24   MRN: [id number]      HPI: Flores Gaines is a 88 y.o. female who presents to the ED for Loss of Consciousness (Suddenly lost consciousness and fell to floor), Neck Pain, Headache (Rt temple), and Leg Pain (bilateral)  States she had a syncopal episode after going to the bathroom states she fell into the bathroom floor had no loss of consciousness but did fall.  She is complaining of neck pain and head pain.    ROS: Negative for cp or sob.    PE: Gen Appearance/Constitutional: alert  CV: regular rate     Initial Plan of Care: All treatment areas with department are currently occupied. Plan to order/Initiate the following while awaiting opening in ED: labs, EKG, and imaging studies.  Initiate Treatment-Testing, Proceed toTreatment Area When Bed Available for ED Attending/MLP to Continue Care    Electronically signed by CELE Mario CNP   DD: 2/1/24       Sindy Turner APRN - CNP  02/01/24 1136

## 2024-02-01 NOTE — TELEPHONE ENCOUNTER
ECC: Got up and went to the bathroom, fell in the bathroom stuck between toilet and cabinet, at the time of fall she said she didn't feel dizzy or anything,  scared that she may go down again.  Not sure if her blood pressure is low or not.  Still feeling funny, legs feel funny. She went to bed at 2am recovering from being sick with cold, feels shaky now.  Telling patient she should go to Ed or urgent care to be assessed. Per patient request, I spoke with her sister Marva #[phone] and she is going to go pick her up and take her to the hospital

## 2024-02-12 ENCOUNTER — TELEPHONE (OUTPATIENT)
Dept: PRIMARY CARE CLINIC | Age: 89
End: 2024-02-12

## 2024-02-12 DIAGNOSIS — I10 PRIMARY HYPERTENSION: Primary | ICD-10-CM

## 2024-02-12 DIAGNOSIS — R42 DIZZINESS: ICD-10-CM

## 2024-02-12 NOTE — TELEPHONE ENCOUNTER
Patient calling stating Dr. Easton was going to order a nurse to come take her BP daily since she is dizzy all the time and doesn't know if its her BP or something else.     Unsure if home health could be ordered. But I Pended order for remote monitoring if agreeable.

## 2024-02-13 ENCOUNTER — CARE COORDINATION (OUTPATIENT)
Dept: CARE COORDINATION | Age: 89
End: 2024-02-13

## 2024-02-13 NOTE — CARE COORDINATION
Attempted to reach patient by telephone.  No answer, not able to leave message.  Will send introduction letter via mail.  Will attempt to reach patient again.

## 2024-02-16 ENCOUNTER — CARE COORDINATION (OUTPATIENT)
Dept: PRIMARY CARE CLINIC | Age: 89
End: 2024-02-16

## 2024-02-16 ENCOUNTER — CARE COORDINATION (OUTPATIENT)
Dept: CARE COORDINATION | Age: 89
End: 2024-02-16

## 2024-02-16 DIAGNOSIS — I16.1 HYPERTENSIVE EMERGENCY: Primary | ICD-10-CM

## 2024-02-16 SDOH — HEALTH STABILITY: PHYSICAL HEALTH: ON AVERAGE, HOW MANY DAYS PER WEEK DO YOU ENGAGE IN MODERATE TO STRENUOUS EXERCISE (LIKE A BRISK WALK)?: 7 DAYS

## 2024-02-16 SDOH — ECONOMIC STABILITY: INCOME INSECURITY: IN THE LAST 12 MONTHS, WAS THERE A TIME WHEN YOU WERE NOT ABLE TO PAY THE MORTGAGE OR RENT ON TIME?: NO

## 2024-02-16 SDOH — ECONOMIC STABILITY: FOOD INSECURITY: WITHIN THE PAST 12 MONTHS, YOU WORRIED THAT YOUR FOOD WOULD RUN OUT BEFORE YOU GOT MONEY TO BUY MORE.: NEVER TRUE

## 2024-02-16 SDOH — ECONOMIC STABILITY: FOOD INSECURITY: WITHIN THE PAST 12 MONTHS, THE FOOD YOU BOUGHT JUST DIDN'T LAST AND YOU DIDN'T HAVE MONEY TO GET MORE.: NEVER TRUE

## 2024-02-16 SDOH — HEALTH STABILITY: PHYSICAL HEALTH: ON AVERAGE, HOW MANY MINUTES DO YOU ENGAGE IN EXERCISE AT THIS LEVEL?: 10 MIN

## 2024-02-16 SDOH — ECONOMIC STABILITY: HOUSING INSECURITY: IN THE LAST 12 MONTHS, HOW MANY PLACES HAVE YOU LIVED?: 1

## 2024-02-16 ASSESSMENT — SOCIAL DETERMINANTS OF HEALTH (SDOH)
HOW HARD IS IT FOR YOU TO PAY FOR THE VERY BASICS LIKE FOOD, HOUSING, MEDICAL CARE, AND HEATING?: NOT HARD AT ALL
HOW OFTEN DO YOU GET TOGETHER WITH FRIENDS OR RELATIVES?: MORE THAN THREE TIMES A WEEK
DO YOU BELONG TO ANY CLUBS OR ORGANIZATIONS SUCH AS CHURCH GROUPS UNIONS, FRATERNAL OR ATHLETIC GROUPS, OR SCHOOL GROUPS?: NO
HOW OFTEN DO YOU ATTEND CHURCH OR RELIGIOUS SERVICES?: NEVER
HOW OFTEN DO YOU ATTENT MEETINGS OF THE CLUB OR ORGANIZATION YOU BELONG TO?: NEVER
IN A TYPICAL WEEK, HOW MANY TIMES DO YOU TALK ON THE PHONE WITH FAMILY, FRIENDS, OR NEIGHBORS?: MORE THAN THREE TIMES A WEEK

## 2024-02-16 NOTE — CARE COORDINATION
Remote Patient Kit Ordering Note      Date/Time:  2/16/2024 2:59 PM      CCSS placed phone call to patient/family today to notify of RPM kit order; patient/family was available; discussed the following topics below and all questions answered.    [x] CCSS confirmed patient shipping address  [x] Patient will receive package over the next 1-3 business days. Someone 21 years or older must be present to sign for UPS delivery.  [x] HRS will contact patient within 24 hours, an HRS  will call the patient directly: If the patient does not answer, HRS will follow up with the clinical team notifying them about the unsuccessful attempt to contact the patient. HRS will make three call attempts to the patient.Provide patient with [facility] Virtual install number is: [phone].  [x] ACM will contact patient once equipment is active to welcome them to the program.                                                         [x] Hours of RPM monitoring - Monday-Friday [id number]; encourage patient to get vitals entered by Noon each day to have the alert addressed same day.  [x][facility] to patient.                      ACM made aware the RPM kit has been ordered.

## 2024-02-16 NOTE — CARE COORDINATION
Remote Patient Monitoring Enrollment Note      Date/Time:  2024 1:33 PM    Offered patient enrollment in the [facility] Remote Patient Monitoring (RPM) program for in home monitoring for HTN; condition managed by Dr. Easton..  Patient accepted RPM services.    Patient will be monitoring the following daily:  blood pressure reading      ACM reviewed the information below with patient:    Emergency Contact (name and contact number): Padmini Denny [phone]  (daughter)    [x] A member from the care coordination team will reach out to notify the patient once the RPM kit is ordered.   [x] Once the kit is delivered, the HRS team will contact the patient after UPS deliver to assist with set up.            [x] Determined BP cuff size: regular (9.05\"-15.74\")      [] Determined weight scale:  na                                                  [x] Hours of ACM monitoring - Monday-Friday 5441-7843.   [x] It is important to take your vitals every day, even on the weekends,to keep your care team aware of how you are doing every day of the week.            Ambulatory Care Coordination Note  2024    Patient Current Location:  Home: [address] [place] [address] [address]  [place] [address]    ACM contacted the patient by telephone. Verified name and  with patient as identifiers. Provided introduction to self, and explanation of the ACM role.     ACM: Umesh Rodney RN    Challenges to be reviewed by the provider   Additional needs identified to be addressed with provider: No  none               Method of communication with provider: none.    AC contacted Flores to offer enrollment in care coordination and remote patient monitoring (RPM). Both services explained to Flores and she is agreeable to enroll.  Flores lives alone in a senior living apartment.  She is independent in all ADLs and IADLs.  She states she has suffered a recent syncopal episode.  She was seen at the ED and underwent testing that all came back negative.  She is

## 2024-02-16 NOTE — PROGRESS NOTES
Remote Patient Monitoring Treatment Plan    Received request from ACM/Umesh Zhang RN  to order remote patient monitoring for in home monitoring of HTN; Condition managed by PCP.  and order completed.     Patient will be monitoring blood pressure .     Please note: [facility] has indicated pt will not require weight monitoring via RPM.      Patient will engage in Remote Patient Monitoring each day to develop the skills necessary for self management.       RPM Care Team Responsibilities:   Alerts will be reviewed daily and addressed within 2-4 hours during operational hours (Monday -Friday 9 am-4 pm)  Alert response and intervention documented in patient medical record  Alert response escalated to PCP per protocol and documented in patient medical record  Patient monitored over approximately  days  Discharge from program based on self-management readiness    See care coordination encounters for additional details.

## 2024-02-26 ENCOUNTER — TELEPHONE (OUTPATIENT)
Dept: PRIMARY CARE CLINIC | Age: 89
End: 2024-02-26

## 2024-02-26 NOTE — TELEPHONE ENCOUNTER
I received the following e-mail form HRS:      Hello Team,     I spoke to patient PID [id number] and they were unwilling or unable to participate in our Virtual Installation. The patient stated that they don't feel up to participating in the RPM program at this time and would like to send it back

## 2024-02-27 ENCOUNTER — CARE COORDINATION (OUTPATIENT)
Dept: PRIMARY CARE CLINIC | Age: 89
End: 2024-02-27

## 2024-02-27 ENCOUNTER — CARE COORDINATION (OUTPATIENT)
Dept: CARE COORDINATION | Age: 89
End: 2024-02-27

## 2024-02-27 DIAGNOSIS — I10 UNCONTROLLED HYPERTENSION: Primary | ICD-10-CM

## 2024-02-27 NOTE — CARE COORDINATION
CCSS placed call to patient to arrange RPM kit  through UPS.    Reviewed with patient how to pack equipment in original packing.     Verified patient's availability to schedule UPS  time.     UPS  time requested. Anticipated  date range 2 to 4 business days.

## 2024-02-27 NOTE — PROGRESS NOTES
Remote Patient Order Discontinued    Received request from Umesh Rodney RN  to discontinue order for remote patient monitoring of HTN and order completed.

## 2024-02-27 NOTE — CARE COORDINATION
AC received message from RPM team that patient has received equipment but no longer wants to participate in program.  ACM contacted Flores.  She does not want to participate in RPM or care coordination.  [facility] will update RPM team and close care coordination episode.      Remote Patient Monitoring Disenrollment      Date/Time:  2/27/2024 3:04 PM  Patient Current Location: Home: [address] [place] [address]  [address]  [facility] [address]  Patient has been dis-enrolled from Remote Patient Monitoring program on 2/27/2024 due to no longer Interested. Patient has been provided instruction on process to return RPM equipment.       Patient has [facility]'s contact information for any further questions, concerns, or needs.

## 2024-02-28 ENCOUNTER — TELEPHONE (OUTPATIENT)
Dept: PRIMARY CARE CLINIC | Age: 89
End: 2024-02-28

## 2024-02-28 NOTE — TELEPHONE ENCOUNTER
Flores called in because she is concerned with falling.  She had an episode at the beginning of the month were she fell in her bathroom and went to the ED.  She said she sometimes feels dizzy and she checked her blood pressure and it was 136/68, I told her that was good.  She asked why she feels dizzy.  I asked her what she ate today, she said a jelly bean.  I told her she should probably go eat food not candy but food, she did say she had food in the house and said she would do that right now.  I looked for a sooner appt but you don't have one right now we will keep her on a cancel list and bring her in sooner if we can.  She also wanted to make sure you were back and doing well.

## 2024-04-04 ENCOUNTER — OFFICE VISIT (OUTPATIENT)
Dept: PRIMARY CARE CLINIC | Age: 89
End: 2024-04-04

## 2024-04-04 VITALS
DIASTOLIC BLOOD PRESSURE: 50 MMHG | HEART RATE: 90 BPM | SYSTOLIC BLOOD PRESSURE: 98 MMHG | OXYGEN SATURATION: 97 % | HEIGHT: 62 IN | TEMPERATURE: 96.9 F | RESPIRATION RATE: 18 BRPM | BODY MASS INDEX: 23.37 KG/M2 | WEIGHT: 127 LBS

## 2024-04-04 DIAGNOSIS — R55 SYNCOPE AND COLLAPSE: ICD-10-CM

## 2024-04-04 DIAGNOSIS — F41.9 ANXIETY: ICD-10-CM

## 2024-04-04 DIAGNOSIS — E78.00 PURE HYPERCHOLESTEROLEMIA: ICD-10-CM

## 2024-04-04 DIAGNOSIS — M54.2 CERVICALGIA: Primary | ICD-10-CM

## 2024-04-04 DIAGNOSIS — H66.001 NON-RECURRENT ACUTE SUPPURATIVE OTITIS MEDIA OF RIGHT EAR WITHOUT SPONTANEOUS RUPTURE OF TYMPANIC MEMBRANE: ICD-10-CM

## 2024-04-04 DIAGNOSIS — I95.2 HYPOTENSION DUE TO DRUGS: ICD-10-CM

## 2024-04-04 DIAGNOSIS — Z86.73 HISTORY OF STROKE: ICD-10-CM

## 2024-04-04 RX ORDER — EZETIMIBE 10 MG/1
10 TABLET ORAL DAILY
Qty: 90 TABLET | Refills: 3 | Status: SHIPPED | OUTPATIENT
Start: 2024-04-04

## 2024-04-04 RX ORDER — DIAZEPAM 2 MG/1
2 TABLET ORAL NIGHTLY PRN
Qty: 30 TABLET | Refills: 1 | Status: SHIPPED | OUTPATIENT
Start: 2024-04-04 | End: 2024-06-03

## 2024-04-04 RX ORDER — OFLOXACIN 3 MG/ML
5 SOLUTION AURICULAR (OTIC) 2 TIMES DAILY
Qty: 10 ML | Refills: 0 | Status: SHIPPED | OUTPATIENT
Start: 2024-04-04 | End: 2024-04-14

## 2024-04-04 RX ORDER — BACLOFEN 10 MG/1
10 TABLET ORAL NIGHTLY PRN
Qty: 15 TABLET | Refills: 1 | Status: SHIPPED | OUTPATIENT
Start: 2024-04-04

## 2024-04-04 RX ORDER — BENZONATATE 100 MG/1
100 CAPSULE ORAL 2 TIMES DAILY PRN
COMMUNITY
Start: 2024-02-28 | End: 2024-04-04 | Stop reason: SDUPTHER

## 2024-04-04 RX ORDER — BENZONATATE 100 MG/1
100 CAPSULE ORAL 2 TIMES DAILY PRN
Qty: 60 CAPSULE | Refills: 1 | Status: SHIPPED | OUTPATIENT
Start: 2024-04-04 | End: 2024-07-03

## 2024-04-04 RX ORDER — AMLODIPINE BESYLATE 2.5 MG/1
2.5 TABLET ORAL DAILY
Qty: 90 TABLET | Refills: 3 | Status: CANCELLED | OUTPATIENT
Start: 2024-04-04

## 2024-04-04 ASSESSMENT — ENCOUNTER SYMPTOMS
CHEST TIGHTNESS: 0
ABDOMINAL PAIN: 0
NAUSEA: 0
WHEEZING: 0
SORE THROAT: 0
VOICE CHANGE: 0
SHORTNESS OF BREATH: 0

## 2024-04-04 NOTE — PROGRESS NOTES
HPI:  Patient comes in today for complaint of pain in her right ear patient is concerned that her blood pressure is low today the patient states that she had nothing to eat today and she thinks that is why her pressure is low, she also took amlodipine 2.5 mg today.  Patient states that she needs some of the Valium she has only 7 left she is taking them only when needed PDMP reviewed last prescription was September 2023 for 30 tablets  She states also she wants to keep some of the Tessalon Perles in case she gets a cough since she lives around a lot of people in her apartment.  Patient states she loves her apartment.  Review of Systems   Constitutional:  Negative for chills, fever and unexpected weight change.   HENT:  Negative for postnasal drip, sore throat and voice change.    Respiratory:  Negative for chest tightness, shortness of breath and wheezing.    Cardiovascular:  Negative for chest pain and leg swelling.   Gastrointestinal:  Negative for abdominal pain, nausea and vomiting.   Musculoskeletal:  Negative for gait problem and joint swelling.   Skin:  Negative for rash and wound.   Neurological:  Positive for speech difficulty (Same as baseline) and light-headedness (Occasional time was about 2 weeks ago patient states she jumped out of bed really fast to run to the bathroom.). Negative for dizziness.   Psychiatric/Behavioral: Negative.          Past Medical History:   Diagnosis Date    Abnormal weight loss     Anxiety     Anxiety disorder     Cerebral infarction, unspecified (HCC)     Essential (primary) hypertension     Gastric reflux     Hyperlipidemia     Hypertension     Occlusion and stenosis of right carotid artery     Slurred speech      Past Surgical History:   Procedure Laterality Date    CAROTID ENDARTERECTOMY Right 11/27/2020    RIGHT CAROTID ENDARTERECTOMY performed by Bobby Fitzpatrick MD at [facility] [place] [place]    COLONOSCOPY      HYSTERECTOMY (CERVIX STATUS UNKNOWN)      LITHOTRIPSY Left 2/7/2020

## 2024-04-18 ENCOUNTER — HOSPITAL ENCOUNTER (OUTPATIENT)
Age: 89
Setting detail: OBSERVATION
Discharge: HOME | End: 2024-04-21
Attending: EMERGENCY MEDICINE | Admitting: HOSPITALIST
Payer: MEDICARE

## 2024-04-18 DIAGNOSIS — R07.9 CHEST PAIN, UNSPECIFIED TYPE: Primary | ICD-10-CM

## 2024-04-18 PROCEDURE — 93005 ELECTROCARDIOGRAM TRACING: CPT

## 2024-04-18 PROCEDURE — 99285 EMERGENCY DEPT VISIT HI MDM: CPT

## 2024-04-18 ASSESSMENT — PAIN SCALES - GENERAL: PAINLEVEL_OUTOF10: 10

## 2024-04-18 ASSESSMENT — PAIN - FUNCTIONAL ASSESSMENT: PAIN_FUNCTIONAL_ASSESSMENT: 0-10

## 2024-04-18 ASSESSMENT — PAIN DESCRIPTION - FREQUENCY: FREQUENCY: CONTINUOUS

## 2024-04-18 ASSESSMENT — PAIN DESCRIPTION - ORIENTATION: ORIENTATION: LEFT

## 2024-04-18 ASSESSMENT — PAIN DESCRIPTION - PAIN TYPE: TYPE: ACUTE PAIN

## 2024-04-18 ASSESSMENT — PAIN DESCRIPTION - DESCRIPTORS: DESCRIPTORS: PATIENT UNABLE TO DESCRIBE

## 2024-04-18 ASSESSMENT — PAIN DESCRIPTION - LOCATION: LOCATION: CHEST

## 2024-04-18 ASSESSMENT — PAIN DESCRIPTION - ONSET: ONSET: SUDDEN

## 2024-04-19 ENCOUNTER — APPOINTMENT (OUTPATIENT)
Dept: GENERAL RADIOLOGY | Age: 89
End: 2024-04-19
Payer: MEDICARE

## 2024-04-19 ENCOUNTER — APPOINTMENT (OUTPATIENT)
Age: 89
End: 2024-04-19
Payer: MEDICARE

## 2024-04-19 PROBLEM — R07.9 CHEST PAIN: Status: ACTIVE | Noted: 2024-04-19

## 2024-04-19 LAB
ANION GAP SERPL CALCULATED.3IONS-SCNC: 9 MMOL/L (ref 7–16)
BASOPHILS # BLD: 0.01 K/UL (ref 0–0.2)
BASOPHILS NFR BLD: 0 % (ref 0–2)
BUN SERPL-MCNC: 20 MG/DL (ref 6–23)
CALCIUM SERPL-MCNC: 8.9 MG/DL (ref 8.6–10.2)
CHLORIDE SERPL-SCNC: 106 MMOL/L (ref 98–107)
CHOLEST SERPL-MCNC: 134 MG/DL
CO2 SERPL-SCNC: 26 MMOL/L (ref 22–29)
CREAT SERPL-MCNC: 0.7 MG/DL (ref 0.5–1)
ECHO BSA: 1.59 M2
EKG ATRIAL RATE: 75 BPM
EKG ATRIAL RATE: 95 BPM
EKG P AXIS: 74 DEGREES
EKG P AXIS: 86 DEGREES
EKG P-R INTERVAL: 186 MS
EKG P-R INTERVAL: 196 MS
EKG Q-T INTERVAL: 348 MS
EKG Q-T INTERVAL: 392 MS
EKG QRS DURATION: 88 MS
EKG QRS DURATION: 94 MS
EKG QTC CALCULATION (BAZETT): 437 MS
EKG QTC CALCULATION (BAZETT): 437 MS
EKG R AXIS: -12 DEGREES
EKG R AXIS: 40 DEGREES
EKG T AXIS: 72 DEGREES
EKG T AXIS: 85 DEGREES
EKG VENTRICULAR RATE: 75 BPM
EKG VENTRICULAR RATE: 95 BPM
EOSINOPHIL # BLD: 0.01 K/UL (ref 0.05–0.5)
EOSINOPHILS RELATIVE PERCENT: 0 % (ref 0–6)
ERYTHROCYTE [DISTWIDTH] IN BLOOD BY AUTOMATED COUNT: 13.9 % (ref 11.5–15)
GFR SERPL CREATININE-BSD FRML MDRD: 80 ML/MIN/1.73M2
GLUCOSE SERPL-MCNC: 202 MG/DL (ref 74–99)
HBA1C MFR BLD: 5.8 % (ref 4–5.6)
HCT VFR BLD AUTO: 34.2 % (ref 34–48)
HDLC SERPL-MCNC: 37 MG/DL
HGB BLD-MCNC: 11.4 G/DL (ref 11.5–15.5)
IMM GRANULOCYTES # BLD AUTO: <0.03 K/UL (ref 0–0.58)
IMM GRANULOCYTES NFR BLD: 0 % (ref 0–5)
LDLC SERPL CALC-MCNC: 85 MG/DL
LYMPHOCYTES NFR BLD: 0.99 K/UL (ref 1.5–4)
LYMPHOCYTES RELATIVE PERCENT: 28 % (ref 20–42)
MCH RBC QN AUTO: 33.5 PG (ref 26–35)
MCHC RBC AUTO-ENTMCNC: 33.3 G/DL (ref 32–34.5)
MCV RBC AUTO: 100.6 FL (ref 80–99.9)
MONOCYTES NFR BLD: 0.53 K/UL (ref 0.1–0.95)
MONOCYTES NFR BLD: 15 % (ref 2–12)
NEUTROPHILS NFR BLD: 57 % (ref 43–80)
NEUTS SEG NFR BLD: 2.05 K/UL (ref 1.8–7.3)
NUC STRESS EJECTION FRACTION: 84 %
PLATELET # BLD AUTO: 261 K/UL (ref 130–450)
PMV BLD AUTO: 10.2 FL (ref 7–12)
POTASSIUM SERPL-SCNC: 3.8 MMOL/L (ref 3.5–5)
RBC # BLD AUTO: 3.4 M/UL (ref 3.5–5.5)
SODIUM SERPL-SCNC: 141 MMOL/L (ref 132–146)
STRESS BASELINE DIAS BP: 60 MMHG
STRESS BASELINE HR: 72 BPM
STRESS BASELINE SYS BP: 124 MMHG
STRESS ESTIMATED WORKLOAD: 1 METS
STRESS PEAK DIAS BP: 64 MMHG
STRESS PEAK SYS BP: 134 MMHG
STRESS PERCENT HR ACHIEVED: 87 %
STRESS POST PEAK HR: 117 BPM
STRESS RATE PRESSURE PRODUCT: NORMAL BPM*MMHG
STRESS STAGE 1 BP: NORMAL MMHG
STRESS STAGE 1 HR: 71 BPM
STRESS STAGE 2 BP: NORMAL MMHG
STRESS STAGE 2 HR: 115 BPM
STRESS STAGE RECOVERY 1 BP: NORMAL MMHG
STRESS STAGE RECOVERY 1 HR: 107 BPM
STRESS STAGE RECOVERY 3 BP: NORMAL MMHG
STRESS STAGE RECOVERY 3 HR: 100 BPM
STRESS TARGET HR: 134 BPM
TID: 1.15
TRIGL SERPL-MCNC: 60 MG/DL
TROPONIN I SERPL HS-MCNC: 7 NG/L (ref 0–9)
TROPONIN I SERPL HS-MCNC: 7 NG/L (ref 0–9)
TSH SERPL DL<=0.05 MIU/L-ACNC: 1.5 UIU/ML (ref 0.27–4.2)
VLDLC SERPL CALC-MCNC: 12 MG/DL
WBC OTHER # BLD: 3.6 K/UL (ref 4.5–11.5)

## 2024-04-19 PROCEDURE — G0378 HOSPITAL OBSERVATION PER HR: HCPCS

## 2024-04-19 PROCEDURE — 2580000003 HC RX 258

## 2024-04-19 PROCEDURE — 96372 THER/PROPH/DIAG INJ SC/IM: CPT

## 2024-04-19 PROCEDURE — 83036 HEMOGLOBIN GLYCOSYLATED A1C: CPT

## 2024-04-19 PROCEDURE — 85025 COMPLETE CBC W/AUTO DIFF WBC: CPT

## 2024-04-19 PROCEDURE — 6360000002 HC RX W HCPCS

## 2024-04-19 PROCEDURE — 78452 HT MUSCLE IMAGE SPECT MULT: CPT

## 2024-04-19 PROCEDURE — 84484 ASSAY OF TROPONIN QUANT: CPT

## 2024-04-19 PROCEDURE — 78452 HT MUSCLE IMAGE SPECT MULT: CPT | Performed by: INTERNAL MEDICINE

## 2024-04-19 PROCEDURE — 93010 ELECTROCARDIOGRAM REPORT: CPT | Performed by: INTERNAL MEDICINE

## 2024-04-19 PROCEDURE — 84443 ASSAY THYROID STIM HORMONE: CPT

## 2024-04-19 PROCEDURE — 71045 X-RAY EXAM CHEST 1 VIEW: CPT

## 2024-04-19 PROCEDURE — 80061 LIPID PANEL: CPT

## 2024-04-19 PROCEDURE — 3430000000 HC RX DIAGNOSTIC RADIOPHARMACEUTICAL: Performed by: RADIOLOGY

## 2024-04-19 PROCEDURE — A9500 TC99M SESTAMIBI: HCPCS | Performed by: RADIOLOGY

## 2024-04-19 PROCEDURE — 6370000000 HC RX 637 (ALT 250 FOR IP)

## 2024-04-19 PROCEDURE — 99222 1ST HOSP IP/OBS MODERATE 55: CPT

## 2024-04-19 PROCEDURE — 93017 CV STRESS TEST TRACING ONLY: CPT

## 2024-04-19 PROCEDURE — 93018 CV STRESS TEST I&R ONLY: CPT | Performed by: INTERNAL MEDICINE

## 2024-04-19 PROCEDURE — 93016 CV STRESS TEST SUPVJ ONLY: CPT | Performed by: INTERNAL MEDICINE

## 2024-04-19 PROCEDURE — 93005 ELECTROCARDIOGRAM TRACING: CPT | Performed by: INTERNAL MEDICINE

## 2024-04-19 PROCEDURE — 80048 BASIC METABOLIC PNL TOTAL CA: CPT

## 2024-04-19 RX ORDER — FLUTICASONE PROPIONATE 50 MCG
1 SPRAY, SUSPENSION (ML) NASAL DAILY
Status: DISCONTINUED | OUTPATIENT
Start: 2024-04-19 | End: 2024-04-21 | Stop reason: HOSPADM

## 2024-04-19 RX ORDER — ENOXAPARIN SODIUM 100 MG/ML
40 INJECTION SUBCUTANEOUS DAILY
Status: DISCONTINUED | OUTPATIENT
Start: 2024-04-19 | End: 2024-04-21 | Stop reason: HOSPADM

## 2024-04-19 RX ORDER — ACETAMINOPHEN 650 MG/1
650 SUPPOSITORY RECTAL EVERY 6 HOURS PRN
Status: DISCONTINUED | OUTPATIENT
Start: 2024-04-19 | End: 2024-04-21 | Stop reason: HOSPADM

## 2024-04-19 RX ORDER — BENZONATATE 100 MG/1
100 CAPSULE ORAL 2 TIMES DAILY PRN
Status: DISCONTINUED | OUTPATIENT
Start: 2024-04-19 | End: 2024-04-21 | Stop reason: HOSPADM

## 2024-04-19 RX ORDER — TETRAKIS(2-METHOXYISOBUTYLISOCYANIDE)COPPER(I) TETRAFLUOROBORATE 1 MG/ML
11.7 INJECTION, POWDER, LYOPHILIZED, FOR SOLUTION INTRAVENOUS
Status: COMPLETED | OUTPATIENT
Start: 2024-04-19 | End: 2024-04-19

## 2024-04-19 RX ORDER — POTASSIUM CHLORIDE 20 MEQ/1
40 TABLET, EXTENDED RELEASE ORAL PRN
Status: DISCONTINUED | OUTPATIENT
Start: 2024-04-19 | End: 2024-04-21 | Stop reason: HOSPADM

## 2024-04-19 RX ORDER — SODIUM CHLORIDE 0.9 % (FLUSH) 0.9 %
5-40 SYRINGE (ML) INJECTION EVERY 12 HOURS SCHEDULED
Status: DISCONTINUED | OUTPATIENT
Start: 2024-04-19 | End: 2024-04-21 | Stop reason: HOSPADM

## 2024-04-19 RX ORDER — ONDANSETRON 2 MG/ML
4 INJECTION INTRAMUSCULAR; INTRAVENOUS EVERY 6 HOURS PRN
Status: DISCONTINUED | OUTPATIENT
Start: 2024-04-19 | End: 2024-04-21 | Stop reason: HOSPADM

## 2024-04-19 RX ORDER — BACLOFEN 10 MG/1
10 TABLET ORAL NIGHTLY PRN
Status: DISCONTINUED | OUTPATIENT
Start: 2024-04-19 | End: 2024-04-21 | Stop reason: HOSPADM

## 2024-04-19 RX ORDER — MAGNESIUM HYDROXIDE/ALUMINUM HYDROXICE/SIMETHICONE 120; 1200; 1200 MG/30ML; MG/30ML; MG/30ML
30 SUSPENSION ORAL EVERY 6 HOURS PRN
Status: DISCONTINUED | OUTPATIENT
Start: 2024-04-19 | End: 2024-04-21 | Stop reason: HOSPADM

## 2024-04-19 RX ORDER — POTASSIUM CHLORIDE 7.45 MG/ML
10 INJECTION INTRAVENOUS PRN
Status: DISCONTINUED | OUTPATIENT
Start: 2024-04-19 | End: 2024-04-21 | Stop reason: HOSPADM

## 2024-04-19 RX ORDER — TETRAKIS(2-METHOXYISOBUTYLISOCYANIDE)COPPER(I) TETRAFLUOROBORATE 1 MG/ML
35 INJECTION, POWDER, LYOPHILIZED, FOR SOLUTION INTRAVENOUS
Status: COMPLETED | OUTPATIENT
Start: 2024-04-19 | End: 2024-04-19

## 2024-04-19 RX ORDER — ONDANSETRON 4 MG/1
4 TABLET, ORALLY DISINTEGRATING ORAL EVERY 8 HOURS PRN
Status: DISCONTINUED | OUTPATIENT
Start: 2024-04-19 | End: 2024-04-21 | Stop reason: HOSPADM

## 2024-04-19 RX ORDER — REGADENOSON 0.08 MG/ML
0.4 INJECTION, SOLUTION INTRAVENOUS
Status: COMPLETED | OUTPATIENT
Start: 2024-04-19 | End: 2024-04-19

## 2024-04-19 RX ORDER — SODIUM CHLORIDE 9 MG/ML
INJECTION, SOLUTION INTRAVENOUS PRN
Status: DISCONTINUED | OUTPATIENT
Start: 2024-04-19 | End: 2024-04-21 | Stop reason: HOSPADM

## 2024-04-19 RX ORDER — ACETAMINOPHEN 325 MG/1
650 TABLET ORAL EVERY 6 HOURS PRN
Status: DISCONTINUED | OUTPATIENT
Start: 2024-04-19 | End: 2024-04-21 | Stop reason: HOSPADM

## 2024-04-19 RX ORDER — EZETIMIBE 10 MG/1
10 TABLET ORAL DAILY
Status: DISCONTINUED | OUTPATIENT
Start: 2024-04-19 | End: 2024-04-21 | Stop reason: HOSPADM

## 2024-04-19 RX ORDER — MAGNESIUM SULFATE IN WATER 40 MG/ML
2000 INJECTION, SOLUTION INTRAVENOUS PRN
Status: DISCONTINUED | OUTPATIENT
Start: 2024-04-19 | End: 2024-04-21 | Stop reason: HOSPADM

## 2024-04-19 RX ORDER — ASPIRIN 81 MG/1
81 TABLET, CHEWABLE ORAL DAILY
Status: DISCONTINUED | OUTPATIENT
Start: 2024-04-19 | End: 2024-04-21 | Stop reason: HOSPADM

## 2024-04-19 RX ORDER — AZELASTINE 1 MG/ML
2 SPRAY, METERED NASAL 2 TIMES DAILY PRN
COMMUNITY

## 2024-04-19 RX ORDER — ATORVASTATIN CALCIUM 40 MG/1
40 TABLET, FILM COATED ORAL NIGHTLY
Status: DISCONTINUED | OUTPATIENT
Start: 2024-04-19 | End: 2024-04-21 | Stop reason: HOSPADM

## 2024-04-19 RX ORDER — DIAZEPAM 2 MG/1
2 TABLET ORAL NIGHTLY PRN
Status: DISCONTINUED | OUTPATIENT
Start: 2024-04-19 | End: 2024-04-21 | Stop reason: HOSPADM

## 2024-04-19 RX ORDER — POLYETHYLENE GLYCOL 3350 17 G/17G
17 POWDER, FOR SOLUTION ORAL DAILY PRN
Status: DISCONTINUED | OUTPATIENT
Start: 2024-04-19 | End: 2024-04-21 | Stop reason: HOSPADM

## 2024-04-19 RX ORDER — SODIUM CHLORIDE 0.9 % (FLUSH) 0.9 %
5-40 SYRINGE (ML) INJECTION PRN
Status: DISCONTINUED | OUTPATIENT
Start: 2024-04-19 | End: 2024-04-21 | Stop reason: HOSPADM

## 2024-04-19 RX ORDER — AMLODIPINE BESYLATE 5 MG/1
2.5 TABLET ORAL DAILY
Status: DISCONTINUED | OUTPATIENT
Start: 2024-04-19 | End: 2024-04-20

## 2024-04-19 RX ADMIN — ENOXAPARIN SODIUM 40 MG: 100 INJECTION SUBCUTANEOUS at 08:42

## 2024-04-19 RX ADMIN — FLUTICASONE PROPIONATE 1 SPRAY: 50 SPRAY, METERED NASAL at 08:45

## 2024-04-19 RX ADMIN — Medication 11.7 MILLICURIE: at 13:19

## 2024-04-19 RX ADMIN — EZETIMIBE 10 MG: 10 TABLET ORAL at 08:45

## 2024-04-19 RX ADMIN — SODIUM CHLORIDE, PRESERVATIVE FREE 10 ML: 5 INJECTION INTRAVENOUS at 08:45

## 2024-04-19 RX ADMIN — AMLODIPINE BESYLATE 2.5 MG: 5 TABLET ORAL at 07:27

## 2024-04-19 RX ADMIN — REGADENOSON 0.4 MG: 0.08 INJECTION, SOLUTION INTRAVENOUS at 13:45

## 2024-04-19 RX ADMIN — ASPIRIN 81 MG: 81 TABLET, CHEWABLE ORAL at 07:28

## 2024-04-19 RX ADMIN — SODIUM CHLORIDE, PRESERVATIVE FREE 10 ML: 5 INJECTION INTRAVENOUS at 20:25

## 2024-04-19 RX ADMIN — Medication 34 MILLICURIE: at 13:47

## 2024-04-19 ASSESSMENT — PAIN SCALES - GENERAL: PAINLEVEL_OUTOF10: 0

## 2024-04-19 NOTE — ED NOTES
Report given to CARRI Becerril from 8200.   Report method by phone   The following was reviewed with receiving RN:   Current vital signs:  BP (!) 150/68   Pulse 69   Temp 98.7 °F (37.1 °C)   Resp 19   Ht 1.575 m (5' 2\")   Wt 58.1 kg (128 lb)   LMP  (LMP Unknown)   SpO2 95%   BMI 23.41 kg/m²                MEWS Score: 2     Any medication or safety alerts were reviewed. Any pending diagnostics and notifications were also reviewed, as well as any safety concerns or issues, abnormal labs, abnormal imaging, and abnormal assessment findings. Questions were answered.

## 2024-04-19 NOTE — H&P
Hospitalist History & Physical      PCP: Abena Easton MD    Date of Service: Pt seen/examined on 4/19/2024     Placed in Observation.    Chief Complaint:  had concerns including Chest Pain and Shortness of Breath.    History of Present Illness:    Ms. Flores Gaines, a 88 y.o. year old female  who  has a past medical history of Abnormal weight loss, Anxiety, Anxiety disorder, Cerebral infarction, unspecified (HCC), Essential (primary) hypertension, Gastric reflux, Hyperlipidemia, Hypertension, Occlusion and stenosis of right carotid artery, and Slurred speech.   Patient presented to the ED with complaints of shortness of breath and chest pain.  Patient states she was throwing away old food and [facility] and she thought \"Boy, is this heavy\".  When she had sudden onset of abrupt chest pain that she describes as pressure.  Pain is worsened with exertion and relieved mildly by rest.  Hospital course largely unremarkable including troponin of 7 with repeat of 7, hemoglobin 11.4.  Patient admitted to medicine with order placed for stress test for a.m.    Past Medical History:        Diagnosis Date    Abnormal weight loss     Anxiety     Anxiety disorder     Cerebral infarction, unspecified (HCC)     Essential (primary) hypertension     Gastric reflux     Hyperlipidemia     Hypertension     Occlusion and stenosis of right carotid artery     Slurred speech        Past Surgical History:        Procedure Laterality Date    CAROTID ENDARTERECTOMY Right 11/27/2020    RIGHT CAROTID ENDARTERECTOMY performed by Bobby Fitzpatrick MD at [facility] [place]    COLONOSCOPY      HYSTERECTOMY (CERVIX STATUS UNKNOWN)      LITHOTRIPSY Left 2/7/2020    CYSTOSCOPY RETROGRADE PYELOGRAM LEFT URETEROSCOPY LEFT J STENT LASER LITHOTRIPSY performed by Fernando Mendez DO at [facility] OR       Medications Prior to Admission:      Prior to Admission medications    Medication Sig Start Date End Date Taking?

## 2024-04-19 NOTE — ED PROVIDER NOTES
[facility] EMERGENCY DEPARTMENT  EMERGENCY DEPARTMENT ENCOUNTER        Pt Name: Flores Gaines  MRN: [id number]  Birthdate 1935  Date of evaluation: 4/18/2024  Provider: Carmelo Demarco DO  PCP: Abena Easton MD  Note Started: 1:39 AM EDT 4/19/24    CHIEF COMPLAINT       Chief Complaint   Patient presents with    Chest Pain    Shortness of Breath       HISTORY OF PRESENT ILLNESS: 1 or more Elements   History From: patient    Limitations to history : None    Flores Gaines is a 88 y.o. female who presents to the emergency department for chest pain that started few hours prior to arrival.  Patient reports that she went for a jog today and was feeling okay but then she went to exert herself this evening lifting boxes upstairs when she started to experience chest pain with some shortness of breath.  It was left-sided and nonradiating in nature.  She has never seen a cardiologist before.  She does have a history of hypertension hyperlipidemia.  Patient reports her chest pain is improved while emergency department at rest.  Patient denies fever, chills, headache, abdominal pain, nausea, vomiting, diarrhea.    Nursing Notes were all reviewed and agreed with or any disagreements were addressed in the HPI.          REVIEW OF SYSTEMS :           Positives and Pertinent negatives as per HPI.     SURGICAL HISTORY     Past Surgical History:   Procedure Laterality Date    CAROTID ENDARTERECTOMY Right 11/27/2020    RIGHT CAROTID ENDARTERECTOMY performed by Bobby Fitzpatrick MD at [facility] [place]    COLONOSCOPY      HYSTERECTOMY (CERVIX STATUS UNKNOWN)      LITHOTRIPSY Left 2/7/2020    CYSTOSCOPY RETROGRADE PYELOGRAM LEFT URETEROSCOPY LEFT J STENT LASER LITHOTRIPSY performed by Fernando Mendez DO at [facility] OR       CURRENTMEDICATIONS       Previous Medications    ACETAMINOPHEN (TYLENOL) 500 MG TABLET    Take 1 tablet by mouth 4 times daily as needed for Pain    AMLODIPINE (NORVASC) 2.5 MG TABLET

## 2024-04-19 NOTE — ED NOTES
Patient ambulated to bathroom well; Patient repeat EKG done and doctor notified of transmission. Patient okay to give morning meds per physician.

## 2024-04-20 LAB
ANION GAP SERPL CALCULATED.3IONS-SCNC: 11 MMOL/L (ref 7–16)
BUN SERPL-MCNC: 15 MG/DL (ref 6–23)
CALCIUM SERPL-MCNC: 8.6 MG/DL (ref 8.6–10.2)
CHLORIDE SERPL-SCNC: 107 MMOL/L (ref 98–107)
CO2 SERPL-SCNC: 18 MMOL/L (ref 22–29)
CREAT SERPL-MCNC: 0.6 MG/DL (ref 0.5–1)
GFR SERPL CREATININE-BSD FRML MDRD: 89 ML/MIN/1.73M2
GLUCOSE SERPL-MCNC: 94 MG/DL (ref 74–99)
MAGNESIUM SERPL-MCNC: 2 MG/DL (ref 1.6–2.6)
POTASSIUM SERPL-SCNC: 5.2 MMOL/L (ref 3.5–5)
SODIUM SERPL-SCNC: 136 MMOL/L (ref 132–146)

## 2024-04-20 PROCEDURE — 83735 ASSAY OF MAGNESIUM: CPT

## 2024-04-20 PROCEDURE — 36415 COLL VENOUS BLD VENIPUNCTURE: CPT

## 2024-04-20 PROCEDURE — 2580000003 HC RX 258

## 2024-04-20 PROCEDURE — 2580000003 HC RX 258: Performed by: INTERNAL MEDICINE

## 2024-04-20 PROCEDURE — G0378 HOSPITAL OBSERVATION PER HR: HCPCS

## 2024-04-20 PROCEDURE — 96372 THER/PROPH/DIAG INJ SC/IM: CPT

## 2024-04-20 PROCEDURE — 96361 HYDRATE IV INFUSION ADD-ON: CPT

## 2024-04-20 PROCEDURE — 80048 BASIC METABOLIC PNL TOTAL CA: CPT

## 2024-04-20 PROCEDURE — 99231 SBSQ HOSP IP/OBS SF/LOW 25: CPT | Performed by: INTERNAL MEDICINE

## 2024-04-20 PROCEDURE — 96360 HYDRATION IV INFUSION INIT: CPT

## 2024-04-20 PROCEDURE — 6360000002 HC RX W HCPCS

## 2024-04-20 PROCEDURE — 6370000000 HC RX 637 (ALT 250 FOR IP)

## 2024-04-20 RX ORDER — SODIUM CHLORIDE 9 MG/ML
INJECTION, SOLUTION INTRAVENOUS CONTINUOUS
Status: DISCONTINUED | OUTPATIENT
Start: 2024-04-20 | End: 2024-04-21 | Stop reason: HOSPADM

## 2024-04-20 RX ORDER — SODIUM CHLORIDE, SODIUM LACTATE, POTASSIUM CHLORIDE, AND CALCIUM CHLORIDE .6; .31; .03; .02 G/100ML; G/100ML; G/100ML; G/100ML
500 INJECTION, SOLUTION INTRAVENOUS ONCE
Status: COMPLETED | OUTPATIENT
Start: 2024-04-20 | End: 2024-04-20

## 2024-04-20 RX ADMIN — SODIUM CHLORIDE, PRESERVATIVE FREE 10 ML: 5 INJECTION INTRAVENOUS at 21:53

## 2024-04-20 RX ADMIN — ENOXAPARIN SODIUM 40 MG: 100 INJECTION SUBCUTANEOUS at 09:43

## 2024-04-20 RX ADMIN — SODIUM CHLORIDE, PRESERVATIVE FREE 10 ML: 5 INJECTION INTRAVENOUS at 09:43

## 2024-04-20 RX ADMIN — ASPIRIN 81 MG: 81 TABLET, CHEWABLE ORAL at 09:43

## 2024-04-20 RX ADMIN — EZETIMIBE 10 MG: 10 TABLET ORAL at 09:46

## 2024-04-20 RX ADMIN — AMLODIPINE BESYLATE 2.5 MG: 5 TABLET ORAL at 09:42

## 2024-04-20 RX ADMIN — SODIUM CHLORIDE, POTASSIUM CHLORIDE, SODIUM LACTATE AND CALCIUM CHLORIDE 500 ML: 600; 310; 30; 20 INJECTION, SOLUTION INTRAVENOUS at 18:00

## 2024-04-20 RX ADMIN — ATORVASTATIN CALCIUM 40 MG: 40 TABLET, FILM COATED ORAL at 21:53

## 2024-04-21 VITALS
DIASTOLIC BLOOD PRESSURE: 62 MMHG | HEIGHT: 62 IN | BODY MASS INDEX: 23.45 KG/M2 | RESPIRATION RATE: 16 BRPM | TEMPERATURE: 97.3 F | SYSTOLIC BLOOD PRESSURE: 115 MMHG | WEIGHT: 127.43 LBS | OXYGEN SATURATION: 97 % | HEART RATE: 73 BPM

## 2024-04-21 LAB
ANION GAP SERPL CALCULATED.3IONS-SCNC: 10 MMOL/L (ref 7–16)
BASOPHILS # BLD: 0.01 K/UL (ref 0–0.2)
BASOPHILS NFR BLD: 0 % (ref 0–2)
BUN SERPL-MCNC: 17 MG/DL (ref 6–23)
CALCIUM SERPL-MCNC: 8.7 MG/DL (ref 8.6–10.2)
CHLORIDE SERPL-SCNC: 112 MMOL/L (ref 98–107)
CO2 SERPL-SCNC: 21 MMOL/L (ref 22–29)
CREAT SERPL-MCNC: 0.8 MG/DL (ref 0.5–1)
EOSINOPHIL # BLD: 0.03 K/UL (ref 0.05–0.5)
EOSINOPHILS RELATIVE PERCENT: 1 % (ref 0–6)
ERYTHROCYTE [DISTWIDTH] IN BLOOD BY AUTOMATED COUNT: 13.6 % (ref 11.5–15)
GFR SERPL CREATININE-BSD FRML MDRD: 68 ML/MIN/1.73M2
GLUCOSE SERPL-MCNC: 100 MG/DL (ref 74–99)
HCT VFR BLD AUTO: 33.6 % (ref 34–48)
HGB BLD-MCNC: 11.1 G/DL (ref 11.5–15.5)
IMM GRANULOCYTES # BLD AUTO: <0.03 K/UL (ref 0–0.58)
IMM GRANULOCYTES NFR BLD: 0 % (ref 0–5)
LYMPHOCYTES NFR BLD: 1.06 K/UL (ref 1.5–4)
LYMPHOCYTES RELATIVE PERCENT: 33 % (ref 20–42)
MAGNESIUM SERPL-MCNC: 1.8 MG/DL (ref 1.6–2.6)
MCH RBC QN AUTO: 33.5 PG (ref 26–35)
MCHC RBC AUTO-ENTMCNC: 33 G/DL (ref 32–34.5)
MCV RBC AUTO: 101.5 FL (ref 80–99.9)
MONOCYTES NFR BLD: 0.65 K/UL (ref 0.1–0.95)
MONOCYTES NFR BLD: 21 % (ref 2–12)
NEUTROPHILS NFR BLD: 45 % (ref 43–80)
NEUTS SEG NFR BLD: 1.42 K/UL (ref 1.8–7.3)
PHOSPHATE SERPL-MCNC: 3.6 MG/DL (ref 2.5–4.5)
PLATELET # BLD AUTO: 262 K/UL (ref 130–450)
PMV BLD AUTO: 10.4 FL (ref 7–12)
POTASSIUM SERPL-SCNC: 3.8 MMOL/L (ref 3.5–5)
RBC # BLD AUTO: 3.31 M/UL (ref 3.5–5.5)
SODIUM SERPL-SCNC: 143 MMOL/L (ref 132–146)
WBC OTHER # BLD: 3.2 K/UL (ref 4.5–11.5)

## 2024-04-21 PROCEDURE — 84100 ASSAY OF PHOSPHORUS: CPT

## 2024-04-21 PROCEDURE — 2580000003 HC RX 258

## 2024-04-21 PROCEDURE — 6360000002 HC RX W HCPCS

## 2024-04-21 PROCEDURE — 36415 COLL VENOUS BLD VENIPUNCTURE: CPT

## 2024-04-21 PROCEDURE — 83735 ASSAY OF MAGNESIUM: CPT

## 2024-04-21 PROCEDURE — 6370000000 HC RX 637 (ALT 250 FOR IP)

## 2024-04-21 PROCEDURE — G0378 HOSPITAL OBSERVATION PER HR: HCPCS

## 2024-04-21 PROCEDURE — 99239 HOSP IP/OBS DSCHRG MGMT >30: CPT | Performed by: INTERNAL MEDICINE

## 2024-04-21 PROCEDURE — 80048 BASIC METABOLIC PNL TOTAL CA: CPT

## 2024-04-21 PROCEDURE — 96372 THER/PROPH/DIAG INJ SC/IM: CPT

## 2024-04-21 PROCEDURE — 85025 COMPLETE CBC W/AUTO DIFF WBC: CPT

## 2024-04-21 RX ORDER — ATORVASTATIN CALCIUM 40 MG/1
40 TABLET, FILM COATED ORAL NIGHTLY
Qty: 30 TABLET | Refills: 3 | Status: SHIPPED | OUTPATIENT
Start: 2024-04-21

## 2024-04-21 RX ADMIN — SODIUM CHLORIDE, PRESERVATIVE FREE 10 ML: 5 INJECTION INTRAVENOUS at 09:28

## 2024-04-21 RX ADMIN — ASPIRIN 81 MG: 81 TABLET, CHEWABLE ORAL at 09:28

## 2024-04-21 RX ADMIN — ENOXAPARIN SODIUM 40 MG: 100 INJECTION SUBCUTANEOUS at 09:28

## 2024-04-21 RX ADMIN — EZETIMIBE 10 MG: 10 TABLET ORAL at 09:28

## 2024-04-21 NOTE — PLAN OF CARE
Problem: Chronic Conditions and Co-morbidities  Goal: Patient's chronic conditions and co-morbidity symptoms are monitored and maintained or improved  4/21/2024 1546 by Suzanne Ventura RN  Outcome: Adequate for Discharge  4/21/2024 0503 by Cassie Noonan RN  Outcome: Progressing  Flowsheets (Taken 4/20/2024 2119)  Care Plan - Patient's Chronic Conditions and Co-Morbidity Symptoms are Monitored and Maintained or Improved: Monitor and assess patient's chronic conditions and comorbid symptoms for stability, deterioration, or improvement     Problem: Pain  Goal: Verbalizes/displays adequate comfort level or baseline comfort level  4/21/2024 1546 by Suzanne Ventura, RN  Outcome: Adequate for Discharge  4/21/2024 0503 by Cassie Noonan, RN  Outcome: Progressing

## 2024-04-21 NOTE — DISCHARGE SUMMARY
follow-up.     I have spoken with the patient and discussed the results of the current hospitalization, in addition to providing specific details for the plan of care and counseling regarding the diagnosis and prognosis.  The plan has been discussed in detail and they are aware of the specific conditions for emergent return, as well as the importance of follow-up.  Their questions are answered at this time and they are agreeable with the plan for discharge home    Continued appropriate risk factor modification of blood pressure, diabetes and serum lipids will remain essential to reducing risk of future atherosclerotic development      Discharge Exam:    Patient was seen and examined on the day of discharge.    General Appearance: alert and oriented to person, place and time and in no acute distress  Skin: warm and dry  Head: normocephalic and atraumatic  Eyes: pupils equal, round, and reactive to light, extraocular eye movements intact, conjunctivae normal  Neck: neck supple and non tender without mass   Pulmonary/Chest: clear to auscultation bilaterally- no wheezes, rales or rhonchi, normal air movement, no respiratory distress  Cardiovascular: normal rate, normal S1 and S2 and no carotid bruits  Abdomen: soft, non-tender, non-distended, normal bowel sounds, no masses or organomegaly  Extremities: no cyanosis, no clubbing and no edema  Neurologic: no cranial nerve deficit and speech normal    I/O last 3 completed shifts:  In: 660 [P.O.:660]  Out: -   I/O this shift:  In: 360 [P.O.:360]  Out: 0       LABS:  Recent Labs     04/19/24  0005 04/20/24  0125 04/21/24  1337    136 143   K 3.8 5.2* 3.8    107 112*   CO2 26 18* 21*   BUN 20 15 17   CREATININE 0.7 0.6 0.8   GLUCOSE 202* 94 100*   CALCIUM 8.9 8.6 8.7       Recent Labs     04/19/24  0005 04/21/24  1337   WBC 3.6* 3.2*   RBC 3.40* 3.31*   HGB 11.4* 11.1*   HCT 34.2 33.6*   .6* 101.5*   MCH 33.5 33.5   MCHC 33.3 33.0   RDW 13.9 13.6

## 2024-04-21 NOTE — PROGRESS NOTES
[facility] Hospitalist Progress Note     Admitting Date and Time: 4/18/2024 11:41 PM  had concerns including Chest Pain and Shortness of Breath.    Admit Dx: Chest pain [R07.9]  Chest pain, unspecified type [R07.9]    Synopsis: Patient presented to the ED with complaints of shortness of breath and chest pain.  Patient states she was throwing away old food and had to make several trips to [facility] down long apartment hallway and she thought \"Boy, is this heavy\".  When she had sudden onset of abrupt chest pain that she describes as pressure.  Pain is worsened with exertion and relieved mildly by rest.  Hospital course largely unremarkable including troponin of 7 with repeat of 7, hemoglobin 11.4.  Patient admitted to medicine with order placed for stress test for a.m     Subjective:  Patient is being followed for Chest pain [R07.9]  Chest pain, unspecified type [R07.9]     Patient was seen and examined this morning at bedside in the ED  Resting comfortably in bed  No acute distress, chest pain has resolved    ROS: denies fever, chills, cp, sob, n/v, HA unless stated above.      lactated ringers  500 mL IntraVENous Once    aspirin  81 mg Oral Daily    ezetimibe  10 mg Oral Daily    fluticasone  1 spray Each Nostril Daily    sodium chloride flush  5-40 mL IntraVENous 2 times per day    atorvastatin  40 mg Oral Nightly    enoxaparin  40 mg SubCUTAneous Daily     baclofen, 10 mg, Nightly PRN  benzonatate, 100 mg, BID PRN  diazePAM, 2 mg, Nightly PRN  sodium chloride flush, 5-40 mL, PRN  sodium chloride, , PRN  potassium chloride, 40 mEq, PRN   Or  potassium alternative oral replacement, 40 mEq, PRN   Or  potassium chloride, 10 mEq, PRN  magnesium sulfate, 2,000 mg, PRN  ondansetron, 4 mg, Q8H PRN   Or  ondansetron, 4 mg, Q6H PRN  acetaminophen, 650 mg, Q6H PRN   Or  acetaminophen, 650 mg, Q6H 
Patient notified of discharge. Left voicemail for Hiram.  
Patient refusing fluids (0.9 @ 100) as she feels the IV pole is a safety issue for her getting up to use the bathroom independently.  They were ordered to be started because her BP is soft in the high 90s and Dr. Belcher just ordered them after her 500cc bolus. He stopped her BP med and just wanted her re-hydrated, but I encouraged her to increase her oral intake of water if she's refusing and she agreed.     YESY Rios notified via perfect serve.   
transcription errors may be present.    Electronically signed by Jose Belcher MD on 4/19/2024 at 3:10 PM

## 2024-04-22 ENCOUNTER — CARE COORDINATION (OUTPATIENT)
Dept: CARE COORDINATION | Age: 89
End: 2024-04-22

## 2024-04-22 NOTE — CARE COORDINATION
Care Transitions Note    Initial Call - Call within 2 business days of discharge: Yes     First attempt to reach the patient for initial Care Transition call post hospital discharge. Home phone rang several times with no answer or option to leave a message. HIPAA compliant message left on listed mobile with CTN's contact information requesting return phone call.     Outreach Attempts:   HIPAA compliant voicemail left for patient.     Patient: Flores Gaines    Patient : 1935   MRN: [id number]    Reason for Admission: CP  Discharge Date: 24  RURS: NA  Last Discharge Facility       Date Complaint Diagnosis Description Type Department Provider    24 Chest Pain; Shortness of Breath Chest pain, unspecified type ED to Hosp-Admission (Discharged) (ADMITTED) [facility] Jose Belcher MD; Shavonne Yeh, ...   Follow Up Appointment:   Patient does not have a follow up appointment scheduled at time of call. Will route a message to Dr. Easton's office requesting that an attempt be made to contact the patient to schedule TCM visit within 7 days of discharge, discharge date 24.     Future Appointments         Provider Specialty Dept Phone    2024 4:00 PM Abena Easton MD Primary Care [phone]        Merary Esquivel RN

## 2024-04-23 ENCOUNTER — TELEPHONE (OUTPATIENT)
Dept: PRIMARY CARE CLINIC | Age: 89
End: 2024-04-23

## 2024-04-23 ENCOUNTER — CARE COORDINATION (OUTPATIENT)
Dept: CARE COORDINATION | Age: 89
End: 2024-04-23

## 2024-04-23 NOTE — TELEPHONE ENCOUNTER
Care Transitions Initial Follow Up Call    Outreach made within 2 business days of discharge: Yes    Patient: Flores Gaines Patient : 1935   MRN: [id number]  Reason for Admission: There are no discharge diagnoses documented for the most recent discharge.  Discharge Date: 24       Spoke with: Patient     Discharge department/facility: [facility] Interactive Patient Contact:  Was patient able to fill all prescriptions: Yes  Was patient instructed to bring all medications to the follow-up visit: Yes  Is patient taking all medications as directed in the discharge summary? Yes  Does patient understand their discharge instructions: Yes  Does patient have questions or concerns that need addressed prior to 7-14 day follow up office visit: no    Scheduled appointment with PCP within 7-14 days    Follow Up  Future Appointments   Date Time Provider Department Center   2024  2:30 PM Abena Easton MD [facility]   2024  4:00 PM Abena Easton MD [facility]       Rosa Singer MA

## 2024-04-23 NOTE — CARE COORDINATION
Care Transitions Note    Initial Call - Call within 2 business days of discharge: Yes     Patient Current Location:  Home: [address]  [place] [address]    Care Transition Nurse contacted the patient by telephone to perform post hospital discharge assessment, verified name and  as identifiers. Provided introduction to self, and explanation of the Care Transition Nurse role.     Patient: Flores Gaines    Patient : 1935   MRN: [id number]    Reason for Admission: CP  Discharge Date: 24  RURS: No data recorded    Last Discharge Facility       Date Complaint Diagnosis Description Type Department Provider    24 Chest Pain; Shortness of Breath Chest pain, unspecified type ED to Hosp-Admission (Discharged) (ADMITTED) [facility]S [facility] Jose Belcher MD; Shavonne Yeh, ...     Care Summary Note:     Spoke with Flores briefly for initial observation status care transition call post hospital discharge. She reports that she is still asleep but went on to report she is feeling \"good\". She denies any recurrence of CP and denies SOB or TOMLINSON since discharge.   She declined full medication review, however, confirmed stopping Norvasc.   She is agreeable to further outreach calls.   Flores denies any needs, questions, or concerns at this time.      Care Transition Nurse reviewed discharge instructions and medical action plan with patient. The patient was given an opportunity to ask questions; no further questions or concerns at this time.  Were discharge instructions available to patient? Yes.   Reviewed appropriate site of care based on symptoms and resources available to patient including: PCP. The patient agrees to contact the primary care provider and/or specialist office for questions related to their healthcare.      Medication Reconciliation:  Medication reconciliation was not performed during this call, declined as she is still sleeping.     Remote Patient Monitoring:  Offered patient enrollment in

## 2024-04-26 ENCOUNTER — CARE COORDINATION (OUTPATIENT)
Dept: CARE COORDINATION | Age: 89
End: 2024-04-26

## 2024-04-26 NOTE — CARE COORDINATION
----- Message from Chichi Warner sent at 1/13/2022 12:48 PM CST -----  Contact: LULU TRACEY [[id number]]  .Type:  Needs Medical Advice    Who Called: LULU TRACEY [[id number]]  Symptoms (please be specific):   How long has patient had these symptoms:    Pharmacy name and phone #:    Would the patient rather a call back or a response via [facility]? call  Best Call Back Number: .[phone]  Additional Information: Pt is req a call back in regards to his rx        Care Transitions Note    Follow Up Call      Attempted to reach the patient for subsequent Care Transition call. Phone rang several times with no answer or option to leave a message.     Outreach Attempts:   Unable to leave message.     Follow Up Appointment:   Future Appointments         Provider Specialty Dept Phone    4/30/2024 2:30 PM Abena Easton MD Primary Care [phone]    7/9/2024 4:00 PM Abena Easton MD Primary Care [phone]          Merary Esquivel RN

## 2024-04-30 ENCOUNTER — OFFICE VISIT (OUTPATIENT)
Dept: PRIMARY CARE CLINIC | Age: 89
End: 2024-04-30

## 2024-04-30 VITALS
RESPIRATION RATE: 18 BRPM | OXYGEN SATURATION: 96 % | BODY MASS INDEX: 23.37 KG/M2 | WEIGHT: 127 LBS | HEIGHT: 62 IN | HEART RATE: 84 BPM | TEMPERATURE: 97 F | SYSTOLIC BLOOD PRESSURE: 84 MMHG | DIASTOLIC BLOOD PRESSURE: 40 MMHG

## 2024-04-30 DIAGNOSIS — I10 PRIMARY HYPERTENSION: ICD-10-CM

## 2024-04-30 DIAGNOSIS — H93.13 TINNITUS OF BOTH EARS: ICD-10-CM

## 2024-04-30 DIAGNOSIS — Z12.31 SCREENING MAMMOGRAM FOR BREAST CANCER: Primary | ICD-10-CM

## 2024-04-30 DIAGNOSIS — I95.9 HYPOTENSION, UNSPECIFIED HYPOTENSION TYPE: ICD-10-CM

## 2024-04-30 DIAGNOSIS — Z09 HOSPITAL DISCHARGE FOLLOW-UP: ICD-10-CM

## 2024-04-30 RX ORDER — AMLODIPINE BESYLATE 2.5 MG/1
2.5 TABLET ORAL DAILY
COMMUNITY
End: 2024-04-30 | Stop reason: ALTCHOICE

## 2024-04-30 ASSESSMENT — PATIENT HEALTH QUESTIONNAIRE - PHQ9
SUM OF ALL RESPONSES TO PHQ QUESTIONS 1-9: 0
2. FEELING DOWN, DEPRESSED OR HOPELESS: NOT AT ALL
SUM OF ALL RESPONSES TO PHQ QUESTIONS 1-9: 0
SUM OF ALL RESPONSES TO PHQ9 QUESTIONS 1 & 2: 0
SUM OF ALL RESPONSES TO PHQ QUESTIONS 1-9: 0
1. LITTLE INTEREST OR PLEASURE IN DOING THINGS: NOT AT ALL
SUM OF ALL RESPONSES TO PHQ QUESTIONS 1-9: 0

## 2024-04-30 NOTE — PROGRESS NOTES
Post-Discharge Transitional Care  Follow Up      Flores Gaines   YOB: 1935    Date of Office Visit:  4/30/2024  Date of Hospital Admission: 4/18/24  Date of Hospital Discharge: 4/21/24  Risk of hospital readmission (high >=14%. Medium >=10%) :No data recorded    Care management risk score Rising risk (score 2-5) and Complex Care (Scores >=6): No Risk Score On File     Non face to face  following discharge, date last encounter closed (first attempt may have been earlier): 04/23/2024    Call initiated 2 business days of discharge: Yes    ASSESSMENT/PLAN:   Screening mammogram for breast cancer  -     KAREN DIGITAL SCREEN BILATERAL PER PROTOCOL; Future  Primary hypertension  Comments:  Patient states she has been taking half a tablet of the amlodipine about twice weekly, did not take any today, continue to check BP and stop meds  Tinnitus of both ears  -     Bobby Pearson DO, Otolaryngology, [facility] discharge follow-up  -     [place] DISCHARGE MEDS RECONCILED W/ CURRENT OUTPATIENT MED LIST  Hypotension, unspecified hypotension type  Comments:  Pt states she had nothing to eat or drink today it is after 3:30 pm  Pt  eat 3 meals a day, she states did not have time to eat/has plenty of grocery at home      Medical Decision Making: moderate complexity  Return in 3 months (on 7/30/2024).           Subjective:   HPI:  Follow up of Hospital problems/diagnosis(es): Patient was in the hospital with chest pain had evaluation troponins were negative she described the pain as around her left breast stress test was done    Inpatient course: Discharge summary reviewed- see chart.    Interval history/Current status:  patient currently does not have any chest pain she is complaining of discomfort in both the ears and tinnitus describes it as feeling of running water inside the ears    Patient Active Problem List   Diagnosis    Anxiety    Uncontrolled hypertension    Gastroesophageal reflux

## 2024-05-02 DIAGNOSIS — N64.4 BREAST PAIN, LEFT: Primary | ICD-10-CM

## 2024-05-03 ENCOUNTER — CARE COORDINATION (OUTPATIENT)
Dept: CARE COORDINATION | Age: 89
End: 2024-05-03

## 2024-05-03 NOTE — CARE COORDINATION
Care Transitions Note    Follow Up Call      Second and final attempt to reach the patient for subsequent Care Transition call. Home phone rang several times with no answer or option to leave a message. HIPAA compliant message left on listed mobile with CTN's contact information requesting return phone call.   Follow Up Appointment:   Future Appointments         Provider Specialty Dept Phone    5/21/2024 2:15 PM (Arrive by 2:00 PM) Radiologist, Luis Miguel Arora; LUIS MIGUEL ARORA TECHNOLOGIST 3; LUIS MIGUEL ARORA [facility] 2 Radiology [phone]    5/21/2024 2:45 PM (Arrive by 2:30 PM) LUIS MIGUEL ARORA TECHNOLOGIST 2; LUIS MIGUEL ARORA  RM 3 Radiology [phone]    7/9/2024 4:00 PM Abena Easton MD Primary Care [phone]    8/5/2024 1:30 PM Dameon Monk, APRN - [facility] Otolaryngology [phone]        Merary Esquivel RN

## 2024-05-05 ENCOUNTER — HOSPITAL ENCOUNTER (EMERGENCY)
Age: 89
Discharge: HOME | End: 2024-05-05
Attending: EMERGENCY MEDICINE
Payer: MEDICARE

## 2024-05-05 ENCOUNTER — APPOINTMENT (OUTPATIENT)
Dept: GENERAL RADIOLOGY | Age: 89
End: 2024-05-05
Payer: MEDICARE

## 2024-05-05 ENCOUNTER — APPOINTMENT (OUTPATIENT)
Dept: CT IMAGING | Age: 89
End: 2024-05-05
Payer: MEDICARE

## 2024-05-05 VITALS
TEMPERATURE: 98.3 F | DIASTOLIC BLOOD PRESSURE: 51 MMHG | RESPIRATION RATE: 16 BRPM | SYSTOLIC BLOOD PRESSURE: 135 MMHG | HEART RATE: 85 BPM | OXYGEN SATURATION: 95 %

## 2024-05-05 DIAGNOSIS — R42 DIZZINESS: Primary | ICD-10-CM

## 2024-05-05 LAB
ALBUMIN SERPL-MCNC: 4.2 G/DL (ref 3.5–5.2)
ALP SERPL-CCNC: 80 U/L (ref 35–104)
ALT SERPL-CCNC: 7 U/L (ref 0–32)
ANION GAP SERPL CALCULATED.3IONS-SCNC: 10 MMOL/L (ref 7–16)
AST SERPL-CCNC: 13 U/L (ref 0–31)
BASOPHILS # BLD: 0.01 K/UL (ref 0–0.2)
BASOPHILS NFR BLD: 0 % (ref 0–2)
BILIRUB SERPL-MCNC: 0.4 MG/DL (ref 0–1.2)
BILIRUB UR QL STRIP: NEGATIVE
BUN SERPL-MCNC: 14 MG/DL (ref 6–23)
CALCIUM SERPL-MCNC: 8.9 MG/DL (ref 8.6–10.2)
CHLORIDE SERPL-SCNC: 103 MMOL/L (ref 98–107)
CLARITY UR: CLEAR
CO2 SERPL-SCNC: 26 MMOL/L (ref 22–29)
COLOR UR: YELLOW
CREAT SERPL-MCNC: 0.6 MG/DL (ref 0.5–1)
EOSINOPHIL # BLD: 0 K/UL (ref 0.05–0.5)
EOSINOPHILS RELATIVE PERCENT: 0 % (ref 0–6)
ERYTHROCYTE [DISTWIDTH] IN BLOOD BY AUTOMATED COUNT: 13.5 % (ref 11.5–15)
GFR, ESTIMATED: 86 ML/MIN/1.73M2
GLUCOSE SERPL-MCNC: 122 MG/DL (ref 74–99)
GLUCOSE UR STRIP-MCNC: NEGATIVE MG/DL
HCT VFR BLD AUTO: 36.3 % (ref 34–48)
HGB BLD-MCNC: 12.1 G/DL (ref 11.5–15.5)
HGB UR QL STRIP.AUTO: ABNORMAL
IMM GRANULOCYTES # BLD AUTO: <0.03 K/UL (ref 0–0.58)
IMM GRANULOCYTES NFR BLD: 0 % (ref 0–5)
KETONES UR STRIP-MCNC: NEGATIVE MG/DL
LEUKOCYTE ESTERASE UR QL STRIP: NEGATIVE
LYMPHOCYTES NFR BLD: 0.37 K/UL (ref 1.5–4)
LYMPHOCYTES RELATIVE PERCENT: 9 % (ref 20–42)
MCH RBC QN AUTO: 34.6 PG (ref 26–35)
MCHC RBC AUTO-ENTMCNC: 33.3 G/DL (ref 32–34.5)
MCV RBC AUTO: 103.7 FL (ref 80–99.9)
MONOCYTES NFR BLD: 0.32 K/UL (ref 0.1–0.95)
MONOCYTES NFR BLD: 8 % (ref 2–12)
NEUTROPHILS NFR BLD: 83 % (ref 43–80)
NEUTS SEG NFR BLD: 3.42 K/UL (ref 1.8–7.3)
NITRITE UR QL STRIP: NEGATIVE
PH UR STRIP: 7 [PH] (ref 5–9)
PLATELET # BLD AUTO: 268 K/UL (ref 130–450)
PMV BLD AUTO: 10.5 FL (ref 7–12)
POTASSIUM SERPL-SCNC: 4.4 MMOL/L (ref 3.5–5)
PROT SERPL-MCNC: 7.1 G/DL (ref 6.4–8.3)
PROT UR STRIP-MCNC: NEGATIVE MG/DL
RBC # BLD AUTO: 3.5 M/UL (ref 3.5–5.5)
RBC # BLD: ABNORMAL 10*6/UL
RBC # BLD: ABNORMAL 10*6/UL
RBC #/AREA URNS HPF: ABNORMAL /HPF
SODIUM SERPL-SCNC: 139 MMOL/L (ref 132–146)
SP GR UR STRIP: 1.01 (ref 1–1.03)
TROPONIN I SERPL HS-MCNC: <6 NG/L (ref 0–9)
UROBILINOGEN UR STRIP-ACNC: 0.2 EU/DL (ref 0–1)
WBC #/AREA URNS HPF: ABNORMAL /HPF
WBC OTHER # BLD: 4.1 K/UL (ref 4.5–11.5)

## 2024-05-05 PROCEDURE — 84484 ASSAY OF TROPONIN QUANT: CPT

## 2024-05-05 PROCEDURE — 2580000003 HC RX 258

## 2024-05-05 PROCEDURE — 70498 CT ANGIOGRAPHY NECK: CPT

## 2024-05-05 PROCEDURE — 70496 CT ANGIOGRAPHY HEAD: CPT

## 2024-05-05 PROCEDURE — 99285 EMERGENCY DEPT VISIT HI MDM: CPT

## 2024-05-05 PROCEDURE — 6370000000 HC RX 637 (ALT 250 FOR IP)

## 2024-05-05 PROCEDURE — 6360000004 HC RX CONTRAST MEDICATION: Performed by: RADIOLOGY

## 2024-05-05 PROCEDURE — 81001 URINALYSIS AUTO W/SCOPE: CPT

## 2024-05-05 PROCEDURE — 85025 COMPLETE CBC W/AUTO DIFF WBC: CPT

## 2024-05-05 PROCEDURE — 93005 ELECTROCARDIOGRAM TRACING: CPT

## 2024-05-05 PROCEDURE — 71046 X-RAY EXAM CHEST 2 VIEWS: CPT

## 2024-05-05 PROCEDURE — 80053 COMPREHEN METABOLIC PANEL: CPT

## 2024-05-05 RX ORDER — MECLIZINE HCL 12.5 MG/1
12.5 TABLET ORAL 3 TIMES DAILY PRN
Qty: 15 TABLET | Refills: 0 | Status: SHIPPED | OUTPATIENT
Start: 2024-05-05 | End: 2024-05-15

## 2024-05-05 RX ORDER — MECLIZINE HCL 12.5 MG/1
12.5 TABLET ORAL ONCE
Status: COMPLETED | OUTPATIENT
Start: 2024-05-05 | End: 2024-05-05

## 2024-05-05 RX ORDER — ONDANSETRON 2 MG/ML
4 INJECTION INTRAMUSCULAR; INTRAVENOUS EVERY 6 HOURS PRN
Status: DISCONTINUED | OUTPATIENT
Start: 2024-05-05 | End: 2024-05-06 | Stop reason: HOSPADM

## 2024-05-05 RX ORDER — 0.9 % SODIUM CHLORIDE 0.9 %
1000 INTRAVENOUS SOLUTION INTRAVENOUS ONCE
Status: COMPLETED | OUTPATIENT
Start: 2024-05-05 | End: 2024-05-05

## 2024-05-05 RX ADMIN — MECLIZINE 12.5 MG: 12.5 TABLET ORAL at 19:52

## 2024-05-05 RX ADMIN — IOPAMIDOL 60 ML: 755 INJECTION, SOLUTION INTRAVENOUS at 20:21

## 2024-05-05 RX ADMIN — SODIUM CHLORIDE 1000 ML: 9 INJECTION, SOLUTION INTRAVENOUS at 19:55

## 2024-05-05 ASSESSMENT — LIFESTYLE VARIABLES
HOW OFTEN DO YOU HAVE A DRINK CONTAINING ALCOHOL: NEVER
HOW MANY STANDARD DRINKS CONTAINING ALCOHOL DO YOU HAVE ON A TYPICAL DAY: PATIENT DOES NOT DRINK

## 2024-05-05 NOTE — ED PROVIDER NOTES
ED Course, Reassessment, disposition considerations/shared decision making with patient, consults, disposition:      ED Course as of 05/06/24 0800   Sun May 05, 2024   1937 Flores Gaines is a 88 y.o. female who presents to the [facility] emergency department with a chief complaint of dizziness.  Patient states the dizziness began this morning and is associated when she is standing.  When asked to describe her dizziness, patient states that she is really not had time to think about it because she immediately has to sit down.  Patient states she also has not had much of an appetite today.  Patient denies fever, fatigue, chills, nausea, vomiting, or diarrhea.  Patient does state that she has had several bowel movements today. [CJ]   1937 This EKG is signed and interpreted by me.    Rate: 74  Rhythm: Sinus  Interpretation: Normal sinus rhythm, normal CA interval, normal QRS, normal QT interval, no acute ST or T wave changes  Comparison: stable as compared to patient's most recent EKG  [CJ]   2253 Orthostatic negative [KM]   2253 Patient ambulated without difficulty and without symptoms [KM]      ED Course User Index  [CJ] Grant Bridges, DO  [KM] Pau Tilley MD        EKG ordered to evaluate patient's current cardiac rate, rhythm, and QT interval. POCT glucose ordered to rule out acute hyperglycemia or hypoglycemia. CBC was ordered as part of my assessment for possible infection, anemia or thrombocytopenia. CMP to assess electrolytes, kidney function, liver function or any metabolic derangements. Urinalysis to evaluate for a UTI. Troponin as a marker for myocardial ischemia or heart strain. Chest x-ray for any possible signs of, but without limitation to, pneumonia, pleural effusions, cardiomegaly, pneumothorax, atelectasis, rib or sternal abnormalities including fractures. CTA head and neck to evaluate for vascular occlusion, dissection or aneurysms.    Flores Gaines is a 88 y.o.

## 2024-05-06 LAB
EKG ATRIAL RATE: 74 BPM
EKG P AXIS: 77 DEGREES
EKG P-R INTERVAL: 194 MS
EKG Q-T INTERVAL: 412 MS
EKG QRS DURATION: 98 MS
EKG QTC CALCULATION (BAZETT): 457 MS
EKG R AXIS: -33 DEGREES
EKG T AXIS: 81 DEGREES
EKG VENTRICULAR RATE: 74 BPM

## 2024-05-06 PROCEDURE — 93010 ELECTROCARDIOGRAM REPORT: CPT | Performed by: INTERNAL MEDICINE

## 2024-05-21 ENCOUNTER — HOSPITAL ENCOUNTER (OUTPATIENT)
Dept: GENERAL RADIOLOGY | Age: 89
Discharge: HOME | End: 2024-05-23
Attending: INTERNAL MEDICINE
Payer: MEDICARE

## 2024-05-21 ENCOUNTER — HOSPITAL ENCOUNTER (OUTPATIENT)
Dept: GENERAL RADIOLOGY | Age: 89
End: 2024-05-21
Attending: INTERNAL MEDICINE
Payer: MEDICARE

## 2024-05-21 VITALS — WEIGHT: 127 LBS | HEIGHT: 62 IN | BODY MASS INDEX: 23.37 KG/M2

## 2024-05-21 DIAGNOSIS — N64.4 BREAST PAIN, LEFT: ICD-10-CM

## 2024-05-21 PROCEDURE — G0279 TOMOSYNTHESIS, MAMMO: HCPCS

## 2024-06-05 NOTE — TELEPHONE ENCOUNTER
PT has 1 day left and needs a refill of:    Maclizine 12.5 MG    [id number] - [place], [place] - [address] - P [phone] - f [phone] [[id number]]

## 2024-06-05 NOTE — TELEPHONE ENCOUNTER
Last Appointment:  4/30/2024  Future Appointments   Date Time Provider Department Center   7/9/2024  4:00 PM [facility]   8/5/2024  1:30 PM Dameon Monk APRN - CNP [facility]

## 2024-06-08 RX ORDER — MECLIZINE HCL 12.5 MG/1
12.5 TABLET ORAL 3 TIMES DAILY PRN
Qty: 15 TABLET | Refills: 0 | Status: SHIPPED | OUTPATIENT
Start: 2024-06-08 | End: 2024-06-18

## 2024-06-14 ENCOUNTER — TELEPHONE (OUTPATIENT)
Dept: PRIMARY CARE CLINIC | Age: 89
End: 2024-06-14

## 2024-06-14 NOTE — TELEPHONE ENCOUNTER
PT is asking if it is okay to eat bananas due to the fact that everyone is telling her its not safe to eat them. She says you can get kidney failure. She also states she needs medication to move her bowels.

## 2024-06-15 NOTE — TELEPHONE ENCOUNTER
OK to eat bananas,but ripe bananas are constipating  Eat prunes 3-4 daily and cooked mixed vegetables

## 2024-07-05 LAB
ALBUMIN SERPL-MCNC: 4 G/DL (ref 3.5–5.2)
ALP SERPL-CCNC: 74 U/L (ref 35–104)
ALT SERPL-CCNC: 7 U/L (ref 0–32)
ANION GAP SERPL CALCULATED.3IONS-SCNC: 8 MMOL/L (ref 7–16)
AST SERPL-CCNC: 10 U/L (ref 0–31)
BASOPHILS # BLD: 0.02 K/UL (ref 0–0.2)
BASOPHILS NFR BLD: 1 % (ref 0–2)
BILIRUB SERPL-MCNC: 0.3 MG/DL (ref 0–1.2)
BUN SERPL-MCNC: 16 MG/DL (ref 6–23)
CALCIUM SERPL-MCNC: 9.1 MG/DL (ref 8.6–10.2)
CHLORIDE SERPL-SCNC: 107 MMOL/L (ref 98–107)
CO2 SERPL-SCNC: 27 MMOL/L (ref 22–29)
CREAT SERPL-MCNC: 0.7 MG/DL (ref 0.5–1)
EKG ATRIAL RATE: 85 BPM
EKG P AXIS: 87 DEGREES
EKG P-R INTERVAL: 182 MS
EKG Q-T INTERVAL: 388 MS
EKG QRS DURATION: 106 MS
EKG QTC CALCULATION (BAZETT): 461 MS
EKG R AXIS: -17 DEGREES
EKG T AXIS: 81 DEGREES
EKG VENTRICULAR RATE: 85 BPM
EOSINOPHIL # BLD: 0.03 K/UL (ref 0.05–0.5)
EOSINOPHILS RELATIVE PERCENT: 1 % (ref 0–6)
ERYTHROCYTE [DISTWIDTH] IN BLOOD BY AUTOMATED COUNT: 13.7 % (ref 11.5–15)
GFR, ESTIMATED: 83 ML/MIN/1.73M2
GLUCOSE SERPL-MCNC: 109 MG/DL (ref 74–99)
HCT VFR BLD AUTO: 34 % (ref 34–48)
HGB BLD-MCNC: 11.4 G/DL (ref 11.5–15.5)
IMM GRANULOCYTES # BLD AUTO: <0.03 K/UL (ref 0–0.58)
IMM GRANULOCYTES NFR BLD: 0 % (ref 0–5)
LYMPHOCYTES NFR BLD: 1.25 K/UL (ref 1.5–4)
LYMPHOCYTES RELATIVE PERCENT: 34 % (ref 20–42)
MCH RBC QN AUTO: 34.4 PG (ref 26–35)
MCHC RBC AUTO-ENTMCNC: 33.5 G/DL (ref 32–34.5)
MCV RBC AUTO: 102.7 FL (ref 80–99.9)
MONOCYTES NFR BLD: 0.7 K/UL (ref 0.1–0.95)
MONOCYTES NFR BLD: 19 % (ref 2–12)
NEUTROPHILS NFR BLD: 45 % (ref 43–80)
NEUTS SEG NFR BLD: 1.64 K/UL (ref 1.8–7.3)
PLATELET # BLD AUTO: 240 K/UL (ref 130–450)
PMV BLD AUTO: 10.2 FL (ref 7–12)
POTASSIUM SERPL-SCNC: 3.8 MMOL/L (ref 3.5–5)
PROT SERPL-MCNC: 6.6 G/DL (ref 6.4–8.3)
RBC # BLD AUTO: 3.31 M/UL (ref 3.5–5.5)
SODIUM SERPL-SCNC: 142 MMOL/L (ref 132–146)
TROPONIN I SERPL HS-MCNC: 7 NG/L (ref 0–9)
WBC OTHER # BLD: 3.7 K/UL (ref 4.5–11.5)

## 2024-07-05 PROCEDURE — 93005 ELECTROCARDIOGRAM TRACING: CPT | Performed by: EMERGENCY MEDICINE

## 2024-07-05 PROCEDURE — 84484 ASSAY OF TROPONIN QUANT: CPT

## 2024-07-05 PROCEDURE — 80053 COMPREHEN METABOLIC PANEL: CPT

## 2024-07-05 PROCEDURE — 85025 COMPLETE CBC W/AUTO DIFF WBC: CPT

## 2024-07-05 PROCEDURE — 99285 EMERGENCY DEPT VISIT HI MDM: CPT

## 2024-07-05 ASSESSMENT — PAIN - FUNCTIONAL ASSESSMENT: PAIN_FUNCTIONAL_ASSESSMENT: 0-10

## 2024-07-05 ASSESSMENT — PAIN SCALES - GENERAL: PAINLEVEL_OUTOF10: 8

## 2024-07-05 ASSESSMENT — PAIN DESCRIPTION - LOCATION: LOCATION: CHEST

## 2024-07-05 ASSESSMENT — PAIN DESCRIPTION - ORIENTATION: ORIENTATION: LEFT

## 2024-07-05 ASSESSMENT — PAIN DESCRIPTION - DESCRIPTORS: DESCRIPTORS: THROBBING

## 2024-07-06 ENCOUNTER — HOSPITAL ENCOUNTER (EMERGENCY)
Age: 89
Discharge: HOME | End: 2024-07-06
Attending: EMERGENCY MEDICINE
Payer: MEDICARE

## 2024-07-06 ENCOUNTER — APPOINTMENT (OUTPATIENT)
Dept: CT IMAGING | Age: 89
End: 2024-07-06
Attending: EMERGENCY MEDICINE
Payer: MEDICARE

## 2024-07-06 ENCOUNTER — APPOINTMENT (OUTPATIENT)
Dept: GENERAL RADIOLOGY | Age: 89
End: 2024-07-06
Payer: MEDICARE

## 2024-07-06 VITALS
OXYGEN SATURATION: 98 % | DIASTOLIC BLOOD PRESSURE: 70 MMHG | SYSTOLIC BLOOD PRESSURE: 151 MMHG | HEIGHT: 62 IN | HEART RATE: 78 BPM | RESPIRATION RATE: 16 BRPM | TEMPERATURE: 97.6 F | BODY MASS INDEX: 23.74 KG/M2 | WEIGHT: 129 LBS

## 2024-07-06 DIAGNOSIS — R00.2 PALPITATIONS: Primary | ICD-10-CM

## 2024-07-06 LAB — TROPONIN I SERPL HS-MCNC: 6 NG/L (ref 0–9)

## 2024-07-06 PROCEDURE — 6360000004 HC RX CONTRAST MEDICATION: Performed by: RADIOLOGY

## 2024-07-06 PROCEDURE — 71045 X-RAY EXAM CHEST 1 VIEW: CPT

## 2024-07-06 PROCEDURE — 71275 CT ANGIOGRAPHY CHEST: CPT

## 2024-07-06 PROCEDURE — 84484 ASSAY OF TROPONIN QUANT: CPT

## 2024-07-06 RX ADMIN — IOPAMIDOL 75 ML: 755 INJECTION, SOLUTION INTRAVENOUS at 01:26

## 2024-07-06 ASSESSMENT — PAIN - FUNCTIONAL ASSESSMENT: PAIN_FUNCTIONAL_ASSESSMENT: NONE - DENIES PAIN

## 2024-07-06 NOTE — ED PROVIDER NOTES
HPI:  7/5/24, Time: 9:43 PM EDT         Flores Gaines is a 88 y.o. female history anxiety history of stroke history of hypertension acid reflux hyperlipidemia hypertension history of slurred speech history of occlusion stenosis right carotid presenting to the ED for chest pain, beginning several days ago.  The complaint has been intermittent, moderate in severity, and worsened by nothing.  Patient presenting here because of palpitations chest discomfort for the last several days she reports history of diaphoresis also having difficulty sleeping.  Patient reporting also upper back pain and mild shortness of breath she reports no cough she reports no abdominal pain or vomit she reports no upper or lower extremity weakness.  Patient reporting no headache she reports no slurred speech.  Patient reports she was recently taken off her medication several months ago due to low blood pressure.    ROS:   Pertinent positives and negatives are stated within HPI, all other systems reviewed and are negative.  --------------------------------------------- PAST HISTORY ---------------------------------------------  Past Medical History:  has a past medical history of Abnormal weight loss, Anxiety, Anxiety disorder, Cerebral infarction, unspecified (HCC), Essential (primary) hypertension, Gastric reflux, Hyperlipidemia, Hypertension, Occlusion and stenosis of right carotid artery, and Slurred speech.    Past Surgical History:  has a past surgical history that includes Colonoscopy; Hysterectomy; Lithotripsy (Left, 2/7/2020); and Carotid endarterectomy (Right, 11/27/2020).    Social History:  reports that she has never smoked. She has never used smokeless tobacco. She reports that she does not drink alcohol and does not use drugs.    Family History: family history includes Cancer in her brother and brother; High Blood Pressure in her mother; Other in her father.     The patient’s home medications have been reviewed.    Allergies:

## 2024-07-08 LAB
EKG ATRIAL RATE: 85 BPM
EKG P AXIS: 87 DEGREES
EKG P-R INTERVAL: 182 MS
EKG Q-T INTERVAL: 388 MS
EKG QRS DURATION: 106 MS
EKG QTC CALCULATION (BAZETT): 461 MS
EKG R AXIS: -17 DEGREES
EKG T AXIS: 81 DEGREES
EKG VENTRICULAR RATE: 85 BPM

## 2024-07-08 PROCEDURE — 93010 ELECTROCARDIOGRAM REPORT: CPT | Performed by: INTERNAL MEDICINE

## 2024-07-10 ENCOUNTER — OFFICE VISIT (OUTPATIENT)
Dept: PRIMARY CARE CLINIC | Age: 89
End: 2024-07-10

## 2024-07-10 VITALS
RESPIRATION RATE: 18 BRPM | OXYGEN SATURATION: 96 % | WEIGHT: 125 LBS | HEART RATE: 85 BPM | SYSTOLIC BLOOD PRESSURE: 92 MMHG | TEMPERATURE: 97 F | HEIGHT: 62 IN | DIASTOLIC BLOOD PRESSURE: 42 MMHG | BODY MASS INDEX: 23 KG/M2

## 2024-07-10 DIAGNOSIS — K44.9 HIATAL HERNIA: ICD-10-CM

## 2024-07-10 DIAGNOSIS — I69.30 HISTORY OF CVA WITH RESIDUAL DEFICIT: ICD-10-CM

## 2024-07-10 DIAGNOSIS — E78.00 PURE HYPERCHOLESTEROLEMIA: ICD-10-CM

## 2024-07-10 DIAGNOSIS — K21.9 GASTROESOPHAGEAL REFLUX DISEASE, UNSPECIFIED WHETHER ESOPHAGITIS PRESENT: Primary | ICD-10-CM

## 2024-07-10 RX ORDER — ATORVASTATIN CALCIUM 40 MG/1
40 TABLET, FILM COATED ORAL NIGHTLY
Qty: 30 TABLET | Refills: 3 | Status: SHIPPED | OUTPATIENT
Start: 2024-07-10

## 2024-07-10 RX ORDER — OMEPRAZOLE 20 MG/1
20 CAPSULE, DELAYED RELEASE ORAL
Qty: 30 CAPSULE | Refills: 0 | Status: SHIPPED | OUTPATIENT
Start: 2024-07-10

## 2024-07-10 ASSESSMENT — ENCOUNTER SYMPTOMS
SORE THROAT: 0
CHEST TIGHTNESS: 0
NAUSEA: 0
SHORTNESS OF BREATH: 0
ABDOMINAL PAIN: 0
WHEEZING: 0
VOMITING: 0
VOICE CHANGE: 0
COUGH: 0

## 2024-07-10 NOTE — PROGRESS NOTES
HPI:  Patient comes in today for follow-up on her visit to the emergency room patient states that she gets chest pain described as burning sensation patient has known hiatal hernia and she denies any indigestion she denies palpitations diaphoresis denies any relation of the burning to activity and actually states that the burning is in the center of her left breast it could be because a hiatal hernia.    Review of Systems   Constitutional:  Negative for chills, fever and unexpected weight change.   HENT:  Negative for postnasal drip, sore throat and voice change.    Respiratory:  Negative for cough, chest tightness, shortness of breath and wheezing.    Cardiovascular:  Negative for chest pain and leg swelling.   Gastrointestinal:  Negative for abdominal pain, nausea and vomiting.        Heartburn related to acidly food as and spaghetti sauce etc.   Musculoskeletal:  Negative for gait problem and joint swelling.   Skin:  Negative for rash and wound.   Neurological: Negative.    Psychiatric/Behavioral: Negative.  Negative for confusion and suicidal ideas. The patient is not nervous/anxious.         Past Medical History:   Diagnosis Date    Abnormal weight loss     Anxiety     Anxiety disorder     Cerebral infarction, unspecified (HCC)     Essential (primary) hypertension     Gastric reflux     Hyperlipidemia     Hypertension     Occlusion and stenosis of right carotid artery     Slurred speech      Past Surgical History:   Procedure Laterality Date    CAROTID ENDARTERECTOMY Right 11/27/2020    RIGHT CAROTID ENDARTERECTOMY performed by Bobby Fitzpatrick MD at [facility] OR    COLONOSCOPY      HYSTERECTOMY (CERVIX STATUS UNKNOWN)      LITHOTRIPSY Left 2/7/2020    CYSTOSCOPY RETROGRADE PYELOGRAM LEFT URETEROSCOPY LEFT J STENT LASER LITHOTRIPSY performed by Fernando Mendez DO at [facility] OR     Family History   Problem Relation Age of Onset    High Blood Pressure Mother     Other Father         Emphysema    Cancer Brother

## 2024-07-11 ENCOUNTER — TELEPHONE (OUTPATIENT)
Dept: PRIMARY CARE CLINIC | Age: 89
End: 2024-07-11

## 2024-07-11 NOTE — TELEPHONE ENCOUNTER
No nail infection with yesterday evaluation  Please advise the pt..,Avoid ATBX to avoid SE of ATBX<especially diarrhea& Colitis  can develop from ATBX

## 2024-07-11 NOTE — TELEPHONE ENCOUNTER
Flores would like an antibiotic ordered for her due to her biting her nails and the doctor indicating that this could cause an infection.  She stated she does bring up a lot of mucus. Flores indicated that she is going to work on to stop biting her nails.      [facility] - [place], [place] - [address] - P [phone] - F [phone] [[id number]]

## 2024-08-05 ENCOUNTER — OFFICE VISIT (OUTPATIENT)
Dept: ENT CLINIC | Age: 89
End: 2024-08-05
Payer: MEDICARE

## 2024-08-05 ENCOUNTER — PROCEDURE VISIT (OUTPATIENT)
Dept: AUDIOLOGY | Age: 89
End: 2024-08-05
Payer: MEDICARE

## 2024-08-05 VITALS
SYSTOLIC BLOOD PRESSURE: 105 MMHG | TEMPERATURE: 97 F | HEIGHT: 62 IN | HEART RATE: 93 BPM | BODY MASS INDEX: 23.55 KG/M2 | WEIGHT: 128 LBS | DIASTOLIC BLOOD PRESSURE: 68 MMHG

## 2024-08-05 DIAGNOSIS — Z01.818 PREOP TESTING: ICD-10-CM

## 2024-08-05 DIAGNOSIS — H93.13 TINNITUS OF BOTH EARS: Primary | ICD-10-CM

## 2024-08-05 DIAGNOSIS — H90.3 SENSORINEURAL HEARING LOSS (SNHL) OF BOTH EARS: ICD-10-CM

## 2024-08-05 DIAGNOSIS — H90.3 ASYMMETRIC SNHL (SENSORINEURAL HEARING LOSS): ICD-10-CM

## 2024-08-05 PROCEDURE — G8420 CALC BMI NORM PARAMETERS: HCPCS

## 2024-08-05 PROCEDURE — 92567 TYMPANOMETRY: CPT | Performed by: AUDIOLOGIST

## 2024-08-05 PROCEDURE — 92557 COMPREHENSIVE HEARING TEST: CPT | Performed by: AUDIOLOGIST

## 2024-08-05 PROCEDURE — 1036F TOBACCO NON-USER: CPT

## 2024-08-05 PROCEDURE — 99204 OFFICE O/P NEW MOD 45 MIN: CPT

## 2024-08-05 PROCEDURE — 1123F ACP DISCUSS/DSCN MKR DOCD: CPT

## 2024-08-05 PROCEDURE — G8427 DOCREV CUR MEDS BY ELIG CLIN: HCPCS

## 2024-08-05 PROCEDURE — 1090F PRES/ABSN URINE INCON ASSESS: CPT

## 2024-08-05 NOTE — PROGRESS NOTES
Subjective:     Patient ID:  Flores Gaines is a 88 y.o. female.    HPI:    Tinnitus  Patient presents with tinnitus.Onset of symptoms was gradual several years ago ago with unchanged course since that time. Patient describes the tinnitus as constant located in the bilateral ear. The quality is described as high pitch that sounds like crickets. The pattern is nonpulsatile with an intensity that is soft. Patient describes her level of annoyance as minimally annoying, intermittently aware. Associated symptoms include no hearing loss, pain, dizziness, drainage or recurrent otitis. Family history is negative family history for tinnitusPatient has had no prior evaluation, treatment or surgery for tinnitus  Previous treatments include none.      Patient does not have hearing aids at this time.  History of Head trauma: no   Description: none  History of surgery to the head/neck: yes   Description:Thrombectomy  History of cerumen impaction: no  History of noise exposure: yes   Type: Worked at [employer] for 11 years  Spinning: no  Hearing loss: no    Fluctuating: no  Aural pressure: no  Tinnitus:yes  Otalgia:no    Past Medical History:   Diagnosis Date    Abnormal weight loss     Anxiety     Anxiety disorder     Cerebral infarction, unspecified (HCC)     Essential (primary) hypertension     Gastric reflux     Hyperlipidemia     Hypertension     Occlusion and stenosis of right carotid artery     Slurred speech      Past Surgical History:   Procedure Laterality Date    CAROTID ENDARTERECTOMY Right 11/27/2020    RIGHT CAROTID ENDARTERECTOMY performed by Bobby Fitzpatrick MD at [facility] OR    COLONOSCOPY      HYSTERECTOMY (CERVIX STATUS UNKNOWN)      LITHOTRIPSY Left 2/7/2020    CYSTOSCOPY RETROGRADE PYELOGRAM LEFT URETEROSCOPY LEFT J STENT LASER LITHOTRIPSY performed by Fernando Mendez DO at [facility] OR     Family History   Problem Relation Age of Onset    High Blood Pressure Mother     Other Father         Emphysema    Cancer Brother

## 2024-08-06 ENCOUNTER — TELEPHONE (OUTPATIENT)
Dept: ENT CLINIC | Age: 89
End: 2024-08-06

## 2024-08-06 NOTE — TELEPHONE ENCOUNTER
[facility] to authorize order with patient insurance. Patient is scheduled for MRI IAC with radiology on 8/23/24 @ 2:00pm. Patient has been notified of date and time and that they need to arrive at 1:15pm. Patient was informed of her prep prior to procedure. Patient instructed to park in [facility] parking lot and report to registration. Patient verbalized understanding.    Electronically signed by Diana Flores MA on 8/6/24 at 1:53 PM EDT

## 2024-08-09 NOTE — PROGRESS NOTES
This patient was referred for audiometric/tympanometric testing by DORCAS Mauricio due to tinnitus.      Audiometry using pure tone air and bone conduction revealed hearing sensitivity within normal limits through 2000 Hz sloping to a moderate loss at 6000 Hz sloping to a severe loss at 8000 Hz in the right ear. Left ear revealed hearing sensitivity within normal limits through 1500 Hz sloping to a moderate severe sensorineural hearing loss through 8000 Hz.   Reliability was good. Speech reception thresholds were in good agreement with the pure tone averages, bilaterally. Speech discrimination scores were excellent, bilaterally.    Tympanometry revealed normal middle ear peak pressure and compliance, bilaterally.        The results were reviewed with the patient and CNP.     Recommendations for follow up will be made pending ordering provider consult.    Liborio Gonzalez/CCC-A  [place] Lic # [id number]

## 2024-08-23 ENCOUNTER — HOSPITAL ENCOUNTER (OUTPATIENT)
Dept: MRI IMAGING | Age: 89
End: 2024-08-23
Payer: MEDICARE

## 2024-08-23 ENCOUNTER — HOSPITAL ENCOUNTER (OUTPATIENT)
Age: 89
Discharge: HOME | End: 2024-08-23
Payer: MEDICARE

## 2024-08-23 DIAGNOSIS — H90.3 ASYMMETRIC SNHL (SENSORINEURAL HEARING LOSS): ICD-10-CM

## 2024-08-23 DIAGNOSIS — H93.13 TINNITUS OF BOTH EARS: ICD-10-CM

## 2024-08-23 DIAGNOSIS — Z01.818 PREOP TESTING: ICD-10-CM

## 2024-08-23 LAB
BUN SERPL-MCNC: 14 MG/DL (ref 6–23)
CREAT SERPL-MCNC: 0.6 MG/DL (ref 0.5–1)
GFR, ESTIMATED: 86 ML/MIN/1.73M2

## 2024-08-23 PROCEDURE — 36415 COLL VENOUS BLD VENIPUNCTURE: CPT

## 2024-08-23 PROCEDURE — 82565 ASSAY OF CREATININE: CPT

## 2024-08-23 PROCEDURE — 70553 MRI BRAIN STEM W/O & W/DYE: CPT

## 2024-08-23 PROCEDURE — A9577 INJ MULTIHANCE: HCPCS | Performed by: RADIOLOGY

## 2024-08-23 PROCEDURE — 6360000004 HC RX CONTRAST MEDICATION: Performed by: RADIOLOGY

## 2024-08-23 PROCEDURE — 84520 ASSAY OF UREA NITROGEN: CPT

## 2024-08-23 RX ADMIN — GADOBENATE DIMEGLUMINE 12 ML: 529 INJECTION, SOLUTION INTRAVENOUS at 14:57

## 2024-08-27 ENCOUNTER — TELEPHONE (OUTPATIENT)
Dept: PRIMARY CARE CLINIC | Age: 89
End: 2024-08-27

## 2024-08-27 NOTE — TELEPHONE ENCOUNTER
Patient is checking on the status of her MRI. She is unsure why she had this test done and who ordered it.

## 2024-08-28 NOTE — TELEPHONE ENCOUNTER
Please notify the patient the MRI was on the ear, was ordered by the ear specialist, no abnormal findings on the MRI reported

## 2024-09-26 ENCOUNTER — HOSPITAL ENCOUNTER (EMERGENCY)
Age: 89
Discharge: HOME | End: 2024-09-27
Attending: EMERGENCY MEDICINE
Payer: MEDICARE

## 2024-09-26 ENCOUNTER — TELEPHONE (OUTPATIENT)
Dept: PRIMARY CARE CLINIC | Age: 89
End: 2024-09-26

## 2024-09-26 ENCOUNTER — APPOINTMENT (OUTPATIENT)
Dept: GENERAL RADIOLOGY | Age: 89
End: 2024-09-26
Payer: MEDICARE

## 2024-09-26 DIAGNOSIS — R07.9 CHEST PAIN, UNSPECIFIED TYPE: Primary | ICD-10-CM

## 2024-09-26 DIAGNOSIS — F41.9 ANXIETY: ICD-10-CM

## 2024-09-26 PROCEDURE — 6370000000 HC RX 637 (ALT 250 FOR IP): Performed by: NURSE PRACTITIONER

## 2024-09-26 PROCEDURE — 99285 EMERGENCY DEPT VISIT HI MDM: CPT

## 2024-09-26 PROCEDURE — 93005 ELECTROCARDIOGRAM TRACING: CPT | Performed by: EMERGENCY MEDICINE

## 2024-09-26 PROCEDURE — 71046 X-RAY EXAM CHEST 2 VIEWS: CPT

## 2024-09-26 RX ORDER — ACETAMINOPHEN 500 MG
1000 TABLET ORAL ONCE
Status: COMPLETED | OUTPATIENT
Start: 2024-09-26 | End: 2024-09-26

## 2024-09-26 RX ADMIN — ACETAMINOPHEN 1000 MG: 500 TABLET ORAL at 23:36

## 2024-09-26 ASSESSMENT — VISUAL ACUITY: OU: 1

## 2024-09-26 ASSESSMENT — PAIN DESCRIPTION - LOCATION
LOCATION: ABDOMEN
LOCATION: CHEST

## 2024-09-26 ASSESSMENT — PAIN DESCRIPTION - DESCRIPTORS
DESCRIPTORS: BURNING
DESCRIPTORS: BURNING

## 2024-09-26 ASSESSMENT — PAIN SCALES - GENERAL: PAINLEVEL_OUTOF10: 6

## 2024-09-26 ASSESSMENT — PAIN DESCRIPTION - ORIENTATION: ORIENTATION: LEFT

## 2024-09-26 ASSESSMENT — PAIN - FUNCTIONAL ASSESSMENT: PAIN_FUNCTIONAL_ASSESSMENT: 0-10

## 2024-09-26 NOTE — ED PROVIDER NOTES
Department of Emergency Medicine  FIRST PROVIDER TRIAGE NOTE             In[facility] MLP           9/26/24  7:24 PM EDT    Date of Encounter: 9/26/24   MRN: [id number]      HPI: Flores Gaines is a 88 y.o. female who presents to the ED for left-sided chest pain that started last night.   The pain is burning in nature and is now spreading to the right side.  No alleviating or exacerbating factors.    ROS: Negative for sob, abd pain, back pain, fever, cough, or dizziness.    Vitals:    09/26/24 1924   BP: 137/77   Pulse: 84   Resp: 16   Temp: 98.1 °F (36.7 °C)   TempSrc: Oral   SpO2: 95%       PE: Gen Appearance/Constitutional: alert  CV: regular rate  Pulm: CTA bilat    Provider-Patient relationship only established for Provider In Triage (PIT)  Full assessment, HPI and examination not performed, therefore, it is not yet possible to state whether or not an emergency medical condition exists   Focused assessment only during triage. This is not a comprehensive evaluation due to no physical ER space, staff shortage and high numbers of boarding patients in the ER     Initial Plan of Care: All treatment areas with department are currently occupied. Plan to order/Initiate the following while awaiting opening in ED: labs, EKG, and imaging studies.  Initiate Treatment-Testing, Proceed toTreatment Area When Bed Available for ED Attending/SHAGGYP to Continue Care    Electronically signed by CELE Vasquez CNP   DD: 9/26/24       Марина Miner APRN - CNP  09/26/24 1926       Марина Miner APRN - CNP  09/26/24 1926

## 2024-09-27 VITALS
OXYGEN SATURATION: 95 % | HEIGHT: 62 IN | BODY MASS INDEX: 23.19 KG/M2 | WEIGHT: 126 LBS | HEART RATE: 86 BPM | SYSTOLIC BLOOD PRESSURE: 148 MMHG | TEMPERATURE: 97.6 F | RESPIRATION RATE: 16 BRPM | DIASTOLIC BLOOD PRESSURE: 69 MMHG

## 2024-09-27 LAB
ALBUMIN SERPL-MCNC: 4.1 G/DL (ref 3.5–5.2)
ALP SERPL-CCNC: 69 U/L (ref 35–104)
ALT SERPL-CCNC: 6 U/L (ref 0–32)
ANION GAP SERPL CALCULATED.3IONS-SCNC: 9 MMOL/L (ref 7–16)
AST SERPL-CCNC: 11 U/L (ref 0–31)
BASOPHILS # BLD: 0.01 K/UL (ref 0–0.2)
BASOPHILS NFR BLD: 0 % (ref 0–2)
BILIRUB SERPL-MCNC: 0.5 MG/DL (ref 0–1.2)
BUN SERPL-MCNC: 14 MG/DL (ref 6–23)
CALCIUM SERPL-MCNC: 8.7 MG/DL (ref 8.6–10.2)
CHLORIDE SERPL-SCNC: 108 MMOL/L (ref 98–107)
CO2 SERPL-SCNC: 27 MMOL/L (ref 22–29)
CREAT SERPL-MCNC: 0.6 MG/DL (ref 0.5–1)
EKG ATRIAL RATE: 80 BPM
EKG P AXIS: 74 DEGREES
EKG P-R INTERVAL: 188 MS
EKG Q-T INTERVAL: 390 MS
EKG QRS DURATION: 94 MS
EKG QTC CALCULATION (BAZETT): 449 MS
EKG R AXIS: -20 DEGREES
EKG T AXIS: 83 DEGREES
EKG VENTRICULAR RATE: 80 BPM
EOSINOPHIL # BLD: 0.01 K/UL (ref 0.05–0.5)
EOSINOPHILS RELATIVE PERCENT: 0 % (ref 0–6)
ERYTHROCYTE [DISTWIDTH] IN BLOOD BY AUTOMATED COUNT: 13.5 % (ref 11.5–15)
GFR, ESTIMATED: 87 ML/MIN/1.73M2
GLUCOSE SERPL-MCNC: 99 MG/DL (ref 74–99)
HCT VFR BLD AUTO: 34.2 % (ref 34–48)
HGB BLD-MCNC: 11.3 G/DL (ref 11.5–15.5)
IMM GRANULOCYTES # BLD AUTO: <0.03 K/UL (ref 0–0.58)
IMM GRANULOCYTES NFR BLD: 0 % (ref 0–5)
LIPASE SERPL-CCNC: 18 U/L (ref 13–60)
LYMPHOCYTES NFR BLD: 1.06 K/UL (ref 1.5–4)
LYMPHOCYTES RELATIVE PERCENT: 31 % (ref 20–42)
MCH RBC QN AUTO: 34.1 PG (ref 26–35)
MCHC RBC AUTO-ENTMCNC: 33 G/DL (ref 32–34.5)
MCV RBC AUTO: 103.3 FL (ref 80–99.9)
MONOCYTES NFR BLD: 0.59 K/UL (ref 0.1–0.95)
MONOCYTES NFR BLD: 17 % (ref 2–12)
NEUTROPHILS NFR BLD: 51 % (ref 43–80)
NEUTS SEG NFR BLD: 1.72 K/UL (ref 1.8–7.3)
PLATELET # BLD AUTO: 234 K/UL (ref 130–450)
PMV BLD AUTO: 10.5 FL (ref 7–12)
POTASSIUM SERPL-SCNC: 3.8 MMOL/L (ref 3.5–5)
PROT SERPL-MCNC: 6.4 G/DL (ref 6.4–8.3)
RBC # BLD AUTO: 3.31 M/UL (ref 3.5–5.5)
SODIUM SERPL-SCNC: 144 MMOL/L (ref 132–146)
TROPONIN I SERPL HS-MCNC: 10 NG/L (ref 0–9)
TROPONIN I SERPL HS-MCNC: 8 NG/L (ref 0–9)
WBC OTHER # BLD: 3.4 K/UL (ref 4.5–11.5)

## 2024-09-27 PROCEDURE — 83690 ASSAY OF LIPASE: CPT

## 2024-09-27 PROCEDURE — 85025 COMPLETE CBC W/AUTO DIFF WBC: CPT

## 2024-09-27 PROCEDURE — 93010 ELECTROCARDIOGRAM REPORT: CPT | Performed by: INTERNAL MEDICINE

## 2024-09-27 PROCEDURE — 80053 COMPREHEN METABOLIC PANEL: CPT

## 2024-09-27 PROCEDURE — 84484 ASSAY OF TROPONIN QUANT: CPT

## 2024-09-27 RX ORDER — PANTOPRAZOLE SODIUM 40 MG/1
40 TABLET, DELAYED RELEASE ORAL
Qty: 30 TABLET | Refills: 0 | Status: SHIPPED | OUTPATIENT
Start: 2024-09-27

## 2024-09-27 RX ORDER — DIAZEPAM 2 MG
2 TABLET ORAL NIGHTLY PRN
Qty: 30 TABLET | Refills: 0 | Status: SHIPPED | OUTPATIENT
Start: 2024-09-27 | End: 2024-11-26

## 2024-09-27 ASSESSMENT — PAIN SCALES - GENERAL: PAINLEVEL_OUTOF10: 0

## 2024-09-27 NOTE — ED PROVIDER NOTES
Shared JOSE-ED Attending Visit.  CC: No            [facility]  Department of Emergency Medicine   ED  Encounter Note  Admit Date/RoomTime: 2024  9:54 PM  ED Room:   NAME: Flores Gaines  : 1935  MRN: [id number]     Chief Complaint:  Chest Pain (Started last night, left side)    HISTORY OF PRESENT ILLNESS      Patient presents to the emergency department for evaluation of chest pain that began last night.  She states that it was in her left breast and felt like a, \"discomfort burn like.\"  She took a dose of Tums due to the burning sensation.  It did seem to help her discomfort.  The pain then migrated over to her right side of her chest.  She disagrees with having been taken off of amlodipine many months ago so she took a dose of that not that she knew what her blood pressure was at the time.  She reached out to her primary care provider who referred her to the emergency department given she has a history of hypertension, CVA, hyperlipidemia and carotid artery stenosis.  She admits she has not had the pain since coming to the emergency department and it has been resolved for about 4 hours now.  She denies any traumatic incident.  She states she is quite active as she does laundry every 3 days and walks the premises of her apartment.  There are stairs but she typically takes the elevator.  She has not had any diaphoresis, syncope, shortness of breath, fever, body aches, nausea, abdominal pain, UTI symptoms, weakness or traumatic incident.      ROS   Pertinent positives and negatives are stated within HPI, all other systems reviewed and are negative.    Past Medical History:  has a past medical history of Abnormal weight loss, Anxiety, Anxiety disorder, Cerebral infarction, unspecified (HCC), Essential (primary) hypertension, Gastric reflux, Hyperlipidemia, Hypertension, Occlusion and stenosis of right carotid artery, and Slurred speech.    Surgical History:  has a past surgical  Palpations: Abdomen is soft.      Tenderness: There is no abdominal tenderness.   Musculoskeletal:      Cervical back: Normal range of motion and neck supple.      Right lower leg: No edema.      Left lower leg: No edema.      Comments: Moves extremities randomly.   Skin:     General: Skin is warm and dry.      Capillary Refill: Capillary refill takes less than 2 seconds.      Coloration: Skin is not jaundiced or pale.   Neurological:      Mental Status: She is alert and oriented to person, place, and time.      Motor: Motor function is intact.      Coordination: Coordination is intact.      Gait: Gait is intact.      Comments: Slurred speech at baseline   Psychiatric:         Attention and Perception: Attention normal.         Mood and Affect: Mood normal.         Behavior: Behavior normal. Behavior is cooperative.         Lab / Imaging Results   (All laboratory and radiology results have been personally reviewed by myself)  Labs:  Results for orders placed or performed during the hospital encounter of 09/26/24   CBC with Auto Differential   Result Value Ref Range    WBC 3.4 (L) 4.5 - 11.5 k/uL    RBC 3.31 (L) 3.50 - 5.50 m/uL    Hemoglobin 11.3 (L) 11.5 - 15.5 g/dL    Hematocrit 34.2 34.0 - 48.0 %    .3 (H) 80.0 - 99.9 fL    MCH 34.1 26.0 - 35.0 pg    MCHC 33.0 32.0 - 34.5 g/dL    RDW 13.5 11.5 - 15.0 %    Platelets 234 130 - 450 k/uL    MPV 10.5 7.0 - 12.0 fL    Neutrophils % 51 43.0 - 80.0 %    Lymphocytes % 31 20.0 - 42.0 %    Monocytes % 17 (H) 2.0 - 12.0 %    Eosinophils % 0 0 - 6 %    Basophils % 0 0.0 - 2.0 %    Immature Granulocytes % 0 0.0 - 5.0 %    Neutrophils Absolute 1.72 (L) 1.80 - 7.30 k/uL    Lymphocytes Absolute 1.06 (L) 1.50 - 4.00 k/uL    Monocytes Absolute 0.59 0.10 - 0.95 k/uL    Eosinophils Absolute 0.01 (L) 0.05 - 0.50 k/uL    Basophils Absolute 0.01 0.00 - 0.20 k/uL    Immature Granulocytes Absolute <0.03 0.00 - 0.58 k/uL   Comprehensive Metabolic Panel w/ Reflex to MG   Result Value

## 2024-09-27 NOTE — ED NOTES
Breakfast tray provided to patient. Voucher for taxi obtained for patient. No further concerns at this time.

## 2024-09-27 NOTE — ED NOTES
Discharge instructions reviewed with patient, all questions answered. No further concerns at time of discharge. Patient walked out to taxi independently with this nurse.

## 2024-10-07 NOTE — TELEPHONE ENCOUNTER
----- Message from Bryn Kehr sent at 1/5/2022  4:30 PM EST -----  Subject: Refill Request    QUESTIONS  Name of Medication? Cyanocobalamin (B-12) 5000 MCG SUBL  Patient-reported dosage and instructions? 5000 MCG  How many days do you have left? 0  [facility]? [facility] [place] [address]  Pharmacy phone number (if available)? [phone]  ---------------------------------------------------------------------------  --------------,  Name of Medication? polyethylene glycol (GLYCOLAX) 17 g packet  Patient-reported dosage and instructions? 17 g  How many days do you have left? 0  Preferred Pharmacy? [facility] [place] [address]  Pharmacy phone number (if available)? [phone]  ---------------------------------------------------------------------------  --------------,  Name of Medication? diazePAM (VALIUM) 2 MG tablet  Patient-reported dosage and instructions? 2 mg   How many days do you have left? 0  Preferred Pharmacy? [facility] [place] [address]  Pharmacy phone number (if available)? [phone]  ---------------------------------------------------------------------------  --------------,  Name of Medication? aspirin 81 MG chewable tablet  Patient-reported dosage and instructions? 81 MG  How many days do you have left? 0  [facility]erred [facility]acy? [facility]  Pharmacy phone number (if available)? [phone]  ---------------------------------------------------------------------------  --------------,  Name of Medication? amLODIPine (NORVASC) 2.5 MG tablet  Patient-reported dosage and instructions? 2.5 MG   How many days do you have left? 0  Preferred Pharmacy? [facility]  Pharmacy phone number (if available)? [phone]  ---------------------------------------------------------------------------  --------------  CALL BACK INFO  What is the best way for the office to contact you? OK to leave message on   voicemail  Preferred Call Back Phone Number?  [phone] No

## 2024-10-14 DIAGNOSIS — I10 PRIMARY HYPERTENSION: ICD-10-CM

## 2024-10-14 RX ORDER — ASPIRIN 81 MG
TABLET,CHEWABLE ORAL
Qty: 90 TABLET | Refills: 0 | Status: SHIPPED | OUTPATIENT
Start: 2024-10-14

## 2024-10-22 ENCOUNTER — TELEPHONE (OUTPATIENT)
Dept: PRIMARY CARE CLINIC | Age: 89
End: 2024-10-22

## 2024-10-22 NOTE — TELEPHONE ENCOUNTER
Flores received a no show letter for her last appointment and wanted to let the office know that she is frustrated that Dr Easton took her off of her blood pressure medicine because it caused her to have a stroke and patient hung up.

## 2024-10-24 ENCOUNTER — APPOINTMENT (OUTPATIENT)
Dept: CT IMAGING | Age: 89
DRG: 312 | End: 2024-10-24
Attending: STUDENT IN AN ORGANIZED HEALTH CARE EDUCATION/TRAINING PROGRAM
Payer: MEDICARE

## 2024-10-24 ENCOUNTER — TELEPHONE (OUTPATIENT)
Dept: PRIMARY CARE CLINIC | Age: 89
End: 2024-10-24

## 2024-10-24 ENCOUNTER — APPOINTMENT (OUTPATIENT)
Dept: GENERAL RADIOLOGY | Age: 89
DRG: 312 | End: 2024-10-24
Payer: MEDICARE

## 2024-10-24 LAB
ALBUMIN SERPL-MCNC: 3.9 G/DL (ref 3.5–5.2)
ALP SERPL-CCNC: 75 U/L (ref 35–104)
ALT SERPL-CCNC: 7 U/L (ref 0–32)
ANION GAP SERPL CALCULATED.3IONS-SCNC: 7 MMOL/L (ref 7–16)
AST SERPL-CCNC: 17 U/L (ref 0–31)
BACTERIA URNS QL MICRO: ABNORMAL
BASOPHILS # BLD: 0.01 K/UL (ref 0–0.2)
BASOPHILS NFR BLD: 0 % (ref 0–2)
BILIRUB SERPL-MCNC: 0.4 MG/DL (ref 0–1.2)
BILIRUB UR QL STRIP: NEGATIVE
BUN SERPL-MCNC: 12 MG/DL (ref 6–23)
CALCIUM SERPL-MCNC: 8.4 MG/DL (ref 8.6–10.2)
CHLORIDE SERPL-SCNC: 109 MMOL/L (ref 98–107)
CLARITY UR: ABNORMAL
CO2 SERPL-SCNC: 25 MMOL/L (ref 22–29)
COLOR UR: YELLOW
CREAT SERPL-MCNC: 0.7 MG/DL (ref 0.5–1)
CRYSTALS URNS MICRO: ABNORMAL /HPF
EOSINOPHIL # BLD: 0.02 K/UL (ref 0.05–0.5)
EOSINOPHILS RELATIVE PERCENT: 1 % (ref 0–6)
EPI CELLS #/AREA URNS HPF: ABNORMAL /HPF
ERYTHROCYTE [DISTWIDTH] IN BLOOD BY AUTOMATED COUNT: 13.7 % (ref 11.5–15)
GFR, ESTIMATED: 86 ML/MIN/1.73M2
GLUCOSE SERPL-MCNC: 106 MG/DL (ref 74–99)
GLUCOSE UR STRIP-MCNC: NEGATIVE MG/DL
HCT VFR BLD AUTO: 34.2 % (ref 34–48)
HGB BLD-MCNC: 11.2 G/DL (ref 11.5–15.5)
HGB UR QL STRIP.AUTO: NEGATIVE
IMM GRANULOCYTES # BLD AUTO: <0.03 K/UL (ref 0–0.58)
IMM GRANULOCYTES NFR BLD: 0 % (ref 0–5)
KETONES UR STRIP-MCNC: NEGATIVE MG/DL
LEUKOCYTE ESTERASE UR QL STRIP: NEGATIVE
LYMPHOCYTES NFR BLD: 0.86 K/UL (ref 1.5–4)
LYMPHOCYTES RELATIVE PERCENT: 25 % (ref 20–42)
MCH RBC QN AUTO: 34.3 PG (ref 26–35)
MCHC RBC AUTO-ENTMCNC: 32.7 G/DL (ref 32–34.5)
MCV RBC AUTO: 104.6 FL (ref 80–99.9)
MONOCYTES NFR BLD: 0.59 K/UL (ref 0.1–0.95)
MONOCYTES NFR BLD: 17 % (ref 2–12)
NEUTROPHILS NFR BLD: 58 % (ref 43–80)
NEUTS SEG NFR BLD: 2.02 K/UL (ref 1.8–7.3)
NITRITE UR QL STRIP: NEGATIVE
PH UR STRIP: 5.5 [PH] (ref 5–9)
PLATELET # BLD AUTO: 277 K/UL (ref 130–450)
PMV BLD AUTO: 10.9 FL (ref 7–12)
POTASSIUM SERPL-SCNC: 4.6 MMOL/L (ref 3.5–5)
PROT SERPL-MCNC: 6.6 G/DL (ref 6.4–8.3)
PROT UR STRIP-MCNC: NEGATIVE MG/DL
RBC # BLD AUTO: 3.27 M/UL (ref 3.5–5.5)
RBC #/AREA URNS HPF: ABNORMAL /HPF
SODIUM SERPL-SCNC: 141 MMOL/L (ref 132–146)
SP GR UR STRIP: >1.03 (ref 1–1.03)
TROPONIN I SERPL HS-MCNC: NORMAL NG/L (ref 0–14)
TROPONIN INTERP: NORMAL
TROPONIN T SERPL-MCNC: NORMAL NG/ML
UROBILINOGEN UR STRIP-ACNC: 0.2 EU/DL (ref 0–1)
WBC #/AREA URNS HPF: ABNORMAL /HPF
WBC OTHER # BLD: 3.5 K/UL (ref 4.5–11.5)

## 2024-10-24 PROCEDURE — 85025 COMPLETE CBC W/AUTO DIFF WBC: CPT

## 2024-10-24 PROCEDURE — 84484 ASSAY OF TROPONIN QUANT: CPT

## 2024-10-24 PROCEDURE — 93005 ELECTROCARDIOGRAM TRACING: CPT | Performed by: STUDENT IN AN ORGANIZED HEALTH CARE EDUCATION/TRAINING PROGRAM

## 2024-10-24 PROCEDURE — 81001 URINALYSIS AUTO W/SCOPE: CPT

## 2024-10-24 PROCEDURE — 99285 EMERGENCY DEPT VISIT HI MDM: CPT

## 2024-10-24 PROCEDURE — 80053 COMPREHEN METABOLIC PANEL: CPT

## 2024-10-24 PROCEDURE — 71045 X-RAY EXAM CHEST 1 VIEW: CPT

## 2024-10-24 PROCEDURE — 70450 CT HEAD/BRAIN W/O DYE: CPT

## 2024-10-24 PROCEDURE — 72125 CT NECK SPINE W/O DYE: CPT

## 2024-10-24 PROCEDURE — 87086 URINE CULTURE/COLONY COUNT: CPT

## 2024-10-24 ASSESSMENT — LIFESTYLE VARIABLES: HOW OFTEN DO YOU HAVE A DRINK CONTAINING ALCOHOL: NEVER

## 2024-10-24 NOTE — TELEPHONE ENCOUNTER
Flores called to notify  That she passed out around midnight. She wants advice on why this happened. She states her blood pressure was up and she couldn't see anything her eyes were all blurry. She states she \"tried to get up and was still in the mode of fainting or something and couldn't see\" She said her blood pressure was 161/87 and her heart rate 104.

## 2024-10-25 ENCOUNTER — HOSPITAL ENCOUNTER (INPATIENT)
Age: 89
LOS: 2 days | Discharge: HOME | DRG: 312 | End: 2024-10-28
Attending: STUDENT IN AN ORGANIZED HEALTH CARE EDUCATION/TRAINING PROGRAM | Admitting: INTERNAL MEDICINE
Payer: MEDICARE

## 2024-10-25 ENCOUNTER — APPOINTMENT (OUTPATIENT)
Age: 89
DRG: 312 | End: 2024-10-25
Payer: MEDICARE

## 2024-10-25 DIAGNOSIS — I63.231 CEREBROVASCULAR ACCIDENT (CVA) DUE TO STENOSIS OF RIGHT CAROTID ARTERY (HCC): ICD-10-CM

## 2024-10-25 DIAGNOSIS — R55 SYNCOPE AND COLLAPSE: Primary | ICD-10-CM

## 2024-10-25 LAB
ECHO AO ASC DIAM: 2.4 CM
ECHO AV AREA PEAK VELOCITY: 1.6 CM2
ECHO AV AREA VTI: 1.7 CM2
ECHO AV CUSP MM: 0.9 CM
ECHO AV MEAN GRADIENT: 5 MMHG
ECHO AV MEAN VELOCITY: 1.1 M/S
ECHO AV PEAK GRADIENT: 10 MMHG
ECHO AV PEAK VELOCITY: 1.6 M/S
ECHO AV VELOCITY RATIO: 0.5
ECHO AV VTI: 38.5 CM
ECHO EST RA PRESSURE: 3 MMHG
ECHO LA DIAMETER: 3.1 CM
ECHO LA VOL A-L A2C: 31 ML (ref 22–52)
ECHO LA VOL A-L A4C: 47 ML (ref 22–52)
ECHO LA VOL BP: 38 ML (ref 22–52)
ECHO LA VOL MOD A2C: 29 ML (ref 22–52)
ECHO LA VOL MOD A4C: 42 ML (ref 22–52)
ECHO LA VOLUME AREA LENGTH: 41 ML
ECHO LV EF PHYSICIAN: 55 %
ECHO LV FRACTIONAL SHORTENING: 30 % (ref 28–44)
ECHO LV INTERNAL DIMENSION DIASTOLIC: 4 CM (ref 3.9–5.3)
ECHO LV INTERNAL DIMENSION SYSTOLIC: 2.8 CM
ECHO LV IVSD: 1 CM (ref 0.6–0.9)
ECHO LV IVSS: 1.1 CM
ECHO LV MASS 2D: 118.2 G (ref 67–162)
ECHO LV POSTERIOR WALL DIASTOLIC: 0.9 CM (ref 0.6–0.9)
ECHO LV POSTERIOR WALL SYSTOLIC: 1 CM
ECHO LV RELATIVE WALL THICKNESS RATIO: 0.45
ECHO LVOT AREA: 3.1 CM2
ECHO LVOT AV VTI INDEX: 0.55
ECHO LVOT DIAM: 2 CM
ECHO LVOT MEAN GRADIENT: 2 MMHG
ECHO LVOT PEAK GRADIENT: 3 MMHG
ECHO LVOT PEAK VELOCITY: 0.8 M/S
ECHO LVOT SV: 66.6 ML
ECHO LVOT VTI: 21.2 CM
ECHO MV "A" WAVE DURATION: 145.6 MSEC
ECHO MV A VELOCITY: 0.89 M/S
ECHO MV AREA PHT: 3.4 CM2
ECHO MV AREA VTI: 2.8 CM2
ECHO MV E DECELERATION TIME (DT): 216 MS
ECHO MV E VELOCITY: 0.73 M/S
ECHO MV E/A RATIO: 0.82
ECHO MV LVOT VTI INDEX: 1.14
ECHO MV MAX VELOCITY: 1.1 M/S
ECHO MV MEAN GRADIENT: 3 MMHG
ECHO MV MEAN VELOCITY: 0.8 M/S
ECHO MV PEAK GRADIENT: 5 MMHG
ECHO MV PRESSURE HALF TIME (PHT): 65.2 MS
ECHO MV VTI: 24.2 CM
ECHO PV MAX VELOCITY: 0.9 M/S
ECHO PV MEAN GRADIENT: 2 MMHG
ECHO PV MEAN VELOCITY: 0.6 M/S
ECHO PV PEAK GRADIENT: 3 MMHG
ECHO PV VTI: 22.1 CM
ECHO RIGHT VENTRICULAR SYSTOLIC PRESSURE (RVSP): 32 MMHG
ECHO RV TAPSE: 2.2 CM (ref 1.7–?)
ECHO TV REGURGITANT MAX VELOCITY: 2.67 M/S
ECHO TV REGURGITANT PEAK GRADIENT: 29 MMHG
EKG ATRIAL RATE: 74 BPM
EKG P AXIS: 82 DEGREES
EKG P-R INTERVAL: 196 MS
EKG Q-T INTERVAL: 400 MS
EKG QRS DURATION: 92 MS
EKG QTC CALCULATION (BAZETT): 444 MS
EKG R AXIS: -3 DEGREES
EKG T AXIS: 75 DEGREES
EKG VENTRICULAR RATE: 74 BPM
FOLATE SERPL-MCNC: 12.7 NG/ML (ref 4.8–24.2)
MICROORGANISM SPEC CULT: ABNORMAL
SERVICE CMNT-IMP: ABNORMAL
SPECIMEN DESCRIPTION: ABNORMAL
TROPONIN I SERPL HS-MCNC: 7 NG/L (ref 0–9)
VIT B12 SERPL-MCNC: 347 PG/ML (ref 211–946)

## 2024-10-25 PROCEDURE — 93306 TTE W/DOPPLER COMPLETE: CPT | Performed by: INTERNAL MEDICINE

## 2024-10-25 PROCEDURE — 6370000000 HC RX 637 (ALT 250 FOR IP): Performed by: INTERNAL MEDICINE

## 2024-10-25 PROCEDURE — 82746 ASSAY OF FOLIC ACID SERUM: CPT

## 2024-10-25 PROCEDURE — G0378 HOSPITAL OBSERVATION PER HR: HCPCS

## 2024-10-25 PROCEDURE — 2580000003 HC RX 258: Performed by: INTERNAL MEDICINE

## 2024-10-25 PROCEDURE — 93010 ELECTROCARDIOGRAM REPORT: CPT | Performed by: INTERNAL MEDICINE

## 2024-10-25 PROCEDURE — 82607 VITAMIN B-12: CPT

## 2024-10-25 PROCEDURE — 99222 1ST HOSP IP/OBS MODERATE 55: CPT | Performed by: INTERNAL MEDICINE

## 2024-10-25 PROCEDURE — 93306 TTE W/DOPPLER COMPLETE: CPT

## 2024-10-25 PROCEDURE — 6360000002 HC RX W HCPCS: Performed by: INTERNAL MEDICINE

## 2024-10-25 PROCEDURE — 36415 COLL VENOUS BLD VENIPUNCTURE: CPT

## 2024-10-25 RX ORDER — SODIUM CHLORIDE 9 MG/ML
INJECTION, SOLUTION INTRAVENOUS PRN
Status: DISCONTINUED | OUTPATIENT
Start: 2024-10-25 | End: 2024-10-28 | Stop reason: HOSPADM

## 2024-10-25 RX ORDER — 0.9 % SODIUM CHLORIDE 0.9 %
500 INTRAVENOUS SOLUTION INTRAVENOUS ONCE
Status: COMPLETED | OUTPATIENT
Start: 2024-10-25 | End: 2024-10-25

## 2024-10-25 RX ORDER — SODIUM CHLORIDE 0.9 % (FLUSH) 0.9 %
5-40 SYRINGE (ML) INJECTION EVERY 12 HOURS SCHEDULED
Status: DISCONTINUED | OUTPATIENT
Start: 2024-10-25 | End: 2024-10-28 | Stop reason: HOSPADM

## 2024-10-25 RX ORDER — ACETAMINOPHEN 325 MG/1
650 TABLET ORAL EVERY 6 HOURS PRN
Status: DISCONTINUED | OUTPATIENT
Start: 2024-10-25 | End: 2024-10-28 | Stop reason: HOSPADM

## 2024-10-25 RX ORDER — AZELASTINE 1 MG/ML
2 SPRAY, METERED NASAL 2 TIMES DAILY PRN
Status: DISCONTINUED | OUTPATIENT
Start: 2024-10-25 | End: 2024-10-25 | Stop reason: CLARIF

## 2024-10-25 RX ORDER — POLYETHYLENE GLYCOL 3350 17 G/17G
17 POWDER, FOR SOLUTION ORAL DAILY PRN
Status: DISCONTINUED | OUTPATIENT
Start: 2024-10-25 | End: 2024-10-28 | Stop reason: HOSPADM

## 2024-10-25 RX ORDER — DIAZEPAM 2 MG/1
2 TABLET ORAL NIGHTLY PRN
Status: DISCONTINUED | OUTPATIENT
Start: 2024-10-25 | End: 2024-10-28 | Stop reason: HOSPADM

## 2024-10-25 RX ORDER — ASPIRIN 81 MG/1
81 TABLET, CHEWABLE ORAL DAILY
Status: DISCONTINUED | OUTPATIENT
Start: 2024-10-25 | End: 2024-10-28 | Stop reason: HOSPADM

## 2024-10-25 RX ORDER — ACETAMINOPHEN 500 MG
500 TABLET ORAL 4 TIMES DAILY PRN
Status: DISCONTINUED | OUTPATIENT
Start: 2024-10-25 | End: 2024-10-25 | Stop reason: ALTCHOICE

## 2024-10-25 RX ORDER — PANTOPRAZOLE SODIUM 40 MG/1
40 TABLET, DELAYED RELEASE ORAL
Status: DISCONTINUED | OUTPATIENT
Start: 2024-10-25 | End: 2024-10-28 | Stop reason: HOSPADM

## 2024-10-25 RX ORDER — POTASSIUM CHLORIDE 7.45 MG/ML
10 INJECTION INTRAVENOUS PRN
Status: ACTIVE | OUTPATIENT
Start: 2024-10-25 | End: 2024-10-28

## 2024-10-25 RX ORDER — POTASSIUM CHLORIDE 1500 MG/1
40 TABLET, EXTENDED RELEASE ORAL PRN
Status: ACTIVE | OUTPATIENT
Start: 2024-10-25 | End: 2024-10-28

## 2024-10-25 RX ORDER — MAGNESIUM SULFATE IN WATER 40 MG/ML
2000 INJECTION, SOLUTION INTRAVENOUS PRN
Status: DISCONTINUED | OUTPATIENT
Start: 2024-10-25 | End: 2024-10-28 | Stop reason: HOSPADM

## 2024-10-25 RX ORDER — ACETAMINOPHEN 650 MG/1
650 SUPPOSITORY RECTAL EVERY 6 HOURS PRN
Status: DISCONTINUED | OUTPATIENT
Start: 2024-10-25 | End: 2024-10-28 | Stop reason: HOSPADM

## 2024-10-25 RX ORDER — SODIUM CHLORIDE 0.9 % (FLUSH) 0.9 %
5-40 SYRINGE (ML) INJECTION PRN
Status: DISCONTINUED | OUTPATIENT
Start: 2024-10-25 | End: 2024-10-28 | Stop reason: HOSPADM

## 2024-10-25 RX ORDER — ONDANSETRON 2 MG/ML
4 INJECTION INTRAMUSCULAR; INTRAVENOUS EVERY 6 HOURS PRN
Status: DISCONTINUED | OUTPATIENT
Start: 2024-10-25 | End: 2024-10-28 | Stop reason: HOSPADM

## 2024-10-25 RX ORDER — ATORVASTATIN CALCIUM 40 MG/1
40 TABLET, FILM COATED ORAL NIGHTLY
Status: DISCONTINUED | OUTPATIENT
Start: 2024-10-25 | End: 2024-10-28 | Stop reason: HOSPADM

## 2024-10-25 RX ORDER — ONDANSETRON 4 MG/1
4 TABLET, ORALLY DISINTEGRATING ORAL EVERY 8 HOURS PRN
Status: DISCONTINUED | OUTPATIENT
Start: 2024-10-25 | End: 2024-10-28 | Stop reason: HOSPADM

## 2024-10-25 RX ORDER — ENOXAPARIN SODIUM 100 MG/ML
40 INJECTION SUBCUTANEOUS DAILY
Status: DISCONTINUED | OUTPATIENT
Start: 2024-10-25 | End: 2024-10-28 | Stop reason: HOSPADM

## 2024-10-25 RX ADMIN — SODIUM CHLORIDE 500 ML: 9 INJECTION, SOLUTION INTRAVENOUS at 12:40

## 2024-10-25 RX ADMIN — PANTOPRAZOLE SODIUM 40 MG: 40 TABLET, DELAYED RELEASE ORAL at 05:08

## 2024-10-25 RX ADMIN — ENOXAPARIN SODIUM 40 MG: 100 INJECTION SUBCUTANEOUS at 08:37

## 2024-10-25 RX ADMIN — ASPIRIN 81 MG CHEWABLE TABLET 81 MG: 81 TABLET CHEWABLE at 08:37

## 2024-10-25 RX ADMIN — SODIUM CHLORIDE, PRESERVATIVE FREE 10 ML: 5 INJECTION INTRAVENOUS at 20:50

## 2024-10-25 RX ADMIN — ATORVASTATIN CALCIUM 40 MG: 40 TABLET, FILM COATED ORAL at 20:50

## 2024-10-25 NOTE — CARE COORDINATION
Chart reviewed and case reviewed in IDR.  Patient passed out at home.  Echocardiogram and orthostatic vitals ordered.  Met with the patient at the bedside.  Patient lives independently in a third floor apartment with level entry to the building and elevator to the third floor.  Patient has no DME and a history of outpatient speech therapy.  Patient's PCP is Dr Easton and she uses [facility] in [facility].  Patient's son Hiram is POA and her daughter Padmini will provide transportation home when she is medically stable to discharge.  Will continue to follow for further transition of care planning needs.       Leticia Alberts RN.  P:  [phone]      Case Management Assessment  Initial Evaluation    Date/Time of Evaluation: 10/25/2024 3:10 PM  Assessment Completed by: Leticia Alberts RN    If patient is discharged prior to next notation, then this note serves as note for discharge by case management.    Patient Name: Flores Gaines                   YOB: 1935  Diagnosis: Syncope and collapse [R55]                   Date / Time: 10/25/2024  2:02 AM    Patient Admission Status: Observation   Readmission Risk (Low < 19, Mod (19-27), High > 27): No data recorded  Current PCP: Abena Easton MD  PCP verified by ? Yes (Dr Easton)    Chart Reviewed: Yes      History Provided by: Patient  Patient Orientation: Alert and Oriented    Patient Cognition: Alert    Hospitalization in the last 30 days (Readmission):  No    If yes, Readmission Assessment in CM Navigator will be completed.    Advance Directives:      Code Status: Full Code   Patient's Primary Decision Maker is: Legal Next of Kin    Primary Decision Maker: Hiram Levine - Child - [phone]    Secondary Decision Maker: StuartmarMiguel - Child - [phone]    Supplemental (Other) Decision Maker: BerePadmini - Child - [phone]    Discharge Planning:    Patient lives with:   Type of Home:    Primary Care Giver:

## 2024-10-25 NOTE — PROGRESS NOTES
4 Eyes Skin Assessment     NAME:  Flores Gaines  YOB: 1935  MEDICAL RECORD NUMBER:  [id number]    The patient is being assessed for  Admission    I agree that at least one RN has performed a thorough Head to Toe Skin Assessment on the patient. ALL assessment sites listed below have been assessed.      Areas assessed by both nurses:    Head, Face, Ears, Shoulders, Back, Chest, Arms, Elbows, Hands, Sacrum. Buttock, Coccyx, Ischium, and Legs. Feet and Heels        Does the Patient have a Wound? No noted wound(s)       Charanjit Prevention initiated by RN: Yes  Wound Care Orders initiated by RN: No    Pressure Injury (Stage 3,4, Unstageable, DTI, NWPT, and Complex wounds) if present, place Wound referral order by RN under : No    New Ostomies, if present place, Ostomy referral order under : No     Nurse 1 eSignature: Electronically signed by Bobby Topete RN on 10/25/24 at 7:45 AM EDT    **SHARE this note so that the co-signing nurse can place an eSignature**    Nurse 2 eSignature: {Esignature:[id number]}

## 2024-10-25 NOTE — PROGRESS NOTES
Pt came from ED without IV access. Attempted to place IV twice, then pt requested IV team try and not another floor nurse. IV team request sent via [employer].

## 2024-10-25 NOTE — CONSULTS
INPATIENT CARDIOLOGY CONSULT     Reason for Consult:  Syncope    Cardiologist: Dr. Wynn     Requesting Physician:  Dr. James Correa    Date of Consultation: 10/25/2024    HISTORY OF PRESENT ILLNESS:   Flores Gaines is a 88 year old female who is known to Dr. Liu in the remote past. Patient was seen by Dr. Harrell inpatient last in 2020. She does not follow with cardiology in the OP setting.  See PMH.     Patient reported that she has been in her normal state of health.  She stated over the past week she has been arguing with her daughter and has been upset at times and anxious.  She stated that over the past several years she eats minimally and only drinks approximately 2 bottles of water per day.  She lives alone in an apartment and denies using assistive device.    Patient stated that on 10/24/2024 she was sitting down to watch TV.  She attempted to stand up to go to the kitchen sink to wash some dishes.  As she was standing there and turned on the faucet she suddenly had complete \"vision loss.\"  She reported that it happened suddenly and she had no prior warning.  Patient stated that she became very anxious and was attempting to walk to the rocking chair.  She suddenly felt a \"jerk\" and the next thing she remembers is hitting the ground.  She believes that she \"lost consciousness\" for approximately 2 seconds and woke up as she was sitting around.  She is still unclear if she completely passed out.  She stated that she was attempting to crawl to the couch and she still continued to have vision loss.  Patient stated that he was attempting to stand back and suddenly her vision came back within 6 minutes.  She had no complaints of chest pain but did feel \"anxious which she believes made her short of breath.\"  She stated \"I felt woozy and did not know why I went blind for a brief period of time.\"  She called her daughter and her daughter's fiancé brought her to the [facility] on 10/24/2024 for further

## 2024-10-25 NOTE — H&P
Admission:    Prior to Admission medications    Medication Sig Start Date End Date Taking? Authorizing Provider   ASPIRIN LOW DOSE 81 MG chewable tablet CHEW 1 TABLET BY MOUTH ONCE DAILY 10/14/24   Abena Easton MD   diazePAM (VALIUM) 2 MG tablet Take 1 tablet by mouth nightly as needed for Anxiety for up to 60 days. Max Daily Amount: 2 mg 9/27/24 11/26/24  Misty Lopez MD   pantoprazole (PROTONIX) 40 MG tablet Take 1 tablet by mouth daily (with breakfast) 9/27/24   Ventura Godinez DO   atorvastatin (LIPITOR) 40 MG tablet Take 1 tablet by mouth nightly 7/10/24   Abena Easton MD   omeprazole (PRILOSEC) 20 MG delayed release capsule Take 1 capsule by mouth every morning (before breakfast) 7/10/24   Abena Easton MD   azelastine (ASTELIN) 0.1 % nasal spray 2 sprays by Nasal route 2 times daily as needed for Rhinitis    Provider, MD Alivia   acetaminophen (TYLENOL) 500 MG tablet Take 1 tablet by mouth 4 times daily as needed for Pain 6/9/22   Javier Shrestha MD       Allergies:    Lorazepam and Penicillins    Social History:    reports that she has never smoked. She has never used smokeless tobacco. She reports that she does not drink alcohol and does not use drugs.    Family History:   family history includes Cancer in her brother and brother; High Blood Pressure in her mother; Other in her father.       PHYSICAL EXAM:  Vitals:  /64   Pulse 85   Temp 97.8 °F (36.6 °C) (Temporal)   Resp 16   Ht 1.626 m (5' 4\")   Wt 54.4 kg (120 lb)   LMP  (LMP Unknown)   SpO2 97%   BMI 20.60 kg/m²     General Appearance: alert and oriented to person, place and time and in no acute distress  Skin: warm and dry  Head: normocephalic and atraumatic  Eyes: pupils equal, round, and reactive to light, extraocular eye movements intact, conjunctivae normal  Neck: neck supple and non tender without mass   Pulmonary/Chest: clear to auscultation bilaterally- no wheezes, rales or rhonchi, normal air movement, no

## 2024-10-25 NOTE — PLAN OF CARE
Patient is an 88-year-old female with past medical history of hypertension, hyperlipidemia and anxiety disorder who was admitted by my colleague overnight for syncope.  Says that she was in the kitchen stood up when she cannot see anything and then fell down.  She says that she was recently taken off of her blood pressure medication which is amlodipine.  She says that she checks her blood pressure and her heart rate and whenever she checks her blood pressure she notices that her heart rate is elevated at times, in the 110s to 120s. Orthostatics noted to be positive SBP going from  to 148.  Will add 500 cc normal saline bolus and compression stockings.  Echo pending.  Cardiology has been consulted will follow recommendations

## 2024-10-25 NOTE — ED PROVIDER NOTES
NEGATIVE NEGATIVE mg/dL    Bilirubin, Urine NEGATIVE NEGATIVE    Ketones, Urine NEGATIVE NEGATIVE mg/dL    Specific Gravity, UA >1.030 (H) 1.005 - 1.030    Urine Hgb NEGATIVE NEGATIVE    pH, Urine 5.5 5.0 - 9.0    Protein, UA NEGATIVE NEGATIVE mg/dL    Urobilinogen, Urine 0.2 0.0 - 1.0 EU/dL    Nitrite, Urine NEGATIVE NEGATIVE    Leukocyte Esterase, Urine NEGATIVE NEGATIVE    WBC, UA 0 TO 5 0 TO 5 /HPF    RBC, UA 0 TO 2 0 TO 2 /HPF    Crystals, UA FEW CALCIUM OXALATE (A) None /HPF    Epithelial Cells, UA 0 TO 2 /HPF    Bacteria, UA TRACE (A) None   Troponin   Result Value Ref Range    Troponin, High Sensitivity 7 0 - 9 ng/L   EKG 12 Lead   Result Value Ref Range    Ventricular Rate 74 BPM    Atrial Rate 74 BPM    P-R Interval 196 ms    QRS Duration 92 ms    Q-T Interval 400 ms    QTc Calculation (Bazett) 444 ms    P Axis 82 degrees    R Axis -3 degrees    T Axis 75 degrees       RADIOLOGY:  CT HEAD WO CONTRAST   Final Result   No acute intracranial abnormality.         CT CERVICAL SPINE WO CONTRAST   Final Result   1. No acute fracture or traumatic malalignment of the cervical spine.   2. C5 through C7 degenerative changes.         XR CHEST 1 VIEW   Final Result   No acute process.                 ------------------------- NURSING NOTES AND VITALS REVIEWED ---------------------------  Date / Time Roomed:  No admission date for patient encounter.  ED Bed Assignment:  Room/bed info not found    The nursing notes within the ED encounter and vital signs as below have been reviewed.     Patient Vitals for the past 24 hrs:   BP Temp Temp src Pulse Resp SpO2 Height Weight   10/24/24 1837 127/64 -- -- -- 16 -- 1.626 m (5' 4\") 54.4 kg (120 lb)   10/24/24 1832 -- 97.8 °F (36.6 °C) Temporal 85 -- 97 % -- --       Oxygen Saturation Interpretation: Normal    ------------------------------------------ PROGRESS NOTES ------------------------------------------  Re-evaluation(s):  Time: 9p  Patient’s symptoms show no change  Repeat

## 2024-10-25 NOTE — PROGRESS NOTES
Pharmacy Note    This patient was ordered Azelastine nasal spray. Per the Pharmacy & Therapeutics Committee, this medication is non-formulary and not stocked by pharmacy for the reason indicated below. The medication can be reordered at discharge.     Medications in which risks outweigh benefits during hospitalization:           -  oral bisphosphonates         -  raloxifene (Evista)        -  SGLT2 inhibitors (ordered in the hospital for an indication other than heart failure or chronic kidney disease)    Medications that lack necessity during an acute hospital stay:        -  nasal antihistamines        -  nasal ipratropium 0.03% and 0.06%        -  nasal miacalcin        -  acyclovir topical cream/ointment orders for herpes labialis (cold sores)

## 2024-10-26 LAB
ANION GAP SERPL CALCULATED.3IONS-SCNC: 6 MMOL/L (ref 7–16)
BASOPHILS # BLD: 0.01 K/UL (ref 0–0.2)
BASOPHILS NFR BLD: 0 % (ref 0–2)
BUN SERPL-MCNC: 9 MG/DL (ref 6–23)
CALCIUM SERPL-MCNC: 8.5 MG/DL (ref 8.6–10.2)
CHLORIDE SERPL-SCNC: 106 MMOL/L (ref 98–107)
CO2 SERPL-SCNC: 27 MMOL/L (ref 22–29)
CREAT SERPL-MCNC: 0.6 MG/DL (ref 0.5–1)
EOSINOPHIL # BLD: 0.03 K/UL (ref 0.05–0.5)
EOSINOPHILS RELATIVE PERCENT: 1 % (ref 0–6)
ERYTHROCYTE [DISTWIDTH] IN BLOOD BY AUTOMATED COUNT: 13.5 % (ref 11.5–15)
GFR, ESTIMATED: 86 ML/MIN/1.73M2
GLUCOSE SERPL-MCNC: 89 MG/DL (ref 74–99)
HCT VFR BLD AUTO: 31.5 % (ref 34–48)
HGB BLD-MCNC: 10.4 G/DL (ref 11.5–15.5)
IMM GRANULOCYTES # BLD AUTO: <0.03 K/UL (ref 0–0.58)
IMM GRANULOCYTES NFR BLD: 0 % (ref 0–5)
LYMPHOCYTES NFR BLD: 1.3 K/UL (ref 1.5–4)
LYMPHOCYTES RELATIVE PERCENT: 35 % (ref 20–42)
MCH RBC QN AUTO: 33.9 PG (ref 26–35)
MCHC RBC AUTO-ENTMCNC: 33 G/DL (ref 32–34.5)
MCV RBC AUTO: 102.6 FL (ref 80–99.9)
MONOCYTES NFR BLD: 0.7 K/UL (ref 0.1–0.95)
MONOCYTES NFR BLD: 19 % (ref 2–12)
NEUTROPHILS NFR BLD: 45 % (ref 43–80)
NEUTS SEG NFR BLD: 1.69 K/UL (ref 1.8–7.3)
PLATELET # BLD AUTO: 224 K/UL (ref 130–450)
PMV BLD AUTO: 10.6 FL (ref 7–12)
POTASSIUM SERPL-SCNC: 3.9 MMOL/L (ref 3.5–5)
RBC # BLD AUTO: 3.07 M/UL (ref 3.5–5.5)
SODIUM SERPL-SCNC: 139 MMOL/L (ref 132–146)
WBC OTHER # BLD: 3.7 K/UL (ref 4.5–11.5)

## 2024-10-26 PROCEDURE — 2060000000 HC ICU INTERMEDIATE R&B

## 2024-10-26 PROCEDURE — 99232 SBSQ HOSP IP/OBS MODERATE 35: CPT | Performed by: INTERNAL MEDICINE

## 2024-10-26 PROCEDURE — 6370000000 HC RX 637 (ALT 250 FOR IP): Performed by: INTERNAL MEDICINE

## 2024-10-26 PROCEDURE — 2580000003 HC RX 258: Performed by: INTERNAL MEDICINE

## 2024-10-26 PROCEDURE — 36415 COLL VENOUS BLD VENIPUNCTURE: CPT

## 2024-10-26 PROCEDURE — 6360000002 HC RX W HCPCS: Performed by: INTERNAL MEDICINE

## 2024-10-26 PROCEDURE — 80048 BASIC METABOLIC PNL TOTAL CA: CPT

## 2024-10-26 PROCEDURE — G0378 HOSPITAL OBSERVATION PER HR: HCPCS

## 2024-10-26 PROCEDURE — 85025 COMPLETE CBC W/AUTO DIFF WBC: CPT

## 2024-10-26 RX ADMIN — ATORVASTATIN CALCIUM 40 MG: 40 TABLET, FILM COATED ORAL at 22:28

## 2024-10-26 RX ADMIN — ENOXAPARIN SODIUM 40 MG: 100 INJECTION SUBCUTANEOUS at 09:17

## 2024-10-26 RX ADMIN — SODIUM CHLORIDE, PRESERVATIVE FREE 10 ML: 5 INJECTION INTRAVENOUS at 22:28

## 2024-10-26 RX ADMIN — PANTOPRAZOLE SODIUM 40 MG: 40 TABLET, DELAYED RELEASE ORAL at 06:02

## 2024-10-26 RX ADMIN — ASPIRIN 81 MG CHEWABLE TABLET 81 MG: 81 TABLET CHEWABLE at 09:17

## 2024-10-26 RX ADMIN — SODIUM CHLORIDE, PRESERVATIVE FREE 10 ML: 5 INJECTION INTRAVENOUS at 09:17

## 2024-10-26 ASSESSMENT — PAIN SCALES - GENERAL: PAINLEVEL_OUTOF10: 0

## 2024-10-26 NOTE — PROGRESS NOTES
[facility] Hospitalist Progress Note    Admitting Date and Time: 10/25/2024  2:02 AM  Admit Dx: Syncope and collapse [R55]    Synopsis: Patient is an 88-year-old female with past medical history of hypertension, hyperlipidemia and anxiety disorder who was admitted by my colleague overnight for syncope.  Says that she was in the kitchen stood up when she cannot see anything and then fell down.  Orthostatics noted to be positive SBP going from  to 148.  Cardiology following. Patient given fluids which improved orthostasis. MRI and US of carotids pending     Subjective:  Patient seen and examined   Continues to feel \"woozy\" when getting up out of bed.     ROS: denies fever, chills, cp, sob, n/v, HA unless stated above.      atorvastatin  40 mg Oral Nightly    aspirin  81 mg Oral Daily    pantoprazole  40 mg Oral QAM AC    sodium chloride flush  5-40 mL IntraVENous 2 times per day    enoxaparin  40 mg SubCUTAneous Daily     diazePAM, 2 mg, Nightly PRN  sodium chloride flush, 5-40 mL, PRN  sodium chloride, , PRN  potassium chloride, 40 mEq, PRN   Or  potassium alternative oral replacement, 40 mEq, PRN   Or  potassium chloride, 10 mEq, PRN  magnesium sulfate, 2,000 mg, PRN  ondansetron, 4 mg, Q8H PRN   Or  ondansetron, 4 mg, Q6H PRN  polyethylene glycol, 17 g, Daily PRN  acetaminophen, 650 mg, Q6H PRN   Or  acetaminophen, 650 mg, Q6H PRN  perflutren lipid microspheres, 1.5 mL, ONCE PRN         Objective:    BP (!) 149/91   Pulse 89   Temp 98.3 °F (36.8 °C) (Oral)   Resp 18   Ht 1.626 m (5' 4\")   Wt 54.4 kg (120 lb)   LMP  (LMP Unknown)   SpO2 (!) 89%   BMI 20.60 kg/m²   General Appearance: alert and oriented to person, place and time and in no acute distress  Skin: warm and dry  Head: normocephalic and atraumatic  Eyes: pupils equal, round, and reactive to light, extraocular eye movements intact, conjunctivae normal  Neck: neck supple and non tender without mass   Pulmonary/Chest: clear to

## 2024-10-26 NOTE — PLAN OF CARE
Problem: Discharge Planning  Goal: Discharge to home or other facility with appropriate resources  10/25/2024 2109 by Hawk Schroeder RN  Outcome: Progressing  10/25/2024 0746 by Bobby Topete RN  Outcome: Progressing     Problem: Safety - Adult  Goal: Free from fall injury  10/25/2024 2109 by Hawk Schroeder RN  Outcome: Progressing  10/25/2024 0746 by Bobby Topete, RN  Outcome: Progressing     Problem: ABCDS Injury Assessment  Goal: Absence of physical injury  10/25/2024 2109 by Hawk Schroeder RN  Outcome: Progressing  10/25/2024 0746 by Bobby Topete, RN  Outcome: Progressing

## 2024-10-26 NOTE — PROGRESS NOTES
INPATIENT CARDIOLOGY FOLLOW-UP    Name: Flores Gaines    Age: 88 y.o.    Date of Admission: 10/25/2024  2:02 AM    Date of Service: 10/26/2024    Primary Cardiologist: None, previously known to Dr. Liu    Chief Complaint: Follow-up for possible syncope    Interim History:  Feels well.  Denies chest pain shortness of breath palpitations syncope.  No arrhythmias on the monitor.    Review of Systems:   Negative except as described above    Problem List:  Patient Active Problem List   Diagnosis    Anxiety    Uncontrolled hypertension    Gastroesophageal reflux disease without esophagitis    Mixed hyperlipidemia    Diastolic dysfunction    Pure hypercholesterolemia    Moderate obesity    Personal history of hydronephrosis    Hiatal hernia with GERD    Cerebrovascular accident (CVA) due to stenosis of right carotid artery (HCC)    Stenosis of right carotid artery    Hypertensive emergency    Ischemic stroke (HCC)    Cervicalgia    History of ischemic stroke    Slurred speech    Memory deficit    Ganglion cyst    Weight loss    History of stroke    Chest pain    Syncope and collapse       Current Medications:    Current Facility-Administered Medications:     atorvastatin (LIPITOR) tablet 40 mg, 40 mg, Oral, Nightly, Swapnil Quintana MD, 40 mg at 10/25/24 2050    diazePAM (VALIUM) tablet 2 mg, 2 mg, Oral, Nightly PRN, Swapnil Quintana MD    aspirin chewable tablet 81 mg, 81 mg, Oral, Daily, Swapnil Quintana MD, 81 mg at 10/26/24 0917    pantoprazole (PROTONIX) tablet 40 mg, 40 mg, Oral, QAM AC, Swapnil Quintana MD, 40 mg at 10/26/24 0602    sodium chloride flush 0.9 % injection 5-40 mL, 5-40 mL, IntraVENous, 2 times per day, Swapnil Quintana MD, 10 mL at 10/26/24 0917    sodium chloride flush 0.9 % injection 5-40 mL, 5-40 mL, IntraVENous, PRN, Swapnil Quintana MD    0.9 % sodium chloride infusion, , IntraVENous, PRN, Swapnil Quintana MD    potassium chloride (KLOR-CON M) extended release tablet 40 mEq, 40 mEq, Oral, PRN **OR** potassium

## 2024-10-27 ENCOUNTER — APPOINTMENT (OUTPATIENT)
Dept: MRI IMAGING | Age: 89
DRG: 312 | End: 2024-10-27
Payer: MEDICARE

## 2024-10-27 ENCOUNTER — APPOINTMENT (OUTPATIENT)
Dept: ULTRASOUND IMAGING | Age: 89
DRG: 312 | End: 2024-10-27
Payer: MEDICARE

## 2024-10-27 PROCEDURE — 99232 SBSQ HOSP IP/OBS MODERATE 35: CPT | Performed by: INTERNAL MEDICINE

## 2024-10-27 PROCEDURE — 2060000000 HC ICU INTERMEDIATE R&B

## 2024-10-27 PROCEDURE — 93880 EXTRACRANIAL BILAT STUDY: CPT

## 2024-10-27 PROCEDURE — 6360000002 HC RX W HCPCS: Performed by: INTERNAL MEDICINE

## 2024-10-27 PROCEDURE — 6370000000 HC RX 637 (ALT 250 FOR IP): Performed by: INTERNAL MEDICINE

## 2024-10-27 PROCEDURE — 70551 MRI BRAIN STEM W/O DYE: CPT

## 2024-10-27 PROCEDURE — 2580000003 HC RX 258: Performed by: INTERNAL MEDICINE

## 2024-10-27 RX ADMIN — ACETAMINOPHEN 650 MG: 325 TABLET ORAL at 09:19

## 2024-10-27 RX ADMIN — ASPIRIN 81 MG CHEWABLE TABLET 81 MG: 81 TABLET CHEWABLE at 09:20

## 2024-10-27 RX ADMIN — SODIUM CHLORIDE, PRESERVATIVE FREE 10 ML: 5 INJECTION INTRAVENOUS at 09:20

## 2024-10-27 RX ADMIN — ATORVASTATIN CALCIUM 40 MG: 40 TABLET, FILM COATED ORAL at 20:23

## 2024-10-27 RX ADMIN — SODIUM CHLORIDE, PRESERVATIVE FREE 10 ML: 5 INJECTION INTRAVENOUS at 23:03

## 2024-10-27 RX ADMIN — ENOXAPARIN SODIUM 40 MG: 100 INJECTION SUBCUTANEOUS at 09:20

## 2024-10-27 RX ADMIN — PANTOPRAZOLE SODIUM 40 MG: 40 TABLET, DELAYED RELEASE ORAL at 05:13

## 2024-10-27 RX ADMIN — ACETAMINOPHEN 650 MG: 325 TABLET ORAL at 16:06

## 2024-10-27 ASSESSMENT — PAIN SCALES - GENERAL
PAINLEVEL_OUTOF10: 0
PAINLEVEL_OUTOF10: 2
PAINLEVEL_OUTOF10: 5
PAINLEVEL_OUTOF10: 5
PAINLEVEL_OUTOF10: 0

## 2024-10-27 ASSESSMENT — PAIN DESCRIPTION - FREQUENCY
FREQUENCY: INTERMITTENT
FREQUENCY: INTERMITTENT

## 2024-10-27 ASSESSMENT — PAIN DESCRIPTION - ONSET
ONSET: ON-GOING
ONSET: ON-GOING

## 2024-10-27 ASSESSMENT — PAIN DESCRIPTION - PAIN TYPE
TYPE: ACUTE PAIN
TYPE: ACUTE PAIN

## 2024-10-27 ASSESSMENT — PAIN DESCRIPTION - ORIENTATION
ORIENTATION: RIGHT
ORIENTATION: RIGHT

## 2024-10-27 ASSESSMENT — PAIN DESCRIPTION - DESCRIPTORS
DESCRIPTORS: DISCOMFORT;SORE;ACHING
DESCRIPTORS: ACHING;SORE;DISCOMFORT

## 2024-10-27 ASSESSMENT — PAIN DESCRIPTION - LOCATION
LOCATION: NECK
LOCATION: NECK

## 2024-10-27 NOTE — PROGRESS NOTES
[facility] Hospitalist Progress Note    Admitting Date and Time: 10/25/2024  2:02 AM  Admit Dx: Syncope and collapse [R55]    Synopsis: Patient is an 88-year-old female with past medical history of hypertension, hyperlipidemia and anxiety disorder who was admitted by my colleague overnight for syncope.  Says that she was in the kitchen stood up when she cannot see anything and then fell down.  Orthostatics noted to be positive SBP going from  to 148.  Cardiology following. Patient given fluids which improved orthostasis. MRI and US of carotids pending     Subjective:  Patient seen and examined   Abulated well with assistance yesterday.     ROS: denies fever, chills, cp, sob, n/v, HA unless stated above.      atorvastatin  40 mg Oral Nightly    aspirin  81 mg Oral Daily    pantoprazole  40 mg Oral QAM AC    sodium chloride flush  5-40 mL IntraVENous 2 times per day    enoxaparin  40 mg SubCUTAneous Daily     diazePAM, 2 mg, Nightly PRN  sodium chloride flush, 5-40 mL, PRN  sodium chloride, , PRN  potassium chloride, 40 mEq, PRN   Or  potassium alternative oral replacement, 40 mEq, PRN   Or  potassium chloride, 10 mEq, PRN  magnesium sulfate, 2,000 mg, PRN  ondansetron, 4 mg, Q8H PRN   Or  ondansetron, 4 mg, Q6H PRN  polyethylene glycol, 17 g, Daily PRN  acetaminophen, 650 mg, Q6H PRN   Or  acetaminophen, 650 mg, Q6H PRN  perflutren lipid microspheres, 1.5 mL, ONCE PRN         Objective:    BP (!) 134/54   Pulse 68   Temp 97.9 °F (36.6 °C) (Infrared)   Resp 17   Ht 1.626 m (5' 4\")   Wt 54.4 kg (120 lb)   LMP  (LMP Unknown)   SpO2 96%   BMI 20.60 kg/m²   General Appearance: alert and oriented to person, place and time and in no acute distress  Skin: warm and dry  Head: normocephalic and atraumatic  Eyes: pupils equal, round, and reactive to light, extraocular eye movements intact, conjunctivae normal  Neck: neck supple and non tender without mass   Pulmonary/Chest: clear to auscultation

## 2024-10-27 NOTE — PROGRESS NOTES
INPATIENT CARDIOLOGY FOLLOW-UP    Name: Flores Gaines    Age: 88 y.o.    Date of Admission: 10/25/2024  2:02 AM    Date of Service: 10/27/2024    Primary Cardiologist: None, previously known to Dr. Liu    Chief Complaint: Follow-up for possible syncope    Interim History:  Feels well.  Denies chest pain shortness of breath palpitations syncope.  No arrhythmias on the monitor.  Refusing MRI this morning.  States only agreed to it if it is \"15 minutes or less.\"    Review of Systems:   Negative except as described above    Problem List:  Patient Active Problem List   Diagnosis    Anxiety    Uncontrolled hypertension    Gastroesophageal reflux disease without esophagitis    Mixed hyperlipidemia    Diastolic dysfunction    Pure hypercholesterolemia    Moderate obesity    Personal history of hydronephrosis    Hiatal hernia with GERD    Cerebrovascular accident (CVA) due to stenosis of right carotid artery (HCC)    Stenosis of right carotid artery    Hypertensive emergency    Ischemic stroke (HCC)    Cervicalgia    History of ischemic stroke    Slurred speech    Memory deficit    Ganglion cyst    Weight loss    History of stroke    Chest pain    Syncope and collapse       Current Medications:    Current Facility-Administered Medications:     atorvastatin (LIPITOR) tablet 40 mg, 40 mg, Oral, Nightly, Swapnil Quintana MD, 40 mg at 10/26/24 2228    diazePAM (VALIUM) tablet 2 mg, 2 mg, Oral, Nightly PRN, Swapnil Quintana MD    aspirin chewable tablet 81 mg, 81 mg, Oral, Daily, Swapnil Quintana MD, 81 mg at 10/26/24 0917    pantoprazole (PROTONIX) tablet 40 mg, 40 mg, Oral, QAM AC, Swapnil Quintana MD, 40 mg at 10/27/24 0513    sodium chloride flush 0.9 % injection 5-40 mL, 5-40 mL, IntraVENous, 2 times per day, Swapnil Quintana MD, 10 mL at 10/26/24 2228    sodium chloride flush 0.9 % injection 5-40 mL, 5-40 mL, IntraVENous, PRN, Swapnil Quintana MD    0.9 % sodium chloride infusion, , IntraVENous, PRN, Swapnil Quintana MD    potassium

## 2024-10-27 NOTE — DISCHARGE INSTRUCTIONS
Call [facility] Physician referral line for new Primary Care Physician appointment [phone] or [phone].             Lightheadedness or Faintness: Care Instructions  Overview  Lightheadedness is a feeling that you are about to faint or \"pass out.\" You do not feel as if you or your surroundings are moving. It is different from vertigo, which is the feeling that you or things around you are spinning or tilting.  Lightheadedness usually goes away or gets better when you lie down. If lightheadedness gets worse, it can lead to a fainting spell.  It is common to feel lightheaded from time to time. It may be caused by many things. These include allergies, dehydration, illness, and medicines. Lightheadedness usually is not caused by a serious problem. It often is caused by a short-lasting drop in blood pressure and blood flow to your head that occurs when you get up too quickly from a seated or lying position.  Follow-up care is a key part of your treatment and safety. Be sure to make and go to all appointments, and call your doctor if you are having problems. It's also a good idea to know your test results and keep a list of the medicines you take.  How can you care for yourself at home?  Lie down for 1 or 2 minutes when you feel lightheaded. After lying down, sit up slowly and remain sitting for 1 to 2 minutes before slowly standing up.  Avoid movements, positions, or activities that have made you lightheaded in the past.  Get plenty of rest, especially if you have a cold or flu, which can cause lightheadedness.  Make sure you drink plenty of fluids, especially if you have a fever or have been sweating.  Do not drive or put yourself and others in danger while you feel lightheaded.  When should you call for help?   Call 911 anytime you think you may need emergency care. For example, call if:    You passed out (lost consciousness).     You have symptoms of a stroke. These may include:  Sudden numbness, tingling,

## 2024-10-28 VITALS
DIASTOLIC BLOOD PRESSURE: 64 MMHG | BODY MASS INDEX: 20.49 KG/M2 | HEART RATE: 69 BPM | WEIGHT: 120 LBS | HEIGHT: 64 IN | OXYGEN SATURATION: 94 % | TEMPERATURE: 97.8 F | SYSTOLIC BLOOD PRESSURE: 126 MMHG | RESPIRATION RATE: 16 BRPM

## 2024-10-28 PROCEDURE — 99239 HOSP IP/OBS DSCHRG MGMT >30: CPT | Performed by: INTERNAL MEDICINE

## 2024-10-28 PROCEDURE — 6360000002 HC RX W HCPCS: Performed by: INTERNAL MEDICINE

## 2024-10-28 PROCEDURE — 2580000003 HC RX 258: Performed by: INTERNAL MEDICINE

## 2024-10-28 PROCEDURE — 6370000000 HC RX 637 (ALT 250 FOR IP): Performed by: INTERNAL MEDICINE

## 2024-10-28 RX ADMIN — PANTOPRAZOLE SODIUM 40 MG: 40 TABLET, DELAYED RELEASE ORAL at 05:20

## 2024-10-28 RX ADMIN — ASPIRIN 81 MG CHEWABLE TABLET 81 MG: 81 TABLET CHEWABLE at 08:21

## 2024-10-28 RX ADMIN — ACETAMINOPHEN 650 MG: 325 TABLET ORAL at 05:19

## 2024-10-28 RX ADMIN — SODIUM CHLORIDE, PRESERVATIVE FREE 5 ML: 5 INJECTION INTRAVENOUS at 08:21

## 2024-10-28 RX ADMIN — ENOXAPARIN SODIUM 40 MG: 100 INJECTION SUBCUTANEOUS at 08:21

## 2024-10-28 ASSESSMENT — PAIN DESCRIPTION - LOCATION: LOCATION: HEAD

## 2024-10-28 ASSESSMENT — PAIN DESCRIPTION - DESCRIPTORS: DESCRIPTORS: ACHING

## 2024-10-28 ASSESSMENT — PAIN SCALES - GENERAL
PAINLEVEL_OUTOF10: 4
PAINLEVEL_OUTOF10: 0

## 2024-10-28 NOTE — PLAN OF CARE
Problem: Discharge Planning  Goal: Discharge to home or other facility with appropriate resources  10/28/2024 1023 by Eli Acharya RN  Outcome: Progressing  10/27/2024 2318 by Hawk Schroeder RN  Outcome: Progressing     Problem: Safety - Adult  Goal: Free from fall injury  10/28/2024 1023 by Eli Acharya RN  Outcome: Progressing  10/27/2024 2318 by Hawk Schroeder RN  Outcome: Progressing     Problem: ABCDS Injury Assessment  Goal: Absence of physical injury  10/28/2024 1023 by Eli Acharya RN  Outcome: Progressing  10/27/2024 2318 by Hawk Schroeder RN  Outcome: Progressing     Problem: Pain  Goal: Verbalizes/displays adequate comfort level or baseline comfort level  10/28/2024 1023 by Eli Acharya RN  Outcome: Progressing  10/27/2024 2318 by Hawk Schroeder RN  Outcome: Progressing

## 2024-10-28 NOTE — PLAN OF CARE
Problem: Discharge Planning  Goal: Discharge to home or other facility with appropriate resources  10/28/2024 1240 by Eli Acharya RN  Outcome: Adequate for Discharge  10/28/2024 1023 by Eli Acharya RN  Outcome: Progressing  10/27/2024 2318 by Hawk Schroeder RN  Outcome: Progressing     Problem: Safety - Adult  Goal: Free from fall injury  10/28/2024 1240 by Eli Acharya RN  Outcome: Adequate for Discharge  10/28/2024 1023 by Eli Acharya RN  Outcome: Progressing  10/27/2024 2318 by Hawk Schroeder RN  Outcome: Progressing     Problem: ABCDS Injury Assessment  Goal: Absence of physical injury  10/28/2024 1240 by Eli Acharya RN  Outcome: Adequate for Discharge  10/28/2024 1023 by Eli Acharya RN  Outcome: Progressing  10/27/2024 2318 by Hawk Schroeder RN  Outcome: Progressing     Problem: Pain  Goal: Verbalizes/displays adequate comfort level or baseline comfort level  10/28/2024 1240 by Eli Acharya RN  Outcome: Adequate for Discharge  10/28/2024 1023 by Eli Acharya RN  Outcome: Progressing  10/27/2024 2318 by Hawk Schroeder RN  Outcome: Progressing      No

## 2024-10-28 NOTE — DISCHARGE SUMMARY
mouth daily (with breakfast)               Where to Get Your Medications        You can get these medications from any pharmacy    Bring a paper prescription for each of these medications  Compression Stockings Misc           Note that more than 30 minutes was spent in preparing discharge papers, discussing discharge with patient, medication review, etc.    Signed:  Electronically signed by Padmini Correa MD on 10/28/2024 at 9:59 AM

## 2024-10-28 NOTE — CARE COORDINATION
Chart reviewed and case reviewed in IDR and with Dr Karina Palomino.  MRI of the brain showing chronic lacunar infarcts.  EF 50-55%.  [facility].  Patient medically stable to be discharged.  Patient asking for information re: new PCP.  List of offices and phone number to call to schedule an appointment provided to the patient.  Reviewed above and patient expressed understanding.  No other needs identified for transition of care.  Patient's daughter to provide transportation home.  Will continue to follow for further transition of care planning needs.         Leticia Alberts RN.  P:  [phone]

## 2024-10-29 ENCOUNTER — CARE COORDINATION (OUTPATIENT)
Dept: CARE COORDINATION | Age: 89
End: 2024-10-29

## 2024-10-29 NOTE — CARE COORDINATION
Care Transitions Note    Initial Call - Call within 2 business days of discharge: Yes    Patient Current Location:  Home: [address]  [place] [address]    Care Transition Nurse contacted the patient by telephone to perform post hospital discharge assessment, verified name and  as identifiers. Provided introduction to self, and explanation of the Care Transition Nurse role.     Patient: Flores Gaines    Patient : 1935   MRN: [id number]    Reason for Admission: syncope and collapse   Discharge Date: 10/28/24  RURS: Readmission Risk Score: 12.7      Last Discharge Facility       Date Complaint Diagnosis Description Type Department Provider    10/25/24 Loss of Consciousness Syncope and collapse ... ED to Hosp-Admission (Discharged) (ADMITTED) [facility] Correa, Padmini Wall,...       Additional needs identified to be addressed with provider   No needs identified           Method of communication with provider: none.    Patients top risk factors for readmission: PCP relationship and support system    Care Summary Note: Spoke with Flores for initial low readmission risk score care transition call post hospital discharge. She reports that she is feeling \"good\" today. She reports that her daughter brought her home last night from the hospital and started fighting with her so by the time she got in the shower around 10pm she felt a little bit lightheaded but it didn't last. Flores reports that today she is ambulating around her apartment without any issues or without the use of DME. She is rising slowly and trying to stay hydrated with water. She does have compression stockings at home but is unsure when to wear them, CTN explained to wear during the day and remove at bedtime.   Flores is looking forward going to a birthday party for her daughter in law tonight and a Halloween party later this week with her family.    Flores denies any needs, questions, or concerns at this time.

## 2024-11-04 ENCOUNTER — APPOINTMENT (OUTPATIENT)
Dept: GENERAL RADIOLOGY | Age: 89
End: 2024-11-04
Payer: MEDICARE

## 2024-11-04 ENCOUNTER — HOSPITAL ENCOUNTER (OUTPATIENT)
Age: 89
Setting detail: OBSERVATION
Discharge: HOME | End: 2024-11-06
Attending: EMERGENCY MEDICINE | Admitting: INTERNAL MEDICINE
Payer: MEDICARE

## 2024-11-04 ENCOUNTER — APPOINTMENT (OUTPATIENT)
Dept: CT IMAGING | Age: 89
End: 2024-11-04
Payer: MEDICARE

## 2024-11-04 ENCOUNTER — TELEPHONE (OUTPATIENT)
Dept: PRIMARY CARE CLINIC | Age: 89
End: 2024-11-04

## 2024-11-04 DIAGNOSIS — R55 SYNCOPE AND COLLAPSE: Primary | ICD-10-CM

## 2024-11-04 DIAGNOSIS — R55 PRE-SYNCOPE: ICD-10-CM

## 2024-11-04 LAB
ALBUMIN SERPL-MCNC: 4.2 G/DL (ref 3.5–5.2)
ALP SERPL-CCNC: 81 U/L (ref 35–104)
ALT SERPL-CCNC: 9 U/L (ref 0–32)
ANION GAP SERPL CALCULATED.3IONS-SCNC: 8 MMOL/L (ref 7–16)
AST SERPL-CCNC: 10 U/L (ref 0–31)
BACTERIA URNS QL MICRO: ABNORMAL
BASOPHILS # BLD: 0 K/UL (ref 0–0.2)
BASOPHILS NFR BLD: 0 % (ref 0–2)
BILIRUB SERPL-MCNC: 0.5 MG/DL (ref 0–1.2)
BILIRUB UR QL STRIP: NEGATIVE
BUN SERPL-MCNC: 14 MG/DL (ref 6–23)
CALCIUM SERPL-MCNC: 8.9 MG/DL (ref 8.6–10.2)
CHLORIDE SERPL-SCNC: 104 MMOL/L (ref 98–107)
CLARITY UR: CLEAR
CO2 SERPL-SCNC: 27 MMOL/L (ref 22–29)
COLOR UR: YELLOW
CREAT SERPL-MCNC: 0.6 MG/DL (ref 0.5–1)
EOSINOPHIL # BLD: 0 K/UL (ref 0.05–0.5)
EOSINOPHILS RELATIVE PERCENT: 0 % (ref 0–6)
ERYTHROCYTE [DISTWIDTH] IN BLOOD BY AUTOMATED COUNT: 13.8 % (ref 11.5–15)
GFR, ESTIMATED: 86 ML/MIN/1.73M2
GLUCOSE SERPL-MCNC: 109 MG/DL (ref 74–99)
GLUCOSE UR STRIP-MCNC: NEGATIVE MG/DL
HCT VFR BLD AUTO: 33.6 % (ref 34–48)
HGB BLD-MCNC: 11 G/DL (ref 11.5–15.5)
HGB UR QL STRIP.AUTO: NEGATIVE
KETONES UR STRIP-MCNC: 15 MG/DL
LACTATE BLDV-SCNC: 1 MMOL/L (ref 0.5–2.2)
LEUKOCYTE ESTERASE UR QL STRIP: ABNORMAL
LYMPHOCYTES NFR BLD: 0.26 K/UL (ref 1.5–4)
LYMPHOCYTES RELATIVE PERCENT: 6 % (ref 20–42)
MAGNESIUM SERPL-MCNC: 2 MG/DL (ref 1.6–2.6)
MCH RBC QN AUTO: 34.2 PG (ref 26–35)
MCHC RBC AUTO-ENTMCNC: 32.7 G/DL (ref 32–34.5)
MCV RBC AUTO: 104.3 FL (ref 80–99.9)
MONOCYTES NFR BLD: 0.11 K/UL (ref 0.1–0.95)
MONOCYTES NFR BLD: 3 % (ref 2–12)
NEUTROPHILS NFR BLD: 91 % (ref 43–80)
NEUTS SEG NFR BLD: 3.83 K/UL (ref 1.8–7.3)
NITRITE UR QL STRIP: NEGATIVE
PH UR STRIP: 6 [PH] (ref 5–9)
PLATELET # BLD AUTO: 262 K/UL (ref 130–450)
PMV BLD AUTO: 10.5 FL (ref 7–12)
POTASSIUM SERPL-SCNC: 4.1 MMOL/L (ref 3.5–5)
PROT SERPL-MCNC: 6.7 G/DL (ref 6.4–8.3)
PROT UR STRIP-MCNC: NEGATIVE MG/DL
RBC # BLD AUTO: 3.22 M/UL (ref 3.5–5.5)
RBC # BLD: ABNORMAL 10*6/UL
RBC #/AREA URNS HPF: ABNORMAL /HPF
SODIUM SERPL-SCNC: 139 MMOL/L (ref 132–146)
SP GR UR STRIP: 1.02 (ref 1–1.03)
TROPONIN I SERPL HS-MCNC: <6 NG/L (ref 0–9)
UROBILINOGEN UR STRIP-ACNC: 0.2 EU/DL (ref 0–1)
WBC #/AREA URNS HPF: ABNORMAL /HPF
WBC OTHER # BLD: 4.2 K/UL (ref 4.5–11.5)

## 2024-11-04 PROCEDURE — 99222 1ST HOSP IP/OBS MODERATE 55: CPT | Performed by: INTERNAL MEDICINE

## 2024-11-04 PROCEDURE — 99285 EMERGENCY DEPT VISIT HI MDM: CPT

## 2024-11-04 PROCEDURE — 81001 URINALYSIS AUTO W/SCOPE: CPT

## 2024-11-04 PROCEDURE — 6370000000 HC RX 637 (ALT 250 FOR IP): Performed by: INTERNAL MEDICINE

## 2024-11-04 PROCEDURE — 71046 X-RAY EXAM CHEST 2 VIEWS: CPT

## 2024-11-04 PROCEDURE — 83735 ASSAY OF MAGNESIUM: CPT

## 2024-11-04 PROCEDURE — 84484 ASSAY OF TROPONIN QUANT: CPT

## 2024-11-04 PROCEDURE — 70450 CT HEAD/BRAIN W/O DYE: CPT

## 2024-11-04 PROCEDURE — G0378 HOSPITAL OBSERVATION PER HR: HCPCS

## 2024-11-04 PROCEDURE — 93005 ELECTROCARDIOGRAM TRACING: CPT | Performed by: EMERGENCY MEDICINE

## 2024-11-04 PROCEDURE — 83605 ASSAY OF LACTIC ACID: CPT

## 2024-11-04 PROCEDURE — 2580000003 HC RX 258: Performed by: INTERNAL MEDICINE

## 2024-11-04 PROCEDURE — 85025 COMPLETE CBC W/AUTO DIFF WBC: CPT

## 2024-11-04 PROCEDURE — 80053 COMPREHEN METABOLIC PANEL: CPT

## 2024-11-04 RX ORDER — SODIUM CHLORIDE 1 G/1
1 TABLET ORAL
Status: DISCONTINUED | OUTPATIENT
Start: 2024-11-05 | End: 2024-11-06 | Stop reason: HOSPADM

## 2024-11-04 RX ORDER — ASPIRIN 81 MG/1
81 TABLET, CHEWABLE ORAL
Status: DISCONTINUED | OUTPATIENT
Start: 2024-11-04 | End: 2024-11-06 | Stop reason: HOSPADM

## 2024-11-04 RX ORDER — DIAZEPAM 2 MG/1
2 TABLET ORAL NIGHTLY PRN
Status: DISCONTINUED | OUTPATIENT
Start: 2024-11-04 | End: 2024-11-06 | Stop reason: HOSPADM

## 2024-11-04 RX ORDER — AMLODIPINE BESYLATE 2.5 MG/1
TABLET ORAL
COMMUNITY
Start: 2024-10-14 | End: 2024-11-04 | Stop reason: SINTOL

## 2024-11-04 RX ORDER — PANTOPRAZOLE SODIUM 40 MG/1
40 TABLET, DELAYED RELEASE ORAL
Status: DISCONTINUED | OUTPATIENT
Start: 2024-11-05 | End: 2024-11-06 | Stop reason: HOSPADM

## 2024-11-04 RX ORDER — ACETAMINOPHEN 500 MG
500 TABLET ORAL EVERY 6 HOURS PRN
Status: DISCONTINUED | OUTPATIENT
Start: 2024-11-04 | End: 2024-11-06 | Stop reason: ALTCHOICE

## 2024-11-04 RX ORDER — 0.9 % SODIUM CHLORIDE 0.9 %
500 INTRAVENOUS SOLUTION INTRAVENOUS ONCE
Status: COMPLETED | OUTPATIENT
Start: 2024-11-04 | End: 2024-11-05

## 2024-11-04 RX ORDER — ATORVASTATIN CALCIUM 40 MG/1
40 TABLET, FILM COATED ORAL NIGHTLY
Status: DISCONTINUED | OUTPATIENT
Start: 2024-11-04 | End: 2024-11-06 | Stop reason: HOSPADM

## 2024-11-04 RX ADMIN — ATORVASTATIN CALCIUM 40 MG: 40 TABLET, FILM COATED ORAL at 22:00

## 2024-11-04 RX ADMIN — SODIUM CHLORIDE 500 ML: 9 INJECTION, SOLUTION INTRAVENOUS at 23:37

## 2024-11-04 ASSESSMENT — PAIN SCALES - GENERAL: PAINLEVEL_OUTOF10: 0

## 2024-11-04 ASSESSMENT — PAIN - FUNCTIONAL ASSESSMENT
PAIN_FUNCTIONAL_ASSESSMENT: NONE - DENIES PAIN
PAIN_FUNCTIONAL_ASSESSMENT: NONE - DENIES PAIN

## 2024-11-04 NOTE — TELEPHONE ENCOUNTER
Flores called in stating she wanted to know what she should take that her blood pressure is high and so is her heart rate.  She is scared she is going to pass out.  I asked her what her blood pressure is, she reported that it is 126/64 and her HR is 100.  I explained to her that that is a normal B/P and her HR is up probably because she is scared.  She said she just needs to know what medication to take to bring her B/P down.  I explained to her again that she doesn't want her blood pressure to be lower and not to take any medication.  If she feels she needs seen she can go to an urgent care or the Ed.  There was more back and forth with the same conversation, I tried to get her set up with appt with Dr Easton, she couldn't do that she said there was no one to bring her.  I encouraged her to drink and eat, she had just gotten up.  I stated that if she felt like she needed help right away to use her alert button that calls for her.  We hung up with her saying she needs to take care of herself and call for some help.

## 2024-11-04 NOTE — TELEPHONE ENCOUNTER
Name of Medication(s) Requested:  Requested Prescriptions      No prescriptions requested or ordered in this encounter   azelastine (ASTELIN) 0.1 % nasal spray     Medication is on current medication list Yes    Dosage and directions were verified? Yes    Quantity verified: 30 day supply     Pharmacy Verified?  Yes    Last Appointment:  7/10/2024    Future appts:  No future appointments.     (If no appt send self scheduling link. .REFILLAPPT)  Scheduling request sent?     [] Yes  [] No    Does patient need updated?  [] Yes  [] No    [facility]

## 2024-11-04 NOTE — ED PROVIDER NOTES
[facility] EMERGENCY DEPARTMENT  EMERGENCY DEPARTMENT ENCOUNTER        Pt Name: Flores Gaines  MRN: [id number]  Birthdate 1935  Date of evaluation: 11/4/2024  Provider: Macarena Lagunas DO  PCP: Abena Easton MD  Note Started: 4:22 PM EST 11/4/24    CHIEF COMPLAINT       No chief complaint on file.      HISTORY OF PRESENT ILLNESS: 1 or more Elements   History From: patient    Limitations to history : None    Flores Gaines is a 88 y.o. female who presents with dizziness and syncopal episode this afternoon at home.  She reports she had something to eat then suddenly felt cold all over and got goosebumps then her vision went black. She states she had an episode of shock because her vision went black and laid on the floor for a while. She had a syncopal episode last week and is bruised all over her arms from the fall. Was seen in the ER and admitted for syncope at that time too. Patient lives home alone.   The complaint has been persistent, moderate in severity, and worsened by nothing.  Her only complaint at this time is anxiety and the need to urinate. Patient denies fever/chills, sore throat, cough, congestion, chest pain, shortness of breath, edema, headache, visual disturbances, focal paresthesias, focal weakness, abdominal pain, nausea, vomiting, diarrhea, constipation, dysuria, hematuria, trauma, neck or back pain, rash or other complaints.        Nursing Notes were all reviewed and agreed with or any disagreements were addressed in the HPI.    REVIEW OF SYSTEMS :           Positives and Pertinent negatives as per HPI.     SURGICAL HISTORY     Past Surgical History:   Procedure Laterality Date    CAROTID ENDARTERECTOMY Right 11/27/2020    RIGHT CAROTID ENDARTERECTOMY performed by Bobby Fitzpatrick MD at [facility]    COLONOSCOPY      HYSTERECTOMY (CERVIX STATUS UNKNOWN)      LITHOTRIPSY Left 2/7/2020    CYSTOSCOPY RETROGRADE PYELOGRAM LEFT URETEROSCOPY LEFT J STENT

## 2024-11-05 LAB
EKG ATRIAL RATE: 74 BPM
EKG P AXIS: 81 DEGREES
EKG P-R INTERVAL: 186 MS
EKG Q-T INTERVAL: 402 MS
EKG QRS DURATION: 94 MS
EKG QTC CALCULATION (BAZETT): 446 MS
EKG R AXIS: -33 DEGREES
EKG T AXIS: 68 DEGREES
EKG VENTRICULAR RATE: 74 BPM

## 2024-11-05 PROCEDURE — 93010 ELECTROCARDIOGRAM REPORT: CPT | Performed by: INTERNAL MEDICINE

## 2024-11-05 PROCEDURE — G0378 HOSPITAL OBSERVATION PER HR: HCPCS

## 2024-11-05 PROCEDURE — 6360000002 HC RX W HCPCS: Performed by: INTERNAL MEDICINE

## 2024-11-05 PROCEDURE — 97161 PT EVAL LOW COMPLEX 20 MIN: CPT

## 2024-11-05 PROCEDURE — 96360 HYDRATION IV INFUSION INIT: CPT

## 2024-11-05 PROCEDURE — 99232 SBSQ HOSP IP/OBS MODERATE 35: CPT | Performed by: INTERNAL MEDICINE

## 2024-11-05 PROCEDURE — 2580000003 HC RX 258: Performed by: INTERNAL MEDICINE

## 2024-11-05 PROCEDURE — 96372 THER/PROPH/DIAG INJ SC/IM: CPT

## 2024-11-05 PROCEDURE — 96361 HYDRATE IV INFUSION ADD-ON: CPT

## 2024-11-05 PROCEDURE — 6370000000 HC RX 637 (ALT 250 FOR IP): Performed by: INTERNAL MEDICINE

## 2024-11-05 RX ORDER — MAGNESIUM SULFATE IN WATER 40 MG/ML
2000 INJECTION, SOLUTION INTRAVENOUS PRN
Status: DISCONTINUED | OUTPATIENT
Start: 2024-11-05 | End: 2024-11-06 | Stop reason: HOSPADM

## 2024-11-05 RX ORDER — SODIUM CHLORIDE 0.9 % (FLUSH) 0.9 %
5-40 SYRINGE (ML) INJECTION PRN
Status: DISCONTINUED | OUTPATIENT
Start: 2024-11-05 | End: 2024-11-06 | Stop reason: HOSPADM

## 2024-11-05 RX ORDER — ONDANSETRON 2 MG/ML
4 INJECTION INTRAMUSCULAR; INTRAVENOUS EVERY 6 HOURS PRN
Status: DISCONTINUED | OUTPATIENT
Start: 2024-11-05 | End: 2024-11-06 | Stop reason: HOSPADM

## 2024-11-05 RX ORDER — AZELASTINE 1 MG/ML
2 SPRAY, METERED NASAL 2 TIMES DAILY PRN
Qty: 30 ML | Refills: 3 | Status: SHIPPED | OUTPATIENT
Start: 2024-11-05

## 2024-11-05 RX ORDER — POLYETHYLENE GLYCOL 3350 17 G/17G
17 POWDER, FOR SOLUTION ORAL DAILY PRN
Status: DISCONTINUED | OUTPATIENT
Start: 2024-11-05 | End: 2024-11-06 | Stop reason: HOSPADM

## 2024-11-05 RX ORDER — ONDANSETRON 4 MG/1
4 TABLET, ORALLY DISINTEGRATING ORAL EVERY 8 HOURS PRN
Status: DISCONTINUED | OUTPATIENT
Start: 2024-11-05 | End: 2024-11-06 | Stop reason: HOSPADM

## 2024-11-05 RX ORDER — SODIUM CHLORIDE 0.9 % (FLUSH) 0.9 %
5-40 SYRINGE (ML) INJECTION EVERY 12 HOURS SCHEDULED
Status: DISCONTINUED | OUTPATIENT
Start: 2024-11-05 | End: 2024-11-06 | Stop reason: HOSPADM

## 2024-11-05 RX ORDER — ACETAMINOPHEN 325 MG/1
650 TABLET ORAL EVERY 6 HOURS PRN
Status: DISCONTINUED | OUTPATIENT
Start: 2024-11-05 | End: 2024-11-06 | Stop reason: HOSPADM

## 2024-11-05 RX ORDER — SODIUM CHLORIDE 9 MG/ML
INJECTION, SOLUTION INTRAVENOUS PRN
Status: DISCONTINUED | OUTPATIENT
Start: 2024-11-05 | End: 2024-11-06 | Stop reason: HOSPADM

## 2024-11-05 RX ORDER — ACETAMINOPHEN 650 MG/1
650 SUPPOSITORY RECTAL EVERY 6 HOURS PRN
Status: DISCONTINUED | OUTPATIENT
Start: 2024-11-05 | End: 2024-11-06 | Stop reason: HOSPADM

## 2024-11-05 RX ORDER — ENOXAPARIN SODIUM 100 MG/ML
40 INJECTION SUBCUTANEOUS DAILY
Status: DISCONTINUED | OUTPATIENT
Start: 2024-11-05 | End: 2024-11-06 | Stop reason: HOSPADM

## 2024-11-05 RX ORDER — POTASSIUM CHLORIDE 7.45 MG/ML
10 INJECTION INTRAVENOUS PRN
Status: DISCONTINUED | OUTPATIENT
Start: 2024-11-05 | End: 2024-11-05 | Stop reason: CLARIF

## 2024-11-05 RX ORDER — POTASSIUM CHLORIDE 1500 MG/1
40 TABLET, EXTENDED RELEASE ORAL PRN
Status: DISCONTINUED | OUTPATIENT
Start: 2024-11-05 | End: 2024-11-06 | Stop reason: HOSPADM

## 2024-11-05 RX ADMIN — SODIUM CHLORIDE, PRESERVATIVE FREE 10 ML: 5 INJECTION INTRAVENOUS at 20:00

## 2024-11-05 RX ADMIN — SODIUM CHLORIDE, PRESERVATIVE FREE 10 ML: 5 INJECTION INTRAVENOUS at 10:41

## 2024-11-05 RX ADMIN — Medication 1 G: at 10:40

## 2024-11-05 RX ADMIN — ATORVASTATIN CALCIUM 40 MG: 40 TABLET, FILM COATED ORAL at 19:50

## 2024-11-05 RX ADMIN — ENOXAPARIN SODIUM 40 MG: 100 INJECTION SUBCUTANEOUS at 10:40

## 2024-11-05 RX ADMIN — Medication 1 G: at 08:10

## 2024-11-05 RX ADMIN — PANTOPRAZOLE SODIUM 40 MG: 40 TABLET, DELAYED RELEASE ORAL at 08:11

## 2024-11-05 RX ADMIN — ASPIRIN 81 MG CHEWABLE TABLET 81 MG: 81 TABLET CHEWABLE at 10:40

## 2024-11-05 ASSESSMENT — PAIN SCALES - GENERAL
PAINLEVEL_OUTOF10: 0
PAINLEVEL_OUTOF10: 0

## 2024-11-05 NOTE — ACP (ADVANCE CARE PLANNING)
Advance Care Planning   Healthcare Decision Maker:    Primary Decision Maker: Hiram Levine - Child - [phone]    Secondary Decision Maker: Padmini Blackburn - Dank - [phone]    Secondary Decision Maker: Miguel Blackburn - Child - [phone]

## 2024-11-05 NOTE — CONSULTS
Discussed with hospitalist.  Consult only placed for tilt table test ordered.  Has been ordered and will be scheduled.  No need for full consult.

## 2024-11-05 NOTE — TELEPHONE ENCOUNTER
Pt. Is getting discharged  Please notify the patient she should not be on blood pressure medication because her blood pressure was 127 when she went to the emergency room, she fainted because her pressure drops when she stands, need to wear compression stockings all the time except at bedtime and increase her fluid intake okay to schedule follow-up if patient requests.

## 2024-11-05 NOTE — H&P
11/19/2018 02:02 PM    BLOODU Negative 12/02/2020 09:20 PM    GLUCOSEU NEGATIVE 11/04/2024 03:45 PM    GLUCOSEU NEGATIVE 07/16/2011 07:55 PM     ABG:  No results found for: \"PHART\", \"OUK0RWQ\", \"PO2ART\", \"T0ZUWDJP\", \"XSI3TQL\", \"BEART\"  TSH:    Lab Results   Component Value Date/Time    TSH 1.50 04/19/2024 12:05 AM     Cardiac Enzymes:   Lab Results   Component Value Date    CKTOTAL 32 11/13/2017    CKTOTAL 29 11/05/2016    CKTOTAL 30 11/05/2016    CKMB <1.0 11/13/2017    CKMB <1.0 11/05/2016    CKMB 1.0 11/05/2016    TROPONINI <0.01 03/26/2021    TROPONINI <0.01 11/23/2020    TROPONINI <0.01 10/17/2020       Radiology: CT HEAD WO CONTRAST    Result Date: 11/4/2024  EXAMINATION: CT OF THE HEAD WITHOUT CONTRAST  11/4/2024 5:27 pm TECHNIQUE: CT of the head was performed without the administration of intravenous contrast. Automated exposure control, iterative reconstruction, and/or weight based adjustment of the mA/kV was utilized to reduce the radiation dose to as low as reasonably achievable. COMPARISON: 02/01/2024 HISTORY: ORDERING SYSTEM PROVIDED HISTORY: Evaluate intracranial abnormality TECHNOLOGIST PROVIDED HISTORY: Has a \"code stroke\" or \"stroke alert\" been called?->No Reason for exam:->Evaluate intracranial abnormality Decision Support Exception - unselect if not a suspected or confirmed emergency medical condition->Emergency Medical Condition (MA) FINDINGS: BRAIN/VENTRICLES: There is no acute intracranial hemorrhage, mass effect or midline shift.  No abnormal extra-axial fluid collection.  The gray-white differentiation is maintained without evidence of an acute infarct.  There is no evidence of hydrocephalus. ORBITS: The visualized portion of the orbits demonstrate no acute abnormality. SINUSES: The visualized paranasal sinuses and mastoid air cells demonstrate no acute abnormality. SOFT TISSUES/SKULL:  No acute abnormality of the visualized skull or soft tissues.     No acute intracranial abnormality.     XR

## 2024-11-05 NOTE — CARE COORDINATION
CASE MANAGEMENT.... Met with patient at the bedside. She is readmission with same s/s of syncope. Ms Gaines lives alone and has strong family support. Her son, Hiram, lives close by and assists with transportation. She has no dme and no spike/hhc history. PT 20 OT pending. She plans to return home and denies any needs. States she is very independent. PCP is Dr Easton. Cardio consulted. No needs noted. Cardio will schedule for outpt tilt test. Orthos neg. Anticipate dc tomorrow. Will follow along and assist accordingly.

## 2024-11-06 VITALS
SYSTOLIC BLOOD PRESSURE: 107 MMHG | HEIGHT: 63 IN | WEIGHT: 120.37 LBS | BODY MASS INDEX: 21.33 KG/M2 | RESPIRATION RATE: 16 BRPM | DIASTOLIC BLOOD PRESSURE: 57 MMHG | HEART RATE: 79 BPM | TEMPERATURE: 97.4 F | OXYGEN SATURATION: 97 %

## 2024-11-06 PROBLEM — R55 SYNCOPE AND COLLAPSE: Status: RESOLVED | Noted: 2024-10-25 | Resolved: 2024-11-06

## 2024-11-06 LAB
ANION GAP SERPL CALCULATED.3IONS-SCNC: 7 MMOL/L (ref 7–16)
BASOPHILS # BLD: 0.02 K/UL (ref 0–0.2)
BASOPHILS NFR BLD: 0 % (ref 0–2)
BUN SERPL-MCNC: 15 MG/DL (ref 6–23)
CALCIUM SERPL-MCNC: 8.3 MG/DL (ref 8.6–10.2)
CHLORIDE SERPL-SCNC: 108 MMOL/L (ref 98–107)
CO2 SERPL-SCNC: 25 MMOL/L (ref 22–29)
CREAT SERPL-MCNC: 0.6 MG/DL (ref 0.5–1)
EOSINOPHIL # BLD: 0.03 K/UL (ref 0.05–0.5)
EOSINOPHILS RELATIVE PERCENT: 1 % (ref 0–6)
ERYTHROCYTE [DISTWIDTH] IN BLOOD BY AUTOMATED COUNT: 13.6 % (ref 11.5–15)
GFR, ESTIMATED: 86 ML/MIN/1.73M2
GLUCOSE SERPL-MCNC: 99 MG/DL (ref 74–99)
HCT VFR BLD AUTO: 31.9 % (ref 34–48)
HGB BLD-MCNC: 10.5 G/DL (ref 11.5–15.5)
IMM GRANULOCYTES # BLD AUTO: <0.03 K/UL (ref 0–0.58)
IMM GRANULOCYTES NFR BLD: 0 % (ref 0–5)
LYMPHOCYTES NFR BLD: 1.57 K/UL (ref 1.5–4)
LYMPHOCYTES RELATIVE PERCENT: 34 % (ref 20–42)
MCH RBC QN AUTO: 33.8 PG (ref 26–35)
MCHC RBC AUTO-ENTMCNC: 32.9 G/DL (ref 32–34.5)
MCV RBC AUTO: 102.6 FL (ref 80–99.9)
MONOCYTES NFR BLD: 0.97 K/UL (ref 0.1–0.95)
MONOCYTES NFR BLD: 21 % (ref 2–12)
NEUTROPHILS NFR BLD: 44 % (ref 43–80)
NEUTS SEG NFR BLD: 2.05 K/UL (ref 1.8–7.3)
PLATELET # BLD AUTO: 255 K/UL (ref 130–450)
PMV BLD AUTO: 10.2 FL (ref 7–12)
POTASSIUM SERPL-SCNC: 3.9 MMOL/L (ref 3.5–5)
RBC # BLD AUTO: 3.11 M/UL (ref 3.5–5.5)
SODIUM SERPL-SCNC: 140 MMOL/L (ref 132–146)
WBC OTHER # BLD: 4.7 K/UL (ref 4.5–11.5)

## 2024-11-06 PROCEDURE — 80048 BASIC METABOLIC PNL TOTAL CA: CPT

## 2024-11-06 PROCEDURE — G0378 HOSPITAL OBSERVATION PER HR: HCPCS

## 2024-11-06 PROCEDURE — 6370000000 HC RX 637 (ALT 250 FOR IP): Performed by: INTERNAL MEDICINE

## 2024-11-06 PROCEDURE — 85025 COMPLETE CBC W/AUTO DIFF WBC: CPT

## 2024-11-06 PROCEDURE — 99232 SBSQ HOSP IP/OBS MODERATE 35: CPT | Performed by: INTERNAL MEDICINE

## 2024-11-06 PROCEDURE — 6360000002 HC RX W HCPCS: Performed by: INTERNAL MEDICINE

## 2024-11-06 PROCEDURE — 2580000003 HC RX 258: Performed by: INTERNAL MEDICINE

## 2024-11-06 PROCEDURE — 96372 THER/PROPH/DIAG INJ SC/IM: CPT

## 2024-11-06 RX ORDER — FLUDROCORTISONE ACETATE 0.1 MG/1
0.05 TABLET ORAL ONCE
Status: COMPLETED | OUTPATIENT
Start: 2024-11-06 | End: 2024-11-06

## 2024-11-06 RX ADMIN — ENOXAPARIN SODIUM 40 MG: 100 INJECTION SUBCUTANEOUS at 07:46

## 2024-11-06 RX ADMIN — PANTOPRAZOLE SODIUM 40 MG: 40 TABLET, DELAYED RELEASE ORAL at 07:46

## 2024-11-06 RX ADMIN — SODIUM CHLORIDE, PRESERVATIVE FREE 10 ML: 5 INJECTION INTRAVENOUS at 07:46

## 2024-11-06 RX ADMIN — FLUDROCORTISONE ACETATE 0.05 MG: 0.1 TABLET ORAL at 12:49

## 2024-11-06 RX ADMIN — ASPIRIN 81 MG CHEWABLE TABLET 81 MG: 81 TABLET CHEWABLE at 12:49

## 2024-11-06 ASSESSMENT — PAIN SCALES - GENERAL
PAINLEVEL_OUTOF10: 0
PAINLEVEL_OUTOF10: 0

## 2024-11-06 NOTE — DISCHARGE INSTRUCTIONS
Educational material has been provided please read them carefully concerning her diagnosis.  It is important to wear compression stockings.  Follow-up with cardiology to schedule tilt table testing.

## 2024-11-06 NOTE — DISCHARGE SUMMARY
[facility] Hospitalist Physician Discharge Summary       Abena Easton MD  [address] [place] [address]  [phone]    Go on 11/18/2024  Your appointment time is 2pm!!      Activity level: As tolerated     Dispo: Home      Condition on discharge: Stable     Patient ID:  Flores Gaines  [id number]  88 y.o.  1935    Admit date: 11/4/2024    Discharge date and time:  11/6/2024  3:39 PM    Admission Diagnoses: Principal Problem:    Pre-syncope  Active Problems:    Diastolic dysfunction    Cerebrovascular accident (CVA) due to stenosis of right carotid artery (HCC)    Memory deficit  Resolved Problems:    Syncope and collapse      Discharge Diagnoses: Principal Problem:    Pre-syncope  Active Problems:    Diastolic dysfunction    Cerebrovascular accident (CVA) due to stenosis of right carotid artery (HCC)    Memory deficit  Resolved Problems:    Syncope and collapse      Consults:  IP CONSULT TO SOCIAL WORK  IP CONSULT TO SOCIAL WORK  IP CONSULT TO CARDIOLOGY    Procedures: None    Hospital Course:   Patient Flores Gaines is a 88 y.o. presented with Syncope and collapse [R55]    Patient presented with a presyncopal episode.  Of note she presented with similar episode a few weeks ago where workup showed orthostatic hypotension vs POTS.  Patient had a trial of Florinef.  Orthostatic's were negative.  Case discussed with cardiology who will schedule patient for OPT with table testing.    Patient encouraged to get in and out of bed slowly, wear compression stockings prescribed at previous admission and avoid sudden movements.    11/6: Patient does not like salt tabs.  Florinef trial.  Check BP with dose prior to discharge.     Plan:  Syncope-like episode vs seizures: Brief loss of vision.  Staring into the distance.  Unable to answer colleagues.  Recent admission for similar episode.  No arrhythmias found at previous visit.  EKG on admission with NSR, left axis deviation. Recent echo

## 2024-11-06 NOTE — PLAN OF CARE
Problem: ABCDS Injury Assessment  Goal: Absence of physical injury  11/6/2024 1330 by Kaylen Bond RN  Outcome: Completed     Problem: Discharge Planning  Goal: Discharge to home or other facility with appropriate resources  11/6/2024 1330 by Kaylen Bond RN  Outcome: Completed     Problem: Chronic Conditions and Co-morbidities  Goal: Patient's chronic conditions and co-morbidity symptoms are monitored and maintained or improved  11/6/2024 1330 by Kaylen Bond RN  Outcome: Completed     Problem: Pain  Goal: Verbalizes/displays adequate comfort level or baseline comfort level  11/6/2024 1330 by Kaylen Bond RN  Outcome: Completed     Problem: Safety - Adult  Goal: Free from fall injury  11/6/2024 1330 by Kaylen Bond RN  Outcome: Completed

## 2024-11-06 NOTE — PROGRESS NOTES
[facility] Hospitalist Physician Discharge Summary       Abena Easton MD  [address] [place] [address]  [phone]    Go on 11/18/2024  Your appointment time is 2pm!!      Activity level: As tolerated     Dispo: Home      Condition on discharge: Stable     Patient ID:  Flores Gaines  [id number]  88 y.o.  1935    Admit date: 11/4/2024    Discharge date and time:  11/6/2024  3:30 PM    Admission Diagnoses: Principal Problem:    Pre-syncope  Active Problems:    Diastolic dysfunction    Cerebrovascular accident (CVA) due to stenosis of right carotid artery (HCC)    Memory deficit  Resolved Problems:    Syncope and collapse      Discharge Diagnoses: Principal Problem:    Pre-syncope  Active Problems:    Diastolic dysfunction    Cerebrovascular accident (CVA) due to stenosis of right carotid artery (HCC)    Memory deficit  Resolved Problems:    Syncope and collapse      Consults:  IP CONSULT TO SOCIAL WORK  IP CONSULT TO SOCIAL WORK  IP CONSULT TO CARDIOLOGY    Procedures: None    Hospital Course:   Patient Flores Gaines is a 88 y.o. presented with Syncope and collapse [R55]    Patient presented with a presyncopal episode.  Of note she presented with similar episode a few weeks ago where workup showed orthostatic hypotension vs POTS.  Patient had a trial of Florinef.  Orthostatic's were negative.  Case discussed with cardiology who will schedule patient for OPT with table testing.    Patient encouraged to get in and out of bed slowly, wear compression stockings prescribed at previous admission and avoid sudden movements.    11/6: Patient does not like salt tabs.  Florinef trial.  Check BP with dose prior to discharge.     Plan:  Syncope-like episode vs seizures: Brief loss of vision.  Staring into the distance.  Unable to answer colleagues.  Recent admission for similar episode.  No arrhythmias found at previous visit.  EKG on admission with NSR, left axis deviation. Recent echo 
       [facility] Hospitalist Progress Note    Admitting Date and Time: 11/4/2024  2:53 PM  Admit Dx: Syncope and collapse [R55]    Subjective:  Patient is being followed for Syncope and collapse [R55]     Patient has no acute concerns.      ROS: denies fever, chills, cp, sob, n/v, HA unless stated above.      sodium chloride flush  5-40 mL IntraVENous 2 times per day    enoxaparin  40 mg SubCUTAneous Daily    aspirin  81 mg Oral Lunch    atorvastatin  40 mg Oral Nightly    pantoprazole  40 mg Oral Daily with breakfast    sodium chloride  1 g Oral TID WC     sodium chloride flush, 5-40 mL, PRN  sodium chloride, , PRN  potassium chloride, 40 mEq, PRN   Or  potassium alternative oral replacement, 40 mEq, PRN  magnesium sulfate, 2,000 mg, PRN  ondansetron, 4 mg, Q8H PRN   Or  ondansetron, 4 mg, Q6H PRN  polyethylene glycol, 17 g, Daily PRN  acetaminophen, 650 mg, Q6H PRN   Or  acetaminophen, 650 mg, Q6H PRN  acetaminophen, 500 mg, Q6H PRN  diazePAM, 2 mg, Nightly PRN         Objective:    BP (!) 148/68   Pulse 83   Temp 98.5 °F (36.9 °C) (Oral)   Resp 18   Ht 1.6 m (5' 3\")   Wt 55.5 kg (122 lb 5.7 oz)   LMP  (LMP Unknown)   SpO2 97%   BMI 21.67 kg/m²     General Appearance: Appears stated age, AOx3.  No acute distress.  Skin: warm and dry  Head: normocephalic and atraumatic  Eyes: pupils equal, round, and reactive to light, extraocular eye movements intact, conjunctivae normal  Neck: neck supple and non tender without mass   Pulmonary/Chest: clear to auscultation bilaterally- no wheezes, rales or rhonchi, normal air movement, no respiratory distress on room air  Cardiovascular: normal rate, normal S1 and S2 and no carotid bruits  Abdomen: soft, non-tender, non-distended, normal bowel sounds, no masses or organomegaly  Extremities: no cyanosis, no clubbing and no edema  Neurologic: no cranial nerve deficit and speech normal        Recent Labs     11/04/24  1601      K 4.1      CO2 27   BUN 14 
4 Eyes Skin Assessment     NAME:  Flores Gaines  YOB: 1935  MEDICAL RECORD NUMBER:  [id number]    The patient is being assessed for  Admission    I agree that at least one RN has performed a thorough Head to Toe Skin Assessment on the patient. ALL assessment sites listed below have been assessed.      Areas assessed by both nurses:    Head, Face, Ears, Shoulders, Back, Chest, Arms, Elbows, Hands, Sacrum. Buttock, Coccyx, Ischium, Legs. Feet and Heels, and Under Medical Devices         Does the Patient have a Wound? No noted wound(s)       Charanjit Prevention initiated by RN: No  Wound Care Orders initiated by RN: No    Pressure Injury (Stage 3,4, Unstageable, DTI, NWPT, and Complex wounds) if present, place Wound referral order by RN under : No    New Ostomies, if present place, Ostomy referral order under : No     Nurse 1 eSignature: Electronically signed by Tonie Magdaleno RN on 11/4/24 at 11:01 PM EST    **SHARE this note so that the co-signing nurse can place an eSignature**    Nurse 2 eSignature: Electronically signed by Tejal Back RN on 11/5/24 at 4:50 AM EST  
Cardio consult placed  
Database initiated pharmacy and medications verified with the patient. She is A&O comes in from home alone. She had a recent admission [place]. States she feels woozy and has fallen recently. Also states she doesn't take her medications like she is supposed to.   
Spiritual Health History and Assessment/Progress Note  [facility]     Encounter, Rituals, Rites and Sacraments,  ,  ,      Name: Flores Gaines MRN: [id number]    Age: 88 y.o.     Sex: female   Language: English   Moravian: Latter day   Pre-syncope     Date: 11/5/2024                           Spiritual Assessment began in [facility] 4S INTERMEDIATE 1        Referral/Consult From: Rounding   Encounter Overview/Reason:  Encounter, Rituals, Rites and Sacraments  Service Provided For: Patient    Telma, Belief, Meaning:   Patient is connected with a telma tradition or spiritual practice  Family/Friends No family/friends present      Importance and Influence:  Patient has no beliefs influential to healthcare decision-making identified during this visit  Family/Friends No family/friends present    Community:  Patient feels well-supported. Support system includes: Children  Family/Friends No family/friends present    Assessment and Plan of Care:     Patient Interventions include: Provided sacramental/Faith ritual  Family/Friends Interventions include: No family/friends present    Patient Plan of Care: Spiritual Care available upon further referral  Family/Friends Plan of Care: Spiritual Care available upon further referral    Electronically signed by Chaplain Erin on 11/5/2024 at 4:24 PM   
present.  Electronically signed by Nehemiah Davis MD on 11/6/2024 at 2:01 PM      
review of current medical information, gathering information on past medical history/social history and prior level of function, completion of standardized testing/informal observation of tasks, assessment of data and education on plan of care and goals.      CPT codes:  [x] Low Complexity PT evaluation 49004  [] Moderate Complexity PT evaluation 73523  [] High Complexity PT evaluation 42961  [] PT Re-evaluation 35300  [] Gait training 76408 minutes  [] Manual therapy 97284 minutes  [] Therapeutic activities 66791 minutes  [] Therapeutic exercises 10192 minutes  [] Neuromuscular reeducation 66527 minutes     Mendy Puentes PT [id number]

## 2024-11-07 ENCOUNTER — CARE COORDINATION (OUTPATIENT)
Dept: CARE COORDINATION | Age: 89
End: 2024-11-07

## 2024-11-07 DIAGNOSIS — R55 PRE-SYNCOPE: Primary | ICD-10-CM

## 2024-11-07 PROCEDURE — 1111F DSCHRG MED/CURRENT MED MERGE: CPT | Performed by: INTERNAL MEDICINE

## 2024-11-07 NOTE — CARE COORDINATION
-Phone answered by patient who states she is still sleeping and requests a return call later this afternoon  
equipment, patient declines.    Assessments:  Care Transitions 24 Hour Call    Do you have a copy of your discharge instructions?: Yes  Do you have all of your prescriptions and are they filled?: Yes  Have you been contacted by a [facility] Pharmacist?: No  Have you scheduled your follow up appointment?: Yes  How are you going to get to your appointment?: Car - family or friend to transport  Do you have support at home?: Alone  Do you feel like you have everything you need to keep you well at home?: Yes  Are you an active caregiver in your home?: No  Care Transitions Interventions     Other Services: Declined (Comment: declined RPM 2/27/24 and 11/7/24.)             Follow Up Appointment:   Discussed follow up appointments. Patient has hospital follow up appointment scheduled within 14 days of discharge.   Future Appointments         Provider Specialty Dept Phone    11/18/2024 2:00 PM Abena Easton MD Primary Care [phone]            Care Transition Nurse provided contact information.  Plan for follow-up call in 11-14 days based on severity of symptoms and risk factors.  Plan for next call: symptom check-pre syncope  self management-B/P, weight.  Wearing compression stockings?  follow-up appointment-review PCP visit  tilt table scheduled?     Unique Jimenez RN

## 2024-11-13 ENCOUNTER — TELEPHONE (OUTPATIENT)
Dept: PRIMARY CARE CLINIC | Age: 89
End: 2024-11-13

## 2024-11-13 DIAGNOSIS — E78.00 PURE HYPERCHOLESTEROLEMIA: ICD-10-CM

## 2024-11-13 NOTE — TELEPHONE ENCOUNTER
Flores has had high BP readings since she has been out of the hospital. Flores would like to know she should take when her BP is High?

## 2024-11-17 ENCOUNTER — APPOINTMENT (OUTPATIENT)
Dept: GENERAL RADIOLOGY | Age: 89
End: 2024-11-17
Payer: MEDICARE

## 2024-11-17 ENCOUNTER — HOSPITAL ENCOUNTER (OUTPATIENT)
Age: 89
Setting detail: OBSERVATION
Discharge: HOME | End: 2024-11-18
Attending: EMERGENCY MEDICINE
Payer: MEDICARE

## 2024-11-17 ENCOUNTER — APPOINTMENT (OUTPATIENT)
Dept: CT IMAGING | Age: 89
End: 2024-11-17
Attending: EMERGENCY MEDICINE
Payer: MEDICARE

## 2024-11-17 DIAGNOSIS — R29.90 STROKE-LIKE SYMPTOMS: Primary | ICD-10-CM

## 2024-11-17 LAB
ANION GAP SERPL CALCULATED.3IONS-SCNC: 5 MMOL/L (ref 7–16)
BASOPHILS # BLD: 0.02 K/UL (ref 0–0.2)
BASOPHILS NFR BLD: 1 % (ref 0–2)
BUN SERPL-MCNC: 14 MG/DL (ref 6–23)
CALCIUM SERPL-MCNC: 8.8 MG/DL (ref 8.6–10.2)
CHLORIDE SERPL-SCNC: 107 MMOL/L (ref 98–107)
CO2 SERPL-SCNC: 31 MMOL/L (ref 22–29)
CREAT SERPL-MCNC: 0.7 MG/DL (ref 0.5–1)
EOSINOPHIL # BLD: 0.02 K/UL (ref 0.05–0.5)
EOSINOPHILS RELATIVE PERCENT: 1 % (ref 0–6)
ERYTHROCYTE [DISTWIDTH] IN BLOOD BY AUTOMATED COUNT: 14.1 % (ref 11.5–15)
GFR, ESTIMATED: 84 ML/MIN/1.73M2
GLUCOSE SERPL-MCNC: 102 MG/DL (ref 74–99)
HCT VFR BLD AUTO: 34.6 % (ref 34–48)
HGB BLD-MCNC: 11.1 G/DL (ref 11.5–15.5)
IMM GRANULOCYTES # BLD AUTO: <0.03 K/UL (ref 0–0.58)
IMM GRANULOCYTES NFR BLD: 0 % (ref 0–5)
LYMPHOCYTES NFR BLD: 1.15 K/UL (ref 1.5–4)
LYMPHOCYTES RELATIVE PERCENT: 30 % (ref 20–42)
MCH RBC QN AUTO: 33.5 PG (ref 26–35)
MCHC RBC AUTO-ENTMCNC: 32.1 G/DL (ref 32–34.5)
MCV RBC AUTO: 104.5 FL (ref 80–99.9)
MONOCYTES NFR BLD: 0.75 K/UL (ref 0.1–0.95)
MONOCYTES NFR BLD: 20 % (ref 2–12)
NEUTROPHILS NFR BLD: 49 % (ref 43–80)
NEUTS SEG NFR BLD: 1.87 K/UL (ref 1.8–7.3)
PLATELET # BLD AUTO: 275 K/UL (ref 130–450)
PMV BLD AUTO: 10.6 FL (ref 7–12)
POTASSIUM SERPL-SCNC: 4.1 MMOL/L (ref 3.5–5)
RBC # BLD AUTO: 3.31 M/UL (ref 3.5–5.5)
SODIUM SERPL-SCNC: 143 MMOL/L (ref 132–146)
TROPONIN I SERPL HS-MCNC: <6 NG/L (ref 0–9)
WBC OTHER # BLD: 3.8 K/UL (ref 4.5–11.5)

## 2024-11-17 PROCEDURE — 85025 COMPLETE CBC W/AUTO DIFF WBC: CPT

## 2024-11-17 PROCEDURE — 6360000002 HC RX W HCPCS: Performed by: STUDENT IN AN ORGANIZED HEALTH CARE EDUCATION/TRAINING PROGRAM

## 2024-11-17 PROCEDURE — 96372 THER/PROPH/DIAG INJ SC/IM: CPT

## 2024-11-17 PROCEDURE — 80048 BASIC METABOLIC PNL TOTAL CA: CPT

## 2024-11-17 PROCEDURE — 93005 ELECTROCARDIOGRAM TRACING: CPT | Performed by: EMERGENCY MEDICINE

## 2024-11-17 PROCEDURE — 99285 EMERGENCY DEPT VISIT HI MDM: CPT

## 2024-11-17 PROCEDURE — 84484 ASSAY OF TROPONIN QUANT: CPT

## 2024-11-17 PROCEDURE — 6370000000 HC RX 637 (ALT 250 FOR IP): Performed by: STUDENT IN AN ORGANIZED HEALTH CARE EDUCATION/TRAINING PROGRAM

## 2024-11-17 PROCEDURE — 70450 CT HEAD/BRAIN W/O DYE: CPT

## 2024-11-17 PROCEDURE — 2580000003 HC RX 258: Performed by: STUDENT IN AN ORGANIZED HEALTH CARE EDUCATION/TRAINING PROGRAM

## 2024-11-17 PROCEDURE — 71046 X-RAY EXAM CHEST 2 VIEWS: CPT

## 2024-11-17 PROCEDURE — G0378 HOSPITAL OBSERVATION PER HR: HCPCS

## 2024-11-17 PROCEDURE — 99221 1ST HOSP IP/OBS SF/LOW 40: CPT | Performed by: STUDENT IN AN ORGANIZED HEALTH CARE EDUCATION/TRAINING PROGRAM

## 2024-11-17 RX ORDER — SODIUM CHLORIDE 9 MG/ML
INJECTION, SOLUTION INTRAVENOUS PRN
Status: DISCONTINUED | OUTPATIENT
Start: 2024-11-17 | End: 2024-11-18 | Stop reason: HOSPADM

## 2024-11-17 RX ORDER — SODIUM CHLORIDE 0.9 % (FLUSH) 0.9 %
5-40 SYRINGE (ML) INJECTION EVERY 12 HOURS SCHEDULED
Status: DISCONTINUED | OUTPATIENT
Start: 2024-11-17 | End: 2024-11-18 | Stop reason: HOSPADM

## 2024-11-17 RX ORDER — SODIUM CHLORIDE 0.9 % (FLUSH) 0.9 %
5-40 SYRINGE (ML) INJECTION PRN
Status: DISCONTINUED | OUTPATIENT
Start: 2024-11-17 | End: 2024-11-18 | Stop reason: HOSPADM

## 2024-11-17 RX ORDER — POLYETHYLENE GLYCOL 3350 17 G/17G
17 POWDER, FOR SOLUTION ORAL DAILY PRN
Status: DISCONTINUED | OUTPATIENT
Start: 2024-11-17 | End: 2024-11-18 | Stop reason: HOSPADM

## 2024-11-17 RX ORDER — ATORVASTATIN CALCIUM 40 MG/1
TABLET, FILM COATED ORAL
Qty: 90 TABLET | Refills: 0 | Status: ON HOLD | OUTPATIENT
Start: 2024-11-17

## 2024-11-17 RX ORDER — ASPIRIN 81 MG/1
81 TABLET, CHEWABLE ORAL DAILY
Status: DISCONTINUED | OUTPATIENT
Start: 2024-11-18 | End: 2024-11-18 | Stop reason: HOSPADM

## 2024-11-17 RX ORDER — ATORVASTATIN CALCIUM 40 MG/1
80 TABLET, FILM COATED ORAL NIGHTLY
Status: DISCONTINUED | OUTPATIENT
Start: 2024-11-17 | End: 2024-11-18 | Stop reason: HOSPADM

## 2024-11-17 RX ORDER — ASPIRIN 300 MG/1
300 SUPPOSITORY RECTAL DAILY
Status: DISCONTINUED | OUTPATIENT
Start: 2024-11-18 | End: 2024-11-18 | Stop reason: HOSPADM

## 2024-11-17 RX ORDER — ENOXAPARIN SODIUM 100 MG/ML
40 INJECTION SUBCUTANEOUS DAILY
Status: DISCONTINUED | OUTPATIENT
Start: 2024-11-17 | End: 2024-11-18 | Stop reason: HOSPADM

## 2024-11-17 RX ORDER — ONDANSETRON 2 MG/ML
4 INJECTION INTRAMUSCULAR; INTRAVENOUS EVERY 6 HOURS PRN
Status: DISCONTINUED | OUTPATIENT
Start: 2024-11-17 | End: 2024-11-18 | Stop reason: HOSPADM

## 2024-11-17 RX ORDER — ASPIRIN 81 MG/1
324 TABLET, CHEWABLE ORAL ONCE
Status: DISCONTINUED | OUTPATIENT
Start: 2024-11-17 | End: 2024-11-18 | Stop reason: HOSPADM

## 2024-11-17 RX ORDER — ONDANSETRON 4 MG/1
4 TABLET, ORALLY DISINTEGRATING ORAL EVERY 8 HOURS PRN
Status: DISCONTINUED | OUTPATIENT
Start: 2024-11-17 | End: 2024-11-18 | Stop reason: HOSPADM

## 2024-11-17 RX ADMIN — SODIUM CHLORIDE, PRESERVATIVE FREE 10 ML: 5 INJECTION INTRAVENOUS at 21:40

## 2024-11-17 RX ADMIN — ATORVASTATIN CALCIUM 80 MG: 40 TABLET, FILM COATED ORAL at 21:35

## 2024-11-17 RX ADMIN — ENOXAPARIN SODIUM 40 MG: 100 INJECTION SUBCUTANEOUS at 21:35

## 2024-11-17 ASSESSMENT — PAIN - FUNCTIONAL ASSESSMENT: PAIN_FUNCTIONAL_ASSESSMENT: NONE - DENIES PAIN

## 2024-11-17 NOTE — TELEPHONE ENCOUNTER
Patient to take half a tablet of her Ativan or 1 tablet if pressure continues to be high she should go to the emergency room

## 2024-11-17 NOTE — H&P
Hospitalist History & Physical      PCP: Abena Easton MD    Date of Admission: 11/17/2024    Date of Service: Pt seen/examined on 11/17/2024 and is placed in Observation.    Chief Complaint:  left hand tingling     History Of Present Illness:  89-year-old female patient with past medical history as outlined who presented to the ER with complaints of left hand tingling since 6:20 this morning.   Patient states she woke up this morning having tingling on her left hand. She called the ambulance because she had stroke before in the same side. She then went to the bathroom and was afraid of falling as she was wobbling. She made her trip back to the bedroom safely and EMS arrived. Patient states she eat nice dinner for her birthday and the next day but yesterday her oral intake was poor, probably did not hydrate enough. She denies other focal neuro deficit, chest pain, headaches, visual changes, fevers, chills.     On ER evaluation she was afebrile 98.7 /67 HR 83 RR 16 SpO2 97% room air.  Labs with WBC 3.8, hemoglobin 11.1, normal platelets.  Normal renal function and electrolytes.  Troponin negative.  CT head without contrast with no acute intracranial malady.  Chest x-ray with no acute process.  ER MD order MRI stat in the ER but unable to obtain today.  Decision to admit for MRI.  Case was discussed with ER physician            Past Medical History:   Diagnosis Date    Abnormal weight loss     Anxiety     Anxiety disorder     Cerebral infarction, unspecified (HCC)     Essential (primary) hypertension     Gastric reflux     Hyperlipidemia     Hypertension     Occlusion and stenosis of right carotid artery     Slurred speech        Past Surgical History:   Procedure Laterality Date    CAROTID ENDARTERECTOMY Right 11/27/2020    RIGHT CAROTID ENDARTERECTOMY performed by Bobby Fitzpatrick MD at [facility] OR    COLONOSCOPY      HYSTERECTOMY (CERVIX STATUS UNKNOWN)      LITHOTRIPSY Left 2/7/2020    CYSTOSCOPY

## 2024-11-18 ENCOUNTER — APPOINTMENT (OUTPATIENT)
Dept: MRI IMAGING | Age: 89
End: 2024-11-18
Payer: MEDICARE

## 2024-11-18 ENCOUNTER — APPOINTMENT (OUTPATIENT)
Dept: CT IMAGING | Age: 89
End: 2024-11-18
Attending: STUDENT IN AN ORGANIZED HEALTH CARE EDUCATION/TRAINING PROGRAM
Payer: MEDICARE

## 2024-11-18 VITALS
BODY MASS INDEX: 22.08 KG/M2 | TEMPERATURE: 97.6 F | OXYGEN SATURATION: 97 % | HEART RATE: 83 BPM | HEIGHT: 62 IN | RESPIRATION RATE: 18 BRPM | DIASTOLIC BLOOD PRESSURE: 56 MMHG | WEIGHT: 120 LBS | SYSTOLIC BLOOD PRESSURE: 122 MMHG

## 2024-11-18 LAB
ANION GAP SERPL CALCULATED.3IONS-SCNC: 8 MMOL/L (ref 7–16)
BUN SERPL-MCNC: 10 MG/DL (ref 6–23)
CALCIUM SERPL-MCNC: 8.7 MG/DL (ref 8.6–10.2)
CHLORIDE SERPL-SCNC: 105 MMOL/L (ref 98–107)
CHOLEST SERPL-MCNC: 105 MG/DL
CO2 SERPL-SCNC: 27 MMOL/L (ref 22–29)
CREAT SERPL-MCNC: 0.6 MG/DL (ref 0.5–1)
ERYTHROCYTE [DISTWIDTH] IN BLOOD BY AUTOMATED COUNT: 13.9 % (ref 11.5–15)
GFR, ESTIMATED: 87 ML/MIN/1.73M2
GLUCOSE SERPL-MCNC: 99 MG/DL (ref 74–99)
HBA1C MFR BLD: 5.8 % (ref 4–5.6)
HCT VFR BLD AUTO: 34.1 % (ref 34–48)
HDLC SERPL-MCNC: 35 MG/DL
HGB BLD-MCNC: 11.1 G/DL (ref 11.5–15.5)
LDLC SERPL CALC-MCNC: 55 MG/DL
MCH RBC QN AUTO: 33.8 PG (ref 26–35)
MCHC RBC AUTO-ENTMCNC: 32.6 G/DL (ref 32–34.5)
MCV RBC AUTO: 104 FL (ref 80–99.9)
PLATELET # BLD AUTO: 261 K/UL (ref 130–450)
PMV BLD AUTO: 10.6 FL (ref 7–12)
POTASSIUM SERPL-SCNC: 3.9 MMOL/L (ref 3.5–5)
RBC # BLD AUTO: 3.28 M/UL (ref 3.5–5.5)
SODIUM SERPL-SCNC: 140 MMOL/L (ref 132–146)
TRIGL SERPL-MCNC: 74 MG/DL
VLDLC SERPL CALC-MCNC: 15 MG/DL
WBC OTHER # BLD: 4.2 K/UL (ref 4.5–11.5)

## 2024-11-18 PROCEDURE — 6360000002 HC RX W HCPCS: Performed by: STUDENT IN AN ORGANIZED HEALTH CARE EDUCATION/TRAINING PROGRAM

## 2024-11-18 PROCEDURE — 2580000003 HC RX 258: Performed by: STUDENT IN AN ORGANIZED HEALTH CARE EDUCATION/TRAINING PROGRAM

## 2024-11-18 PROCEDURE — 83036 HEMOGLOBIN GLYCOSYLATED A1C: CPT

## 2024-11-18 PROCEDURE — 2580000003 HC RX 258: Performed by: RADIOLOGY

## 2024-11-18 PROCEDURE — 85027 COMPLETE CBC AUTOMATED: CPT

## 2024-11-18 PROCEDURE — 6360000004 HC RX CONTRAST MEDICATION: Performed by: RADIOLOGY

## 2024-11-18 PROCEDURE — 96372 THER/PROPH/DIAG INJ SC/IM: CPT

## 2024-11-18 PROCEDURE — 70498 CT ANGIOGRAPHY NECK: CPT

## 2024-11-18 PROCEDURE — 99239 HOSP IP/OBS DSCHRG MGMT >30: CPT | Performed by: INTERNAL MEDICINE

## 2024-11-18 PROCEDURE — 70496 CT ANGIOGRAPHY HEAD: CPT

## 2024-11-18 PROCEDURE — 80048 BASIC METABOLIC PNL TOTAL CA: CPT

## 2024-11-18 PROCEDURE — 92610 EVALUATE SWALLOWING FUNCTION: CPT

## 2024-11-18 PROCEDURE — G0378 HOSPITAL OBSERVATION PER HR: HCPCS

## 2024-11-18 PROCEDURE — 36415 COLL VENOUS BLD VENIPUNCTURE: CPT

## 2024-11-18 PROCEDURE — 92523 SPEECH SOUND LANG COMPREHEN: CPT

## 2024-11-18 PROCEDURE — 70551 MRI BRAIN STEM W/O DYE: CPT

## 2024-11-18 PROCEDURE — 80061 LIPID PANEL: CPT

## 2024-11-18 PROCEDURE — 97530 THERAPEUTIC ACTIVITIES: CPT

## 2024-11-18 PROCEDURE — 6370000000 HC RX 637 (ALT 250 FOR IP): Performed by: STUDENT IN AN ORGANIZED HEALTH CARE EDUCATION/TRAINING PROGRAM

## 2024-11-18 PROCEDURE — 97165 OT EVAL LOW COMPLEX 30 MIN: CPT

## 2024-11-18 PROCEDURE — 97535 SELF CARE MNGMENT TRAINING: CPT

## 2024-11-18 PROCEDURE — 92507 TX SP LANG VOICE COMM INDIV: CPT

## 2024-11-18 PROCEDURE — 97161 PT EVAL LOW COMPLEX 20 MIN: CPT

## 2024-11-18 RX ORDER — SODIUM CHLORIDE 0.9 % (FLUSH) 0.9 %
10 SYRINGE (ML) INJECTION PRN
Status: COMPLETED | OUTPATIENT
Start: 2024-11-18 | End: 2024-11-18

## 2024-11-18 RX ORDER — IOPAMIDOL 755 MG/ML
70 INJECTION, SOLUTION INTRAVASCULAR
Status: COMPLETED | OUTPATIENT
Start: 2024-11-18 | End: 2024-11-18

## 2024-11-18 RX ADMIN — IOPAMIDOL 70 ML: 755 INJECTION, SOLUTION INTRAVENOUS at 00:41

## 2024-11-18 RX ADMIN — ASPIRIN 81 MG CHEWABLE TABLET 81 MG: 81 TABLET CHEWABLE at 09:33

## 2024-11-18 RX ADMIN — SODIUM CHLORIDE, PRESERVATIVE FREE 10 ML: 5 INJECTION INTRAVENOUS at 00:41

## 2024-11-18 RX ADMIN — ENOXAPARIN SODIUM 40 MG: 100 INJECTION SUBCUTANEOUS at 09:33

## 2024-11-18 RX ADMIN — SODIUM CHLORIDE, PRESERVATIVE FREE 10 ML: 5 INJECTION INTRAVENOUS at 09:33

## 2024-11-18 NOTE — ACP (ADVANCE CARE PLANNING)
Advance Care Planning   Healthcare Decision Maker:    Primary Decision Maker: Hiram Levine - Child - [phone]    Secondary Decision Maker: Padmini Blackburn - Child - [phone]    Secondary Decision Maker: Miguel Blackburn - Child - [phone]    Click here to complete Healthcare Decision Makers including selection of the Healthcare Decision Maker Relationship (ie \"Primary\").         documents are in epic. .MELISSA Reddy  11/18/2024

## 2024-11-18 NOTE — PROGRESS NOTES
[facility] Hospitalist Progress Note    Admitting Date and Time: 11/17/2024  7:54 AM  Admit Dx: Stroke-like symptoms [R29.90]  Stroke-like symptom [R29.90]  89-year-old female patient with past medical history as outlined who presented to the ER with complaints of left hand tingling since 6:20 this morning.   Patient states she woke up this morning having tingling on her left hand. She called the ambulance because she had stroke before in the same side. She then went to the bathroom and was afraid of falling as she was wobbling. She made her trip back to the bedroom safely and EMS arrived. Patient states she eat nice dinner for her birthday and the next day but yesterday her oral intake was poor, probably did not hydrate enough. She denies other focal neuro deficit, chest pain, headaches, visual changes, fevers, chills.     Of note, recent admission in [place] from 11/4-11/6/24 for pre-syncope and has been having repeat admissions for similar complaints with history of orthostatic hypotension with trial of Florinef. Discharged with recommendation for OPT with table testing. Education provided on orthostatic hypotension and patient still notices it when she gets out of bed too quickly in the morning and feels wobbly, then it resolves.      On ER evaluation she was afebrile 98.7 /67 HR 83 RR 16 SpO2 97% room air.  Labs with WBC 3.8, hemoglobin 11.1, normal platelets.  Normal renal function and electrolytes.  Troponin negative.  CT head without contrast with no acute intracranial malady.  Chest x-ray with no acute process.  ER MD order MRI stat in the ER but unable to obtain today.  Decision to admit for MRI, which was negative for any acute abnormality. Neurology consulted.     Subjective:  Patient is being followed for Stroke-like symptoms [R29.90]  Stroke-like symptom [R29.90]   Patient seen and examined.   No new complaints.   Doing well.     ROS: denies fever, chills, cp, sob, n/v, HA unless stated 
  SPEECH/LANGUAGE PATHOLOGY  CLINICAL ASSESSMENT OF SWALLOWING FUNCTION   and PLAN OF CARE      PATIENT NAME:  Flores Gaines  (female)     MRN:  [id number]    :  1935  (89 y.o.)  STATUS:  Inpatient: Room 8502/8502-A    TODAY'S DATE:  2024  ORDER DATE, DESCRIPTION AND REFERRING PROVIDER: 24    Speech Language Pathology (SLP) eval and treat  Start:  24,   End:  24,   ONE TIME,   Standing Count:  1 Occurrences,   R       Milanes Marino, Maria, MD  REASON FOR REFERRAL: stroke/TIA   EVALUATING THERAPIST: Emily Ge, SLP                 RESULTS:    DYSPHAGIA DIAGNOSIS:   Clinical indicators of minimal pharyngeal phase dysphagia       DIET RECOMMENDATIONS:  Regular consistency solids (IDDSI level 7) with  thin liquids (IDDSI level 0),     MEDICATION ADMINISTRATION, and Per patient choice/nursing judgement     FEEDING RECOMMENDATIONS:     Assistance level:  Set-up is required for all oral intake      Compensatory strategies recommended: Thorough oral care to prevent colonization of oral bacteria   Slow rate of intake   SINGLE cup sips  SINGLE straw sips   SMALL bites      Discussed recommendations with:  patient nurse in person    SPEECH THERAPY  PLAN OF CARE   The dysphagia POC is established based on physician order, dysphagia diagnosis and results of clinical assessment     Skilled SLP intervention for dysphagia management on acute care up to 5 x per week until goals met, pt plateaus in function and/or discharged from hospital    Conditions Requiring Skilled Therapeutic Intervention for dysphagia:    Patient is performing below functional baseline d/t  current acute condition, respiratory compromise, multiple medications, and/or increased dependency upon caregivers.    Specific dysphagia interventions to include:     compensatory swallowing strategies to improve airway protection and swallow function.  PO trials of upgraded diet textures with SLP only to determine the 
4 Eyes Skin Assessment     NAME:  Flores Gaines  YOB: 1935  MEDICAL RECORD NUMBER:  [id number]    The patient is being assessed for  Admission    I agree that at least one RN has performed a thorough Head to Toe Skin Assessment on the patient. ALL assessment sites listed below have been assessed.      Areas assessed by both nurses:    Head, Face, Ears, Shoulders, Back, Chest, Arms, Elbows, Hands, Sacrum. Buttock, Coccyx, Ischium, Legs. Feet and Heels, and Under Medical Devices         Does the Patient have a Wound? No noted wound(s)       Charanjit Prevention initiated by RN: No  Wound Care Orders initiated by RN: No    Pressure Injury (Stage 3,4, Unstageable, DTI, NWPT, and Complex wounds) if present, place Wound referral order by RN under : No    New Ostomies, if present place, Ostomy referral order under : No     Nurse 1 eSignature: Electronically signed by Asya Lundberg RN on 11/18/24 at 2:54 AM EST    **SHARE this note so that the co-signing nurse can place an eSignature**    Nurse 2 eSignature: Electronically signed by Tiana Rodney RN on 11/18/24 at 2:55 AM EST   
Dr Mandel notified of neuro consult via perfStayTuned.  Pt added to census  
Okay to discharge per Dr. Mandel.  
Per Dr. Mandel, pt needs to follow up outpatient with Vascular.     Per Dr. Mandel after viewing imaging, no need to see vascular outpatient.    Per Dr. 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  
Physical Therapy  Physical Therapy Initial Assessment     Name: Flores Gaines  : 1935  MRN: [id number]      Date of Service: 2024    Evaluating PT:  Zahra Rogers, PT, DPT, [id number]    Room #:  8502/8502-A  Diagnosis:  Stroke-like symptoms [R29.90]  Stroke-like symptom [R29.90]  PMHx/PSHx:    Past Medical History:   Diagnosis Date    Abnormal weight loss     Anxiety     Anxiety disorder     Cerebral infarction, unspecified (HCC)     Essential (primary) hypertension     Gastric reflux     Hyperlipidemia     Hypertension     Occlusion and stenosis of right carotid artery     Slurred speech       Past Surgical History:   Procedure Laterality Date    CAROTID ENDARTERECTOMY Right 2020    RIGHT CAROTID ENDARTERECTOMY performed by Bobby Fitzpatrick MD at [facility] OR    COLONOSCOPY      HYSTERECTOMY (CERVIX STATUS UNKNOWN)      LITHOTRIPSY Left 2020    CYSTOSCOPY RETROGRADE PYELOGRAM LEFT URETEROSCOPY LEFT J STENT LASER LITHOTRIPSY performed by Fernando Mendez DO at [facility] OR      Procedure/Surgery:  none this admission   Precautions:  Fall Risk, mild dysarthria, + Alarms, monitor BP  Equipment Needs:  TBD    SUBJECTIVE:    Pt lives alone in a 3rd floor apartment with level entry and elevator access. Pt ambulated with no AD PTA.    OBJECTIVE:   Initial Evaluation  Date: 24 Treatment Short Term/ Long Term   Goals   AM-PAC 6 Clicks      Was pt agreeable to Eval/treatment? yes     Does pt have pain? Abdominal cramping     Bed Mobility  Rolling: NT  Supine to sit: SBA  Sit to supine: SBA  Scooting: SBA  Rolling: Independent   Supine to sit: Independent   Sit to supine: Independent   Scooting: Independent    Transfers Sit to stand: SBA  Stand to sit: SBA  Stand pivot: CGA no AD  Sit to stand: Independent   Stand to sit: Independent   Stand pivot: mod Independent with AAD prn   Ambulation    25'x2 with no AD CGA  400+ feet with AAD prn mod Independent    Stair negotiation: ascended and descended  NT  
SPEECH/LANGUAGE PATHOLOGY  SPEECH/LANGUAGE/COGNITIVE EVALUATION   and PLAN OF CARE      PATIENT NAME:  Flores Gaines  (female)     MRN:  [id number]    :  1935  (89 y.o.)  STATUS:  Inpatient: Room 8502/8502-A    TODAY'S DATE:  2024  ORDER DATE, DESCRIPTION AND REFERRING PROVIDER : 24    Speech Language Pathology (SLP) eval and treat  Start:  24,   End:  24,   ONE TIME,   Standing Count:  1 Occurrences,   R       Milanes Marino, Maria, MD  REASON FOR REFERRAL:  stroke/TIA  EVALUATING THERAPIST: BRYANT Jack    ADMITTING DIAGNOSIS: Stroke-like symptoms [R29.90]  Stroke-like symptom [R29.90]    VISIT DIAGNOSIS:   Visit Diagnoses         Codes    Stroke-like symptoms    -  Primary R29.90               SPEECH THERAPY  PLAN OF CARE   The speech therapy  POC is established based on physician order, speech pathology diagnosis and results of clinical assessment     SPEECH PATHOLOGY DIAGNOSIS:    Mild dysarthria    Speech Pathology intervention is recommended up to 6 times per week for LOS or when goals are met with emphasis on the following:      Conditions Requiring Skilled Therapeutic Intervention for speech, language and/or cognition    Dysarthria     Specific Speech Therapy Interventions to Include:   Therapeutic exercises for dysarthria    Specific instructions for next treatment:     To initiate POC    SHORT/LONG TERM GOALS  Pt will improve speech intelligibility and increased articulatory precision via compensatory strategies and oral motor exercises     Patient goals: Patient/family involved in developing goals and treatment plan:   Treatment goals discussed with Patient    The Patient understand(s) the diagnosis, prognosis and plan of care   The patient/family Agreed with above,     This plan may be re-evaluated and revised as warranted.        Rehabilitation Potential/Prognosis: good                CLINICAL ASSESSMENT:  MOTOR SPEECH       Oral Peripheral 
With no AD  to and from the restroom   Mod I     Balance Sitting:    Static: Supervision     Dynamic: Stand by assist    Standing: Min A    Sitting:    Static: Mod I     Dynamic: Mod I    Standing: Mod I     Activity Tolerance Fair  fatigue  and rest breaks required  Good     Visual/  Perceptual Glasses: does not wear glasses     perception: WFL     pt able to read clock on wall, difficulty with small fonts, near    visual tracking appears WFL        Safety Fair  Good during ADL completion   Vitals HR and SPO2 stable throughout session         Hand Dominance right    AROM (PROM) Strength /FMC Goal: (PRN)   RUE   WFL    grossly 4+/5   good  and wfl FMC/dexterity noted during ADL tasks   Improve overall RUE strength to 5/5 for participation in functional tasks       LUE WFL    grossly 4+/5   good  and wfl FMC/dexterity noted during ADL tasks   Improve overall LUE strength to 5/5 for participation in functional tasks         Fine Motor Coordination:  finger to nose assessment appears WFL, opposition wfl   Hearing: WFL   Sensation:  no c/o numbness or tingling , light touch equal bilaterally  Tone:  WNL    Edema: unremarkable      Comment: RN cleared patient for OT.  Upon arrival, patient was supine in bed and agreeable to OT session. no visitors present throughout session . At end of session, patient sitting in chair with arms  and RN aware ; with call light and phone within reach; all lines and tubes intact. Extended time and rest breaks required for task.   Patient presents with decreased safety awareness, activity tolerance , balance , and coordination. Pt demonstrated decreased independence during ADLs, bed mobility , functional transfers, and functional mobility. Pt would benefit from continued skilled OT to increase safety and independence with completion of ADL tasks and functional mobility for improved quality of life and return to PLOF.       Treatment: OT treatment provided this date includes:

## 2024-11-18 NOTE — DISCHARGE SUMMARY
been called?->No Decision Support Exception - unselect if not a suspected or confirmed emergency medical condition->Emergency Medical Condition (MA) What reading provider will be dictating this exam?->CRC FINDINGS: BRAIN/VENTRICLES: There is no acute intracranial hemorrhage, mass effect or midline shift.  No abnormal extra-axial fluid collection.  The gray-white differentiation is maintained without evidence of an acute infarct.  There is no evidence of hydrocephalus. ORBITS: The visualized portion of the orbits demonstrate no acute abnormality. SINUSES: The visualized paranasal sinuses and mastoid air cells demonstrate no acute abnormality. SOFT TISSUES/SKULL:  No acute abnormality of the visualized skull or soft tissues.     No acute intracranial abnormality.     XR CHEST (2 VW)    Result Date: 11/17/2024  EXAMINATION: TWO XRAY VIEWS OF THE CHEST 11/17/2024 9:34 am COMPARISON: 10/24/2024 HISTORY: ORDERING SYSTEM PROVIDED HISTORY: stroke TECHNOLOGIST PROVIDED HISTORY: Reason for exam:->stroke FINDINGS: The lungs are without acute focal process.  There is no effusion or pneumothorax. The cardiomediastinal silhouette is without acute process. The osseous structures are without acute process.     No acute process.       Patient Instructions:      Medication List        CONTINUE taking these medications      acetaminophen 500 MG tablet  Commonly known as: TYLENOL  Take 1 tablet by mouth 4 times daily as needed for Pain     Aspirin Low Dose 81 MG chewable tablet  Generic drug: aspirin  CHEW 1 TABLET BY MOUTH ONCE DAILY     atorvastatin 40 MG tablet  Commonly known as: LIPITOR  TAKE ONE TABLET BY MOUTH EVERY DAY AT NIGHT     diazePAM 2 MG tablet  Commonly known as: Valium  Take 1 tablet by mouth nightly as needed for Anxiety for up to 60 days. Max Daily Amount: 2 mg            STOP taking these medications      azelastine 0.1 % nasal spray  Commonly known as: ASTELIN     pantoprazole 40 MG tablet  Commonly known as:

## 2024-11-18 NOTE — ED PROVIDER NOTES
[facility] EMERGENCY DEPARTMENT  EMERGENCY DEPARTMENT ENCOUNTER        Pt Name: Flores Gaines  MRN: [id number]  Birthdate 1935  Date of evaluation: 11/17/2024  Provider: Leticia Demarco DO  PCP: Abena Easton MD  Note Started: 8:45 PM EST 11/17/24    CHIEF COMPLAINT       Chief Complaint   Patient presents with    Extremity Weakness     Pt stated started having generalized weakness and pins and needles feeling in her hands that started this AM. Currently having only complaints of weakness at triage.        HISTORY OF PRESENT ILLNESS: 1 or more Elements       Flores Gaines is a 89 y.o. female who presents to the emergency department the chief complaint of weakness.  The patient states that she went to bed last at 2 AM.  The patient states that she woke up just an hour prior to arrival in the emergency department with weakness in her left upper extremity.  She states this is weakness and numbness.  She states that she now feels only mild numbness in her hand as well as persistent paresthesias.  Symptoms are mild in severity.  Nothing spine.  Nothing supports.  The patient with no treatment prior to arrival.  She states this does feel similar to when she needed a carotid endarterectomy approximately 4 years ago.  She denies any particular fevers, chills, chest pain, nausea or vomiting.    Nursing Notes were all reviewed and agreed with or any disagreements were addressed in the HPI.    REVIEW OF SYSTEMS :      Review of Systems   Constitutional:  Negative for chills and fatigue.   HENT:  Negative for congestion.    Eyes:  Negative for redness.   Respiratory:  Negative for shortness of breath.    Cardiovascular:  Negative for chest pain.   Gastrointestinal:  Negative for abdominal pain, nausea and vomiting.   Genitourinary:  Negative for dysuria.   Musculoskeletal:  Negative for arthralgias.   Skin:  Negative for rash.   Neurological:  Positive for weakness (currently

## 2024-11-18 NOTE — CARE COORDINATION
Pt lives at home in a apt on the 3rd floor with a elevator. She is retired. Pt has 4 grown children all local and work. Pt is not a , no c pta and no dme. Pt is not diabetic she said pta. Her pcp is Dr. Ramey and saw her about 4 months ago. Pt said she is independent with all adl's but no longer drives x 2 years. Her plan is home via one of her children. PT and OT to [facility].  Speech saw  pt and recommends on going therapy for  Mild dysarthria. MRI of the brain showed: No acute intracranial abnormality. No acute infarct. MELISSA Reddy  11/18/2024    Case Management Assessment  Initial Evaluation    Date/Time of Evaluation: 11/18/2024 12:00 PM  Assessment Completed by: MELISSA Reddy    If patient is discharged prior to next notation, then this note serves as note for discharge by case management.    Patient Name: Flores Gaines                   YOB: 1935  Diagnosis: Stroke-like symptoms [R29.90]  Stroke-like symptom [R29.90]                   Date / Time: 11/17/2024  7:54 AM    Patient Admission Status: Observation   Readmission Risk (Low < 19, Mod (19-27), High > 27): Readmission Risk Score: 12.7    Current PCP: Abena Easton MD  PCP verified by CM? Yes    Chart Reviewed: Yes      History Provided by: Patient  Patient Orientation: Alert and Oriented    Patient Cognition: Alert    Hospitalization in the last 30 days (Readmission):  Yes    If yes, Readmission Assessment in CM Navigator will be completed.    Advance Directives:      Code Status: Full Code   Patient's Primary Decision Maker is: Named in Scanned ACP Document    Primary Decision Maker: Hiram Levine - Child - [phone]    Secondary Decision Maker: Padmini Blackburn - Child - [phone]    Secondary Decision Maker: Miguel Blackburn - Child - [phone]    Discharge Planning:    Patient lives with: Alone Type of Home: Apartment  Primary Care Giver: Self  Patient Support Systems include: Family Members   Current Financial

## 2024-11-18 NOTE — PROCEDURES
PROCEDURE NOTE  Date: 11/17/2024   Name: Flroes Gaines  YOB: 1935    Procedures    Patient needs 20G diffusics IV charted for CTA of the head and neck

## 2024-11-18 NOTE — CONSULTS
leaflets. Mild leaflet prolapse noted.  No significant MR.     CT CERVICAL SPINE WO CONTRAST    Result Date: 10/24/2024  EXAMINATION: CT OF THE CERVICAL SPINE WITHOUT CONTRAST 10/24/2024 9:41 pm TECHNIQUE: CT of the cervical spine was performed without the administration of intravenous contrast. Multiplanar reformatted images are provided for review. Automated exposure control, iterative reconstruction, and/or weight based adjustment of the mA/kV was utilized to reduce the radiation dose to as low as reasonably achievable. COMPARISON: None. HISTORY: ORDERING SYSTEM PROVIDED HISTORY: fall TECHNOLOGIST PROVIDED HISTORY: Reason for exam:->fall Decision Support Exception - unselect if not a suspected or confirmed emergency medical condition->Emergency Medical Condition (MA) What reading provider will be dictating this exam?->CRC FINDINGS: BONES/ALIGNMENT: There is no acute fracture or traumatic malalignment. DEGENERATIVE CHANGES: C5 through C7 degenerative changes. SOFT TISSUES: There is no prevertebral soft tissue swelling.     1. No acute fracture or traumatic malalignment of the cervical spine. 2. C5 through C7 degenerative changes.     CT HEAD WO CONTRAST    Result Date: 10/24/2024  EXAMINATION: CT OF THE HEAD WITHOUT CONTRAST  10/24/2024 9:41 pm TECHNIQUE: CT of the head was performed without the administration of intravenous contrast. Automated exposure control, iterative reconstruction, and/or weight based adjustment of the mA/kV was utilized to reduce the radiation dose to as low as reasonably achievable. COMPARISON: 05/05/2024 HISTORY: ORDERING SYSTEM PROVIDED HISTORY: head injury syncope TECHNOLOGIST PROVIDED HISTORY: Has a \"code stroke\" or \"stroke alert\" been called?->No Reason for exam:->head injury syncope Decision Support Exception - unselect if not a suspected or confirmed emergency medical condition->Emergency Medical Condition (MA) What reading provider will be dictating this exam?->CRC FINDINGS:

## 2024-11-18 NOTE — CARE COORDINATION
SOCIAL WORK/CASEMANAGEMENT TRANSITION OF CARE PLANNING( CHEYENNE DONN, -[phone]):  there is a order in [facility] for discharge if ok with neurology who has to see pt yet. I went over PT and OT and speech evals with pt who is not open to [facility] and wants to go home. She is open to [facility] and has no preference to a agency when I offered it. I made a referral to [facility] who will assess pt. I asked dr. Jaquez to write an order for nsg, PT, OT and speech for Mild dysarthria with dx.  I left a note for the rn regarding this request and referral. Cheyenne Saleem, MELISSA  11/18/2024

## 2024-11-19 ENCOUNTER — CARE COORDINATION (OUTPATIENT)
Dept: CARE COORDINATION | Age: 89
End: 2024-11-19

## 2024-11-19 DIAGNOSIS — R29.90 STROKE-LIKE SYMPTOMS: Primary | ICD-10-CM

## 2024-11-19 LAB
EKG ATRIAL RATE: 75 BPM
EKG P AXIS: 79 DEGREES
EKG P-R INTERVAL: 198 MS
EKG Q-T INTERVAL: 402 MS
EKG QRS DURATION: 90 MS
EKG QTC CALCULATION (BAZETT): 448 MS
EKG R AXIS: -32 DEGREES
EKG T AXIS: 71 DEGREES
EKG VENTRICULAR RATE: 75 BPM

## 2024-11-19 PROCEDURE — 93010 ELECTROCARDIOGRAM REPORT: CPT | Performed by: INTERNAL MEDICINE

## 2024-11-19 PROCEDURE — 1111F DSCHRG MED/CURRENT MED MERGE: CPT | Performed by: INTERNAL MEDICINE

## 2024-11-19 NOTE — CARE COORDINATION
-CTN phoned [facility] and spoke to ROSS Capellan tomorrow.  
her sleep.  -Son provides transportation to any appointments.  -Patient denies any needs at this time and is agreeable to continued follow up from care transitions.     Care Transition Nurse reviewed discharge instructions with patient. The patient was given an opportunity to ask questions; all questions answered at this time.  The patient verbalized understanding and needs reinforcement of information discussed.   Were discharge instructions available to patient? Yes.   Reviewed appropriate site of care based on symptoms and resources available to patient including: PCP  Specialist  Home health. The patient agrees to contact the primary care provider and/or specialist office for questions related to their healthcare.      Advance Care Planning:   Does patient have an Advance Directive: health care decision maker confirmed.    Medication Reconciliation:  Medication reconciliation was performed with patient,1111F entered: yes.     Remote Patient Monitoring:  Offered patient enrollment in the Remote Patient Monitoring (RPM) program for in-home monitoring: No:  patient declined on 2/27/24 and 11/7/24 as per EMR.    Assessments:  Care Transitions 24 Hour Call    Schedule Follow Up Appointment with PCP: Declined  Do you have a copy of your discharge instructions?: Yes  Do you have all of your prescriptions and are they filled?: Yes  Have you been contacted by a [facility] Pharmacist?: No  Have you scheduled your follow up appointment?: No  Do you have support at home?: Alone  Do you feel like you have everything you need to keep you well at home?: Yes  Are you an active caregiver in your home?: No  Care Transitions Interventions     Other Services: Declined (Comment: declined RPM 2/27/24 and 11/7/24.)             Follow Up Appointment:   Patient does not have a follow up appointment scheduled at time of call.  CTN explained to patient recommendation to have contact with PCP 1-2 weeks post hospital discharge.   Declined to

## 2024-11-19 NOTE — CARE COORDINATION
-CTN did not receive hospital ADT alert for observation readmission 11/17/24 through 11/18/24 at [facility].  Noted in EMR patient discharged home with C yesterday.  -CTN will close current care transition program and open a new program.

## 2024-11-20 ENCOUNTER — TELEPHONE (OUTPATIENT)
Dept: ADMINISTRATIVE | Age: 89
End: 2024-11-20

## 2024-11-20 DIAGNOSIS — R55 SYNCOPE, UNSPECIFIED SYNCOPE TYPE: Primary | ICD-10-CM

## 2024-11-25 ENCOUNTER — NURSE ONLY (OUTPATIENT)
Dept: CARDIOLOGY CLINIC | Age: 89
End: 2024-11-25

## 2024-11-25 NOTE — PROGRESS NOTES
Patient was seen today for a heart monitor placement ordered by Dr. chan    Monitor type: zio xt   Duration: 14 days  Serial number: [id number]    The monitor was applied and instructions were given.    Daya Esparza MA

## 2024-11-27 ENCOUNTER — CARE COORDINATION (OUTPATIENT)
Dept: CARE COORDINATION | Age: 89
End: 2024-11-27

## 2024-11-27 NOTE — CARE COORDINATION
Care Transitions Note    Follow Up Call     Patient Current Location:  Home: [address]  [place] [address]    Care Transition Nurse contacted the patient by telephone.     Additional needs identified to be addressed with provider   No needs identified                 Method of communication with provider: none.    Care Summary Note:   -Patient denies any return of left hand tingling.  Patient tends to go onto other subjects that requires redirection some CTN.  -States [facility] visited yesterday and her B/P was stable.  Patient states she checks a blood pressure/weight and logs.  Weight today 120 pounds.   -Patient denies any needs at this time and is agreeable to continued follow up from care transitions.        Plan of care updates since last contact:  Education: HTN/CHF  Home Health: active with [facility]   NS appointment scheduled .  Wearing 14 day Zio XT as ordered by cardiology.       Advance Care Planning:   Does patient have an Advance Directive: reviewed during previous call, see note.     Medication Review:  Full medication reconciliation completed during previous call.  No changes since last call.     Remote Patient Monitoring:  Offered patient enrollment in the Remote Patient Monitoring (RPM) program for in-home monitoring: Addressed on a prior call, see notes.    Assessments:  Care Transitions Subsequent and Final Call    Subsequent and Final Calls  Do you have any ongoing symptoms?: No  Have your medications changed?: No  Do you have any questions related to your medications?: No  Do you currently have any active services?: Yes  Are you currently active with any services?: Home Health  Do you have any needs or concerns that I can assist you with?: No  Identified Barriers: None  Care Transitions Interventions     Other Services: Declined (Comment: declined RPM 2/27/24 and 11/7/24.)   Other Interventions:              Follow Up Appointment:   Reviewed upcoming appointment.  CTN routed on 11/19/24

## 2024-12-06 ENCOUNTER — CARE COORDINATION (OUTPATIENT)
Dept: CARE COORDINATION | Age: 89
End: 2024-12-06

## 2024-12-06 NOTE — CARE COORDINATION
Care Transitions Note    Follow Up Call     Outreach Attempts:   HIPAA compliant voicemail left for patient.     Care Summary Note:     First attempt made to contact patient for subsequent Care Transition call. CTN left HIPAA compliant message requesting return call.      Follow Up Appointment:   Future Appointments         Provider Specialty Dept Phone    1/3/2025 12:30 PM Dieudonne Du MD Neurology [phone]            Plan for follow-up call in 6-10 days based on severity of symptoms and risk factors. Plan for next call:   symptom check  self management-B/P, weight      Luz Hurst LPN

## 2024-12-11 ENCOUNTER — TELEPHONE (OUTPATIENT)
Dept: CARDIOLOGY CLINIC | Age: 89
End: 2024-12-11

## 2024-12-11 NOTE — TELEPHONE ENCOUNTER
I Rhythm Alert Patient had two episodes of asymptomatic AV block on 11/28/24  high grade AV block 26-28 BPM lasted for 8-9 sec. Then on 11/29/24 a episode of asymptomatic AV block 37 BPM lasted for 1.6 sec.

## 2024-12-12 ENCOUNTER — TELEPHONE (OUTPATIENT)
Dept: CARDIOLOGY CLINIC | Age: 89
End: 2024-12-12

## 2024-12-12 DIAGNOSIS — R55 SYNCOPE, UNSPECIFIED SYNCOPE TYPE: ICD-10-CM

## 2024-12-12 DIAGNOSIS — R00.1 BRADYCARDIA: Primary | ICD-10-CM

## 2024-12-12 DIAGNOSIS — R55 PRE-SYNCOPE: ICD-10-CM

## 2024-12-12 NOTE — TELEPHONE ENCOUNTER
Patient notified of monitor results per Dr. Wynn. Referral placed to EP per Dr. Wynn. Patient notified to follow up as scheduled and go to the ER if dizziness, lightheadedness or near syncope occurs per Dr. Wynn's recommendations.

## 2024-12-12 NOTE — TELEPHONE ENCOUNTER
----- Message from Dr. Leonid Wynn MD sent at 12/11/2024  7:09 PM EST -----  Monitor showed normal baseline rhythm average heart rate in the 80s.  There were few episodes of low heart rates, and given her history of syncope she may benefit from a pacemaker.  Please refer to EP.  ER if any dizziness lightheadedness or near syncope.  Follow-up as scheduled.

## 2024-12-13 ENCOUNTER — TELEPHONE (OUTPATIENT)
Dept: PRIMARY CARE CLINIC | Age: 89
End: 2024-12-13

## 2024-12-13 NOTE — TELEPHONE ENCOUNTER
Flores is asking for an antibiotic for a head cold. She refused an appointment and asked that I send a message. She also wanted to notify Dr. Easton she has been letting her finger nails grow out.

## 2024-12-18 ENCOUNTER — CARE COORDINATION (OUTPATIENT)
Dept: CARE COORDINATION | Age: 89
End: 2024-12-18

## 2024-12-18 NOTE — CARE COORDINATION
-CTN phoned [facility] and spoke to Radha who confirmed referral in EMR.  Radha states patient will receive a call either this week or next week to get scheduled.

## 2024-12-18 NOTE — CARE COORDINATION
Care Transitions Note    Final Call     Patient Current Location:  Home: [address]  [place] [address]    Care Transition Nurse contacted the patient by telephone.     Patient graduated from the Care Transitions program on 12/18/24.  Patient/family progressing towards self management.  Reinforced resources provided during this care transition period..      Advance Care Planning:   Does patient have an Advance Directive: reviewed during previous call, see note.     Handoff:   Patient was not referred to the ACM team due to no additional needs identified.       Care Summary Note:   -Patient reports [facility] nurse visited earlier today.  SBP at visit 112.  Patient states her weight range has been 120-123 pounds.  -Patient states she wore her Zio XT for a week and then it broke.  Patient is under the impression that she may need a pacemaker which CTN confirmed based on cardiology documentation in EMR.  Patient states she has not heard from [facility] (referral placed on 12/12/24)-CTN to call.  CTN reviewed with patient as per cardiology-\"ER if any dizziness lightheadedness or near syncope.\"  Patient admits to one episode of \"woozy around eyes\" which occurred yesterday and quickly resolved on its own.  -Patient states cold symptoms reported to PCP office on 12/13/24 are gone.  -Patient denies any further needs at this time.   -CTN to sign off as care transition program ends tomorrow.          Assessments:  Care Transitions 24 Hour Call    Do you have any ongoing symptoms?: No  Do you have all of your prescriptions and are they filled?: Yes  Were you discharged with any Home Care or Post Acute Services: Yes  Post Acute Services: Home Health (Comment: 11/27/24-active with [facility])  Do you have support at home?: Alone  Are you an active caregiver in your home?: No  Care Transitions Interventions     Other Services: Declined (Comment: declined RPM 2/27/24 and 11/7/24.)             Upcoming Appointments:

## 2024-12-19 ENCOUNTER — CARE COORDINATION (OUTPATIENT)
Dept: CARE COORDINATION | Age: 89
End: 2024-12-19

## 2024-12-19 NOTE — CARE COORDINATION
-CTN received call from patient's phone number from a female who identified herself as patient's neighbor.  VM left requesting return call.  Patient's neighbor stated the patient thought nurse was coming today and she wanted to make sure the connection was correct that the nurse was coming as patient has a  to attend tomorrow.

## 2024-12-19 NOTE — CARE COORDINATION
-CTN returned call and spoke to patient.  CTN explained to patient that care transitions is different from HHC.  CTN provided patient phone number for [facility].  -Patient appreciative of call back.  -Patient denies any further needs at this time.  -CTN to sign off.

## 2024-12-23 ENCOUNTER — TELEPHONE (OUTPATIENT)
Dept: NON INVASIVE DIAGNOSTICS | Age: 89
End: 2024-12-23

## 2024-12-23 NOTE — TELEPHONE ENCOUNTER
The patient called to get scheduled for an appt with EP. This a new referral request. I advised the patient that she will be contacted probably next month to get scheduled once EP has an opening. I advised the patient until she gets seen by EP, any issues she has need to be discussed with PCP or cardiologist. The patient verbalized understanding.     Electronically signed by Yuliana Esparza MA on 12/23/2024 at 11:32 AM

## 2024-12-28 ENCOUNTER — HOSPITAL ENCOUNTER (EMERGENCY)
Age: 89
Discharge: ANOTHER HOSPITAL | End: 2024-12-28
Attending: EMERGENCY MEDICINE
Payer: MEDICARE

## 2024-12-28 ENCOUNTER — APPOINTMENT (OUTPATIENT)
Dept: GENERAL RADIOLOGY | Age: 89
End: 2024-12-28
Payer: MEDICARE

## 2024-12-28 ENCOUNTER — HOSPITAL ENCOUNTER (INPATIENT)
Age: 89
LOS: 1 days | Discharge: HOME | DRG: 311 | End: 2024-12-29
Attending: SURGERY | Admitting: INTERNAL MEDICINE
Payer: MEDICARE

## 2024-12-28 VITALS
WEIGHT: 123 LBS | SYSTOLIC BLOOD PRESSURE: 148 MMHG | RESPIRATION RATE: 18 BRPM | OXYGEN SATURATION: 97 % | HEIGHT: 62 IN | TEMPERATURE: 98 F | BODY MASS INDEX: 22.63 KG/M2 | HEART RATE: 77 BPM | DIASTOLIC BLOOD PRESSURE: 61 MMHG

## 2024-12-28 DIAGNOSIS — R07.9 CHEST PAIN, UNSPECIFIED TYPE: Primary | ICD-10-CM

## 2024-12-28 PROBLEM — I20.0 UNSTABLE ANGINA (HCC): Status: ACTIVE | Noted: 2024-12-28

## 2024-12-28 LAB
ALBUMIN SERPL-MCNC: 4 G/DL (ref 3.5–5.2)
ALP SERPL-CCNC: 74 U/L (ref 35–104)
ALT SERPL-CCNC: 5 U/L (ref 0–32)
ANION GAP SERPL CALCULATED.3IONS-SCNC: 9 MMOL/L (ref 7–16)
APAP SERPL-MCNC: <5 UG/ML (ref 10–30)
AST SERPL-CCNC: <5 U/L (ref 0–31)
BACTERIA URNS QL MICRO: ABNORMAL
BASOPHILS # BLD: 0.01 K/UL (ref 0–0.2)
BASOPHILS NFR BLD: 0 % (ref 0–2)
BILIRUB SERPL-MCNC: 0.4 MG/DL (ref 0–1.2)
BILIRUB UR QL STRIP: NEGATIVE
BUN SERPL-MCNC: 14 MG/DL (ref 6–23)
CALCIUM SERPL-MCNC: 9.1 MG/DL (ref 8.6–10.2)
CHLORIDE SERPL-SCNC: 107 MMOL/L (ref 98–107)
CLARITY UR: CLEAR
CO2 SERPL-SCNC: 27 MMOL/L (ref 22–29)
COLOR UR: YELLOW
CREAT SERPL-MCNC: 0.7 MG/DL (ref 0.5–1)
D-DIMER QUANTITATIVE: 228 NG/ML DDU (ref 0–230)
EKG ATRIAL RATE: 74 BPM
EKG P AXIS: 92 DEGREES
EKG P-R INTERVAL: 194 MS
EKG Q-T INTERVAL: 400 MS
EKG QRS DURATION: 92 MS
EKG QTC CALCULATION (BAZETT): 444 MS
EKG R AXIS: 2 DEGREES
EKG T AXIS: 83 DEGREES
EKG VENTRICULAR RATE: 74 BPM
EOSINOPHIL # BLD: 0.02 K/UL (ref 0.05–0.5)
EOSINOPHILS RELATIVE PERCENT: 1 % (ref 0–6)
EPI CELLS #/AREA URNS HPF: ABNORMAL /HPF
ERYTHROCYTE [DISTWIDTH] IN BLOOD BY AUTOMATED COUNT: 14 % (ref 11.5–15)
ETHANOLAMINE SERPL-MCNC: <10 MG/DL (ref 0–0.08)
FLUAV RNA RESP QL NAA+PROBE: NOT DETECTED
FLUBV RNA RESP QL NAA+PROBE: NOT DETECTED
GFR, ESTIMATED: 80 ML/MIN/1.73M2
GLUCOSE SERPL-MCNC: 103 MG/DL (ref 74–99)
GLUCOSE UR STRIP-MCNC: NEGATIVE MG/DL
HCT VFR BLD AUTO: 32.2 % (ref 34–48)
HGB BLD-MCNC: 10.7 G/DL (ref 11.5–15.5)
HGB UR QL STRIP.AUTO: NEGATIVE
IMM GRANULOCYTES # BLD AUTO: <0.03 K/UL (ref 0–0.58)
IMM GRANULOCYTES NFR BLD: 0 % (ref 0–5)
KETONES UR STRIP-MCNC: NEGATIVE MG/DL
LEUKOCYTE ESTERASE UR QL STRIP: NEGATIVE
LYMPHOCYTES NFR BLD: 0.89 K/UL (ref 1.5–4)
LYMPHOCYTES RELATIVE PERCENT: 28 % (ref 20–42)
MCH RBC QN AUTO: 34.2 PG (ref 26–35)
MCHC RBC AUTO-ENTMCNC: 33.2 G/DL (ref 32–34.5)
MCV RBC AUTO: 102.9 FL (ref 80–99.9)
MONOCYTES NFR BLD: 0.53 K/UL (ref 0.1–0.95)
MONOCYTES NFR BLD: 17 % (ref 2–12)
NEUTROPHILS NFR BLD: 55 % (ref 43–80)
NEUTS SEG NFR BLD: 1.75 K/UL (ref 1.8–7.3)
NITRITE UR QL STRIP: NEGATIVE
PH UR STRIP: 5.5 [PH] (ref 5–9)
PLATELET # BLD AUTO: 253 K/UL (ref 130–450)
PMV BLD AUTO: 10.7 FL (ref 7–12)
POTASSIUM SERPL-SCNC: 4.4 MMOL/L (ref 3.5–5)
PROT SERPL-MCNC: 6.7 G/DL (ref 6.4–8.3)
PROT UR STRIP-MCNC: NEGATIVE MG/DL
RBC # BLD AUTO: 3.13 M/UL (ref 3.5–5.5)
RBC #/AREA URNS HPF: ABNORMAL /HPF
SALICYLATES SERPL-MCNC: <0.3 MG/DL (ref 0–30)
SARS-COV-2 RNA RESP QL NAA+PROBE: NOT DETECTED
SODIUM SERPL-SCNC: 143 MMOL/L (ref 132–146)
SOURCE: NORMAL
SP GR UR STRIP: >1.03 (ref 1–1.03)
SPECIMEN DESCRIPTION: NORMAL
TOXIC TRICYCLIC SC,BLOOD: NEGATIVE
TROPONIN I SERPL HS-MCNC: 7 NG/L (ref 0–9)
UROBILINOGEN UR STRIP-ACNC: 0.2 EU/DL (ref 0–1)
WBC #/AREA URNS HPF: ABNORMAL /HPF
WBC OTHER # BLD: 3.2 K/UL (ref 4.5–11.5)

## 2024-12-28 PROCEDURE — 71045 X-RAY EXAM CHEST 1 VIEW: CPT

## 2024-12-28 PROCEDURE — G0480 DRUG TEST DEF 1-7 CLASSES: HCPCS

## 2024-12-28 PROCEDURE — 2060000000 HC ICU INTERMEDIATE R&B

## 2024-12-28 PROCEDURE — 84484 ASSAY OF TROPONIN QUANT: CPT

## 2024-12-28 PROCEDURE — 6370000000 HC RX 637 (ALT 250 FOR IP): Performed by: EMERGENCY MEDICINE

## 2024-12-28 PROCEDURE — APPSS45 APP SPLIT SHARED TIME 31-45 MINUTES

## 2024-12-28 PROCEDURE — 99285 EMERGENCY DEPT VISIT HI MDM: CPT

## 2024-12-28 PROCEDURE — 80179 DRUG ASSAY SALICYLATE: CPT

## 2024-12-28 PROCEDURE — 80307 DRUG TEST PRSMV CHEM ANLYZR: CPT

## 2024-12-28 PROCEDURE — 81001 URINALYSIS AUTO W/SCOPE: CPT

## 2024-12-28 PROCEDURE — 87636 SARSCOV2 & INF A&B AMP PRB: CPT

## 2024-12-28 PROCEDURE — 85025 COMPLETE CBC W/AUTO DIFF WBC: CPT

## 2024-12-28 PROCEDURE — 93005 ELECTROCARDIOGRAM TRACING: CPT | Performed by: NURSE PRACTITIONER

## 2024-12-28 PROCEDURE — 85379 FIBRIN DEGRADATION QUANT: CPT

## 2024-12-28 PROCEDURE — 80053 COMPREHEN METABOLIC PANEL: CPT

## 2024-12-28 PROCEDURE — 99223 1ST HOSP IP/OBS HIGH 75: CPT | Performed by: INTERNAL MEDICINE

## 2024-12-28 PROCEDURE — 80143 DRUG ASSAY ACETAMINOPHEN: CPT

## 2024-12-28 RX ORDER — SODIUM CHLORIDE 0.9 % (FLUSH) 0.9 %
5-40 SYRINGE (ML) INJECTION PRN
Status: DISCONTINUED | OUTPATIENT
Start: 2024-12-28 | End: 2024-12-29 | Stop reason: HOSPADM

## 2024-12-28 RX ORDER — ONDANSETRON 2 MG/ML
4 INJECTION INTRAMUSCULAR; INTRAVENOUS EVERY 6 HOURS PRN
Status: DISCONTINUED | OUTPATIENT
Start: 2024-12-28 | End: 2024-12-29 | Stop reason: HOSPADM

## 2024-12-28 RX ORDER — ASPIRIN 81 MG/1
324 TABLET, CHEWABLE ORAL ONCE
Status: DISCONTINUED | OUTPATIENT
Start: 2024-12-28 | End: 2024-12-28

## 2024-12-28 RX ORDER — SODIUM CHLORIDE 0.9 % (FLUSH) 0.9 %
5-40 SYRINGE (ML) INJECTION EVERY 12 HOURS SCHEDULED
Status: DISCONTINUED | OUTPATIENT
Start: 2024-12-28 | End: 2024-12-29 | Stop reason: HOSPADM

## 2024-12-28 RX ORDER — NITROGLYCERIN 0.4 MG/1
0.4 TABLET SUBLINGUAL EVERY 5 MIN PRN
Status: DISCONTINUED | OUTPATIENT
Start: 2024-12-28 | End: 2024-12-28 | Stop reason: HOSPADM

## 2024-12-28 RX ORDER — ACETAMINOPHEN 325 MG/1
650 TABLET ORAL EVERY 6 HOURS PRN
Status: DISCONTINUED | OUTPATIENT
Start: 2024-12-28 | End: 2024-12-29 | Stop reason: HOSPADM

## 2024-12-28 RX ORDER — POLYETHYLENE GLYCOL 3350 17 G/17G
17 POWDER, FOR SOLUTION ORAL DAILY PRN
Status: DISCONTINUED | OUTPATIENT
Start: 2024-12-28 | End: 2024-12-29 | Stop reason: HOSPADM

## 2024-12-28 RX ORDER — ONDANSETRON 4 MG/1
4 TABLET, ORALLY DISINTEGRATING ORAL EVERY 8 HOURS PRN
Status: DISCONTINUED | OUTPATIENT
Start: 2024-12-28 | End: 2024-12-29 | Stop reason: HOSPADM

## 2024-12-28 RX ORDER — ASPIRIN 81 MG/1
81 TABLET, CHEWABLE ORAL DAILY
Status: DISCONTINUED | OUTPATIENT
Start: 2024-12-29 | End: 2024-12-29 | Stop reason: HOSPADM

## 2024-12-28 RX ORDER — ASPIRIN 81 MG/1
243 TABLET, CHEWABLE ORAL ONCE
Status: COMPLETED | OUTPATIENT
Start: 2024-12-28 | End: 2024-12-28

## 2024-12-28 RX ORDER — ENOXAPARIN SODIUM 100 MG/ML
40 INJECTION SUBCUTANEOUS DAILY
Status: DISCONTINUED | OUTPATIENT
Start: 2024-12-29 | End: 2024-12-29 | Stop reason: HOSPADM

## 2024-12-28 RX ORDER — SODIUM CHLORIDE 9 MG/ML
INJECTION, SOLUTION INTRAVENOUS PRN
Status: DISCONTINUED | OUTPATIENT
Start: 2024-12-28 | End: 2024-12-29 | Stop reason: HOSPADM

## 2024-12-28 RX ORDER — ACETAMINOPHEN 650 MG/1
650 SUPPOSITORY RECTAL EVERY 6 HOURS PRN
Status: DISCONTINUED | OUTPATIENT
Start: 2024-12-28 | End: 2024-12-29 | Stop reason: HOSPADM

## 2024-12-28 RX ADMIN — ASPIRIN 81 MG CHEWABLE TABLET 243 MG: 81 TABLET CHEWABLE at 17:48

## 2024-12-28 ASSESSMENT — PAIN - FUNCTIONAL ASSESSMENT
PAIN_FUNCTIONAL_ASSESSMENT: PREVENTS OR INTERFERES WITH ALL ACTIVE AND SOME PASSIVE ACTIVITIES
PAIN_FUNCTIONAL_ASSESSMENT: 0-10

## 2024-12-28 ASSESSMENT — PAIN DESCRIPTION - LOCATION
LOCATION: CHEST
LOCATION: CHEST

## 2024-12-28 ASSESSMENT — PAIN SCALES - GENERAL
PAINLEVEL_OUTOF10: 5
PAINLEVEL_OUTOF10: 9

## 2024-12-28 ASSESSMENT — PAIN DESCRIPTION - DESCRIPTORS: DESCRIPTORS: DISCOMFORT;THROBBING;SHARP

## 2024-12-28 ASSESSMENT — PAIN DESCRIPTION - ORIENTATION
ORIENTATION: LEFT
ORIENTATION: LEFT

## 2024-12-28 NOTE — ED PROVIDER NOTES
[facility] EMERGENCY DEPARTMENT  EMERGENCY DEPARTMENT ENCOUNTER        Pt Name: Flores Gaines  MRN: [id number]  Birthdate 1935  Date of evaluation: 12/28/2024  Provider: Macarena Lagunas DO  PCP: Abena Easton MD  Note Started: 5:32 PM EST 12/28/24    CHIEF COMPLAINT       Chief Complaint   Patient presents with    Chest Pain     Chest pain for a week with cough        HISTORY OF PRESENT ILLNESS: 1 or more Elements   History From: patient and daughter    Limitations to history : None    Flores Gaines is a 89 y.o. female who presents with substernal chest pressure that has been intermittent for about 2 weeks.  It is worse with exertion, anxiety or being upset.  She describes the pain as an ache or a squeeze, it is substernal, nonradiating and associated with shortness of breath but not diaphoresis.  She describes a mild cough but nonproductive and no fever, chills, body aches, sore throat or runny nose.  She advises these episodes happen at least daily, last several minutes to hours and then self resolve.  She reports sometimes it goes away with Tylenol.  She is currently pain-free.  She had an admission a month and a half ago for stroke workup which included an echo and Holter monitor.  She reports last week she did have an episode where she passed out but no other episodes since. The complaint has been intermittent, moderate in severity, and worsened by nothing.  Patient denies fever/chills, sore throat, cough, congestion,  edema, headache, visual disturbances, focal paresthesias, focal weakness, abdominal pain, nausea, vomiting, diarrhea, constipation, dysuria, hematuria, trauma, neck or back pain, rash or other complaints.      Nursing Notes were all reviewed and agreed with or any disagreements were addressed in the HPI.    REVIEW OF SYSTEMS :           Positives and Pertinent negatives as per HPI.     SURGICAL HISTORY     Past Surgical History:   Procedure Laterality

## 2024-12-29 VITALS
RESPIRATION RATE: 18 BRPM | HEIGHT: 62 IN | SYSTOLIC BLOOD PRESSURE: 112 MMHG | DIASTOLIC BLOOD PRESSURE: 53 MMHG | HEART RATE: 75 BPM | TEMPERATURE: 97.5 F | OXYGEN SATURATION: 96 % | WEIGHT: 136.69 LBS | BODY MASS INDEX: 25.15 KG/M2

## 2024-12-29 PROBLEM — D64.9 ANEMIA: Status: ACTIVE | Noted: 2024-12-29

## 2024-12-29 PROBLEM — R07.89 OTHER CHEST PAIN: Status: ACTIVE | Noted: 2024-12-28

## 2024-12-29 PROBLEM — E78.5 HYPERLIPIDEMIA: Status: ACTIVE | Noted: 2024-12-29

## 2024-12-29 PROBLEM — D72.819 LEUKOPENIA: Status: ACTIVE | Noted: 2024-12-29

## 2024-12-29 PROBLEM — D53.9 MACROCYTIC ANEMIA: Status: ACTIVE | Noted: 2024-12-29

## 2024-12-29 LAB
ALBUMIN SERPL-MCNC: 3.4 G/DL (ref 3.5–5.2)
ALP SERPL-CCNC: 60 U/L (ref 35–104)
ALT SERPL-CCNC: 6 U/L (ref 0–32)
ANION GAP SERPL CALCULATED.3IONS-SCNC: 8 MMOL/L (ref 7–16)
AST SERPL-CCNC: 15 U/L (ref 0–31)
B PARAP IS1001 DNA NPH QL NAA+NON-PROBE: NOT DETECTED
B PERT DNA SPEC QL NAA+PROBE: NOT DETECTED
BASOPHILS # BLD: 0.01 K/UL (ref 0–0.2)
BASOPHILS NFR BLD: 0 % (ref 0–2)
BILIRUB SERPL-MCNC: 0.5 MG/DL (ref 0–1.2)
BUN SERPL-MCNC: 10 MG/DL (ref 6–23)
C PNEUM DNA NPH QL NAA+NON-PROBE: NOT DETECTED
CALCIUM SERPL-MCNC: 8.1 MG/DL (ref 8.6–10.2)
CHLORIDE SERPL-SCNC: 109 MMOL/L (ref 98–107)
CO2 SERPL-SCNC: 24 MMOL/L (ref 22–29)
CREAT SERPL-MCNC: 0.6 MG/DL (ref 0.5–1)
EOSINOPHIL # BLD: 0.02 K/UL (ref 0.05–0.5)
EOSINOPHILS RELATIVE PERCENT: 1 % (ref 0–6)
ERYTHROCYTE [DISTWIDTH] IN BLOOD BY AUTOMATED COUNT: 13.9 % (ref 11.5–15)
FERRITIN SERPL-MCNC: 50 NG/ML
FLUAV RNA NPH QL NAA+NON-PROBE: NOT DETECTED
FLUBV RNA NPH QL NAA+NON-PROBE: NOT DETECTED
FOLATE SERPL-MCNC: 12.5 NG/ML (ref 4.8–24.2)
GFR, ESTIMATED: 88 ML/MIN/1.73M2
GLUCOSE SERPL-MCNC: 96 MG/DL (ref 74–99)
HADV DNA NPH QL NAA+NON-PROBE: NOT DETECTED
HCOV 229E RNA NPH QL NAA+NON-PROBE: NOT DETECTED
HCOV HKU1 RNA NPH QL NAA+NON-PROBE: NOT DETECTED
HCOV NL63 RNA NPH QL NAA+NON-PROBE: NOT DETECTED
HCOV OC43 RNA NPH QL NAA+NON-PROBE: NOT DETECTED
HCT VFR BLD AUTO: 29 % (ref 34–48)
HGB BLD-MCNC: 9.3 G/DL (ref 11.5–15.5)
HMPV RNA NPH QL NAA+NON-PROBE: NOT DETECTED
HPIV1 RNA NPH QL NAA+NON-PROBE: NOT DETECTED
HPIV2 RNA NPH QL NAA+NON-PROBE: NOT DETECTED
HPIV3 RNA NPH QL NAA+NON-PROBE: NOT DETECTED
HPIV4 RNA NPH QL NAA+NON-PROBE: NOT DETECTED
IMM GRANULOCYTES # BLD AUTO: <0.03 K/UL (ref 0–0.58)
IMM GRANULOCYTES NFR BLD: 0 % (ref 0–5)
IRON SATN MFR SERPL: 51 % (ref 15–50)
IRON SERPL-MCNC: 114 UG/DL (ref 37–145)
LACTATE BLDV-SCNC: 0.6 MMOL/L (ref 0.5–2.2)
LYMPHOCYTES NFR BLD: 0.93 K/UL (ref 1.5–4)
LYMPHOCYTES RELATIVE PERCENT: 29 % (ref 20–42)
M PNEUMO DNA NPH QL NAA+NON-PROBE: NOT DETECTED
MCH RBC QN AUTO: 33.8 PG (ref 26–35)
MCHC RBC AUTO-ENTMCNC: 32.1 G/DL (ref 32–34.5)
MCV RBC AUTO: 105.5 FL (ref 80–99.9)
MONOCYTES NFR BLD: 0.58 K/UL (ref 0.1–0.95)
MONOCYTES NFR BLD: 18 % (ref 2–12)
NEUTROPHILS NFR BLD: 51 % (ref 43–80)
NEUTS SEG NFR BLD: 1.63 K/UL (ref 1.8–7.3)
PHOSPHATE SERPL-MCNC: 3.2 MG/DL (ref 2.5–4.5)
PLATELET # BLD AUTO: 224 K/UL (ref 130–450)
PMV BLD AUTO: 10.6 FL (ref 7–12)
POTASSIUM SERPL-SCNC: 4.3 MMOL/L (ref 3.5–5)
PROT SERPL-MCNC: 5.8 G/DL (ref 6.4–8.3)
RBC # BLD AUTO: 2.75 M/UL (ref 3.5–5.5)
RSV RNA NPH QL NAA+NON-PROBE: NOT DETECTED
RV+EV RNA NPH QL NAA+NON-PROBE: NOT DETECTED
SARS-COV-2 RNA NPH QL NAA+NON-PROBE: NOT DETECTED
SODIUM SERPL-SCNC: 141 MMOL/L (ref 132–146)
SPECIMEN DESCRIPTION: NORMAL
TIBC SERPL-MCNC: 223 UG/DL (ref 250–450)
TROPONIN I SERPL HS-MCNC: 10 NG/L (ref 0–9)
TROPONIN I SERPL HS-MCNC: 13 NG/L (ref 0–9)
TSH SERPL DL<=0.05 MIU/L-ACNC: 1.54 UIU/ML (ref 0.27–4.2)
VIT B12 SERPL-MCNC: 299 PG/ML (ref 211–946)
WBC OTHER # BLD: 3.2 K/UL (ref 4.5–11.5)

## 2024-12-29 PROCEDURE — 83550 IRON BINDING TEST: CPT

## 2024-12-29 PROCEDURE — 99223 1ST HOSP IP/OBS HIGH 75: CPT | Performed by: INTERNAL MEDICINE

## 2024-12-29 PROCEDURE — 84443 ASSAY THYROID STIM HORMONE: CPT

## 2024-12-29 PROCEDURE — 99239 HOSP IP/OBS DSCHRG MGMT >30: CPT | Performed by: INTERNAL MEDICINE

## 2024-12-29 PROCEDURE — 82607 VITAMIN B-12: CPT

## 2024-12-29 PROCEDURE — 84100 ASSAY OF PHOSPHORUS: CPT

## 2024-12-29 PROCEDURE — 6360000002 HC RX W HCPCS: Performed by: INTERNAL MEDICINE

## 2024-12-29 PROCEDURE — APPSS60 APP SPLIT SHARED TIME 46-60 MINUTES

## 2024-12-29 PROCEDURE — 6370000000 HC RX 637 (ALT 250 FOR IP): Performed by: INTERNAL MEDICINE

## 2024-12-29 PROCEDURE — 83605 ASSAY OF LACTIC ACID: CPT

## 2024-12-29 PROCEDURE — 0202U NFCT DS 22 TRGT SARS-COV-2: CPT

## 2024-12-29 PROCEDURE — 84484 ASSAY OF TROPONIN QUANT: CPT

## 2024-12-29 PROCEDURE — 83540 ASSAY OF IRON: CPT

## 2024-12-29 PROCEDURE — 2500000003 HC RX 250 WO HCPCS: Performed by: INTERNAL MEDICINE

## 2024-12-29 PROCEDURE — 82728 ASSAY OF FERRITIN: CPT

## 2024-12-29 PROCEDURE — 82746 ASSAY OF FOLIC ACID SERUM: CPT

## 2024-12-29 PROCEDURE — 85025 COMPLETE CBC W/AUTO DIFF WBC: CPT

## 2024-12-29 PROCEDURE — 80053 COMPREHEN METABOLIC PANEL: CPT

## 2024-12-29 PROCEDURE — 36415 COLL VENOUS BLD VENIPUNCTURE: CPT

## 2024-12-29 PROCEDURE — 6370000000 HC RX 637 (ALT 250 FOR IP)

## 2024-12-29 RX ORDER — CARVEDILOL 3.12 MG/1
3.12 TABLET ORAL 2 TIMES DAILY WITH MEALS
Status: DISCONTINUED | OUTPATIENT
Start: 2024-12-29 | End: 2024-12-29 | Stop reason: HOSPADM

## 2024-12-29 RX ORDER — CYANOCOBALAMIN 1000 UG/ML
1000 INJECTION, SOLUTION INTRAMUSCULAR; SUBCUTANEOUS ONCE
Status: COMPLETED | OUTPATIENT
Start: 2024-12-29 | End: 2024-12-29

## 2024-12-29 RX ORDER — MULTIVITAMIN WITH IRON
500 TABLET ORAL DAILY
Qty: 30 TABLET | Refills: 3 | Status: SHIPPED | OUTPATIENT
Start: 2024-12-29 | End: 2025-12-29

## 2024-12-29 RX ORDER — METOPROLOL TARTRATE 50 MG
TABLET ORAL
Qty: 3 TABLET | Refills: 0 | Status: SHIPPED | OUTPATIENT
Start: 2024-12-29

## 2024-12-29 RX ORDER — SODIUM CHLORIDE 9 MG/ML
INJECTION, SOLUTION INTRAVENOUS PRN
OUTPATIENT
Start: 2024-12-29

## 2024-12-29 RX ORDER — SODIUM CHLORIDE 0.9 % (FLUSH) 0.9 %
5-40 SYRINGE (ML) INJECTION EVERY 12 HOURS SCHEDULED
OUTPATIENT
Start: 2024-12-29

## 2024-12-29 RX ORDER — CARVEDILOL 3.12 MG/1
3.12 TABLET ORAL 2 TIMES DAILY WITH MEALS
Qty: 60 TABLET | Refills: 3 | Status: SHIPPED | OUTPATIENT
Start: 2024-12-30

## 2024-12-29 RX ORDER — NITROGLYCERIN 0.4 MG/1
0.8 TABLET SUBLINGUAL
OUTPATIENT
Start: 2024-12-29 | End: 2024-12-30

## 2024-12-29 RX ORDER — METOPROLOL TARTRATE 1 MG/ML
5 INJECTION, SOLUTION INTRAVENOUS EVERY 5 MIN PRN
OUTPATIENT
Start: 2024-12-29

## 2024-12-29 RX ORDER — NITROGLYCERIN 0.4 MG/1
0.4 TABLET SUBLINGUAL
OUTPATIENT
Start: 2024-12-29 | End: 2024-12-30

## 2024-12-29 RX ORDER — FERROUS SULFATE 325(65) MG
325 TABLET ORAL EVERY OTHER DAY
Qty: 90 TABLET | Refills: 1 | Status: SHIPPED | OUTPATIENT
Start: 2024-12-29

## 2024-12-29 RX ORDER — SODIUM CHLORIDE 0.9 % (FLUSH) 0.9 %
5-40 SYRINGE (ML) INJECTION PRN
OUTPATIENT
Start: 2024-12-29

## 2024-12-29 RX ADMIN — ASPIRIN 81 MG CHEWABLE TABLET 81 MG: 81 TABLET CHEWABLE at 07:55

## 2024-12-29 RX ADMIN — ENOXAPARIN SODIUM 40 MG: 100 INJECTION SUBCUTANEOUS at 07:55

## 2024-12-29 RX ADMIN — SODIUM CHLORIDE, PRESERVATIVE FREE 10 ML: 5 INJECTION INTRAVENOUS at 07:58

## 2024-12-29 RX ADMIN — CARVEDILOL 3.12 MG: 3.12 TABLET, FILM COATED ORAL at 15:39

## 2024-12-29 RX ADMIN — CYANOCOBALAMIN 1000 MCG: 1000 INJECTION, SOLUTION INTRAMUSCULAR; SUBCUTANEOUS at 17:58

## 2024-12-29 RX ADMIN — ACETAMINOPHEN 650 MG: 325 TABLET ORAL at 11:26

## 2024-12-29 ASSESSMENT — PAIN DESCRIPTION - ORIENTATION: ORIENTATION: LEFT

## 2024-12-29 ASSESSMENT — PAIN DESCRIPTION - LOCATION: LOCATION: RIB CAGE

## 2024-12-29 ASSESSMENT — PAIN SCALES - GENERAL
PAINLEVEL_OUTOF10: 0
PAINLEVEL_OUTOF10: 0
PAINLEVEL_OUTOF10: 6
PAINLEVEL_OUTOF10: 2

## 2024-12-29 ASSESSMENT — PAIN DESCRIPTION - DESCRIPTORS: DESCRIPTORS: ACHING;DISCOMFORT

## 2024-12-29 ASSESSMENT — PAIN - FUNCTIONAL ASSESSMENT: PAIN_FUNCTIONAL_ASSESSMENT: PREVENTS OR INTERFERES SOME ACTIVE ACTIVITIES AND ADLS

## 2024-12-29 NOTE — PROGRESS NOTES
Hospitalist Progress Note      Synopsis: Patient admitted on 12/28/2024 89 year old female with a past medical history of anxiety, hyperlipidemia, hypertension, CVA who is being admitted with unstable angina. Presented to [place] ED with complains of left-sided chest pain x 2 weeks, worse with exertion and anxiety. Complains of associated lightheadedness, had one episode of syncope one months ago. Denies associated shortness of breath, nausea, diaphoresis, or radiation. Patient was admitted in November for stroke-like symptoms, followed with 14 day Zio monitor. Echo done 10/25/24, EF 50-55% with normal diastolic function and normal wall motion. Reports a history of hyperlipidemia, denies cardiac history.      Initial vital signs in ED were 97.6, 90, 14, 123/65, 96% on room air. Troponin were 6,7. EKG non-ischemic. Treated with 243 mg aspirin and transferred to [facility] for admission.       ASSESSMENT:    Principal Problem:    Unstable angina (HCC)  Active Problems:    Leukopenia    Anemia    Hyperlipidemia  Resolved Problems:    * No resolved hospital problems. *       PLAN:    Chest pain reproducible: cardiology consult appreciated. Monitor telemetry. Daily aspirin.  Hyperlipidemia: On Lipitor.  Anemia macrocytic reviewed iron studies  folate all adequate.  B12 low normal ferritin low normal Cyanocobalamin IM x 1.  Start oral iron and oral B12 with discharge       Diet: ADULT DIET; Regular  Code Status: Full Code  PT/OT Eval Status:     DVT Prophylaxis:     Recommended disposition at discharge: [place] Home.  Patient can follow-up with her PCP and if desired she can be referred to hematology oncology if further workup is required.      Subjective    Feeling better without complaint  No CP or SOB  No fever or chills   No uncontrolled pain  No vomiting or diarrhea   No events reported overnight     Exam:  BP (!) 157/62   Pulse 72   Temp 97.3 °F (36.3 °C) (Temporal)   Resp 18   Ht 1.575 m (5' 2\")    Wt 62 kg (136 lb 11 oz)   LMP  (LMP Unknown)   SpO2 96%   BMI 25.00 kg/m²   General appearance: No apparent distress, appears stated age and cooperative.  HEENT: Pupils equal, round, and reactive to light. Conjunctivae/corneas clear.  Neck: Supple. No jugular venous distention. Trachea midline.  Respiratory:  Normal respiratory effort. Clear to auscultation, bilaterally without Rales/Wheezes/Rhonchi.  Cardiovascular: Regular rate and rhythm with normal S1/S2 without murmurs, rubs or gallops.  Abdomen: Soft, non-tender, non-distended with normal bowel sounds.  Musculoskeletal: No clubbing, cyanosis or edema bilaterally. Brisk capillary refill. 2+radial pulses.   Skin:  No rashes    Neurologic: awake, alert and following commands    Medications:  Reviewed    Infusion Medications    sodium chloride       Scheduled Medications    aspirin  81 mg Oral Daily    sodium chloride flush  5-40 mL IntraVENous 2 times per day    enoxaparin  40 mg SubCUTAneous Daily     PRN Meds: sodium chloride flush, sodium chloride, ondansetron **OR** ondansetron, polyethylene glycol, acetaminophen **OR** acetaminophen    I/O  No intake or output data in the 24 hours ending 12/29/24 1231    Labs:   Recent Labs     12/28/24  1600 12/29/24  0418   WBC 3.2* 3.2*   HGB 10.7* 9.3*   HCT 32.2* 29.0*    224       Recent Labs     12/28/24  1600 12/29/24  0418    141   K 4.4 4.3    109*   CO2 27 24   BUN 14 10   CREATININE 0.7 0.6   CALCIUM 9.1 8.1*   PHOS  --  3.2       Recent Labs     12/28/24  1600 12/29/24  0418   ALKPHOS 74 60   ALT 5 6   AST <5 15   BILITOT 0.4 0.5       No results for input(s): \"INR\" in the last 72 hours.    No results for input(s): \"CKTOTAL\", \"TROPONINI\" in the last 72 hours.    Chronic labs:  Lab Results   Component Value Date    CHOL 105 11/18/2024    TRIG 74 11/18/2024    HDL 35 (L) 11/18/2024    TSH 1.54 12/29/2024    INR 1.1 06/10/2021    LABA1C 5.8 (H) 11/18/2024       Radiology:  Imaging studies

## 2024-12-29 NOTE — H&P
Unstable angina (HCC)  Resolved Problems:    * No resolved hospital problems. *      PLAN:    Unstable angina: Consult cardiology. Monitor telemetry. Daily aspirin.  Hyperlipidemia: On Lipitor.    Code Status: Full  DVT prophylaxis: Lovenox    45 minutes or more spent reviewing patient chart, assessing patient, discussing plan of care with patient and family, discussing plan of care with collaborating physician, and documentation.    NOTE: This report was transcribed using voice recognition software. Every effort was made to ensure accuracy; however, inadvertent computerized transcription errors may be present.  Electronically signed by CELE Waterman CNP on 12/28/2024 at 10:32 PM   HOSPITALIST ATTENDING PHYSICIAN NOTE 12/29/2024 0207AM:    Details of the evaluation - subjective assessment (including medication profile, past medical, family and social history when applicable), examination, review of lab and test data, diagnostic impressions and medical decision making - performed by CELE Waterman CNP, were discussed with me on the date of service and I agree with clinical information herein unless otherwise noted.    The patient has been evaluated by me personally at this time.  Pt reports no fevers, chills,n/v          PHYSICAL EXAM:    Vitals:  BP (!) 159/66   Pulse 76   Temp 97.3 °F (36.3 °C) (Temporal)   Resp 20   Ht 1.575 m (5' 2\")   Wt 62 kg (136 lb 11 oz)   LMP  (LMP Unknown)   SpO2 96%   BMI 25.00 kg/m²     General:  Appears comfortable. Answers questions appropriately and cooperative with exam  HEENT:  Mucous membranes moist. No erythema, rhinorrhea, or post-nasal drip noted.  Neck:  No carotid bruits.  Heart:  Rhythm regular at rate of 78  Lungs:  CTA.  No wheeze, rales, or rhonchi  Abdomen:  Positive bowel sounds positive.  Soft.  Non-tender. No guarding, rebound or rigidity.  Breast/Rectal/Genitourinary: not pertinent.    Extremities:  Negative for lower extremity edema  Skin:   Warm and dry  Vascular: 2/4 Dorsalis Pedis pulses bilaterally.  Neuro:  Cranial nerves 2-12 grossly intact, no focal weakness or change in sensation noted.  Extraocular muscles intact.  Pupils equal, round, reactive to light.              I agree with the assessment and plan of CELE Waterman CNP    46 min spent discussing with ER, reviewing records, interviewing/examining pt, analyzing data, formulating treatment plan as noted in NP's note    Chest pain  hyperlipidemia  Leukopenia  anemia    Decision to admit    Pt seen on 12/28      Electronically signed by Tobi Adams D.O.  Hospitalist  [facility]ist [facility] at [facility]

## 2024-12-29 NOTE — CONSULTS
Inpatient Cardiology Consultation      Reason for Consult:  Chest Pain     Consulting Physician: Dr. Nichols     Requesting Physician:  Dr. Adams     Date of Consultation: 12/29/2024    History of Present Illness:   Flores Gaines  is a 89 y.o. year old female known to [facility] through inpatient consultation only. She last saw Dr. Wynn inpatient on 10/25/24 for syncope.     Patient presented to the [place] ED on 12/28/24 with complaints of chest pain with cough. On arrival /65, heart rate 90, 96% on room air and afebrile.  Potassium 4.4> 4.3, troponin 7, WBC 3.2, Hgb 10.7> 9.3.  Patient was transferred to [facility] and admitted for further evaluation.    At the time examination patient is lying flat in bed and appears to be in no acute distress.  Patient reports that over the last month she has had intermittent pain under her left breast that occurs mostly when she is out shopping and pushing a cart or when she has increased caffeine intake.  She states that she does take Tylenol which helps alleviate her pain.  It is reproducible on palpation.  She reports that she has very minimal pain at the time of my exam and believes that it is related to her sleeping on her left side overnight.  She denies any lightheadedness, dizziness, shortness of breath, lower extremity swelling, PND or orthopnea.    Please note: past medical records were reviewed per electronic medical record (EMR) - see detailed reports under Past Medical/ Surgical History.     Past Medical History:    HTN  HLD, on statin therapy  Syncope   o 12/2024: baseline rhythm DORIS, occasional PVCs with 7 episodes nonsustained SVT and episodes of possible second-degree AV heart block  Tilt table test ordered November 2024; not yet completed  GERD  History of medical non-compliance.  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 Garcia.    Electronically signed by CELE Driscoll CNP on 12/29/2024 at 11:54 AM     CARDIOLOGY ATTENDING ATTESTATION (DR. GARCIA)  Our JOSE exam, assessment reviewed and reflect my work.   I personally saw, examined, and evaluated the patient today.  I personally reviewed the medications, rhythm strips, pertinent labs and test reports.    I directly participated in the medical-decision making, ordering pertinent tests and medication adjustment.  I am the primary author for the consult notes.  I spent > 51% of the total time involved with the encounter and 100% of assessment and recommendations.  The total time included the following:  Independently interviewing the patient (HPI, ROS, PMH, PSH, FMH, SH, allergies, and medications)  Reviewing available records, results of previously ordered testing/procedures, and current problem list  Independently performing a medically appropriate examination  Reviewing the above documentation completed by the JOSE  Ordering medications, tests, and/or procedures as required  Formulating the assessment/plan and reviewing the rationale for the above recommendations  Counseling/educating the patient  Coordinating care with other healthcare professionals  Communicating results to the patient's family/caregiver as available  Documenting clinical information in the patient's electronic health record      I independently interviewed and examined the patient. I have reviewed the above documentation completed by the JOSE. Please see my additional contributions to the HPI, physical exam, and assessment / medical decision making.  89-year-old female recently underwent ischemic evaluation with stress test in April 2024 and echocardiogram in October 2024 that were unrevealing who presented with atypical chest pain and cardiology consulted.        Review of Systems:  Cardiac: As per

## 2024-12-29 NOTE — PLAN OF CARE
Problem: Chronic Conditions and Co-morbidities  Goal: Patient's chronic conditions and co-morbidity symptoms are monitored and maintained or improved  Outcome: Progressing     Problem: Discharge Planning  Goal: Discharge to home or other facility with appropriate resources  Outcome: Progressing     Problem: ABCDS Injury Assessment  Goal: Absence of physical injury  Outcome: Progressing     Problem: Respiratory - Adult  Goal: Achieves optimal ventilation and oxygenation  Outcome: Progressing     Problem: Cardiovascular - Adult  Goal: Maintains optimal cardiac output and hemodynamic stability  Outcome: Progressing  Goal: Absence of cardiac dysrhythmias or at baseline  Outcome: Progressing      0

## 2024-12-29 NOTE — DISCHARGE SUMMARY
Physician Discharge Summary     Patient ID:  Flores Gaines  [id number]  89 y.o.  1935    Admit date: 12/28/2024    Discharge date and time:  12/29/2024    Discharge Diagnoses: Principal Problem:    Other chest pain  Active Problems:    Leukopenia    Macrocytic anemia    Hyperlipidemia  Resolved Problems:    * No resolved hospital problems. *      Consults: IP CONSULT TO CARDIOLOGY  IP CONSULT TO VASCULAR ACCESS TEAM    Procedures: See below    Hospital Course:  Patient admitted on 12/28/2024 89 year old female with a past medical history of anxiety, hyperlipidemia, hypertension, CVA who is being admitted with unstable angina. Presented to [place] ED with complains of left-sided chest pain x 2 weeks, worse with exertion and anxiety. Complains of associated lightheadedness, had one episode of syncope one months ago. Denies associated shortness of breath, nausea, diaphoresis, or radiation. Patient was admitted in November for stroke-like symptoms, followed with 14 day Zio monitor. Echo done 10/25/24, EF 50-55% with normal diastolic function and normal wall motion. Reports a history of hyperlipidemia, denies cardiac history.      Initial vital signs in ED were 97.6, 90, 14, 123/65, 96% on room air. Troponin were 6,7. EKG non-ischemic. Treated with 243 mg aspirin and transferred to [facility] for admission.      PLAN:     Chest pain reproducible: cardiology consult appreciated.  Planning outpatient coronary CTA.  Monitor telemetry. Daily aspirin.  Hyperlipidemia: On Lipitor.  Anemia macrocytic reviewed iron studies  folate all adequate.  B12 low normal, ferritin low normal Cyanocobalamin IM x 1.  Start oral iron and oral B12 with discharge.  Follow-up as outpatient with PCP.          Discharge Exam:  See progress note from today    Condition:  Stable    Disposition: home    Patient Instructions:   Current Discharge Medication List        START taking these medications    Details   metoprolol tartrate  (LOPRESSOR) 50 MG tablet Two hours before your CTA test check your heart rate, take by mouth 1 tablet if your heart rate is 55-70, 2 tablets if your heart rate is 71-80, 3 tablets if heart rate greater than 80, and no tablets if heart rate is less than 55.  Qty: 3 tablet, Refills: 0      ferrous sulfate (IRON 325) 325 (65 Fe) MG tablet Take 1 tablet by mouth every other day With breakfast  Qty: 90 tablet, Refills: 1    Comments: Please provide any appropriate covered product.  Otherwise refer to OTC.      carvedilol (COREG) 3.125 MG tablet Take 1 tablet by mouth 2 times daily (with meals)  Qty: 60 tablet, Refills: 3      vitamin B-12 (CYANOCOBALAMIN) 500 MCG tablet Take 1 tablet by mouth daily  Qty: 30 tablet, Refills: 3    Comments: Please provide any appropriate covered product.  Otherwise refer to OTC.           CONTINUE these medications which have NOT CHANGED    Details   atorvastatin (LIPITOR) 40 MG tablet TAKE ONE TABLET BY MOUTH EVERY DAY AT NIGHT  Qty: 90 tablet, Refills: 0    Associated Diagnoses: Pure hypercholesterolemia      ASPIRIN LOW DOSE 81 MG chewable tablet CHEW 1 TABLET BY MOUTH ONCE DAILY  Qty: 90 tablet, Refills: 0    Associated Diagnoses: Primary hypertension      acetaminophen (TYLENOL) 500 MG tablet Take 1 tablet by mouth 4 times daily as needed for Pain  Qty: 120 tablet, Refills: 0           Activity: activity as tolerated  Diet: regular diet    Follow-up with PCP, cardiology    Note that 33 minutes was spent in preparing discharge papers, discussing discharge with patient, staff, consultants, medication review, arranging follow up, etc.    Signed:  Ventura Pool MD  12/29/2024  4:53 PM

## 2024-12-30 ENCOUNTER — CARE COORDINATION (OUTPATIENT)
Dept: CARE COORDINATION | Age: 89
End: 2024-12-30

## 2024-12-30 DIAGNOSIS — R07.9 CHEST PAIN, UNSPECIFIED TYPE: Primary | ICD-10-CM

## 2024-12-30 DIAGNOSIS — F41.9 ANXIETY: ICD-10-CM

## 2024-12-30 LAB
EKG ATRIAL RATE: 74 BPM
EKG P AXIS: 92 DEGREES
EKG P-R INTERVAL: 194 MS
EKG Q-T INTERVAL: 400 MS
EKG QRS DURATION: 92 MS
EKG QTC CALCULATION (BAZETT): 444 MS
EKG R AXIS: 2 DEGREES
EKG T AXIS: 83 DEGREES
EKG VENTRICULAR RATE: 74 BPM

## 2024-12-30 PROCEDURE — 93010 ELECTROCARDIOGRAM REPORT: CPT | Performed by: INTERNAL MEDICINE

## 2024-12-30 PROCEDURE — 1111F DSCHRG MED/CURRENT MED MERGE: CPT | Performed by: INTERNAL MEDICINE

## 2024-12-30 NOTE — CARE COORDINATION
Care Transitions Note    Initial Call - Call within 2 business days of discharge: Yes    Patient Current Location:  Home: [address]  [place] [address]    Care Transition Nurse contacted the patient by telephone to perform post hospital discharge assessment, verified name and  as identifiers. Provided reintroduction to self, and reexplanation of the Care Transition Nurse role as patient known to CTN from recent outreaches, patient remembers this CTN.    Patient: Flores Gaines    Patient : 1935   MRN: [id number]    Reason for Admission: chest pain  Discharge Date: 24  RURS: Readmission Risk Score: 17.2      Last Discharge Facility       Date Complaint Diagnosis Description Type Department Provider    24  Chest pain, unspecified type Admission (Discharged) [facility] Ventura Pool MD    24 Chest Pain Chest pain, unspecified type ED (TRANSFER) [facility] Macarena Lagunas, DO            Was this an external facility discharge? No    Additional needs identified to be addressed with provider   No needs identified             Method of communication with provider: none.    Patients top risk factors for readmission: medical condition-chest pain, HTN, CVA, diastolic dysfunction, anxiety, multiple health system providers, and utilization of services    Interventions to address risk factors:   Review of patient management of conditions/medications.  Home Health: patient continues with [facility], nurse to visit today.   Discussed with patient need for cardiology f/u (Dr Wynn) as per AVS.  Patient states she has not yet heard from [facility] for scheduling-CTN reminded patient that CTN had called the office on 24 and they are aware of the referral.    Care Summary Note:   -Patient transferred from [facility] and admitted to [facility] on 24 with symptoms of left sided chest pain and lightheadedness. See hospital discharge summary for further details.   -Patient reports no return of any

## 2024-12-30 NOTE — TELEPHONE ENCOUNTER
Flores is needing a refill of:      Vallum 2.5MG      [facility] - [place], [place] - [address] - P [phone] - F [phone] [[id number]]       She has an appointment on 01/09/25 @ 11:30 AM

## 2024-12-31 RX ORDER — DIAZEPAM 2 MG/1
2 TABLET ORAL NIGHTLY PRN
Qty: 30 TABLET | Refills: 0 | Status: SHIPPED | OUTPATIENT
Start: 2024-12-31 | End: 2025-03-01

## 2025-01-09 ENCOUNTER — OFFICE VISIT (OUTPATIENT)
Dept: PRIMARY CARE CLINIC | Age: 89
End: 2025-01-09

## 2025-01-09 VITALS
TEMPERATURE: 96.8 F | OXYGEN SATURATION: 98 % | WEIGHT: 124 LBS | HEART RATE: 73 BPM | SYSTOLIC BLOOD PRESSURE: 116 MMHG | BODY MASS INDEX: 22.82 KG/M2 | DIASTOLIC BLOOD PRESSURE: 48 MMHG | HEIGHT: 62 IN | RESPIRATION RATE: 18 BRPM

## 2025-01-09 DIAGNOSIS — Z09 HOSPITAL DISCHARGE FOLLOW-UP: Primary | ICD-10-CM

## 2025-01-09 DIAGNOSIS — E78.00 PURE HYPERCHOLESTEROLEMIA: ICD-10-CM

## 2025-01-09 DIAGNOSIS — I10 PRIMARY HYPERTENSION: ICD-10-CM

## 2025-01-09 DIAGNOSIS — F41.9 ANXIETY: Chronic | ICD-10-CM

## 2025-01-09 DIAGNOSIS — R07.9 CHEST PAIN, UNSPECIFIED TYPE: ICD-10-CM

## 2025-01-09 ASSESSMENT — PATIENT HEALTH QUESTIONNAIRE - PHQ9
SUM OF ALL RESPONSES TO PHQ QUESTIONS 1-9: 1
3. TROUBLE FALLING OR STAYING ASLEEP: NOT AT ALL
2. FEELING DOWN, DEPRESSED OR HOPELESS: NOT AT ALL
1. LITTLE INTEREST OR PLEASURE IN DOING THINGS: NOT AT ALL
10. IF YOU CHECKED OFF ANY PROBLEMS, HOW DIFFICULT HAVE THESE PROBLEMS MADE IT FOR YOU TO DO YOUR WORK, TAKE CARE OF THINGS AT HOME, OR GET ALONG WITH OTHER PEOPLE: NOT DIFFICULT AT ALL
SUM OF ALL RESPONSES TO PHQ QUESTIONS 1-9: 1
4. FEELING TIRED OR HAVING LITTLE ENERGY: NOT AT ALL
8. MOVING OR SPEAKING SO SLOWLY THAT OTHER PEOPLE COULD HAVE NOTICED. OR THE OPPOSITE, BEING SO FIGETY OR RESTLESS THAT YOU HAVE BEEN MOVING AROUND A LOT MORE THAN USUAL: NOT AT ALL
9. THOUGHTS THAT YOU WOULD BE BETTER OFF DEAD, OR OF HURTING YOURSELF: NOT AT ALL
5. POOR APPETITE OR OVEREATING: SEVERAL DAYS
6. FEELING BAD ABOUT YOURSELF - OR THAT YOU ARE A FAILURE OR HAVE LET YOURSELF OR YOUR FAMILY DOWN: NOT AT ALL
SUM OF ALL RESPONSES TO PHQ9 QUESTIONS 1 & 2: 0
SUM OF ALL RESPONSES TO PHQ QUESTIONS 1-9: 1
7. TROUBLE CONCENTRATING ON THINGS, SUCH AS READING THE NEWSPAPER OR WATCHING TELEVISION: NOT AT ALL
SUM OF ALL RESPONSES TO PHQ QUESTIONS 1-9: 1

## 2025-01-09 NOTE — PROGRESS NOTES
needed for Anxiety for up to 60 days. Max Daily Amount: 2 mg     ferrous sulfate 325 (65 Fe) MG tablet  Commonly known as: IRON 325  Take 1 tablet by mouth every other day With breakfast     metoprolol tartrate 50 MG tablet  Commonly known as: LOPRESSOR  Two hours before your CTA test check your heart rate, take by mouth 1 tablet if your heart rate is 55-70, 2 tablets if your heart rate is 71-80, 3 tablets if heart rate greater than 80, and no tablets if heart rate is less than 55.     vitamin B-12 500 MCG tablet  Commonly known as: CYANOCOBALAMIN  Take 1 tablet by mouth daily                Medications marked \"taking\" at this time  Outpatient Medications Marked as Taking for the 1/9/25 encounter (Office Visit) with Abena Easton MD   Medication Sig Dispense Refill    diazePAM (VALIUM) 2 MG tablet Take 1 tablet by mouth nightly as needed for Anxiety for up to 60 days. Max Daily Amount: 2 mg 30 tablet 0    metoprolol tartrate (LOPRESSOR) 50 MG tablet Two hours before your CTA test check your heart rate, take by mouth 1 tablet if your heart rate is 55-70, 2 tablets if your heart rate is 71-80, 3 tablets if heart rate greater than 80, and no tablets if heart rate is less than 55. 3 tablet 0    ferrous sulfate (IRON 325) 325 (65 Fe) MG tablet Take 1 tablet by mouth every other day With breakfast 90 tablet 1    carvedilol (COREG) 3.125 MG tablet Take 1 tablet by mouth 2 times daily (with meals) 60 tablet 3    vitamin B-12 (CYANOCOBALAMIN) 500 MCG tablet Take 1 tablet by mouth daily 30 tablet 3    atorvastatin (LIPITOR) 40 MG tablet TAKE ONE TABLET BY MOUTH EVERY DAY AT NIGHT 90 tablet 0    ASPIRIN LOW DOSE 81 MG chewable tablet CHEW 1 TABLET BY MOUTH ONCE DAILY 90 tablet 0    acetaminophen (TYLENOL) 500 MG tablet Take 1 tablet by mouth 4 times daily as needed for Pain 120 tablet 0        Medications patient taking as of now reconciled against medications ordered at time of hospital discharge: Yes         Objective:

## 2025-01-10 ENCOUNTER — CARE COORDINATION (OUTPATIENT)
Dept: CARE COORDINATION | Age: 89
End: 2025-01-10

## 2025-01-10 NOTE — CARE COORDINATION
Care Transitions Note    Follow Up Call     Patient Current Location:  Home: [address]  [place] [address]    Care Transition Nurse contacted the patient by telephone.     Additional needs identified to be addressed with provider   No needs identified                 Method of communication with provider: none.    Care Summary Note:   -Patient states she has felt well the last couple days. Spent the day yesterday with her daughter shopping and then for dinner which was enjoyable.  -Aware to schedule with cardiology (Dr Wynn.)   CTN verified that patient had correct phone number.  -Patient denies any needs at this time.  -CTN will plan for final call next outreach.    Plan of care updates since last contact:  Education: HTN  Home Health: remains active with [facility], visit occurred today.   Completed PCP TCM/HFU yesterday.   B/P 116/48, HR 73 at visit.       Advance Care Planning:   Does patient have an Advance Directive: health care decision maker confirmed on a prior call.    Medication Review:  Full medication reconciliation completed during previous call. No changes since last call.     Remote Patient Monitoring:  Offered patient enrollment in the Remote Patient Monitoring (RPM) program for in-home monitoring: Addressed on a prior call, see notes.    Assessments:  Care Transitions Subsequent and Final Call    Subsequent and Final Calls  Do you have any ongoing symptoms?: No  Have your medications changed?: No  Do you have any questions related to your medications?: No  Do you currently have any active services?: Yes  Are you currently active with any services?: Home Health  Do you have any needs or concerns that I can assist you with?: No  Identified Barriers: Other  Care Transitions Interventions     Other Services: Declined (Comment: declined RPM 2/27/24 and 11/7/24.)   Other Interventions:              Follow Up Appointment:   JANIS appointment attended as scheduled   Future Appointments

## 2025-01-24 ENCOUNTER — TELEPHONE (OUTPATIENT)
Dept: CARDIOLOGY CLINIC | Age: 89
End: 2025-01-24

## 2025-01-24 ENCOUNTER — CARE COORDINATION (OUTPATIENT)
Dept: CARE COORDINATION | Age: 89
End: 2025-01-24

## 2025-01-24 NOTE — CARE COORDINATION
-CTN phoned [facility] and spoke to Mendy to check on need for f/u with cardiology provider (Dr Wynn.)  Mendy to message provider to check if appointment needed.  Mendy requested  CTN contact information which CTN provided to Mendy.

## 2025-01-24 NOTE — CARE COORDINATION
Care Transitions Note    Final Call     Patient Current Location:  Home: [address]  [place] [id number]    Care Transition Nurse contacted the patient by telephone.     Patient graduated from the Care Transitions program on 1/24/25.  Patient/family progressing towards self management.  Reinforced resources provided during this care transition period..      Advance Care Planning:   Does patient have an Advance Directive: health care decision maker confirmed on a prior call.    Handoff:   Patient was not referred to the ACM team due to no additional needs identified.       Care Summary Note:   -Remains active with [facility], next visit planned for 1/28/25.  -States her son will be picking her up soon to take her shopping.  -Has appointment scheduled with EP as noted below.  -CTN to sign off as care transition program ends in less than a week.     Assessments:  Care Transitions Subsequent and Final Call    Subsequent and Final Calls  Do you have any ongoing symptoms?: No  Have your medications changed?: No  Do you have any questions related to your medications?: No  Do you currently have any active services?: Yes  Are you currently active with any services?: Home Health  Do you have any needs or concerns that I can assist you with?: No  Identified Barriers: None  Care Transitions Interventions     Other Services: Declined (Comment: declined RPM 2/27/24 and 11/7/24.)   Other Interventions:              Upcoming Appointments:    Future Appointments         Provider Specialty Dept Phone    4/1/2025 1:00 PM Elliott Villatoro MD Cardiac Electrophysiology [phone]    4/1/2025 3:30 PM Abena Easton MD Primary Care [phone]            Patient has agreed to contact primary care provider and/or specialist for any further questions, concerns, or needs.    Unique Jimenez RN

## 2025-01-24 NOTE — TELEPHONE ENCOUNTER
Care transition nurse called to find out if patient needs HFU and also does the patient need Coronary CTA order in the  hospital on 12/29/24? Please Advise

## 2025-01-25 DIAGNOSIS — F41.9 ANXIETY: ICD-10-CM

## 2025-01-27 ENCOUNTER — CARE COORDINATION (OUTPATIENT)
Dept: CARE COORDINATION | Age: 89
End: 2025-01-27

## 2025-01-27 RX ORDER — DIAZEPAM 2 MG/1
TABLET ORAL
Qty: 30 TABLET | Refills: 0 | Status: SHIPPED | OUTPATIENT
Start: 2025-01-27 | End: 2025-02-26

## 2025-01-27 NOTE — TELEPHONE ENCOUNTER
Patient notified no HFU needed with Dr. Wynn. Patient notified to follow up with  EP on 4/1/25 per  Dr. Wynn's recommendations.

## 2025-01-27 NOTE — CARE COORDINATION
-CTN received VM from [facility] with [facility] requesting return call from CTN at extension [phone]

## 2025-02-03 ENCOUNTER — TELEPHONE (OUTPATIENT)
Dept: CARDIOLOGY CLINIC | Age: 89
End: 2025-02-03

## 2025-02-03 DIAGNOSIS — R07.9 CHEST PAIN, UNSPECIFIED TYPE: ICD-10-CM

## 2025-02-03 NOTE — TELEPHONE ENCOUNTER
Daniela from [facility] would like to know if this patient needs a Coronary CTA. If so she will need an outpatient order. Patient has a inpatient order from 12/29/24 in chart. Please Advise

## 2025-02-04 ENCOUNTER — TELEPHONE (OUTPATIENT)
Dept: PRIMARY CARE CLINIC | Age: 89
End: 2025-02-04

## 2025-02-04 NOTE — TELEPHONE ENCOUNTER
Flores states her coat fell in the mud yesterday and now she has a cold. She is asking if  can call her in an antibiotic and cough syrup. I offered the 1pm appointment tomorrow or a virtual and she refused.

## 2025-02-06 ENCOUNTER — TELEMEDICINE (OUTPATIENT)
Dept: PRIMARY CARE CLINIC | Age: 89
End: 2025-02-06

## 2025-02-06 DIAGNOSIS — J00 ACUTE RHINITIS: ICD-10-CM

## 2025-02-06 DIAGNOSIS — J40 BRONCHITIS: Primary | ICD-10-CM

## 2025-02-06 DIAGNOSIS — I10 PRIMARY HYPERTENSION: ICD-10-CM

## 2025-02-06 DIAGNOSIS — I95.9 HYPOTENSION, UNSPECIFIED HYPOTENSION TYPE: ICD-10-CM

## 2025-02-06 RX ORDER — METHYLPREDNISOLONE 4 MG/1
TABLET ORAL
Qty: 1 KIT | Refills: 0 | Status: SHIPPED | OUTPATIENT
Start: 2025-02-06 | End: 2025-02-12

## 2025-02-06 RX ORDER — CETIRIZINE HYDROCHLORIDE 10 MG/1
10 TABLET ORAL DAILY
Qty: 30 TABLET | Refills: 0 | Status: SHIPPED | OUTPATIENT
Start: 2025-02-06

## 2025-02-06 RX ORDER — AZITHROMYCIN 250 MG/1
TABLET, FILM COATED ORAL
Qty: 6 TABLET | Refills: 0 | Status: SHIPPED | OUTPATIENT
Start: 2025-02-06 | End: 2025-02-16

## 2025-02-06 RX ORDER — BENZONATATE 200 MG/1
200 CAPSULE ORAL 3 TIMES DAILY PRN
Qty: 30 CAPSULE | Refills: 0 | Status: SHIPPED | OUTPATIENT
Start: 2025-02-06 | End: 2025-02-16

## 2025-02-06 NOTE — PROGRESS NOTES
Flores Gaines, was evaluated through a synchronous (real-time) audio-video encounter. The patient (or guardian if applicable) is aware that this is a billable service, which includes applicable co-pays. This Virtual Visit was conducted with patient's (and/or legal guardian's) consent. Patient identification was verified, and a caregiver was present when appropriate.   The patient was located at Home: [address]  [place] [address]  Provider was located at Facility (Appt Dept): [address] [place], [place] [address]  Confirm you are appropriately licensed, registered, or certified to deliver care in the state where the patient is located as indicated above. If you are not or unsure, please re-schedule the visit: Yes, I confirm.     Flores Gaines (:  1935) is a Established patient, presenting virtually for evaluation of the following:      Below is the assessment and plan developed based on review of pertinent history, physical exam, labs, studies, and medications.     Assessment & Plan  Bronchitis  Cough with phlegm and runny nose patient was out in the cold weather had with close on couple days ago she declines to go to the emergency room requesting an antibiotic declines to have a flu or COVID testing done states nobody can take me.  Will prescribe Zithromax and prednisone Zyrtec and benzonatate    Orders:    azithromycin (ZITHROMAX) 250 MG tablet; 500mg on day 1 followed by 250mg on days 2 - 5    methylPREDNISolone (MEDROL DOSEPACK) 4 MG tablet; Take by mouth.    benzonatate (TESSALON) 200 MG capsule; Take 1 capsule by mouth 3 times daily as needed for Cough    cetirizine (ZYRTEC) 10 MG tablet; Take 1 tablet by mouth daily    Primary hypertension    Patient on carvedilol advised to take only half a tablet since she has hypotension states her blood pressure today is only 90 advised to hydrate avoid falling, her son will  her medication and assist her should take her to the

## 2025-02-26 ENCOUNTER — TELEPHONE (OUTPATIENT)
Dept: PRIMARY CARE CLINIC | Age: 89
End: 2025-02-26

## 2025-02-26 NOTE — TELEPHONE ENCOUNTER
Flores took her blood pressure pill at 11:30 and then took her blood pressure and read it wrong and thought it was high so she took another pill at 3:30. She would like advice on what to do now.

## 2025-02-26 NOTE — TELEPHONE ENCOUNTER
I spoke to Flores, she took her carvedilol (COREG) 3.125 MG tablet   Take 1 tablet by mouth 2 times daily (with meals)   Early because she thought her heart rate was high.  She woke up and was confused.  Once she was awake she realized what she did.  I told her to go ahead and eat an early dinner and not to take her dose tonight, repeated that several times.  She said she ate a can of soup and she won't take it tonight.  She said her systolic is 108 and she isn't feeling as anxious.

## 2025-02-27 ENCOUNTER — TELEPHONE (OUTPATIENT)
Dept: PRIMARY CARE CLINIC | Age: 89
End: 2025-02-27

## 2025-02-27 DIAGNOSIS — E78.00 PURE HYPERCHOLESTEROLEMIA: ICD-10-CM

## 2025-02-27 NOTE — TELEPHONE ENCOUNTER
Name of Medication(s) Requested:  Requested Prescriptions      No prescriptions requested or ordered in this encounter      atorvastatin (LIPITOR) 40 MG tablet       Medication is on current medication list Yes    Dosage and directions were verified? Yes    Quantity verified: 30 day supply     Pharmacy Verified?  Yes    Last Appointment:  2/6/2025    Future appts:  Future Appointments   Date Time Provider Department Center   4/1/2025  1:00 PM Elliott Villatoro MD ELECTRO [facility]YS [facility]   4/1/2025  3:30 PM Abena Easton MD [facility] DEP        (If no appt send self scheduling link. .REFILLAPPT)  Scheduling request sent?     [] Yes  [] No    Does patient need updated?  [] Yes  [] No    [facility] - [place], [place] - [address] - P [phone] - F [phone] [[id number]]

## 2025-02-28 RX ORDER — ATORVASTATIN CALCIUM 40 MG/1
TABLET, FILM COATED ORAL
Qty: 90 TABLET | Refills: 0 | Status: SHIPPED | OUTPATIENT
Start: 2025-02-28

## 2025-03-03 ENCOUNTER — TELEPHONE (OUTPATIENT)
Dept: NON INVASIVE DIAGNOSTICS | Age: 89
End: 2025-03-03

## 2025-03-03 NOTE — TELEPHONE ENCOUNTER
The patient called with confusion about her appointment on 4/1/2025 with EP. The patient was under the impression she is getting a coronary CTA done and worried about the pills she has to take. I explained to the patient, she does not need to have a coronary CTA and she does not have to take the medication that was prescribed for it. I explained she is seeing Dr. Villatoro for the first time as a new doctor. The patient still seemed a little confused but verbalized she will be here on 4/1/2025 for her appt with Dr Villatoro and not for testing.     Electronically signed by Yuliana Esparza MA on 3/3/2025 at 2:35 PM

## 2025-03-26 ENCOUNTER — TELEPHONE (OUTPATIENT)
Dept: PRIMARY CARE CLINIC | Age: 89
End: 2025-03-26

## 2025-03-31 NOTE — PROGRESS NOTES
[facility] PHYSICIANS- The Heart and Vascular [facility] Electrophysiology  Consultation Report  PATIENT: Flores Gaines  MEDICAL RECORD NUMBER: [id number]  DATE OF SERVICE:  4/1/2025  ATTENDING ELECTROPHYSIOLOGIST: Elliott Villatoro MD  REFERRING PHYSICIAN: Leonid Wynn MD and Abena Easton MD  CHIEF COMPLAINT: Bradycardia     HPI: This is a 89 y.o. female with a history of   Patient Active Problem List   Diagnosis    Anxiety    Uncontrolled hypertension    Gastroesophageal reflux disease without esophagitis    Mixed hyperlipidemia    Diastolic dysfunction    Pure hypercholesterolemia    Personal history of hydronephrosis    Hiatal hernia with GERD    Cerebrovascular accident (CVA) due to stenosis of right carotid artery (HCC)    Stenosis of right carotid artery    Hypertensive emergency    Ischemic stroke (HCC)    Cervicalgia    History of ischemic stroke    Slurred speech    Memory deficit    Ganglion cyst    Weight loss    History of stroke    Chest pain    Pre-syncope    Stroke-like symptoms    Other chest pain    Leukopenia    Macrocytic anemia    Hyperlipidemia   who presents to cardiac electrophysiology clinic for consultation of bradycardia and presyncope. The patient has history of hypertension, hyperlipidemia, GERD, history of a right parietal lobe infarct likely due to R ICA atherosclerotic disease status post CEA 11/27/20, anemia, [place], anxiety and depression. The patient presented to the hospital in October 2024 due to syncope. She was seen by Cardiology and was diagnosed with vision loss and was thought not typical for true syncope. She underwent echocardiogram which showed LV EF of 50-55%. Mild MV prolapses without significant MR. She underwent 14 day MCOT 12/11/24 which showed baseline rhythm normal sinus average heart rate 82 bpm (). Rare PACs. Occasional PVCs mostly isolated 4.7%, with rare couplets, triplets, bigeminy and trigeminy. 7 nonsustained SVT longest 18

## 2025-04-01 ENCOUNTER — OFFICE VISIT (OUTPATIENT)
Dept: NON INVASIVE DIAGNOSTICS | Age: 89
End: 2025-04-01
Payer: MEDICARE

## 2025-04-01 VITALS
WEIGHT: 119.6 LBS | HEART RATE: 85 BPM | HEIGHT: 61 IN | SYSTOLIC BLOOD PRESSURE: 110 MMHG | OXYGEN SATURATION: 95 % | BODY MASS INDEX: 22.58 KG/M2 | DIASTOLIC BLOOD PRESSURE: 64 MMHG | RESPIRATION RATE: 16 BRPM | TEMPERATURE: 97.1 F

## 2025-04-01 DIAGNOSIS — I49.9 IRREGULAR HEART BEAT: Primary | ICD-10-CM

## 2025-04-01 PROCEDURE — 1123F ACP DISCUSS/DSCN MKR DOCD: CPT | Performed by: INTERNAL MEDICINE

## 2025-04-01 PROCEDURE — G8420 CALC BMI NORM PARAMETERS: HCPCS | Performed by: INTERNAL MEDICINE

## 2025-04-01 PROCEDURE — 1159F MED LIST DOCD IN RCRD: CPT | Performed by: INTERNAL MEDICINE

## 2025-04-01 PROCEDURE — 1036F TOBACCO NON-USER: CPT | Performed by: INTERNAL MEDICINE

## 2025-04-01 PROCEDURE — 1090F PRES/ABSN URINE INCON ASSESS: CPT | Performed by: INTERNAL MEDICINE

## 2025-04-01 PROCEDURE — 93000 ELECTROCARDIOGRAM COMPLETE: CPT | Performed by: INTERNAL MEDICINE

## 2025-04-01 PROCEDURE — 99205 OFFICE O/P NEW HI 60 MIN: CPT | Performed by: INTERNAL MEDICINE

## 2025-04-01 PROCEDURE — G8427 DOCREV CUR MEDS BY ELIG CLIN: HCPCS | Performed by: INTERNAL MEDICINE

## 2025-04-02 ENCOUNTER — TELEPHONE (OUTPATIENT)
Dept: NON INVASIVE DIAGNOSTICS | Age: 89
End: 2025-04-02

## 2025-04-02 NOTE — TELEPHONE ENCOUNTER
Please check prior auth.    Date:not scheduled yet    Doc:Khunnawat    Procedure:  ILR insert - MDT    CPT: 14678    ICD: I44.1    Electronically signed by Yuliana Esparza MA on 4/2/2025 at 11:26 AM

## 2025-04-03 ENCOUNTER — TELEPHONE (OUTPATIENT)
Dept: PRIMARY CARE CLINIC | Age: 89
End: 2025-04-03

## 2025-04-03 RX ORDER — GUAIFENESIN 600 MG/1
600 TABLET, EXTENDED RELEASE ORAL 2 TIMES DAILY
Qty: 30 TABLET | Refills: 3 | Status: SHIPPED | OUTPATIENT
Start: 2025-04-03 | End: 2025-04-18

## 2025-04-03 NOTE — TELEPHONE ENCOUNTER
Flores is notifying they are placing a heart monitor on 4/18 at [facility] for 4 or 5 years.  Flores would like a call back regarding this.

## 2025-04-08 ENCOUNTER — TELEPHONE (OUTPATIENT)
Dept: PRIMARY CARE CLINIC | Age: 89
End: 2025-04-08

## 2025-04-08 ENCOUNTER — TELEPHONE (OUTPATIENT)
Dept: NON INVASIVE DIAGNOSTICS | Age: 89
End: 2025-04-08

## 2025-04-08 NOTE — TELEPHONE ENCOUNTER
She called back and it is noted in another encounter.  She was told to proceed with the monitor, but she still wants to speak with Dr Easton.

## 2025-04-08 NOTE — TELEPHONE ENCOUNTER
Returned patient phone call regarding arrival  time for upcoming procedure on 04/18/2025. Patient also was told to follow up with PCP about a skin tear on the chest area. Patient was asking about skin creams. Patient agreed and understood instructions.

## 2025-04-08 NOTE — TELEPHONE ENCOUNTER
Flores says she can't take mucinex      She also said she needs a refill on vallium.     She asked that Dr. Easton give her a call back regarding the monitor and also regarding an issue that happened at the hospital.

## 2025-04-09 ENCOUNTER — TELEMEDICINE (OUTPATIENT)
Dept: PRIMARY CARE CLINIC | Age: 89
End: 2025-04-09
Payer: MEDICARE

## 2025-04-09 DIAGNOSIS — Z86.73 HISTORY OF STROKE: ICD-10-CM

## 2025-04-09 DIAGNOSIS — I49.9 IRREGULAR HEART BEAT: ICD-10-CM

## 2025-04-09 DIAGNOSIS — F41.9 ANXIETY: Primary | Chronic | ICD-10-CM

## 2025-04-09 DIAGNOSIS — E78.2 MIXED HYPERLIPIDEMIA: Chronic | ICD-10-CM

## 2025-04-09 DIAGNOSIS — J31.0 CHRONIC RHINITIS: ICD-10-CM

## 2025-04-09 PROCEDURE — 99213 OFFICE O/P EST LOW 20 MIN: CPT | Performed by: INTERNAL MEDICINE

## 2025-04-09 RX ORDER — CETIRIZINE HYDROCHLORIDE 10 MG/1
5 TABLET ORAL DAILY
Qty: 45 TABLET | Refills: 0 | Status: SHIPPED | OUTPATIENT
Start: 2025-04-09

## 2025-04-09 RX ORDER — DIAZEPAM 2 MG/1
2 TABLET ORAL EVERY 6 HOURS PRN
Qty: 30 TABLET | Refills: 1 | Status: SHIPPED | OUTPATIENT
Start: 2025-04-09 | End: 2025-04-19

## 2025-04-09 NOTE — ASSESSMENT & PLAN NOTE
Chronic, at goal (stable), continue current treatment plan  Continue atorvastatin 40 mg daily and monitor liver function and LDL  Orders:    CBC with Auto Differential; Future    Comprehensive Metabolic Panel; Future    Lipid Panel; Future    TSH; Future

## 2025-04-09 NOTE — ASSESSMENT & PLAN NOTE
Chronic, at goal (stable), continue current treatment plan    Orders:    diazePAM (VALIUM) 2 MG tablet; Take 1 tablet by mouth every 6 hours as needed for Anxiety for up to 10 days. Max Daily Amount: 8 mg  PDMP reviewed and discussed with the patient patient is using the medications appropriately she is aware of the habit-forming effect of medication

## 2025-04-17 PROCEDURE — 33285 INSJ SUBQ CAR RHYTHM MNTR: CPT | Performed by: INTERNAL MEDICINE

## 2025-04-17 NOTE — H&P
[facility] PHYSICIANS- The Heart and Vascular [facility] Electrophysiology  Consultation Report  PATIENT: Flores Gaines  MEDICAL RECORD NUMBER: [id number]  DATE OF SERVICE:  4/18/2025  ATTENDING ELECTROPHYSIOLOGIST: Elliott Villatoro MD  REFERRING PHYSICIAN: No ref. provider found and Abena Easton MD  CHIEF COMPLAINT: Bradycardia     HPI: This is a 89 y.o. female with a history of   Patient Active Problem List   Diagnosis    Anxiety    Uncontrolled hypertension    Gastroesophageal reflux disease without esophagitis    Mixed hyperlipidemia    Diastolic dysfunction    Pure hypercholesterolemia    Personal history of hydronephrosis    Hiatal hernia with GERD    Cerebrovascular accident (CVA) due to stenosis of right carotid artery (HCC)    Stenosis of right carotid artery    Hypertensive emergency    Ischemic stroke (HCC)    Cervicalgia    History of ischemic stroke    Slurred speech    Memory deficit    Ganglion cyst    Weight loss    History of stroke    Chest pain    Pre-syncope    Stroke-like symptoms    Other chest pain    Leukopenia    Macrocytic anemia    Hyperlipidemia   who presents to cardiac electrophysiology clinic for consultation of bradycardia and presyncope. The patient has history of hypertension, hyperlipidemia, GERD, history of a right parietal lobe infarct likely due to R ICA atherosclerotic disease status post CEA 11/27/20, anemia, [place], anxiety and depression. The patient presented to the hospital in October 2024 due to syncope. She was seen by Cardiology and was diagnosed with vision loss and was thought not typical for true syncope. She underwent echocardiogram which showed LV EF of 50-55%. Mild MV prolapses without significant MR. She underwent 14 day MCOT 12/11/24 which showed baseline rhythm normal sinus average heart rate 82 bpm (). Rare PACs. Occasional PVCs mostly isolated 4.7%, with rare couplets, triplets, bigeminy and trigeminy. 7 nonsustained SVT longest 18

## 2025-04-18 ENCOUNTER — HOSPITAL ENCOUNTER (OUTPATIENT)
Age: 89
Setting detail: OUTPATIENT SURGERY
Discharge: HOME | End: 2025-04-18
Attending: INTERNAL MEDICINE | Admitting: INTERNAL MEDICINE
Payer: MEDICARE

## 2025-04-18 VITALS
HEART RATE: 71 BPM | OXYGEN SATURATION: 98 % | RESPIRATION RATE: 19 BRPM | DIASTOLIC BLOOD PRESSURE: 67 MMHG | TEMPERATURE: 97.9 F | SYSTOLIC BLOOD PRESSURE: 127 MMHG

## 2025-04-18 DIAGNOSIS — I44.1 MOBITZ TYPE II ATRIOVENTRICULAR BLOCK: ICD-10-CM

## 2025-04-18 PROBLEM — I44.39 HIGH-GRADE ATRIOVENTRICULAR BLOCK: Status: ACTIVE | Noted: 2025-04-18

## 2025-04-18 PROCEDURE — 2709999900 HC NON-CHARGEABLE SUPPLY: Performed by: INTERNAL MEDICINE

## 2025-04-18 PROCEDURE — 6360000002 HC RX W HCPCS: Performed by: INTERNAL MEDICINE

## 2025-04-18 PROCEDURE — C1764 EVENT RECORDER, CARDIAC: HCPCS | Performed by: INTERNAL MEDICINE

## 2025-04-18 PROCEDURE — 7100000010 HC PHASE II RECOVERY - FIRST 15 MIN: Performed by: INTERNAL MEDICINE

## 2025-04-18 PROCEDURE — 33285 INSJ SUBQ CAR RHYTHM MNTR: CPT | Performed by: INTERNAL MEDICINE

## 2025-04-18 PROCEDURE — 7100000011 HC PHASE II RECOVERY - ADDTL 15 MIN: Performed by: INTERNAL MEDICINE

## 2025-04-18 DEVICE — ICM LNQ22 LINQ II USA
Type: IMPLANTABLE DEVICE | Status: FUNCTIONAL
Brand: LINQ II™

## 2025-04-18 ASSESSMENT — PAIN - FUNCTIONAL ASSESSMENT: PAIN_FUNCTIONAL_ASSESSMENT: NONE - DENIES PAIN

## 2025-04-18 NOTE — PROGRESS NOTES
Pt dressed and ready for discharge.  Discharge instructions given to pt and son.  Both verbalized an understanding.Questions answered.  No further questions at this time.

## 2025-04-18 NOTE — DISCHARGE INSTRUCTIONS
[facility]  Implantable Cardiac Monitor Discharge Instructions For Patients      Medications:  Resume all prescribed medications.    Follow-Up: You will be seen on Friday 5/2/25 at 2:00 pm at the [facility] for an incision and device check.  Please call the office if you need to reschedule this appointment. The number is [phone]     Incision Care:  Leave the brown AquaCel dressing on for 7 days.  Remove AquaCel dressing on Friday 4/25/25, but do not remove the Steri-Strips from your incision. They will fall off on their own, or they will be removed at your follow-up appointment.   You may shower starting tomorrow, but do not let the water run directly on the incision  for 7 days.   Check the area daily. If you find any redness, swelling, drainage, warmth, or have a fever greater than 100 degrees, notify the office immediately at the number listed below.     Activity: You may continue regular daily activities, but try to prevent any hard blows to the incision site.    Driving: No driving until the day after your procedure.    Special Instructions: Let your dentist, doctor, or medical specialist know that you have an implantable cardiac monitor so precautions can be taken to protect the device. There is no need to take antibiotics prior to dental procedures. Your device is considered to be \"MRI conditional,\" meaning that under certain circumstances, MRI exams may be performed immediately post implantation. Your device may set off a metal detector, such as those used in airports and courtrooms. Let security know you have an implantable cardiac monitor and show them your ID card.    ID Card: You will have a temporary ID card until a permanent card is sent to you by the device company. The permanent card will look like a ’s license or credit card and should arrive within 8 weeks. Carry your ID card with you at all times.     [facility]:   [phone]

## 2025-04-23 ENCOUNTER — TELEPHONE (OUTPATIENT)
Age: 89
End: 2025-04-23

## 2025-04-23 NOTE — TELEPHONE ENCOUNTER
I called Flores to see how she's doing since her Linq insertion on 4/18/25.  She states she's doing well. Denies any bleeding, drainage, or swelling from insertion site. Aquacel dressing to be removed on Friday 4/25/25 and reminded her to leave steri strips intact. I reminded her of her follow up appt @ the Device Clinic on 5/2/25 at 2:00 pm.  She offers no complaints and is thankful for the call.

## 2025-04-28 ENCOUNTER — TELEPHONE (OUTPATIENT)
Dept: NON INVASIVE DIAGNOSTICS | Age: 89
End: 2025-04-28

## 2025-04-28 NOTE — TELEPHONE ENCOUNTER
I returned patient's call. She needs to move her appointment on Friday, 5/2/2025 to 1530. I informed Flores the nursing staff leaves at 3:30. We need to bring her in before 3:30. I asked if family can bring her in on another day. She states they are off the next day, Saturday. I told her the office is closed on the weekends. She will check with her family regarding the 2:00 appt on 5/2/2025.    Sheila Tenorio RN, BSN  [facility] Heart and Vascular [place]   Device Clinic

## 2025-05-02 ENCOUNTER — CLINICAL SUPPORT (OUTPATIENT)
Dept: NON INVASIVE DIAGNOSTICS | Age: 89
End: 2025-05-02

## 2025-05-02 DIAGNOSIS — I49.9 IRREGULAR HEART BEAT: Primary | ICD-10-CM

## 2025-05-02 NOTE — PROGRESS NOTES
Steri strips removed from left mid  chest incision. Incision well approximated, free of erythema, edema, and drainage. Minimal ecchymosis noted beneath ILR implant site. Ana Chairez MA did device interrogation and instructions on wound care and remote monitoring.    Sheila Tenorio RN, BSN  [facility] Heart and Vascular [place]   Device Clinic

## 2025-05-02 NOTE — PATIENT INSTRUCTIONS
You may shower starting now    Call if any signs or symptoms of infection [phone] ext: [phone]  Fevers, chills, redness, swelling or drainage.       Hook up home  Monitor      [employer] Stay connected: [phone]

## 2025-05-04 DIAGNOSIS — I10 PRIMARY HYPERTENSION: ICD-10-CM

## 2025-05-05 ENCOUNTER — TELEPHONE (OUTPATIENT)
Dept: PRIMARY CARE CLINIC | Age: 89
End: 2025-05-05

## 2025-05-05 RX ORDER — CARVEDILOL 3.12 MG/1
3.12 TABLET ORAL 2 TIMES DAILY WITH MEALS
Qty: 60 TABLET | Refills: 3 | Status: SHIPPED | OUTPATIENT
Start: 2025-05-05

## 2025-05-05 RX ORDER — ASPIRIN 81 MG
TABLET,CHEWABLE ORAL
Qty: 90 TABLET | Refills: 0 | Status: SHIPPED | OUTPATIENT
Start: 2025-05-05

## 2025-05-05 NOTE — TELEPHONE ENCOUNTER
Name of Medication(s) Requested:  Requested Prescriptions     Pending Prescriptions Disp Refills    carvedilol (COREG) 3.125 MG tablet 60 tablet 3     Sig: Take 1 tablet by mouth 2 times daily (with meals)       Medication is on current medication list Yes    Dosage and directions were verified? Yes    Quantity verified: 30 day supply     Pharmacy Verified?  Yes    Last Appointment:  4/9/2025    Future appts:  No future appointments.     (If no appt send self scheduling link. .REFILLAPPT)  Scheduling request sent?     [] Yes  [x] No    Does patient need updated?  [] Yes  [x] No

## 2025-05-18 DIAGNOSIS — E78.00 PURE HYPERCHOLESTEROLEMIA: ICD-10-CM

## 2025-05-19 NOTE — TELEPHONE ENCOUNTER
Name of Medication(s) Requested:  Requested Prescriptions     Pending Prescriptions Disp Refills    atorvastatin (LIPITOR) 40 MG tablet [Pharmacy Med Name: ATORVASTATIN 40 MG TABLET] 90 tablet 0     Sig: TAKE ONE TABLET BY MOUTH EVERY DAY AT NIGHT       Medication is on current medication list Yes    Dosage and directions were verified? Yes    Quantity verified: 90 day supply     Pharmacy Verified?  Yes    Last Appointment:  4/9/2025    Future appts:  No future appointments.     (If no appt send self scheduling link. .REFILLAPPT)  Scheduling request sent?     [] Yes  [x] No    Does patient need updated?  [] Yes  [x] No

## 2025-05-21 RX ORDER — ATORVASTATIN CALCIUM 40 MG/1
TABLET, FILM COATED ORAL
Qty: 90 TABLET | Refills: 0 | Status: SHIPPED | OUTPATIENT
Start: 2025-05-21

## 2025-05-29 DIAGNOSIS — E78.00 PURE HYPERCHOLESTEROLEMIA: ICD-10-CM

## 2025-05-29 RX ORDER — ATORVASTATIN CALCIUM 40 MG/1
TABLET, FILM COATED ORAL
Qty: 90 TABLET | Refills: 0 | OUTPATIENT
Start: 2025-05-29

## 2025-06-17 ENCOUNTER — TELEPHONE (OUTPATIENT)
Dept: PRIMARY CARE CLINIC | Age: 89
End: 2025-06-17

## 2025-06-17 NOTE — TELEPHONE ENCOUNTER
She would have to be examined to answer to her question she can either schedule in the office tomorrow or go to the emergency room

## 2025-06-17 NOTE — TELEPHONE ENCOUNTER
Flores stated she hasn't eaten anything today and drank a little lemonade.  I encouraged her to eat and drink.  If blood pressure gets any lower or she feels bad instructed to go to the ED.  Can call tomorrow to see if she can come in.

## 2025-06-17 NOTE — TELEPHONE ENCOUNTER
Flores is concerned about her her heart rate and blood pressure. 91/56  and heart rate is 100.  She is concerned why her heart rate is so high.  She said that she has not taken any prescription medication. Whe would like to know why this is happenening?

## 2025-06-20 ENCOUNTER — TELEPHONE (OUTPATIENT)
Dept: NON INVASIVE DIAGNOSTICS | Age: 89
End: 2025-06-20

## 2025-06-20 NOTE — TELEPHONE ENCOUNTER
I returned the patient's VM. The patient had a device procedure the end of April and just wanted to make sure it was safe to wear a bra. I spoke with the patient and advised she is able to wear a bra. The patient verbalized understanding.    Electronically signed by Yuliaan Esparza MA on 6/20/2025 at 2:44 PM

## 2025-07-07 ENCOUNTER — TELEPHONE (OUTPATIENT)
Dept: PRIMARY CARE CLINIC | Age: 89
End: 2025-07-07

## 2025-07-07 NOTE — TELEPHONE ENCOUNTER
Flores would like an antibiotic prescribed for her. She has a knot in her stomach, she believes it is remnants from the head cold that she had.

## 2025-07-08 NOTE — TELEPHONE ENCOUNTER
Please notify the patient cannot write an antibiotic for a knot in the stomach I would have to see the patient okay to add her to my schedule tomorrow

## 2025-07-09 NOTE — TELEPHONE ENCOUNTER
Patient is scheduled on Wednesday 7/16. She is unable to come sooner as she needed to wait for a ride.

## 2025-07-16 ENCOUNTER — OFFICE VISIT (OUTPATIENT)
Dept: PRIMARY CARE CLINIC | Age: 89
End: 2025-07-16
Payer: MEDICARE

## 2025-07-16 VITALS
WEIGHT: 117 LBS | HEART RATE: 82 BPM | TEMPERATURE: 97.4 F | OXYGEN SATURATION: 96 % | BODY MASS INDEX: 22.09 KG/M2 | DIASTOLIC BLOOD PRESSURE: 60 MMHG | SYSTOLIC BLOOD PRESSURE: 98 MMHG | HEIGHT: 61 IN

## 2025-07-16 DIAGNOSIS — R10.30 LOWER ABDOMINAL PAIN: Primary | ICD-10-CM

## 2025-07-16 DIAGNOSIS — D64.9 ANEMIA, UNSPECIFIED TYPE: ICD-10-CM

## 2025-07-16 DIAGNOSIS — Z95.818 IMPLANTABLE LOOP RECORDER PRESENT: ICD-10-CM

## 2025-07-16 DIAGNOSIS — Z86.73 HISTORY OF STROKE: ICD-10-CM

## 2025-07-16 DIAGNOSIS — E78.2 MIXED HYPERLIPIDEMIA: ICD-10-CM

## 2025-07-16 DIAGNOSIS — R00.0 TACHYCARDIA: ICD-10-CM

## 2025-07-16 DIAGNOSIS — I95.2 HYPOTENSION DUE TO DRUGS: ICD-10-CM

## 2025-07-16 PROCEDURE — 1123F ACP DISCUSS/DSCN MKR DOCD: CPT | Performed by: INTERNAL MEDICINE

## 2025-07-16 PROCEDURE — G8427 DOCREV CUR MEDS BY ELIG CLIN: HCPCS | Performed by: INTERNAL MEDICINE

## 2025-07-16 PROCEDURE — 1159F MED LIST DOCD IN RCRD: CPT | Performed by: INTERNAL MEDICINE

## 2025-07-16 PROCEDURE — 1036F TOBACCO NON-USER: CPT | Performed by: INTERNAL MEDICINE

## 2025-07-16 PROCEDURE — G8420 CALC BMI NORM PARAMETERS: HCPCS | Performed by: INTERNAL MEDICINE

## 2025-07-16 PROCEDURE — 99214 OFFICE O/P EST MOD 30 MIN: CPT | Performed by: INTERNAL MEDICINE

## 2025-07-16 PROCEDURE — 1090F PRES/ABSN URINE INCON ASSESS: CPT | Performed by: INTERNAL MEDICINE

## 2025-07-16 RX ORDER — DICYCLOMINE HYDROCHLORIDE 10 MG/1
10 CAPSULE ORAL 2 TIMES DAILY PRN
Qty: 60 CAPSULE | Refills: 0 | Status: SHIPPED | OUTPATIENT
Start: 2025-07-16

## 2025-07-16 NOTE — PROGRESS NOTES
unspecified type  -     CBC with Auto Differential; Future  -     Comprehensive Metabolic Panel; Future  -     TSH; Future  4. Mixed hyperlipidemia  -     CBC with Auto Differential; Future  -     Comprehensive Metabolic Panel; Future  -     TSH; Future  -     POC US ABDOMEN COMPLETE; Future  -     Lipid Panel; Future  5. History of stroke  6. Tachycardia  7. Implantable loop recorder present

## 2025-07-21 ENCOUNTER — HOSPITAL ENCOUNTER (OUTPATIENT)
Age: 89
Discharge: HOME | End: 2025-07-21
Payer: MEDICARE

## 2025-07-21 ENCOUNTER — TELEPHONE (OUTPATIENT)
Dept: PRIMARY CARE CLINIC | Age: 89
End: 2025-07-21

## 2025-07-21 DIAGNOSIS — R10.30 LOWER ABDOMINAL PAIN: ICD-10-CM

## 2025-07-21 DIAGNOSIS — I95.2 HYPOTENSION DUE TO DRUGS: ICD-10-CM

## 2025-07-21 DIAGNOSIS — E78.2 MIXED HYPERLIPIDEMIA: ICD-10-CM

## 2025-07-21 DIAGNOSIS — D64.9 ANEMIA, UNSPECIFIED TYPE: ICD-10-CM

## 2025-07-21 LAB
ALBUMIN SERPL-MCNC: 3.9 G/DL (ref 3.5–5.2)
ALP SERPL-CCNC: 72 U/L (ref 35–104)
ALT SERPL-CCNC: <5 U/L (ref 0–35)
ANION GAP SERPL CALCULATED.3IONS-SCNC: 10 MMOL/L (ref 7–16)
AST SERPL-CCNC: 10 U/L (ref 0–35)
BASOPHILS # BLD: 0.01 K/UL (ref 0–0.2)
BASOPHILS NFR BLD: 0 % (ref 0–2)
BILIRUB SERPL-MCNC: 0.6 MG/DL (ref 0–1.2)
BUN SERPL-MCNC: 15 MG/DL (ref 8–23)
CALCIUM SERPL-MCNC: 9 MG/DL (ref 8.8–10.2)
CHLORIDE SERPL-SCNC: 108 MMOL/L (ref 98–107)
CHOLEST SERPL-MCNC: 86 MG/DL
CO2 SERPL-SCNC: 24 MMOL/L (ref 22–29)
CREAT SERPL-MCNC: 0.9 MG/DL (ref 0.5–1)
EOSINOPHIL # BLD: 0.03 K/UL (ref 0.05–0.5)
EOSINOPHILS RELATIVE PERCENT: 1 % (ref 0–6)
ERYTHROCYTE [DISTWIDTH] IN BLOOD BY AUTOMATED COUNT: 14.3 % (ref 11.5–15)
GFR, ESTIMATED: 63 ML/MIN/1.73M2
GLUCOSE SERPL-MCNC: 97 MG/DL (ref 74–99)
HCT VFR BLD AUTO: 34.3 % (ref 34–48)
HDLC SERPL-MCNC: 35 MG/DL
HGB BLD-MCNC: 11.2 G/DL (ref 11.5–15.5)
IMM GRANULOCYTES # BLD AUTO: <0.03 K/UL (ref 0–0.58)
IMM GRANULOCYTES NFR BLD: 0 % (ref 0–5)
LDLC SERPL CALC-MCNC: 35 MG/DL
LYMPHOCYTES NFR BLD: 0.96 K/UL (ref 1.5–4)
LYMPHOCYTES RELATIVE PERCENT: 27 % (ref 20–42)
MCH RBC QN AUTO: 34.8 PG (ref 26–35)
MCHC RBC AUTO-ENTMCNC: 32.7 G/DL (ref 32–34.5)
MCV RBC AUTO: 106.5 FL (ref 80–99.9)
MONOCYTES NFR BLD: 0.87 K/UL (ref 0.1–0.95)
MONOCYTES NFR BLD: 24 % (ref 2–12)
NEUTROPHILS NFR BLD: 47 % (ref 43–80)
NEUTS SEG NFR BLD: 1.68 K/UL (ref 1.8–7.3)
PLATELET # BLD AUTO: 240 K/UL (ref 130–450)
PMV BLD AUTO: 11.1 FL (ref 7–12)
POTASSIUM SERPL-SCNC: 4.2 MMOL/L (ref 3.5–5.1)
PROT SERPL-MCNC: 6.5 G/DL (ref 6.4–8.3)
RBC # BLD AUTO: 3.22 M/UL (ref 3.5–5.5)
SODIUM SERPL-SCNC: 142 MMOL/L (ref 136–145)
TRIGL SERPL-MCNC: 83 MG/DL
TSH SERPL DL<=0.05 MIU/L-ACNC: 1.72 UIU/ML (ref 0.27–4.2)
VLDLC SERPL CALC-MCNC: 17 MG/DL
WBC OTHER # BLD: 3.6 K/UL (ref 4.5–11.5)

## 2025-07-21 PROCEDURE — 84443 ASSAY THYROID STIM HORMONE: CPT

## 2025-07-21 PROCEDURE — 80053 COMPREHEN METABOLIC PANEL: CPT

## 2025-07-21 PROCEDURE — 80061 LIPID PANEL: CPT

## 2025-07-21 PROCEDURE — 85025 COMPLETE CBC W/AUTO DIFF WBC: CPT

## 2025-07-21 PROCEDURE — 36415 COLL VENOUS BLD VENIPUNCTURE: CPT

## 2025-07-21 ASSESSMENT — ENCOUNTER SYMPTOMS
SHORTNESS OF BREATH: 0
CHEST TIGHTNESS: 0
VOMITING: 0
COLOR CHANGE: 0
ABDOMINAL PAIN: 1
BACK PAIN: 0
DIARRHEA: 1
SORE THROAT: 0
NAUSEA: 0
WHEEZING: 0
COUGH: 0
VOICE CHANGE: 0
BLOOD IN STOOL: 0

## 2025-07-21 NOTE — TELEPHONE ENCOUNTER
Please notify the patient there is an order for ultrasound on the abdomen that she did not do yet and the blood work also not done yet, both of these have to be completed ASAP, okay to switch the ultrasound to a stat if they cannot schedule her soon.  I will not give any antibiotics until I see these results if the pain is severe she should go to the emergency room.

## 2025-07-21 NOTE — TELEPHONE ENCOUNTER
Flores wants to notify  that the pill she gave her for her stomach is not helping her. She said she needs a antibiotic to get rid of the bacteria in her stomach not a pain pill. She also wanted to let dr know she was going to get her blood work done this afternoon at the hospital

## 2025-07-25 ENCOUNTER — TELEPHONE (OUTPATIENT)
Dept: PRIMARY CARE CLINIC | Age: 89
End: 2025-07-25

## 2025-07-25 DIAGNOSIS — J31.0 CHRONIC RHINITIS: ICD-10-CM

## 2025-07-25 NOTE — TELEPHONE ENCOUNTER
Flores is asking what she can take to get her blood pressure up. She states its been like this everyday for the last couple of weeks. Her last one was 117/63 and a few other ones have been 122/65, 11/67,120/57. She states she's drinking orange juice to see if this is helpful.

## 2025-07-28 DIAGNOSIS — I10 PRIMARY HYPERTENSION: ICD-10-CM

## 2025-07-28 NOTE — TELEPHONE ENCOUNTER
Name of Medication(s) Requested:  Requested Prescriptions     Pending Prescriptions Disp Refills    cetirizine (ZYRTEC) 10 MG tablet [Pharmacy Med Name: CETIRIZINE HCL 10 MG TABLET] 45 tablet 0     Sig: TAKE 1/2 TABLET BY MOUTH EVERY DAY       Medication is on current medication list Yes    Dosage and directions were verified? Yes    Quantity verified: 90 day supply     Pharmacy Verified?  Yes    Last Appointment:  7/16/2025    Future appts:  Future Appointments   Date Time Provider Department Center   8/14/2025  6:15 PM [facility] US RM 2 [facility] Radiolog        (If no appt send self scheduling link. .REFILLAPPT)  Scheduling request sent?     [] Yes  [x] No    Does patient need updated?  [] Yes  [x] No

## 2025-07-28 NOTE — TELEPHONE ENCOUNTER
If patient tested positive for COVID she should have further evaluation recommend she have video visit they can further evaluate her to see if she might be a candidate for antiviral treatment they will also need to do an updated medication interaction review.   Please help patient get set up with on demand video visit through the [facility]   Name of Medication(s) Requested:  Requested Prescriptions     Pending Prescriptions Disp Refills    ASPIRIN LOW DOSE 81 MG chewable tablet [Pharmacy Med Name: ASPIRIN 81 MG CHEWABLE TABLET] 90 tablet 0     Sig: CHEW 1 TABLET BY MOUTH ONCE DAILY       Medication is on current medication list Yes    Dosage and directions were verified? Yes    Quantity verified: 90 day supply     Pharmacy Verified?  Yes    Last Appointment:  7/16/2025    Future appts:  Future Appointments   Date Time Provider Department Center   8/14/2025  6:15 PM [facility] US RM 2 [facility] Radiolog        (If no appt send self scheduling link. .REFILLAPPT)  Scheduling request sent?     [] Yes  [x] No    Does patient need updated?  [] Yes  [x] No

## 2025-07-29 RX ORDER — CETIRIZINE HYDROCHLORIDE 10 MG/1
5 TABLET ORAL DAILY
Qty: 45 TABLET | Refills: 0 | Status: SHIPPED | OUTPATIENT
Start: 2025-07-29

## 2025-07-29 RX ORDER — ASPIRIN 81 MG
TABLET,CHEWABLE ORAL
Qty: 90 TABLET | Refills: 0 | Status: SHIPPED | OUTPATIENT
Start: 2025-07-29

## 2025-08-07 ENCOUNTER — TELEPHONE (OUTPATIENT)
Dept: PRIMARY CARE CLINIC | Age: 89
End: 2025-08-07

## 2025-08-11 ENCOUNTER — TELEPHONE (OUTPATIENT)
Dept: NON INVASIVE DIAGNOSTICS | Age: 89
End: 2025-08-11

## 2025-08-13 ENCOUNTER — TELEPHONE (OUTPATIENT)
Dept: PRIMARY CARE CLINIC | Age: 89
End: 2025-08-13

## 2025-08-13 DIAGNOSIS — E53.8 B12 DEFICIENCY: Primary | ICD-10-CM

## 2025-08-24 DIAGNOSIS — E78.00 PURE HYPERCHOLESTEROLEMIA: ICD-10-CM

## 2025-08-26 RX ORDER — ATORVASTATIN CALCIUM 40 MG/1
TABLET, FILM COATED ORAL
Qty: 90 TABLET | Refills: 0 | Status: SHIPPED | OUTPATIENT
Start: 2025-08-26

## 2025-09-05 RX ORDER — FERROUS SULFATE 325(65) MG
325 TABLET ORAL EVERY OTHER DAY
Qty: 90 TABLET | Refills: 1 | Status: SHIPPED | OUTPATIENT
Start: 2025-09-05

## (undated) DEVICE — TOWEL,OR,DSP,ST,BLUE,STD,6/PK,12PK/CS: Brand: MEDLINE

## (undated) DEVICE — HOSE CONN FOR WST MGMT SYS NEPTUNE 2

## (undated) DEVICE — MAGNETIC INSTR DRAPE 20X16: Brand: MEDLINE INDUSTRIES, INC.

## (undated) DEVICE — CATHETER URETH 22FR L16IN LTX INTMIT ROB MOD BARDX

## (undated) DEVICE — SURGICAL PROCEDURE PACK VASC MAJ CUST

## (undated) DEVICE — 72" ARTERIAL PRESSURE TUBING: Brand: ICU MEDICAL

## (undated) DEVICE — DRESSING HYDROFIBER AQUACEL AG ADVANT 3.5X4 IN

## (undated) DEVICE — GOWN,SIRUS,FABRNF,L,20/CS: Brand: MEDLINE

## (undated) DEVICE — PACK,LAPAROTOMY,NO GOWNS: Brand: MEDLINE

## (undated) DEVICE — BLADE CLIPPER GEN PURP NS

## (undated) DEVICE — Z INACTIVE USE 2635503 SOLUTION IRRIG 3000ML ST H2O USP UROMATIC PLAS CONT

## (undated) DEVICE — SET SURG INSTR ART III

## (undated) DEVICE — GOWN,SIRUS,FABRNF,XL,20/CS: Brand: MEDLINE

## (undated) DEVICE — SYRINGE MED 3ML CLR PLAS STD N CTRL LUERLOCK TIP DISP

## (undated) DEVICE — MEDIA CONTRAST ISOVUE  300 10X50ML

## (undated) DEVICE — PATIENT RETURN ELECTRODE, SINGLE-USE, CONTACT QUALITY MONITORING, ADULT, WITH 9FT CORD, FOR PATIENTS WEIGING OVER 33LBS. (15KG): Brand: MEGADYNE

## (undated) DEVICE — CYSTO PACK: Brand: MEDLINE INDUSTRIES, INC.

## (undated) DEVICE — CLIP INT SM TI EZ LD LIG SYS WECK HORZ

## (undated) DEVICE — Z INACTIVE USE 2660664 SOLUTION IRRIG 3000ML 0.9% SOD CHL USP UROMATIC PLAS CONT

## (undated) DEVICE — DOUBLE BASIN SET: Brand: MEDLINE INDUSTRIES, INC.

## (undated) DEVICE — LABEL MED 4 IN SURG PANEL W/ PEN STRL

## (undated) DEVICE — SET INSTR ART 1

## (undated) DEVICE — GARMENT,MEDLINE,DVT,INT,CALF,MED, GEN2: Brand: MEDLINE

## (undated) DEVICE — Z DUP USE 2139333 GUIDEWIRE UROLOGICAL STR STD 0.035 IN X150 CM REG ZIPWIRE LF

## (undated) DEVICE — LOOP VES W25MM THK1MM MAXI RED SIL FLD REPELLENT 100 PER

## (undated) DEVICE — 24" (61 CM) ARTERIAL PRESSURE TUBING: Brand: ICU MEDICAL

## (undated) DEVICE — NEEDLE HYPO 21GA L1.5IN GRN POLYPR HUB S STL REG BVL STR

## (undated) DEVICE — NEEDLE HYPO 18GA L1.5IN PNK POLYPR HUB S STL REG BVL STR

## (undated) DEVICE — NEEDLE HYPO 25GA L0.625IN BLU POLYPR HUB S STL REG BVL STR

## (undated) DEVICE — CATHETER ETER IV 20GA L1IN POLYUR STR RADPQ INTROCAN SFTY

## (undated) DEVICE — Z DUP USE 2257490 ADHESIVE SKIN CLSRE 036ML TPCL 2CTL CNCRLTE HIGH VSCSTY DRMB

## (undated) DEVICE — GLOVE SURG SZ 75 L12IN FNGR THK94MIL TRNSLUC YEL LTX

## (undated) DEVICE — GAUZE SPONGES,8 PLY: Brand: CURITY

## (undated) DEVICE — BAG DRNGE COMB PK

## (undated) DEVICE — CLOTH SURG PREP PREOPERATIVE CHLORHEXIDINE GLUC 2% READYPREP

## (undated) DEVICE — PAD, DEFIB, ADULT, RADIOTRAN, PHYSIO, LO: Brand: MEDLINE

## (undated) DEVICE — SOLUTION IV IRRIG 500ML 0.9% SODIUM CHL 2F7123

## (undated) DEVICE — CATHETER URET 5FR L70CM OPN END SGL LUMN INJ HUB FLEXIMA

## (undated) DEVICE — TOTAL TRAY, 16FR 10ML SIL FOLEY, URN: Brand: MEDLINE

## (undated) DEVICE — STAPLER SKIN L39MM DIA0.53MM CRWN 5.7MM S STL FIX HD PROX

## (undated) DEVICE — Z INACTIVE USE 2535480 CLIP LIG M BLU TI HRT SHP WIRE HORZ 180 PER BX

## (undated) DEVICE — TOWEL,OR,DSP,ST,WHITE,DLX,4/PK,20PK/CS: Brand: MEDLINE

## (undated) DEVICE — SOLUTION SURG PREP ANTIMICROBIAL 4 OZ SKIN WND EXIDINE

## (undated) DEVICE — GLOVE ORANGE PI 8   MSG9080

## (undated) DEVICE — NEEDLE HYPO 26GA L0.625IN TAN POLYPR HUB S STL REG BVL STR

## (undated) DEVICE — 1.5 FR, 120 CM, NITI TIPLESS STONE BASKET: Brand: HALO

## (undated) DEVICE — GAUZE,SPONGE,4"X4",8PLY,STRL,LF,10/TRAY: Brand: MEDLINE